# Patient Record
Sex: FEMALE | Race: OTHER | HISPANIC OR LATINO | Employment: FULL TIME | URBAN - METROPOLITAN AREA
[De-identification: names, ages, dates, MRNs, and addresses within clinical notes are randomized per-mention and may not be internally consistent; named-entity substitution may affect disease eponyms.]

---

## 2017-02-23 ENCOUNTER — ALLSCRIPTS OFFICE VISIT (OUTPATIENT)
Dept: OTHER | Facility: OTHER | Age: 22
End: 2017-02-23

## 2017-05-26 ENCOUNTER — HOSPITAL ENCOUNTER (EMERGENCY)
Facility: HOSPITAL | Age: 22
Discharge: HOME | End: 2017-05-26
Attending: EMERGENCY MEDICINE | Admitting: EMERGENCY MEDICINE
Payer: COMMERCIAL

## 2017-05-26 ENCOUNTER — APPOINTMENT (EMERGENCY)
Dept: RADIOLOGY | Facility: HOSPITAL | Age: 22
End: 2017-05-26
Payer: COMMERCIAL

## 2017-05-26 VITALS
WEIGHT: 140 LBS | OXYGEN SATURATION: 98 % | RESPIRATION RATE: 18 BRPM | TEMPERATURE: 97.9 F | DIASTOLIC BLOOD PRESSURE: 59 MMHG | SYSTOLIC BLOOD PRESSURE: 111 MMHG | HEART RATE: 107 BPM

## 2017-05-26 DIAGNOSIS — R10.9 ABDOMINAL PAIN: Primary | ICD-10-CM

## 2017-05-26 LAB
BACTERIA UR QL AUTO: ABNORMAL /HPF
BILIRUB UR QL STRIP: NEGATIVE
CLARITY UR: CLEAR
COLOR UR: YELLOW
COLOR, POC: YELLOW
GLUCOSE UR STRIP-MCNC: NEGATIVE MG/DL
HCG UR QL: NEGATIVE
HGB UR QL STRIP.AUTO: ABNORMAL
HYALINE CASTS #/AREA URNS LPF: ABNORMAL /LPF
KETONES UR STRIP-MCNC: NEGATIVE MG/DL
LEUKOCYTE ESTERASE UR QL STRIP: NEGATIVE
NITRITE UR QL STRIP: NEGATIVE
NON-SQ EPI CELLS URNS QL MICRO: ABNORMAL /HPF
PH UR STRIP.AUTO: 5 [PH] (ref 4.5–8)
PROT UR STRIP-MCNC: NEGATIVE MG/DL
RBC #/AREA URNS AUTO: ABNORMAL /HPF
SP GR UR STRIP.AUTO: >=1.03 (ref 1–1.03)
UROBILINOGEN UR QL STRIP.AUTO: 0.2 E.U./DL
WBC #/AREA URNS AUTO: ABNORMAL /HPF

## 2017-05-26 PROCEDURE — 99284 EMERGENCY DEPT VISIT MOD MDM: CPT

## 2017-05-26 PROCEDURE — A9270 NON-COVERED ITEM OR SERVICE: HCPCS | Performed by: EMERGENCY MEDICINE

## 2017-05-26 PROCEDURE — 81002 URINALYSIS NONAUTO W/O SCOPE: CPT | Performed by: EMERGENCY MEDICINE

## 2017-05-26 PROCEDURE — 74176 CT ABD & PELVIS W/O CONTRAST: CPT

## 2017-05-26 PROCEDURE — 81001 URINALYSIS AUTO W/SCOPE: CPT

## 2017-05-26 PROCEDURE — 76856 US EXAM PELVIC COMPLETE: CPT

## 2017-05-26 PROCEDURE — 93976 VASCULAR STUDY: CPT

## 2017-05-26 PROCEDURE — 76830 TRANSVAGINAL US NON-OB: CPT

## 2017-05-26 PROCEDURE — 81025 URINE PREGNANCY TEST: CPT | Performed by: EMERGENCY MEDICINE

## 2017-05-26 RX ORDER — IBUPROFEN 400 MG/1
400 TABLET ORAL ONCE
Status: COMPLETED | OUTPATIENT
Start: 2017-05-26 | End: 2017-05-26

## 2017-05-26 RX ORDER — ONDANSETRON 4 MG/1
4 TABLET, FILM COATED ORAL EVERY 8 HOURS PRN
Qty: 20 TABLET | Refills: 0 | Status: SHIPPED | OUTPATIENT
Start: 2017-05-26 | End: 2018-05-17

## 2017-05-26 RX ORDER — ONDANSETRON 4 MG/1
4 TABLET, ORALLY DISINTEGRATING ORAL ONCE
Status: COMPLETED | OUTPATIENT
Start: 2017-05-26 | End: 2017-05-26

## 2017-05-26 RX ADMIN — ONDANSETRON 4 MG: 4 TABLET, ORALLY DISINTEGRATING ORAL at 15:07

## 2017-05-26 RX ADMIN — IBUPROFEN 400 MG: 400 TABLET ORAL at 15:31

## 2017-08-15 ENCOUNTER — ALLSCRIPTS OFFICE VISIT (OUTPATIENT)
Dept: OTHER | Facility: OTHER | Age: 22
End: 2017-08-15

## 2017-08-15 DIAGNOSIS — R00.2 PALPITATIONS: ICD-10-CM

## 2017-08-15 DIAGNOSIS — R07.89 OTHER CHEST PAIN: ICD-10-CM

## 2017-08-16 ENCOUNTER — TRANSCRIBE ORDERS (OUTPATIENT)
Dept: LAB | Facility: HOSPITAL | Age: 22
End: 2017-08-16

## 2017-08-16 ENCOUNTER — APPOINTMENT (OUTPATIENT)
Dept: LAB | Facility: HOSPITAL | Age: 22
End: 2017-08-16
Payer: COMMERCIAL

## 2017-08-16 ENCOUNTER — TRANSCRIBE ORDERS (OUTPATIENT)
Dept: ADMINISTRATIVE | Facility: HOSPITAL | Age: 22
End: 2017-08-16

## 2017-08-16 DIAGNOSIS — I49.9 IRREGULAR HEART BEAT: Primary | ICD-10-CM

## 2017-08-16 DIAGNOSIS — R07.89 OTHER CHEST PAIN: ICD-10-CM

## 2017-08-16 DIAGNOSIS — R00.2 PALPITATIONS: ICD-10-CM

## 2017-08-16 LAB
25(OH)D3 SERPL-MCNC: 20 NG/ML (ref 30–100)
ALBUMIN SERPL BCP-MCNC: 3.8 G/DL (ref 3.5–5)
ALP SERPL-CCNC: 63 U/L (ref 46–116)
ALT SERPL W P-5'-P-CCNC: 16 U/L (ref 12–78)
ANION GAP SERPL CALCULATED.3IONS-SCNC: 8 MMOL/L (ref 4–13)
AST SERPL W P-5'-P-CCNC: 11 U/L (ref 5–45)
BASOPHILS # BLD AUTO: 0.02 THOUSANDS/ΜL (ref 0–0.1)
BASOPHILS NFR BLD AUTO: 0 % (ref 0–1)
BILIRUB SERPL-MCNC: 0.5 MG/DL (ref 0.2–1)
BUN SERPL-MCNC: 16 MG/DL (ref 5–25)
CALCIUM SERPL-MCNC: 9.2 MG/DL (ref 8.3–10.1)
CHLORIDE SERPL-SCNC: 103 MMOL/L (ref 100–108)
CO2 SERPL-SCNC: 26 MMOL/L (ref 21–32)
CREAT SERPL-MCNC: 0.7 MG/DL (ref 0.6–1.3)
EOSINOPHIL # BLD AUTO: 0.09 THOUSAND/ΜL (ref 0–0.61)
EOSINOPHIL NFR BLD AUTO: 1 % (ref 0–6)
ERYTHROCYTE [DISTWIDTH] IN BLOOD BY AUTOMATED COUNT: 13.5 % (ref 11.6–15.1)
GFR SERPL CREATININE-BSD FRML MDRD: 123 ML/MIN/1.73SQ M
GLUCOSE P FAST SERPL-MCNC: 91 MG/DL (ref 65–99)
HCT VFR BLD AUTO: 41.8 % (ref 34.8–46.1)
HGB BLD-MCNC: 13.6 G/DL (ref 11.5–15.4)
LYMPHOCYTES # BLD AUTO: 2.14 THOUSANDS/ΜL (ref 0.6–4.47)
LYMPHOCYTES NFR BLD AUTO: 33 % (ref 14–44)
MCH RBC QN AUTO: 29.3 PG (ref 26.8–34.3)
MCHC RBC AUTO-ENTMCNC: 32.5 G/DL (ref 31.4–37.4)
MCV RBC AUTO: 90 FL (ref 82–98)
MONOCYTES # BLD AUTO: 0.58 THOUSAND/ΜL (ref 0.17–1.22)
MONOCYTES NFR BLD AUTO: 9 % (ref 4–12)
NEUTROPHILS # BLD AUTO: 3.6 THOUSANDS/ΜL (ref 1.85–7.62)
NEUTS SEG NFR BLD AUTO: 57 % (ref 43–75)
NRBC BLD AUTO-RTO: 0 /100 WBCS
PLATELET # BLD AUTO: 284 THOUSANDS/UL (ref 149–390)
PMV BLD AUTO: 10.1 FL (ref 8.9–12.7)
POTASSIUM SERPL-SCNC: 4.2 MMOL/L (ref 3.5–5.3)
PROT SERPL-MCNC: 7.4 G/DL (ref 6.4–8.2)
RBC # BLD AUTO: 4.64 MILLION/UL (ref 3.81–5.12)
SODIUM SERPL-SCNC: 137 MMOL/L (ref 136–145)
TSH SERPL DL<=0.05 MIU/L-ACNC: 2.42 UIU/ML (ref 0.36–3.74)
WBC # BLD AUTO: 6.43 THOUSAND/UL (ref 4.31–10.16)

## 2017-08-16 PROCEDURE — 84443 ASSAY THYROID STIM HORMONE: CPT

## 2017-08-16 PROCEDURE — 85025 COMPLETE CBC W/AUTO DIFF WBC: CPT

## 2017-08-16 PROCEDURE — 80053 COMPREHEN METABOLIC PANEL: CPT

## 2017-08-16 PROCEDURE — 36415 COLL VENOUS BLD VENIPUNCTURE: CPT

## 2017-08-16 PROCEDURE — 82306 VITAMIN D 25 HYDROXY: CPT

## 2017-08-17 ENCOUNTER — GENERIC CONVERSION - ENCOUNTER (OUTPATIENT)
Dept: OTHER | Facility: OTHER | Age: 22
End: 2017-08-17

## 2017-08-24 ENCOUNTER — HOSPITAL ENCOUNTER (OUTPATIENT)
Dept: NON INVASIVE DIAGNOSTICS | Facility: CLINIC | Age: 22
Discharge: HOME | End: 2017-08-24
Payer: COMMERCIAL

## 2017-08-24 DIAGNOSIS — R00.2 PALPITATIONS: ICD-10-CM

## 2017-08-24 DIAGNOSIS — R07.89 OTHER CHEST PAIN: ICD-10-CM

## 2017-08-24 PROCEDURE — 93306 TTE W/DOPPLER COMPLETE: CPT

## 2017-08-24 PROCEDURE — 93226 XTRNL ECG REC<48 HR SCAN A/R: CPT

## 2017-08-24 PROCEDURE — 93225 XTRNL ECG REC<48 HRS REC: CPT

## 2017-09-14 ENCOUNTER — TRANSCRIBE ORDERS (OUTPATIENT)
Dept: LAB | Facility: HOSPITAL | Age: 22
End: 2017-09-14

## 2017-09-14 ENCOUNTER — APPOINTMENT (OUTPATIENT)
Dept: LAB | Facility: HOSPITAL | Age: 22
End: 2017-09-14
Payer: COMMERCIAL

## 2017-09-14 DIAGNOSIS — Z11.3 SCREENING EXAMINATION FOR VENEREAL DISEASE: ICD-10-CM

## 2017-09-14 DIAGNOSIS — Z11.3 SCREENING EXAMINATION FOR VENEREAL DISEASE: Primary | ICD-10-CM

## 2017-09-14 LAB
HBV SURFACE AG SER QL: NORMAL
HCV AB SER QL: NORMAL

## 2017-09-14 PROCEDURE — 87389 HIV-1 AG W/HIV-1&-2 AB AG IA: CPT

## 2017-09-14 PROCEDURE — 86694 HERPES SIMPLEX NES ANTBDY: CPT

## 2017-09-14 PROCEDURE — 86696 HERPES SIMPLEX TYPE 2 TEST: CPT

## 2017-09-14 PROCEDURE — 86592 SYPHILIS TEST NON-TREP QUAL: CPT

## 2017-09-14 PROCEDURE — 86695 HERPES SIMPLEX TYPE 1 TEST: CPT

## 2017-09-14 PROCEDURE — 87340 HEPATITIS B SURFACE AG IA: CPT

## 2017-09-14 PROCEDURE — 36415 COLL VENOUS BLD VENIPUNCTURE: CPT

## 2017-09-14 PROCEDURE — 86803 HEPATITIS C AB TEST: CPT

## 2017-09-15 LAB
HIV 1+2 AB+HIV1 P24 AG SERPL QL IA: NORMAL
HSV1 IGG SER IA-ACNC: 29.7 INDEX (ref 0–0.9)
HSV2 IGG SER IA-ACNC: <0.91 INDEX (ref 0–0.9)
RPR SER QL: NORMAL

## 2017-09-18 LAB — HSV1+2 IGM SER IA-ACNC: 1.56 RATIO (ref 0–0.9)

## 2017-09-26 ENCOUNTER — ALLSCRIPTS OFFICE VISIT (OUTPATIENT)
Dept: OTHER | Facility: OTHER | Age: 22
End: 2017-09-26

## 2018-01-10 NOTE — PROGRESS NOTES
Assessment    1  Encounter for preventive health examination (V70 0) (Z00 00)   2  Cherry angioma (448 1) (I78 1)   3  Vitamin D deficiency (268 9) (E55 9)    Plan  Cherry angioma    · [facility] (DERMATOLOGY ) Physician Referral  Consult  Status: Hold For -  Scheduling  Requested for: [id number]  Care Summary provided  : Yes  Cherry angioma, Health Maintenance    · (1) COMPREHENSIVE METABOLIC PANEL; Status:Active; Requested for:20Oct2016;    · (1) LIPID PANEL FASTING W DIRECT LDL REFLEX; Status:Active; Requested  for:20Oct2016;     Discussion/Summary  health maintenance visit healthy adult female Currently, she eats an adequate diet and has an inadequate exercise regimen  the risks and benefits of cervical cancer screening were discussed cervical cancer screening is current Breast cancer screening: the risks and benefits of breast cancer screening were discussed and breast cancer screening is not indicated  Colorectal cancer screening: the risks and benefits of colorectal cancer screening were discussed and colorectal cancer screening is not indicated  Advice and education were given regarding nutrition  Patient discussion: discussed with the patient  The patient was counseled regarding instructions for management, risk factor reductions, impressions  total time of encounter was 30 minutes  Chief Complaint  HM  UTD with Pap and flu vaccine      History of Present Illness  , Adult Female: The patient is being seen for a health maintenance evaluation  The last health maintenance visit was 1 year(s) ago  General Health: The patient's health since the last visit is described as good  Lifestyle:  She consumes a diverse and healthy diet  She does not have any weight concerns  She does not exercise regularly  She does not use tobacco  She denies alcohol use  She denies drug use  Reproductive health: the patient is premenopausal   she reports normal menses  she uses contraception   For contraception, she uses the intravaginal ring  she is sexually active  she denies prior pregnancies  Screening:   metabolic screening reviewed and current  Review of Systems    Constitutional: No fever, no chills, feels well, no tiredness, no recent weight gain or weight loss  Eyes: No complaints of eye pain, no red eyes, no eyesight problems, no discharge, no dry eyes, no itching of eyes  ENT: no complaints of earache, no loss of hearing, no nose bleeds, no nasal discharge, no sore throat, no hoarseness  Cardiovascular: No complaints of slow heart rate, no fast heart rate, no chest pain, no palpitations, no leg claudication, no lower extremity edema  Respiratory: No complaints of shortness of breath, no wheezing, no cough, no SOB on exertion, no orthopnea, no PND  Gastrointestinal: No complaints of abdominal pain, no constipation, no nausea or vomiting, no diarrhea, no bloody stools  Genitourinary: No complaints of dysuria, no incontinence, no pelvic pain, no dysmenorrhea, no vaginal discharge or bleeding  Musculoskeletal: No complaints of arthralgias, no myalgias, no joint swelling or stiffness, no limb pain or swelling  Integumentary: cherry angionma on right leg, bothersome bc bleeds when she shaves, has it for years  otherwise no apin or sxs , but No complaints of skin rash or lesions, no itching, no skin wounds, no breast pain or lump  Neurological: No complaints of headache, no confusion, no convulsions, no numbness, no dizziness or fainting, no tingling, no limb weakness, no difficulty walking  Psychiatric: Not suicidal, no sleep disturbance, no anxiety or depression, no change in personality, no emotional problems  Endocrine: No complaints of proptosis, no hot flashes, no muscle weakness, no deepening of the voice, no feelings of weakness  Hematologic/Lymphatic: No complaints of swollen glands, no swollen glands in the neck, does not bleed easily, does not bruise easily        Active Problems 1  Tired (780 79) (R53 83)   2  Vitamin D deficiency (268 9) (E55 9)    Past Medical History    · History of Healthy adult    Surgical History    · History of Prior Surgical Procedure Not Done    Family History  Mother    · Family history of hypertension (V17 49) (Z82 49)  Father    · Family history of Vitamin D deficiency  Maternal Grandmother    · Family history of diabetes mellitus (V18 0) (Z83 3)  Paternal Uncle    · Family history of schizophrenia (V17 0) (Z81 8)    Social History    · Caffeine use (V49 89) (F15 90)   · Full time student   · general [employer] [address] [facility]   · Never a smoker   · No drug use   · Occasional alcohol use   · Part-time employment   · Childs and Minor-    · Single    Current Meds   1  Calcium 500/D 500-200 MG-UNIT Oral Tablet; TAKE 1 TABLET TWICE DAILY AS   DIRECTED; Therapy: 73XMV0097 to (Evaluate:61Ojh7397)  Requested for: 57HTZ1384; Last   Rx:01Feb2016 Ordered   2  Dicyclomine HCl - 10 MG Oral Capsule; TAKE 1 CAPSULE EVERY 6 HOURS AS   NEEDED for cramping; Therapy: 60GXK6449 to (Evaluate:56Yto6548)  Requested for: 19ICF9856; Last   Rx:29Jan2016 Ordered   3  Ergocalciferol 32979 UNIT Oral Capsule; TAKE 1 CAPSULE BY MOUTH WEEKLY; Therapy: 48XOY9463 to (Last Rx:01Feb2016)  Requested for: 17KQX8138 Ordered   4  NuvaRing 0 12-0 015 MG/24HR Vaginal Ring; USE AS DIRECTED; Therapy: 68TAT8077 to Recorded    Allergies    1  No Known Drug Allergies    Vitals   Recorded: 58HPF3791 74:96GC   Systolic 272   Diastolic 70   Heart Rate 90   Temperature 98 6 F   Pain Scale 0   LMP 55Xhx5309   O2 Saturation 100   Height 5 ft 1 in   Weight 127 lb 2 oz   BMI Calculated 24 02   BSA Calculated 1 56     Physical Exam    Constitutional   General appearance: No acute distress, well appearing and well nourished  Head and Face   Head and face: Normal     Palpation of the face and sinuses: No sinus tenderness  Eyes   Conjunctiva and lids: No swelling, erythema or discharge  Ears, Nose, Mouth, and Throat   External inspection of ears and nose: Normal     Nasal mucosa, septum, and turbinates: Normal without edema or erythema  Lips, teeth, and gums: Normal, good dentition  Oropharynx: Normal with no erythema, edema, exudate or lesions  Neck   Neck: Supple, symmetric, trachea midline, no masses  Thyroid: Normal, no thyromegaly  Pulmonary   Respiratory effort: No increased work of breathing or signs of respiratory distress  Palpation of chest: Normal     Auscultation of lungs: Clear to auscultation  Cardiovascular   Auscultation of heart: Normal rate and rhythm, normal S1 and S2, no murmurs  Peripheral vascular exam: Normal     Examination of extremities for edema and/or varicosities: Normal     Chest   Breasts: Normal, no dimpling or skin changes appreciated  Chest: Normal     Abdomen   Abdomen: Non-tender, no masses  Examination for hernias: No hernia appreciated  Lymphatic   Palpation of lymph nodes in neck: No lymphadenopathy  Musculoskeletal   Gait and station: Normal     Joints, bones, and muscles: Normal     Skin   Skin and subcutaneous tissue: Normal without rashes or lesions  cherry angioma on right upper leg  Palpation of skin and subcutaneous tissue: Normal turgor  Neurologic   Cranial nerves: Cranial nerves II-XII intact  Psychiatric   Judgment and insight: Normal     Mood and affect: Normal        Results/Data  PHQ-2 Adult Depression Screening 20Oct2016 03:00PM User, s     Test Name Result Flag Reference   PHQ-2 Adult Depression Score 0     Over the last two weeks, how often have you been bothered by any of the following problems?   Little interest or pleasure in doing things: Not at all - 0  Feeling down, depressed, or hopeless: Not at all - 0   PHQ-2 Adult Depression Screening Negative         Health Management  History of Influenza vaccine needed   Fluzone everyuadrivalent 0 5 ML Intramuscular Suspension; every 1 year; Last  15NCW5868; Next Due: 65BXF3333; Overdue  History of Need for Tdap vaccination   Adacel 5-2-15 5 LF-MCG/0 5 Intramuscular Suspension; every 10 years; Next Due:  91FUK7061; Overdue    Future Appointments    Date/Time Provider Specialty Site   10/23/2017 01:00 PM ELZA Hill   Family Medicine [address]     Signatures   Electronically signed by : ELZA Ross ; Oct 20 2016  6:05PM EST                       (Author)

## 2018-01-10 NOTE — RESULT NOTES
Verified Results  (1) COMPREHENSIVE METABOLIC PANEL 34EVK3755 58:38JK [facility] Order Number: [id number]     Test Name Result Flag Reference   SODIUM 137 mmol/L  136-145   POTASSIUM 4 2 mmol/L  3 5-5 3   CHLORIDE 103 mmol/L  100-108   CARBON DIOXIDE 26 mmol/L  21-32   ANION GAP (CALC) 8 mmol/L  4-13   BLOOD UREA NITROGEN 16 mg/dL  5-25   CREATININE 0 70 mg/dL  0 60-1 30   Standardized to IDMS reference method   CALCIUM 9 2 mg/dL  8 3-10 1   BILI, TOTAL 0 50 mg/dL  0 20-1 00   ALK PHOSPHATAS 63 U/L     ALT (SGPT) 16 U/L  12-78   Specimen collection should occur prior to Sulfasalazine and/or Sulfapyridine administration due to the potential for falsely depressed results  AST(SGOT) 11 U/L  5-45   Specimen collection should occur prior to Sulfasalazine administration due to the potential for falsely depressed results  ALBUMIN 3 8 g/dL  3 5-5 0   TOTAL PROTEIN 7 4 g/dL  6 4-8 2   eGFR 123 ml/min/1 73sq [facility] Disease Education Program recommendations are as follows:  GFR calculation is accurate only with a steady state creatinine  Chronic Kidney disease less than 60 ml/min/1 73 sq  meters  Kidney failure less than 15 ml/min/1 73 sq  meters  GLUCOSE FASTING 91 mg/dL  65-99   Specimen collection should occur prior to Sulfasalazine administration due to the potential for falsely depressed results  Specimen collection should occur prior to Sulfapyridine administration due to the potential for falsely elevated results  (1) TSH WITH FT4 REFLEX 53Gbr5515 08:47AM [facility] Order Number: [id number]     Test Name Result Flag Reference   TSH 2 420 uIU/mL  0 358-3 740   Patients undergoing fluorescein dye angiography may retain small amounts of fluorescein in the body for 48-72 hours post procedure  Samples containing fluorescein can produce falsely depressed TSH values   If the patient had this procedure,a specimen should be resubmitted post fluorescein clearance  The recommended reference ranges for TSH during pregnancy are as follows:  First trimester 0 1 to 2 5 uIU/mL  Second trimester  0 2 to 3 0 uIU/mL  Third trimester 0 3 to 3 0 uIU/m     (1) VITAMIN D 25-HYDROXY 29Vgb3792 08:47AM Liseth Doll    Order Number: [id number]     Test Name Result Flag Reference   VIT D 25-HYDROX 20 0 ng/mL L 30 0-100 0   This assay is a certified procedure of the CDC Vitamin D Standardization Certification Program (VDSCP)     Deficiency <20ng/ml   Insufficiency 20-30ng/ml   Sufficient  ng/ml     *Patients undergoing fluorescein dye angiography may retain small amounts of fluorescein in the body for 48-72 hours post procedure  Samples containing fluorescein can produce falsely elevated Vitamin D values  If the patient had this procedure, a specimen should be resubmitted post fluorescein clearance  (1) CBC/PLT/DIFF 16SJC4240 08:47AM Liseth Doll    Order Number: [id number]     Test Name Result Flag Reference   WBC COUNT 6 43 Thousand/uL  4 31-10 16   RBC COUNT 4 64 Million/uL  3 81-5 12   HEMOGLOBIN 13 6 g/dL  11 5-15 4   HEMATOCRIT 41 8 %  34 8-46  1   MCV 90 fL  82-98   MCH 29 3 pg  26 8-34 3   MCHC 32 5 g/dL  31 4-37 4   RDW 13 5 %  11 6-15 1   MPV 10 1 fL  8 9-12 7   PLATELET COUNT 118 Thousands/uL  149-390   nRBC AUTOMATED 0 /100 WBCs     NEUTROPHILS RELATIVE PERCENT 57 %  43-75   LYMPHOCYTES RELATIVE PERCENT 33 %  14-44   MONOCYTES RELATIVE PERCENT 9 %  4-12   EOSINOPHILS RELATIVE PERCENT 1 %  0-6   BASOPHILS RELATIVE PERCENT 0 %  0-1   NEUTROPHILS ABSOLUTE COUNT 3 60 Thousands/? ??L  1 85-7 62   LYMPHOCYTES ABSOLUTE COUNT 2 14 Thousands/? ??L  0 60-4 47   MONOCYTES ABSOLUTE COUNT 0 58 Thousand/? ??L  0 17-1 22   EOSINOPHILS ABSOLUTE COUNT 0 09 Thousand/? ??L  0 00-0 61   BASOPHILS ABSOLUTE COUNT 0 02 Thousands/? ??L  0 00-0 10

## 2018-01-12 VITALS
DIASTOLIC BLOOD PRESSURE: 60 MMHG | RESPIRATION RATE: 16 BRPM | WEIGHT: 133 LBS | HEART RATE: 85 BPM | HEIGHT: 61 IN | OXYGEN SATURATION: 98 % | SYSTOLIC BLOOD PRESSURE: 100 MMHG | TEMPERATURE: 98.4 F | BODY MASS INDEX: 25.11 KG/M2

## 2018-01-12 VITALS
TEMPERATURE: 98.1 F | HEIGHT: 61 IN | BODY MASS INDEX: 25.86 KG/M2 | SYSTOLIC BLOOD PRESSURE: 100 MMHG | WEIGHT: 137 LBS | OXYGEN SATURATION: 98 % | DIASTOLIC BLOOD PRESSURE: 70 MMHG | RESPIRATION RATE: 18 BRPM | HEART RATE: 85 BPM

## 2018-01-13 NOTE — RESULT NOTES
Message   TSH is within normal limits  VIT D is very low - will send over prescription to pharm - 50,000 iu vit D2 once a week x 8 weeks - choose a day that you will remember - I recommend one of the week end days  Also take calcium 500 mg 2 x a day daily ( twice daily is needed since you dont get much calcium enriched foods in diet) - sent script over to pharmacy - calcium is needed for VIT D to be absorbed  Please obtain US as ordered at [address]  Verified Results  (1) TSH WITH FT4 REFLEX 30Jan2016 11:23AM Farhat Cooper   [facility] Order Number: [id number]    Patients undergoing fluorescein dye angiography may retain small amounts of fluorescein in the body for 48-72 hours post procedure  Samples containing fluorescein can produce falsely depressed TSH values  If the patient had this procedure,a specimen should be resubmitted post fluorescein clearance          The recommended reference ranges for TSH during pregnancy are as follows:  First trimester 0 1 to 2 5 uIU/mL  Second trimester  0 2 to 3 0 uIU/mL  Third trimester 0 3 to 3 0 uIU/m     Test Name Result Flag Reference   TSH 1 570 uIU/mL  0 463-3 980     (1) VITAMIN D 25-HYDROXY 30Jan2016 11:23AM Farhat COLLADO Order Number: [id number]     Order Number: [id number]     Test Name Result Flag Reference   VIT D 25-HYDROX 16 3 ng/mL L 30 0-100 0       Plan  Vitamin D deficiency    · Calcium 500/D 500-200 MG-UNIT Oral Tablet; TAKE 1 TABLET TWICE DAILY AS  DIRECTED   · Ergocalciferol 54561 UNIT Oral Capsule; TAKE 1 CAPSULE BY MOUTH WEEKLY

## 2018-01-14 VITALS
WEIGHT: 138 LBS | OXYGEN SATURATION: 99 % | HEART RATE: 79 BPM | TEMPERATURE: 98.2 F | BODY MASS INDEX: 26.06 KG/M2 | DIASTOLIC BLOOD PRESSURE: 70 MMHG | HEIGHT: 61 IN | SYSTOLIC BLOOD PRESSURE: 100 MMHG

## 2018-01-15 NOTE — RESULT NOTES
Message   Pt has a normal thyroid on US/ normal TSH  No further work up is necessary  Verified Results  US THYROID [id number] 11:44AM Arielle Lucas     Test Name Result Flag Reference   US THYROID (Report)     THYROID ULTRASOUND     INDICATION: Enlarged thyroid gland  COMPARISON: None  TECHNIQUE:  Ultrasound of the thyroid was performed with a high frequency linear transducer in transverse and sagittal planes including volumetric imaging sweeps as well as traditional still imaging technique  FINDINGS:   Normal homogeneous smooth echotexture  Right gland: 4 8 x 1 0 cm  No dominant nodules  Left gland: 4 6 x 1 0 cm  No dominant nodules  Isthmus: 0 8 cm in AP dimension  No dominant nodules  IMPRESSION:      Normal      Signed by:    Paulo Gonsalez MD   2/1/16

## 2018-02-14 DIAGNOSIS — B00.1 COLD SORE: Primary | ICD-10-CM

## 2018-02-14 RX ORDER — VALACYCLOVIR HYDROCHLORIDE 1 G/1
TABLET, FILM COATED ORAL
COMMUNITY
Start: 2017-04-14 | End: 2018-02-14 | Stop reason: SDUPTHER

## 2018-02-15 RX ORDER — VALACYCLOVIR HYDROCHLORIDE 500 MG/1
TABLET, FILM COATED ORAL
Qty: 15 TABLET | Refills: 1 | Status: SHIPPED | OUTPATIENT
Start: 2018-02-15 | End: 2018-05-17

## 2018-05-17 ENCOUNTER — APPOINTMENT (EMERGENCY)
Dept: RADIOLOGY | Facility: HOSPITAL | Age: 23
End: 2018-05-17
Payer: OTHER MISCELLANEOUS

## 2018-05-17 ENCOUNTER — HOSPITAL ENCOUNTER (EMERGENCY)
Facility: HOSPITAL | Age: 23
Discharge: HOME | End: 2018-05-17
Payer: OTHER MISCELLANEOUS

## 2018-05-17 VITALS
TEMPERATURE: 98.3 F | DIASTOLIC BLOOD PRESSURE: 69 MMHG | OXYGEN SATURATION: 98 % | SYSTOLIC BLOOD PRESSURE: 124 MMHG | HEART RATE: 93 BPM | RESPIRATION RATE: 20 BRPM

## 2018-05-17 DIAGNOSIS — S80.211A ABRASION, RIGHT KNEE, INITIAL ENCOUNTER: ICD-10-CM

## 2018-05-17 DIAGNOSIS — S40.021A HEMATOMA OF ARM, RIGHT, INITIAL ENCOUNTER: Primary | ICD-10-CM

## 2018-05-17 DIAGNOSIS — S60.811A ABRASION OF RIGHT WRIST, INITIAL ENCOUNTER: ICD-10-CM

## 2018-05-17 DIAGNOSIS — M25.561 RIGHT KNEE PAIN: ICD-10-CM

## 2018-05-17 DIAGNOSIS — S60.211A CONTUSION OF RIGHT WRIST, INITIAL ENCOUNTER: ICD-10-CM

## 2018-05-17 PROCEDURE — 73564 X-RAY EXAM KNEE 4 OR MORE: CPT

## 2018-05-17 PROCEDURE — 99283 EMERGENCY DEPT VISIT LOW MDM: CPT

## 2018-05-17 PROCEDURE — 73110 X-RAY EXAM OF WRIST: CPT

## 2018-05-17 PROCEDURE — 73060 X-RAY EXAM OF HUMERUS: CPT

## 2018-05-17 RX ORDER — IBUPROFEN 600 MG/1
600 TABLET ORAL ONCE
Status: COMPLETED | OUTPATIENT
Start: 2018-05-17 | End: 2018-05-17

## 2018-05-17 RX ADMIN — IBUPROFEN 600 MG: 600 TABLET ORAL at 16:01

## 2018-05-17 NOTE — DISCHARGE INSTRUCTIONS
Abrasion   WHAT YOU NEED TO KNOW:   An abrasion is a scrape on your skin  It happens when your skin rubs against a rough surface  Some examples of an abrasion include rug burn, a skinned elbow, or road rash  Abrasions can be many shapes and sizes  The wound may hurt, bleed, bruise, or swell  DISCHARGE INSTRUCTIONS:   Return to the emergency department if:   · The bleeding does not stop after 10 minutes of firm pressure  · You cannot rinse one or more foreign objects out of your wound  · You have red streaks on your skin coming from your wound  Contact your healthcare provider if:   · You have a fever or chills  · Your abrasion is red, warm, swollen, or draining pus  · You have questions or concerns about your condition or care  Care for your abrasion:   · Wash your hands and dry them with a clean towel  · Press a clean cloth against your wound to stop any bleeding  · Rinse your wound with a lot of clean water  Do not use harsh soap, alcohol, or iodine solutions  · Use a clean, wet cloth to remove any objects, such as small pieces of rocks or dirt  · Rub antibiotic ointment on your wound  This may help prevent infection and help your wound heal     · Cover the wound with a non-stick bandage  Change the bandage daily, and if gets wet or dirty  Follow up with your healthcare provider as directed:  Write down your questions so you remember to ask them during your visits  © 2017 [employer] Information is for End User's use only and may not be sold, redistributed or otherwise used for commercial purposes  All illustrations and images included in CareNotes® are the copyrighted property of [employer]  or Seth Truong  The above information is an  only  It is not intended as medical advice for individual conditions or treatments   Talk to your doctor, nurse or pharmacist before following any medical regimen to see if it is safe and effective for you     Contusion in [facility]:   A contusion is a bruise that appears on your skin after an injury  A bruise happens when small blood vessels tear but skin does not  When blood vessels tear, blood leaks into nearby tissue, such as soft tissue or muscle  DISCHARGE INSTRUCTIONS:   Seek care immediately if:   · You have new trouble moving the injured area  · You have tingling or numbness in or near the injured area  · Your hand or foot below the bruise gets cold or turns pale  Contact your healthcare provider if:   · You find a new lump in the injured area  · Your symptoms do not improve with treatment after 4 to 5 days  · You have questions or concerns about your condition or care  Medicines: You may need any of the following:  · NSAIDs , such as ibuprofen, help decrease swelling, pain, and fever  This medicine is available with or without a doctor's order  NSAIDs can cause stomach bleeding or kidney problems in certain people  If you take blood thinner medicine, always ask your healthcare provider if NSAIDs are safe for you  Always read the medicine label and follow directions  · Prescription pain medicine  may be given  Do not wait until the pain is severe before you take your medicine  · Take your medicine as directed  Contact your healthcare provider if you think your medicine is not helping or if you have side effects  Tell him or her if you are allergic to any medicine  Keep a list of the medicines, vitamins, and herbs you take  Include the amounts, and when and why you take them  Bring the list or the pill bottles to follow-up visits  Carry your medicine list with you in case of an emergency  Follow up with your healthcare provider as directed: You may need to return within a week to check your injury again  Write down your questions so you remember to ask them during your visits    Help a contusion heal:   · Rest the injured area  or use it less than usual  If you bruised your leg or foot, you may need crutches or a cane to help you walk  This will help you keep weight off your injured body part  · Apply ice  to decrease swelling and pain  Ice may also help prevent tissue damage  Use an ice pack, or put crushed ice in a plastic bag  Cover it with a towel and place it on your bruise for 15 to 20 minutes every hour or as directed  · Use compression  to support the area and decrease swelling  Wrap an elastic bandage around the area over the bruised muscle  Make sure the bandage is not too tight  You should be able to fit 1 finger between the bandage and your skin  · Elevate (raise) your injured body part  above the level of your heart to help decrease pain and swelling  Use pillows, blankets, or rolled towels to elevate the area as often as you can  · Do not drink alcohol  as directed  Alcohol may slow healing  · Do not stretch injured muscles  right after your injury  Ask your healthcare provider when and how you may safely stretch after your injury  Gentle stretches can help increase your flexibility  · Do not massage the area or put heating pads  on the bruise right after your injury  Heat and massage may slow healing  Your healthcare provider may tell you to apply heat after several days  At that time, heat will start to help the injury heal   Prevent another contusion:   · Stretch and warm up before you play sports or exercise  · Wear protective gear when you play sports  Examples are shin guards and padding  · If you begin a new physical activity, start slowly to give your body a chance to adjust   © 2017 [facility] Information is for End User's use only and may not be sold, redistributed or otherwise used for commercial purposes  All illustrations and images included in CareNotes® are the copyrighted property of [employer]  or Seth Truong  The above information is an  only   It is not intended as medical advice for individual conditions or treatments  Talk to your doctor, nurse or pharmacist before following any medical regimen to see if it is safe and effective for you  Knee Pain   WHAT YOU NEED TO KNOW:   Knee pain may start suddenly, or it may be a long-term problem  You may have pain on the side, front, or back of your knee  You may have knee stiffness and swelling  You may hear popping sounds or feel like your knee is giving way or locking up as you walk  You may feel pain when you sit, stand, walk, or climb up and down stairs  Knee pain can be caused by conditions such as obesity, inflammation, or strains or tears in ligaments or tendons  DISCHARGE INSTRUCTIONS:   Follow up with your healthcare provider within 24 hours or as directed: You may need follow-up treatments, such as steroid injections to decrease pain  Write down your questions so you remember to ask them during your visits  Self-care:   · Rest  your knee so it can heal  Limit activities that increase your pain  · Ice  can help reduce swelling  Wrap ice in a towel and put it on your knee for as long and as often as directed  · Compression  with a brace or bandage can help reduce swelling  Use a brace or bandage only as directed  · Elevation  helps decrease pain and swelling  Elevate your knee while you are sitting or lying down  Prop your leg on pillows to keep your knee above the level of your heart  Medicines:   · NSAIDs  help decrease swelling and pain or fever  This medicine is available with or without a doctor's order  NSAIDs can cause stomach bleeding or kidney problems in certain people  If you take blood thinner medicine, always ask your healthcare provider if NSAIDs are safe for you  Always read the medicine label and follow directions  · Acetaminophen  decreases pain and fever  It is available without a doctor's order  Ask how much to take and when to take it  Follow directions   Acetaminophen can cause liver damage if not taken correctly  · Take your medicine as directed  Contact your healthcare provider if you think your medicine is not helping or if you have side effects  Tell him or her if you are allergic to any medicine  Keep a list of the medicines, vitamins, and herbs you take  Include the amounts, and when and why you take them  Bring the list or the pill bottles to follow-up visits  Carry your medicine list with you in case of an emergency  Exercise as directed: You may need to see a physical therapist or do recommended exercises to improve movement and decrease your pain  You may be directed to walk, swim, or ride a bike  Follow your exercise plan exactly as directed to avoid further injury  Contact your healthcare provider if:   · You have questions or concerns about your condition or care  Return to the emergency department if:   · Your pain is worse, even after treatment  · You cannot bend or straighten your leg completely  · The swelling around your knee does not go down even with treatment  · Your knee is painful and hot to the touch  © 2017 [employer]  Information is for End User's use only and may not be sold, redistributed or otherwise used for commercial purposes  All illustrations and images included in CareNotes® are the copyrighted property of [employer]  or Seth Truong  The above information is an  only  It is not intended as medical advice for individual conditions or treatments  Talk to your doctor, nurse or pharmacist before following any medical regimen to see if it is safe and effective for you  Wrist Injury   WHAT YOU NEED TO KNOW:   A wrist injury happens when the tissues of your wrist joint are damaged  Your wrist joint is made up of tendons, ligaments, nerves, and bones  Two common types of injuries that can happen to your wrist are sprains and strains  A sprain can happen when the ligaments are stretched or torn  Ligaments are bands of elastic tissue that connect and hold the bones together  A strain happens when a tendon or muscle is overused, stretched, or torn  Tendons attach your hand and arm muscles to the bones of the wrist    DISCHARGE INSTRUCTIONS:   Medicines:   · NSAIDs:  These medicines decrease swelling, pain, and fever  NSAIDs are available without a doctor's order  Ask which medicine is right for you  Ask how much to take and when to take it  Take as directed  NSAIDs can cause stomach bleeding and kidney problems if not taken correctly  · Pain medicine: You may be given a prescription medicine to decrease pain  Do not wait until the pain is severe before you take this medicine  · Take your medicine as directed  Contact your healthcare provider if you think your medicine is not helping or if you have side effects  Tell him of her if you are allergic to any medicine  Keep a list of the medicines, vitamins, and herbs you take  Include the amounts, and when and why you take them  Bring the list or the pill bottles to follow-up visits  Carry your medicine list with you in case of an emergency  Follow up with your healthcare provider as directed:  Write down your questions so you remember to ask them during your visits  Manage your symptoms:   · Wrist supports:  A cast or splint may be put on your fingers, hand, and wrist to support your wrist and prevent further damage  Wear these as directed  Ask for instructions on how to bathe while you are wearing a splint or case  · Rest:  You may need to rest your wrist for at least 48 hours and avoid activities that cause pain  Ask what activities you should avoid and for how long  · Ice:  Ice helps decrease swelling and pain  Ice may also help prevent tissue damage  Use an ice pack or put crushed ice in a plastic bag  Cover it with a towel and place it on your injured wrist for 15 to 20 minutes every hour as directed      · Compression:  Your healthcare provider may suggest you wrap your wrist with an elastic bandage  This will help decrease swelling, support your wrist, and help it heal  Wear your wrist wrap as directed  Ask for instructions on how to wrap your wrist     · Elevation:  When you sit or lie down, keep your wrist at or above the level of your heart  This may help decrease pain and swelling  Physical therapy:  Your healthcare provider may recommend that you go to physical therapy  A physical therapist shows you how to do exercises that can help to strengthen your wrist and improve its range of movement  These exercises may also help decrease your pain  Prevent another wrist injury:   · Do strengthening exercises: Your healthcare provider or physical therapist may suggest that you do exercises to strengthen your hand and arm muscles  Ask when you may return to your regular physical activities or sports  If you start to exercise too soon it may cause you to injure your wrist again  · Protect your wrists:  Wrist guard splints or protective tape can help to support your wrist during exercise and sports  These devices may also keep your wrist from bending too far back  Ask for more information about the type of wrist support that you should use  Contact your healthcare provider if:   · You have a fever  · The bruising, swelling, or pain in your wrist gets worse  · You have questions or concerns about your condition or care  Return to the emergency department if:   · The skin on or near your wrist or hand feels cold, or it turns blue or white  · The skin on or near your wrist or hand is very tight, raised, and swollen  · You have new trouble moving and using your hands, fingers, or wrist     · Your wrist, hands, or fingers become swollen, red, numb, or they tingle  · Your wrist has any open wounds, including from surgery, that are red, swollen, warm, or have pus coming from them    © 2017 [employer] Information is for End User's use only and may not be sold, redistributed or otherwise used for commercial purposes  All illustrations and images included in CareNotes® are the copyrighted property of [employer]  or Seth Truong  The above information is an  only  It is not intended as medical advice for individual conditions or treatments  Talk to your doctor, nurse or pharmacist before following any medical regimen to see if it is safe and effective for you

## 2018-05-17 NOTE — ED PROVIDER NOTES
History  Chief Complaint   Patient presents with    Fall     Pt states that she slipped and fell at work  Pt hit her right arm, wrist and knee off of the corner of the bar      25 y o  Female presents with c o  Right arm, wrist and knee pain after slipping while at work and hitting the corner of the bar and the floor; Notes fall was last night, denies hitting head, LOC, HA, blurry vision, numbness, tingling, erythema, loss of strength or motor  Does note ambulation is painful on knee  None       History reviewed  No pertinent past medical history  Past Surgical History:   Procedure Laterality Date    WISDOM TOOTH EXTRACTION         History reviewed  No pertinent family history  I have reviewed and agree with the history as documented  Social History   Substance Use Topics    Smoking status: Light Tobacco Smoker    Smokeless tobacco: Never Used      Comment: occassionally    Alcohol use Yes      Comment: occassionally        Review of Systems   Musculoskeletal: Positive for arthralgias, gait problem, joint swelling and myalgias  All other systems reviewed and are negative  Physical Exam  ED Triage Vitals   Temperature Pulse Respirations Blood Pressure SpO2   05/17/18 1435 05/17/18 1435 05/17/18 1435 05/17/18 1435 05/17/18 1435   98 3 °F (36 8 °C) 93 20 124/69 98 %      Temp Source Heart Rate Source Patient Position - Orthostatic VS BP Location FiO2 (%)   05/17/18 1435 05/17/18 1435 05/17/18 1435 05/17/18 1435 --   Oral Monitor Sitting Left arm       Pain Score       05/17/18 1601       No Pain           Orthostatic Vital Signs  Vitals:    05/17/18 1435   BP: 124/69   Pulse: 93   Patient Position - Orthostatic VS: Sitting       Physical Exam   Constitutional: She is oriented to person, place, and time  She appears well-developed and well-nourished  HENT:   Head: Normocephalic and atraumatic     Right Ear: External ear normal    Left Ear: External ear normal    Nose: Nose normal  Eyes: Conjunctivae and EOM are normal    Neck: Normal range of motion  Cardiovascular: Normal rate and regular rhythm  Pulmonary/Chest: Effort normal    Musculoskeletal: She exhibits edema and tenderness  Right wrist: She exhibits decreased range of motion and swelling  Right knee: She exhibits decreased range of motion, swelling and ecchymosis  Tenderness found  Medial joint line and lateral joint line tenderness noted  Neurological: She is alert and oriented to person, place, and time  Skin: Skin is warm and dry  Capillary refill takes less than 2 seconds  Bruising and ecchymosis noted  Psychiatric: She has a normal mood and affect  Her behavior is normal    Nursing note and vitals reviewed        ED Medications  Medications   ibuprofen (MOTRIN) tablet 600 mg (600 mg Oral Given 5/17/18 1601)       Diagnostic Studies  Results Reviewed     None                 XR knee 4+ views Right injury   ED Interpretation by Naomi England PA-C (05/17 1657)   No acute osseous abnormalitie      XR humerus RIGHT   ED Interpretation by Naomi England PA-C (05/17 1656)   No acute osseous abnormalities      XR wrist 3+ views RIGHT   ED Interpretation by Naomi England PA-C (05/17 1656)   No acute osseous abnormalities                 Procedures  Procedures       Phone Contacts  ED Phone Contact    ED Course                               MDM  Number of Diagnoses or Management Options  Abrasion of right wrist, initial encounter: new and requires workup  Abrasion, right knee, initial encounter: new and requires workup  Contusion of right wrist, initial encounter: new and requires workup  Hematoma of arm, right, initial encounter: new and requires workup  Right knee pain: new and requires workup    CritCare Time    Disposition  Final diagnoses:   None     ED Disposition     None      Follow-up Information    None       Patient's Medications   Discharge Prescriptions    No medications on file No discharge procedures on file      ED Provider  Electronically Signed by           Dolores Arreguin PA-C  05/30/18 [phone]

## 2018-05-21 ENCOUNTER — OFFICE VISIT (OUTPATIENT)
Dept: FAMILY MEDICINE CLINIC | Facility: CLINIC | Age: 23
End: 2018-05-21
Payer: COMMERCIAL

## 2018-05-21 VITALS
OXYGEN SATURATION: 98 % | HEIGHT: 61 IN | TEMPERATURE: 98.5 F | BODY MASS INDEX: 25.3 KG/M2 | WEIGHT: 134 LBS | SYSTOLIC BLOOD PRESSURE: 102 MMHG | DIASTOLIC BLOOD PRESSURE: 58 MMHG | HEART RATE: 89 BPM

## 2018-05-21 DIAGNOSIS — M25.561 ACUTE PAIN OF RIGHT KNEE: Primary | ICD-10-CM

## 2018-05-21 PROBLEM — R00.2 PALPITATIONS: Status: ACTIVE | Noted: 2017-08-15

## 2018-05-21 PROBLEM — F41.9 ANXIETY: Status: ACTIVE | Noted: 2017-09-26

## 2018-05-21 PROBLEM — B00.1 COLD SORE: Status: ACTIVE | Noted: 2017-04-14

## 2018-05-21 PROBLEM — J45.909 REACTIVE AIRWAY DISEASE: Status: ACTIVE | Noted: 2017-02-23

## 2018-05-21 PROBLEM — J30.2 SEASONAL ALLERGIC RHINITIS: Status: ACTIVE | Noted: 2017-02-23

## 2018-05-21 PROCEDURE — 99213 OFFICE O/P EST LOW 20 MIN: CPT | Performed by: FAMILY MEDICINE

## 2018-05-21 PROCEDURE — 3008F BODY MASS INDEX DOCD: CPT | Performed by: FAMILY MEDICINE

## 2018-05-21 NOTE — PROGRESS NOTES
Assessment/Plan:    No problem-specific Assessment & Plan notes found for this encounter  Diagnoses and all orders for this visit:    Acute pain of right knee  -     Ambulatory referral to Physical Therapy; Future    Other orders  -     Levonorgestrel 13 5 MG IUD; 1 each by Intrauterine route        ICE, elevate and rest for 3 days (will be 1 week post injury) for better healing  Take ibuprofen 600mg bid for a week  Can try PT after next week for ROM  PRN     Subjective:   Pt here for an acute visit  Pt feel at work on Wednesday pt was in e/r  Right knee bruised and thigh is cramping when walking     Patient ID: Sadia Marrero is a 25 y o  female  HPI    Pt presents bc last week she suffered a fall at work after she was working as a  and slipped  She injured her right knee  She went to ER and had xrays done neg for fracture  She took 2 days off but returned to work 10hr shift 2 days ago  She states there has been some improvement but still swelling and pain with some movements and soreness  She uses crutches for prolonged walking/long distances  She otherwise states did well at work and was able to push through her shift but had to take ibuprofen all day and next day felt more pain  She also noted cramping of her leg after exerting it  She is able to bear weight on right knee without pain  The following portions of the patient's history were reviewed and updated as appropriate: allergies, current medications, past family history, past medical history, past social history, past surgical history and problem list     Review of Systems   Constitutional: Negative for activity change and appetite change  Respiratory: Negative  Cardiovascular: Negative  Gastrointestinal: Negative  Genitourinary: Negative  Musculoskeletal: Negative for arthralgias  Skin: Negative for rash  Psychiatric/Behavioral: Negative            Objective:      /58   Pulse 89   Temp 98 5 °F (36 9 °C)   Ht 5' 1 22" (1 555 m)   Wt 60 8 kg (134 lb)   SpO2 98%   BMI 25 14 kg/m²           Physical Exam   Constitutional: She is oriented to person, place, and time  She appears well-developed and well-nourished  HENT:   Head: Normocephalic  Eyes: Conjunctivae are normal    Cardiovascular: Normal rate, regular rhythm and normal heart sounds  Pulmonary/Chest: Effort normal and breath sounds normal  No respiratory distress  She has no wheezes  She has no rales  Musculoskeletal:        Right knee: She exhibits decreased range of motion (at full flexion), swelling and ecchymosis  She exhibits no erythema, no LCL laxity and no MCL laxity  Tenderness (generalized) found  Legs:  Neurological: She is alert and oriented to person, place, and time  Psychiatric: She has a normal mood and affect   Her behavior is normal

## 2018-06-28 DIAGNOSIS — B00.1 COLD SORE: Primary | ICD-10-CM

## 2018-06-29 RX ORDER — VALACYCLOVIR HYDROCHLORIDE 1 G/1
1 TABLET, FILM COATED ORAL 2 TIMES DAILY
Qty: 15 TABLET | Refills: 1 | Status: SHIPPED | OUTPATIENT
Start: 2018-06-29 | End: 2019-02-08 | Stop reason: ALTCHOICE

## 2018-08-08 ENCOUNTER — EVALUATION (OUTPATIENT)
Dept: PHYSICAL THERAPY | Facility: OTHER | Age: 23
End: 2018-08-08
Payer: OTHER MISCELLANEOUS

## 2018-08-08 DIAGNOSIS — M25.561 ACUTE PAIN OF RIGHT KNEE: Primary | ICD-10-CM

## 2018-08-08 PROCEDURE — G8979 MOBILITY GOAL STATUS: HCPCS | Performed by: PHYSICAL THERAPIST

## 2018-08-08 PROCEDURE — 97140 MANUAL THERAPY 1/> REGIONS: CPT | Performed by: PHYSICAL THERAPIST

## 2018-08-08 PROCEDURE — 97161 PT EVAL LOW COMPLEX 20 MIN: CPT | Performed by: PHYSICAL THERAPIST

## 2018-08-08 PROCEDURE — 97110 THERAPEUTIC EXERCISES: CPT | Performed by: PHYSICAL THERAPIST

## 2018-08-08 PROCEDURE — G8978 MOBILITY CURRENT STATUS: HCPCS | Performed by: PHYSICAL THERAPIST

## 2018-08-08 NOTE — PROGRESS NOTES
PT Evaluation     Today's date: 2018  Patient name: Sadia Marrero  : 1995  MRN: [id number]  Referring provider: Aruna Edwards MD  Dx:   Encounter Diagnosis     ICD-10-CM    1  Acute pain of right knee M25 561 Ambulatory referral to Physical Therapy       Start Time: 1500  Stop Time: 1600  Total time in clinic (min): 60 minutes    Assessment  Impairments: abnormal or restricted ROM, activity intolerance, impaired balance, impaired physical strength, lacks appropriate home exercise program, pain with function, safety issue and weight-bearing intolerance    Assessment details: Sadia Marrero is a 21 y o  female who presents with complaints of acute pain of right knee  (primary encounter diagnosis)  No further referral appears necessary at this time based upon examination results  Patient presents to PT with impaired strength, impaired ROM, decreased flexibility and impaired ability to complete IADLs  Prognosis is good given HEP compliance and PT 2-3x/wk  Positive prognostic indicators include positive attitude toward recovery  Please contact me if you have any questions or recommendations  Thank you for the opportunity to share in  [facility]       Understanding of Dx/Px/POC: good   Prognosis: good    Goals  Short Term:  Pt will report decreased levels of pain by at least 2 subjective ratings in 4 weeks  Pt will demonstrate improved ROM by at least 10 degrees in 4 weeks  Pt will demonstrate improved strength by 1/2 grade  Long Term:   Pt will be independent in their HEP in 8 weeks  Pt will be be pain free with IADL's  Pt will demonstrate improved FOTO score       Plan  Patient would benefit from: skilled physical therapy  Planned modality interventions: electrical stimulation/Russian stimulation, cryotherapy, low level laser therapy and thermotherapy: hydrocollator packs  Planned therapy interventions: abdominal trunk stabilization, balance, flexibility, functional ROM exercises, gait training, home exercise program, therapeutic exercise, therapeutic activities, stretching, strengthening, patient education, manual therapy, joint mobilization and neuromuscular re-education  Frequency: 2x week  Duration in weeks: 12  Plan of Care beginning date: 2018  Plan of Care expiration date: 2018  Treatment plan discussed with: patient        Subjective Evaluation    History of Present Illness  Date of onset: 2018  Mechanism of injury: Patient reports she fell on 2018  She said she nailed her knee on the bar as well as her RUE into the bar  Patient went to urgent care next day  They performed an x-ray  They gave her crutches and told her to stay off of it for a week but she was unable to do so  Patient went to see her PCP that Monday  She was told to take off of work for a week  She abided by this recommendation  She has since returned back to work but she is still having issues      Not a recurrent problem   Quality of life: fair    Pain  Current pain ratin  At best pain ratin  At worst pain ratin  Location: R Knee   Quality: discomfort and dull ache  Relieving factors: change in position, support, ice and medications  Aggravating factors: sitting, standing, stair climbing, walking and lifting      Diagnostic Tests  X-ray: normal  Treatments  Previous treatment: medication  Patient Goals  Patient goals for therapy: decreased edema, decreased pain, improved balance, increased motion, increased strength, return to work and return to sport/leisure activities  Patient goal: "I want to be able to deadlift 195 again"         Objective     Palpation     Additional Palpation Details  Tenderness noted at the medial joint line    Neurological Testing     Sensation     Knee   Left Knee   Intact: light touch    Right Knee   Intact: light touch     Reflexes   Left   Patellar (L4): normal (2+)  Achilles (S1): normal (2+)  Babinski sign: negative  Clonus sign: negative    Right Patellar (L4): normal (2+)  Achilles (S1): normal (2+)  Babinski sign: negative  Clonus sign: negative    Passive Range of Motion   Left Knee   Flexion: 138 degrees   Prone flexion: 125 degrees   Extension: -2 degrees     Right Knee   Flexion: 138 degrees   Prone flexion: 130 degrees   Extension: -4 degrees     Tests     Left Knee   Negative anterior drawer, anterior Lachman, Apley's compression, Apley's distraction, lateral Yosi, medial Yosi, patellar apprehension, patellar compression, patella-femoral grind, posterior drawer, Thessaly's test at 5 degrees, Thessaly's test at 20 degrees, valgus stress test at 0 degrees, valgus stress test at 30 degrees, varus stress test at 0 degrees and varus stress test at 30 degrees  Right Knee   Positive anterior drawer, Apley's compression, Apley's distraction, medial Yosi, patellar compression, patella-femoral grind, Thessaly's test at 5 degrees and Thessaly's test at 20 degrees  Negative anterior Lachman, lateral Yosi, patellar apprehension, posterior drawer, valgus stress test at 0 degrees, valgus stress test at 30 degrees, varus stress test at 0 degrees and varus stress test at 30 degrees       Additional Tests Details  Eggy Test IR + ER -       Flowsheet Rows      Most Recent Value   PT/OT G-Codes   Current Score  66   Projected Score  78   FOTO information reviewed  Yes   Assessment Type  Evaluation   G code set  Mobility: Walking & Moving Around   Mobility: Walking and Moving Around Current Status ()  CJ   Mobility: Walking and Moving Around Goal Status ()  CJ        Precautions: anxiety, cherry angioma    Daily Treatment Diary     Manual                                                               Exercise Diary           TA contraction          p-ball crushers          Clamshells           SLR x 4          Prone bent knee hip ext          [facility]          Single Leg Stance (SLS)          SLS on foam          SLS w/ ball toss          STAR Sliders          Step Up          Lat Step Ups          Curve/Bike          Alter G          Hamstring strap stretch          Quad strap stretch          IT Band Strap Stretch          Slantboard          Rockerboard: Balance          BIODEX                                Modalities

## 2018-08-13 ENCOUNTER — APPOINTMENT (OUTPATIENT)
Dept: PHYSICAL THERAPY | Facility: OTHER | Age: 23
End: 2018-08-13
Payer: OTHER MISCELLANEOUS

## 2018-08-13 NOTE — PROGRESS NOTES
Daily Note     Today's date: 2018  Patient name: Levi White  : 1995  MRN: [id number]  Referring provider: Nima Olvera MD  Dx: No diagnosis found  Subjective: ***      Objective: See treatment diary below  Daily Treatment Diary     Manual                                                               Exercise Diary           TA contraction          p-ball crushers          Clamshells           SLR x 4          Prone bent knee hip ext          [facility]          Single Leg Stance (SLS)          SLS on foam          SLS w/ ball toss          STAR Sliders          Step Up          Lat Step Ups          Curve/Bike          Alter G          Hamstring strap stretch          Quad strap stretch          IT Band Strap Stretch          Slantboard          Rockerboard: Balance          BIODEX                                Modalities                                               Assessment: Tolerated treatment {Tolerated treatment :[id number]}   Patient {assessment:}      Plan: {PLAN:[id number]}

## 2018-08-20 ENCOUNTER — APPOINTMENT (OUTPATIENT)
Dept: PHYSICAL THERAPY | Facility: OTHER | Age: 23
End: 2018-08-20
Payer: OTHER MISCELLANEOUS

## 2018-08-23 ENCOUNTER — APPOINTMENT (OUTPATIENT)
Dept: PHYSICAL THERAPY | Facility: OTHER | Age: 23
End: 2018-08-23
Payer: OTHER MISCELLANEOUS

## 2018-08-27 ENCOUNTER — APPOINTMENT (OUTPATIENT)
Dept: PHYSICAL THERAPY | Facility: OTHER | Age: 23
End: 2018-08-27
Payer: OTHER MISCELLANEOUS

## 2018-08-30 ENCOUNTER — APPOINTMENT (OUTPATIENT)
Dept: PHYSICAL THERAPY | Facility: OTHER | Age: 23
End: 2018-08-30
Payer: OTHER MISCELLANEOUS

## 2018-09-26 NOTE — PROGRESS NOTES
Patient only came in for the evaluation  She never came back after that  PT and [facility] called patient several times to schedule and re-schedule  She would answer and say she was coming and then not show up or cancel last minute    Thank you for your referral

## 2019-02-08 ENCOUNTER — OFFICE VISIT (OUTPATIENT)
Dept: URGENT CARE | Age: 24
End: 2019-02-08
Payer: COMMERCIAL

## 2019-02-08 VITALS
BODY MASS INDEX: 24.35 KG/M2 | TEMPERATURE: 99 F | SYSTOLIC BLOOD PRESSURE: 104 MMHG | WEIGHT: 129 LBS | HEIGHT: 61 IN | HEART RATE: 96 BPM | RESPIRATION RATE: 18 BRPM | DIASTOLIC BLOOD PRESSURE: 67 MMHG | OXYGEN SATURATION: 99 %

## 2019-02-08 DIAGNOSIS — J11.1 INFLUENZA-LIKE ILLNESS: Primary | ICD-10-CM

## 2019-02-08 PROCEDURE — 87631 RESP VIRUS 3-5 TARGETS: CPT | Performed by: PHYSICIAN ASSISTANT

## 2019-02-08 PROCEDURE — G0382 LEV 3 HOSP TYPE B ED VISIT: HCPCS | Performed by: FAMILY MEDICINE

## 2019-02-08 RX ORDER — OSELTAMIVIR PHOSPHATE 75 MG/1
75 CAPSULE ORAL EVERY 12 HOURS SCHEDULED
Qty: 10 CAPSULE | Refills: 0 | Status: SHIPPED | OUTPATIENT
Start: 2019-02-08 | End: 2019-02-13

## 2019-02-08 NOTE — PROGRESS NOTES
[address] [facility] [facility]        NAME: Foster Blount is a 21 y o  female  : 1995    MRN: [id number]  DATE: 2019  TIME: 2:42 PM    Assessment and Plan   Influenza-like illness [R69]  1  Influenza-like illness  oseltamivir (TAMIFLU) 75 mg capsule    Influenza A/B and RSV by PCR         Patient Instructions       Follow up with PCP in 3-5 days  Proceed to  ER if symptoms worsen  Chief Complaint     Chief Complaint   Patient presents with    Fever     pt states fever and cough onset yesterday  History of Present Illness       Patient here for evaluation of acute onset of fevers, chills, cough, body aches, fatigue  Patient's symptoms started yesterday abruptly  She did not take her temperature but has had chills and body aches over the past 24 hr  She did not get her flu shot this year        Review of Systems   Review of Systems      Current Medications       Current Outpatient Prescriptions:     Levonorgestrel 13 5 MG IUD, 1 each by Intrauterine route, Disp: , Rfl:     oseltamivir (TAMIFLU) 75 mg capsule, Take 1 capsule (75 mg total) by mouth every 12 (twelve) hours for 5 days, Disp: 10 capsule, Rfl: 0    Current Allergies     Allergies as of 2019    (No Known Allergies)            The following portions of the patient's history were reviewed and updated as appropriate: allergies, current medications, past family history, past medical history, past social history, past surgical history and problem list      History reviewed  No pertinent past medical history  Past Surgical History:   Procedure Laterality Date    WISDOM TOOTH EXTRACTION         No family history on file  Medications have been verified          Objective   /67 (BP Location: Left arm, Patient Position: Sitting)   Pulse 96   Temp 99 °F (37 2 °C) (Temporal)   Resp 18   Ht 5' 1" (1 549 m)   Wt 58 5 kg (129 lb)   LMP 2019   SpO2 99%   BMI 24 37 kg/m²        Physical Exam     Physical Exam Constitutional: She is oriented to person, place, and time  She appears well-developed and well-nourished  No distress  HENT:   Head: Normocephalic and atraumatic  Right Ear: External ear normal    Left Ear: External ear normal    Bilateral nasal congestion and erythema with no discharge  Bilateral tonsillar erythema with no soft tissue swelling  No exudate  Eyes: Pupils are equal, round, and reactive to light  Conjunctivae and EOM are normal    Cardiovascular: Normal rate, regular rhythm and normal heart sounds  No murmur heard  Pulmonary/Chest: Effort normal and breath sounds normal  No respiratory distress  She has no wheezes  She has no rales  Lymphadenopathy:     She has no cervical adenopathy  Neurological: She is alert and oriented to person, place, and time  Skin: Skin is warm and dry  Psychiatric: She has a normal mood and affect  Her behavior is normal    Nursing note and vitals reviewed

## 2019-02-08 NOTE — PATIENT INSTRUCTIONS
The influenza swab will be sent for further testing  Results take approximately 24 hours to return  Please call phone number at top of clinical summary to request the results  In the meantime you may take over-the-counter medications as needed        Go to emergency room if symptoms are worsening

## 2019-02-08 NOTE — LETTER
February 8, 2019     Patient: Tristen Cagle   YOB: 1995   Date of Visit: 2/8/2019       To Whom It May Concern:    Please excuse Tristen Cagle  from work 02/08/2019 and 02/09/2019 due to illness           Sincerely,        Isac Haro PA-C    CC: No Recipients

## 2019-02-09 LAB
FLUAV AG SPEC QL: DETECTED
FLUBV AG SPEC QL: NOT DETECTED
RSV B RNA SPEC QL NAA+PROBE: NOT DETECTED

## 2019-02-11 ENCOUNTER — TELEPHONE (OUTPATIENT)
Dept: URGENT CARE | Age: 24
End: 2019-02-11

## 2019-07-08 NOTE — PROGRESS NOTES
Assessment/Plan:    Cold sore  Prescription for Valtrex provided to use PRN symptoms  Seasonal allergic rhinitis  Discuss treatment with OTC Flonase and OTC Claritin  Hemangioma of skin  Will refer to dermatology as patient would like the lesion removed  Acute pain of right knee  Will refer to PT for acute flare of right knee pain  Anxiety  Labwork ordered to assess  Will refer to psychology for further management  Vitamin D deficiency  Labwork ordered to assess  Diagnoses and all orders for this visit:    Hemangioma of skin  -     Ambulatory referral to Dermatology; Future    Encounter for health maintenance examination in adult  -     Lipid Panel with Direct LDL reflex; Future  -     Comprehensive metabolic panel; Future  -     CBC and differential; Future  -     TSH, 3rd generation with Free T4 reflex; Future    Anxiety  -     Ambulatory referral to Psychology; Future    Need for vaccination against Streptococcus pneumoniae using pneumococcal conjugate vaccine 13  -     PNEUMOCOCCAL CONJUGATE VACCINE 13-VALENT GREATER THAN 6 MONTHS    Acute pain of right knee  -     Ambulatory referral to Physical Therapy; Future    Cold sore  -     valACYclovir (VALTREX) 1,000 mg tablet; Take 2 tablets (2,000 mg total) by mouth 2 (two) times a day for 1 day    Vitamin D deficiency  -     Vitamin D 25 hydroxy; Future          Subjective:  Patient is here for a physical exam    Patient would like the pneumo vaccine     Health Maintenance Due   Topic Date Due    Pneumococcal Vaccine: Pediatrics (0 to 5 Years) and At-Risk Patients (6 to 59 Years) (1 of 3 - PCV13) 07/02/2001    INFLUENZA VACCINE  07/01/2019          Patient ID: Shruthi Falk is a 25 y o  female  Patient presents to clinic today for a well visit  She notes that she has an angioma on the right thigh that she would like to have removed  She also notes that she feels some anxiousness, and would like to be referred for counseling    She works as a , which she enjoys but it is stressful  She also notes to have some right knee pain  She suffered a fall a while ago, and was treated with PT  However, she had trouble going to PT appointments, so PT was discontinued  She did feel that PT improved her symptoms  She has not had any new trauma, but notes that her right knee pain returned after working out  She would like to try physical therapy for the knee  She also notes to get cold sores occasionally, and would like to have a prescription on hand for the Valtrex to use PRN  The following portions of the patient's history were reviewed and updated as appropriate: allergies, current medications, past family history, past medical history, past social history, past surgical history and problem list     Review of Systems   Constitutional: Positive for fatigue  Musculoskeletal: Positive for arthralgias (right knee pain)  Skin: Positive for wound  Rash: hemangioma right thigh  Psychiatric/Behavioral: The patient is nervous/anxious  All other systems reviewed and are negative  Objective:      /70 (BP Location: Left arm, Patient Position: Sitting, Cuff Size: Adult)   Pulse 75   Temp 98 1 °F (36 7 °C) (Oral)   Resp 16   Ht 5' 1" (1 549 m)   Wt 55 6 kg (122 lb 9 6 oz)   SpO2 99%   BMI 23 17 kg/m²          Physical Exam   Constitutional: She is oriented to person, place, and time  She appears well-developed and well-nourished  She is cooperative  HENT:   Head: Normocephalic and atraumatic  Right Ear: Hearing, tympanic membrane, external ear and ear canal normal    Left Ear: Hearing, tympanic membrane, external ear and ear canal normal    Mouth/Throat: Uvula is midline and mucous membranes are normal  Posterior oropharyngeal erythema (mild with post nasal drainage noted) present  Eyes: Pupils are equal, round, and reactive to light   Conjunctivae and lids are normal    Neck: Trachea normal and normal range of motion  Neck supple  No thyromegaly present  Cardiovascular: Normal rate, regular rhythm and normal heart sounds  No murmur heard  Pulmonary/Chest: Effort normal and breath sounds normal  She has no wheezes  She has no rhonchi  She has no rales  Abdominal: Soft  Normal appearance and bowel sounds are normal  There is no tenderness  Musculoskeletal: Normal range of motion  Lymphadenopathy:     She has no cervical adenopathy  Neurological: She is alert and oriented to person, place, and time  No cranial nerve deficit  She exhibits normal muscle tone  Gait normal    Skin: Skin is warm, dry and intact  Lesion (small hemangioma on distal right lateral thigh) noted  Psychiatric: She has a normal mood and affect  Her speech is normal and behavior is normal    Nursing note and vitals reviewed

## 2019-07-09 ENCOUNTER — OFFICE VISIT (OUTPATIENT)
Dept: FAMILY MEDICINE CLINIC | Facility: CLINIC | Age: 24
End: 2019-07-09
Payer: COMMERCIAL

## 2019-07-09 VITALS
RESPIRATION RATE: 16 BRPM | OXYGEN SATURATION: 99 % | BODY MASS INDEX: 23.15 KG/M2 | WEIGHT: 122.6 LBS | HEIGHT: 61 IN | DIASTOLIC BLOOD PRESSURE: 70 MMHG | TEMPERATURE: 98.1 F | HEART RATE: 75 BPM | SYSTOLIC BLOOD PRESSURE: 100 MMHG

## 2019-07-09 DIAGNOSIS — Z23 NEED FOR VACCINATION AGAINST STREPTOCOCCUS PNEUMONIAE USING PNEUMOCOCCAL CONJUGATE VACCINE 13: ICD-10-CM

## 2019-07-09 DIAGNOSIS — E55.9 VITAMIN D DEFICIENCY: ICD-10-CM

## 2019-07-09 DIAGNOSIS — M25.561 ACUTE PAIN OF RIGHT KNEE: ICD-10-CM

## 2019-07-09 DIAGNOSIS — F41.9 ANXIETY: ICD-10-CM

## 2019-07-09 DIAGNOSIS — Z00.00 ENCOUNTER FOR HEALTH MAINTENANCE EXAMINATION IN ADULT: ICD-10-CM

## 2019-07-09 DIAGNOSIS — B00.1 COLD SORE: ICD-10-CM

## 2019-07-09 DIAGNOSIS — D18.01 HEMANGIOMA OF SKIN: Primary | ICD-10-CM

## 2019-07-09 PROBLEM — J11.1 INFLUENZA-LIKE ILLNESS: Status: RESOLVED | Noted: 2019-02-08 | Resolved: 2019-07-09

## 2019-07-09 PROCEDURE — 90670 PCV13 VACCINE IM: CPT

## 2019-07-09 PROCEDURE — 99395 PREV VISIT EST AGE 18-39: CPT | Performed by: FAMILY MEDICINE

## 2019-07-09 PROCEDURE — 90471 IMMUNIZATION ADMIN: CPT

## 2019-07-09 RX ORDER — VALACYCLOVIR HYDROCHLORIDE 1 G/1
2000 TABLET, FILM COATED ORAL 2 TIMES DAILY
Qty: 4 TABLET | Refills: 3 | Status: SHIPPED | OUTPATIENT
Start: 2019-07-09 | End: 2022-03-10 | Stop reason: SDUPTHER

## 2019-07-09 NOTE — PATIENT INSTRUCTIONS

## 2019-07-11 ENCOUNTER — TELEPHONE (OUTPATIENT)
Dept: FAMILY MEDICINE CLINIC | Facility: CLINIC | Age: 24
End: 2019-07-11

## 2019-08-22 ENCOUNTER — EVALUATION (OUTPATIENT)
Dept: PHYSICAL THERAPY | Facility: REHABILITATION | Age: 24
End: 2019-08-22
Payer: COMMERCIAL

## 2019-08-22 DIAGNOSIS — M25.561 ACUTE PAIN OF RIGHT KNEE: Primary | ICD-10-CM

## 2019-08-22 PROCEDURE — 97161 PT EVAL LOW COMPLEX 20 MIN: CPT | Performed by: PHYSICAL THERAPIST

## 2019-08-22 PROCEDURE — 97110 THERAPEUTIC EXERCISES: CPT | Performed by: PHYSICAL THERAPIST

## 2019-08-22 PROCEDURE — 97140 MANUAL THERAPY 1/> REGIONS: CPT | Performed by: PHYSICAL THERAPIST

## 2019-08-22 NOTE — PROGRESS NOTES
PT Evaluation     Today's date: 2019  Patient name: Bianca Ruiz  : 1995  MRN: [id number]  Referring provider: Heather Bermudez MD  Dx:   Encounter Diagnosis     ICD-10-CM    1  Acute pain of right knee M25 561                   Assessment  Assessment details: Patient presents complaining of R knee pain which has been ongoing for about a year following a fall at work  She was turning and slipped and fell down to the ground and made contact at her knee  She did have physical therapy following that but only went to her initial evaluation, she reported limited relief following this  She had an x-ray completed which was negative for any fracture  She reaggrevated her pain following an hour gym session a few months ago, she just reports her knee "doesn't feel right"  She reports most of her pain is on the medial aspect of her R knee with no pain laterally or posteriorly  She reports no numbness or tingling down to the toes  She currently works as a  at [employer], she reports limited pain at work  She reports noting no swelling on her R knee compared to L  She reports some difficulty with steps, as well as cutting and turning  Patient presents with normal range of motion and strength in her LE, but does have tenderness on the medial aspect of her R knee, inferior to joint line, consistent with pes anserine pathology  She also does have some slightly positive testing for meniscal involvement  Patient made aware of condition as well as the proposed treatment plan, including risks, benefits and alternatives  Impairments: abnormal or restricted ROM, activity intolerance, impaired physical strength, lacks appropriate home exercise program and pain with function     Prognosis: good    Goals  ST- demonstrate compliancy with HEP in 1 week     Decrease reported pain to 4/10 at worst and with activity, to improve functional capacity, within 2 weeks   Decrease tenderness on R medial knee, within 3 weeks LT- Improve FOTO score to specified value to improve patients perceived benefit of therapy, in 8 weeks   Be able to complete full shift at work with no complaints of pain, in 10 weeks     Plan  Plan details: Physical therapy with focus on there ex and manual therapy to improve ability to complete tasks around the house and complete functional activities, use of modalities as needed  Patient would benefit from: skilled physical therapy  Referral necessary: No  Planned modality interventions: cryotherapy, TENS and thermotherapy: hydrocollator packs  Planned therapy interventions: ADL training, balance, balance/weight bearing training, gait training, manual therapy, joint mobilization, neuromuscular re-education, strengthening, stretching, therapeutic activities and therapeutic exercise  Frequency: 2x week  Duration in weeks: 12  Plan of Care beginning date: 2019  Plan of Care expiration date: 2019  Treatment plan discussed with: patient        Subjective Evaluation    History of Present Illness  Date of onset: 2018  Mechanism of injury: Fall at work   Pain  Current pain ratin  At best pain ratin  At worst pain ratin  Location: R knee   Quality: dull ache  Relieving factors: ice  Aggravating factors: stair climbing and running    Treatments  Previous treatment: physical therapy  Patient Goals  Patient goals for therapy: decreased pain and independence with ADLs/IADLs  Patient goal: return to gym activities without pain      patient arrived late for her evaluation today       Objective     General Comments:      Knee Comments  Ttp along medial aspect of R knee at location of pes anserine bursa, no tenderness noted at medial joint line      rom  L knee WFL  R knee WFL    mmt  Hip flexion 4+/5 B/L  Hip abduction 5/5 B/L  Hip adduction 5/5 B/L   Knee extension L=5/5 R=4+/5   Knee flexion 5/5 B/L    Special tests  Mild (+) thessalys, anteromedial and anterolateral drawer   (-) anterior drawer, mcmdarrian, valgus/varus stress test     Joint play normal @ TF jt, patellar movement increased              Precautions: none       Manual  8/22            IASTM R pes anserine, medial knee  CW 10'             SL hip flexor stretch                                                         Exercise Diary              Bike              Step downs (fwd, lat)              Single leg press             Wall sits             Wall squats             SL clamshells              Bridges w/ TB                                                                                                                                                                                           Modalities              PRN

## 2019-08-26 ENCOUNTER — OFFICE VISIT (OUTPATIENT)
Dept: PHYSICAL THERAPY | Facility: REHABILITATION | Age: 24
End: 2019-08-26
Payer: COMMERCIAL

## 2019-08-26 DIAGNOSIS — M25.561 ACUTE PAIN OF RIGHT KNEE: Primary | ICD-10-CM

## 2019-08-26 DIAGNOSIS — M70.50 PES ANSERINE BURSITIS: ICD-10-CM

## 2019-08-26 PROCEDURE — 97140 MANUAL THERAPY 1/> REGIONS: CPT | Performed by: PHYSICAL THERAPIST

## 2019-08-26 PROCEDURE — 97530 THERAPEUTIC ACTIVITIES: CPT | Performed by: PHYSICAL THERAPIST

## 2019-08-26 PROCEDURE — 97110 THERAPEUTIC EXERCISES: CPT | Performed by: PHYSICAL THERAPIST

## 2019-08-26 PROCEDURE — 97112 NEUROMUSCULAR REEDUCATION: CPT | Performed by: PHYSICAL THERAPIST

## 2019-08-26 NOTE — PROGRESS NOTES
Daily Note     Today's date: 2019  Patient name: Manisha Dunn  : 1995  MRN: [id number]  Referring provider: Bobbi Mei MD  Dx:   Encounter Diagnosis     ICD-10-CM    1  Acute pain of right knee M25 561    2  Pes anserine bursitis M70 50                   Subjective: Reports feeling more soreness today after doing her exercises at home  Objective: See treatment diary below      Assessment: Tolerated treatment well  Patient would benefit from continued PT, reported some pain with single leg press, was able to complete with both legs  Plan: Continue per plan of care        Precautions: none       Manual              IASTM R pes anserine, medial knee  CW 10'  CW 8'            SL hip flexor stretch R   CW 3'                                                       Exercise Diary               Bike  10'             Step downs (fwd, lat)  20x ea 6" R             Leg press 3x10 100#              Wall sits nv            Wall squats 3x10             SL clamshells  20x B/L GTB            Bridges w/ TB  15x5"  GTB            Side steps w/ TB  3 laps GTB                                                                                                                                                                            Modalities              PRN

## 2019-08-29 ENCOUNTER — OFFICE VISIT (OUTPATIENT)
Dept: PHYSICAL THERAPY | Facility: REHABILITATION | Age: 24
End: 2019-08-29
Payer: COMMERCIAL

## 2019-08-29 DIAGNOSIS — M25.561 ACUTE PAIN OF RIGHT KNEE: Primary | ICD-10-CM

## 2019-08-29 DIAGNOSIS — M70.50 PES ANSERINE BURSITIS: ICD-10-CM

## 2019-08-29 PROCEDURE — 97530 THERAPEUTIC ACTIVITIES: CPT

## 2019-08-29 PROCEDURE — 97110 THERAPEUTIC EXERCISES: CPT

## 2019-08-29 PROCEDURE — 97112 NEUROMUSCULAR REEDUCATION: CPT

## 2019-08-29 PROCEDURE — 97140 MANUAL THERAPY 1/> REGIONS: CPT

## 2019-08-29 NOTE — PROGRESS NOTES
Daily Note     Today's date: 2019  Patient name: Bebeto Santana  : 1995  MRN: [id number]  Referring provider: Carey Estrella MD  Dx:   Encounter Diagnosis     ICD-10-CM    1  Acute pain of right knee M25 561    2  Pes anserine bursitis M70 50                   Subjective: Pt reports increased soreness upon arrival due to increased lifting activity at work      Objective: See treatment diary below      Assessment: Tolerated treatment well  Patient would benefit from continued PT   Pt  able to complete all exercises with no increase in pain during or after session  Pt able to progress exercises this session without complaint  Pt  1:1 with PTA for entirety  Plan: Continue per plan of care        Precautions: none       Manual            IASTM R pes anserine, medial knee  CW 10'  CW 8'  KP 6'          SL hip flexor stretch R   CW 3'  KP 3'                                                     Exercise Diary             Bike  10'  10'           Step downs (fwd, lat)  20x ea 6" R  20x ea 6" R           Leg press 3x10 100#   3x10 115#           Wall sits nv 3x30"           Wall squats 3x10  3x10           SL clamshells  20x B/L GTB 20x b/l gtb           Bridges w/ TB  15x5"  GTB 15x5" gtb           Side steps w/ TB  3 laps GTB 3 laps gtb                                                                                                                                                                           Modalities              PRN

## 2019-09-05 ENCOUNTER — OFFICE VISIT (OUTPATIENT)
Dept: PHYSICAL THERAPY | Facility: REHABILITATION | Age: 24
End: 2019-09-05
Payer: COMMERCIAL

## 2019-09-05 DIAGNOSIS — M25.561 ACUTE PAIN OF RIGHT KNEE: Primary | ICD-10-CM

## 2019-09-05 DIAGNOSIS — M70.50 PES ANSERINE BURSITIS: ICD-10-CM

## 2019-09-05 PROCEDURE — 97112 NEUROMUSCULAR REEDUCATION: CPT | Performed by: PHYSICAL THERAPIST

## 2019-09-05 PROCEDURE — 97140 MANUAL THERAPY 1/> REGIONS: CPT | Performed by: PHYSICAL THERAPIST

## 2019-09-05 PROCEDURE — 97110 THERAPEUTIC EXERCISES: CPT | Performed by: PHYSICAL THERAPIST

## 2019-09-05 PROCEDURE — 97530 THERAPEUTIC ACTIVITIES: CPT | Performed by: PHYSICAL THERAPIST

## 2019-09-05 NOTE — PROGRESS NOTES
Daily Note     Today's date: 2019  Patient name: Cass Munroe  : 1995  MRN: [id number]  Referring provider: Josh Randolph MD  Dx:   Encounter Diagnosis     ICD-10-CM    1  Acute pain of right knee M25 561    2  Pes anserine bursitis M70 50                   Subjective: Reports having decreased R knee pain today  Objective: See treatment diary below      Assessment: Tolerated treatment well  Patient would benefit from continued PT, patient reported some difficulty with wall sits but was able to complete  Was able to complete squats to improve ability to complete bending activities at home  Plan: Continue per plan of care        Precautions: none       Manual           IASTM R pes anserine, medial knee  CW 10'  CW 8'  KP 6' CW 5'          SL hip flexor stretch R   CW 3'  KP 3' CW 3'                                                    Exercise Diary            Bike  10'  10' 10'           Step downs (fwd, lat)  20x ea 6" R  20x ea 6" R 25x ea 6" R          Leg press 3x10 100#   3x10 115# 3x10 115#           Wall sits nv 3x30" 3x30"           Wall squats 3x10  3x10 2x15           SL clamshells  20x B/L GTB 20x b/l gtb 25x B/L GTB          Bridges w/ TB  15x5"  GTB 15x5" gtb 20x5"           Side steps w/ TB  3 laps GTB 3 laps gtb 4 laps GTB                                                                                                                                                                          Modalities              PRN

## 2019-09-09 ENCOUNTER — OFFICE VISIT (OUTPATIENT)
Dept: PHYSICAL THERAPY | Facility: REHABILITATION | Age: 24
End: 2019-09-09
Payer: COMMERCIAL

## 2019-09-09 DIAGNOSIS — M70.50 PES ANSERINE BURSITIS: ICD-10-CM

## 2019-09-09 DIAGNOSIS — M25.561 ACUTE PAIN OF RIGHT KNEE: Primary | ICD-10-CM

## 2019-09-09 PROCEDURE — 97112 NEUROMUSCULAR REEDUCATION: CPT

## 2019-09-09 PROCEDURE — 97140 MANUAL THERAPY 1/> REGIONS: CPT

## 2019-09-09 PROCEDURE — 97110 THERAPEUTIC EXERCISES: CPT

## 2019-09-09 PROCEDURE — 97530 THERAPEUTIC ACTIVITIES: CPT

## 2019-09-09 NOTE — PROGRESS NOTES
Daily Note     Today's date: 2019  Patient name: Hina Burnett  : 1995  MRN: [id number]  Referring provider: Val Barbosa MD  Dx:   Encounter Diagnosis     ICD-10-CM    1  Acute pain of right knee M25 561    2  Pes anserine bursitis M70 50                   Subjective: Pt reports that knee is better upon arrival, states she has had less pain at work  Objective: See treatment diary below      Assessment: Tolerated treatment well  Patient would benefit from continued PT  Pt  able to complete all exercises with no increase in pain during or after session  Pt able to progress exercises this session without complaint  Pt  1:1 with PTA for entirety  Plan: Continue per plan of care        Precautions: none       Manual          IASTM R pes anserine, medial knee  CW 10'  CW 8'  KP 6' CW 5'  KP 5'        SL hip flexor stretch R   CW 3'  KP 3' CW 3' KP 3'                                                   Exercise Diary           Bike  10'  10' 10'  10'         Step downs (fwd, lat)  20x ea 6" R  20x ea 6" R 25x ea 6" R 25x ea 6" R         Leg press 3x10 100#   3x10 115# 3x10 115#  3x10 115#         Wall sits nv 3x30" 3x30"  3x30"         Wall squats 3x10  3x10 2x15  2x15         SL clamshells  20x B/L GTB 20x b/l gtb 25x B/L GTB 25x b/l btb         Bridges w/ TB  15x5"  GTB 15x5" gtb 20x5"  25x5" btb         Side steps w/ TB  3 laps GTB 3 laps gtb 4 laps GTB 4 laps btb         Lunges into bosu    10x10" w R                                                                                                                                                            Modalities              PRN

## 2019-09-12 ENCOUNTER — OFFICE VISIT (OUTPATIENT)
Dept: PHYSICAL THERAPY | Facility: REHABILITATION | Age: 24
End: 2019-09-12
Payer: COMMERCIAL

## 2019-09-12 DIAGNOSIS — M70.50 PES ANSERINE BURSITIS: ICD-10-CM

## 2019-09-12 DIAGNOSIS — M25.561 ACUTE PAIN OF RIGHT KNEE: Primary | ICD-10-CM

## 2019-09-12 PROCEDURE — 97110 THERAPEUTIC EXERCISES: CPT | Performed by: PHYSICAL THERAPIST

## 2019-09-12 PROCEDURE — 97140 MANUAL THERAPY 1/> REGIONS: CPT | Performed by: PHYSICAL THERAPIST

## 2019-09-12 PROCEDURE — 97112 NEUROMUSCULAR REEDUCATION: CPT | Performed by: PHYSICAL THERAPIST

## 2019-09-12 PROCEDURE — 97530 THERAPEUTIC ACTIVITIES: CPT | Performed by: PHYSICAL THERAPIST

## 2019-09-12 NOTE — PROGRESS NOTES
Daily Note     Today's date: 2019  Patient name: Cass Munroe  : 1995  MRN: [id number]  Referring provider: Josh Randolph MD  Dx:   Encounter Diagnosis     ICD-10-CM    1  Acute pain of right knee M25 561    2  Pes anserine bursitis M70 50                   Subjective: Patient reports improved R knee status, and has less pain while at work  Objective: See treatment diary below      Assessment: Tolerated treatment well  Patient would benefit from continued PT, demonstrated less restrictions with IASTM today  Was able to complete squats and wall sits to improve ability to complete bending and lifting tasks at work  Plan: Continue per plan of care        Precautions: none       Manual         IASTM R pes anserine, medial knee  CW 10'  CW 8'  KP 6' CW 5'  KP 5' CW 5'        SL hip flexor stretch R   CW 3'  KP 3' CW 3' KP 3' CW 3'                                                   Exercise Diary          Bike  10'  10' 10'  10' 10'         Step downs (fwd, lat)  20x ea 6" R  20x ea 6" R 25x ea 6" R 25x ea 6" R 30x ea 6" R         Leg press 3x10 100#   3x10 115# 3x10 115#  3x10 115# 3x10 130#         Wall sits nv 3x30" 3x30"  3x30" 3x30"         Wall squats 3x10  3x10 2x15  2x15 2x20         SL clamshells  20x B/L GTB 20x b/l gtb 25x B/L GTB 25x b/l btb 30x B/L BTB        Bridges w/ TB  15x5"  GTB 15x5" gtb 20x5"  25x5" btb 30x5" BTB        Side steps w/ TB  3 laps GTB 3 laps gtb 4 laps GTB 4 laps btb 4 laps BTB        Lunges into bosu    10x10" w R 15x10" R                                                                                                                                                            Modalities              PRN

## 2019-09-16 ENCOUNTER — OFFICE VISIT (OUTPATIENT)
Dept: PHYSICAL THERAPY | Facility: REHABILITATION | Age: 24
End: 2019-09-16
Payer: COMMERCIAL

## 2019-09-16 DIAGNOSIS — M70.50 PES ANSERINE BURSITIS: ICD-10-CM

## 2019-09-16 DIAGNOSIS — M25.561 ACUTE PAIN OF RIGHT KNEE: Primary | ICD-10-CM

## 2019-09-16 PROCEDURE — 97530 THERAPEUTIC ACTIVITIES: CPT

## 2019-09-16 PROCEDURE — 97110 THERAPEUTIC EXERCISES: CPT

## 2019-09-16 PROCEDURE — 97140 MANUAL THERAPY 1/> REGIONS: CPT

## 2019-09-16 PROCEDURE — 97112 NEUROMUSCULAR REEDUCATION: CPT

## 2019-09-16 NOTE — PROGRESS NOTES
Daily Note     Today's date: 2019  Patient name: Shruthi Falk  : 1995  MRN: [id number]  Referring provider: Niru Gregory MD  Dx:   Encounter Diagnosis     ICD-10-CM    1  Acute pain of right knee M25 561    2  Pes anserine bursitis M70 50                   Subjective: Pt reports some overall improvement upon arrival, states that work activities are not bothering her as much as they used to, but she has also reduced amount of lifting she's doing      Objective: See treatment diary below      Assessment: Tolerated treatment well  Patient demonstrated fatigue post treatment and would benefit from continued PT  Pt able to progress exercises this session without complaint  Pt  able to complete all exercises with no increase in pain during or after session  Pt 1:1 with PTA [id number], IEP for remainder  Plan: Continue per plan of care        Precautions: none       Manual        IASTM R pes anserine, medial knee  CW 10'  CW 8'  KP 6' CW 5'  KP 5' CW 5'  KP 6'      SL hip flexor stretch R   CW 3'  KP 3' CW 3' KP 3' CW 3'  KP 2'                                                 Exercise Diary         Bike  10'  10' 10'  10' 10'  10'       Step downs (fwd, lat)  20x ea 6" R  20x ea 6" R 25x ea 6" R 25x ea 6" R 30x ea 6" R  20x ea 8" R       Leg press 3x10 100#   3x10 115# 3x10 115#  3x10 115# 3x10 130#  2x15 130#       Wall sits nv 3x30" 3x30"  3x30" 3x30"  3x30"       Wall squats 3x10  3x10 2x15  2x15 2x20  2x20       SL clamshells  20x B/L GTB 20x b/l gtb 25x B/L GTB 25x b/l btb 30x B/L BTB 30x b/l btb       Bridges w/ TB  15x5"  GTB 15x5" gtb 20x5"  25x5" btb 30x5" BTB 30x5" btb       Side steps w/ TB  3 laps GTB 3 laps gtb 4 laps GTB 4 laps btb 4 laps BTB 5 laps btb       Lunges into bosu    10x10" w R 15x10" R  15x10" R Modalities              PRN

## 2019-09-19 ENCOUNTER — APPOINTMENT (OUTPATIENT)
Dept: PHYSICAL THERAPY | Facility: REHABILITATION | Age: 24
End: 2019-09-19
Payer: COMMERCIAL

## 2019-09-23 ENCOUNTER — APPOINTMENT (OUTPATIENT)
Dept: PHYSICAL THERAPY | Facility: REHABILITATION | Age: 24
End: 2019-09-23
Payer: COMMERCIAL

## 2019-09-24 ENCOUNTER — APPOINTMENT (OUTPATIENT)
Dept: PHYSICAL THERAPY | Facility: REHABILITATION | Age: 24
End: 2019-09-24
Payer: COMMERCIAL

## 2019-09-26 ENCOUNTER — APPOINTMENT (OUTPATIENT)
Dept: PHYSICAL THERAPY | Facility: REHABILITATION | Age: 24
End: 2019-09-26
Payer: COMMERCIAL

## 2019-09-30 ENCOUNTER — OFFICE VISIT (OUTPATIENT)
Dept: FAMILY MEDICINE CLINIC | Facility: CLINIC | Age: 24
End: 2019-09-30
Payer: COMMERCIAL

## 2019-09-30 VITALS
DIASTOLIC BLOOD PRESSURE: 72 MMHG | WEIGHT: 121 LBS | TEMPERATURE: 98.1 F | BODY MASS INDEX: 22.84 KG/M2 | OXYGEN SATURATION: 97 % | SYSTOLIC BLOOD PRESSURE: 108 MMHG | HEIGHT: 61 IN | HEART RATE: 92 BPM | RESPIRATION RATE: 17 BRPM

## 2019-09-30 DIAGNOSIS — J20.9 ACUTE BRONCHITIS, UNSPECIFIED ORGANISM: Primary | ICD-10-CM

## 2019-09-30 DIAGNOSIS — Z23 NEED FOR IMMUNIZATION AGAINST INFLUENZA: ICD-10-CM

## 2019-09-30 PROCEDURE — 90471 IMMUNIZATION ADMIN: CPT

## 2019-09-30 PROCEDURE — 90682 RIV4 VACC RECOMBINANT DNA IM: CPT

## 2019-09-30 PROCEDURE — 99214 OFFICE O/P EST MOD 30 MIN: CPT | Performed by: FAMILY MEDICINE

## 2019-09-30 RX ORDER — ALBUTEROL SULFATE 90 UG/1
2 AEROSOL, METERED RESPIRATORY (INHALATION) EVERY 6 HOURS PRN
Qty: 3 INHALER | Refills: 1 | Status: SHIPPED | OUTPATIENT
Start: 2019-09-30 | End: 2020-04-17 | Stop reason: SDUPTHER

## 2019-09-30 RX ORDER — AZITHROMYCIN 250 MG/1
TABLET, FILM COATED ORAL
Qty: 6 TABLET | Refills: 0 | Status: SHIPPED | OUTPATIENT
Start: 2019-09-30 | End: 2019-10-05

## 2019-09-30 RX ORDER — METHYLPREDNISOLONE 4 MG/1
TABLET ORAL
Qty: 21 EACH | Refills: 0 | Status: SHIPPED | OUTPATIENT
Start: 2019-09-30 | End: 2019-10-11 | Stop reason: ALTCHOICE

## 2019-09-30 NOTE — PROGRESS NOTES
Assessment/Plan:    No problem-specific Assessment & Plan notes found for this encounter  Diagnoses and all orders for this visit:    Acute bronchitis, unspecified organism  -     albuterol (PROVENTIL HFA,VENTOLIN HFA) 90 mcg/act inhaler; Inhale 2 puffs every 6 (six) hours as needed for wheezing or shortness of breath  -     methylPREDNISolone 4 MG tablet therapy pack; Use as directed on package  -     azithromycin (ZITHROMAX) 250 mg tablet; Take 2 tablets PO daily on day #1, then 1 tablet PO daily on days #2-5    Need for immunization against influenza  -     influenza vaccine, [id number], quadrivalent, recombinant, PF, 0 5 mL, for patients 18 yr+ (FLUBLOK)          Subjective:      Patient ID: Lashonda Chahal is a 25 y o  female  Cough   This is a new problem  Episode onset: 3 weeks  The problem has been unchanged  Episode frequency: intermittently  The cough is productive of sputum  Associated symptoms include nasal congestion  Pertinent negatives include no fever, headaches, sore throat, shortness of breath or wheezing  The symptoms are aggravated by lying down  Treatments tried: Mucinex DM  The treatment provided mild relief  The following portions of the patient's history were reviewed and updated as appropriate: allergies, current medications, past family history, past medical history, past social history, past surgical history and problem list     Review of Systems   Constitutional: Negative for fever  HENT: Positive for congestion  Negative for sore throat  Respiratory: Positive for cough  Negative for shortness of breath and wheezing  Gastrointestinal: Negative for nausea and vomiting  Neurological: Negative for headaches           Objective:      /72 (BP Location: Left arm, Patient Position: Sitting, Cuff Size: Standard)   Pulse 92   Temp 98 1 °F (36 7 °C) (Oral)   Resp 17   Ht 5' 1" (1 549 m)   Wt 54 9 kg (121 lb)   SpO2 97%   BMI 22 86 kg/m²          Physical Exam Constitutional: She is oriented to person, place, and time  She appears well-developed and well-nourished  She is cooperative  HENT:   Head: Normocephalic and atraumatic  Right Ear: Hearing, tympanic membrane, external ear and ear canal normal    Left Ear: Hearing, tympanic membrane, external ear and ear canal normal    Mouth/Throat: Uvula is midline and mucous membranes are normal  Posterior oropharyngeal erythema (mild with purulent post nasal drainage) present  Eyes: Conjunctivae and lids are normal    Neck: Trachea normal and normal range of motion  Neck supple  No thyromegaly present  Cardiovascular: Normal rate, regular rhythm and normal heart sounds  Pulmonary/Chest: Effort normal  She has wheezes (end expiratory wheezing all lung fields)  Musculoskeletal: Normal range of motion  Lymphadenopathy:     She has no cervical adenopathy  Neurological: She is alert and oriented to person, place, and time  She exhibits normal muscle tone  Gait normal    Skin: Skin is warm and dry  Psychiatric: She has a normal mood and affect  Her speech is normal and behavior is normal    Nursing note and vitals reviewed

## 2019-09-30 NOTE — PATIENT INSTRUCTIONS
Acute Bronchitis   WHAT YOU NEED TO KNOW:   Acute bronchitis is swelling and irritation in the air passages of your lungs  This irritation may cause you to cough or have other breathing problems  Acute bronchitis often starts because of another illness, such as a cold or the flu  The illness spreads from your nose and throat to your windpipe and airways  Bronchitis is often called a chest cold  Acute bronchitis lasts about 3 to 6 weeks and is usually not a serious illness  Your cough can last for several weeks  DISCHARGE INSTRUCTIONS:   Return to the emergency department if:   · You cough up blood  · Your lips or fingernails turn blue  · You feel like you are not getting enough air when you breathe  Contact your healthcare provider if:   · You have a fever  · Your breathing problems do not go away or get worse  · Your cough does not get better within 4 weeks  · You have questions or concerns about your condition or care  Self-care:   · Get more rest   Rest helps your body to heal  Slowly start to do more each day  Rest when you feel it is needed  · Avoid irritants in the air  Avoid chemicals, fumes, and dust  Wear a face mask if you must work around dust or fumes  Stay inside on days when air pollution levels are high  If you have allergies, stay inside when pollen counts are high  Do not use aerosol products, such as spray-on deodorant, bug spray, and hair spray  · Do not smoke or be around others who smoke  Nicotine and other chemicals in cigarettes and cigars damages the cilia that move mucus out of your lungs  Ask your healthcare provider for information if you currently smoke and need help to quit  E-cigarettes or smokeless tobacco still contain nicotine  Talk to your healthcare provider before you use these products  · Drink liquids as directed  Liquids help keep your air passages moist and help you cough up mucus   You may need to drink more liquids when you have acute bronchitis  Ask how much liquid to drink each day and which liquids are best for you  · Use a humidifier or vaporizer  Use a cool mist humidifier or a vaporizer to increase air moisture in your home  This may make it easier for you to breathe and help decrease your cough  Decrease risk for acute bronchitis:   · Get the vaccinations you need  Ask your healthcare provider if you should get vaccinated against the flu or pneumonia  · Prevent the spread of germs  You can decrease your risk of acute bronchitis and other illnesses by doing the following:     [facility] your hands often with soap and water  Carry germ-killing hand lotion or gel with you  You can use the lotion or gel to clean your hands when soap and water are not available  ¨ Do not touch your eyes, nose, or mouth unless you have washed your hands first     ¨ Always cover your mouth when you cough to prevent the spread of germs  It is best to cough into a tissue or your shirt sleeve instead of into your hand  Ask those around you cover their mouths when they cough  ¨ Try to avoid people who have a cold or the flu  If you are sick, stay away from others as much as possible  Medicines: Your healthcare provider may  give you any of the following:  · Ibuprofen or acetaminophen  are medicines that help lower your fever  They are available without a doctor's order  Ask your healthcare provider which medicine is right for you  Ask how much to take and how often to take it  Follow directions  These medicines can cause stomach bleeding if not taken correctly  Ibuprofen can cause kidney damage  Do not take ibuprofen if you have kidney disease, an ulcer, or allergies to aspirin  Acetaminophen can cause liver damage  Do not take more than 4,000 milligrams in 24 hours  · Decongestants  help loosen mucus in your lungs and make it easier to cough up  This can help you breathe easier  · Cough suppressants  decrease your urge to cough   If your cough produces mucus, do not take a cough suppressant unless your healthcare provider tells you to  Your healthcare provider may suggest that you take a cough suppressant at night so you can rest     · Inhalers  may be given  Your healthcare provider may give you one or more inhalers to help you breathe easier and cough less  An inhaler gives your medicine to open your airways  Ask your healthcare provider to show you how to use your inhaler correctly  · Take your medicine as directed  Contact your healthcare provider if you think your medicine is not helping or if you have side effects  Tell him of her if you are allergic to any medicine  Keep a list of the medicines, vitamins, and herbs you take  Include the amounts, and when and why you take them  Bring the list or the pill bottles to follow-up visits  Carry your medicine list with you in case of an emergency  Follow up with your healthcare provider as directed:  Write down questions you have so you will remember to ask them during your follow-up visits  © 2017 [employer] Information is for End User's use only and may not be sold, redistributed or otherwise used for commercial purposes  All illustrations and images included in CareNotes® are the copyrighted property of [employer]  or [employer]  The above information is an  only  It is not intended as medical advice for individual conditions or treatments  Talk to your doctor, nurse or pharmacist before following any medical regimen to see if it is safe and effective for you

## 2019-10-01 NOTE — PROGRESS NOTES
Patient will be D/C due to noncompliance to therapy  No formal re-evaluation performed and goals were not assessed

## 2019-10-11 ENCOUNTER — OFFICE VISIT (OUTPATIENT)
Dept: FAMILY MEDICINE CLINIC | Facility: CLINIC | Age: 24
End: 2019-10-11
Payer: COMMERCIAL

## 2019-10-11 VITALS
BODY MASS INDEX: 23.22 KG/M2 | HEART RATE: 97 BPM | OXYGEN SATURATION: 98 % | SYSTOLIC BLOOD PRESSURE: 100 MMHG | DIASTOLIC BLOOD PRESSURE: 60 MMHG | HEIGHT: 61 IN | WEIGHT: 123 LBS | TEMPERATURE: 98 F | RESPIRATION RATE: 16 BRPM

## 2019-10-11 DIAGNOSIS — J30.81 ALLERGIC RHINITIS DUE TO ANIMAL HAIR AND DANDER: Primary | ICD-10-CM

## 2019-10-11 PROCEDURE — 99214 OFFICE O/P EST MOD 30 MIN: CPT | Performed by: FAMILY MEDICINE

## 2019-10-11 PROCEDURE — 3008F BODY MASS INDEX DOCD: CPT | Performed by: FAMILY MEDICINE

## 2019-10-11 RX ORDER — MOMETASONE FUROATE 50 UG/1
2 SPRAY, METERED NASAL DAILY
Qty: 1 ACT | Refills: 6 | Status: SHIPPED | OUTPATIENT
Start: 2019-10-11 | End: 2020-04-17 | Stop reason: SDUPTHER

## 2019-10-11 RX ORDER — MONTELUKAST SODIUM 10 MG/1
10 TABLET ORAL
Qty: 30 TABLET | Refills: 6 | Status: SHIPPED | OUTPATIENT
Start: 2019-10-11 | End: 2020-04-17 | Stop reason: SDUPTHER

## 2019-10-11 RX ORDER — AZELASTINE 1 MG/ML
2 SPRAY, METERED NASAL 2 TIMES DAILY
Qty: 1 BOTTLE | Refills: 6 | Status: SHIPPED | OUTPATIENT
Start: 2019-10-11 | End: 2022-04-04

## 2019-10-11 NOTE — ASSESSMENT & PLAN NOTE
Will treat the allergy with Nasonex, Astelin, and Singulair  Discussed tapering of medications when able to, but continuing Nasonex as her maintenance medication for her allergies  If symptoms do not improve with this regimen, will refer to allergist for further evaluation and management

## 2019-10-11 NOTE — PROGRESS NOTES
Assessment/Plan:    Allergic rhinitis due to animal hair and dander  Will treat the allergy with Nasonex, Astelin, and Singulair  Discussed tapering of medications when able to, but continuing Nasonex as her maintenance medication for her allergies  If symptoms do not improve with this regimen, will refer to allergist for further evaluation and management  Diagnoses and all orders for this visit:    Allergic rhinitis due to animal hair and dander  -     mometasone (NASONEX) 50 mcg/act nasal spray; 2 sprays into each nostril daily  -     montelukast (SINGULAIR) 10 mg tablet; Take 1 tablet (10 mg total) by mouth daily at bedtime  -     azelastine (ASTELIN) 0 1 % nasal spray; 2 sprays into each nostril 2 (two) times a day Use in each nostril as directed          Subjective:      Patient ID: Ny Henson is a 25 y o  female  Cough   This is a new problem  The current episode started more than 1 month ago  The problem has been unchanged  The problem occurs constantly  The cough is productive of sputum  Associated symptoms include ear congestion, headaches (sinus), nasal congestion and postnasal drip  Pertinent negatives include no fever or shortness of breath  Exacerbated by: roommate has a dog and cat  Roommate recently obtained the cat  The following portions of the patient's history were reviewed and updated as appropriate: allergies, current medications, past family history, past medical history, past social history, past surgical history and problem list     Review of Systems   Constitutional: Negative for fever  HENT: Positive for congestion, postnasal drip and sinus pressure  Respiratory: Positive for cough  Negative for shortness of breath  Neurological: Positive for headaches (sinus)           Objective:      /60 (BP Location: Left arm, Patient Position: Sitting, Cuff Size: Adult)   Pulse 97   Temp 98 °F (36 7 °C) (Oral)   Resp 16   Ht 5' 1" (1 549 m)   Wt 55 8 kg (123 lb) SpO2 98%   BMI 23 24 kg/m²          Physical Exam   Constitutional: She is oriented to person, place, and time  She appears well-developed and well-nourished  She is cooperative  HENT:   Head: Normocephalic and atraumatic  Right Ear: Hearing, external ear and ear canal normal  Tympanic membrane is not injected and not erythematous  A middle ear effusion is present  Left Ear: Hearing, external ear and ear canal normal  Tympanic membrane is not injected and not erythematous  A middle ear effusion is present  Mouth/Throat: Uvula is midline and mucous membranes are normal  Posterior oropharyngeal erythema (with white post nasal drainage) present  Eyes: Conjunctivae and lids are normal    Neck: Trachea normal and normal range of motion  Neck supple  No thyromegaly present  Cardiovascular: Normal rate, regular rhythm and normal heart sounds  Pulmonary/Chest: Effort normal and breath sounds normal    Musculoskeletal: Normal range of motion  Lymphadenopathy:     She has no cervical adenopathy  Neurological: She is alert and oriented to person, place, and time  She exhibits normal muscle tone  Gait normal    Skin: Skin is warm and dry  Psychiatric: She has a normal mood and affect  Her speech is normal and behavior is normal    Nursing note and vitals reviewed

## 2019-10-11 NOTE — PATIENT INSTRUCTIONS
Allergic Rhinitis   AMBULATORY CARE:   Allergic rhinitis , or hay fever, is swelling of the inside of your nose  The swelling is a reaction to allergens in the air  An allergen can be anything that causes an allergic reaction  Allergies to weeds, grass, trees, or mold often cause seasonal allergic rhinitis  Indoor dust mites, cockroaches, pet dander, or mold can also cause allergic rhinitis  Common signs and symptoms include the following:   · Sneezing    · Nasal congestion    · Runny nose    · Itchy nose, eyes, or mouth    · Red, watery eyes    · Postnasal drip (nasal drainage down the back of your throat)    · Cough or frequent throat clearing    · Feeling tired or lethargic    · Dark circles under your eyes  Call 911 for the following:   · You have chest pain or shortness of breath  Seek care immediately if:   · You have severe pain  · You cough up blood  Contact your healthcare provider if:   · You have a fever  · You have ear or sinus pain, or a headache  · Your symptoms get worse, even after treatment  · You have yellow, green, brown, or bloody mucus coming from your nose  · Your nose is bleeding or you have pain inside your nose  · You have trouble sleeping because of your symptoms  · You have questions or concerns about your condition or care  Treatment:   · Antihistamines  help reduce itching, sneezing, and a runny nose  Some antihistamines can make you sleepy  · Nasal steroids  help decrease inflammation in your nose  · Decongestants  help clear your stuffy nose  · Immunotherapy  may be needed if your symptoms are severe or other treatments do not work  Immunotherapy is used to inject an allergen into your skin  At first, the therapy contains tiny amounts of the allergen  Your healthcare provider will slowly increase the amount of allergen  This may help your body be less sensitive to the allergen and stop reacting to it   You may need immunotherapy for weeks or longer  Manage allergic rhinitis:  The best way to manage allergic rhinitis is to avoid allergens that can trigger your symptoms  Any of the following may help decrease your symptoms:  · Rinse your nose and sinuses  with a salt water solution or use a salt water nasal spray  This will help thin the mucus in your nose and rinse away pollen and dirt  It will also help reduce swelling so you can breathe normally  Ask your healthcare provider how often to rinse your nose  · Reduce exposure to dust mites  Wash sheets and towels in hot water every week  Cover your pillows and mattresses with allergen-free covers  Limit the number of stuffed animals and soft toys your child has  Wash your child's toys in hot water regularly  Vacuum weekly and use a vacuum  with an air filter  If possible, get rid of carpets and curtains  These collect dust and dust mites  · Reduce exposure to pollen  Keep windows and doors closed in your house and car  Stay inside when air pollution or the pollen count is high  Run your air conditioner on recycle, and change air filters often  Shower and wash your hair before bed every night to rinse away pollen  · Reduce exposure to pet dander  If possible, do not keep cats, dogs, birds, or other pets  If you do keep pets in your home, keep them out of bedrooms and carpeted rooms  Bathe them often  · Reduce exposure to mold  Do not spend time in basements  Choose artificial plants instead of live plants  Keep your home's humidity at less than 45%  Do not have ponds or standing water in your home or yard  · Do not smoke  Avoid others who smoke  Ask your healthcare provider for information if you currently smoke and need help to quit  Follow up with your healthcare provider as directed:  Write down your questions so you remember to ask them during your visits     © 2017 [employer] Information is for End User's use only and may not be sold, redistributed or otherwise used for commercial purposes  All illustrations and images included in CareNotes® are the copyrighted property of [employer]  or [employer]ong  The above information is an  only  It is not intended as medical advice for individual conditions or treatments  Talk to your doctor, nurse or pharmacist before following any medical regimen to see if it is safe and effective for you

## 2020-04-17 ENCOUNTER — OFFICE VISIT (OUTPATIENT)
Dept: FAMILY MEDICINE CLINIC | Facility: CLINIC | Age: 25
End: 2020-04-17
Payer: COMMERCIAL

## 2020-04-17 DIAGNOSIS — J30.81 ALLERGIC RHINITIS DUE TO ANIMAL HAIR AND DANDER: Primary | ICD-10-CM

## 2020-04-17 DIAGNOSIS — E55.9 VITAMIN D DEFICIENCY: ICD-10-CM

## 2020-04-17 DIAGNOSIS — R53.83 FATIGUE, UNSPECIFIED TYPE: ICD-10-CM

## 2020-04-17 DIAGNOSIS — J45.20 MILD INTERMITTENT REACTIVE AIRWAY DISEASE WITHOUT COMPLICATION: ICD-10-CM

## 2020-04-17 PROCEDURE — 99213 OFFICE O/P EST LOW 20 MIN: CPT | Performed by: FAMILY MEDICINE

## 2020-04-17 RX ORDER — ALBUTEROL SULFATE 90 UG/1
2 AEROSOL, METERED RESPIRATORY (INHALATION) EVERY 6 HOURS PRN
Qty: 3 INHALER | Refills: 1 | Status: SHIPPED | OUTPATIENT
Start: 2020-04-17

## 2020-04-17 RX ORDER — MOMETASONE FUROATE 50 UG/1
2 SPRAY, METERED NASAL DAILY
Qty: 3 ACT | Refills: 1 | Status: SHIPPED | OUTPATIENT
Start: 2020-04-17 | End: 2022-04-04 | Stop reason: ALTCHOICE

## 2020-04-17 RX ORDER — CETIRIZINE HYDROCHLORIDE 10 MG/1
10 TABLET ORAL DAILY
Qty: 90 TABLET | Refills: 1 | Status: SHIPPED | OUTPATIENT
Start: 2020-04-17 | End: 2022-04-04

## 2020-04-17 RX ORDER — MONTELUKAST SODIUM 10 MG/1
10 TABLET ORAL
Qty: 90 TABLET | Refills: 1 | Status: SHIPPED | OUTPATIENT
Start: 2020-04-17 | End: 2022-04-04

## 2020-07-09 ENCOUNTER — OFFICE VISIT (OUTPATIENT)
Dept: FAMILY MEDICINE CLINIC | Facility: CLINIC | Age: 25
End: 2020-07-09
Payer: COMMERCIAL

## 2020-07-09 VITALS
SYSTOLIC BLOOD PRESSURE: 100 MMHG | TEMPERATURE: 100 F | WEIGHT: 140 LBS | BODY MASS INDEX: 26.43 KG/M2 | OXYGEN SATURATION: 98 % | DIASTOLIC BLOOD PRESSURE: 62 MMHG | HEIGHT: 61 IN | RESPIRATION RATE: 17 BRPM | HEART RATE: 93 BPM

## 2020-07-09 DIAGNOSIS — D18.01 HEMANGIOMA OF SKIN: ICD-10-CM

## 2020-07-09 DIAGNOSIS — Z13.220 SCREENING FOR LIPID DISORDERS: ICD-10-CM

## 2020-07-09 DIAGNOSIS — R45.86 MOOD CHANGE: ICD-10-CM

## 2020-07-09 DIAGNOSIS — M25.551 RIGHT HIP PAIN: ICD-10-CM

## 2020-07-09 DIAGNOSIS — Z00.00 ANNUAL PHYSICAL EXAM: Primary | ICD-10-CM

## 2020-07-09 PROCEDURE — 99395 PREV VISIT EST AGE 18-39: CPT | Performed by: FAMILY MEDICINE

## 2020-07-09 NOTE — PROGRESS NOTES
[address] [facility] PRACTICE    NAME: Gutierrez Romero  AGE: 22 y o  SEX: female  : 1995     DATE: 2020     Assessment and Plan:     Problem List Items Addressed This Visit        Cardiovascular and Mediastinum    Hemangioma of skin    Relevant Orders    Ambulatory referral to Dermatology      Other Visit Diagnoses     Annual physical exam    -  Primary    BMI 26 0-26 9,adult        Right hip pain        Relevant Orders    Ambulatory referral to Sports Medicine    Mood change        Relevant Orders    Ambulatory referral to [address] [place] [address] for lipid disorders        Relevant Orders    Lipid Panel with Direct LDL reflex          Immunizations and preventive care screenings were discussed with patient today  Appropriate education was printed on patient's after visit summary  Counseling:  · Exercise: the importance of regular exercise/physical activity was discussed  Recommend exercise 3-5 times per week for at least 30 minutes  BMI Counseling: Body mass index is 26 45 kg/m²  The BMI is above normal  Nutrition recommendations include encouraging healthy choices of fruits and vegetables and limiting drinks that contain sugar  Exercise recommendations include exercising 3-5 times per week  Tobacco Cessation Counseling: Tobacco cessation counseling was provided  The patient is sincerely urged to quit consumption of tobacco  She is not ready to quit tobacco        Return in 1 year (on 2021) for Annual physical      Chief Complaint:     Chief Complaint   Patient presents with    Follow-up      History of Present Illness:     Adult Annual Physical   Patient here for a comprehensive physical exam  The patient reports problems - right hip pain and would like to be referred for counseling  Diet and Physical Activity  · Diet/Nutrition: limited junk food and consuming 3-5 servings of fruits/vegetables daily  · Exercise: 1-2 times a week on average  Depression Screening  PHQ-9 Depression Screening    PHQ-9:    Frequency of the following problems over the past two weeks:       Little interest or pleasure in doing things:  0 - not at all  Feeling down, depressed, or hopeless:  0 - not at all  PHQ-2 Score:  0       General Health  · Sleep: sleeps poorly and gets 7-8 hours of sleep on average  · Hearing: normal - bilateral   · Vision: no vision problems  · Dental: regular dental visits  /GYN Health  · Last menstrual period: 1 month ago3  · Contraceptive method: IUD placement  · History of STDs?: no      Review of Systems:     Review of Systems   Constitutional: Negative for fever  Respiratory: Negative for shortness of breath  Cardiovascular: Negative for chest pain  Musculoskeletal: Positive for myalgias (right hip pain; previous ballet injury)  Skin:        Hemangioma right thigh; unchanged   Neurological: Negative for syncope  Psychiatric/Behavioral: Positive for dysphoric mood  All other systems reviewed and are negative  Past Medical History:     No past medical history on file     Past Surgical History:     Past Surgical History:   Procedure Laterality Date    WISDOM TOOTH EXTRACTION        Social History:        Social History     Socioeconomic History    Marital status: Single     Spouse name: None    Number of children: None    Years of education: None    Highest education level: None   Occupational History    None   Social Needs    Financial resource strain: Not hard at all   Edel-Eric insecurity:     Worry: Never true     Inability: Never true    Transportation needs:     Medical: No     Non-medical: No   Tobacco Use    Smoking status: Light Tobacco Smoker    Smokeless tobacco: Never Used    Tobacco comment: occassionally   Substance and Sexual Activity    Alcohol use: Yes     Comment: occassionally    Drug use: Yes     Types: Marijuana    Sexual activity: Not Currently   Lifestyle    Physical activity:     Days per week: 0 days     Minutes per session: 0 min    Stress: To some extent   Relationships    Social connections:     Talks on phone: Patient refused     Gets together: Patient refused     Attends Congregational service: Patient refused     Active member of club or organization: Patient refused     Attends meetings of clubs or organizations: Patient refused     Relationship status: Patient refused    Intimate partner violence:     Fear of current or ex partner: Patient refused     Emotionally abused: Patient refused     Physically abused: Patient refused     Forced sexual activity: Patient refused   Other Topics Concern    None   Social History Narrative    None      Family History:     Family History   Problem Relation Age of Onset    Hypertension Mother     Vitamin D deficiency Father     Diabetes Maternal Grandmother     Schizophrenia Paternal Uncle       Current Medications:     Current Outpatient Medications   Medication Sig Dispense Refill    albuterol (PROVENTIL HFA,VENTOLIN HFA) 90 mcg/act inhaler Inhale 2 puffs every 6 (six) hours as needed for wheezing or shortness of breath 3 Inhaler 1    azelastine (ASTELIN) 0 1 % nasal spray 2 sprays into each nostril 2 (two) times a day Use in each nostril as directed 1 Bottle 6    cetirizine (ZyrTEC) 10 mg tablet Take 1 tablet (10 mg total) by mouth daily 90 tablet 1    mometasone (NASONEX) 50 mcg/act nasal spray 2 sprays into each nostril daily 3 Act 1    montelukast (SINGULAIR) 10 mg tablet Take 1 tablet (10 mg total) by mouth daily at bedtime 90 tablet 1    Levonorgestrel 13 5 MG IUD 1 each by Intrauterine route      valACYclovir (VALTREX) 1,000 mg tablet Take 2 tablets (2,000 mg total) by mouth 2 (two) times a day for 1 day 4 tablet 3     No current facility-administered medications for this visit         Allergies:     No Known Allergies   Physical Exam:     /62 (BP Location: Left arm, Patient Position: Sitting, Cuff Size: Standard)   Pulse 93   Temp 100 °F (37 8 °C) (Tympanic)   Resp 17   Ht 5' 1" (1 549 m)   Wt 63 5 kg (140 lb)   SpO2 98%   BMI 26 45 kg/m²     Physical Exam   Constitutional: She is oriented to person, place, and time  She appears well-developed and well-nourished  She is cooperative  No distress  HENT:   Head: Normocephalic and atraumatic  Right Ear: Hearing, tympanic membrane, external ear and ear canal normal    Left Ear: Hearing, tympanic membrane, external ear and ear canal normal    Mouth/Throat: Uvula is midline, oropharynx is clear and moist and mucous membranes are normal    Eyes: Pupils are equal, round, and reactive to light  Conjunctivae and lids are normal    Neck: Trachea normal and normal range of motion  Neck supple  No thyromegaly present  Cardiovascular: Normal rate, regular rhythm and normal heart sounds  No murmur heard  Pulses:       Posterior tibial pulses are 2+ on the right side, and 2+ on the left side  Pulmonary/Chest: Effort normal and breath sounds normal  She has no decreased breath sounds  She has no wheezes  She has no rhonchi  She has no rales  Abdominal: Soft  Normal appearance and bowel sounds are normal  There is no hepatosplenomegaly  There is no tenderness  Musculoskeletal: Normal range of motion  Right ankle: She exhibits no swelling  Left ankle: She exhibits no swelling  Lymphadenopathy:        Head (right side): No submandibular adenopathy present  Head (left side): No submandibular adenopathy present  She has no cervical adenopathy  Neurological: She is alert and oriented to person, place, and time  No cranial nerve deficit  She exhibits normal muscle tone  Gait normal    Skin: Skin is warm, dry and intact  Psychiatric: She has a normal mood and affect  Her speech is normal and behavior is normal  Thought content normal    Nursing note and vitals reviewed        Teresa Harper MD   [facility]

## 2020-07-09 NOTE — PATIENT INSTRUCTIONS
Wellness Visit for Adults   AMBULATORY CARE:   A wellness visit  is when you see your healthcare provider to get screened for health problems  You can also get advice on how to stay healthy  Write down your questions so you remember to ask them  Ask your healthcare provider how often you should have a wellness visit  What happens at a wellness visit:  Your healthcare provider will ask about your health, and your family history of health problems  This includes high blood pressure, heart disease, and cancer  He or she will ask if you have symptoms that concern you, if you smoke, and about your mood  You may also be asked about your intake of medicines, supplements, food, and alcohol  Any of the following may be done:  · Your weight  will be checked  Your height may also be checked so your body mass index (BMI) can be calculated  Your BMI shows if you are at a healthy weight  · Your blood pressure  and heart rate will be checked  Your temperature may also be checked  · Blood and urine tests  may be done  Blood tests may be done to check your cholesterol levels  Abnormal cholesterol levels increase your risk for heart disease and stroke  You may also need a blood or urine test to check for diabetes if you are at increased risk  Urine tests may be done to look for signs of an infection or kidney disease  · A physical exam  includes checking your heartbeat and lungs with a stethoscope  Your healthcare provider may also check your skin to look for sun damage  · Screening tests  may be recommended  A screening test is done to check for diseases that may not cause symptoms  The screening tests you may need depend on your age, gender, family history, and lifestyle habits  For example, colorectal screening may be recommended if you are 48years old or older  Screening tests you need if you are a woman:   · A Pap smear  is used to screen for cervical cancer   Pap smears are usually done every 3 to 5 years depending on your age  You may need them more often if you have had abnormal Pap smear test results in the past  Ask your healthcare provider how often you should have a Pap smear  · A mammogram  is an x-ray of your breasts to screen for breast cancer  Experts recommend mammograms every 2 years starting at age 48 years  You may need a mammogram at age 52 years or younger if you have an increased risk for breast cancer  Talk to your healthcare provider about when you should start having mammograms and how often you need them  Vaccines you may need:   · Get an influenza vaccine  every year  The influenza vaccine protects you from the flu  Several types of viruses cause the flu  The viruses change over time, so new vaccines are made each year  · Get a tetanus-diphtheria (Td) booster vaccine  every 10 years  This vaccine protects you against tetanus and diphtheria  Tetanus is a severe infection that may cause painful muscle spasms and lockjaw  Diphtheria is a severe bacterial infection that causes a thick covering in the back of your mouth and throat  · Get a human papillomavirus (HPV) vaccine  if you are female and aged 23 to 32 or male 23 to 24 and never received it  This vaccine protects you from HPV infection  HPV is the most common infection spread by sexual contact  HPV may also cause vaginal, penile, and anal cancers  · Get a pneumococcal vaccine  if you are aged 72 years or older  The pneumococcal vaccine is an injection given to protect you from pneumococcal disease  Pneumococcal disease is an infection caused by pneumococcal bacteria  The infection may cause pneumonia, meningitis, or an ear infection  · Get a shingles vaccine  if you are aged 61 or older, even if you have had shingles before  The shingles vaccine is an injection to protect you from the varicella-zoster virus  This is the same virus that causes chickenpox   Shingles is a painful rash that develops in people who had chickenpox or have been exposed to the virus  How to eat healthy:  My Plate is a model for planning healthy meals  It shows the types and amounts of foods that should go on your plate  Fruits and vegetables make up about half of your plate, and grains and protein make up the other half  A serving of dairy is included on the side of your plate  The amount of calories and serving sizes you need depends on your age, gender, weight, and height  Examples of healthy foods are listed below:  · Eat a variety of vegetables  such as dark green, red, and orange vegetables  You can also include canned vegetables low in sodium (salt) and frozen vegetables without added butter or sauces  · Eat a variety of fresh fruits , canned fruit in 100% juice, frozen fruit, and dried fruit  · Include whole grains  At least half of the grains you eat should be whole grains  Examples include whole-wheat bread, wheat pasta, brown rice, and whole-grain cereals such as oatmeal     · Eat a variety of protein foods such as seafood (fish and shellfish), lean meat, and poultry without skin (turkey and chicken)  Examples of lean meats include pork leg, shoulder, or tenderloin, and beef round, sirloin, tenderloin, and extra lean ground beef  Other protein foods include eggs and egg substitutes, beans, peas, soy products, nuts, and seeds  · Choose low-fat dairy products such as skim or 1% milk or low-fat yogurt, cheese, and cottage cheese  · Limit unhealthy fats  such as butter, hard margarine, and shortening  Exercise:  Exercise at least 30 minutes per day on most days of the week  Some examples of exercise include walking, biking, dancing, and swimming  You can also fit in more physical activity by taking the stairs instead of the elevator or parking farther away from stores  Include muscle strengthening activities 2 days each week  Regular exercise provides many health benefits   It helps you manage your weight, and decreases your risk for type 2 diabetes, heart disease, stroke, and high blood pressure  Exercise can also help improve your mood  Ask your healthcare provider about the best exercise plan for you  General health and safety guidelines:   · Do not smoke  Nicotine and other chemicals in cigarettes and cigars can cause lung damage  Ask your healthcare provider for information if you currently smoke and need help to quit  E-cigarettes or smokeless tobacco still contain nicotine  Talk to your healthcare provider before you use these products  · Limit alcohol  A drink of alcohol is 12 ounces of beer, 5 ounces of wine, or 1½ ounces of liquor  · Lose weight, if needed  Being overweight increases your risk of certain health conditions  These include heart disease, high blood pressure, type 2 diabetes, and certain types of cancer  · Protect your skin  Do not sunbathe or use tanning beds  Use sunscreen with a SPF 15 or higher  Apply sunscreen at least 15 minutes before you go outside  Reapply sunscreen every 2 hours  Wear protective clothing, hats, and sunglasses when you are outside  · Drive safely  Always wear your seatbelt  Make sure everyone in your car wears a seatbelt  A seatbelt can save your life if you are in an accident  Do not use your cell phone when you are driving  This could distract you and cause an accident  Pull over if you need to make a call or send a text message  · Practice safe sex  Use latex condoms if are sexually active and have more than one partner  Your healthcare provider may recommend screening tests for sexually transmitted infections (STIs)  · Wear helmets, lifejackets, and protective gear  Always wear a helmet when you ride a bike or motorcycle, go skiing, or play sports that could cause a head injury  Wear protective equipment when you play sports  Wear a lifejacket when you are on a boat or doing water sports    © 2017 [employer] Information is for End User's use only and may not be sold, redistributed or otherwise used for commercial purposes  All illustrations and images included in CareNotes® are the copyrighted property of [employer]  or Seth Truong  The above information is an  only  It is not intended as medical advice for individual conditions or treatments  Talk to your doctor, nurse or pharmacist before following any medical regimen to see if it is safe and effective for you  Weight Management   AMBULATORY CARE:   Why it is important to manage your weight:  Being overweight increases your risk of health conditions such as heart disease, high blood pressure, type 2 diabetes, and certain types of cancer  It can also increase your risk for osteoarthritis, sleep apnea, and other respiratory problems  Aim for a slow, steady weight loss  Even a small amount of weight loss can lower your risk of health problems  How to lose weight safely:  A safe and healthy way to lose weight is to eat fewer calories and get regular exercise  You can lose up about 1 pound a week by decreasing the number of calories you eat by 500 calories each day  You can decrease calories by eating smaller portion sizes or by cutting out high-calorie foods  Read labels to find out how many calories are in the foods you eat  You can also burn calories with exercise such as walking, swimming, or biking  You will be more likely to keep weight off if you make these changes part of your lifestyle  Healthy meal plan for weight management:  A healthy meal plan includes a variety of foods, contains fewer calories, and helps you stay healthy  A healthy meal plan includes the following:  · Eat whole-grain foods more often  A healthy meal plan should contain fiber  Fiber is the part of grains, fruits, and vegetables that is not broken down by your body  Whole-grain foods are healthy and provide extra fiber in your diet   Some examples of whole-grain foods are whole-wheat breads and pastas, oatmeal, brown rice, and bulgur  · Eat a variety of vegetables every day  Include dark, leafy greens such as spinach, kale, brandie greens, and mustard greens  Eat yellow and orange vegetables such as carrots, sweet potatoes, and winter squash  · Eat a variety of fruits every day  Choose fresh or canned fruit (canned in its own juice or light syrup) instead of juice  Fruit juice has very little or no fiber  · Eat low-fat dairy foods  Drink fat-free (skim) milk or 1% milk  Eat fat-free yogurt and low-fat cottage cheese  Try low-fat cheeses such as mozzarella and other reduced-fat cheeses  · Choose meat and other protein foods that are low in fat  Choose beans or other legumes such as split peas or lentils  Choose fish, skinless poultry (chicken or turkey), or lean cuts of red meat (beef or pork)  Before you cook meat or poultry, cut off any visible fat  · Use less fat and oil  Try baking foods instead of frying them  Add less fat, such as margarine, sour cream, regular salad dressing and mayonnaise to foods  Eat fewer high-fat foods  Some examples of high-fat foods include french fries, doughnuts, ice cream, and cakes  · Eat fewer sweets  Limit foods and drinks that are high in sugar  This includes candy, cookies, regular soda, and sweetened drinks  Ways to decrease calories:   · Eat smaller portions  ¨ Use a small plate with smaller servings  ¨ Do not eat second helpings  ¨ When you eat at a restaurant, ask for a box and place half of your meal in the box before you eat  ¨ Share an entrée with someone else  · Replace high-calorie snacks with healthy, low-calorie snacks  ¨ Choose fresh fruit, vegetables, fat-free rice cakes, or air-popped popcorn instead of potato chips, nuts, or chocolate  ¨ Choose water or calorie-free drinks instead of soda or sweetened drinks  · Eat regular meals  Skipping meals can lead to overeating later in the day   Eat a healthy snack in place of a meal if you do not have time to eat a regular meal      · Do not shop for groceries when you are hungry  You may be more likely to make unhealthy food choices  Take a grocery list of healthy foods and shop after you have eaten  Exercise:  Exercise at least 30 minutes per day on most days of the week  Some examples of exercise include walking, biking, dancing, and swimming  You can also fit in more physical activity by taking the stairs instead of the elevator or parking farther away from stores  Ask your healthcare provider about the best exercise plan for you  Other things to consider as you try to lose weight:   · Be aware of situations that may give you the urge to overeat, such as eating while watching television  Find ways to avoid these situations  For example, read a book, go for a walk, or do crafts  · Meet with a weight loss support group or friends who are also trying to lose weight  This may help you stay motivated to continue working on your weight loss goals  © 2017 [employer] Information is for End User's use only and may not be sold, redistributed or otherwise used for commercial purposes  All illustrations and images included in CareNotes® are the copyrighted property of [employer]  or [employer] Truong  The above information is an  only  It is not intended as medical advice for individual conditions or treatments  Talk to your doctor, nurse or pharmacist before following any medical regimen to see if it is safe and effective for you

## 2020-07-22 NOTE — PROGRESS NOTES
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  IMPRESSION:  Labral tear of the right hip, initial encounter  -Relative rest, ice, moist heat as indicated  -OTC analgesics (discussed SE's of NSAIDs and educated on proper use as well as contraindications)  -Will initiate physical therapy  -Follow-up in 6 weeks; may consider MR arthrogram at that time    Repeat X-ray next visit:   No       Return in about 6 weeks (around 9/3/2020)  Patient Instructions   I believe you have a labral tear of your right hip joint  We will start with weekly physical therapy this week  I recommend home exercise as directed by your physical therapist  May use ice (15 mins on, 15 mins off, 15 mins on take a half hour break and then repeat), may use Tylenol and ibuprofen (with food) as needed for pain  Follow-up will be with us in 6 weeks  CHIEF COMPLAINT:  Right hip pain    HPI:  Susan Mendieta is a 22 y o  female  who presents for right hip pain  She was referred by her PCP, Dr Amado Shabazz  The patient believes this is due to an old ballet injury  Visit 7/23/2020 : Today she reports the original injury was 5-6 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  Two years ago she fell and hit her right knee, which re-aggravated the hip   After the second injury she went to physical therapy, but reports this was primarily for her knee  She reports the pain is located on the anteromedial aspect of the hip  Lifting heavy objects and "doing  More physical activity" makes the pain worse  Sometimes the right hip pain radiates into her back  Doesn't take any OTC's  Not using the joint seems to make it better  Denies any fevers, chills, night sweats  Review of Systems   Constitutional: Negative for chills, fever and unexpected weight change  HENT: Negative for hearing loss, nosebleeds and sore throat  Eyes: Negative for pain, redness and visual disturbance  Respiratory: Negative for cough, shortness of breath and wheezing  Cardiovascular: Negative for chest pain, palpitations and leg swelling  Gastrointestinal: Negative for abdominal pain, nausea and vomiting  Endocrine: Negative for polydipsia and polyuria  Genitourinary: Negative for dysuria and hematuria  Skin: Negative for rash and wound  Neurological: Negative for dizziness, numbness and headaches  Psychiatric/Behavioral: Negative for decreased concentration, dysphoric mood and suicidal ideas  The patient is not nervous/anxious  Following history reviewed and update:    History reviewed  No pertinent past medical history    Past Surgical History:   Procedure Laterality Date    CERVICAL BIOPSY  W/ LOOP ELECTRODE EXCISION      WISDOM TOOTH EXTRACTION       Social History   Social History     Substance and Sexual Activity   Alcohol Use Yes    Comment: occassionally     Social History     Substance and Sexual Activity   Drug Use Yes    Types: Marijuana     Social History     Tobacco Use   Smoking Status Light Tobacco Smoker   Smokeless Tobacco Never Used   Tobacco Comment    occassionally     Family History   Problem Relation Age of Onset    Hypertension Mother     Vitamin D deficiency Father     Diabetes Maternal Grandmother     Schizophrenia Paternal Uncle      No Known Allergies       Physical Exam  BP 95/60 (BP Location: Right arm, Patient Position: Sitting, Cuff Size: Adult)   Pulse 85   Ht 5' (1 524 m)   Wt 62 6 kg (138 lb)   BMI 26 95 kg/m²     Constitutional:  see vital signs  Gen: well-developed, normocephalic/atraumatic, well-groomed  Eyes: No inflammation or discharge of conjunctiva or lids; sclera clear   Pharynx: no inflammation, lesion, or mass of lips  Neck: supple, no masses, non-distended  MSK: no inflammation, lesion, mass, or clubbing of nails and digits except for other than mentioned below  SKIN: no visible rashes or skin lesions  Pulmonary/Chest: Effort normal  No respiratory distress     NEURO: cranial nerves grossly intact  PSYCH:  Alert and oriented to person, place, and time; recent and remote memory intact; mood normal, no depression, anxiety, or agitation, judgment and insight good and intact     Ortho Exam  BACK EXAM:  Gait: normal, no trendelenberg gait, no antalgic gait    BACK TENDERNESS:  Spinous Processes: no  Paraspinal Muscles: no  SI Joint: no  Sacrum: no    ROM:  Flexion: intact  Extension: intact  Sidebending: intact    DERMATOMAL SENSATION:  L1: normal   L2: normal   L3: normal   L4: normal   L5: normal   S1: normal    STRENGTH (bilateral):  Knee Extension: 5/5  Knee Flexion: 5/5  Foot Dorsiflexion: 5/5  Great Toe Extension: 5/5  Foot Plantarflexion: 5/5  Hip Flexion: 5/5  Hip Abduction: 5/5    REFLEXES:  Patellar: 2+ bilateral  Achilles: 2+ bilateral  Clonus: negative bilateral    BACK:   SUPINE STRAIGHT LEG: negative    HIP:  LOG ROLL: negative  KIARA: negative  FADIR: reproduces pain    SI JOINT: no tenderness  ASIS COMPRESSION TEST: negative  KIARA SI PAIN: negative     Procedures    None

## 2020-07-23 ENCOUNTER — APPOINTMENT (OUTPATIENT)
Dept: RADIOLOGY | Facility: OTHER | Age: 25
End: 2020-07-23
Payer: COMMERCIAL

## 2020-07-23 ENCOUNTER — TELEPHONE (OUTPATIENT)
Dept: PSYCHIATRY | Facility: CLINIC | Age: 25
End: 2020-07-23

## 2020-07-23 ENCOUNTER — CONSULT (OUTPATIENT)
Dept: OBGYN CLINIC | Facility: OTHER | Age: 25
End: 2020-07-23
Payer: COMMERCIAL

## 2020-07-23 VITALS
HEART RATE: 85 BPM | SYSTOLIC BLOOD PRESSURE: 95 MMHG | WEIGHT: 138 LBS | DIASTOLIC BLOOD PRESSURE: 60 MMHG | HEIGHT: 60 IN | BODY MASS INDEX: 27.09 KG/M2

## 2020-07-23 DIAGNOSIS — M25.551 RIGHT HIP PAIN: ICD-10-CM

## 2020-07-23 DIAGNOSIS — M25.551 RIGHT HIP PAIN: Primary | ICD-10-CM

## 2020-07-23 DIAGNOSIS — S73.191A TEAR OF RIGHT ACETABULAR LABRUM, INITIAL ENCOUNTER: ICD-10-CM

## 2020-07-23 PROCEDURE — 3008F BODY MASS INDEX DOCD: CPT | Performed by: FAMILY MEDICINE

## 2020-07-23 PROCEDURE — 73502 X-RAY EXAM HIP UNI 2-3 VIEWS: CPT

## 2020-07-23 PROCEDURE — 4004F PT TOBACCO SCREEN RCVD TLK: CPT | Performed by: FAMILY MEDICINE

## 2020-07-23 PROCEDURE — 99203 OFFICE O/P NEW LOW 30 MIN: CPT | Performed by: FAMILY MEDICINE

## 2020-07-23 NOTE — TELEPHONE ENCOUNTER
Behavorial Health Outpatient Intake Questions    Referred by: PCP    Please advised interviewee that they need to answer all questions truthfully to allow for best care and any misrepresentations of information may affect their ability to be seen at this clinic   => Was this discussed? Yes     Behavorial Health Outpatient Intake History -     Presenting Problem (in patient's words): depression/anxiety; PTSD    Are there any developmental disabilities? ? If yes, can they speak to you on the phone? If they are too limited to speak to you on phone, refer out No    Are you taking any psychiatric medications? No    => If yes, who prescribes? If yes, are they injectable medications? Does the patient have a language barrier or hearing impairment? No    Have you been treated at [facility] by a therapist or a doctor in the past? If yes, who? No    Has the patient been hospitalized for mental health? No   If yes, how long ago was last hospitalization and where was it? Do you actively use alcohol or marijuana or illegal substances? If yes, what and how much - refer out to Drug and alcohol treatment if use is excessive or daily use of illegal substances There is a documented history of marijuana abuse confirmed by the patient  Recreational use-every day    Do you have a community treatment team or ? No    Legal History-     Does the patient have any history of arrests, snf/long-term time, or DUIs? No  If Yes-  1) What types of charges? 2) When were they last incarcerated? 3) Are they currently on parole or probation? Minor Child-    Who has custody of the child? Is there a custody agreement? If there is a custody agreement remind parent that they must bring a copy to the first appt or they will not be seen       Intake Team, please check with provider before scheduling if flags come up such as:  - complex case  - legal history (other than DUI)  - communication barrier concerns are present  - if, in your judgment, this needs further review    ACCEPTED as a patient Yes  => Appointment Date: Tuesday 10/20 @ 11am w/ Dr Lina Snow? Yes    Name of Insurance Co: [address] [place] [address] ID#: [id number]  Insurance Phone #  If ins is primary or secondary  If patient is a minor, parents information such as Name, D  O B of guarantor

## 2020-07-23 NOTE — PATIENT INSTRUCTIONS
I believe you have a labral tear of your right hip joint  We will start with weekly physical therapy this week  I recommend home exercise as directed by your physical therapist  May use ice (15 mins on, 15 mins off, 15 mins on take a half hour break and then repeat), may use Tylenol and ibuprofen (with food) as needed for pain  Follow-up will be with us in 6 weeks

## 2020-07-28 ENCOUNTER — EVALUATION (OUTPATIENT)
Dept: PHYSICAL THERAPY | Facility: OTHER | Age: 25
End: 2020-07-28
Payer: COMMERCIAL

## 2020-07-28 DIAGNOSIS — S73.191A TEAR OF RIGHT ACETABULAR LABRUM, INITIAL ENCOUNTER: ICD-10-CM

## 2020-07-28 DIAGNOSIS — M25.551 RIGHT HIP PAIN: Primary | ICD-10-CM

## 2020-07-28 PROCEDURE — 97162 PT EVAL MOD COMPLEX 30 MIN: CPT | Performed by: PHYSICAL THERAPIST

## 2020-07-28 NOTE — PROGRESS NOTES
PT Evaluation     Today's date: 2020  Patient name: Marcy Solis  : 1995  MRN: [id number]  Referring provider: Judy Cho DO  Dx:   Encounter Diagnosis     ICD-10-CM    1  Right hip pain M25 551 Ambulatory referral to Physical Therapy   2  Tear of right acetabular labrum, initial encounter S73 191A Ambulatory referral to Physical Therapy       Start Time: 1100  Stop Time: 1200  Total time in clinic (min): 60 minutes    Assessment  Assessment details: Marcy Solis is a 22 y o  female who presents with complaints of right hip pain  (primary encounter diagnosis) and tear of right acetabular labrum, initial encounter  No further referral appears necessary at this time based upon examination results  Patient presents to PT with impaired strength, impaired ROM, decreased flexibility and impaired ability to complete IADLs  Prognosis is good given HEP compliance and PT 2-3x/wk  Positive prognostic indicators include positive attitude toward recovery  Please contact me if you have any questions or recommendations  Thank you for the opportunity to share in  [facility]         Impairments: abnormal coordination, abnormal muscle firing, abnormal or restricted ROM, activity intolerance, impaired physical strength, lacks appropriate home exercise program, pain with function and poor body mechanics    Symptom irritability: moderateBarriers to therapy: History of knee pain, history of hip pain  Understanding of Dx/Px/POC: good   Prognosis: good    Goals  Short Term:  Pt will report decreased levels of pain by at least 2 subjective ratings in 4 weeks  Pt will demonstrate improved ROM by at least 10 degrees in 4 weeks  Pt will demonstrate improved strength by 1/2 grade  Long Term:   Pt will be independent in their HEP in 8 weeks  Pt will be be pain free with IADL's  Pt will demonstrate improved FOTO, > 80         Plan  Plan details: Prognosis above is given PT services 2x/week tapering to 1x/week over the next 3 months and home program adherence  Patient would benefit from: skilled physical therapy  Planned modality interventions: thermotherapy: hydrocollator packs  Planned therapy interventions: activity modification, joint mobilization, manual therapy, motor coordination training, neuromuscular re-education, patient education, self care, therapeutic activities, therapeutic exercise, graded activity, home exercise program, flexibility, functional ROM exercises, strengthening and stretching  Frequency: 2x week  Duration in weeks: 12  Plan of Care beginning date: 7/28/2020  Plan of Care expiration date: 10/28/2020  Treatment plan discussed with: patient        Subjective Evaluation    History of Present Illness  Mechanism of injury: Patient has hip pain from when she was dancing  She injured her R knee approximately 2-3 years ago  Patient said that she was trying to get back into shape  She said she can't workout 5-6 days like she used to because of the cumulative effects of the knee, the hip and the low back  Patient underwent an x-ray which revealed nothing  Patient was sent to PT    Pain   R Hip   Currently 0/10  [facility] 0/10  At [facility] 6/10  Patient described the pain as "annoying- I need to crack it "    AGGS: lifting heavy, splits, stretching  EASES: slow squats which cause self mobilization   Patient's Goal: "I want to get my splits back and be pain free "          Objective     General Comments:      Hip Comments   LQS WNL     Special Tests:   Butch +1 joint on L +2 joint on R   Ely's +R +L   Log Roll -   Lumbar Protective Mechanism: +L posterior +R anterior     Palpation: increased tone in the bilateral QL and increased tenderness with palpation of the bilateral Iliopsoas     Strength:   Iliopsoas 3-/5   ER 3+/5   IR 3+/5   Glute Max 3-/5   PGM 3-/5     ROM   Lumbar WNL   Hip WNL     Segmental Mobility:   Hypomobile L2-L4         Precautions: depression, anxiety, low back pain     Daily Treatment Log   Manuals 7/28            MWM Hip Flexion Desean FINN GRC            SI Mobs GRC            Lumbar Mobs GRC            Psoas Release GRC            Neuro Re-Ed 7/28            Clamshells  HEP            Single Leg Bridge HEP                                                                             Ther Ex 7/28            SLR Flex HEP            SLR Add HEP                                                                                          Ther Activity                                       Gait Training                                       Modalities

## 2020-07-30 ENCOUNTER — OFFICE VISIT (OUTPATIENT)
Dept: PHYSICAL THERAPY | Facility: OTHER | Age: 25
End: 2020-07-30
Payer: COMMERCIAL

## 2020-07-30 DIAGNOSIS — S73.191A TEAR OF RIGHT ACETABULAR LABRUM, INITIAL ENCOUNTER: ICD-10-CM

## 2020-07-30 DIAGNOSIS — M25.551 RIGHT HIP PAIN: Primary | ICD-10-CM

## 2020-07-30 PROCEDURE — 97140 MANUAL THERAPY 1/> REGIONS: CPT | Performed by: PHYSICAL THERAPIST

## 2020-07-30 PROCEDURE — 97110 THERAPEUTIC EXERCISES: CPT | Performed by: PHYSICAL THERAPIST

## 2020-07-30 PROCEDURE — 97112 NEUROMUSCULAR REEDUCATION: CPT | Performed by: PHYSICAL THERAPIST

## 2020-07-30 NOTE — PROGRESS NOTES
Daily Note     Today's date: 2020  Patient name: Jacques Gracia  : 1995  MRN: [id number]  Referring provider: Jhon Burdick DO  Dx:   Encounter Diagnosis     ICD-10-CM    1  Right hip pain M25 551    2  Tear of right acetabular labrum, initial encounter S73 191A      1 on 1 entire duration  Start Time: 1100  Stop Time: 1200  Total time in clinic (min): 60 minutes    Subjective: Patient reports increased muscle soreness during today's session  Objective: See treatment diary below    Assessment: Tolerated treatment well  Patient demonstrated fatigue post treatment, exhibited good technique with therapeutic exercises and would benefit from continued PT    Plan: Continue per plan of care       Precautions: none     Daily Treatment Log  Manuals        MFDc  20'        GISTM  5'                        Neuro Re-Ed         Diagonal Resisted Hip Flexion   GTB 15 x 5"ea       KB Hip Flexion KB#10 15x5"ea       Flamingos NV       Bird Dips  NV       Hip CARs  30x ea                       Ther Ex        Bike  10'                                                                Ther Activity                        Gait Training                        Modalities

## 2020-08-04 ENCOUNTER — OFFICE VISIT (OUTPATIENT)
Dept: PHYSICAL THERAPY | Facility: OTHER | Age: 25
End: 2020-08-04
Payer: COMMERCIAL

## 2020-08-04 DIAGNOSIS — M25.551 RIGHT HIP PAIN: Primary | ICD-10-CM

## 2020-08-04 DIAGNOSIS — S73.191A TEAR OF RIGHT ACETABULAR LABRUM, INITIAL ENCOUNTER: ICD-10-CM

## 2020-08-04 PROCEDURE — 97140 MANUAL THERAPY 1/> REGIONS: CPT | Performed by: PHYSICAL THERAPIST

## 2020-08-04 PROCEDURE — 97110 THERAPEUTIC EXERCISES: CPT | Performed by: PHYSICAL THERAPIST

## 2020-08-04 PROCEDURE — 97112 NEUROMUSCULAR REEDUCATION: CPT | Performed by: PHYSICAL THERAPIST

## 2020-08-04 NOTE — PROGRESS NOTES
Daily Note     Today's date: 2020  Patient name: Terrence Montoya  : 1995  MRN: [id number]  Referring provider: Maria Del Rosario Espinal DO  Dx:   Encounter Diagnosis     ICD-10-CM    1  Right hip pain  M25 551    2  Tear of right acetabular labrum, initial encounter  S73 191A      1 on 1 entire duration  Time In: 1000  Time Out: 1100  Total Time: 60 minutes     Subjective: Patient reports increased muscle soreness at the conclusion of today's session  Objective: See treatment diary below    Assessment: Tolerated treatment well  Patient demonstrated fatigue post treatment, exhibited good technique with therapeutic exercises and would benefit from continued PT    Plan: Continue per plan of care       Precautions: none     Daily Treatment Log  Manuals        MFDc  20'        GISTM  5'        LAD  [facility] 1'       FR  [facility] 8'       Neuro Re-Ed        Diagonal Resisted Hip Flexion   GTB 15 x 5"ea   GTB 20 x 5" ea      KB Hip Flexion KB#10 15x5"ea       Flamingos NV       Bird Dips  NV       Hip CARs  30x ea 15x ea      Clamshells  30 x 5" ea GTB              Ther Ex        Bike  10'  10'       Resisted SS  GTB 5x       Donkey Kicks  1 x 15 x 5" ea                                              Ther Activity                        Gait Training                        Modalities

## 2020-08-05 ENCOUNTER — OFFICE VISIT (OUTPATIENT)
Dept: PHYSICAL THERAPY | Facility: OTHER | Age: 25
End: 2020-08-05
Payer: COMMERCIAL

## 2020-08-05 DIAGNOSIS — M25.551 RIGHT HIP PAIN: Primary | ICD-10-CM

## 2020-08-05 DIAGNOSIS — S73.191A TEAR OF RIGHT ACETABULAR LABRUM, INITIAL ENCOUNTER: ICD-10-CM

## 2020-08-05 PROCEDURE — 97140 MANUAL THERAPY 1/> REGIONS: CPT | Performed by: PHYSICAL THERAPIST

## 2020-08-05 PROCEDURE — 97110 THERAPEUTIC EXERCISES: CPT | Performed by: PHYSICAL THERAPIST

## 2020-08-05 PROCEDURE — 97112 NEUROMUSCULAR REEDUCATION: CPT | Performed by: PHYSICAL THERAPIST

## 2020-08-05 NOTE — PROGRESS NOTES
Daily Note     Today's date: 2020  Patient name: Alvino Pierce  : 1995  MRN: [id number]  Referring provider: Yasmany Neri DO  Dx:   Encounter Diagnosis     ICD-10-CM    1  Right hip pain  M25 551    2  Tear of right acetabular labrum, initial encounter  S73 191A      1 on 1 entire duration  Time In: 1000  Time Out: 1100  Total Time: 60 minutes     Subjective: Patient reports increased muscle soreness  Today PT checked LLD  She was a little shorter on the L  QD SPT performed MET to correct  Objective: See treatment diary below    Assessment: Tolerated treatment well  Patient demonstrated fatigue post treatment, exhibited good technique with therapeutic exercises and would benefit from continued PT    Plan: Continue per plan of care       Precautions: none     Daily Treatment Log  Manuals       MFDc  20'        GISTM  5'        LAD  GRC 1'  QD SPT 1'      [facility]       [place] MET   QD SPT 5'      Psoas Release   QD SPT 5'      Psoas MET   QD SPT 5'      L4 G5   QD SPT 1'      Neuro Re-Ed        Diagonal Resisted Hip Flexion   GTB 15 x 5"ea   GTB 20 x 5" ea      KB Hip Flexion KB#10 15x5"ea       Flamingos NV       Bird Dips  NV       Hip CARs  30x ea 15x ea      Clamshells  30 x 5" ea GTB      Quadruped Rocking   15 x 5"      MF Activation in Diagonal    15 x 5"     Ther Ex       Bike  10'  10'  10'      Resisted SS  GTB 5x  GTB 2x  3x      Monster Walks   GTB 2x   2x      Donkey Kicks  1 x 15 x 5" ea                                              Ther Activity                        Gait Training                        Modalities

## 2020-08-11 ENCOUNTER — APPOINTMENT (OUTPATIENT)
Dept: PHYSICAL THERAPY | Facility: OTHER | Age: 25
End: 2020-08-11
Payer: COMMERCIAL

## 2020-08-11 ENCOUNTER — TELEPHONE (OUTPATIENT)
Dept: PSYCHIATRY | Facility: CLINIC | Age: 25
End: 2020-08-11

## 2020-08-11 ENCOUNTER — TELEMEDICINE (OUTPATIENT)
Dept: PSYCHIATRY | Facility: CLINIC | Age: 25
End: 2020-08-11
Payer: COMMERCIAL

## 2020-08-11 VITALS — HEIGHT: 60 IN | BODY MASS INDEX: 27.09 KG/M2 | WEIGHT: 138 LBS

## 2020-08-11 DIAGNOSIS — F31.81 BIPOLAR 2 DISORDER (HCC): Primary | ICD-10-CM

## 2020-08-11 DIAGNOSIS — F60.3 BORDERLINE PERSONALITY DISORDER (HCC): Chronic | ICD-10-CM

## 2020-08-11 DIAGNOSIS — F41.9 ANXIETY: ICD-10-CM

## 2020-08-11 PROCEDURE — 90792 PSYCH DIAG EVAL W/MED SRVCS: CPT | Performed by: STUDENT IN AN ORGANIZED HEALTH CARE EDUCATION/TRAINING PROGRAM

## 2020-08-11 PROCEDURE — 3008F BODY MASS INDEX DOCD: CPT | Performed by: FAMILY MEDICINE

## 2020-08-11 RX ORDER — LAMOTRIGINE 25 MG/1
50 TABLET ORAL DAILY
Qty: 60 TABLET | Refills: 0 | Status: SHIPPED | OUTPATIENT
Start: 2020-08-11 | End: 2020-09-02

## 2020-08-11 NOTE — PATIENT INSTRUCTIONS
- referral for individual psychotherapy  - f/u in 4 weeks  - I educated Ms Tristen Cagle on the potential risks, benefits and alternatives of treatment with lamotrigine -- including the rare but serious risk of a potentially fatal rash (with education about recognition and management of rash if it occurs -- "Please go directly to the emergency room if you notice any rash while taking lamotrigine -- especially any rash that is red, or raised, or peeling, or involving the face, or involving any wet part of the body -- because this could be a sign of a very dangerous side effect"); and including the rare but serious risk of suicidal ideation  I encouraged Tristen Cagle to always take lamotrigine consistently -- and to never abruptly stop and start lamotrigine, because starting lamotrigine suddenly could increase the risk of a dangerous rash  I also educated Tristen Keekatelynn about the potential risks, benefits and alternatives of declining lamotrigine treatment (e g , engaging in psychotherapy alone without lamotrigine treatment)  Tristen Chilangokatelynn gave informed consent for treatment with lamotrigine
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
## 2020-08-11 NOTE — BH TREATMENT PLAN
TREATMENT PLAN (Medication Management Only)        [facility]    Name and Date of Birth:  Christopher Monroe 22 y o  1995  Date of Treatment Plan: August 11, 2020  Diagnosis/Diagnoses:  No diagnosis found  Strengths/Personal Resources for Self-Care: ""willingness to change, listening to music and painting""  Area/Areas of need (in own words): ""mood swings and frustration""  1  Long Term Goal: improve mood stability  Target Date: 2 months - 10/11/2020  Person/Persons responsible for completion of goal: Raghav  2  Short Term Objective (s) - How will we reach this goal?:   A  Provider new recommended medication/dosage changes and/or continue medication(s): continue current medications as prescribed  B  Individual psychotherapy  C  N/A  Target Date: 4 weeks - 9/8/2020  Person/Persons Responsible for Completion of Goal: Raghav  Progress Towards Goals: initiating treatment  Treatment Modality: medication management every 4 months  Review due 90 to 120 days from date of this plan: 4 months - 12/11/2020  Expected length of service: maintenance  My Physician/PA/NP and I have developed this plan together and I agree to work on the goals and objectives  I understand the treatment goals that were developed for my treatment

## 2020-08-18 ENCOUNTER — OFFICE VISIT (OUTPATIENT)
Dept: PHYSICAL THERAPY | Facility: OTHER | Age: 25
End: 2020-08-18
Payer: COMMERCIAL

## 2020-08-18 DIAGNOSIS — S73.191A TEAR OF RIGHT ACETABULAR LABRUM, INITIAL ENCOUNTER: ICD-10-CM

## 2020-08-18 DIAGNOSIS — M25.551 RIGHT HIP PAIN: Primary | ICD-10-CM

## 2020-08-18 PROCEDURE — 97110 THERAPEUTIC EXERCISES: CPT | Performed by: PHYSICAL THERAPIST

## 2020-08-18 PROCEDURE — 97112 NEUROMUSCULAR REEDUCATION: CPT | Performed by: PHYSICAL THERAPIST

## 2020-08-18 PROCEDURE — 97140 MANUAL THERAPY 1/> REGIONS: CPT | Performed by: PHYSICAL THERAPIST

## 2020-08-18 NOTE — PROGRESS NOTES
Daily Note     Today's date: 2020  Patient name: Foster Blount  : 1995  MRN: [id number]  Referring provider: Ann Ramos DO  Dx:   Encounter Diagnosis     ICD-10-CM    1  Right hip pain  M25 551    2  Tear of right acetabular labrum, initial encounter  S73 191A      1 on 1 entire duration  Time In: 1000  Time Out: 1100  Total Time: 60 minutes     Subjective: Patient reports a pull in her gluteal region  She said she was dancing on the weekend  PT will be adding split squats and bird dips as well as other hamstring exercises  Objective: See treatment diary below    Assessment: Tolerated treatment well  Patient demonstrated fatigue post treatment, exhibited good technique with therapeutic exercises and would benefit from continued PT    Plan: Continue per plan of care       Precautions: none     Daily Treatment Log  Manuals     MFDc  20'        GISTM  5'        LAD  GRC 1'  QD SPT 1'  [facility]     FR  [facility] 8'       [place] MET   QD SPT 5'      Psoas Release   QD SPT 5'  [facility]    Psoas MET   QD SPT 5'  GRC    L4 G5   QD SPT 1'  [facility]    SI SL G5    [facility]     Neuro Re-Ed      Diagonal Resisted Hip Flexion   GTB 15 x 5"ea   GTB 20 x 5" ea        KB Hip Flexion KB#10 15x5"ea   KB #10 15 x 5" ea    [employer] NV       Bird Dips  NV       Hip CARs  30x ea 15x ea      Clamshells  30 x 5" ea GTB      Quadruped Rocking   15 x 5"      Dead Bug Variations:   Anti Rot  Anti Ext         OTB 20x ea  OTB 20x     ER/IR MRE    15 x 5" ea    TB Oscillations ER/IR      OTB 60x ea    Clams    Adv II 20 x 5" ea    MF Activation in Diagonal    15 x 5"     Ther Ex     Bike  10'  10'  10'  Curve 10'     Resisted SS  GTB 5x  GTB 2x  3x      Monster Walks   GTB 2x   2x      Donkey Kicks  1 x 15 x 5" ea                                              Ther Activity                        Gait Training                        Modalities

## 2020-08-19 LAB
25(OH)D3 SERPL-MCNC: 21 NG/ML (ref 30–100)
BASOPHILS # BLD AUTO: 51 CELLS/UL (ref 0–200)
BASOPHILS NFR BLD AUTO: 0.8 %
EOSINOPHIL # BLD AUTO: 77 CELLS/UL (ref 15–500)
EOSINOPHIL NFR BLD AUTO: 1.2 %
ERYTHROCYTE [DISTWIDTH] IN BLOOD BY AUTOMATED COUNT: 12.7 % (ref 11–15)
HCT VFR BLD AUTO: 40.5 % (ref 35–45)
HGB BLD-MCNC: 13.2 G/DL (ref 11.7–15.5)
LYMPHOCYTES # BLD AUTO: 1510 CELLS/UL (ref 850–3900)
LYMPHOCYTES NFR BLD AUTO: 23.6 %
MCH RBC QN AUTO: 29.7 PG (ref 27–33)
MCHC RBC AUTO-ENTMCNC: 32.6 G/DL (ref 32–36)
MCV RBC AUTO: 91.2 FL (ref 80–100)
MONOCYTES # BLD AUTO: 467 CELLS/UL (ref 200–950)
MONOCYTES NFR BLD AUTO: 7.3 %
NEUTROPHILS # BLD AUTO: 4294 CELLS/UL (ref 1500–7800)
NEUTROPHILS NFR BLD AUTO: 67.1 %
PLATELET # BLD AUTO: 298 THOUSAND/UL (ref 140–400)
PMV BLD REES-ECKER: 10.2 FL (ref 7.5–12.5)
RBC # BLD AUTO: 4.44 MILLION/UL (ref 3.8–5.1)
TSH SERPL-ACNC: 0.82 MIU/L
VIT B12 SERPL-MCNC: 348 PG/ML (ref 200–1100)
WBC # BLD AUTO: 6.4 THOUSAND/UL (ref 3.8–10.8)

## 2020-08-20 ENCOUNTER — OFFICE VISIT (OUTPATIENT)
Dept: PHYSICAL THERAPY | Facility: OTHER | Age: 25
End: 2020-08-20
Payer: COMMERCIAL

## 2020-08-20 DIAGNOSIS — M25.551 RIGHT HIP PAIN: Primary | ICD-10-CM

## 2020-08-20 DIAGNOSIS — S73.191A TEAR OF RIGHT ACETABULAR LABRUM, INITIAL ENCOUNTER: ICD-10-CM

## 2020-08-20 LAB
ALBUMIN SERPL-MCNC: 4.5 G/DL (ref 3.6–5.1)
ALBUMIN/GLOB SERPL: 2 (CALC) (ref 1–2.5)
ALP SERPL-CCNC: 45 U/L (ref 31–125)
ALT SERPL-CCNC: 13 U/L (ref 6–29)
AST SERPL-CCNC: 13 U/L (ref 10–30)
BILIRUB SERPL-MCNC: 0.6 MG/DL (ref 0.2–1.2)
BUN SERPL-MCNC: 13 MG/DL (ref 7–25)
BUN/CREAT SERPL: NORMAL (CALC) (ref 6–22)
CALCIUM SERPL-MCNC: 9.4 MG/DL (ref 8.6–10.2)
CHLORIDE SERPL-SCNC: 104 MMOL/L (ref 98–110)
CO2 SERPL-SCNC: 25 MMOL/L (ref 20–32)
CREAT SERPL-MCNC: 0.71 MG/DL (ref 0.5–1.1)
GLOBULIN SER CALC-MCNC: 2.2 G/DL (CALC) (ref 1.9–3.7)
GLUCOSE SERPL-MCNC: 87 MG/DL (ref 65–99)
POTASSIUM SERPL-SCNC: 4.5 MMOL/L (ref 3.5–5.3)
PROT SERPL-MCNC: 6.7 G/DL (ref 6.1–8.1)
SL AMB EGFR AFRICAN AMERICAN: 137 ML/MIN/1.73M2
SL AMB EGFR NON AFRICAN AMERICAN: 118 ML/MIN/1.73M2
SODIUM SERPL-SCNC: 138 MMOL/L (ref 135–146)

## 2020-08-20 PROCEDURE — 97140 MANUAL THERAPY 1/> REGIONS: CPT | Performed by: PHYSICAL THERAPIST

## 2020-08-20 PROCEDURE — 97112 NEUROMUSCULAR REEDUCATION: CPT | Performed by: PHYSICAL THERAPIST

## 2020-08-20 PROCEDURE — 97110 THERAPEUTIC EXERCISES: CPT | Performed by: PHYSICAL THERAPIST

## 2020-08-20 NOTE — PROGRESS NOTES
Daily Note     Today's date: 2020  Patient name: Kandy Arias  : 1995  MRN: [id number]  Referring provider: Ernie Gallardo DO  Dx:   Encounter Diagnosis     ICD-10-CM    1  Right hip pain  M25 551    2  Tear of right acetabular labrum, initial encounter  S73 191A      IEP [id number]  1 on 1 [id number]  Time In: 1000  Time Out: 1110  Total Time: 70 minutes     Subjective: Patient reports short term relief  PT added split squats and bird dips as well as other exercises today  She said her R hip is feeling much better but her L hip specifically hamstrings insertion was giving her the most issues  Objective: See treatment diary below    Assessment: Tolerated treatment well  Patient demonstrated fatigue post treatment, exhibited good technique with therapeutic exercises and would benefit from continued PT    Plan: Continue per plan of care       Precautions: none     Daily Treatment Log  Manuals    MFDc  20'     [facility] 10'    GISTM  5'        LAD  GRC 1'  QD SPT 1'  [facility]     FR  [facility] 8'       [place] MET   QD SPT 5'      Psoas Release   QD SPT 5'  [facility]    Psoas MET   QD SPT 5'  GRC    L4 G5   QD SPT 1'  [facility]    SI SL G5    [facility]   Neuro Re-Ed     Diagonal Resisted Hip Flexion   GTB 15 x 5"ea   GTB 20 x 5" ea        KB Hip Flexion KB#10 15x5"ea   KB #10 15 x 5" ea    Flamingos NV       Bird Dips  NV       Hip CARs  30x ea 15x ea      Clamshells  30 x 5" ea GTB      Quadruped Rocking   15 x 5"      Dead Bug Variations:   Anti Rot  Anti Ext         OTB 20x ea  OTB 20x     ER/IR MRE    15 x 5" ea    TB Oscillations ER/IR      OTB 60x ea    Clams    Adv II 20 x 5" ea    MF Activation in Diagonal    15 x 5"     Bird Dips     1 x 15 ea   Split Squat Hold     10 x 10" ea   Dead Bug Crushers     2 x 10 x 5" ea           Ther Ex    Bike  10'  10'  10'  Curve 10'  10'    Resisted SS  GTB 5x  GTB 2x  3x      Monster Walks   GTB 2x   2x Donkey Kicks  1 x 15 x 5" ea      PBall HS Curls     2 x 10 x 5" ea   [address]      #2  2 x 10 x 5" ea   Step Ups     18" 1 x 15 ea                   Ther Activity                        Gait Training                        Modalities

## 2020-08-21 LAB — B-HCG SERPL-ACNC: <3 MIU/ML

## 2020-08-25 ENCOUNTER — OFFICE VISIT (OUTPATIENT)
Dept: PHYSICAL THERAPY | Facility: OTHER | Age: 25
End: 2020-08-25
Payer: COMMERCIAL

## 2020-08-25 DIAGNOSIS — M25.551 RIGHT HIP PAIN: Primary | ICD-10-CM

## 2020-08-25 DIAGNOSIS — S73.191A TEAR OF RIGHT ACETABULAR LABRUM, INITIAL ENCOUNTER: ICD-10-CM

## 2020-08-25 PROCEDURE — 97140 MANUAL THERAPY 1/> REGIONS: CPT | Performed by: PHYSICAL THERAPIST

## 2020-08-25 PROCEDURE — 97110 THERAPEUTIC EXERCISES: CPT | Performed by: PHYSICAL THERAPIST

## 2020-08-25 PROCEDURE — 97112 NEUROMUSCULAR REEDUCATION: CPT | Performed by: PHYSICAL THERAPIST

## 2020-08-25 NOTE — PROGRESS NOTES
Daily Note     Today's date: 2020  Patient name: Genevieve Abbasi  : 1995  MRN: [id number]  Referring provider: Vikki Ding DO  Dx:   Encounter Diagnosis     ICD-10-CM    1  Right hip pain  M25 551    2  Tear of right acetabular labrum, initial encounter  S73 191A      1 on 1 [id number]  IEP [id number]  Time In: 1100  Time Out: 1200  Total Time: 60 minutes     Subjective: Patient reports more relief after today's session than previously  She said she felt great after last session  Her pain is diminishing  PT will be adding several more exercises NV  Objective: See treatment diary below    Assessment: Tolerated treatment well  Patient demonstrated fatigue post treatment, exhibited good technique with therapeutic exercises and would benefit from continued PT    Plan: Continue per plan of care       Precautions: none     Daily Treatment Log  Manuals    MFDc      GRC 10'    GISTM         LAD  [facility] 1'  QD SPT 1'  [facility]     FR  [facility] 8'       [place] MET   QD SPT 5'      Psoas Release GRC   QD SPT 5'  [facility]    Psoas MET   QD SPT 5'  GRC    L4 G5   QD SPT 1'  [facility] GRC    SI SL G5 [facility]   SI MET Prone [facility]       FMP Lumbar Flexion Supine [facility]               Neuro Re-Ed     Diagonal Resisted Hip Flexion    GTB 20 x 5" ea        KB Hip Flexion    KB #10 15 x 5" ea    Flamingos        Bird Dips         Hip CARs   15x ea      Quadruped Rocking   15 x 5"      Dead Bug Variations:   Anti Rot  Anti Ext   Crushers              OTB 20x ea  OTB 20x     ER/IR MRE    15 x 5" ea    TB Oscillations ER/IR      OTB 60x ea    Clams Adv II   2 x 10 x 5" ea   Adv II 20 x 5" ea    MF Activation in Diagonal    15 x 5"     Bird Dips     1 x 15 ea   Split Squat Hold     10 x 10" ea   Dead Bug Crushers 2 x 10 x 5" ea    2 x 10 x 5" ea   PBKHE  2 x 10 x 5" ea       Ther Ex    Bike  10'  10'  10'  Curve 10'  10'    Resisted SS  GTB 5x  GTB 2x  3x Monster Walks   GTB 2x   2x      Donkey Kicks  1 x 15 x 5" ea      PBall HS Curls 20 x 5" ea    2 x 10 x 5" ea   [address]      #2  2 x 10 x 5" ea   Step Ups     18" 1 x 15 ea   Cat Camel 20 x 5" ea               Ther Activity                        Gait Training                        Modalities

## 2020-08-26 ENCOUNTER — OFFICE VISIT (OUTPATIENT)
Dept: PHYSICAL THERAPY | Facility: OTHER | Age: 25
End: 2020-08-26
Payer: COMMERCIAL

## 2020-08-26 DIAGNOSIS — S73.191A TEAR OF RIGHT ACETABULAR LABRUM, INITIAL ENCOUNTER: ICD-10-CM

## 2020-08-26 DIAGNOSIS — M25.551 RIGHT HIP PAIN: Primary | ICD-10-CM

## 2020-08-26 PROCEDURE — 97140 MANUAL THERAPY 1/> REGIONS: CPT | Performed by: PHYSICAL THERAPIST

## 2020-08-26 PROCEDURE — 97110 THERAPEUTIC EXERCISES: CPT | Performed by: PEDIATRICS

## 2020-08-26 PROCEDURE — 97112 NEUROMUSCULAR REEDUCATION: CPT | Performed by: PEDIATRICS

## 2020-08-26 PROCEDURE — 97112 NEUROMUSCULAR REEDUCATION: CPT | Performed by: PHYSICAL THERAPIST

## 2020-08-26 NOTE — PROGRESS NOTES
Daily Note     Today's date: 2020  Patient name: Rosi Bustamante  : 1995  MRN: [id number]  Referring provider: Ashwini Pollack DO  Dx:   Encounter Diagnosis     ICD-10-CM    1  Right hip pain  M25 551    2  Tear of right acetabular labrum, initial encounter  S73 191A      1:1 [id number]  [facility] [id number]    Subjective: Patient reports she has gradually been feeling better over the past week  Still has pain with dancing  Objective: See treatment diary below    Assessment: Tolerated treatment well  Focused on TE today  No complaints of increased pain  Patient demonstrated fatigue post treatment, exhibited good technique with therapeutic exercises and would benefit from continued PT    Plan: Continue per plan of care       Precautions: none     Daily Treatment Log  Manuals    MFDc      GRC 10'    GISTM         LAD   QD SPT 1'  [facility]     FR        [place] MET   QD SPT 5'      Psoas Release GRC   QD SPT 5'  [facility]    Psoas MET   QD SPT 5'  [facility]    L4 G5   QD SPT 1'  [facility]    SI SL G5 [address]  [facility]   SI MET Prone [facility]       FMP Lumbar Flexion Supine [facility]              Neuro Re-Ed     Diagonal Resisted Hip Flexion          KB Hip Flexion    KB #10 15 x 5" ea    Flamingos        Bird Dips   10#  2 x 10      Hip CARs         Quadruped Rocking   15 x 5"      Dead Bug Variations:   Anti Rot  Anti Ext   Crushers              OTB 20x ea  OTB 20x     ER/IR MRE    15 x 5" ea    TB Oscillations ER/IR      OTB 60x ea    Clams Adv II   2 x 10 x 5" ea   Adv II 20 x 5" ea    MF Activation in Diagonal    15 x 5"     Bird Dips     1 x 15 ea   Split Squat Hold     10 x 10" ea   Dead Bug Crushers 2 x 10 x 5" ea 2 x 10 x 5" ea   2 x 10 x 5" ea   PBKHE  2 x 10 x 5" ea 2 x 10 x 5" ea      Ther Ex    Bike  10'  5' 10'  Curve 10'  10'    Resisted SS  GTB 5x  GTB 2x  3x      Monster Walks  GTB  5x GTB 2x   2x      Resisted nicole  GTB  20x      [employer] PBall HS Curls 20 x 5" ea 20 x 5" ea   2 x 10 x 5" ea   [address]      #2  2 x 10 x 5" ea   Step Ups     18" 1 x 15 ea   Cat Camel 20 x 5" ea               Ther Activity                        Gait Training                        Modalities

## 2020-09-01 ENCOUNTER — OFFICE VISIT (OUTPATIENT)
Dept: PHYSICAL THERAPY | Facility: OTHER | Age: 25
End: 2020-09-01
Payer: COMMERCIAL

## 2020-09-01 DIAGNOSIS — M25.551 RIGHT HIP PAIN: Primary | ICD-10-CM

## 2020-09-01 DIAGNOSIS — S73.191A TEAR OF RIGHT ACETABULAR LABRUM, INITIAL ENCOUNTER: ICD-10-CM

## 2020-09-01 PROCEDURE — 97140 MANUAL THERAPY 1/> REGIONS: CPT | Performed by: PHYSICAL THERAPIST

## 2020-09-01 PROCEDURE — 97112 NEUROMUSCULAR REEDUCATION: CPT | Performed by: PHYSICAL THERAPIST

## 2020-09-01 PROCEDURE — 97110 THERAPEUTIC EXERCISES: CPT | Performed by: PHYSICAL THERAPIST

## 2020-09-01 NOTE — PROGRESS NOTES
Daily Note     Today's date: 2020  Patient name: Connie Velasquez  : 1995  MRN: [id number]  Referring provider: Leonides Ayala DO  Dx:   Encounter Diagnosis     ICD-10-CM    1  Right hip pain  M25 551    2  Tear of right acetabular labrum, initial encounter  S73 191A          Subjective: Pt reports she is continuing to improve  She report some pinching in the L groin and mild low back pain with lifting  Objective: See treatment diary below    Assessment: Tolerated treatment well  Reported reduced pinching in L hip following mobilizations  Was fatigued with exercises needing short rest breaks between sets  Some minor pain in low back reported by end of visit  Demonstrates good technique through out  Patient demonstrated fatigue post treatment, exhibited good technique with therapeutic exercises and would benefit from continued PT    Plan: Continue per plan of care       Precautions: none     Daily Treatment Log  Manuals    MFDc      [facility] 10'    GISTM         LAD        FR        [place] MET        Psoas Release GRC        Psoas MET        L4 G5   G4 P-A mobs   GRC    SI SL G5 GRC    GRC   SI MET Prone GRC       FMP Lumbar Flexion Supine [facility] GRC      Hip mobs   Caudal distraction with mob belt G4     Neuro Re-Ed     Diagonal Resisted Hip Flexion        KB Hip Flexion        Flamingos        Bird Dips   10#  2 x 10 10#  2 x 10     Hip CARs         Quadruped Rocking        Dead Bug Variations:   Anti Rot  Anti Ext   Crushers              ER/IR MRE        TB Oscillations ER/IR        Clams Adv II   2 x 10 x 5" ea  Adv II   2 x 10 x 5" ea     MF Activation in Diagonal         Bird Dips     1 x 15 ea   Split Squat Hold     10 x 10" ea   Dead Bug Crushers 2 x 10 x 5" ea 2 x 10 x 5" ea 2 x 10 x 5" ea  2 x 10 x 5" ea   PBKHE  2 x 10 x 5" ea 2 x 10 x 5" ea 2 x 10 x 5" ea     Ther Ex    Bike  10'  5' 5' L2  10'    Resisted SS  GTB 5x  GTB 5x      [employer] Wal[employer]  GTB  5x GTB  5x     Resisted marches  GTB  20x GTB  20x     Donkey Kicks        PBall HS Curls 20 x 5" ea 20 x 5" ea 20 x 5" ea  2 x 10 x 5" ea   SHC      #2  2 x 10 x 5" ea   Step Ups     18" 1 x 15 ea   Cat Camel 20 x 5" ea               Ther Activity                        Gait Training                        Modalities

## 2020-09-02 DIAGNOSIS — F31.81 BIPOLAR 2 DISORDER (HCC): ICD-10-CM

## 2020-09-02 RX ORDER — LAMOTRIGINE 25 MG/1
50 TABLET ORAL
Qty: 14 TABLET | Refills: 0 | Status: SHIPPED | OUTPATIENT
Start: 2020-09-02 | End: 2020-09-16 | Stop reason: ALTCHOICE

## 2020-09-02 NOTE — TELEPHONE ENCOUNTER
The writer called the patient to clarify the reason she has requested refill prior to the expected time (f/u appointment on 9/8)  She stated that in spite of explanation and instructions on her pill box, she "messed up" and took 25 mg for 1 week and then increased to 50 mg nightly afterwards  She denied any side effects  I educated Ms Dillard Halsted on the potential risks, benefits and alternatives of treatment with lamotrigine -- including the rare but serious risk of a potentially fatal rash (with education about recognition and management of rash if it occurs -- "Please go directly to the emergency room if you notice any rash while taking lamotrigine -- especially any rash that is red, or raised, or peeling, or involving the face, or involving any wet part of the body -- because this could be a sign of a very dangerous side effect"); and including the rare but serious risk of suicidal ideation; and including the risk of birth defects if lamotrigine is taken during pregnancy  I encouraged Dillard Halsted to always take lamotrigine consistently as prescribed -- and to never abruptly stop and start lamotrigine, because starting lamotrigine suddenly could increase the risk of a dangerous rash  She was receptive to the education and verbalized understanding  Denied SI/HI at this time  7 day supply was sent to the pharmacy until her f/u appointment on 9/8/2020

## 2020-09-03 ENCOUNTER — APPOINTMENT (OUTPATIENT)
Dept: PHYSICAL THERAPY | Facility: OTHER | Age: 25
End: 2020-09-03
Payer: COMMERCIAL

## 2020-09-08 ENCOUNTER — EVALUATION (OUTPATIENT)
Dept: PHYSICAL THERAPY | Facility: OTHER | Age: 25
End: 2020-09-08
Payer: COMMERCIAL

## 2020-09-08 DIAGNOSIS — S73.191A TEAR OF RIGHT ACETABULAR LABRUM, INITIAL ENCOUNTER: ICD-10-CM

## 2020-09-08 DIAGNOSIS — M25.551 RIGHT HIP PAIN: Primary | ICD-10-CM

## 2020-09-08 PROCEDURE — 97110 THERAPEUTIC EXERCISES: CPT | Performed by: PHYSICAL THERAPIST

## 2020-09-08 PROCEDURE — 97112 NEUROMUSCULAR REEDUCATION: CPT | Performed by: PHYSICAL THERAPIST

## 2020-09-08 PROCEDURE — 97140 MANUAL THERAPY 1/> REGIONS: CPT | Performed by: PHYSICAL THERAPIST

## 2020-09-08 NOTE — PROGRESS NOTES
PT Re-Evaluation     Today's date: 2020  Patient name: Ileana Crowley  : 1995  MRN: [id number]  Referring provider: Alda Courtney DO  Dx:   Encounter Diagnosis     ICD-10-CM    1  Right hip pain  M25 551    2  Tear of right acetabular labrum, initial encounter  S73 191A      1 on 1 entire duration   Start Time: 1000  Stop Time: 1100  Total time in clinic (min): 60 minutes    Assessment  Assessment details: Ileana Crowley is a 22 y o  female who presents with complaints of right hip pain  (primary encounter diagnosis) and tear of right acetabular labrum, initial encounter  No further referral appears necessary at this time based upon examination results  Patient presents to PT with improved strength, ROM, flexibility and ability to complete IADLs  Prognosis is good given HEP compliance and PT 2-3x/wk  Positive prognostic indicators include positive attitude toward recovery  Please contact me if you have any questions or recommendations  Thank you for the opportunity to share in  [facility]         Impairments: abnormal coordination, abnormal muscle firing, abnormal or restricted ROM, activity intolerance, impaired physical strength, lacks appropriate home exercise program, pain with function and poor body mechanics    Symptom irritability: moderateBarriers to therapy: History of knee pain, history of hip pain  Understanding of Dx/Px/POC: good   Prognosis: good    Goals  Short Term:  Pt will report decreased levels of pain by at least 2 subjective ratings in 4 weeks- PARTIALLY MET  Pt will demonstrate improved ROM by at least 10 degrees in 4 weeks- MET  Pt will demonstrate improved strength by 1/2 grade- PARTIALLY MET  Long Term:   Pt will be independent in their HEP in 8 weeks- PARTIALLY MET  Pt will be be pain free with IADL's- MET  Pt will demonstrate improved FOTO, > 80- PARTIALLY MET     Plan  Plan details: Prognosis above is given PT services 2x/week tapering to 1x/week over the next 3 months and home program adherence  Patient would benefit from: skilled physical therapy  Planned therapy interventions: activity modification, joint mobilization, manual therapy, motor coordination training, neuromuscular re-education, patient education, self care, therapeutic activities, therapeutic exercise, graded activity, home exercise program, flexibility, functional ROM exercises, strengthening and stretching  Frequency: 2x week  Duration in weeks: 12  Plan of Care beginning date: 9/8/2020  Plan of Care expiration date: 12/8/2020  Treatment plan discussed with: patient        Subjective Evaluation    History of Present Illness  Mechanism of injury: Patient has hip pain from when she was dancing  She injured her R knee approximately 2-3 years ago  Patient said that she was trying to get back into shape  She said she can't workout 5-6 days like she used to because of the cumulative effects of the knee, the hip and the low back  Patient underwent an x-ray which revealed nothing  Patient was sent to PT    Pain   R Hip   Currently 0/10  [facility] 0/10  At [facility] [place] 6/10  Patient described the pain as "annoying- I need to crack it "    AGGS: lifting heavy, splits, stretching  EASES: slow squats which cause self mobilization   Patient's Goal: "I want to get my splits back and be pain free "          Objective     General Comments:      Hip Comments   LQS WNL     Special Tests:   Emanuel Peres's -   Log Roll -   Lumbar Protective Mechanism: -    Palpation: increased tone in the bilateral external rotator insertion     Strength:   Iliopsoas 4/5   ER 5/5   IR 5/5   Glute Max 4+/5   PGM 4/5     ROM   Lumbar WNL   Hip WNL     Segmental Mobility:   Hypomobile L2-L4   Precautions: depression, anxiety, low back pain     Daily Treatment Log   Manuals 8/25 8/26 9/1 9/8 8/20   [facility] 10'  [facility] 10'    GISTM         LAD        FR        [place] MET        Psoas Release GRC        Psoas MET        L4 G5   G4 P-A mobs [facility]    SI SL G5 [facility]    GRC   SI MET Prone GRC       FMP Lumbar Flexion Supine [facility]      Hip mobs   Caudal distraction with mob belt G4     Neuro Re-Ed  8/25 8/26 9/8 8/20   Diagonal Resisted Hip Flexion        KB Hip Flexion        Flamingos    20 x 5" ea    Bird Dips   10#  2 x 10 10#  2 x 10     Hip CARs         Quadruped Rocking        Dead Bug Variations:   Anti Rot  Anti Ext   Crushers              ER/IR MRE        TB Oscillations ER/IR        Clams Adv II   2 x 10 x 5" ea  Adv II   2 x 10 x 5" ea     MF Activation in Diagonal         Bird Dips     1 x 15 ea   Split Squat Hold     10 x 10" ea   Dead Bug Crushers 2 x 10 x 5" ea 2 x 10 x 5" ea 2 x 10 x 5" ea  2 x 10 x 5" ea   PBKHE  2 x 10 x 5" ea 2 x 10 x 5" ea 2 x 10 x 5" ea     Ther Ex 8/25 8/26 9/1 9/8 8/20   Bike  10'  5' 5' L2 10'  10'    Resisted SS  GTB 5x  GTB 5x  MTB 2x   5x    Monster Walks  GTB  5x GTB  5x MTB 2x  5x    Resisted marches  GTB  20x GTB  20x MTB   20 x 5" ea    Donkey Kicks        PBall HS Curls 20 x 5" ea 20 x 5" ea 20 x 5" ea  2 x 10 x 5" ea   SHC      #2  2 x 10 x 5" ea   Step Ups     18" 1 x 15 ea   Cat Camel 20 x 5" ea               Ther Activity                        Gait Training                        Modalities

## 2020-09-10 ENCOUNTER — OFFICE VISIT (OUTPATIENT)
Dept: PHYSICAL THERAPY | Facility: OTHER | Age: 25
End: 2020-09-10
Payer: COMMERCIAL

## 2020-09-10 DIAGNOSIS — M25.551 RIGHT HIP PAIN: Primary | ICD-10-CM

## 2020-09-10 DIAGNOSIS — S73.191A TEAR OF RIGHT ACETABULAR LABRUM, INITIAL ENCOUNTER: ICD-10-CM

## 2020-09-10 PROCEDURE — 97140 MANUAL THERAPY 1/> REGIONS: CPT | Performed by: PHYSICAL THERAPIST

## 2020-09-10 PROCEDURE — 97110 THERAPEUTIC EXERCISES: CPT | Performed by: PHYSICAL THERAPIST

## 2020-09-10 PROCEDURE — 97112 NEUROMUSCULAR REEDUCATION: CPT | Performed by: PHYSICAL THERAPIST

## 2020-09-10 NOTE — PROGRESS NOTES
Daily Note     Today's date: 9/10/2020  Patient name: Ileana Crowley  : 1995  MRN: [id number]  Referring provider: Alda Courtney DO  Dx:   Encounter Diagnosis     ICD-10-CM    1  Right hip pain  M25 551    2  Tear of right acetabular labrum, initial encounter  S73 191A      1 on 1 entire duration   Start Time: 0945  Stop Time: 1045  Total time in clinic (min): 60 minutes    Subjective: Patient told PT she felt much better since last PT session  Today PT cupped her sacral region because she said it was tight  Patient responded favorably to cupping in this region  Objective: See treatment diary below    Assessment: Tolerated treatment well  Patient demonstrated fatigue post treatment, exhibited good technique with therapeutic exercises and would benefit from continued PT    Plan: Continue per plan of care       Precautions: none     Daily Treatment Log   Manuals 8/25 8/26 9/1 9/8 9/10   MFDc     GRC 10'  [facility] 20'    GISTM/STM     [facility] 10'    PS Inferior [place]     GRC 5'    LAD        FR        [place] MET        Psoas Release GRC        Psoas MET        L4 G5   G4 P-A mobs      SI SL G5 GRC       SI MET Prone GRC       FMP Lumbar Flexion Supine [facility] GRC      Hip mobs   Caudal distraction with mob belt G4     Neuro Re-Ed  8/25 8/26  9/8 9/10   Diagonal Resisted Hip Flexion        KB Hip Flexion        Flamingos    20 x 5" ea    Bird Dips   10#  2 x 10 10#  2 x 10     Hip CARs         Quadruped Rocking        Dead Bug Variations:   Anti Rot  Anti Ext   Crushers              ER/IR MRE        TB Oscillations ER/IR        Clams Adv II   2 x 10 x 5" ea  Adv II   2 x 10 x 5" ea     MF Activation in Diagonal         Bird Dips        Split Squat Hold        Dead Bug Crushers 2 x 10 x 5" ea 2 x 10 x 5" ea 2 x 10 x 5" ea     Alt UE/LE      15 x 5" ea   PBKHE  2 x 10 x 5" ea 2 x 10 x 5" ea 2 x 10 x 5" ea  30 x 5" ea    Ther Ex 8/25 8/26 9/1 9/8 9/10   Bike  10'  5' 5' L2 10'  10'    Resisted SS  GTB 5x  GTB 5x MTB 2x   5x    Monster Walks  GTB  5x GTB  5x MTB 2x  5x    Resisted marches  GTB  20x GTB  20x MTB   20 x 5" ea    Donkey Kicks        PBall HS Curls 20 x 5" ea 20 x 5" ea 20 x 5" ea     SHC         Step Ups        Cat Camel 20 x 5" ea               Ther Activity                        Gait Training                        Modalities

## 2020-09-15 ENCOUNTER — APPOINTMENT (OUTPATIENT)
Dept: PHYSICAL THERAPY | Facility: OTHER | Age: 25
End: 2020-09-15
Payer: COMMERCIAL

## 2020-09-16 ENCOUNTER — OFFICE VISIT (OUTPATIENT)
Dept: PHYSICAL THERAPY | Facility: OTHER | Age: 25
End: 2020-09-16
Payer: COMMERCIAL

## 2020-09-16 ENCOUNTER — TELEPHONE (OUTPATIENT)
Dept: PSYCHIATRY | Facility: CLINIC | Age: 25
End: 2020-09-16

## 2020-09-16 DIAGNOSIS — M25.551 RIGHT HIP PAIN: Primary | ICD-10-CM

## 2020-09-16 DIAGNOSIS — F31.81 BIPOLAR 2 DISORDER (HCC): Primary | ICD-10-CM

## 2020-09-16 DIAGNOSIS — S73.191A TEAR OF RIGHT ACETABULAR LABRUM, INITIAL ENCOUNTER: ICD-10-CM

## 2020-09-16 PROCEDURE — 97140 MANUAL THERAPY 1/> REGIONS: CPT | Performed by: PHYSICAL THERAPIST

## 2020-09-16 PROCEDURE — 97110 THERAPEUTIC EXERCISES: CPT | Performed by: PHYSICAL THERAPIST

## 2020-09-16 PROCEDURE — 97112 NEUROMUSCULAR REEDUCATION: CPT | Performed by: PHYSICAL THERAPIST

## 2020-09-16 RX ORDER — LAMOTRIGINE 200 MG/1
200 TABLET ORAL DAILY
Qty: 30 TABLET | Refills: 0 | Status: SHIPPED | OUTPATIENT
Start: 2020-09-16 | End: 2020-09-18 | Stop reason: SDUPTHER

## 2020-09-16 NOTE — TELEPHONE ENCOUNTER
The writer called the patient and she noted taht she missed her previous appointment, but has been consistent with her Lamictal, and has been taking Lamictal 50 mg for two weeks and then 100 mg for last week  The patient endorsed feeling better with less mood swings, and denied any side effects  Denied SI/HI, intent or plan upon direct inquiry at this time  Lamictal was uptitrated to 200 mg po daily and medication supply sent to her pharmacy  Her next appointment with the writer is on 9/18/2020

## 2020-09-16 NOTE — PROGRESS NOTES
Daily Note     Today's date: 2020  Patient name: Dianne Argueta  : 1995  MRN: [id number]  Referring provider: Stef Feldman DO  Dx:   Encounter Diagnosis     ICD-10-CM    1  Right hip pain  M25 551    2  Tear of right acetabular labrum, initial encounter  S73 191A      1 on 1 entire duration   Start Time: 1400  Stop Time: 1445  Total time in clinic (min): 45 minutes    Subjective: Patient told PT she felt much better since last PT session  She said that when she twisted her hip the other day she felt pain  Monitor response NV  Objective: See treatment diary below    Assessment: Tolerated treatment well  Patient demonstrated fatigue post treatment, exhibited good technique with therapeutic exercises and would benefit from continued PT    Plan: Continue per plan of care       Precautions: none     Daily Treatment Log   Manuals 9/16 8/26 9/1 9/8 9/10   MFDc  GRC 10'    [facility] 10'  [facility] 20'    GISTM/STM [facility] 5'     [facility] 10'    PS Inferior [place]     GRC 5'    LAD        FR        [place] MET        Psoas Release        Psoas MET        L4 G5 GRC   G4 P-A mobs      SI SL G5        SI MET Prone        FMP Lumbar Flexion Supine  GRC      Hip mobs   Caudal distraction with mob belt G4     Neuro Re-Ed  9/16 8/26  9/8 9/10   Diagonal Resisted Hip Flexion        KB Hip Flexion        Flamingos    20 x 5" ea    Bird Dips   10#  2 x 10 10#  2 x 10     Hip CARs         Quadruped Rocking        Dead Bug Variations:   Anti Rot  Anti Ext   Crushers              ER/IR MRE        TB Oscillations ER/IR        Clams 3 x 10 x 5"   Adv II   2 x 10 x 5" ea     MF Activation in Diagonal         Bird Dips        Split Squat Hold        Dead Bug Crushers  2 x 10 x 5" ea 2 x 10 x 5" ea     Alt UE/LE      15 x 5" ea   [address] 15x5"       PBKHE   2 x 10 x 5" ea 2 x 10 x 5" ea  30 x 5" ea    Ther Ex 9/15 8/26 9/1 9/8 9/10   Bike  10'  5' 5' L2 10'  10'    Resisted SS  GTB 5x  GTB 5x  MTB 2x   5x    [employer] GTB  5x GTB  5x MTB 2x  5x    Resisted marches  GTB  20x GTB  20x MTB   20 x 5" ea    Donkey Kicks        PBall HS Curls  20 x 5" ea 20 x 5" ea     SHC         Step Ups        Cat Camel                Ther Activity                        Gait Training                        Modalities 9/15       EPAT Glute Fold   15 Hz 3 5  2000    Adductor  15 Hz 3 0  2000

## 2020-09-17 ENCOUNTER — APPOINTMENT (OUTPATIENT)
Dept: PHYSICAL THERAPY | Facility: OTHER | Age: 25
End: 2020-09-17
Payer: COMMERCIAL

## 2020-09-18 ENCOUNTER — TELEMEDICINE (OUTPATIENT)
Dept: PSYCHIATRY | Facility: CLINIC | Age: 25
End: 2020-09-18
Payer: COMMERCIAL

## 2020-09-18 DIAGNOSIS — F60.3 BORDERLINE PERSONALITY DISORDER (HCC): Primary | Chronic | ICD-10-CM

## 2020-09-18 DIAGNOSIS — F31.81 BIPOLAR 2 DISORDER (HCC): ICD-10-CM

## 2020-09-18 PROCEDURE — 99213 OFFICE O/P EST LOW 20 MIN: CPT | Performed by: STUDENT IN AN ORGANIZED HEALTH CARE EDUCATION/TRAINING PROGRAM

## 2020-09-18 PROCEDURE — 3725F SCREEN DEPRESSION PERFORMED: CPT | Performed by: STUDENT IN AN ORGANIZED HEALTH CARE EDUCATION/TRAINING PROGRAM

## 2020-09-18 PROCEDURE — 90833 PSYTX W PT W E/M 30 MIN: CPT | Performed by: STUDENT IN AN ORGANIZED HEALTH CARE EDUCATION/TRAINING PROGRAM

## 2020-09-18 RX ORDER — LAMOTRIGINE 200 MG/1
200 TABLET ORAL DAILY
Qty: 30 TABLET | Refills: 2 | Status: SHIPPED | OUTPATIENT
Start: 2020-10-15 | End: 2020-10-08
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
## 2020-09-22 ENCOUNTER — OFFICE VISIT (OUTPATIENT)
Dept: OBGYN CLINIC | Facility: OTHER | Age: 25
End: 2020-09-22
Payer: COMMERCIAL

## 2020-09-22 ENCOUNTER — APPOINTMENT (OUTPATIENT)
Dept: RADIOLOGY | Facility: OTHER | Age: 25
End: 2020-09-22
Payer: COMMERCIAL

## 2020-09-22 ENCOUNTER — OFFICE VISIT (OUTPATIENT)
Dept: PHYSICAL THERAPY | Facility: OTHER | Age: 25
End: 2020-09-22
Payer: COMMERCIAL

## 2020-09-22 VITALS
HEART RATE: 86 BPM | SYSTOLIC BLOOD PRESSURE: 99 MMHG | BODY MASS INDEX: 28.08 KG/M2 | DIASTOLIC BLOOD PRESSURE: 64 MMHG | WEIGHT: 143.8 LBS

## 2020-09-22 DIAGNOSIS — G89.29 CHRONIC GROIN PAIN, LEFT: ICD-10-CM

## 2020-09-22 DIAGNOSIS — S73.191A TEAR OF RIGHT ACETABULAR LABRUM, INITIAL ENCOUNTER: ICD-10-CM

## 2020-09-22 DIAGNOSIS — M25.551 RIGHT HIP PAIN: Primary | ICD-10-CM

## 2020-09-22 DIAGNOSIS — M25.552 PAIN IN LEFT HIP: Primary | ICD-10-CM

## 2020-09-22 DIAGNOSIS — M25.552 PAIN IN LEFT HIP: ICD-10-CM

## 2020-09-22 DIAGNOSIS — R10.32 CHRONIC GROIN PAIN, LEFT: ICD-10-CM

## 2020-09-22 PROCEDURE — 73502 X-RAY EXAM HIP UNI 2-3 VIEWS: CPT

## 2020-09-22 PROCEDURE — 97110 THERAPEUTIC EXERCISES: CPT | Performed by: PHYSICAL THERAPIST

## 2020-09-22 PROCEDURE — 99213 OFFICE O/P EST LOW 20 MIN: CPT | Performed by: FAMILY MEDICINE

## 2020-09-22 PROCEDURE — 4004F PT TOBACCO SCREEN RCVD TLK: CPT | Performed by: FAMILY MEDICINE

## 2020-09-22 PROCEDURE — 97112 NEUROMUSCULAR REEDUCATION: CPT | Performed by: PHYSICAL THERAPIST

## 2020-09-22 NOTE — PROGRESS NOTES
Daily Note     Today's date: 2020  Patient name: Connie Velasquez  : 1995  MRN: [id number]  Referring provider: Lenoides Ayala DO  Dx:   Encounter Diagnosis     ICD-10-CM    1  Right hip pain  M25 551    2  Tear of right acetabular labrum, initial encounter  S73 191A      1 on 1 entire duration   Start Time: 0935  Stop Time: 1015  Total time in clinic (min): 40 minutes    Subjective: Patient told PT she felt much better since last PT session  PT showed patient several stretches today  Objective: See treatment diary below    Assessment: Tolerated treatment well  Patient demonstrated fatigue post treatment, exhibited good technique with therapeutic exercises and would benefit from continued PT    Plan: Continue per plan of care       Precautions: none     Daily Treatment Log   Manuals 9/16 9/22 9/1 9/8 9/10   MFDc  GRC 10'    [facility] 10'  [facility] 20'    GISTM/STM [facility] 5'     [facility] 10'    PS Inferior [place]     GRC 5'    LAD        FR        [place] MET        Psoas Release        Psoas MET        L4 G5 GRC   G4 P-A mobs      SI SL G5        SI MET Prone        FMP Lumbar Flexion Supine        Hip mobs   Caudal distraction with mob belt G4     Neuro Re-Ed  9/16 9/22  9/8 9/10   SLS on BOSU  15 x 10" ea       Ecc Leg Press  #70 3 x 10      SL Leg Press  #35 3 x 10       Diagonal Resisted Hip Flexion        KB Hip Flexion        Flamingos    20 x 5" ea    Bird Dips    10#  2 x 10     Hip CARs         Quadruped Rocking        Dead Bug Variations:   Anti Rot  Anti Ext   Crushers              ER/IR MRE        TB Oscillations ER/IR        Clams 3 x 10 x 5"   Adv II   2 x 10 x 5" ea     MF Activation in Diagonal         Bird Dips        Split Squat Hold        Dead Bug Crushers   2 x 10 x 5" ea     Alt UE/LE      15 x 5" ea   SLR Adduction  15 x 5" ea      Can Openers 1st [address] 15x5" 15 x 5" ea       PBKHE    2 x 10 x 5" ea  30 x 5" ea    Ther Ex 9/15 9/22 9/1 9/8 9/10   Bike  10'  10'  5' L2 10'  10'    Resisted SS GTB 5x  MTB 2x   5x    Monster Walks   GTB  5x MTB 2x  5x    Resisted marches   GTB  20x MTB   20 x 5" ea    Donkey Kicks        PBall HS Curls   20 x 5" ea     SHC         Step Ups        Cat Camel        Couch Stretch   10 x 10" ea      Hip Add Stretch  10 x 10" ea       Ther Activity                        Gait Training                        Modalities 9/15       EPAT Glute Fold   15 Hz 3 5  2000    Adductor  15 Hz 3 0  2000

## 2020-09-22 NOTE — PROGRESS NOTES
1  Pain in left hip  XR hip/pelv 2-3 vws left if performed    Ambulatory referral to Physical Therapy   2  Chronic groin pain, left  Ambulatory referral to Physical Therapy     Orders Placed This Encounter   Procedures    XR hip/pelv 2-3 vws left if performed    Ambulatory referral to Physical Therapy      Imaging Studies (I personally reviewed images in PACS and report):  Past Diagnostics:   XR right hip - 7/23/20 - no acute osseous abnormality      IMPRESSION:  Acute on chronic left hip pain   Left groin pain over left pubic ramus  Right hip pain now resolved      Repeat X-ray next visit: None    Return if symptoms worsen or fail to improve  Patient Instructions    Discussed in detail options such as hip injections, requiring MR arthrograms  At this point would be best to trial focused physical therapy on the left hip before proceeding with further imaging and treatments  Patient agreed to plan  She can return if her symptoms do not improve  She can call with any questions or concerns  CHIEF COMPLAINT:  Right Hip follow up    HPI:  Rupal Hartman is a 22 y o  female  who presents for follow up of her right hip pain  She was initially seen on 7/23/20 and there was concern for a right hip labral tear given her history of ballet  She was started in physical therapy and advised to follow up in 6 weeks  Visit 9/23/2020 :    She reports that her right  Hip pain is now resolved, however her left hip has been bothering her more  In physical therapy they have been working on both hips  She denies any new injuries  She does think that she has 50% better, but her left hip is still lingering and pain  It is not constant pain but occasionally sharp stabbing pain that takes her breath away  She is not taking any new medications  She is not active with belly at this time but she would like to resume it when her pain resolves    She also states that her left ischial tuberosity has been a source of pain as well as her left medial thigh tracking into her left pubic ramus  She is not sure what provoked this, but that is typically where her shooting pain occurs  Review of Systems   Constitutional: Negative for chills, fever and unexpected weight change  HENT: Negative for hearing loss, nosebleeds and sore throat  Eyes: Negative for pain, redness and visual disturbance  Respiratory: Negative for cough, shortness of breath and wheezing  Cardiovascular: Negative for chest pain, palpitations and leg swelling  Gastrointestinal: Negative for abdominal pain, nausea and vomiting  Endocrine: Negative for polydipsia and polyuria  Genitourinary: Negative for dysuria and hematuria  Skin: Negative for rash and wound  Neurological: Negative for dizziness, numbness and headaches  Psychiatric/Behavioral: Negative for decreased concentration, dysphoric mood and suicidal ideas  The patient is nervous/anxious (Chronic anxiety)  Following history reviewed and update:    No past medical history on file    Past Surgical History:   Procedure Laterality Date    CERVICAL BIOPSY  W/ LOOP ELECTRODE EXCISION      WISDOM TOOTH EXTRACTION       Social History   Social History     Substance and Sexual Activity   Alcohol Use Yes    Comment: occassionally     Social History     Substance and Sexual Activity   Drug Use Yes    Types: Marijuana     Social History     Tobacco Use   Smoking Status Light Tobacco Smoker   Smokeless Tobacco Never Used   Tobacco Comment    occassionally     Family History   Problem Relation Age of Onset    Hypertension Mother     Vitamin D deficiency Father     Diabetes Maternal Grandmother     Schizophrenia Paternal Uncle      No Known Allergies       Physical Exam  BP 99/64 (BP Location: Right arm, Patient Position: Sitting, Cuff Size: Adult)   Pulse 86   Wt 65 2 kg (143 lb 12 8 oz)   BMI 28 08 kg/m²     Constitutional:  see vital signs  Gen: well-developed, normocephalic/atraumatic, well-groomed  Eyes: No inflammation or discharge of conjunctiva or lids; sclera clear   Pharynx: no inflammation, lesion, or mass of lips  Neck: supple, no masses, non-distended  MSK: no inflammation, lesion, mass, or clubbing of nails and digits except for other than mentioned below  SKIN: no visible rashes or skin lesions  Pulmonary/Chest: Effort normal  No respiratory distress     NEURO: cranial nerves grossly intact  PSYCH:  Alert and oriented to person, place, and time; recent and remote memory intact; mood normal, no depression, anxiety, or agitation, judgment and insight good and intact     Ortho Exam  BACK EXAM:  Gait: normal, no trendelenberg gait, no antalgic gait    BACK TENDERNESS:  Spinous Processes: no  Paraspinal Muscles: no  SI Joint: no  Sacrum: no    ROM:  Flexion: intact  Extension: intact  Sidebending: intact    DERMATOMAL SENSATION:  L1: normal   L2: normal   L3: normal   L4: normal   L5: normal   S1: normal    STRENGTH (bilateral):  Knee Extension: 5/5  Knee Flexion: 5/5  Foot Dorsiflexion: 5/5  Great Toe Extension: 5/5  Foot Plantarflexion: 5/5  Hip Flexion: 5/5  Hip Abduction: 5/5    REFLEXES:  Patellar: 2+ bilateral  Achilles: 2+ bilateral  Clonus: negative bilateral    BACK:   SUPINE STRAIGHT LEG: negative  SLUMP: negative    Pelvis:   Tender to palpation on ischial tuberosity of left and in left superior pubic ramus, non tender pubic symphysis  No motion with     HIPS Bilateral :  ROM: normal  LOG ROLL: negative  KIARA: negative  FADIR: negative

## 2020-09-23 ENCOUNTER — OFFICE VISIT (OUTPATIENT)
Dept: PHYSICAL THERAPY | Facility: OTHER | Age: 25
End: 2020-09-23
Payer: COMMERCIAL

## 2020-09-23 DIAGNOSIS — M25.552 LEFT HIP PAIN: ICD-10-CM

## 2020-09-23 DIAGNOSIS — M25.551 RIGHT HIP PAIN: Primary | ICD-10-CM

## 2020-09-23 DIAGNOSIS — S73.191A TEAR OF RIGHT ACETABULAR LABRUM, INITIAL ENCOUNTER: ICD-10-CM

## 2020-09-23 PROCEDURE — 97140 MANUAL THERAPY 1/> REGIONS: CPT | Performed by: PHYSICAL THERAPIST

## 2020-09-23 PROCEDURE — 97110 THERAPEUTIC EXERCISES: CPT | Performed by: PHYSICAL THERAPIST

## 2020-09-23 PROCEDURE — 97112 NEUROMUSCULAR REEDUCATION: CPT | Performed by: PHYSICAL THERAPIST

## 2020-09-23 NOTE — PATIENT INSTRUCTIONS
Discussed in detail options such as hip injections, requiring MR arthrograms  At this point would be best to trial focused physical therapy on the left hip before proceeding with further imaging and treatments  Patient agreed to plan  She can return if her symptoms do not improve  She can call with any questions or concerns

## 2020-09-23 NOTE — PROGRESS NOTES
Daily Note     Today's date: 2020  Patient name: Christopher Monroe  : 1995  MRN: [id number]  Referring provider: Lottie Aquino DO  Dx:   Encounter Diagnosis     ICD-10-CM    1  Right hip pain  M25 551    2  Tear of right acetabular labrum, initial encounter  S73 191A    3  Left hip pain  M25 552      1 on 1 entire duration   Start Time: 0900  Stop Time: 1000  Total time in clinic (min): 60 minutes    Subjective: Patient told PT she felt much better since last PT session  She said the stretches the PT showed her yesterday were very beneficial        Objective: See treatment diary below    Assessment: Tolerated treatment well  Patient demonstrated fatigue post treatment, exhibited good technique with therapeutic exercises and would benefit from continued PT    Plan: Continue per plan of care       Precautions: none     Daily Treatment Log   Manuals 9/16 9/22 9/23 9/8 9/10   MFDc  GRC 10'   [facility] 30'  [facility] 10'  [facility] 21'    GISTM/STM [facility] 5'   [facility] 10'   [facility] 10'    PS Inferior [place]     GRC 5'    LAD        FR   GRC 5'   Low Back      [place] MET        Psoas Release        Psoas MET        L4 G5 GRC        SI SL G5        SI MET Prone        FMP Lumbar Flexion Supine        Hip mobs        Sacral Mobs   GRC 3'      Neuro Re-Ed  9/16 9/22 9/23 9/8 9/10   SLS on BOSU  15 x 10" ea       Ecc Leg Press  #70 3 x 10      SL Leg Press  #35 3 x 10       Diagonal Resisted Hip Flexion        KB Hip Flexion        Flamingos    20 x 5" ea    Bird Dips         Hip CARs         Quadruped Rocking        Dead Bug Variations:   Anti Rot  Anti Ext   Crushers              ER/IR MRE        TB Oscillations ER/IR        Clams 3 x 10 x 5"        MF Activation in Diagonal         Bird Dips        Split Squat Hold        Dead Bug Crushers        Alt UE/LE      15 x 5" ea   SLR Adduction  15 x 5" ea      Can Openers 1st Way 15x5" 15 x 5" ea       Hip Add Iso   1 x 10      PBKHE    1 x 15   30 x 5" ea    Ther Ex 9/15 9/22 9/23 9/8 9/10 Bike  10'  10'  10'  10'  10'    Resisted SS    MTB 2x   5x    Monster Walks    MTB 2x  5x    Resisted marches    MTB   20 x 5" ea    Donkey Kicks        PBall HS Curls        SHC         Step Ups        Cat Camel        Couch Stretch   10 x 10" ea      Hip Add Stretch  10 x 10" ea  10 x 10" ea     Ther Activity                        Gait Training                        Modalities 9/15       EPAT Glute Fold   15 Hz 3 5  2000    Adductor  15 Hz 3 0  2000

## 2020-09-29 ENCOUNTER — OFFICE VISIT (OUTPATIENT)
Dept: PHYSICAL THERAPY | Facility: OTHER | Age: 25
End: 2020-09-29
Payer: COMMERCIAL

## 2020-09-29 DIAGNOSIS — M25.552 LEFT HIP PAIN: ICD-10-CM

## 2020-09-29 DIAGNOSIS — M25.551 RIGHT HIP PAIN: Primary | ICD-10-CM

## 2020-09-29 DIAGNOSIS — S73.191A TEAR OF RIGHT ACETABULAR LABRUM, INITIAL ENCOUNTER: ICD-10-CM

## 2020-09-29 PROCEDURE — 97140 MANUAL THERAPY 1/> REGIONS: CPT | Performed by: PHYSICAL THERAPIST

## 2020-09-29 PROCEDURE — 97110 THERAPEUTIC EXERCISES: CPT | Performed by: PHYSICAL THERAPIST

## 2020-09-29 PROCEDURE — 97112 NEUROMUSCULAR REEDUCATION: CPT | Performed by: PHYSICAL THERAPIST

## 2020-09-29 NOTE — PROGRESS NOTES
Daily Note     Today's date: 2020  Patient name: Kandy Arias  : 1995  MRN: [id number]  Referring provider: Ernie Gallardo DO  Dx:   Encounter Diagnosis     ICD-10-CM    1  Right hip pain  M25 551    2  Tear of right acetabular labrum, initial encounter  S73 191A    3  Left hip pain  M25 552      1 on 1 entire duration   Start Time: 1030  Stop Time: 1130  Total time in clinic (min): 60 minutes    Subjective: Patient tolerated today's session very well  She reports her pain is changing from discomfort to muscle soreness  Overall she is doing very well  Objective: See treatment diary below    Assessment: Tolerated treatment well  Patient demonstrated fatigue post treatment, exhibited good technique with therapeutic exercises and would benefit from continued PT    Plan: Continue per plan of care        Precautions: none     Daily Treatment Log  Manuals  9/10   MFDc  GRC 10'   [facility] 30'   [facility] 20'    GISTM/STM [facility] 5'   [facility] 10'   [facility] 10'    PS Inferior [place]     GRC 5'    LAD        FR   GRC 5'   Low Back      [place] MET        Psoas Release        Psoas MET        L4 G5 GRC        SI SL G5        SI MET Prone        FMP Lumbar Flexion Supine        Hip mobs        Sacral Mobs   GRC 3'      MWM Hip Flex    [facility] 5'     Neuro Re-Ed   9/10   SLS on BOSU  15 x 10" ea       Ecc Leg Press  #70 3 x 10      SL Leg Press  #35 3 x 10       Diagonal Resisted Hip Flexion        KB Hip Flexion        Flamingos        Bird Dips         Hip CARs         Quadruped Rocking        Dead Bug Variations:   Anti Rot  Anti Ext   Crushers              ER/IR MRE        TB Oscillations ER/IR        Clams 3 x 10 x 5"        MF Activation in Diagonal         Bird Dips        Split Squat Hold        Dead Bug Crushers        Alt UE/LE      15 x 5" ea   SLR Adduction  15 x 5" ea      Can Openers 1st Way 15x5" 15 x 5" ea   3 Way   15 x 5" ea    Hip Add Iso   1 x 10      PBKHE    1 x 15   30 x 5" ea    NRE GMax    10'     MFDc PBKHE    3 x 10 x 5"     MFDc [place]    3 x 10 x 5"     Ther Ex 9/15 9/22 9/23 9/29 9/10   Bike  10'  10'  10'  10'  10'    Resisted SS        Monster Walks        Resisted marches        Donkey Kicks        PBall HS Curls        SHC         Step Ups        Cat Camel        Couch Stretch   10 x 10" ea      Hip Add Stretch  10 x 10" ea  10 x 10" ea     Ther Activity                        Gait Training                        Modalities 9/15       EPAT Glute Fold   15 Hz 3 5  2000    Adductor  15 Hz 3 0  2000

## 2020-10-01 ENCOUNTER — OFFICE VISIT (OUTPATIENT)
Dept: PHYSICAL THERAPY | Facility: OTHER | Age: 25
End: 2020-10-01
Payer: COMMERCIAL

## 2020-10-01 DIAGNOSIS — S73.191A TEAR OF RIGHT ACETABULAR LABRUM, INITIAL ENCOUNTER: ICD-10-CM

## 2020-10-01 DIAGNOSIS — M25.551 RIGHT HIP PAIN: Primary | ICD-10-CM

## 2020-10-01 DIAGNOSIS — M25.552 LEFT HIP PAIN: ICD-10-CM

## 2020-10-01 PROCEDURE — 97112 NEUROMUSCULAR REEDUCATION: CPT | Performed by: PEDIATRICS

## 2020-10-01 PROCEDURE — 97140 MANUAL THERAPY 1/> REGIONS: CPT | Performed by: PEDIATRICS

## 2020-10-01 PROCEDURE — 97110 THERAPEUTIC EXERCISES: CPT | Performed by: PEDIATRICS

## 2020-10-02 ENCOUNTER — TELEPHONE (OUTPATIENT)
Dept: PSYCHIATRY | Facility: CLINIC | Age: 25
End: 2020-10-02

## 2020-10-06 ENCOUNTER — OFFICE VISIT (OUTPATIENT)
Dept: PHYSICAL THERAPY | Facility: OTHER | Age: 25
End: 2020-10-06
Payer: COMMERCIAL

## 2020-10-06 DIAGNOSIS — M25.551 RIGHT HIP PAIN: Primary | ICD-10-CM

## 2020-10-06 DIAGNOSIS — S73.191A TEAR OF RIGHT ACETABULAR LABRUM, INITIAL ENCOUNTER: ICD-10-CM

## 2020-10-06 DIAGNOSIS — M25.552 LEFT HIP PAIN: ICD-10-CM

## 2020-10-06 PROCEDURE — 97112 NEUROMUSCULAR REEDUCATION: CPT | Performed by: PEDIATRICS

## 2020-10-06 PROCEDURE — 97140 MANUAL THERAPY 1/> REGIONS: CPT | Performed by: PEDIATRICS

## 2020-10-06 PROCEDURE — 97110 THERAPEUTIC EXERCISES: CPT | Performed by: PEDIATRICS

## 2020-10-08 ENCOUNTER — OFFICE VISIT (OUTPATIENT)
Dept: PHYSICAL THERAPY | Facility: OTHER | Age: 25
End: 2020-10-08
Payer: COMMERCIAL

## 2020-10-08 DIAGNOSIS — M25.552 LEFT HIP PAIN: ICD-10-CM

## 2020-10-08 DIAGNOSIS — M25.551 RIGHT HIP PAIN: Primary | ICD-10-CM

## 2020-10-08 DIAGNOSIS — S73.191A TEAR OF RIGHT ACETABULAR LABRUM, INITIAL ENCOUNTER: ICD-10-CM

## 2020-10-08 DIAGNOSIS — F31.81 BIPOLAR 2 DISORDER (HCC): ICD-10-CM

## 2020-10-08 PROCEDURE — 97110 THERAPEUTIC EXERCISES: CPT | Performed by: PEDIATRICS

## 2020-10-08 PROCEDURE — 97112 NEUROMUSCULAR REEDUCATION: CPT | Performed by: PEDIATRICS

## 2020-10-08 PROCEDURE — 97140 MANUAL THERAPY 1/> REGIONS: CPT | Performed by: PEDIATRICS

## 2020-10-08 RX ORDER — LAMOTRIGINE 200 MG/1
TABLET ORAL
Qty: 30 TABLET | Refills: 0 | Status: SHIPPED | OUTPATIENT
Start: 2020-10-08 | End: 2020-11-06 | Stop reason: SDUPTHER

## 2020-10-13 ENCOUNTER — OFFICE VISIT (OUTPATIENT)
Dept: PHYSICAL THERAPY | Facility: OTHER | Age: 25
End: 2020-10-13
Payer: COMMERCIAL

## 2020-10-13 DIAGNOSIS — M25.551 RIGHT HIP PAIN: Primary | ICD-10-CM

## 2020-10-13 DIAGNOSIS — S73.191A TEAR OF RIGHT ACETABULAR LABRUM, INITIAL ENCOUNTER: ICD-10-CM

## 2020-10-13 PROCEDURE — 97140 MANUAL THERAPY 1/> REGIONS: CPT | Performed by: PEDIATRICS

## 2020-10-13 PROCEDURE — 97112 NEUROMUSCULAR REEDUCATION: CPT | Performed by: PEDIATRICS

## 2020-10-13 PROCEDURE — 97110 THERAPEUTIC EXERCISES: CPT | Performed by: PEDIATRICS

## 2020-10-15 ENCOUNTER — TELEPHONE (OUTPATIENT)
Dept: FAMILY MEDICINE CLINIC | Facility: CLINIC | Age: 25
End: 2020-10-15

## 2020-10-15 ENCOUNTER — APPOINTMENT (OUTPATIENT)
Dept: PHYSICAL THERAPY | Facility: OTHER | Age: 25
End: 2020-10-15
Payer: COMMERCIAL

## 2020-10-15 ENCOUNTER — TELEPHONE (OUTPATIENT)
Dept: PSYCHIATRY | Facility: CLINIC | Age: 25
End: 2020-10-15

## 2020-10-15 DIAGNOSIS — Z20.828 EXPOSURE TO SARS-ASSOCIATED CORONAVIRUS: Primary | ICD-10-CM

## 2020-10-16 ENCOUNTER — NURSE TRIAGE (OUTPATIENT)
Dept: OTHER | Facility: OTHER | Age: 25
End: 2020-10-16

## 2020-10-16 DIAGNOSIS — Z20.822 COVID-19 RULED OUT: Primary | ICD-10-CM

## 2020-10-16 DIAGNOSIS — Z20.822 COVID-19 RULED OUT: ICD-10-CM

## 2020-10-16 PROCEDURE — U0003 INFECTIOUS AGENT DETECTION BY NUCLEIC ACID (DNA OR RNA); SEVERE ACUTE RESPIRATORY SYNDROME CORONAVIRUS 2 (SARS-COV-2) (CORONAVIRUS DISEASE [COVID-19]), AMPLIFIED PROBE TECHNIQUE, MAKING USE OF HIGH THROUGHPUT TECHNOLOGIES AS DESCRIBED BY CMS-2020-01-R: HCPCS | Performed by: FAMILY MEDICINE

## 2020-10-17 LAB — SARS-COV-2 RNA SPEC QL NAA+PROBE: NOT DETECTED

## 2020-10-22 ENCOUNTER — OFFICE VISIT (OUTPATIENT)
Dept: PHYSICAL THERAPY | Facility: OTHER | Age: 25
End: 2020-10-22
Payer: COMMERCIAL

## 2020-10-22 ENCOUNTER — OFFICE VISIT (OUTPATIENT)
Dept: OBGYN CLINIC | Facility: OTHER | Age: 25
End: 2020-10-22
Payer: COMMERCIAL

## 2020-10-22 VITALS
SYSTOLIC BLOOD PRESSURE: 107 MMHG | HEART RATE: 85 BPM | WEIGHT: 143 LBS | TEMPERATURE: 97.2 F | DIASTOLIC BLOOD PRESSURE: 73 MMHG | BODY MASS INDEX: 27.93 KG/M2

## 2020-10-22 DIAGNOSIS — M62.9 HAMSTRING TIGHTNESS OF BOTH LOWER EXTREMITIES: ICD-10-CM

## 2020-10-22 DIAGNOSIS — S73.191A TEAR OF RIGHT ACETABULAR LABRUM, INITIAL ENCOUNTER: ICD-10-CM

## 2020-10-22 DIAGNOSIS — M25.551 RIGHT HIP PAIN: Primary | ICD-10-CM

## 2020-10-22 DIAGNOSIS — M25.552 LEFT HIP PAIN: ICD-10-CM

## 2020-10-22 DIAGNOSIS — M70.62 GREATER TROCHANTERIC BURSITIS OF LEFT HIP: ICD-10-CM

## 2020-10-22 DIAGNOSIS — M13.0 POLYARTHRITIS: Primary | ICD-10-CM

## 2020-10-22 DIAGNOSIS — M13.80 MIGRATORY POLYARTHRITIS: ICD-10-CM

## 2020-10-22 PROCEDURE — 97140 MANUAL THERAPY 1/> REGIONS: CPT | Performed by: PEDIATRICS

## 2020-10-22 PROCEDURE — 99214 OFFICE O/P EST MOD 30 MIN: CPT | Performed by: FAMILY MEDICINE

## 2020-10-22 PROCEDURE — 97110 THERAPEUTIC EXERCISES: CPT | Performed by: PEDIATRICS

## 2020-10-22 PROCEDURE — 97112 NEUROMUSCULAR REEDUCATION: CPT | Performed by: PEDIATRICS

## 2020-10-27 ENCOUNTER — OFFICE VISIT (OUTPATIENT)
Dept: PHYSICAL THERAPY | Facility: OTHER | Age: 25
End: 2020-10-27
Payer: COMMERCIAL

## 2020-10-27 DIAGNOSIS — M25.551 RIGHT HIP PAIN: Primary | ICD-10-CM

## 2020-10-27 DIAGNOSIS — M25.552 LEFT HIP PAIN: ICD-10-CM

## 2020-10-27 DIAGNOSIS — S73.191A TEAR OF RIGHT ACETABULAR LABRUM, INITIAL ENCOUNTER: ICD-10-CM

## 2020-10-27 PROCEDURE — 97112 NEUROMUSCULAR REEDUCATION: CPT | Performed by: PEDIATRICS

## 2020-10-27 PROCEDURE — 97140 MANUAL THERAPY 1/> REGIONS: CPT | Performed by: PEDIATRICS

## 2020-10-27 PROCEDURE — 97110 THERAPEUTIC EXERCISES: CPT | Performed by: PEDIATRICS

## 2020-10-28 ENCOUNTER — TELEPHONE (OUTPATIENT)
Dept: OBGYN CLINIC | Facility: OTHER | Age: 25
End: 2020-10-28

## 2020-10-29 ENCOUNTER — OFFICE VISIT (OUTPATIENT)
Dept: PHYSICAL THERAPY | Facility: OTHER | Age: 25
End: 2020-10-29
Payer: COMMERCIAL

## 2020-10-29 DIAGNOSIS — M25.551 RIGHT HIP PAIN: Primary | ICD-10-CM

## 2020-10-29 DIAGNOSIS — M25.552 LEFT HIP PAIN: ICD-10-CM

## 2020-10-29 DIAGNOSIS — S73.191A TEAR OF RIGHT ACETABULAR LABRUM, INITIAL ENCOUNTER: ICD-10-CM

## 2020-10-29 PROCEDURE — 97140 MANUAL THERAPY 1/> REGIONS: CPT | Performed by: PHYSICAL THERAPIST

## 2020-10-29 PROCEDURE — 97112 NEUROMUSCULAR REEDUCATION: CPT | Performed by: PHYSICAL THERAPIST

## 2020-10-29 PROCEDURE — 97110 THERAPEUTIC EXERCISES: CPT | Performed by: PHYSICAL THERAPIST

## 2020-11-03 ENCOUNTER — OFFICE VISIT (OUTPATIENT)
Dept: PHYSICAL THERAPY | Facility: OTHER | Age: 25
End: 2020-11-03
Payer: COMMERCIAL

## 2020-11-03 DIAGNOSIS — M25.552 LEFT HIP PAIN: ICD-10-CM

## 2020-11-03 DIAGNOSIS — M25.551 RIGHT HIP PAIN: Primary | ICD-10-CM

## 2020-11-03 DIAGNOSIS — S73.191A TEAR OF RIGHT ACETABULAR LABRUM, INITIAL ENCOUNTER: ICD-10-CM

## 2020-11-03 PROCEDURE — 97140 MANUAL THERAPY 1/> REGIONS: CPT | Performed by: PEDIATRICS

## 2020-11-03 PROCEDURE — 97110 THERAPEUTIC EXERCISES: CPT | Performed by: PEDIATRICS

## 2020-11-05 ENCOUNTER — TELEPHONE (OUTPATIENT)
Dept: PSYCHIATRY | Facility: CLINIC | Age: 25
End: 2020-11-05

## 2020-11-05 ENCOUNTER — OFFICE VISIT (OUTPATIENT)
Dept: PHYSICAL THERAPY | Facility: OTHER | Age: 25
End: 2020-11-05
Payer: COMMERCIAL

## 2020-11-05 DIAGNOSIS — S73.191A TEAR OF RIGHT ACETABULAR LABRUM, INITIAL ENCOUNTER: ICD-10-CM

## 2020-11-05 DIAGNOSIS — M25.551 RIGHT HIP PAIN: Primary | ICD-10-CM

## 2020-11-05 DIAGNOSIS — M25.552 LEFT HIP PAIN: ICD-10-CM

## 2020-11-05 PROCEDURE — 97110 THERAPEUTIC EXERCISES: CPT | Performed by: PEDIATRICS

## 2020-11-05 PROCEDURE — 97140 MANUAL THERAPY 1/> REGIONS: CPT | Performed by: PEDIATRICS

## 2020-11-06 DIAGNOSIS — F31.81 BIPOLAR 2 DISORDER (HCC): ICD-10-CM

## 2020-11-06 RX ORDER — LAMOTRIGINE 200 MG/1
200 TABLET ORAL DAILY
Qty: 30 TABLET | Refills: 1 | Status: SHIPPED | OUTPATIENT
Start: 2020-11-06 | End: 2021-01-14 | Stop reason: SDUPTHER

## 2020-11-10 ENCOUNTER — APPOINTMENT (OUTPATIENT)
Dept: PHYSICAL THERAPY | Facility: OTHER | Age: 25
End: 2020-11-10
Payer: COMMERCIAL

## 2020-11-11 ENCOUNTER — APPOINTMENT (OUTPATIENT)
Dept: PHYSICAL THERAPY | Facility: OTHER | Age: 25
End: 2020-11-11
Payer: COMMERCIAL

## 2020-11-11 ENCOUNTER — TELEPHONE (OUTPATIENT)
Dept: PSYCHIATRY | Facility: CLINIC | Age: 25
End: 2020-11-11

## 2020-11-12 ENCOUNTER — OFFICE VISIT (OUTPATIENT)
Dept: PHYSICAL THERAPY | Facility: OTHER | Age: 25
End: 2020-11-12
Payer: COMMERCIAL

## 2020-11-12 DIAGNOSIS — M25.551 RIGHT HIP PAIN: Primary | ICD-10-CM

## 2020-11-12 DIAGNOSIS — M25.552 LEFT HIP PAIN: ICD-10-CM

## 2020-11-12 DIAGNOSIS — S73.191A TEAR OF RIGHT ACETABULAR LABRUM, INITIAL ENCOUNTER: ICD-10-CM

## 2020-11-12 PROCEDURE — 97140 MANUAL THERAPY 1/> REGIONS: CPT | Performed by: PEDIATRICS

## 2020-11-12 PROCEDURE — 97110 THERAPEUTIC EXERCISES: CPT | Performed by: PEDIATRICS

## 2020-11-17 ENCOUNTER — APPOINTMENT (OUTPATIENT)
Dept: PHYSICAL THERAPY | Facility: OTHER | Age: 25
End: 2020-11-17
Payer: COMMERCIAL

## 2020-11-18 ENCOUNTER — TELEMEDICINE (OUTPATIENT)
Dept: FAMILY MEDICINE CLINIC | Facility: CLINIC | Age: 25
End: 2020-11-18
Payer: COMMERCIAL

## 2020-11-18 DIAGNOSIS — Z20.822 EXPOSURE TO COVID-19 VIRUS: Primary | ICD-10-CM

## 2020-11-18 DIAGNOSIS — Z20.822 EXPOSURE TO COVID-19 VIRUS: ICD-10-CM

## 2020-11-18 PROCEDURE — 99214 OFFICE O/P EST MOD 30 MIN: CPT | Performed by: NURSE PRACTITIONER

## 2020-11-18 PROCEDURE — U0003 INFECTIOUS AGENT DETECTION BY NUCLEIC ACID (DNA OR RNA); SEVERE ACUTE RESPIRATORY SYNDROME CORONAVIRUS 2 (SARS-COV-2) (CORONAVIRUS DISEASE [COVID-19]), AMPLIFIED PROBE TECHNIQUE, MAKING USE OF HIGH THROUGHPUT TECHNOLOGIES AS DESCRIBED BY CMS-2020-01-R: HCPCS | Performed by: NURSE PRACTITIONER

## 2020-11-19 ENCOUNTER — TELEMEDICINE (OUTPATIENT)
Dept: PSYCHIATRY | Facility: CLINIC | Age: 25
End: 2020-11-19
Payer: COMMERCIAL

## 2020-11-19 DIAGNOSIS — F31.81 BIPOLAR 2 DISORDER (HCC): Primary | ICD-10-CM

## 2020-11-19 PROCEDURE — 90833 PSYTX W PT W E/M 30 MIN: CPT | Performed by: STUDENT IN AN ORGANIZED HEALTH CARE EDUCATION/TRAINING PROGRAM

## 2020-11-19 PROCEDURE — 99213 OFFICE O/P EST LOW 20 MIN: CPT | Performed by: STUDENT IN AN ORGANIZED HEALTH CARE EDUCATION/TRAINING PROGRAM

## 2020-11-19 RX ORDER — HYDROXYZINE HYDROCHLORIDE 10 MG/1
10 TABLET, FILM COATED ORAL EVERY 6 HOURS PRN
Qty: 30 TABLET | Refills: 1 | Status: SHIPPED | OUTPATIENT
Start: 2020-11-19 | End: 2021-05-11 | Stop reason: SDUPTHER

## 2020-11-20 LAB — SARS-COV-2 RNA SPEC QL NAA+PROBE: NOT DETECTED

## 2020-11-23 ENCOUNTER — TELEMEDICINE (OUTPATIENT)
Dept: FAMILY MEDICINE CLINIC | Facility: CLINIC | Age: 25
End: 2020-11-23
Payer: COMMERCIAL

## 2020-11-23 VITALS — WEIGHT: 143 LBS | TEMPERATURE: 97.7 F | BODY MASS INDEX: 24.41 KG/M2 | HEIGHT: 64 IN

## 2020-11-23 DIAGNOSIS — R09.81 CONGESTION OF NASAL SINUS: Primary | ICD-10-CM

## 2020-11-23 DIAGNOSIS — Z20.822 EXPOSURE TO COVID-19 VIRUS: ICD-10-CM

## 2020-11-23 PROCEDURE — 4004F PT TOBACCO SCREEN RCVD TLK: CPT | Performed by: FAMILY MEDICINE

## 2020-11-23 PROCEDURE — 99213 OFFICE O/P EST LOW 20 MIN: CPT | Performed by: FAMILY MEDICINE

## 2020-11-23 PROCEDURE — 3008F BODY MASS INDEX DOCD: CPT | Performed by: FAMILY MEDICINE

## 2020-11-23 RX ORDER — PSEUDOEPHEDRINE HYDROCHLORIDE 30 MG/1
30 TABLET ORAL EVERY 6 HOURS PRN
Qty: 10 TABLET | Refills: 0 | Status: SHIPPED | OUTPATIENT
Start: 2020-11-23 | End: 2022-04-04 | Stop reason: ALTCHOICE

## 2020-12-09 ENCOUNTER — OFFICE VISIT (OUTPATIENT)
Dept: RHEUMATOLOGY | Facility: CLINIC | Age: 25
End: 2020-12-09
Payer: COMMERCIAL

## 2020-12-09 VITALS
HEIGHT: 64 IN | WEIGHT: 143 LBS | HEART RATE: 90 BPM | DIASTOLIC BLOOD PRESSURE: 72 MMHG | BODY MASS INDEX: 24.41 KG/M2 | SYSTOLIC BLOOD PRESSURE: 108 MMHG

## 2020-12-09 DIAGNOSIS — M79.641 BILATERAL HAND PAIN: ICD-10-CM

## 2020-12-09 DIAGNOSIS — M25.551 HIP PAIN, BILATERAL: Primary | ICD-10-CM

## 2020-12-09 DIAGNOSIS — M13.0 POLYARTHRITIS: ICD-10-CM

## 2020-12-09 DIAGNOSIS — M79.642 BILATERAL HAND PAIN: ICD-10-CM

## 2020-12-09 DIAGNOSIS — M25.552 HIP PAIN, BILATERAL: Primary | ICD-10-CM

## 2020-12-09 PROCEDURE — 99203 OFFICE O/P NEW LOW 30 MIN: CPT | Performed by: INTERNAL MEDICINE

## 2020-12-09 PROCEDURE — 3008F BODY MASS INDEX DOCD: CPT | Performed by: INTERNAL MEDICINE

## 2020-12-09 PROCEDURE — 1036F TOBACCO NON-USER: CPT | Performed by: INTERNAL MEDICINE

## 2020-12-09 RX ORDER — MELOXICAM 15 MG/1
15 TABLET ORAL DAILY
Qty: 30 TABLET | Refills: 1 | Status: SHIPPED | OUTPATIENT
Start: 2020-12-09 | End: 2022-04-04

## 2020-12-10 ENCOUNTER — TELEPHONE (OUTPATIENT)
Dept: OTHER | Facility: OTHER | Age: 25
End: 2020-12-10

## 2021-01-05 ENCOUNTER — TELEPHONE (OUTPATIENT)
Dept: OBGYN CLINIC | Facility: HOSPITAL | Age: 26
End: 2021-01-05

## 2021-01-05 DIAGNOSIS — M19.90 ARTHRITIS: Primary | ICD-10-CM

## 2021-01-05 NOTE — TELEPHONE ENCOUNTER
DR Debby Castillo  Re: LABs    Patient asked if DR Debby Castillo would like patient to have blood work prior to appt 01 19    Patient will be going to [email or web address], and will  lab script at [place] office
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
## 2021-01-14 ENCOUNTER — OFFICE VISIT (OUTPATIENT)
Dept: PSYCHIATRY | Facility: CLINIC | Age: 26
End: 2021-01-14
Payer: COMMERCIAL

## 2021-01-14 ENCOUNTER — TELEPHONE (OUTPATIENT)
Dept: PSYCHIATRY | Facility: CLINIC | Age: 26
End: 2021-01-14

## 2021-01-14 DIAGNOSIS — F60.3 BORDERLINE PERSONALITY DISORDER (HCC): Chronic | ICD-10-CM

## 2021-01-14 DIAGNOSIS — F31.81 BIPOLAR 2 DISORDER (HCC): Primary | ICD-10-CM

## 2021-01-14 PROCEDURE — 99213 OFFICE O/P EST LOW 20 MIN: CPT | Performed by: STUDENT IN AN ORGANIZED HEALTH CARE EDUCATION/TRAINING PROGRAM

## 2021-01-14 PROCEDURE — 90833 PSYTX W PT W E/M 30 MIN: CPT | Performed by: STUDENT IN AN ORGANIZED HEALTH CARE EDUCATION/TRAINING PROGRAM

## 2021-01-14 RX ORDER — LAMOTRIGINE 200 MG/1
200 TABLET ORAL DAILY
Qty: 30 TABLET | Refills: 1 | Status: SHIPPED | OUTPATIENT
Start: 2021-01-14 | End: 2021-03-11 | Stop reason: SDUPTHER

## 2021-01-14 NOTE — BH TREATMENT PLAN
TREATMENT PLAN (Medication Management Only)        [facility]    Name and Date of Birth:  Esteban Gomez 22 y o  1995  Date of Treatment Plan: January 14, 2021  Diagnosis/Diagnoses:    1  Bipolar 2 disorder ([facility] [address] )    2  Borderline personality disorder [facility])      Strengths/Personal Resources for Self-Care: "willingness to change, listening to music and painting, compliance with medication"  Area/Areas of need (in own words): mood swings  1  Long Term Goal: continue improvement in mood stability  Target Date:4 months - 5/14/2021  Person/Persons responsible for completion of goal: Raghav  2  Short Term Objective (s) - How will we reach this goal?:   A  Provider new recommended medication/dosage changes and/or continue medication(s): continue current medications as prescribed  B  therapy  C  N/A  Target Date:4 months - 5/14/2021  Person/Persons Responsible for Completion of Goal: Raghav  Progress Towards Goals: continuing treatment  Treatment Modality: medication management every 4 months  Review due 180 days from date of this plan: 4 months - 5/14/2021  Expected length of service: maintenance  My Physician/PA/NP and I have developed this plan together and I agree to work on the goals and objectives  I understand the treatment goals that were developed for my treatment

## 2021-01-15 ENCOUNTER — OFFICE VISIT (OUTPATIENT)
Dept: OBGYN CLINIC | Facility: OTHER | Age: 26
End: 2021-01-15
Payer: COMMERCIAL

## 2021-01-15 VITALS
BODY MASS INDEX: 26.09 KG/M2 | DIASTOLIC BLOOD PRESSURE: 68 MMHG | SYSTOLIC BLOOD PRESSURE: 108 MMHG | WEIGHT: 152 LBS | HEART RATE: 98 BPM

## 2021-01-15 DIAGNOSIS — R10.32 CHRONIC GROIN PAIN, LEFT: ICD-10-CM

## 2021-01-15 DIAGNOSIS — M25.552 PAIN IN LEFT HIP: Primary | ICD-10-CM

## 2021-01-15 DIAGNOSIS — M25.552 HIP PAIN, LEFT: ICD-10-CM

## 2021-01-15 DIAGNOSIS — G89.29 CHRONIC GROIN PAIN, LEFT: ICD-10-CM

## 2021-01-15 PROCEDURE — 1036F TOBACCO NON-USER: CPT | Performed by: FAMILY MEDICINE

## 2021-01-15 PROCEDURE — 99214 OFFICE O/P EST MOD 30 MIN: CPT | Performed by: FAMILY MEDICINE

## 2021-01-15 NOTE — PROGRESS NOTES
1  Pain in left hip  MRI arthrogram left hip   2  Chronic groin pain, left  MRI arthrogram left hip     Orders Placed This Encounter   Procedures    MRI arthrogram left hip        Imaging Studies (I personally reviewed images in PACS and report):      IMPRESSION:  Chronic left hip pain  Differential diagnosis:  Labral tear  Femoral acetabular impingement  Greater trochanteric process syndrome  Athletic pubalgia    Repeat X-ray next visit:   None      Return for Follow-up after MRI arthrogram       Patient Instructions   Explained the patient that her persistent pain in her groin region may be due to a labral tear and or femoral acetabular impingement  At this time she has not responded with satisfaction to conservative treatment I have ordered MRI arthrogram of the left hip  CHIEF COMPLAINT:  Follow-up bilateral hip pain and polyarthralgias    HPI:  Milagros Ly is a 22 y o  female  who presents for       Visit 1/15/2021 : Follow-up bilateral hip pain  Patient has working diagnosis of femoral acetabular impingement  Last visit she declined MRI arthrogram for possible labral tear  She was referred to Rheumatology for evaluation of polyarthralgias and recommended to continue exercise program   Review of rheumatology note from 12/09/2020 reveals that patient recommend to continue physical therapy, start lidocaine patches, trial meloxicam     Today, tells me that she has had inconsistent relief with home exercise program given her by physical therapist in the past   Currently she is approximately 50% of her usual baseline for her left hip  She points to her left groin and lateral hip as source of intermittent pain  At times it can be sharp and severe  Review of Systems   Constitutional: Negative for chills, fever and unexpected weight change  HENT: Negative for hearing loss, nosebleeds and sore throat  Eyes: Negative for pain, redness and visual disturbance     Respiratory: Negative for cough, shortness of breath and wheezing  Cardiovascular: Negative for chest pain, palpitations and leg swelling  Gastrointestinal: Negative for abdominal distention, nausea and vomiting  Endocrine: Negative for polydipsia and polyuria  Genitourinary: Negative for dysuria and hematuria  Skin: Negative for rash and wound  Neurological: Negative for dizziness, numbness and headaches  Psychiatric/Behavioral: Negative for decreased concentration and suicidal ideas  Following history reviewed and update:    History reviewed  No pertinent past medical history  Past Surgical History:   Procedure Laterality Date    CERVICAL BIOPSY  W/ LOOP ELECTRODE EXCISION      WISDOM TOOTH EXTRACTION       Social History   Social History     Substance and Sexual Activity   Alcohol Use Yes    Comment: occassionally     Social History     Substance and Sexual Activity   Drug Use Yes    Types: Marijuana    Comment: occasionally     Social History     Tobacco Use   Smoking Status Former Smoker   Smokeless Tobacco Never Used   Tobacco Comment    was a social smoker     Family History   Problem Relation Age of Onset    Hypertension Mother     Vitamin D deficiency Father     Diabetes Maternal Grandmother     Schizophrenia Paternal Uncle      No Known Allergies       Physical Exam  /68 (BP Location: Right arm, Patient Position: Sitting, Cuff Size: Adult)   Pulse 98   Wt 68 9 kg (152 lb)   BMI 26 09 kg/m²     Constitutional:  see vital signs  Gen: well-developed, normocephalic/atraumatic, well-groomed  Eyes: No inflammation or discharge of conjunctiva or lids; sclera clear   Pharynx: no inflammation, lesion, or mass of lips  Neck: supple, no masses, non-distended  MSK: no inflammation, lesion, mass, or clubbing of nails and digits except for other than mentioned below  SKIN: no visible rashes or skin lesions  Pulmonary/Chest: Effort normal  No respiratory distress     NEURO: cranial nerves grossly intact  PSYCH:  Alert and oriented to person, place, and time; recent and remote memory intact; mood normal, no depression, anxiety, or agitation, judgment and insight good and intact     Ortho Exam    BACK EXAM:  Gait: normal, no trendelenberg gait, no antalgic gait    BACK TENDERNESS:  Spinous Processes: no  Paraspinal Muscles: no  SI Joint: no  Sacrum: no    ROM:  Flexion: intact  Extension: intact  Sidebending: intact    DERMATOMAL SENSATION:  L1: normal   L2: normal   L3: normal   L4: normal   L5: normal   S1: normal    STRENGTH (bilateral):  Knee Extension: 5/5  Knee Flexion: 5/5  Foot Dorsiflexion: 5/5  Great Toe Extension: 5/5  Foot Plantarflexion: 5/5  Hip Flexion: 5/5  Hip Abduction: 5/5    REFLEXES:  Patellar: 2+ bilateral  Achilles: 2+ bilateral  Clonus: negative bilateral    BACK:   SUPINE STRAIGHT LEG: negative  PRONE STRAIGHT LEG:  SLUMP: negative    HIP:  LOG ROLL: negative  KIARA: negative  FADIR: + reproduces chief complaint of pain      Procedures

## 2021-01-15 NOTE — PATIENT INSTRUCTIONS
Explained the patient that her persistent pain in her groin region may be due to a labral tear and or femoral acetabular impingement  At this time she has not responded with satisfaction to conservative treatment I have ordered MRI arthrogram of the left hip

## 2021-01-26 NOTE — NURSING NOTE
Call placed to patient to discuss upcoming appointment at [place] [address] radiology department and complete consultation with patient  Patient is having MRI left hip arthogram   Reviewed with patient her allergies NKDA , no current anticoagulant medication present per patient , also discussed the pre and post procedure expectations  Reminded patient of location and time expected for procedure, Patient expressed understanding by verbalizing and repeating instructions  Patient asked if it was necessary to remove her piercings for the MRI, my response was if  It is metal then yes they need to be removed for her safety, if plastic they can remain in place  My number was also supplied to patient to call if any further questions or concerns should arise pre or post procedure

## 2021-02-03 ENCOUNTER — HOSPITAL ENCOUNTER (OUTPATIENT)
Dept: RADIOLOGY | Facility: HOSPITAL | Age: 26
Discharge: HOME | End: 2021-02-03
Attending: ORTHOPAEDIC SURGERY
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
## 2021-03-11 ENCOUNTER — TELEMEDICINE (OUTPATIENT)
Dept: PSYCHIATRY | Facility: CLINIC | Age: 26
End: 2021-03-11
Payer: COMMERCIAL

## 2021-03-11 DIAGNOSIS — F31.81 BIPOLAR 2 DISORDER (HCC): Primary | ICD-10-CM

## 2021-03-11 PROCEDURE — 90833 PSYTX W PT W E/M 30 MIN: CPT | Performed by: STUDENT IN AN ORGANIZED HEALTH CARE EDUCATION/TRAINING PROGRAM

## 2021-03-11 PROCEDURE — 99214 OFFICE O/P EST MOD 30 MIN: CPT | Performed by: STUDENT IN AN ORGANIZED HEALTH CARE EDUCATION/TRAINING PROGRAM

## 2021-03-11 RX ORDER — LAMOTRIGINE 200 MG/1
200 TABLET ORAL DAILY
Qty: 30 TABLET | Refills: 1 | Status: SHIPPED | OUTPATIENT
Start: 2021-03-11 | End: 2021-04-14

## 2021-03-11 RX ORDER — BUPROPION HYDROCHLORIDE 150 MG/1
150 TABLET ORAL DAILY
Qty: 30 TABLET | Refills: 0 | Status: SHIPPED | OUTPATIENT
Start: 2021-03-11 | End: 2021-04-06

## 2021-03-11 RX ORDER — TRAZODONE HYDROCHLORIDE 50 MG/1
50 TABLET ORAL
Qty: 30 TABLET | Refills: 0 | Status: SHIPPED | OUTPATIENT
Start: 2021-03-11 | End: 2021-04-06

## 2021-03-15 ENCOUNTER — TELEPHONE (OUTPATIENT)
Dept: OBGYN CLINIC | Facility: OTHER | Age: 26
End: 2021-03-15

## 2021-03-15 NOTE — TELEPHONE ENCOUNTER
Left message for patient to see if she wanted to reschedule the MRI Dr Charlene Clinton ordered I gave her my number to call me back

## 2021-03-23 ENCOUNTER — TELEPHONE (OUTPATIENT)
Dept: FAMILY MEDICINE CLINIC | Facility: CLINIC | Age: 26
End: 2021-03-23

## 2021-03-23 DIAGNOSIS — Z20.822 EXPOSURE TO COVID-19 VIRUS: Primary | ICD-10-CM

## 2021-03-23 NOTE — TELEPHONE ENCOUNTER
Pt reports they had a positive COVID-19 contact  Contact is defined as within 6 feet for >15 min in a 24 hour period  Unable to schedule pt for an appointment with the provider  A covid test was ordered and the patient will remain on isolation until test results come back  If positive the patient will make an appointment and if negative they will complete a 14 day quarantine starting from the day of the exposure        Patient is currently experiencing a cough and congestion   Patient aware of possible charge of $150 00

## 2021-03-24 DIAGNOSIS — Z20.822 EXPOSURE TO COVID-19 VIRUS: ICD-10-CM

## 2021-03-24 PROCEDURE — U0003 INFECTIOUS AGENT DETECTION BY NUCLEIC ACID (DNA OR RNA); SEVERE ACUTE RESPIRATORY SYNDROME CORONAVIRUS 2 (SARS-COV-2) (CORONAVIRUS DISEASE [COVID-19]), AMPLIFIED PROBE TECHNIQUE, MAKING USE OF HIGH THROUGHPUT TECHNOLOGIES AS DESCRIBED BY CMS-2020-01-R: HCPCS | Performed by: FAMILY MEDICINE

## 2021-03-24 PROCEDURE — U0005 INFEC AGEN DETEC AMPLI PROBE: HCPCS | Performed by: FAMILY MEDICINE

## 2021-03-25 LAB — SARS-COV-2 RNA RESP QL NAA+PROBE: POSITIVE

## 2021-03-25 NOTE — RESULT ENCOUNTER NOTE
I spoke with Josh Salazar and let her know that her COVID-19 swab was positive  Continue symptomatic treatment  Advised she implement home isolation measures including      Staying home  Stay in a specific "sick room" or area and away from other people or animals, including pets  Wear a mask when leaving your room  Use a separate bathroom, if available  Wipe down all commonly touched surfaces with household   Recommended vit d 1000mcg, vit c 500mg, zinc 50mg daily  Recommend purchasing a thermometer and a pulse ox  Please call the patient Monday to check on symptoms  ER precautions given to patient

## 2021-03-29 ENCOUNTER — TELEPHONE (OUTPATIENT)
Dept: FAMILY MEDICINE CLINIC | Facility: CLINIC | Age: 26
End: 2021-03-29

## 2021-03-29 NOTE — TELEPHONE ENCOUNTER
----- Message from Festus Banuelos MD sent at 3/25/2021  4:01 PM EDT -----  I spoke with Mag Cavazos and let her know that her COVID-19 swab was positive  Continue symptomatic treatment  Advised she implement home isolation measures including      Staying home  Stay in a specific "sick room" or area and away from other people or animals, including pets  Wear a mask when leaving your room  Use a separate bathroom, if available  Wipe down all commonly touched surfaces with household   Recommended vit d 1000mcg, vit c 500mg, zinc 50mg daily  Recommend purchasing a thermometer and a pulse ox  Please call the patient Monday to check on symptoms  ER precautions given to patient

## 2021-03-29 NOTE — TELEPHONE ENCOUNTER
----- Message from Esequiel Lux MD sent at 3/25/2021  4:01 PM EDT -----  I spoke with Carlos Gonzalez and let her know that her COVID-19 swab was positive  Continue symptomatic treatment  Advised she implement home isolation measures including      Staying home  Stay in a specific "sick room" or area and away from other people or animals, including pets  Wear a mask when leaving your room  Use a separate bathroom, if available  Wipe down all commonly touched surfaces with household   Recommended vit d 1000mcg, vit c 500mg, zinc 50mg daily  Recommend purchasing a thermometer and a pulse ox  Please call the patient Monday to check on symptoms  ER precautions given to patient

## 2021-03-29 NOTE — NURSING NOTE
Call placed to patient to discuss upcoming left hip arthrogram at [facility] Radiology  RN reviewed patient allergies, verified that patient does not currently take any anticoagulant medications and discussed pre and post procedure expectations  Pre procedure diet and medication (patient's own medications) instructions reviewed with patient  Reviewed the procedure as well as post procedure expectations with the patient  Patient verbalizes understanding  Questions answered  Patient reminded of the location, date and time of her expected procedure  Contact number provided in case patient has any further questions

## 2021-03-29 NOTE — NURSING NOTE
RN attempted to contact patient regarding upcoming left hip arthrogram at [facility]iology  Message left  Radiology RN's name and call back number provided in message

## 2021-03-30 ENCOUNTER — TELEPHONE (OUTPATIENT)
Dept: FAMILY MEDICINE CLINIC | Facility: CLINIC | Age: 26
End: 2021-03-30

## 2021-03-30 NOTE — TELEPHONE ENCOUNTER
Spoke to pt and she is taking her Vitamins  Shes having just a a cough  And alittle fatigue   She know if symptoms worsen she will go to ER

## 2021-03-30 NOTE — TELEPHONE ENCOUNTER
----- Message from Lane Swift MD sent at 3/25/2021  4:01 PM EDT -----  I spoke with Vladislav Chávez and let her know that her COVID-19 swab was positive  Continue symptomatic treatment  Advised she implement home isolation measures including      Staying home  Stay in a specific "sick room" or area and away from other people or animals, including pets  Wear a mask when leaving your room  Use a separate bathroom, if available  Wipe down all commonly touched surfaces with household   Recommended vit d 1000mcg, vit c 500mg, zinc 50mg daily  Recommend purchasing a thermometer and a pulse ox  Please call the patient Monday to check on symptoms  ER precautions given to patient

## 2021-04-02 DIAGNOSIS — F31.81 BIPOLAR 2 DISORDER (HCC): ICD-10-CM

## 2021-04-06 RX ORDER — TRAZODONE HYDROCHLORIDE 50 MG/1
TABLET ORAL
Qty: 30 TABLET | Refills: 0 | Status: SHIPPED | OUTPATIENT
Start: 2021-04-06 | End: 2021-05-04

## 2021-04-06 RX ORDER — BUPROPION HYDROCHLORIDE 150 MG/1
TABLET ORAL
Qty: 30 TABLET | Refills: 0 | Status: SHIPPED | OUTPATIENT
Start: 2021-04-06 | End: 2021-05-04

## 2021-04-07 ENCOUNTER — HOSPITAL ENCOUNTER (OUTPATIENT)
Dept: RADIOLOGY | Facility: HOSPITAL | Age: 26
Discharge: HOME | End: 2021-04-07
Attending: FAMILY MEDICINE
Payer: COMMERCIAL

## 2021-04-07 ENCOUNTER — TELEPHONE (OUTPATIENT)
Dept: OTHER | Facility: HOSPITAL | Age: 26
End: 2021-04-07

## 2021-04-07 ENCOUNTER — HOSPITAL ENCOUNTER (OUTPATIENT)
Dept: RADIOLOGY | Facility: HOSPITAL | Age: 26
Discharge: HOME | End: 2021-04-07
Attending: ORTHOPAEDIC SURGERY
Payer: COMMERCIAL

## 2021-04-07 DIAGNOSIS — M25.552 PAIN IN LEFT HIP: ICD-10-CM

## 2021-04-07 DIAGNOSIS — R10.32 CHRONIC GROIN PAIN, LEFT: ICD-10-CM

## 2021-04-07 DIAGNOSIS — G89.29 CHRONIC GROIN PAIN, LEFT: ICD-10-CM

## 2021-04-07 PROCEDURE — A9585 GADOBUTROL INJECTION: HCPCS | Performed by: ORTHOPAEDIC SURGERY

## 2021-04-07 PROCEDURE — 27095 INJECTION FOR HIP X-RAY: CPT

## 2021-04-07 PROCEDURE — 77002 NEEDLE LOCALIZATION BY XRAY: CPT

## 2021-04-07 PROCEDURE — 73722 MRI JOINT OF LWR EXTR W/DYE: CPT

## 2021-04-07 PROCEDURE — G1004 CDSM NDSC: HCPCS

## 2021-04-07 RX ORDER — LIDOCAINE HYDROCHLORIDE 10 MG/ML
15 INJECTION, SOLUTION EPIDURAL; INFILTRATION; INTRACAUDAL; PERINEURAL
Status: DISCONTINUED | OUTPATIENT
Start: 2021-04-07 | End: 2021-04-08 | Stop reason: HOSPADM

## 2021-04-07 RX ADMIN — GADOBUTROL 1 ML: 604.72 INJECTION INTRAVENOUS at 14:31

## 2021-04-07 RX ADMIN — IOHEXOL 3 ML: 300 INJECTION, SOLUTION INTRAVENOUS at 14:31
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
## 2021-04-08 ENCOUNTER — TELEMEDICINE (OUTPATIENT)
Dept: PSYCHIATRY | Facility: CLINIC | Age: 26
End: 2021-04-08
Payer: COMMERCIAL

## 2021-04-08 DIAGNOSIS — F31.81 BIPOLAR 2 DISORDER (HCC): Primary | ICD-10-CM

## 2021-04-08 DIAGNOSIS — F60.3 BORDERLINE PERSONALITY DISORDER (HCC): Chronic | ICD-10-CM

## 2021-04-08 DIAGNOSIS — F43.10 PTSD (POST-TRAUMATIC STRESS DISORDER): ICD-10-CM

## 2021-04-08 PROCEDURE — 3725F SCREEN DEPRESSION PERFORMED: CPT | Performed by: STUDENT IN AN ORGANIZED HEALTH CARE EDUCATION/TRAINING PROGRAM

## 2021-04-08 PROCEDURE — 1036F TOBACCO NON-USER: CPT | Performed by: STUDENT IN AN ORGANIZED HEALTH CARE EDUCATION/TRAINING PROGRAM

## 2021-04-08 PROCEDURE — 90833 PSYTX W PT W E/M 30 MIN: CPT | Performed by: STUDENT IN AN ORGANIZED HEALTH CARE EDUCATION/TRAINING PROGRAM

## 2021-04-08 PROCEDURE — 99214 OFFICE O/P EST MOD 30 MIN: CPT | Performed by: STUDENT IN AN ORGANIZED HEALTH CARE EDUCATION/TRAINING PROGRAM

## 2021-04-08 NOTE — BH TREATMENT PLAN
Treatment Plan done but not signed at time of office visit due to:  Plan reviewed by phone or in person  and verbal consent given due to COVID social distancing    TREATMENT PLAN (Medication Management Only)        Joseph Salazar    Name and Date of Birth:  Rosi Bustamante 25 y o  1995  Date of Treatment Plan: April 8, 2021  Diagnosis/Diagnoses:    1  Bipolar 2 disorder ([address] )    2  PTSD (post-traumatic stress disorder)    3  Borderline personality disorder [facility])      Strengths/Personal Resources for Self-Care: "willingness to change, listening to music and painting, compliance with medication"  Area/Areas of need (in own words): "mood stability, PTSD sxs"  1  Long Term Goal: improve PTSD sxs and mood stability  Target Date:4 months - 8/8/2021  Person/Persons responsible for completion of goal: Raghav  2  Short Term Objective (s) - How will we reach this goal?:   A  Provider new recommended medication/dosage changes and/or continue medication(s): continue current medications as prescribed  B  individual therapy  C  N/A  Target Date:4 months - 8/8/2021  Person/Persons Responsible for Completion of Goal: Raghav  Progress Towards Goals: continuing treatment  Treatment Modality: medication management every 4 months  Review due 180 days from date of this plan: 4 months - 8/8/2021  Expected length of service: maintenance  My Physician/PA/NP and I have developed this plan together and I agree to work on the goals and objectives  I understand the treatment goals that were developed for my treatment

## 2021-04-09 ENCOUNTER — TELEPHONE (OUTPATIENT)
Dept: PSYCHIATRY | Facility: CLINIC | Age: 26
End: 2021-04-09

## 2021-04-09 NOTE — TELEPHONE ENCOUNTER
----- Message from Renetta York sent at 4/8/2021  4:52 PM EDT -----  Dr Leatha Quintero MD referred Treva Fierro for therapy services      Please add to waitlist      Per provider: - f/u REFERRAL FOR THERAPY (referred on 8/11/2020 - not heard back!)

## 2021-04-14 DIAGNOSIS — F31.81 BIPOLAR 2 DISORDER (HCC): ICD-10-CM

## 2021-04-14 RX ORDER — LAMOTRIGINE 200 MG/1
TABLET ORAL
Qty: 30 TABLET | Refills: 1 | Status: SHIPPED | OUTPATIENT
Start: 2021-04-14 | End: 2021-05-10

## 2021-04-22 ENCOUNTER — TELEPHONE (OUTPATIENT)
Dept: PSYCHIATRY | Facility: CLINIC | Age: 26
End: 2021-04-22

## 2021-04-22 NOTE — TELEPHONE ENCOUNTER
Contacted patient to get 5/3 appt rescheduled due to Dr Chandra Rosado out of the office  Asked Patient to call back to reschedule

## 2021-05-04 ENCOUNTER — TELEPHONE (OUTPATIENT)
Dept: PSYCHIATRY | Facility: CLINIC | Age: 26
End: 2021-05-04

## 2021-05-04 DIAGNOSIS — F31.81 BIPOLAR 2 DISORDER (HCC): ICD-10-CM

## 2021-05-04 RX ORDER — TRAZODONE HYDROCHLORIDE 50 MG/1
TABLET ORAL
Qty: 30 TABLET | Refills: 0 | Status: SHIPPED | OUTPATIENT
Start: 2021-05-04 | End: 2021-06-03

## 2021-05-04 RX ORDER — BUPROPION HYDROCHLORIDE 150 MG/1
TABLET ORAL
Qty: 30 TABLET | Refills: 0 | Status: SHIPPED | OUTPATIENT
Start: 2021-05-04 | End: 2021-05-11 | Stop reason: SDUPTHER

## 2021-05-08 DIAGNOSIS — F31.81 BIPOLAR 2 DISORDER (HCC): ICD-10-CM

## 2021-05-10 RX ORDER — LAMOTRIGINE 200 MG/1
TABLET ORAL
Qty: 90 TABLET | Refills: 1 | Status: SHIPPED | OUTPATIENT
Start: 2021-05-10 | End: 2021-05-11 | Stop reason: SDUPTHER
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
## 2021-05-11 ENCOUNTER — TELEMEDICINE (OUTPATIENT)
Dept: PSYCHIATRY | Facility: CLINIC | Age: 26
End: 2021-05-11
Payer: COMMERCIAL

## 2021-05-11 ENCOUNTER — TELEPHONE (OUTPATIENT)
Dept: PSYCHIATRY | Facility: CLINIC | Age: 26
End: 2021-05-11

## 2021-05-11 DIAGNOSIS — F31.81 BIPOLAR 2 DISORDER (HCC): ICD-10-CM

## 2021-05-11 PROCEDURE — 99214 OFFICE O/P EST MOD 30 MIN: CPT | Performed by: STUDENT IN AN ORGANIZED HEALTH CARE EDUCATION/TRAINING PROGRAM

## 2021-05-11 PROCEDURE — 3725F SCREEN DEPRESSION PERFORMED: CPT | Performed by: STUDENT IN AN ORGANIZED HEALTH CARE EDUCATION/TRAINING PROGRAM

## 2021-05-11 PROCEDURE — 90833 PSYTX W PT W E/M 30 MIN: CPT | Performed by: STUDENT IN AN ORGANIZED HEALTH CARE EDUCATION/TRAINING PROGRAM

## 2021-05-11 RX ORDER — BUPROPION HYDROCHLORIDE 150 MG/1
150 TABLET ORAL DAILY
Qty: 90 TABLET | Refills: 0 | Status: SHIPPED | OUTPATIENT
Start: 2021-05-11 | End: 2021-06-07 | Stop reason: SDUPTHER

## 2021-05-11 RX ORDER — HYDROXYZINE HYDROCHLORIDE 10 MG/1
10 TABLET, FILM COATED ORAL EVERY 6 HOURS PRN
Qty: 30 TABLET | Refills: 2 | Status: SHIPPED | OUTPATIENT
Start: 2021-05-11 | End: 2021-06-07 | Stop reason: SDUPTHER

## 2021-05-11 RX ORDER — LAMOTRIGINE 200 MG/1
200 TABLET ORAL DAILY
Qty: 90 TABLET | Refills: 0 | Status: SHIPPED | OUTPATIENT
Start: 2021-05-11 | End: 2021-08-03 | Stop reason: SDUPTHER

## 2021-05-13 ENCOUNTER — TELEPHONE (OUTPATIENT)
Dept: PSYCHIATRY | Facility: CLINIC | Age: 26
End: 2021-05-13

## 2021-05-19 ENCOUNTER — TELEPHONE (OUTPATIENT)
Dept: PSYCHIATRY | Facility: CLINIC | Age: 26
End: 2021-05-19

## 2021-05-19 NOTE — TELEPHONE ENCOUNTER
Patient called and would like to set up an apt she said a good time to contact her is on Tuesday's and Thursday's anytime in the afternoon can you please give her a call thank you

## 2021-05-25 ENCOUNTER — OFFICE VISIT (OUTPATIENT)
Dept: OBGYN CLINIC | Facility: OTHER | Age: 26
End: 2021-05-25
Payer: COMMERCIAL

## 2021-05-25 ENCOUNTER — TELEPHONE (OUTPATIENT)
Dept: PSYCHIATRY | Facility: CLINIC | Age: 26
End: 2021-05-25

## 2021-05-25 VITALS
SYSTOLIC BLOOD PRESSURE: 103 MMHG | WEIGHT: 148.6 LBS | HEART RATE: 79 BPM | BODY MASS INDEX: 29.02 KG/M2 | DIASTOLIC BLOOD PRESSURE: 68 MMHG

## 2021-05-25 DIAGNOSIS — M13.80 MIGRATORY POLYARTHRITIS: ICD-10-CM

## 2021-05-25 DIAGNOSIS — M25.552 HIP PAIN, LEFT: Primary | ICD-10-CM

## 2021-05-25 DIAGNOSIS — M25.641 JOINT STIFFNESS OF HAND, RIGHT: ICD-10-CM

## 2021-05-25 PROCEDURE — 99214 OFFICE O/P EST MOD 30 MIN: CPT | Performed by: FAMILY MEDICINE

## 2021-05-25 NOTE — PATIENT INSTRUCTIONS
Explained the patient that her left groin pain can be possibly due to femoral acetabular impingement of a tight ball in socket  She has not had any significant improvement  Her MRI does not show any significant pathology however she does have top normal angle for possible impingement  As such I recommended trial of ultrasound-guided corticosteroid injection to left hip joint  Also today patient presented with significant hand stiffness and I have asked her to make a follow-up appointment with Rheumatology again to discuss this  Explained that sometimes a diagnosis of autoimmune disorder requires blood work to be repeated and can be a tricky diagnosis

## 2021-05-25 NOTE — PROGRESS NOTES
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 with no bony edema    bilateral hand pain and stiffness with recurrent flares -seen by Rheumatology  Repeat X-ray next visit:    none      Return for Follow-up for ultrasound-guided corticosteroid injection left hip  Patient Instructions   Explained the patient that her left groin pain can be possibly due to femoral acetabular impingement of a tight ball in socket  She has not had any significant improvement  Her MRI does not show any significant pathology however she does have top normal angle for possible impingement  As such I recommended trial of ultrasound-guided corticosteroid injection to left hip joint  Also today patient presented with significant hand stiffness and I have asked her to make a follow-up appointment with Rheumatology again to discuss this  Explained that sometimes a diagnosis of autoimmune disorder requires blood work to be repeated and can be a tricky diagnosis  CHIEF COMPLAINT:   follow-up left hip pain and complains of hand pain    HPI:  Ileana Crowley is a 22 y o  female  who presents for       Visit 5/25/2021 :   hip pain  No change  No improvement  Continues to point to the left side anterior groin as source of pain worse with perform activities of daily living lifting and walking at work  MRI ordered last visit did not reveal any evidence of labral tear but there is suspicion for possible OLAF without edema     Patient also complains of hand pain and stiffness right greater than left    States unable to make a full fist of the right hand due to significant stiffness  Denies any trauma  Has been seen the past by Rheumatology  Review of Systems   Constitutional: Negative for chills and fever  Neurological: Negative for weakness and numbness  Following history reviewed and update:    No past medical history on file  Past Surgical History:   Procedure Laterality Date    CERVICAL BIOPSY  W/ LOOP ELECTRODE EXCISION      FL INJECTION LEFT HIP (ARTHROGRAM)  4/7/2021    WISDOM TOOTH EXTRACTION       Social History   Social History     Substance and Sexual Activity   Alcohol Use Yes    Comment: occassionally     Social History     Substance and Sexual Activity   Drug Use Yes    Types: Marijuana    Comment: occasionally     Social History     Tobacco Use   Smoking Status Former Smoker   Smokeless Tobacco Never Used   Tobacco Comment    was a social smoker     Family History   Problem Relation Age of Onset    Hypertension Mother     Vitamin D deficiency Father     Diabetes Maternal Grandmother     Schizophrenia Paternal Uncle      No Known Allergies       Physical Exam  /68   Pulse 79   Wt 67 4 kg (148 lb 9 6 oz)   BMI 29 02 kg/m²     Constitutional:  see vital signs  Gen: well-developed, normocephalic/atraumatic, well-groomed  Eyes: No inflammation or discharge of conjunctiva or lids; sclera clear   Pharynx: no inflammation, lesion, or mass of lips  Neck: supple, no masses, non-distended  MSK: no inflammation, lesion, mass, or clubbing of nails and digits except for other than mentioned below  SKIN: no visible rashes or skin lesions  Pulmonary/Chest: Effort normal  No respiratory distress     NEURO: cranial nerves grossly intact  PSYCH:  Alert and oriented to person, place, and time; recent and remote memory intact; mood normal, no depression, anxiety, or agitation, judgment and insight good and intact     Ortho Exam  BACK EXAM:  Gait: normal, no trendelenberg gait, no antalgic gait    BACK TENDERNESS:  Spinous Processes: no  Paraspinal Muscles: no  SI Joint: no  Sacrum: no    ROM:  Flexion: intact  Extension: intact  Sidebending: intact    DERMATOMAL SENSATION:  L1: normal   L2: normal   L3: normal   L4: normal   L5: normal   S1: normal    STRENGTH (bilateral):  Knee Extension: 5/5  Knee Flexion: 5/5  Foot Dorsiflexion: 5/5  Great Toe Extension: 5/5  Foot Plantarflexion: 5/5  Hip Flexion: 5/5  Hip Abduction: 5/5    REFLEXES:  Patellar: 2+ bilateral  Achilles: 2+ bilateral  Clonus: negative bilateral    BACK:   SUPINE STRAIGHT LEG: negative  PRONE STRAIGHT LEG:  SLUMP: negative    RIGHT HIP:  LOG ROLL: negative  KIARA: negative  FADIR: negative    LEFT HIP:  +  Lateral hip greater trochanteric tenderness mild  LOG ROLL: negative  KIARA: +  FADIR:+      Right Wrist  no swelling, erythema, or increased warmth  rom full  nontender  no laxity of joint; druj stable  Carpal tunnel compression test:  Phalen's test:  Tinel's carpal tunnel test:    Right Hand  no erythema  Swelling:+ mild diffuse  Tenderness: non  rom fingers mcp, pip, dip  Diminished active with mild pain on flexion  No digital scissoring or deviation of fingers  no extensor lag  no rotation of fingers  no joint laxity  strenght flexion and extension mcp, pip, dip 5/5  sensation intact  capillary refill intact   Froment sign:  normal  OK sign:  Normal  Thumb extension:  5/5          Procedures     medical assistant present for encounter as chaperone: Gris Serna

## 2021-05-26 ENCOUNTER — TELEPHONE (OUTPATIENT)
Dept: PSYCHIATRY | Facility: CLINIC | Age: 26
End: 2021-05-26

## 2021-05-26 NOTE — TELEPHONE ENCOUNTER
Returning [id number] call from 5/25  Has phone on her at all times   But she said she is off 5/28

## 2021-06-01 ENCOUNTER — IMMUNIZATIONS (OUTPATIENT)
Dept: FAMILY MEDICINE CLINIC | Facility: HOSPITAL | Age: 26
End: 2021-06-01

## 2021-06-01 DIAGNOSIS — Z23 ENCOUNTER FOR IMMUNIZATION: Primary | ICD-10-CM

## 2021-06-01 PROCEDURE — 0001A SARS-COV-2 / COVID-19 MRNA VACCINE (PFIZER-BIONTECH) 30 MCG: CPT

## 2021-06-01 PROCEDURE — 91300 SARS-COV-2 / COVID-19 MRNA VACCINE (PFIZER-BIONTECH) 30 MCG: CPT

## 2021-06-03 ENCOUNTER — TELEPHONE (OUTPATIENT)
Dept: OBGYN CLINIC | Facility: HOSPITAL | Age: 26
End: 2021-06-03

## 2021-06-03 DIAGNOSIS — F31.81 BIPOLAR 2 DISORDER (HCC): ICD-10-CM

## 2021-06-03 RX ORDER — TRAZODONE HYDROCHLORIDE 50 MG/1
TABLET ORAL
Qty: 30 TABLET | Refills: 0 | Status: SHIPPED | OUTPATIENT
Start: 2021-06-03 | End: 2021-06-29

## 2021-06-03 NOTE — TELEPHONE ENCOUNTER
Patient called and schedule an appointment for this upcoming Monday, 6/07 with Dr Milvia Rodriguez  Patient would like to know, if she needs to do any blood work prior to her appointment?    # [phone]

## 2021-06-04 RX ORDER — BUPROPION HYDROCHLORIDE 150 MG/1
TABLET ORAL
Qty: 30 TABLET | OUTPATIENT
Start: 2021-06-04

## 2021-06-04 NOTE — TELEPHONE ENCOUNTER
90 day supply was provided on 5/11/2021, and the request for 30 day supply was declined at this time

## 2021-06-07 DIAGNOSIS — F31.81 BIPOLAR 2 DISORDER (HCC): ICD-10-CM

## 2021-06-07 RX ORDER — HYDROXYZINE HYDROCHLORIDE 10 MG/1
10 TABLET, FILM COATED ORAL EVERY 6 HOURS PRN
Qty: 30 TABLET | Refills: 2 | Status: SHIPPED | OUTPATIENT
Start: 2021-06-07 | End: 2021-08-03 | Stop reason: SDUPTHER

## 2021-06-07 RX ORDER — BUPROPION HYDROCHLORIDE 150 MG/1
150 TABLET ORAL DAILY
Qty: 90 TABLET | Refills: 0 | Status: SHIPPED | OUTPATIENT
Start: 2021-06-07 | End: 2021-08-03 | Stop reason: SDUPTHER

## 2021-06-14 ENCOUNTER — TELEPHONE (OUTPATIENT)
Dept: PSYCHIATRY | Facility: CLINIC | Age: 26
End: 2021-06-14

## 2021-06-14 ENCOUNTER — OFFICE VISIT (OUTPATIENT)
Dept: RHEUMATOLOGY | Facility: CLINIC | Age: 26
End: 2021-06-14
Payer: COMMERCIAL

## 2021-06-14 VITALS
BODY MASS INDEX: 28.47 KG/M2 | HEIGHT: 60 IN | HEART RATE: 87 BPM | DIASTOLIC BLOOD PRESSURE: 72 MMHG | WEIGHT: 145 LBS | SYSTOLIC BLOOD PRESSURE: 107 MMHG

## 2021-06-14 DIAGNOSIS — M53.3 SACROILIAC JOINT PAIN: Primary | ICD-10-CM

## 2021-06-14 PROCEDURE — 3008F BODY MASS INDEX DOCD: CPT | Performed by: INTERNAL MEDICINE

## 2021-06-14 PROCEDURE — 99213 OFFICE O/P EST LOW 20 MIN: CPT | Performed by: INTERNAL MEDICINE

## 2021-06-14 PROCEDURE — 1036F TOBACCO NON-USER: CPT | Performed by: INTERNAL MEDICINE

## 2021-06-14 NOTE — PROGRESS NOTES
Assessment and Plan:   Left SI joint pain and intermittent left hip pain  Continue exercising and home PT  Patient with f/u with ortho for left hip CS injection under fluoroscopic guidance  Topical analgesics for left SI joint pain  Meloxicam PRN joint pain, stiffness  Repeat labs in 3 mos  and f/u in 3 mos  or PRN      Rheumatic Disease Summary  As above    Here for f/u visit  Still with left hip pain on certain ROMs  Also with left SI joint pain, intermittent upper buttock pain  Otherwise she feels well  She took the meloxicam with some improvement in her symptoms  The following portions of the patient's history were reviewed and updated as appropriate: allergies, current medications, past family history, past medical history, past social history, past surgical history and problem list     Review of Systems:   Review of Systems   Constitutional: Negative for chills, fatigue, fever and unexpected weight change  HENT: Negative for mouth sores and trouble swallowing  Eyes: Negative for pain and visual disturbance  Respiratory: Negative for cough and shortness of breath  Cardiovascular: Negative for chest pain and leg swelling  Gastrointestinal: Negative for constipation and diarrhea  Musculoskeletal: Positive for arthralgias and back pain  Negative for joint swelling and myalgias  Skin: Negative for color change and rash  Neurological: Negative for weakness  Hematological: Negative for adenopathy  Psychiatric/Behavioral: Negative for sleep disturbance         Home Medications:    Current Outpatient Medications:     albuterol (PROVENTIL HFA,VENTOLIN HFA) 90 mcg/act inhaler, Inhale 2 puffs every 6 (six) hours as needed for wheezing or shortness of breath, Disp: 3 Inhaler, Rfl: 1    azelastine (ASTELIN) 0 1 % nasal spray, 2 sprays into each nostril 2 (two) times a day Use in each nostril as directed, Disp: 1 Bottle, Rfl: 6    buPROPion (WELLBUTRIN XL) 150 mg 24 hr tablet, Take 1 tablet (150 mg total) by mouth daily, Disp: 90 tablet, Rfl: 0    cetirizine (ZyrTEC) 10 mg tablet, Take 1 tablet (10 mg total) by mouth daily, Disp: 90 tablet, Rfl: 1    hydrOXYzine HCL (ATARAX) 10 mg tablet, Take 1 tablet (10 mg total) by mouth every 6 (six) hours as needed for anxiety, Disp: 30 tablet, Rfl: 2    lamoTRIgine (LaMICtal) 200 MG tablet, Take 1 tablet (200 mg total) by mouth daily, Disp: 90 tablet, Rfl: 0    Levonorgestrel 13 5 MG IUD, 1 each by Intrauterine route, Disp: , Rfl:     meloxicam (MOBIC) 15 mg tablet, Take 1 tablet (15 mg total) by mouth daily, Disp: 30 tablet, Rfl: 1    mometasone (NASONEX) 50 mcg/act nasal spray, 2 sprays into each nostril daily, Disp: 3 Act, Rfl: 1    montelukast (SINGULAIR) 10 mg tablet, Take 1 tablet (10 mg total) by mouth daily at bedtime (Patient not taking: Reported on 5/25/2021), Disp: 90 tablet, Rfl: 1    pseudoephedrine (SUDAFED) 30 mg tablet, Take 1 tablet (30 mg total) by mouth every 6 (six) hours as needed for congestion (Patient not taking: Reported on 5/25/2021), Disp: 10 tablet, Rfl: 0    traZODone (DESYREL) 50 mg tablet, TAKE 1 TABLET BY MOUTH DAILY AT BEDTIME, Disp: 30 tablet, Rfl: 0    valACYclovir (VALTREX) 1,000 mg tablet, Take 2 tablets (2,000 mg total) by mouth 2 (two) times a day for 1 day (Patient not taking: Reported on 12/9/2020), Disp: 4 tablet, Rfl: 3    Objective:    Vitals:    06/14/21 1454   BP: 107/72   Pulse: 87   Weight: 65 8 kg (145 lb)   Height: 5' (1 524 m)       Physical Exam  Constitutional:       General: She is not in acute distress  Appearance: She is well-developed  HENT:      Head: Normocephalic and atraumatic  Eyes:      General: Lids are normal  No scleral icterus  Conjunctiva/sclera: Conjunctivae normal    Cardiovascular:      Rate and Rhythm: Normal rate and regular rhythm  Heart sounds: S1 normal and S2 normal  No murmur heard  No friction rub     Pulmonary:      Effort: Pulmonary effort is normal  No tachypnea or respiratory distress  Breath sounds: Normal breath sounds  No wheezing, rhonchi or rales  Musculoskeletal:      Cervical back: Neck supple  No muscular tenderness  Legs:    Skin:     General: Skin is warm and dry  Findings: No rash  Nails: There is no clubbing  Neurological:      Mental Status: She is alert  Sensory: No sensory deficit  Psychiatric:         Behavior: Behavior normal  Behavior is cooperative  Reviewed labs and imaging  Imaging:   No results found  Labs:   Orders Only on 03/24/2021   Component Date Value Ref Range Status    SARS-CoV-2 03/24/2021 Positive* Negative Final   Orders Only on 11/18/2020   Component Date Value Ref Range Status    SARS-CoV-2  11/18/2020 Not Detected  Not Detected Final    Comment: This nucleic acid amplification test was developed and its performance  characteristics determined by [employer]  Nucleic acid  amplification tests include PCR and TMA  This test has not been FDA  cleared or approved  This test has been authorized by FDA under an  Emergency Use Authorization (EUA)  This test is only authorized for  the duration of time the declaration that circumstances exist  justifying the authorization of the emergency use of in vitro  diagnostic tests for detection of SARS-CoV-2 virus and/or diagnosis  of COVID-19 infection under section 564(b)(1) of the Act, 21 U  S C   163ORD-5(A) (1), unless the authorization is terminated or revoked  sooner  When diagnostic testing is negative, the possibility of a false  negative result should be considered in the context of a patient's  recent exposures and the presence of clinical signs and symptoms  consistent with COVID-19  An individual without symptoms of COVID-19  and who is not shedding SARS-CoV-2 virus would                            expect to have a  negative (not detected) result in this assay     Orders Only on 10/16/2020   Component Date Value Ref Range Status    SARS-CoV-2  10/16/2020 Not Detected  Not Detected Final    Comment: This nucleic acid amplification test was developed and its performance  characteristics determined by [employer]  Nucleic acid  amplification tests include PCR and TMA  This test has not been FDA  cleared or approved  This test has been authorized by FDA under an  Emergency Use Authorization (EUA)  This test is only authorized for  the duration of time the declaration that circumstances exist  justifying the authorization of the emergency use of in vitro  diagnostic tests for detection of SARS-CoV-2 virus and/or diagnosis  of COVID-19 infection under section 564(b)(1) of the Act, 21 U  S C   581KHS-0(T) (1), unless the authorization is terminated or revoked  sooner  When diagnostic testing is negative, the possibility of a false  negative result should be considered in the context of a patient's  recent exposures and the presence of clinical signs and symptoms  consistent with COVID-19  An individual without symptoms of COVID-19  and who is not shedding SARS-CoV-2 virus would                            expect to have a  negative (not detected) result in this assay     Orders Only on 08/20/2020   Component Date Value Ref Range Status    Glucose, Random 08/20/2020 87  65 - 99 mg/dL Final    Comment:               Fasting reference interval         BUN 08/20/2020 13  7 - 25 mg/dL Final    Creatinine 08/20/2020 0 71  0 50 - 1 10 mg/dL Final    eGFR Non  08/20/2020 118  > OR = 60 mL/min/1 73m2 Final    eGFR African American 08/20/2020 137  > OR = 60 mL/min/1 73m2 Final    SL AMB BUN/CREATININE RATIO 81/62/7943 NOT APPLICABLE  6 - 22 (calc) Final    Sodium 08/20/2020 138  135 - 146 mmol/L Final    Potassium 08/20/2020 4 5  3 5 - 5 3 mmol/L Final    Chloride 08/20/2020 104  98 - 110 mmol/L Final    CO2 08/20/2020 25  20 - 32 mmol/L Final    Calcium 08/20/2020 9 4  8 6 - 10 2 mg/dL Final    Protein, Total 08/20/2020 6 7  6 1 - 8 1 g/dL Final    Albumin 08/20/2020 4 5  3 6 - 5 1 g/dL Final    Globulin 08/20/2020 2 2  1 9 - 3 7 g/dL (calc) Final    Albumin/Globulin Ratio 08/20/2020 2 0  1 0 - 2 5 (calc) Final    TOTAL BILIRUBIN 08/20/2020 0 6  0 2 - 1 2 mg/dL Final    Alkaline Phosphatase 08/20/2020 45  31 - 125 U/L Final    AST 08/20/2020 13  10 - 30 U/L Final    ALT 08/20/2020 13  6 - 29 U/L Final   Orders Only on 08/18/2020   Component Date Value Ref Range Status    White Blood Cell Count 08/18/2020 6 4  3 8 - 10 8 Thousand/uL Final    Red Blood Cell Count 08/18/2020 4 44  3 80 - 5 10 Million/uL Final    Hemoglobin 08/18/2020 13 2  11 7 - 15 5 g/dL Final    HCT 08/18/2020 40 5  35 0 - 45 0 % Final    MCV 08/18/2020 91 2  80 0 - 100 0 fL Final    MCH 08/18/2020 29 7  27 0 - 33 0 pg Final    MCHC 08/18/2020 32 6  32 0 - 36 0 g/dL Final    RDW 08/18/2020 12 7  11 0 - 15 0 % Final    Platelet Count 61/63/8512 298  140 - 400 Thousand/uL Final    SL AMB MPV 08/18/2020 10 2  7 5 - 12 5 fL Final    Neutrophils (Absolute) 08/18/2020 4,294  1,500 - 7,800 cells/uL Final    Lymphocytes (Absolute) 08/18/2020 1,510  850 - 3,900 cells/uL Final    Monocytes (Absolute) 08/18/2020 467  200 - 950 cells/uL Final    Eosinophils (Absolute) 08/18/2020 77  15 - 500 cells/uL Final    Basophils ABS 08/18/2020 51  0 - 200 cells/uL Final    Neutrophils 08/18/2020 67 1  % Final    Lymphocytes 08/18/2020 23 6  % Final    Monocytes 08/18/2020 7 3  % Final    Eosinophils 08/18/2020 1 2  % Final    Basophils PCT 08/18/2020 0 8  % Final    Vitamin B-12 08/18/2020 348  200 - 1,100 pg/mL Final    Comment:    Please Note: Although the reference range for vitamin  B12 is 200-1100 pg/mL, it has been reported that between  5 and 10% of patients with values between 200 and 400  pg/mL may experience neuropsychiatric and hematologic  abnormalities due to occult B12 deficiency; less than 1%  of patients with values above 400 pg/mL will have symptoms   Vitamin D, 25-Hydroxy, Serum 08/18/2020 21* 30 - 100 ng/mL Final    Comment: Vitamin D Status         25-OH Vitamin D:     Deficiency:                    <20 ng/mL  Insufficiency:             20 - 29 ng/mL  Optimal:                 > or = 30 ng/mL     For 25-OH Vitamin D testing on patients on   D2-supplementation and patients for whom quantitation   of D2 and D3 fractions is required, the QuestAssureD(TM)  25-OH VIT D, (D2,D3), LC/MS/MS is recommended: order   code 68891 (patients >2yrs)  See Note 1     Note 1     For additional information, please refer to   http://education  [employer] [facility]/faq/SFQ983   (This link is being provided for informational/  educational purposes only )      TSH W/RFX TO FREE T4 08/18/2020 0 82  mIU/L Final    Comment:           Reference Range                         > or = 20 Years  0 40-4 50                              Pregnancy Ranges            First trimester    0 26-2 66            Second trimester   0 55-2 73            Third trimester    0 43-2 91

## 2021-06-14 NOTE — PATIENT INSTRUCTIONS
Continue eating healthy and stretching  You can try over the counter Aspercreme with lidocaine and apply it to your painful joints and lower back 2 times per day as needed  You can also try Salonpas lidocaine patches at apply to your lower back and keep on for 12 hours at a time  I will see you back in the clinic in 3 mos  or sooner if need be

## 2021-06-17 ENCOUNTER — TELEPHONE (OUTPATIENT)
Dept: PSYCHIATRY | Facility: CLINIC | Age: 26
End: 2021-06-17

## 2021-06-18 ENCOUNTER — TELEPHONE (OUTPATIENT)
Dept: PSYCHIATRY | Facility: CLINIC | Age: 26
End: 2021-06-18

## 2021-06-22 ENCOUNTER — TELEPHONE (OUTPATIENT)
Dept: FAMILY MEDICINE CLINIC | Facility: CLINIC | Age: 26
End: 2021-06-22

## 2021-06-22 ENCOUNTER — IMMUNIZATIONS (OUTPATIENT)
Dept: FAMILY MEDICINE CLINIC | Facility: HOSPITAL | Age: 26
End: 2021-06-22

## 2021-06-22 DIAGNOSIS — Z23 ENCOUNTER FOR IMMUNIZATION: Primary | ICD-10-CM

## 2021-06-22 PROCEDURE — 91300 SARS-COV-2 / COVID-19 MRNA VACCINE (PFIZER-BIONTECH) 30 MCG: CPT

## 2021-06-22 PROCEDURE — 0002A SARS-COV-2 / COVID-19 MRNA VACCINE (PFIZER-BIONTECH) 30 MCG: CPT

## 2021-06-22 NOTE — TELEPHONE ENCOUNTER
Patient traveling to [address] [place] [address] 06/26/2021 she asked for a Note stating she is out of quarantine and is able to travel  conducted a detailed discussion... I had a detailed discussion with the patient and/or guardian regarding the historical points, exam findings, and any diagnostic results supporting the discharge/admit diagnosis.

## 2021-06-23 ENCOUNTER — TELEPHONE (OUTPATIENT)
Dept: PSYCHIATRY | Facility: CLINIC | Age: 26
End: 2021-06-23

## 2021-06-29 DIAGNOSIS — F31.81 BIPOLAR 2 DISORDER (HCC): ICD-10-CM

## 2021-06-29 RX ORDER — TRAZODONE HYDROCHLORIDE 50 MG/1
TABLET ORAL
Qty: 30 TABLET | Refills: 0 | Status: SHIPPED | OUTPATIENT
Start: 2021-06-29 | End: 2021-08-03

## 2021-07-13 ENCOUNTER — OFFICE VISIT (OUTPATIENT)
Dept: OBGYN CLINIC | Facility: OTHER | Age: 26
End: 2021-07-13
Payer: COMMERCIAL

## 2021-07-13 VITALS
DIASTOLIC BLOOD PRESSURE: 70 MMHG | BODY MASS INDEX: 29.69 KG/M2 | WEIGHT: 152 LBS | HEART RATE: 82 BPM | SYSTOLIC BLOOD PRESSURE: 107 MMHG

## 2021-07-13 DIAGNOSIS — R10.32 CHRONIC GROIN PAIN, LEFT: Primary | ICD-10-CM

## 2021-07-13 DIAGNOSIS — G89.29 CHRONIC GROIN PAIN, LEFT: Primary | ICD-10-CM

## 2021-07-13 PROCEDURE — 20611 DRAIN/INJ JOINT/BURSA W/US: CPT | Performed by: FAMILY MEDICINE

## 2021-07-13 RX ADMIN — TRIAMCINOLONE ACETONIDE 80 MG: 40 INJECTION, SUSPENSION INTRA-ARTICULAR; INTRAMUSCULAR at 09:22

## 2021-07-13 RX ADMIN — LIDOCAINE HYDROCHLORIDE 8 ML: 10 INJECTION, SOLUTION INFILTRATION; PERINEURAL at 09:22

## 2021-07-13 NOTE — PROGRESS NOTES
1  Chronic groin pain, left  Large joint arthrocentesis: L hip joint     Orders Placed This Encounter   Procedures    Large joint arthrocentesis: L hip joint        Imaging Studies (I personally reviewed images in PACS and report):     past diagnostics:    MRI arthrogram left hip 04/07/2021:   No significant abnormality   Alpha angle is top normal in size, however no sequela of femoral acetabular impingement is appreciated  IMPRESSION:  Left anterior groin hip pain   Mild OLAF on MRI with no bony edema    bilateral hand pain and stiffness with recurrent flares   -seen by Rheumatology      Repeat X-ray next visit: None    Return if symptoms worsen or fail to improve  Patient Instructions   Trial corticosteroid injection left hip  Continue home exercise program          CHIEF COMPLAINT:  Left hip OLAF    HPI:  Robbie Vivas is a 32 y o  female  who presents for       Visit 7/13/2021 :   here for trial corticosteroid injection  Continues to have intermittent left groin pain  Review of Systems   Constitutional: Negative for chills and fever  Neurological: Negative for weakness and numbness  Following history reviewed and update:    No past medical history on file    Past Surgical History:   Procedure Laterality Date    CERVICAL BIOPSY  W/ LOOP ELECTRODE EXCISION      FL INJECTION LEFT HIP (ARTHROGRAM)  4/7/2021    WISDOM TOOTH EXTRACTION       Social History   Social History     Substance and Sexual Activity   Alcohol Use Yes    Comment: occassionally     Social History     Substance and Sexual Activity   Drug Use Yes    Types: Marijuana    Comment: occasionally     Social History     Tobacco Use   Smoking Status Former Smoker   Smokeless Tobacco Never Used   Tobacco Comment    was a social smoker     Family History   Problem Relation Age of Onset    Hypertension Mother     Vitamin D deficiency Father     Diabetes Maternal Grandmother     Schizophrenia Paternal Uncle      No Known Allergies       Physical Exam  /70 (BP Location: Right arm, Patient Position: Sitting, Cuff Size: Adult)   Pulse 82   Wt 68 9 kg (152 lb)   BMI 29 69 kg/m²     Constitutional:  see vital signs  Gen: well-developed, normocephalic/atraumatic, well-groomed  Eyes: No inflammation or discharge of conjunctiva or lids; sclera clear   Pharynx: no inflammation, lesion, or mass of lips  Neck: supple, no masses, non-distended  MSK: no inflammation, lesion, mass, or clubbing of nails and digits except for other than mentioned below  SKIN: no visible rashes or skin lesions  Pulmonary/Chest: Effort normal  No respiratory distress  NEURO: cranial nerves grossly intact  PSYCH:  Alert and oriented to person, place, and time; recent and remote memory intact; mood normal, no depression, anxiety, or agitation, judgment and insight good and intact     Ortho Exam    Left Hip  No swelling erythema or increased warmth        Large joint arthrocentesis: L hip joint  Universal Protocol:  Procedure performed by: (Dr Devante Cross)  Consent: Verbal consent obtained  Risks and benefits: risks, benefits and alternatives were discussed  Consent given by: patient  Time out: Immediately prior to procedure a "time out" was called to verify the correct patient, procedure, equipment, support staff and site/side marked as required    Patient understanding: patient states understanding of the procedure being performed  Site marked: the operative site was marked  Patient identity confirmed: verbally with patient    Supporting Documentation  Indications: pain and diagnostic evaluation   Procedure Details  Location: hip - L hip joint  Preparation: Patient was prepped and draped in the usual sterile fashion  Needle size: 22 G  Ultrasound guidance: yes  Approach: anterior  Medications administered: 8 mL lidocaine 1 %; 80 mg triamcinolone acetonide 40 mg/mL; 7 mL lidocaine 1 % (25g 7ml lidocaine anesthetic tract)    Aspirate amount: 0 mL    Patient tolerance: patient tolerated the procedure well with no immediate complications  Dressing:  Sterile dressing applied

## 2021-07-14 RX ORDER — LIDOCAINE HYDROCHLORIDE 10 MG/ML
7 INJECTION, SOLUTION INFILTRATION; PERINEURAL
Status: COMPLETED | OUTPATIENT
Start: 2021-07-14 | End: 2021-07-14

## 2021-07-14 RX ORDER — TRIAMCINOLONE ACETONIDE 40 MG/ML
80 INJECTION, SUSPENSION INTRA-ARTICULAR; INTRAMUSCULAR
Status: COMPLETED | OUTPATIENT
Start: 2021-07-13 | End: 2021-07-13

## 2021-07-14 RX ORDER — LIDOCAINE HYDROCHLORIDE 10 MG/ML
8 INJECTION, SOLUTION INFILTRATION; PERINEURAL
Status: COMPLETED | OUTPATIENT
Start: 2021-07-13 | End: 2021-07-13

## 2021-07-14 RX ADMIN — LIDOCAINE HYDROCHLORIDE 7 ML: 10 INJECTION, SOLUTION INFILTRATION; PERINEURAL at 08:52

## 2021-07-15 ENCOUNTER — SOCIAL WORK (OUTPATIENT)
Dept: BEHAVIORAL/MENTAL HEALTH CLINIC | Facility: CLINIC | Age: 26
End: 2021-07-15
Payer: COMMERCIAL

## 2021-07-15 DIAGNOSIS — F31.81 BIPOLAR 2 DISORDER (HCC): Primary | ICD-10-CM

## 2021-07-15 DIAGNOSIS — F43.10 PTSD (POST-TRAUMATIC STRESS DISORDER): ICD-10-CM

## 2021-07-15 PROCEDURE — 90791 PSYCH DIAGNOSTIC EVALUATION: CPT | Performed by: COUNSELOR

## 2021-07-15 NOTE — BH TREATMENT PLAN
Rosalva Rodriguesjaqueline  1995       Date of Initial Treatment Plan: 7/15/2021   Date of Current Treatment Plan: 07/15/21    Treatment Plan Number 1     Strengths/Personal Resources for Self Care: Gardening, cooking, dancing, music, painting    Diagnosis:   1  Bipolar 2 disorder ([address] )     2  PTSD (post-traumatic stress disorder)         Area of Needs: Symptoms of PTSD, bipolar 2 disorder, Intrusive memories and nightmares      Long Term Goal 1: A "I can have emotions     that I don't feel like I need to act on right now" "I feel everything intensely" "To have positive resources to better understand emotional intensity" To reduce emotional intensity 50 percent  and BTo reduce intrusive memories from ~a few times per week to 0 times per week  Target Date: 1/5/2022  Completion Date: n/a         Short Term Objectives for Goal 1: This writer will provide Raghav with PCT, CBT, SFBT, DBT and CPT through her course of treatment  CBT/DBT for emotional resiliance  CPT for symptoms of PTSD  PCT and ET work throughout the course of treatment  Efren Dominguez will follw through with her medication management, taking medications as prescribed, engaging in various treatment modalities, following through with homework  GOAL 1: Modality: Individual 4x per month   Completion Date n/a, Medication Management and The person(s) responsible for carrying out the plan is  this writer, Dr Francesco Hodge, [address]: Diagnosis and Treatment Plan explained to Matt Parra relates understanding diagnosis and is agreeable to Treatment Plan  Client Comments : Please share your thoughts, feelings, need and/or experiences regarding your treatment plan: She is in agreement with the above, helped formulate this plan
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
## 2021-07-20 ENCOUNTER — SOCIAL WORK (OUTPATIENT)
Dept: BEHAVIORAL/MENTAL HEALTH CLINIC | Facility: CLINIC | Age: 26
End: 2021-07-20
Payer: COMMERCIAL

## 2021-07-20 DIAGNOSIS — F43.10 PTSD (POST-TRAUMATIC STRESS DISORDER): ICD-10-CM

## 2021-07-20 DIAGNOSIS — F31.81 BIPOLAR 2 DISORDER (HCC): Primary | ICD-10-CM

## 2021-07-20 PROCEDURE — 90834 PSYTX W PT 45 MINUTES: CPT | Performed by: COUNSELOR

## 2021-07-20 NOTE — PSYCH
Psychotherapy Provided: Individual Psychotherapy 45 minutes   9:05am-9:52am  Length of time in session: 47 minutes, follow up in 1 week    Encounter Diagnosis     ICD-10-CM    1  Bipolar 2 disorder (HCC)  F31 81    2  PTSD (post-traumatic stress disorder)  F43 10        Goals addressed in session: Goal 1     Pain:      none    0    Current suicide risk : Low     D: Focused on her enjoyment with friends, then moved into psychoeducation regarding her diagnoses  Discussed Bipolar II disorder, borderline traits and PTSD today  Moved into discussing her mother's concern for her not being able to get out of "big picture" thinking patterns at times since 8th grade  PCT through rapport building, empathic listening, validation of feelings, reflection of content and feelings  She reports she is mostly enjoying all of the work she's doing around her house in-between her jobs  She says she's been enjoying time with family as well, enjoys cooking for her family and friends  A: Seemed to be in a euthymic mood with congruent affect, experiencing moderate symptoms of PTSD through hypervigilance, intrusive memories, nightmares at times  Symptoms apparent of hypomanic episode through increased goal-directed activity  Seems to be responding well to PCT work today  Engaged in PCT interventions  P: Begin CPT work  Behavioral Health Treatment Plan [facility]: Diagnosis and Treatment Plan explained to Nerissa Schmitz relates understanding diagnosis and is agreeable to Treatment Plan   Yes

## 2021-07-27 ENCOUNTER — SOCIAL WORK (OUTPATIENT)
Dept: BEHAVIORAL/MENTAL HEALTH CLINIC | Facility: CLINIC | Age: 26
End: 2021-07-27
Payer: COMMERCIAL

## 2021-07-27 DIAGNOSIS — F31.81 BIPOLAR 2 DISORDER (HCC): ICD-10-CM

## 2021-07-27 DIAGNOSIS — F43.10 PTSD (POST-TRAUMATIC STRESS DISORDER): Primary | ICD-10-CM

## 2021-07-27 PROCEDURE — 90834 PSYTX W PT 45 MINUTES: CPT | Performed by: COUNSELOR

## 2021-07-27 NOTE — PSYCH
Psychotherapy Provided: Individual Psychotherapy 45 minutes   9:08am - 9:59am   Length of time in session: 51 minutes, follow up in 1 week    Encounter Diagnosis     ICD-10-CM    1  PTSD (post-traumatic stress disorder)  F43 10    2  Bipolar 2 disorder ([facility] [address] )  F31 81        Goals addressed in session: Goal 1     Pain:      none    0    Current suicide risk : Low     D: Discussed her recent week, says she has been working around the house at times, but also been watching television quite a bit  Reports that things are going well between herself and her sister  Checked in with her with her strengths in caring for her garden at home  Discussed her preference for a polyamorous relationship  Discussed her beliefs, her views on the world from a political and spiritual perspective, answering her questions about human behavior, how to build intimacy with others  Psychoeducation regarding intimacy  Discussed next session and beginning CPT work  PCT provided through empathic listening, rapport building, validation of feelings, reflection of content and feelings  A: Seems to be in a mildly euthymic mood with congruent affect, experiencing moderate symptoms of PTSD through intrusive memories, sleep disturbances, lower mood at times, moderate lethargy and avolition at times  No SI/HI apparent  Seems to be responding well to PCT today  P: Begin CPT psychoeducation, give handout sheets  Behavioral Health Treatment Plan [facility]: Diagnosis and Treatment Plan explained to Sonia Robledo relates understanding diagnosis and is agreeable to Treatment Plan   Yes

## 2021-07-29 ENCOUNTER — SOCIAL WORK (OUTPATIENT)
Dept: BEHAVIORAL/MENTAL HEALTH CLINIC | Facility: CLINIC | Age: 26
End: 2021-07-29
Payer: COMMERCIAL

## 2021-07-29 DIAGNOSIS — F31.81 BIPOLAR 2 DISORDER (HCC): ICD-10-CM

## 2021-07-29 DIAGNOSIS — F43.10 PTSD (POST-TRAUMATIC STRESS DISORDER): Primary | ICD-10-CM

## 2021-07-29 PROCEDURE — 90834 PSYTX W PT 45 MINUTES: CPT | Performed by: COUNSELOR

## 2021-07-29 NOTE — PSYCH
Psychotherapy Provided: Individual Psychotherapy 45 minutes   3:08pm - 3:56pm  Length of time in session: 48 minutes, follow up in 1 week    Encounter Diagnosis     ICD-10-CM    1  PTSD (post-traumatic stress disorder)  F43 10    2  Bipolar 2 disorder (HCC)  F31 81        Goals addressed in session: Goal 1     Pain:      none    0    Current suicide risk : Low     D: Provided psychoeducation today through discussion about brain, symptoms of PTSD through a CPT lens  Answered questions about brain relationship with symptoms of PTSD  Administered PCL-5 and she scored 37  A: Seems to be responding well to CPT work today, moderate symptoms of PTSD through excessive guilt, lower mood at times, intrusive memories, sleep disturbances  Seemed to be kempt, no SI/HI apparent  P: Begin at Introducing CPT  Behavioral Health Treatment Plan [facility]: Diagnosis and Treatment Plan explained to Diann Olson relates understanding diagnosis and is agreeable to Treatment Plan   Yes

## 2021-08-03 ENCOUNTER — TELEMEDICINE (OUTPATIENT)
Dept: PSYCHIATRY | Facility: CLINIC | Age: 26
End: 2021-08-03
Payer: COMMERCIAL

## 2021-08-03 ENCOUNTER — TELEPHONE (OUTPATIENT)
Dept: PSYCHIATRY | Facility: CLINIC | Age: 26
End: 2021-08-03

## 2021-08-03 DIAGNOSIS — F43.10 PTSD (POST-TRAUMATIC STRESS DISORDER): ICD-10-CM

## 2021-08-03 DIAGNOSIS — F31.81 BIPOLAR 2 DISORDER (HCC): Primary | ICD-10-CM

## 2021-08-03 DIAGNOSIS — R09.81 CONGESTION OF NASAL SINUS: ICD-10-CM

## 2021-08-03 DIAGNOSIS — F31.81 BIPOLAR 2 DISORDER (HCC): ICD-10-CM

## 2021-08-03 DIAGNOSIS — F60.3 BORDERLINE PERSONALITY DISORDER (HCC): ICD-10-CM

## 2021-08-03 PROCEDURE — 99214 OFFICE O/P EST MOD 30 MIN: CPT | Performed by: STUDENT IN AN ORGANIZED HEALTH CARE EDUCATION/TRAINING PROGRAM

## 2021-08-03 RX ORDER — TRAZODONE HYDROCHLORIDE 50 MG/1
25 TABLET ORAL
Qty: 45 TABLET | Refills: 0 | Status: SHIPPED | OUTPATIENT
Start: 2021-08-03 | End: 2021-11-01

## 2021-08-03 RX ORDER — BUPROPION HYDROCHLORIDE 150 MG/1
150 TABLET ORAL DAILY
Qty: 90 TABLET | Refills: 0 | Status: SHIPPED | OUTPATIENT
Start: 2021-08-03 | End: 2021-10-26 | Stop reason: SDUPTHER

## 2021-08-03 RX ORDER — LAMOTRIGINE 200 MG/1
200 TABLET ORAL DAILY
Qty: 90 TABLET | Refills: 0 | Status: SHIPPED | OUTPATIENT
Start: 2021-08-03 | End: 2021-10-26 | Stop reason: SDUPTHER

## 2021-08-03 RX ORDER — TRAZODONE HYDROCHLORIDE 50 MG/1
TABLET ORAL
Qty: 30 TABLET | Refills: 0 | Status: SHIPPED | OUTPATIENT
Start: 2021-08-03 | End: 2021-08-03 | Stop reason: SDUPTHER

## 2021-08-03 RX ORDER — HYDROXYZINE HYDROCHLORIDE 10 MG/1
10 TABLET, FILM COATED ORAL EVERY 6 HOURS PRN
Qty: 30 TABLET | Refills: 2 | Status: SHIPPED | OUTPATIENT
Start: 2021-08-03 | End: 2021-10-26 | Stop reason: SDUPTHER
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
## 2021-08-03 NOTE — TELEPHONE ENCOUNTER
----- Message from Mary Waite sent at 8/3/2021 12:13 PM EDT -----  Regarding: Patient No Show  Ishan Montoya [[id number]]  No Showed 08/03/21  for [facility]  and did not call with proper notice to Cx appt   They are marked as a No Show for today's visit

## 2021-08-05 ENCOUNTER — SOCIAL WORK (OUTPATIENT)
Dept: BEHAVIORAL/MENTAL HEALTH CLINIC | Facility: CLINIC | Age: 26
End: 2021-08-05
Payer: COMMERCIAL

## 2021-08-05 DIAGNOSIS — F31.81 BIPOLAR 2 DISORDER (HCC): ICD-10-CM

## 2021-08-05 DIAGNOSIS — F43.10 PTSD (POST-TRAUMATIC STRESS DISORDER): Primary | ICD-10-CM

## 2021-08-05 PROCEDURE — 90834 PSYTX W PT 45 MINUTES: CPT | Performed by: COUNSELOR

## 2021-08-05 NOTE — PSYCH
Psychotherapy Provided: Individual Psychotherapy 45 minutes   3:07pm - 3:58pm  Length of time in session:  51 minutes, follow up in 1 week    Encounter Diagnosis     ICD-10-CM    1  PTSD (post-traumatic stress disorder)  F43 10    2  Bipolar 2 disorder (HCC)  F31 81        Goals addressed in session: Goal 1     Pain:      none    0    Current suicide risk : Low     D: Reports she's been very busy with spending time with friends, spending time with her neighbors, getting things done around the house by cleaning and organizing  Began to focus on psychoeducation regarding CPT,discussing her engagement with the treatment, she reports she isn't sure she's ready to begin this challenging treatment  A: She appears to be in an anxious mood with congruent affect, experiencing moderate symptoms of PTSD through avoidance, lower mood at times, thoughts of worthlessness at times, excessive worry, intrusive memories  No Si/HI apparent, no CAMARGO risk apparent  Appears to be hesitant to begin CPT, is going to think about this (discussed avoidance, along with her choice)  P: Check in with her decision on CPT  Continue with PCT, CBT interventions  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

## 2021-08-10 ENCOUNTER — SOCIAL WORK (OUTPATIENT)
Dept: BEHAVIORAL/MENTAL HEALTH CLINIC | Facility: CLINIC | Age: 26
End: 2021-08-10
Payer: COMMERCIAL

## 2021-08-10 DIAGNOSIS — F43.10 PTSD (POST-TRAUMATIC STRESS DISORDER): Primary | ICD-10-CM

## 2021-08-10 DIAGNOSIS — F31.81 BIPOLAR 2 DISORDER (HCC): ICD-10-CM

## 2021-08-10 PROCEDURE — 90834 PSYTX W PT 45 MINUTES: CPT | Performed by: COUNSELOR

## 2021-08-10 NOTE — PSYCH
Psychotherapy Provided: Individual Psychotherapy 45 minutes   9:07am - 9:52am  Length of time in session: 45 minutes, follow up in 2 day    Encounter Diagnosis     ICD-10-CM    1  PTSD (post-traumatic stress disorder)  F43 10    2  Bipolar 2 disorder (HCC)  F31 81        Goals addressed in session: Goal 1     Pain:      none    0    Current suicide risk : Low     D: Discussed her experiences when going out with friends this past weekend  Discussed her gardening, focused on this as her strength and interest  Focused on psychoeducation regarding CPT, she chooses to engage in it twice per week  Discussed plan for following session  PCT through empathic listening, validation of feelings, reflection of content and feelings  A: Seemed to be in a neutral mood with mildly euthymic affect today, experiencing moderate symptoms of PTSD through intrusive memories, racing thoughts, excessive worry at times, lower mood, thoughts of hopelessness and worthlessness at times  P: Prefers working through CPT twice per week  Begin discussion at Introducing CPT, psychoeducation regarding thoughts, emotions, introducing impact statement  Behavioral Health Treatment Plan [facility]: Diagnosis and Treatment Plan explained to Tristen Rojas relates understanding diagnosis and is agreeable to Treatment Plan   Yes

## 2021-08-12 ENCOUNTER — SOCIAL WORK (OUTPATIENT)
Dept: BEHAVIORAL/MENTAL HEALTH CLINIC | Facility: CLINIC | Age: 26
End: 2021-08-12
Payer: COMMERCIAL

## 2021-08-12 DIAGNOSIS — F31.81 BIPOLAR 2 DISORDER (HCC): ICD-10-CM

## 2021-08-12 DIAGNOSIS — F43.10 PTSD (POST-TRAUMATIC STRESS DISORDER): Primary | ICD-10-CM

## 2021-08-12 PROCEDURE — 90834 PSYTX W PT 45 MINUTES: CPT | Performed by: COUNSELOR

## 2021-08-12 NOTE — PSYCH
Psychotherapy Provided: Individual Psychotherapy 45 minutes   3:09pm - 3:55pm  Length of time in session: 46 minutes, follow up in 1 week    Encounter Diagnosis     ICD-10-CM    1  PTSD (post-traumatic stress disorder)  F43 10    2  Bipolar 2 disorder ([facility] [address] )  F31 81        Goals addressed in session: Goal 1     Pain:      none    0    Current suicide risk : Low     D: Focused today on psycheoducation portion of CPT introducing cognitive theory, emotions and discussing the homework, writing her impact statement  A: She appears to be in a mildly euthymic mood with congruent affect today, experiencing moderate symptoms of PTSD through intrusive memories, nightmares, excessive guilt, thoughts of worthlessness at times, mild lethargy, sleep disturbances, no SI/HI apparent or reported, no CAMARGO risk apparent, seems to be responding well to CPT discussion today  P: Write her impact statement  Read her impact statement aloud, pick out and discuss stuck points  Continued with session 2  Behavioral Health Treatment Plan [facility]: Diagnosis and Treatment Plan explained to Jigna Harrison relates understanding diagnosis and is agreeable to Treatment Plan   Yes

## 2021-08-17 ENCOUNTER — SOCIAL WORK (OUTPATIENT)
Dept: BEHAVIORAL/MENTAL HEALTH CLINIC | Facility: CLINIC | Age: 26
End: 2021-08-17
Payer: COMMERCIAL

## 2021-08-17 DIAGNOSIS — F31.81 BIPOLAR 2 DISORDER (HCC): ICD-10-CM

## 2021-08-17 DIAGNOSIS — F43.10 PTSD (POST-TRAUMATIC STRESS DISORDER): Primary | ICD-10-CM

## 2021-08-17 PROCEDURE — 90834 PSYTX W PT 45 MINUTES: CPT | Performed by: COUNSELOR

## 2021-08-17 NOTE — PSYCH
Psychotherapy Provided: Individual Psychotherapy 45 minutes   9:05am - 10:00am  Length of time in session: 55 minutes, follow up in 1 week    Encounter Diagnosis     ICD-10-CM    1  PTSD (post-traumatic stress disorder)  F43 10    2  Bipolar 2 disorder ([facility] [address] )  F31 81        Goals addressed in session: Goal 1     Pain:      none    0    Current suicide risk : Low     D: Says she enjoyed her time hanging up pictures in her apartment, using her tools, reorganizing her room and creating an office  She reports she did write her impact statement, she says she wonders if she was subconsciously delaying it  Focused on listening to [facility] discuss her thoughts, examined them for stuck points and filled in her stuck point log together  A: Seems to be experiencing moderate symptoms of PTSD through intrusive memories, lowe mood, excessive guilt, moderate sleep disturbances  No SI/HI apparent  No CAMARGO apparent  Seems to be responding well to PCT as evidenced by engagement today  P: Continue working with [facility] on second half of her impact statement  Then discuss ABC worksheets and discuss how she can go about completing them as homework  Behavioral Health Treatment Plan [facility]: Diagnosis and Treatment Plan explained to Pilar Mae relates understanding diagnosis and is agreeable to Treatment Plan   Yes

## 2021-08-19 ENCOUNTER — SOCIAL WORK (OUTPATIENT)
Dept: BEHAVIORAL/MENTAL HEALTH CLINIC | Facility: CLINIC | Age: 26
End: 2021-08-19
Payer: COMMERCIAL

## 2021-08-19 DIAGNOSIS — F43.10 PTSD (POST-TRAUMATIC STRESS DISORDER): Primary | ICD-10-CM

## 2021-08-19 PROCEDURE — 90834 PSYTX W PT 45 MINUTES: CPT | Performed by: COUNSELOR
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
## 2021-08-24 ENCOUNTER — SOCIAL WORK (OUTPATIENT)
Dept: BEHAVIORAL/MENTAL HEALTH CLINIC | Facility: CLINIC | Age: 26
End: 2021-08-24
Payer: COMMERCIAL

## 2021-08-24 DIAGNOSIS — F43.10 PTSD (POST-TRAUMATIC STRESS DISORDER): Primary | ICD-10-CM

## 2021-08-24 PROCEDURE — 90834 PSYTX W PT 45 MINUTES: CPT | Performed by: COUNSELOR
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
## 2021-08-26 ENCOUNTER — SOCIAL WORK (OUTPATIENT)
Dept: BEHAVIORAL/MENTAL HEALTH CLINIC | Facility: CLINIC | Age: 26
End: 2021-08-26
Payer: COMMERCIAL

## 2021-08-26 DIAGNOSIS — F43.10 PTSD (POST-TRAUMATIC STRESS DISORDER): Primary | ICD-10-CM

## 2021-08-26 PROCEDURE — 90834 PSYTX W PT 45 MINUTES: CPT | Performed by: COUNSELOR

## 2021-08-26 NOTE — PSYCH
Psychotherapy Provided: Individual Psychotherapy 45 minutes     Length of time in session:  50minutes, follow up in 2 week    Encounter Diagnosis     ICD-10-CM    1  PTSD (post-traumatic stress disorder)  F43 10        Goals addressed in session: Goal 1     Pain:      none    0    Current suicide risk : Low     Time: 3:05pm - 3:55pm    D: Looked over previous ABC worksheets  Focused on psychoeducation through discussion about challenging questions worksheet and reviewing a stuck point together in session  CPT through psychoeducation through session 4    A: Sadia Marrero appeared to be in a euthymic mood with congruent affect, seemed to be experiencing symptoms of PTSD through irritability, lower mood, intrusive memories, excessive guilt, appeared to be kempt, no SI/HI nor CAMARGO risk apparent or reported, seemed to respond well to CPT work today  P: Begin session 5 checking in with her challenging questions worksheets  Behavioral Health Treatment Plan [facility]: Diagnosis and Treatment Plan explained to Liseth Gonsalez relates understanding diagnosis and is agreeable to Treatment Plan   Yes

## 2021-09-09 ENCOUNTER — SOCIAL WORK (OUTPATIENT)
Dept: BEHAVIORAL/MENTAL HEALTH CLINIC | Facility: CLINIC | Age: 26
End: 2021-09-09
Payer: COMMERCIAL

## 2021-09-09 DIAGNOSIS — F43.10 PTSD (POST-TRAUMATIC STRESS DISORDER): Primary | ICD-10-CM

## 2021-09-09 PROCEDURE — 90834 PSYTX W PT 45 MINUTES: CPT | Performed by: COUNSELOR

## 2021-09-09 NOTE — PSYCH
Psychotherapy Provided: Individual Psychotherapy 45 minutes     Length of time in session: 45 minutes, follow up in 1 week    Encounter Diagnosis     ICD-10-CM    1  PTSD (post-traumatic stress disorder)  F43 10        Goals addressed in session: Goal 1     Pain:      none    0    Current suicide risk : Low     Time: 3:04pm - 3:49pm    D: Discussed her previous 10 days or so, say she has been painting quite a bit  Moved into discussion about her challenging questions worksheets  Discussed whether or not she is in control of other peoples' feelings or not  A: Barry Greg appeared to be in a mildly euthymic mood with congruent affect, seemed to be experiencing moderate symptoms of PTSD, appeared to be kempt, no SI/HI nor CAMARGO risk apparent or reported, seemed to respond well to CPT work today  P: Resume at session 5 again and check for one challenging question worksheet per day  Behavioral Health Treatment Plan [facility]: Diagnosis and Treatment Plan explained to Tristen Rojas relates understanding diagnosis and is agreeable to Treatment Plan   Yes

## 2021-09-14 ENCOUNTER — SOCIAL WORK (OUTPATIENT)
Dept: BEHAVIORAL/MENTAL HEALTH CLINIC | Facility: CLINIC | Age: 26
End: 2021-09-14
Payer: COMMERCIAL

## 2021-09-14 DIAGNOSIS — F43.10 PTSD (POST-TRAUMATIC STRESS DISORDER): Primary | ICD-10-CM

## 2021-09-14 DIAGNOSIS — F31.81 BIPOLAR 2 DISORDER (HCC): ICD-10-CM

## 2021-09-14 PROCEDURE — 90834 PSYTX W PT 45 MINUTES: CPT | Performed by: COUNSELOR

## 2021-09-14 NOTE — PSYCH
Psychotherapy Provided: Individual Psychotherapy 45 minutes     Length of time in session: 45 minutes, follow up in 1 week    Encounter Diagnosis     ICD-10-CM    1  PTSD (post-traumatic stress disorder)  F43 10    2  Bipolar 2 disorder ([facility] [address] )  F31 81        Goals addressed in session: Goal 1     Pain:      none    0    Current suicide risk : Low     Time: 9:10am - 9:55am    D: She reports her entire weekend felt like it was "about consent" in which she had multiple conversations about consent regarding sexuality, touching  Explored her challenging beliefs worksheets  Introduced problematic thinking patterns worksheets  A: Raz Marshall appeared to be in a euthymic mood with congruent affect, seemed to be experiencing nightmares, racing thoughts at times, lower mood at times, thoughts of worthlessness, excessive guilt, hypervigilance, appeared to be kempt, no SI/HI nor CAMARGO risk apparent or reported, seemed to respond well to CPT work, engaged in her homework  P: Check in with completing "I should have known better" challenging questions worksheet, then do one patterns of problematic thinking worksheet per day  Resume at session 6  Behavioral Health Treatment Plan [facility]: Diagnosis and Treatment Plan explained to Sonia Robledo relates understanding diagnosis and is agreeable to Treatment Plan   Yes

## 2021-09-15 ENCOUNTER — OFFICE VISIT (OUTPATIENT)
Dept: RHEUMATOLOGY | Facility: CLINIC | Age: 26
End: 2021-09-15
Payer: COMMERCIAL

## 2021-09-15 VITALS
SYSTOLIC BLOOD PRESSURE: 108 MMHG | DIASTOLIC BLOOD PRESSURE: 70 MMHG | BODY MASS INDEX: 28.58 KG/M2 | WEIGHT: 145.6 LBS | HEIGHT: 60 IN | HEART RATE: 82 BPM

## 2021-09-15 DIAGNOSIS — M25.551 HIP PAIN, BILATERAL: Primary | ICD-10-CM

## 2021-09-15 DIAGNOSIS — M25.552 HIP PAIN, BILATERAL: Primary | ICD-10-CM

## 2021-09-15 PROCEDURE — 99213 OFFICE O/P EST LOW 20 MIN: CPT | Performed by: INTERNAL MEDICINE

## 2021-09-15 NOTE — PROGRESS NOTES
Assessment and Plan:   Back pain, SI joint pain and left hip pain  S/p left hip CS injection with improvement in her symptoms  She also feels better with Tylenol that she takes PRN and exercises/stretching  F/u in 6mos  or sooner if necessary  Rheumatic Disease Summary  As above    With intermittent left hip pain here for f/u visit  Overall she feels well  No f/c/ns  Left hip with FROM  Min  AM stiffness  Really, no other joint pain or stiffness  The following portions of the patient's history were reviewed and updated as appropriate: allergies, current medications, past family history, past medical history, past social history, past surgical history and problem list     Review of Systems:   Review of Systems   Constitutional: Negative for chills, fatigue, fever and unexpected weight change  HENT: Negative for mouth sores and trouble swallowing  Eyes: Negative for pain and visual disturbance  Respiratory: Negative for cough and shortness of breath  Cardiovascular: Negative for chest pain and leg swelling  Gastrointestinal: Negative for constipation and diarrhea  Musculoskeletal: Positive for arthralgias  Negative for back pain, joint swelling and myalgias  Skin: Negative for color change and rash  Neurological: Negative for weakness  Hematological: Negative for adenopathy  Psychiatric/Behavioral: Negative for sleep disturbance         Home Medications:    Current Outpatient Medications:     albuterol (PROVENTIL HFA,VENTOLIN HFA) 90 mcg/act inhaler, Inhale 2 puffs every 6 (six) hours as needed for wheezing or shortness of breath, Disp: 3 Inhaler, Rfl: 1    azelastine (ASTELIN) 0 1 % nasal spray, 2 sprays into each nostril 2 (two) times a day Use in each nostril as directed, Disp: 1 Bottle, Rfl: 6    buPROPion (WELLBUTRIN XL) 150 mg 24 hr tablet, Take 1 tablet (150 mg total) by mouth daily, Disp: 90 tablet, Rfl: 0    cetirizine (ZyrTEC) 10 mg tablet, Take 1 tablet (10 mg total) by mouth daily, Disp: 90 tablet, Rfl: 1    hydrOXYzine HCL (ATARAX) 10 mg tablet, Take 1 tablet (10 mg total) by mouth every 6 (six) hours as needed for anxiety, Disp: 30 tablet, Rfl: 2    lamoTRIgine (LaMICtal) 200 MG tablet, Take 1 tablet (200 mg total) by mouth daily, Disp: 90 tablet, Rfl: 0    mometasone (NASONEX) 50 mcg/act nasal spray, 2 sprays into each nostril daily, Disp: 3 Act, Rfl: 1    montelukast (SINGULAIR) 10 mg tablet, Take 1 tablet (10 mg total) by mouth daily at bedtime, Disp: 90 tablet, Rfl: 1    pseudoephedrine (SUDAFED) 30 mg tablet, Take 1 tablet (30 mg total) by mouth every 6 (six) hours as needed for congestion, Disp: 10 tablet, Rfl: 0    traZODone (DESYREL) 50 mg tablet, Take 0 5 tablets (25 mg total) by mouth daily at bedtime, Disp: 45 tablet, Rfl: 0    Levonorgestrel 13 5 MG IUD, 1 each by Intrauterine route, Disp: , Rfl:     meloxicam (MOBIC) 15 mg tablet, Take 1 tablet (15 mg total) by mouth daily, Disp: 30 tablet, Rfl: 1    valACYclovir (VALTREX) 1,000 mg tablet, Take 2 tablets (2,000 mg total) by mouth 2 (two) times a day for 1 day (Patient not taking: Reported on 12/9/2020), Disp: 4 tablet, Rfl: 3    Objective:    Vitals:    09/15/21 1447   BP: 108/70   Pulse: 82   Weight: 66 kg (145 lb 9 6 oz)   Height: 5' (1 524 m)       Physical Exam  Constitutional:       General: She is not in acute distress  Appearance: She is well-developed  HENT:      Head: Normocephalic and atraumatic  Eyes:      General: Lids are normal  No scleral icterus  Conjunctiva/sclera: Conjunctivae normal    Cardiovascular:      Rate and Rhythm: Normal rate and regular rhythm  Heart sounds: S1 normal and S2 normal  No murmur heard  No friction rub  Pulmonary:      Effort: Pulmonary effort is normal  No tachypnea or respiratory distress  Breath sounds: Normal breath sounds  No wheezing, rhonchi or rales  Musculoskeletal:      Cervical back: Neck supple   No muscular tenderness  Left hip: Tenderness present  Comments: +min  discomfort internal rotation  Skin:     General: Skin is warm and dry  Findings: No rash  Nails: There is no clubbing  Neurological:      Mental Status: She is alert  Sensory: No sensory deficit  Psychiatric:         Behavior: Behavior normal  Behavior is cooperative  Reviewed labs and imaging  Imaging:   No results found  Labs:   Orders Only on 03/24/2021   Component Date Value Ref Range Status    SARS-CoV-2 03/24/2021 Positive* Negative Final   Orders Only on 11/18/2020   Component Date Value Ref Range Status    SARS-CoV-2  11/18/2020 Not Detected  Not Detected Final    Comment: This nucleic acid amplification test was developed and its performance  characteristics determined by [employer]  Nucleic acid  amplification tests include PCR and TMA  This test has not been FDA  cleared or approved  This test has been authorized by FDA under an  Emergency Use Authorization (EUA)  This test is only authorized for  the duration of time the declaration that circumstances exist  justifying the authorization of the emergency use of in vitro  diagnostic tests for detection of SARS-CoV-2 virus and/or diagnosis  of COVID-19 infection under section 564(b)(1) of the Act, 21 [place]   890MMS-1(W) (1), unless the authorization is terminated or revoked  sooner  When diagnostic testing is negative, the possibility of a false  negative result should be considered in the context of a patient's  recent exposures and the presence of clinical signs and symptoms  consistent with COVID-19  An individual without symptoms of COVID-19  and who is not shedding SARS-CoV-2 virus would                            expect to have a  negative (not detected) result in this assay  Orders Only on 10/16/2020   Component Date Value Ref Range Status    SARS-CoV-2  10/16/2020 Not Detected  Not Detected Final    Comment:  This nucleic acid amplification test was developed and its performance  characteristics determined by [employer]  Nucleic acid  amplification tests include PCR and TMA  This test has not been FDA  cleared or approved  This test has been authorized by FDA under an  Emergency Use Authorization (EUA)  This test is only authorized for  the duration of time the declaration that circumstances exist  justifying the authorization of the emergency use of in vitro  diagnostic tests for detection of SARS-CoV-2 virus and/or diagnosis  of COVID-19 infection under section 564(b)(1) of the Act, 21 U  [place] C   050QNI-9(O) (1), unless the authorization is terminated or revoked  sooner  When diagnostic testing is negative, the possibility of a false  negative result should be considered in the context of a patient's  recent exposures and the presence of clinical signs and symptoms  consistent with COVID-19  An individual without symptoms of COVID-19  and who is not shedding SARS-CoV-2 virus would                            expect to have a  negative (not detected) result in this assay

## 2021-09-15 NOTE — PATIENT INSTRUCTIONS
You can take Advil liquid gel 2-3 times per day with food for hip pain/flares for up to 7-10 days  Continue to exercise/physical therapy  We will see you back in the office in 6 months

## 2021-09-16 ENCOUNTER — SOCIAL WORK (OUTPATIENT)
Dept: BEHAVIORAL/MENTAL HEALTH CLINIC | Facility: CLINIC | Age: 26
End: 2021-09-16
Payer: COMMERCIAL

## 2021-09-16 DIAGNOSIS — F43.10 PTSD (POST-TRAUMATIC STRESS DISORDER): Primary | ICD-10-CM

## 2021-09-16 DIAGNOSIS — F31.81 BIPOLAR 2 DISORDER (HCC): ICD-10-CM

## 2021-09-16 PROCEDURE — 90834 PSYTX W PT 45 MINUTES: CPT | Performed by: COUNSELOR
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
## 2021-09-21 ENCOUNTER — SOCIAL WORK (OUTPATIENT)
Dept: BEHAVIORAL/MENTAL HEALTH CLINIC | Facility: CLINIC | Age: 26
End: 2021-09-21
Payer: COMMERCIAL

## 2021-09-21 DIAGNOSIS — F31.81 BIPOLAR 2 DISORDER (HCC): ICD-10-CM

## 2021-09-21 DIAGNOSIS — F43.10 PTSD (POST-TRAUMATIC STRESS DISORDER): Primary | ICD-10-CM

## 2021-09-21 PROCEDURE — 90834 PSYTX W PT 45 MINUTES: CPT | Performed by: COUNSELOR

## 2021-09-21 NOTE — PSYCH
Psychotherapy Provided: Individual Psychotherapy 45 minutes     Length of time in session: 45 minutes, follow up in 1 week    Encounter Diagnosis     ICD-10-CM    1  PTSD (post-traumatic stress disorder)  F43 10    2  Bipolar 2 disorder ([facility] [address] )  F31 81        Goals addressed in session: Goal 1     Pain:      none    0    Current suicide risk : Low     Time: 9:08am - 9:53am    D: Jaylen Grullon reports she went out with friends on Friday, last night and "got drunk"  She says she feels hungover today and did not complete her homework  Discussed importance of engaging with CPT homework, as difficult as it can be, to continue to reduce her symptoms of PTSD  Gave her monthly PCL today and she has indeed made some apparent progress, falling below the 38 johnathan threshold  Encouraged her with this data to continue with her homework and complete problems of problematic thinking worksheets until Thursday, in which the final type of worksheet will be introduced to her  Provided Jaylen Grullon with empathic listening, psychoeducation regarding intimacy and relationships, validation of feelings, reflection of content and feelings  A: Miguel Geller appeared to be in a mildly euthymic mood with congruent affect, seemed to be experiencing moderate symptoms of PTSD through intrusive memories, racing thoughts, lower mood at times, sleep disturbances, appeared to be kempt, no SI/HI nor CAMARGO risk apparent or reported, seemed to respond well to PCT, CPT work today  P: Check in with her three problematic thinking worksheets completed, then introduce and spend majority of session on the final type of worksheet  Begin Session 6 work  Behavioral Health Treatment Plan [facility]: Diagnosis and Treatment Plan explained to Kole Garza relates understanding diagnosis and is agreeable to Treatment Plan   Yes

## 2021-09-23 ENCOUNTER — SOCIAL WORK (OUTPATIENT)
Dept: BEHAVIORAL/MENTAL HEALTH CLINIC | Facility: CLINIC | Age: 26
End: 2021-09-23
Payer: COMMERCIAL

## 2021-09-23 DIAGNOSIS — F31.81 BIPOLAR 2 DISORDER (HCC): ICD-10-CM

## 2021-09-23 DIAGNOSIS — F43.10 PTSD (POST-TRAUMATIC STRESS DISORDER): Primary | ICD-10-CM

## 2021-09-23 PROCEDURE — 90834 PSYTX W PT 45 MINUTES: CPT | Performed by: COUNSELOR

## 2021-09-23 NOTE — PSYCH
Psychotherapy Provided: Individual Psychotherapy 45 minutes     Length of time in session: 45 minutes, follow up in 1 week    Encounter Diagnosis     ICD-10-CM    1  PTSD (post-traumatic stress disorder)  F43 10    2  Bipolar 2 disorder (HCC)  F31 81        Goals addressed in session: Goal 1     Pain:      none    0    Current suicide risk : Low     Time: 2:15pm - 3:00pm  Previous session plan: Check in with her three problematic thinking worksheets completed, then introduce and spend majority of session on the final type of worksheet  Begin Session 6 work  D: Discussed her three problematic thinking worksheets today as focus of beginning of session  It appears she has already completed the challenging beliefs worksheets, discussed them, provided psychoeducation regarding these worksheets  PCT through rapport building, empathic listening, validation of feelings, reflection of content and feelings  CPT through exploration of challenging beliefs worksheets, socratic dialogue  A: Cass Munroe appeared to be in a euthymic mood with congruent affect, seemed to be experiencing moderate symptoms of PTSD through intrisuve memories, excessive guilt, lower mood at times, hypervigilance, appeared to be kempt, no SI/HI nor CAMARGO risk apparent or reported, seemed to respond well to PCT, CPT  P: Check in with her 5 challenging beliefs worksheets, then begin to work on first of 5 modules  Behavioral Health Treatment Plan [facility]: Diagnosis and Treatment Plan explained to Tomás Mccurdy relates understanding diagnosis and is agreeable to Treatment Plan   Yes

## 2021-09-28 ENCOUNTER — SOCIAL WORK (OUTPATIENT)
Dept: BEHAVIORAL/MENTAL HEALTH CLINIC | Facility: CLINIC | Age: 26
End: 2021-09-28
Payer: COMMERCIAL

## 2021-09-28 DIAGNOSIS — F43.10 PTSD (POST-TRAUMATIC STRESS DISORDER): Primary | ICD-10-CM

## 2021-09-28 PROCEDURE — 90834 PSYTX W PT 45 MINUTES: CPT | Performed by: COUNSELOR

## 2021-09-28 NOTE — PSYCH
Psychotherapy Provided: Individual Psychotherapy 45 minutes     Length of time in session: 45 minutes, follow up in 1 week    Encounter Diagnosis     ICD-10-CM    1  PTSD (post-traumatic stress disorder)  F43 10        Goals addressed in session: Goal 1     Pain:      none    0    Current suicide risk : Low     Time: 9:07am - 9:52am  Previous session plan: Check in with her 5 challenging beliefs worksheets, then begin to work on first of 5 modules  D: Focused on her challenging beliefs worksheets, CPT provided through socratic dialogue  PCT through empathic listening, validation of feelings, reflection of content and feelings  Explored her stuck points through challenging beliefs worksheets  A: Lowell Johnston appeared to be in a euthymic mood with congruent affect, seemed to be experiencing moderate symptoms of PTSD through intrusive memories, feelings of excessive guilt, lower mood, thoughts of hopelessness and worthlessness, appeared to be kempt, no SI/HI nor CAMARGO risk apparent or reported, seemed to respond well to CPT, PCT  P: Begin discussing the remaining challenging beliefs worksheets  She is to complete another week's work on her stuck points  Then begin discussing the modules and completing challenging beliefs worksheets based on discussion about each module  Behavioral Health Treatment Plan [facility]: Diagnosis and Treatment Plan explained to Brooklynn Espinosa relates understanding diagnosis and is agreeable to Treatment Plan   Yes

## 2021-09-29 ENCOUNTER — SOCIAL WORK (OUTPATIENT)
Dept: BEHAVIORAL/MENTAL HEALTH CLINIC | Facility: CLINIC | Age: 26
End: 2021-09-29
Payer: COMMERCIAL

## 2021-09-29 DIAGNOSIS — F43.10 PTSD (POST-TRAUMATIC STRESS DISORDER): Primary | ICD-10-CM

## 2021-09-29 DIAGNOSIS — F31.81 BIPOLAR 2 DISORDER (HCC): ICD-10-CM

## 2021-09-29 PROCEDURE — 90834 PSYTX W PT 45 MINUTES: CPT | Performed by: COUNSELOR

## 2021-09-29 NOTE — PSYCH
Psychotherapy Provided: Individual Psychotherapy 45 minutes     Length of time in session: 45 minutes, follow up in 1 week    Encounter Diagnosis     ICD-10-CM    1  PTSD (post-traumatic stress disorder)  F43 10    2  Bipolar 2 disorder (HCC)  F31 81        Goals addressed in session: Goal 1     Pain:      none    0    Current suicide risk : Low     Time: 2:07pm - 2:52pm  Previous session plan: n/a  D: Discussed some of her coping skills initially in session, discussed how she enjoys shabana  Focused discussion on her remaining challenging beliefs worksheets  Focused on concept of "good" and "Bad" people and how seemingly more bad enter her life  Explored idea that if she were to be "good" then less "bad" people would come into her life, how this belief relates to the just-world belief  A: Kayla Randolph appeared to be in an elevated mood with congruent affect, seemed to be experiencing intrusive memories, thoughts of worthlessness, excessive guilt, lower mood at times, appeared to be kempt, no SI/HI nor CAMARGO risk apparent or reported, seemed to respond well to CPT interventions today  P: Check in with Sunny at the beginning of session regarding her 7 more challenging beliefs worksheets on her stuck points, then move into safety module  Behavioral Health Treatment Plan [facility]: Diagnosis and Treatment Plan explained to David Sanches relates understanding diagnosis and is agreeable to Treatment Plan   Yes

## 2021-10-05 ENCOUNTER — SOCIAL WORK (OUTPATIENT)
Dept: BEHAVIORAL/MENTAL HEALTH CLINIC | Facility: CLINIC | Age: 26
End: 2021-10-05
Payer: COMMERCIAL

## 2021-10-05 DIAGNOSIS — F31.81 BIPOLAR 2 DISORDER (HCC): ICD-10-CM

## 2021-10-05 DIAGNOSIS — F43.10 PTSD (POST-TRAUMATIC STRESS DISORDER): Primary | ICD-10-CM

## 2021-10-05 PROCEDURE — 90834 PSYTX W PT 45 MINUTES: CPT | Performed by: COUNSELOR

## 2021-10-07 ENCOUNTER — SOCIAL WORK (OUTPATIENT)
Dept: BEHAVIORAL/MENTAL HEALTH CLINIC | Facility: CLINIC | Age: 26
End: 2021-10-07
Payer: COMMERCIAL

## 2021-10-07 ENCOUNTER — HOSPITAL ENCOUNTER (EMERGENCY)
Facility: HOSPITAL | Age: 26
Discharge: HOME | End: 2021-10-08
Attending: EMERGENCY MEDICINE | Admitting: EMERGENCY MEDICINE
Payer: COMMERCIAL

## 2021-10-07 DIAGNOSIS — F43.10 PTSD (POST-TRAUMATIC STRESS DISORDER): Primary | ICD-10-CM

## 2021-10-07 DIAGNOSIS — F31.81 BIPOLAR 2 DISORDER (HCC): ICD-10-CM

## 2021-10-07 DIAGNOSIS — K52.9 COLITIS: Primary | ICD-10-CM

## 2021-10-07 PROCEDURE — 99284 EMERGENCY DEPT VISIT MOD MDM: CPT

## 2021-10-07 PROCEDURE — 90834 PSYTX W PT 45 MINUTES: CPT | Performed by: COUNSELOR

## 2021-10-08 ENCOUNTER — APPOINTMENT (EMERGENCY)
Dept: RADIOLOGY | Facility: HOSPITAL | Age: 26
End: 2021-10-08
Payer: COMMERCIAL

## 2021-10-08 VITALS
TEMPERATURE: 97 F | RESPIRATION RATE: 18 BRPM | SYSTOLIC BLOOD PRESSURE: 112 MMHG | HEART RATE: 69 BPM | OXYGEN SATURATION: 95 % | DIASTOLIC BLOOD PRESSURE: 63 MMHG

## 2021-10-08 LAB
ALBUMIN SERPL BCP-MCNC: 4.6 G/DL (ref 3.5–5)
ALP SERPL-CCNC: 62 U/L (ref 46–116)
ALT SERPL W P-5'-P-CCNC: 17 U/L (ref 12–78)
ANION GAP SERPL CALCULATED.3IONS-SCNC: 7 MMOL/L (ref 4–13)
AST SERPL W P-5'-P-CCNC: 11 U/L (ref 5–45)
BASOPHILS # BLD AUTO: 0.08 THOUSANDS/ΜL (ref 0–0.1)
BASOPHILS NFR BLD AUTO: 1 % (ref 0–1)
BILIRUB SERPL-MCNC: 0.48 MG/DL (ref 0.2–1)
BILIRUB UR QL STRIP: NEGATIVE
BUN SERPL-MCNC: 11 MG/DL (ref 5–25)
CALCIUM SERPL-MCNC: 10 MG/DL (ref 8.3–10.1)
CHLORIDE SERPL-SCNC: 106 MMOL/L (ref 100–108)
CLARITY UR: CLEAR
CO2 SERPL-SCNC: 24 MMOL/L (ref 21–32)
COLOR UR: YELLOW
CREAT SERPL-MCNC: 0.91 MG/DL (ref 0.6–1.3)
EOSINOPHIL # BLD AUTO: 0.04 THOUSAND/ΜL (ref 0–0.61)
EOSINOPHIL NFR BLD AUTO: 0 % (ref 0–6)
ERYTHROCYTE [DISTWIDTH] IN BLOOD BY AUTOMATED COUNT: 13.2 % (ref 11.6–15.1)
EXT PREG TEST URINE: NEGATIVE
EXT. CONTROL ED NAV: NORMAL
GFR SERPL CREATININE-BSD FRML MDRD: 87 ML/MIN/1.73SQ M
GLUCOSE SERPL-MCNC: 93 MG/DL (ref 65–140)
GLUCOSE UR STRIP-MCNC: NEGATIVE MG/DL
HCT VFR BLD AUTO: 42.3 % (ref 34.8–46.1)
HGB BLD-MCNC: 14 G/DL (ref 11.5–15.4)
HGB UR QL STRIP.AUTO: NEGATIVE
IMM GRANULOCYTES # BLD AUTO: 0.08 THOUSAND/UL (ref 0–0.2)
IMM GRANULOCYTES NFR BLD AUTO: 1 % (ref 0–2)
KETONES UR STRIP-MCNC: ABNORMAL MG/DL
LEUKOCYTE ESTERASE UR QL STRIP: NEGATIVE
LIPASE SERPL-CCNC: 48 U/L (ref 73–393)
LYMPHOCYTES # BLD AUTO: 2.49 THOUSANDS/ΜL (ref 0.6–4.47)
LYMPHOCYTES NFR BLD AUTO: 15 % (ref 14–44)
MCH RBC QN AUTO: 30.4 PG (ref 26.8–34.3)
MCHC RBC AUTO-ENTMCNC: 33.1 G/DL (ref 31.4–37.4)
MCV RBC AUTO: 92 FL (ref 82–98)
MONOCYTES # BLD AUTO: 1.03 THOUSAND/ΜL (ref 0.17–1.22)
MONOCYTES NFR BLD AUTO: 6 % (ref 4–12)
NEUTROPHILS # BLD AUTO: 13.44 THOUSANDS/ΜL (ref 1.85–7.62)
NEUTS SEG NFR BLD AUTO: 77 % (ref 43–75)
NITRITE UR QL STRIP: NEGATIVE
NRBC BLD AUTO-RTO: 0 /100 WBCS
PH UR STRIP.AUTO: 5.5 [PH] (ref 4.5–8)
PLATELET # BLD AUTO: 320 THOUSANDS/UL (ref 149–390)
PMV BLD AUTO: 9.2 FL (ref 8.9–12.7)
POTASSIUM SERPL-SCNC: 3.5 MMOL/L (ref 3.5–5.3)
PROT SERPL-MCNC: 8.3 G/DL (ref 6.4–8.2)
PROT UR STRIP-MCNC: NEGATIVE MG/DL
RBC # BLD AUTO: 4.6 MILLION/UL (ref 3.81–5.12)
SODIUM SERPL-SCNC: 137 MMOL/L (ref 136–145)
SP GR UR STRIP.AUTO: >=1.03 (ref 1–1.03)
UROBILINOGEN UR QL STRIP.AUTO: 0.2 E.U./DL
WBC # BLD AUTO: 17.16 THOUSAND/UL (ref 4.31–10.16)

## 2021-10-08 PROCEDURE — 74177 CT ABD & PELVIS W/CONTRAST: CPT

## 2021-10-08 PROCEDURE — 36415 COLL VENOUS BLD VENIPUNCTURE: CPT

## 2021-10-08 PROCEDURE — 83690 ASSAY OF LIPASE: CPT | Performed by: EMERGENCY MEDICINE

## 2021-10-08 PROCEDURE — 99284 EMERGENCY DEPT VISIT MOD MDM: CPT | Performed by: EMERGENCY MEDICINE

## 2021-10-08 PROCEDURE — 96361 HYDRATE IV INFUSION ADD-ON: CPT

## 2021-10-08 PROCEDURE — 96374 THER/PROPH/DIAG INJ IV PUSH: CPT

## 2021-10-08 PROCEDURE — 80053 COMPREHEN METABOLIC PANEL: CPT | Performed by: EMERGENCY MEDICINE

## 2021-10-08 PROCEDURE — 81025 URINE PREGNANCY TEST: CPT | Performed by: EMERGENCY MEDICINE

## 2021-10-08 PROCEDURE — 96375 TX/PRO/DX INJ NEW DRUG ADDON: CPT

## 2021-10-08 PROCEDURE — 96376 TX/PRO/DX INJ SAME DRUG ADON: CPT

## 2021-10-08 PROCEDURE — 81003 URINALYSIS AUTO W/O SCOPE: CPT

## 2021-10-08 PROCEDURE — 85025 COMPLETE CBC W/AUTO DIFF WBC: CPT | Performed by: EMERGENCY MEDICINE

## 2021-10-08 RX ORDER — ONDANSETRON 4 MG/1
4 TABLET, FILM COATED ORAL EVERY 6 HOURS
Qty: 12 TABLET | Refills: 0 | Status: SHIPPED | OUTPATIENT
Start: 2021-10-08 | End: 2022-04-04

## 2021-10-08 RX ORDER — ONDANSETRON 2 MG/ML
4 INJECTION INTRAMUSCULAR; INTRAVENOUS ONCE
Status: COMPLETED | OUTPATIENT
Start: 2021-10-08 | End: 2021-10-08

## 2021-10-08 RX ORDER — KETOROLAC TROMETHAMINE 30 MG/ML
15 INJECTION, SOLUTION INTRAMUSCULAR; INTRAVENOUS ONCE
Status: COMPLETED | OUTPATIENT
Start: 2021-10-08 | End: 2021-10-08

## 2021-10-08 RX ADMIN — KETOROLAC TROMETHAMINE 15 MG: 30 INJECTION, SOLUTION INTRAMUSCULAR at 03:28

## 2021-10-08 RX ADMIN — ONDANSETRON 4 MG: 2 INJECTION INTRAMUSCULAR; INTRAVENOUS at 00:30

## 2021-10-08 RX ADMIN — IOHEXOL 100 ML: 350 INJECTION, SOLUTION INTRAVENOUS at 01:08

## 2021-10-08 RX ADMIN — ONDANSETRON 4 MG: 2 INJECTION INTRAMUSCULAR; INTRAVENOUS at 03:28

## 2021-10-08 RX ADMIN — SODIUM CHLORIDE 1000 ML: 0.9 INJECTION, SOLUTION INTRAVENOUS at 00:33

## 2021-10-12 ENCOUNTER — SOCIAL WORK (OUTPATIENT)
Dept: BEHAVIORAL/MENTAL HEALTH CLINIC | Facility: CLINIC | Age: 26
End: 2021-10-12
Payer: COMMERCIAL

## 2021-10-12 DIAGNOSIS — F43.10 PTSD (POST-TRAUMATIC STRESS DISORDER): Primary | ICD-10-CM

## 2021-10-12 DIAGNOSIS — F31.81 BIPOLAR 2 DISORDER (HCC): ICD-10-CM

## 2021-10-12 PROCEDURE — 90834 PSYTX W PT 45 MINUTES: CPT | Performed by: COUNSELOR

## 2021-10-21 ENCOUNTER — SOCIAL WORK (OUTPATIENT)
Dept: BEHAVIORAL/MENTAL HEALTH CLINIC | Facility: CLINIC | Age: 26
End: 2021-10-21
Payer: COMMERCIAL

## 2021-10-21 DIAGNOSIS — F31.81 BIPOLAR 2 DISORDER (HCC): ICD-10-CM

## 2021-10-21 DIAGNOSIS — F43.10 PTSD (POST-TRAUMATIC STRESS DISORDER): Primary | ICD-10-CM

## 2021-10-21 PROCEDURE — 90834 PSYTX W PT 45 MINUTES: CPT | Performed by: COUNSELOR

## 2021-10-26 ENCOUNTER — TELEMEDICINE (OUTPATIENT)
Dept: PSYCHIATRY | Facility: CLINIC | Age: 26
End: 2021-10-26
Payer: COMMERCIAL

## 2021-10-26 ENCOUNTER — SOCIAL WORK (OUTPATIENT)
Dept: BEHAVIORAL/MENTAL HEALTH CLINIC | Facility: CLINIC | Age: 26
End: 2021-10-26
Payer: COMMERCIAL

## 2021-10-26 DIAGNOSIS — F60.3 BORDERLINE PERSONALITY DISORDER (HCC): ICD-10-CM

## 2021-10-26 DIAGNOSIS — F43.10 PTSD (POST-TRAUMATIC STRESS DISORDER): ICD-10-CM

## 2021-10-26 DIAGNOSIS — F31.81 BIPOLAR 2 DISORDER (HCC): Primary | ICD-10-CM

## 2021-10-26 DIAGNOSIS — F43.10 PTSD (POST-TRAUMATIC STRESS DISORDER): Primary | ICD-10-CM

## 2021-10-26 DIAGNOSIS — F31.81 BIPOLAR 2 DISORDER (HCC): ICD-10-CM

## 2021-10-26 PROCEDURE — 90834 PSYTX W PT 45 MINUTES: CPT | Performed by: COUNSELOR

## 2021-10-26 PROCEDURE — 99214 OFFICE O/P EST MOD 30 MIN: CPT | Performed by: STUDENT IN AN ORGANIZED HEALTH CARE EDUCATION/TRAINING PROGRAM

## 2021-10-26 RX ORDER — HYDROXYZINE HYDROCHLORIDE 10 MG/1
10 TABLET, FILM COATED ORAL EVERY 6 HOURS PRN
Qty: 30 TABLET | Refills: 2 | Status: SHIPPED | OUTPATIENT
Start: 2021-10-26 | End: 2022-01-18 | Stop reason: SDUPTHER

## 2021-10-26 RX ORDER — BUPROPION HYDROCHLORIDE 150 MG/1
150 TABLET ORAL DAILY
Qty: 90 TABLET | Refills: 0 | Status: SHIPPED | OUTPATIENT
Start: 2021-10-26 | End: 2022-01-18 | Stop reason: SDUPTHER

## 2021-10-26 RX ORDER — LAMOTRIGINE 200 MG/1
200 TABLET ORAL DAILY
Qty: 90 TABLET | Refills: 0 | Status: SHIPPED | OUTPATIENT
Start: 2021-10-26 | End: 2022-01-18 | Stop reason: SDUPTHER

## 2021-10-27 ENCOUNTER — SOCIAL WORK (OUTPATIENT)
Dept: BEHAVIORAL/MENTAL HEALTH CLINIC | Facility: CLINIC | Age: 26
End: 2021-10-27
Payer: COMMERCIAL

## 2021-10-27 DIAGNOSIS — F31.81 BIPOLAR 2 DISORDER (HCC): ICD-10-CM

## 2021-10-27 DIAGNOSIS — F43.10 PTSD (POST-TRAUMATIC STRESS DISORDER): Primary | ICD-10-CM

## 2021-10-27 PROCEDURE — 90834 PSYTX W PT 45 MINUTES: CPT | Performed by: COUNSELOR

## 2021-11-01 DIAGNOSIS — F31.81 BIPOLAR 2 DISORDER (HCC): ICD-10-CM

## 2021-11-01 RX ORDER — TRAZODONE HYDROCHLORIDE 50 MG/1
25 TABLET ORAL
Qty: 45 TABLET | Refills: 0 | Status: SHIPPED | OUTPATIENT
Start: 2021-11-01 | End: 2022-01-25

## 2021-11-02 ENCOUNTER — SOCIAL WORK (OUTPATIENT)
Dept: BEHAVIORAL/MENTAL HEALTH CLINIC | Facility: CLINIC | Age: 26
End: 2021-11-02
Payer: COMMERCIAL

## 2021-11-02 DIAGNOSIS — F31.81 BIPOLAR 2 DISORDER (HCC): ICD-10-CM

## 2021-11-02 DIAGNOSIS — F43.10 PTSD (POST-TRAUMATIC STRESS DISORDER): Primary | ICD-10-CM

## 2021-11-02 PROCEDURE — 90834 PSYTX W PT 45 MINUTES: CPT | Performed by: COUNSELOR

## 2021-11-04 ENCOUNTER — SOCIAL WORK (OUTPATIENT)
Dept: BEHAVIORAL/MENTAL HEALTH CLINIC | Facility: CLINIC | Age: 26
End: 2021-11-04
Payer: COMMERCIAL

## 2021-11-04 DIAGNOSIS — F31.81 BIPOLAR 2 DISORDER (HCC): ICD-10-CM

## 2021-11-04 DIAGNOSIS — F43.10 PTSD (POST-TRAUMATIC STRESS DISORDER): Primary | ICD-10-CM

## 2021-11-04 PROCEDURE — 90834 PSYTX W PT 45 MINUTES: CPT | Performed by: COUNSELOR

## 2021-11-09 ENCOUNTER — SOCIAL WORK (OUTPATIENT)
Dept: BEHAVIORAL/MENTAL HEALTH CLINIC | Facility: CLINIC | Age: 26
End: 2021-11-09
Payer: COMMERCIAL

## 2021-11-09 DIAGNOSIS — F31.81 BIPOLAR 2 DISORDER (HCC): ICD-10-CM

## 2021-11-09 DIAGNOSIS — F43.10 PTSD (POST-TRAUMATIC STRESS DISORDER): Primary | ICD-10-CM

## 2021-11-09 PROCEDURE — 90834 PSYTX W PT 45 MINUTES: CPT | Performed by: COUNSELOR

## 2021-11-16 ENCOUNTER — SOCIAL WORK (OUTPATIENT)
Dept: BEHAVIORAL/MENTAL HEALTH CLINIC | Facility: CLINIC | Age: 26
End: 2021-11-16
Payer: COMMERCIAL

## 2021-11-16 DIAGNOSIS — F43.10 PTSD (POST-TRAUMATIC STRESS DISORDER): Primary | ICD-10-CM

## 2021-11-16 DIAGNOSIS — F31.81 BIPOLAR 2 DISORDER (HCC): ICD-10-CM

## 2021-11-16 PROCEDURE — 90834 PSYTX W PT 45 MINUTES: CPT | Performed by: COUNSELOR

## 2021-11-18 ENCOUNTER — TELEPHONE (OUTPATIENT)
Dept: PSYCHIATRY | Facility: CLINIC | Age: 26
End: 2021-11-18

## 2021-11-23 ENCOUNTER — SOCIAL WORK (OUTPATIENT)
Dept: BEHAVIORAL/MENTAL HEALTH CLINIC | Facility: CLINIC | Age: 26
End: 2021-11-23
Payer: COMMERCIAL

## 2021-11-23 DIAGNOSIS — F31.81 BIPOLAR 2 DISORDER (HCC): ICD-10-CM

## 2021-11-23 DIAGNOSIS — F43.10 PTSD (POST-TRAUMATIC STRESS DISORDER): Primary | ICD-10-CM

## 2021-11-23 PROCEDURE — 90834 PSYTX W PT 45 MINUTES: CPT | Performed by: COUNSELOR

## 2021-11-30 ENCOUNTER — SOCIAL WORK (OUTPATIENT)
Dept: BEHAVIORAL/MENTAL HEALTH CLINIC | Facility: CLINIC | Age: 26
End: 2021-11-30
Payer: COMMERCIAL

## 2021-11-30 DIAGNOSIS — F43.10 PTSD (POST-TRAUMATIC STRESS DISORDER): Primary | ICD-10-CM

## 2021-11-30 DIAGNOSIS — F31.81 BIPOLAR 2 DISORDER (HCC): ICD-10-CM

## 2021-11-30 PROCEDURE — 90834 PSYTX W PT 45 MINUTES: CPT | Performed by: COUNSELOR

## 2021-12-07 ENCOUNTER — SOCIAL WORK (OUTPATIENT)
Dept: BEHAVIORAL/MENTAL HEALTH CLINIC | Facility: CLINIC | Age: 26
End: 2021-12-07
Payer: COMMERCIAL

## 2021-12-07 DIAGNOSIS — F43.10 PTSD (POST-TRAUMATIC STRESS DISORDER): Primary | ICD-10-CM

## 2021-12-07 DIAGNOSIS — F31.81 BIPOLAR 2 DISORDER (HCC): ICD-10-CM

## 2021-12-07 PROCEDURE — 90834 PSYTX W PT 45 MINUTES: CPT | Performed by: COUNSELOR

## 2021-12-14 ENCOUNTER — SOCIAL WORK (OUTPATIENT)
Dept: BEHAVIORAL/MENTAL HEALTH CLINIC | Facility: CLINIC | Age: 26
End: 2021-12-14
Payer: COMMERCIAL

## 2021-12-14 DIAGNOSIS — F43.10 PTSD (POST-TRAUMATIC STRESS DISORDER): Primary | ICD-10-CM

## 2021-12-14 DIAGNOSIS — F31.81 BIPOLAR 2 DISORDER (HCC): ICD-10-CM

## 2021-12-14 PROCEDURE — 90834 PSYTX W PT 45 MINUTES: CPT | Performed by: COUNSELOR

## 2021-12-21 ENCOUNTER — SOCIAL WORK (OUTPATIENT)
Dept: BEHAVIORAL/MENTAL HEALTH CLINIC | Facility: CLINIC | Age: 26
End: 2021-12-21
Payer: COMMERCIAL

## 2021-12-21 DIAGNOSIS — Z86.59 HISTORY OF POSTTRAUMATIC STRESS DISORDER (PTSD): ICD-10-CM

## 2021-12-21 DIAGNOSIS — F31.81 BIPOLAR 2 DISORDER (HCC): Primary | ICD-10-CM

## 2021-12-21 PROCEDURE — 90834 PSYTX W PT 45 MINUTES: CPT | Performed by: COUNSELOR

## 2021-12-28 ENCOUNTER — SOCIAL WORK (OUTPATIENT)
Dept: BEHAVIORAL/MENTAL HEALTH CLINIC | Facility: CLINIC | Age: 26
End: 2021-12-28
Payer: COMMERCIAL

## 2021-12-28 DIAGNOSIS — Z86.59 HISTORY OF POSTTRAUMATIC STRESS DISORDER (PTSD): ICD-10-CM

## 2021-12-28 DIAGNOSIS — F31.81 BIPOLAR 2 DISORDER (HCC): Primary | ICD-10-CM

## 2021-12-28 PROCEDURE — 90834 PSYTX W PT 45 MINUTES: CPT | Performed by: COUNSELOR

## 2022-01-03 ENCOUNTER — SOCIAL WORK (OUTPATIENT)
Dept: BEHAVIORAL/MENTAL HEALTH CLINIC | Facility: CLINIC | Age: 27
End: 2022-01-03
Payer: COMMERCIAL

## 2022-01-03 DIAGNOSIS — F31.81 BIPOLAR 2 DISORDER (HCC): Primary | ICD-10-CM

## 2022-01-03 DIAGNOSIS — Z86.59 HISTORY OF POSTTRAUMATIC STRESS DISORDER (PTSD): ICD-10-CM

## 2022-01-03 PROCEDURE — 90834 PSYTX W PT 45 MINUTES: CPT | Performed by: COUNSELOR

## 2022-01-03 NOTE — PSYCH
Psychotherapy Provided: Individual Psychotherapy 45 minutes     Length of time in session: 47 minutes, follow up in 1 week    Encounter Diagnosis     ICD-10-CM    1  Bipolar 2 disorder (HCC)  F31 81    2  History of posttraumatic stress disorder (PTSD)  Z86 59        Goals addressed in session: Goal 1     Pain:      none    0    Current suicide risk : Low     Time: 3:08pm - 3:55pm  Previous session plan: CONTINUED: Begin mindfulness work together, starting with grounding  Consider more existential work, discussion  CONTINUED: ET, CBT regarding thoughts about safety and intimacy, continue addressing these themes  Continue with PCT interventions, checking in with her work at [employer]  Check in with going to [employer]  Currently has [employer] book    D: Brief discussion about mindfulness  Discussion about her experiences with her family over the holiday and seeing her parents, noticing that she has resentment for her parents  Reports her [employer] classes are enjoyable despite some issues with someone else at the classes  This writer provided PCT through empathic listening, reflection of content and feelings, psychoeducation  Socratic dialogue provided  A: Laurence Sutherland appeared to be in a mildly depressed mood with congruent affect, seemed to be experiencing lower mood, irritability at times, excessive worry, thoughts of hopelessness and worthlessness at times, appeared to be kempt, no SI/HI nor CAMARGO risk apparent or reported, seemed to respond well to PCT today      P: Check in with considering DBT workbook  CONTINUED: Begin mindfulness work together, starting with grounding  Consider more existential work, discussion  CONTINUED: ET, CBT regarding thoughts about safety and intimacy, continue addressing these themes  Continue with PCT interventions, checking in with her work at [employer]   Check in with going to [employer]  Currently has Art of Happ[employer] book        Behavioral Health Treatment Plan [facility]: Diagnosis and Treatment Plan explained to Javier Lawson relates understanding diagnosis and is agreeable to Treatment Plan   Yes

## 2022-01-05 ENCOUNTER — TELEPHONE (OUTPATIENT)
Dept: PSYCHIATRY | Facility: CLINIC | Age: 27
End: 2022-01-05

## 2022-01-05 NOTE — TELEPHONE ENCOUNTER
Patient called to switch appt 1/6/22 to virtual due to her job "not taking Covid seriously" and someone coming in not wearing a mask   appt was updated

## 2022-01-06 ENCOUNTER — TELEMEDICINE (OUTPATIENT)
Dept: BEHAVIORAL/MENTAL HEALTH CLINIC | Facility: CLINIC | Age: 27
End: 2022-01-06
Payer: COMMERCIAL

## 2022-01-06 DIAGNOSIS — F31.81 BIPOLAR 2 DISORDER (HCC): Primary | ICD-10-CM

## 2022-01-06 DIAGNOSIS — Z86.59 HISTORY OF POSTTRAUMATIC STRESS DISORDER (PTSD): ICD-10-CM

## 2022-01-06 PROCEDURE — 90834 PSYTX W PT 45 MINUTES: CPT | Performed by: COUNSELOR

## 2022-01-06 NOTE — PSYCH
Psychotherapy Provided: Individual Psychotherapy 45 minutes     Length of time in session: 49 minutes, follow up in 1 week    Encounter Diagnosis     ICD-10-CM    1  Bipolar 2 disorder (HCC)  F31 81    2  History of posttraumatic stress disorder (PTSD)  Z86 59        Goals addressed in session: Goal 1     Pain:      none    0    Current suicide risk : Low     Time: 8:03am - 8:52am  Previous session plan: Check in with considering DBT workbook  CONTINUED: Begin mindfulness work together, starting with grounding  Consider more existential work, discussion  CONTINUED: ET, CBT regarding thoughts about safety and intimacy, continue addressing these themes  Continue with PCT interventions, checking in with her work at [employer]  Check in with going to [employer] classes  Currently has Art [employer] Happ[employer] book    D: She says she came into work and found that workers were at work sick, not masking  She decided to stay at home for this appointment  She says when her coworkers say things about Covid, it generally makes her anxious  She says that she can recognize that her reaction to what they are saying is her reaction  "I have a reaction, but it does not warrant an action"  "I was trying to actively think about their compassion is just different than mine"  She discussed a movie again today, discussed which characters she relates to  Movie was about familial trauma  She says she has read through some of the art of happiness  She says that she has been understanding the book, considering how many emotions come from our relationships with people, and how she interacts with other people  This writer provided PCT through empathic listening, validation of feelings, reflection of content and feelings  Psychoeducation regarding trust and intimacy     A: Nehemias Rivero appeared to be in a mildly euthymic mood with congruent affect, seemed to be experiencing lower mood at times, mild excessive guilt, appeared to be kempt, no SI/HI nor CAMARGO risk apparent or reported, seemed to respond well to PCT work  P:  "I was trying to actively think about their compassion is just different than mine" Check in with considering DBT workbook  CONTINUED: Begin mindfulness work together, starting with grounding  Consider more existential work, discussion  CONTINUED: ET, CBT regarding thoughts about safety and intimacy, continue addressing these themes  Continue with PCT interventions, checking in with her work at [employer]  Check in with going to [employer] classes  Currently has Art of Happ[employer] book        Behavioral Health Treatment Plan [facility]: Diagnosis and Treatment Plan explained to Vladislav Jones relates understanding diagnosis and is agreeable to Treatment Plan  Yes     Telemedicine consent    Patient: Lokesh Jackson  Provider: Karl Dan  Provider located at [address] [place] [id number] [phone]    The patient was identified by name and date of birth  Lokesh Jackson was informed that this is a telemedicine visit and that the visit is being conducted through [facility] and patient was informed this is a secure, HIPAA-complaint platform  She agrees to proceed     My office door was closed  No one else was in the room  She acknowledged consent and understanding of privacy and security of the video platform  The patient has agreed to participate and understands they can discontinue the visit at any time  Patient is aware this is a billable service  I spent 49 minutes with the patient during this visit

## 2022-01-11 ENCOUNTER — TELEMEDICINE (OUTPATIENT)
Dept: BEHAVIORAL/MENTAL HEALTH CLINIC | Facility: CLINIC | Age: 27
End: 2022-01-11
Payer: COMMERCIAL

## 2022-01-11 DIAGNOSIS — Z86.59 HISTORY OF POSTTRAUMATIC STRESS DISORDER (PTSD): ICD-10-CM

## 2022-01-11 DIAGNOSIS — F31.81 BIPOLAR 2 DISORDER (HCC): Primary | ICD-10-CM

## 2022-01-11 PROCEDURE — 90834 PSYTX W PT 45 MINUTES: CPT | Performed by: COUNSELOR

## 2022-01-11 NOTE — PSYCH
Psychotherapy Provided: Individual Psychotherapy 45 minutes     Length of time in session: 51 minutes, follow up in 1 week    Encounter Diagnosis     ICD-10-CM    1  Bipolar 2 disorder (HCC)  F31 81    2  History of posttraumatic stress disorder (PTSD)  Z86 59        Goals addressed in session: Goal 1     Pain:      none    0    Current suicide risk : Low     Time: 9:02am - 9:53am  Previous session plan: Check in with considering DBT workbook  CONTINUED: Begin mindfulness work together, starting with grounding  Consider more existential work, discussion  CONTINUED: ET, CBT regarding thoughts about safety and intimacy, continue addressing these themes  Continue with PCT interventions, checking in with her work at [employer]  Check in with going to [employer]  Currently has Art of Happiness book    D: She reports she is hoping to  painting later in the day after work  She says Yamilet Pete has moved to [place]  She says she wants to see if it helps make her calm  Says she was upset with her mother about how she handled her sister coming out to her mother  Says she has been crying quite a bit each day  She says she has continued with [employer] classes, enjoys time with the teacher  Continues to read Art of Happiness  Says she's dealing with microaggressions and is having trouble with managing her responses, internal reactions  She reports she does not have anyone else to talk to about her being an ally for minorities, the underprivileged  Consider talking to her cousin's boyfriend more often - [address] [facility]  This writer provided PCT through reflection of content and feelings, psychoeducation, empathic listening, validation of feelings, bibliotherapy work (Art of ET work through discussion about concept of isolation, the "other"     A: Nehemias Rivero appeared to be in a mildly depressed  mood with congruent affect, seemed to be experiencing lower mood, lethargy, moderate avoltion, appeared to be kempt, no SI/HI nor CAMARGO risk apparent or reported, seemed to respond well to PCT today  ET work through discussion about concept of isolation, the "other"  P: Check in with attempting to paint again  Having flashbacks, consider doing challenging beliefs worksheets  Consider talking to her cousin's boyfriend more often, or the [facility]  Check in with considering DBT workbook  CONTINUED: Begin mindfulness work together, starting with grounding  Consider more existential work, discussion  CONTINUED: ET, CBT regarding thoughts about safety and intimacy, continue addressing these themes  Continue with PCT interventions, checking in with her work at [employer]  Check in with going to [employer] classes  Currently has Art [employer] Happ[employer] book      Behavioral Health Treatment Plan [facility]: Diagnosis and Treatment Plan explained to Kale Lantigua relates understanding diagnosis and is agreeable to Treatment Plan  Yes     Telemedicine consent    Patient: Vj Champion  Provider: Chelo García  Provider located at [address] [place] [id number] [phone]    The patient was identified by name and date of birth  Vj Champion was informed that this is a telemedicine visit and that the visit is being conducted through [facility] and patient was informed this is a secure, HIPAA-complaint platform  She agrees to proceed     My office door was closed  No one else was in the room  She acknowledged consent and understanding of privacy and security of the video platform  The patient has agreed to participate and understands they can discontinue the visit at any time  Patient is aware this is a billable service  I spent 51 minutes with the patient during this visit
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
## 2022-01-18 ENCOUNTER — TELEMEDICINE (OUTPATIENT)
Dept: PSYCHIATRY | Facility: CLINIC | Age: 27
End: 2022-01-18
Payer: COMMERCIAL

## 2022-01-18 DIAGNOSIS — F31.81 BIPOLAR 2 DISORDER (HCC): Primary | ICD-10-CM

## 2022-01-18 DIAGNOSIS — F60.3 BORDERLINE PERSONALITY DISORDER (HCC): ICD-10-CM

## 2022-01-18 DIAGNOSIS — F43.10 PTSD (POST-TRAUMATIC STRESS DISORDER): ICD-10-CM

## 2022-01-18 PROCEDURE — 99214 OFFICE O/P EST MOD 30 MIN: CPT | Performed by: STUDENT IN AN ORGANIZED HEALTH CARE EDUCATION/TRAINING PROGRAM

## 2022-01-18 RX ORDER — LAMOTRIGINE 200 MG/1
200 TABLET ORAL DAILY
Qty: 90 TABLET | Refills: 0 | Status: SHIPPED | OUTPATIENT
Start: 2022-01-18 | End: 2022-04-19 | Stop reason: SDUPTHER

## 2022-01-18 RX ORDER — BUPROPION HYDROCHLORIDE 150 MG/1
150 TABLET ORAL DAILY
Qty: 90 TABLET | Refills: 0 | Status: SHIPPED | OUTPATIENT
Start: 2022-01-18 | End: 2022-04-19 | Stop reason: SDUPTHER

## 2022-01-18 RX ORDER — HYDROXYZINE HYDROCHLORIDE 10 MG/1
10 TABLET, FILM COATED ORAL EVERY 6 HOURS PRN
Qty: 30 TABLET | Refills: 2 | Status: SHIPPED | OUTPATIENT
Start: 2022-01-18 | End: 2022-04-19 | Stop reason: SDUPTHER

## 2022-01-19 ENCOUNTER — TELEMEDICINE (OUTPATIENT)
Dept: BEHAVIORAL/MENTAL HEALTH CLINIC | Facility: CLINIC | Age: 27
End: 2022-01-19
Payer: COMMERCIAL

## 2022-01-19 DIAGNOSIS — F31.81 BIPOLAR 2 DISORDER (HCC): Primary | ICD-10-CM

## 2022-01-19 DIAGNOSIS — Z86.59 HISTORY OF POSTTRAUMATIC STRESS DISORDER (PTSD): ICD-10-CM

## 2022-01-19 PROCEDURE — 90834 PSYTX W PT 45 MINUTES: CPT | Performed by: COUNSELOR

## 2022-01-19 NOTE — PSYCH
Psychotherapy Provided: Individual Psychotherapy 45 minutes     Length of time in session: 48 minutes, follow up in 1 week    Encounter Diagnosis     ICD-10-CM    1  Bipolar 2 disorder (HCC)  F31 81    2  History of posttraumatic stress disorder (PTSD)  Z86 59        Goals addressed in session: Goal 1     Pain:      none    0    Current suicide risk : Low     Time: 3:07pm - 3:55pm   Previous session plan: Check in with attempting to paint again  Having flashbacks, consider doing challenging beliefs worksheets  Consider talking to her cousin's boyfriend more often, or the [employer]  Check in with considering DBT workbook  CONTINUED: Begin mindfulness work together, starting with grounding  Consider more existential work, discussion  CONTINUED: ET, CBT regarding thoughts about safety and intimacy, continue addressing these themes  Continue with PCT interventions, checking in with her work at [employer]  Check in with going to [employer] classes  Currently has [employer] book    D: She says she has been working today and is done for the day  She says she has been doing well - "I think I'm good"  She says she talked to Dr Zeinab De Los Santos  She says she has entered into advanced [employer] classes and she has been enjoying them  Says she wants to go to events at beginning of February  Says Barbara Epstein invited her to go to a tournament this Saturday  "Trying every single day to not be so hard on myself" - Focused with Sunny on the importance of allowing herself to take care of herself, while balancing this out with her responsibilities as an adult, a homeowner, and so on  This writer provided CBT through reframing, socratic dialogue, healthy behavioral changes, PCT through empathic listening, validation of feelings, reflection of content and feelings     A: Artis Campos appeared to be in a moderately euthymic mood with congruent affect, seemed to be experiencing mild lethargy, lower mood at times, excessive worry, appeared to be kempt, No SI/HI apparent, seemed to respond well to PCT, CBT work today  P: Check in with "trying to not be so hard on herself", adjusting beliefs about herself and being "ok with just painting", being an introvert  Listening to new podcast called "All my relations", makes her feel better  Jelanisahara Roth invited her to competition  In more advanced [employer] classes now  Check in with painting  Planning on going to meetings at [facility] at beginning of February  CONTINUED:  Check in with attempting to paint again  Having flashbacks, consider doing challenging beliefs worksheets  Check in with considering DBT workbook  CONTINUED: Begin mindfulness work together, starting with grounding  Consider more existential work, discussion  CONTINUED: ET, CBT regarding thoughts about safety and intimacy, continue addressing these themes  Continue with PCT interventions, checking in with her work at [address] [place] has [employer] book         [address]: Diagnosis and Treatment Plan explained to Jocelyn Mcginnis relates understanding diagnosis and is agreeable to Treatment Plan  Yes     Telemedicine consent    Patient: Tru Knight  Provider: Jaydon Romero  Provider located at [address] [place] [id number] [phone]    The patient was identified by name and date of birth  Tru Knight was informed that this is a telemedicine visit and that the visit is being conducted through [facility] and patient was informed this is a secure, HIPAA-complaint platform  She agrees to proceed     My office door was closed  No one else was in the room  She acknowledged consent and understanding of privacy and security of the video platform   The patient has agreed to participate and understands they can discontinue the visit at any time     Patient is aware this is a billable service  I spent 45 minutes with the patient during this visit  `

## 2022-01-25 ENCOUNTER — SOCIAL WORK (OUTPATIENT)
Dept: BEHAVIORAL/MENTAL HEALTH CLINIC | Facility: CLINIC | Age: 27
End: 2022-01-25
Payer: COMMERCIAL

## 2022-01-25 DIAGNOSIS — Z86.59 HISTORY OF POSTTRAUMATIC STRESS DISORDER (PTSD): ICD-10-CM

## 2022-01-25 DIAGNOSIS — F31.81 BIPOLAR 2 DISORDER (HCC): Primary | ICD-10-CM

## 2022-01-25 DIAGNOSIS — F31.81 BIPOLAR 2 DISORDER (HCC): ICD-10-CM

## 2022-01-25 PROCEDURE — 90834 PSYTX W PT 45 MINUTES: CPT | Performed by: COUNSELOR

## 2022-01-25 RX ORDER — TRAZODONE HYDROCHLORIDE 50 MG/1
25 TABLET ORAL
Qty: 45 TABLET | Refills: 0 | Status: SHIPPED | OUTPATIENT
Start: 2022-01-25 | End: 2022-04-19 | Stop reason: SDUPTHER

## 2022-01-25 NOTE — PSYCH
Psychotherapy Provided: Individual Psychotherapy 45 minutes     Length of time in session: 48 minutes, follow up in 1 week    Encounter Diagnosis     ICD-10-CM    1  Bipolar 2 disorder (HCC)  F31 81    2  History of posttraumatic stress disorder (PTSD)  Z86 59        Goals addressed in session: Goal 1     Pain:      none    0    Current suicide risk : Low     Time: 9:10am - 9:58am  Previous session plan: Check in with "trying to not be so hard on herself", adjusting beliefs about herself and being "ok with just painting", being an introvert  Listening to new podcast called "All my relations", makes her feel better  Fatmata invited her to competition  In more advanced [employer] classes now  Check in with painting  Planning on going to meetings at [facility] at beginning of February  CONTINUED:  Check in with attempting to paint again  Having flashbacks, consider doing challenging beliefs worksheets  Check in with considering DBT workbook  CONTINUED: Begin mindfulness work together, starting with grounding  Consider more existential work, discussion  CONTINUED: ET, CBT regarding thoughts about safety and intimacy, continue addressing these themes  Continue with PCT interventions, checking in with her work at [address] has [employer] book    D: She says she has been feeling well  She reports she went to the competition  She says she greatly enjoyed herself going to the competition and continuing with her [employer] classes  Says she enjoyed some one on one time with Fatmata  She says she was able to clean her house  Focused with her on her ability to clean her home by using a reward system and by practicing mindfulness by adjusting her attitude toward listening to music/cleaning at same time  Explored how she relates to her classmates at [employer], how she feels about herself relating to others    This writer provided PCT through empathic listening, validation of feelings, reflection of content and feelings, discussion about intimacy, reframing work through CBT work, socratic dialogue, reducing sense of isolation  A: Rosie Thurman appeared to be in a moderately euthymic mood with congruent affect, seemed to be experiencing reduced symptoms of depression apparent today, appeared to be kempt, no SI/HI nor CAMARGO risk apparent or reported, seemed to respond well to PCT, CBT and ET work  P: Check in with her month trial for the gym/[employer] classes  She says she was able to use a game, listening to [employer], to motivate herself to clean  Continue discussion about her turning to be closer to her authentic self  CONTINUED: Check in with "trying to not be so hard on herself", adjusting beliefs about herself and being "ok with just painting", being an introvert  Listening to new podcast called "All my relations", makes her feel better  Fatmata invited her to competition  In more advanced [employer] classes now  Check in with painting  Planning on going to meetings at [facility] at beginning of February  Check in with attempting to paint again  Having flashbacks, consider doing challenging beliefs worksheets  Check in with considering DBT workbook  Begin mindfulness work together, starting with grounding  Consider more existential work, discussion  ET, CBT regarding thoughts about safety and intimacy, continue addressing these themes  Continue with PCT interventions, checking in with her work at [address] has [employer] book        [address]: Diagnosis and Treatment Plan explained to Genaro Martinez relates understanding diagnosis and is agreeable to Treatment Plan   Yes

## 2022-01-26 ENCOUNTER — APPOINTMENT (EMERGENCY)
Dept: RADIOLOGY | Facility: HOSPITAL | Age: 27
End: 2022-01-26
Payer: COMMERCIAL

## 2022-01-26 ENCOUNTER — HOSPITAL ENCOUNTER (EMERGENCY)
Facility: HOSPITAL | Age: 27
Discharge: HOME | End: 2022-01-26
Attending: EMERGENCY MEDICINE
Payer: COMMERCIAL

## 2022-01-26 VITALS
OXYGEN SATURATION: 97 % | RESPIRATION RATE: 18 BRPM | HEART RATE: 83 BPM | BODY MASS INDEX: 28.32 KG/M2 | SYSTOLIC BLOOD PRESSURE: 112 MMHG | WEIGHT: 145 LBS | TEMPERATURE: 98.8 F | DIASTOLIC BLOOD PRESSURE: 66 MMHG

## 2022-01-26 DIAGNOSIS — S43.109A: ICD-10-CM

## 2022-01-26 DIAGNOSIS — S43.409A SHOULDER SPRAIN: ICD-10-CM

## 2022-01-26 DIAGNOSIS — M25.512 LEFT SHOULDER PAIN: Primary | ICD-10-CM

## 2022-01-26 PROCEDURE — 99283 EMERGENCY DEPT VISIT LOW MDM: CPT

## 2022-01-26 PROCEDURE — 99284 EMERGENCY DEPT VISIT MOD MDM: CPT | Performed by: EMERGENCY MEDICINE

## 2022-01-26 PROCEDURE — 96372 THER/PROPH/DIAG INJ SC/IM: CPT

## 2022-01-26 PROCEDURE — 73030 X-RAY EXAM OF SHOULDER: CPT

## 2022-01-26 RX ORDER — KETOROLAC TROMETHAMINE 30 MG/ML
15 INJECTION, SOLUTION INTRAMUSCULAR; INTRAVENOUS ONCE
Status: COMPLETED | OUTPATIENT
Start: 2022-01-26 | End: 2022-01-26

## 2022-01-26 RX ADMIN — KETOROLAC TROMETHAMINE 15 MG: 30 INJECTION, SOLUTION INTRAMUSCULAR at 20:54

## 2022-01-26 NOTE — Clinical Note
Terrence Montoya was seen and treated in our emergency department on 1/26/2022  Diagnosis: left shoulder strain    Demetrius Cole  may return to work on return date  She may return on this date: 01/29/2022         If you have any questions or concerns, please don't hesitate to call        Shanna Castle MD    ______________________________           _______________          _______________  [facility] Representative                              Date                                Time

## 2022-01-27 ENCOUNTER — OFFICE VISIT (OUTPATIENT)
Dept: OBGYN CLINIC | Facility: HOSPITAL | Age: 27
End: 2022-01-27
Payer: COMMERCIAL

## 2022-01-27 VITALS
DIASTOLIC BLOOD PRESSURE: 68 MMHG | SYSTOLIC BLOOD PRESSURE: 107 MMHG | HEIGHT: 60 IN | HEART RATE: 82 BPM | WEIGHT: 130.7 LBS | BODY MASS INDEX: 25.66 KG/M2

## 2022-01-27 DIAGNOSIS — S43.102A AC SEPARATION, LEFT, INITIAL ENCOUNTER: Primary | ICD-10-CM

## 2022-01-27 PROCEDURE — 99243 OFF/OP CNSLTJ NEW/EST LOW 30: CPT | Performed by: PHYSICIAN ASSISTANT

## 2022-01-27 NOTE — LETTER
January 27, 2022     Corie Paiz MD  [address]    Patient: Lokesh Jackson   YOB: 1995   Date of Visit: 1/27/2022       Dear Dr Oleg Borden: Thank you for referring Ayla Hall to me for evaluation  Below are my notes for this consultation  If you have questions, please do not hesitate to call me  I look forward to following your patient along with you  Sincerely,        Thor Montoya PA-C        CC: No Recipients  Sandrarene Lindsay, [place]  1/27/2022 10:53 AM  Attested  Assessment:    Left AC joint separation, type II    Plan:      Immobilize with sling for 5-7 days then initiate gentle ROM  Start PT next week for treatment program  NSAIDs PRN  Rest  Ice to the area  Follow-up 3-4 weeks with Dr Geraldine Snyder              Subjective:     Patient ID:  Lokesh Jackson is a 32 y o  female    HPI    32year old female presenting for evaluation of her left shoulder  She states last night she was doing jujitsu and her legs were swept out from under her and she landed directly on her left shoulder  She presented to the ER were she was presented with the sling and x-rays showed no acute findings  She today she states the pain is well localized to the anterior lateral shoulder  She is taking Tylenol with minimal relief  No associated numbness and tingling in the left upper extremity    The following portions of the patient's history were reviewed and updated as appropriate: allergies, current medications, past family history, past medical history, past social history, past surgical history and problem list     Review of Systems     Objective:    Imaging:  Left shoulder x-rays demonstrate type 2 AC separation  No acute fracture  Vitals:    01/27/22 1012   BP: 107/68   Pulse: 82           Physical Exam     Orthopedic Examination:  Left shoulder    Inspection:  No open wounds or erythema    There is no tenting of the skin     Palpation:  Tender to palpation over the [facility] region mid and proximal clavicle  Humerus  And elbow joint nontender to palpation    Range-of-motion:  Limited    Strength:  Deferred    Sensation:  Intact axillary muscular cutaneous median radial ulnar nerve distribution    Special Tests:  Palpable radial pulse   Good cap refill at the fingertips  Attestation signed by Elsy Asher MD at 1/27/2022 11:13 AM:    The patient was seen by the advanced practitioner  I was available by telephone as needed  I am attesting this note as a supervising physician but did not see or examine the patient

## 2022-01-27 NOTE — PROGRESS NOTES
Assessment:    Left AC joint separation, type II    Plan:      Immobilize with sling for 5-7 days then initiate gentle ROM  Start PT next week for treatment program  NSAIDs PRN  Rest  Ice to the area  Follow-up 3-4 weeks with Dr Kim Dixon              Subjective:     Patient ID:  Rosie Thurman is a 32 y o  female    HPI    32year old female presenting for evaluation of her left shoulder  She states last night she was doing jujitsu and her legs were swept out from under her and she landed directly on her left shoulder  She presented to the ER were she was presented with the sling and x-rays showed no acute findings  She today she states the pain is well localized to the anterior lateral shoulder  She is taking Tylenol with minimal relief  No associated numbness and tingling in the left upper extremity    The following portions of the patient's history were reviewed and updated as appropriate: allergies, current medications, past family history, past medical history, past social history, past surgical history and problem list     Review of Systems     Objective:    Imaging:  Left shoulder x-rays demonstrate type 2 AC separation  No acute fracture  Vitals:    01/27/22 1012   BP: 107/68   Pulse: 82           Physical Exam     Orthopedic Examination:  Left shoulder    Inspection:  No open wounds or erythema  There is no tenting of the skin  Palpation:  Tender to palpation over the [facility] region mid and proximal clavicle  Humerus  And elbow joint nontender to palpation    Range-of-motion:  Limited    Strength:  Deferred    Sensation:  Intact axillary muscular cutaneous median radial ulnar nerve distribution    Special Tests:  Palpable radial pulse   Good cap refill at the fingertips 
No

## 2022-01-27 NOTE — DISCHARGE INSTRUCTIONS
You were seen in the ED for shoulder pain  Return to the ED for any worsening symptoms or new symptoms  Follow up with your primary care doctor and orthopedic doctor as soon as possible

## 2022-01-28 ENCOUNTER — TELEPHONE (OUTPATIENT)
Dept: OBGYN CLINIC | Facility: HOSPITAL | Age: 27
End: 2022-01-28

## 2022-01-28 NOTE — TELEPHONE ENCOUNTER
Patient sees Sheron Meza    Patient works at a [address] [place] [address]  Patient needs a new work note per her Employer, it needs to be more detailed on what patient can & can't do      Please call patient with any questions  cb - [phone]

## 2022-01-29 NOTE — ED ATTENDING ATTESTATION
1/26/2022  ISarah MD, saw and evaluated the patient  I have discussed the patient with the resident/non-physician practitioner and agree with the resident's/non-physician practitioner's findings, Plan of Care, and MDM as documented in the resident's/non-physician practitioner's note, except where noted  All available labs and Radiology studies were reviewed  I was present for key portions of any procedure(s) performed by the resident/non-physician practitioner and I was immediately available to provide assistance  At this point I agree with the current assessment done in the Emergency Department  I have conducted an independent evaluation of this patient a history and physical is as follows:    ED Course     59-year-old female presents with left anterior shoulder pain status post takedown at [facility] class today  Patient states she landed on her left shoulder  No obvious deformity but pain with abduction and point tenderness over AC joint  Impression left shoulder pain  Plan pain control shoulder immobilizer at shoulder x-ray  X-rays reviewed by me concern for TRISR [facility] separation  Skin is intact over AC joint  Plan to mobilize and shoulder sling    Follow-up with orthopedics as outpatient    Critical Care Time  Procedures

## 2022-01-31 ENCOUNTER — SOCIAL WORK (OUTPATIENT)
Dept: BEHAVIORAL/MENTAL HEALTH CLINIC | Facility: CLINIC | Age: 27
End: 2022-01-31
Payer: COMMERCIAL

## 2022-01-31 DIAGNOSIS — Z86.59 HISTORY OF POSTTRAUMATIC STRESS DISORDER (PTSD): ICD-10-CM

## 2022-01-31 DIAGNOSIS — F31.81 BIPOLAR 2 DISORDER (HCC): Primary | ICD-10-CM

## 2022-01-31 PROCEDURE — 90834 PSYTX W PT 45 MINUTES: CPT | Performed by: COUNSELOR

## 2022-01-31 NOTE — PSYCH
Psychotherapy Provided: Individual Psychotherapy 45 minutes     Length of time in session: 45 minutes, follow up in 1 week    Encounter Diagnosis     ICD-10-CM    1  Bipolar 2 disorder (HCC)  F31 81    2  History of posttraumatic stress disorder (PTSD)  Z86 59        Goals addressed in session: Goal 1     Pain:      none    0    Current suicide risk : Low     Time: 3:07pm - 3:52pm   Previous session plan: Check in with her month trial for the [email or web address] classes  She says she was able to use a game, listening to [employer], to motivate herself to clean  Continue discussion about her turning to be closer to her authentic self  CONTINUED: Check in with "trying to not be so hard on herself", adjusting beliefs about herself and being "ok with just painting", being an introvert  Listening to new podcast called "All my relations", makes her feel better  Fatmata invited her to competition  In more advanced [email or web address] classes now  Check in with painting  Planning on going to meetings at [facility] at [facility] in with attempting to paint again  Having flashbacks, consider doing challenging beliefs worksheets  Check in with considering DBT workbook  Begin mindfulness work together, starting with grounding  Consider more existential work, discussion  ET, CBT regarding thoughts about safety and intimacy, continue addressing these themes  Continue with PCT interventions, checking in with her work at [address] has [employer] book    D: She reported on an injury she had during her [employer] class, of a torn ligament  Discussed this for much of session, how she is coping and the plan for her treatment  Discussed her recently seeing an old friend Jason Sunshine, who she had a casual relationship with, who she could trust  She says he helped her around the house and going grocery shopping   This writer provided PCT through empathic listening, validation of feelings, reflection of content and feelings  A: Lalitha Sanders appeared to be in a neutral mood with congruent affect, seemed to be experiencing lower mood at times, mild lethargy and avolition, excessive worry at times, appeared to be kempt, no SI/HI nor CAMARGO risk apparent or reported, seemed to respond well to PCT      P: Check in with torn ligament  CONTINUED: Check in with her month trial for the gym/[employer] classes  She says she was able to use a game, listening to [employer], to motivate herself to clean  Continue discussion about her turning to be closer to her authentic self  CONTINUED: Check in with "trying to not be so hard on herself", adjusting beliefs about herself and being "ok with just painting", being an introvert  Listening to new podcast called "All my relations", makes her feel better  Fatmata invited her to competition  In more advanced [employer] classes now  Check in with painting  Planning on going to meetings at [facility] at beginning of [facility] in with attempting to paint again  Having flashbacks, consider doing challenging beliefs worksheets  Check in with considering DBT workbook  Begin mindfulness work together, starting with grounding  Consider more existential work, discussion  ET, CBT regarding thoughts about safety and intimacy, continue addressing these themes  Continue with PCT interventions, checking in with her work at [address] has [employer] book        [address]: Diagnosis and Treatment Plan explained to Colby West relates understanding diagnosis and is agreeable to Treatment Plan   Yes

## 2022-01-31 NOTE — TELEPHONE ENCOUNTER
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

## 2022-01-31 NOTE — TELEPHONE ENCOUNTER
Called left message that patient had a note already written that said no use of left upper extremity  Not sure what needs to be more specific for her employer  Ask her to call back and explain

## 2022-02-07 ENCOUNTER — EVALUATION (OUTPATIENT)
Dept: PHYSICAL THERAPY | Facility: OTHER | Age: 27
End: 2022-02-07
Payer: COMMERCIAL

## 2022-02-07 DIAGNOSIS — S43.102A AC SEPARATION, LEFT, INITIAL ENCOUNTER: Primary | ICD-10-CM

## 2022-02-07 PROCEDURE — 97110 THERAPEUTIC EXERCISES: CPT | Performed by: PHYSICAL THERAPIST

## 2022-02-07 PROCEDURE — 97162 PT EVAL MOD COMPLEX 30 MIN: CPT | Performed by: PHYSICAL THERAPIST

## 2022-02-07 NOTE — PROGRESS NOTES
PT Evaluation     Today's date: 2022  Patient name: Aruna Henriquez  : 1995  MRN: [id number]  Referring provider: Esequiel Bar  Dx:   Encounter Diagnosis     ICD-10-CM    1  AC separation, left, initial encounter  [phone] Ambulatory Referral to Physical Therapy       Start Time: 1500  Stop Time: 1540  Total time in clinic (min): 40 minutes    Assessment  Assessment details: Aruna Henriquez is a pleasant 32 y o  female who presents with L shoulder pain after falling directly on her shoulder during [facility]  Today she presents with signs and symptoms consistent with a frozen shoulder  The primary movement problem is L shoulder pain with movement coordination impairments limiting her ability to care for self, carry, exercise or recreation, lift, reach overhead and sleep  No further referral appears necessary at this time based upon examination results  The patient's greatest concerns are fear of not being able to keep active and future ill health (and wanting to prevent it)  Problem List:  1) decreased shoulder ROM  2) decreased scap stabilizer strength  3) decreased UE flexibility  4) shoulder pain      Impairments: abnormal coordination, abnormal muscle firing, abnormal or restricted ROM, abnormal movement, activity intolerance, impaired physical strength, lacks appropriate home exercise program, pain with function and scapular dyskinesis  Prognosis details: Positive prognostic indicators include positive attitude toward recovery  Negative prognostic indicators include high symptom irritability  Goals  STG's to be achieved in 4 weeks:  1) Patient will demonstrate normal pain free AROM of L shoulder  2) Patient will improve scap stabilizer strength by 1/2 grade  3) Patient will be independent with all ADLs with L shoulder    LTG's to be achieved in 4 weeks:  1) Patient will be independent and compliant with HEP     2) Patient will return to [facility] with no greater than 2/10 shoulder pain  3) Patient will score 80 or greater on FOTO  Plan  Frequency: 2x week  Duration in weeks: 8        Subjective Evaluation    History of Present Illness  Date of onset: 2022  Mechanism of injury: Patient reports an injury to her L shoulder approximately 2 weeks ago  States that she was practicing [employer] and her feet were swept out from under her resulting in her landing directly on her shoulder  Since injury she was seen in the ED and placed in a sling  She also has followed up with ortho and was diagnosed with an AC sprain  Patient was subsequently referred to PT and will follow up with primary care sports med in 8 weeks  At rest, describes no pain, but, states that certain motions, reaching behind her and above shoulder level bother her the most  Patient is taking tylenol and advil for pain     Pain  Current pain ratin  At best pain ratin  At worst pain ratin    Patient Goals  Patient goals for therapy: independence with ADLs/IADLs, decreased pain, increased motion and return to sport/leisure activities          Objective     Active Range of Motion   Left Shoulder   Flexion: 70 degrees with pain  Abduction: 50 degrees with pain  Internal rotation BTB: sacrum with pain    Right Shoulder   Normal active range of motion    Passive Range of Motion   Left Shoulder   Flexion: 90 degrees with pain  Abduction: 90 degrees with pain  External rotation 45°: 20 degrees with pain  Internal rotation 45°: 25 degrees with pain    Right Shoulder   Normal passive range of motion    Strength/Myotome Testing     Left Shoulder     Planes of Motion   Flexion: 4-   Extension: 4-   Abduction: 4-   External rotation at 0°: 4-   Internal rotation at 0°: 4-     Right Shoulder     Planes of Motion   Flexion: 5   Extension: 5   Abduction: 5   External rotation at 0°: 5   Internal rotation at 0°: 5     General Comments:      Shoulder Comments   TTP- L AC  Spasm in upper trap, levator, rhomboids Precautions: AC sprain      Manuals 2/7            AC mobs             LASER ligament sprain protocol jodiudbc [place]                                      Neuro Re-Ed             scap squeeze 2 x 10, 5" hold            TB LPD, row             Prone T, I                                                                 Ther Ex             UBE             Upper trap stretch 10 x 10"            Cervical ROM 10 x            Table slide flex, scap 10 x 10"            Finger ladder             ranger                                       Ther Activity                                       Gait Training                                       Modalities

## 2022-02-08 ENCOUNTER — SOCIAL WORK (OUTPATIENT)
Dept: BEHAVIORAL/MENTAL HEALTH CLINIC | Facility: CLINIC | Age: 27
End: 2022-02-08
Payer: COMMERCIAL

## 2022-02-08 DIAGNOSIS — Z86.59 HISTORY OF POSTTRAUMATIC STRESS DISORDER (PTSD): ICD-10-CM

## 2022-02-08 DIAGNOSIS — F31.81 BIPOLAR 2 DISORDER (HCC): Primary | ICD-10-CM

## 2022-02-08 PROCEDURE — 90834 PSYTX W PT 45 MINUTES: CPT | Performed by: COUNSELOR

## 2022-02-08 NOTE — PSYCH
Psychotherapy Provided: Individual Psychotherapy 45 minutes     Length of time in session: 50 minutes, follow up in 1 week    Encounter Diagnosis     ICD-10-CM    1  Bipolar 2 disorder (HCC)  F31 81    2  History of posttraumatic stress disorder (PTSD)  Z86 59        Goals addressed in session: Goal 1     Pain:      none    0    Current suicide risk : Low     Time: 9:08am - 9:58am  Previous session plan: Check in with torn ligament  CONTINUED: Check in with her month trial for the gym/[employer] classes  She says she was able to use a game, listening to [employer], to motivate herself to clean  Continue discussion about her turning to be closer to her authentic self  Chrissie Mends in with "trying to not be so hard on herself", adjusting beliefs about herself and being "ok with just painting", being an introvert  Listening to new podcast called "All my relations", makes her feel better  Fatmata invited her to competition  In more advanced [employer] classes now  Check in with painting  Planning on going to meetings at [facility] at beginning of [facility] in with attempting to paint again  Having flashbacks, consider doing challenging beliefs worksheets  Check in with considering DBT workbook  Begin mindfulness work together, starting with grounding  Consider more existential work, discussion  ET, CBT regarding thoughts about safety and intimacy, continue addressing these themes  Continue with PCT interventions, checking in with her work at [address] has [employer] book    D: She reports she has started physical therapy and has been feeling better  She says she went to the [facility] fr a bisexual and pansexual group meeting  She says she was treated well and greatly enjoyed her time at the center  Focused on this for some time  She says she has had discussion with her sister about needing help with her having her injury and she's not helping her   Was able to work on communication with her sister  Was able to find some common ground with her sister, agree with her on points  This writer provided PCT, CBT through reframing, empathic listening, socratic dialogue, reflection of content and feelings, reducing isolation through seeking groups with similar interests, psychoeducation  A: Selam Evans appeared to be in a mildly dysphoric mood with congruent affect, seemed to be experiencing racing thoughts at times, excessive worry, appeared to be kempt, no SI/HI nor CAMARGO risk apparent or reported, seemed to respond well to PCT  P: CONTINUED: Check in with torn ligament  Check in with her month trial for the gym/[employer] classes  She says she was able to use a game, listening to [employer], to motivate herself to clean  Continue discussion about her turning to be closer to her authentic self  Check in with "trying to not be so hard on herself", adjusting beliefs about herself and being "ok with just painting", being an introvert  Listening to new podcast called "All my relations", makes her feel better  Fatmata invited her to competition  In more advanced [employer] classes now  Check in with painting  Check in with attempting to paint again  Having flashbacks, consider doing challenging beliefs worksheets  Check in with considering DBT workbook  Begin mindfulness work together, starting with grounding  Consider more existential work, discussion  ET, CBT regarding thoughts about safety and intimacy, continue addressing these themes  Continue with PCT interventions, checking in with her work at [address] has [employer] book        [address]: Diagnosis and Treatment Plan explained to Jessica Alejandro relates understanding diagnosis and is agreeable to Treatment Plan   Yes

## 2022-02-10 ENCOUNTER — OFFICE VISIT (OUTPATIENT)
Dept: PHYSICAL THERAPY | Facility: OTHER | Age: 27
End: 2022-02-10
Payer: COMMERCIAL

## 2022-02-10 DIAGNOSIS — S43.102A AC SEPARATION, LEFT, INITIAL ENCOUNTER: Primary | ICD-10-CM

## 2022-02-10 PROCEDURE — 97110 THERAPEUTIC EXERCISES: CPT

## 2022-02-10 PROCEDURE — 97112 NEUROMUSCULAR REEDUCATION: CPT

## 2022-02-10 NOTE — PROGRESS NOTES
Daily Note     Today's date: 2/10/2022  Patient name: Reddy Granados  : 1995  MRN: [id number]  Referring provider: Kusum Raman  Dx:   Encounter Diagnosis     ICD-10-CM    1  AC separation, left, initial encounter  S43 102A            1 on 1 with PTA and PT       Subjective: Patient reports improved mobility after last session  No pain upon arrival  Pain may reach 8/10 quickly and intermittently depending on the motion  Objective: See treatment diary below      Assessment: Tolerated treatment well, she denied pain throughout  Demonstrated forward rounded posture and decreased postural endurance, as well as tension throughout her upper traps  Patient would benefit from IASTM/MFD to reduce tension, perform NV if able  Good tolerance to more AAROM  Patient demonstrated fatigue post treatment, exhibited good technique with therapeutic exercises and would benefit from continued PT      Plan: Continue per plan of care  Progress treatment as tolerated  Precautions: AC sprain      Manuals 2/7 2/10           AC mobs             LASER ligament sprain protocol elizabeth REDMOND            MFD  NV           IASTM  NV           Neuro Re-Ed             scap squeeze 2 x 10, 5" hold            TB LPD, row  OTB 1 x 15 Row    Low Row 1 x 10 OTB           Prone T, I                                                                 Ther Ex             UBE             Upper trap stretch 10 x 10" 10 x 10"           Cervical ROM 10 x 10 x 5" ea             Table slide flex, scap 10 x 10" 10" x 10           Finger ladder  10" x 10           ranger             Pulleys  3 mins                        Ther Activity                                       Gait Training                                       Modalities

## 2022-02-14 ENCOUNTER — SOCIAL WORK (OUTPATIENT)
Dept: BEHAVIORAL/MENTAL HEALTH CLINIC | Facility: CLINIC | Age: 27
End: 2022-02-14
Payer: COMMERCIAL

## 2022-02-14 DIAGNOSIS — Z86.59 HISTORY OF POSTTRAUMATIC STRESS DISORDER (PTSD): ICD-10-CM

## 2022-02-14 DIAGNOSIS — F31.81 BIPOLAR 2 DISORDER (HCC): Primary | ICD-10-CM

## 2022-02-14 PROCEDURE — 90834 PSYTX W PT 45 MINUTES: CPT | Performed by: COUNSELOR
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
## 2022-02-15 ENCOUNTER — TELEMEDICINE (OUTPATIENT)
Dept: BEHAVIORAL/MENTAL HEALTH CLINIC | Facility: CLINIC | Age: 27
End: 2022-02-15
Payer: COMMERCIAL

## 2022-02-15 ENCOUNTER — OFFICE VISIT (OUTPATIENT)
Dept: PHYSICAL THERAPY | Facility: OTHER | Age: 27
End: 2022-02-15
Payer: COMMERCIAL

## 2022-02-15 DIAGNOSIS — Z86.59 HISTORY OF POSTTRAUMATIC STRESS DISORDER (PTSD): ICD-10-CM

## 2022-02-15 DIAGNOSIS — S43.102A AC SEPARATION, LEFT, INITIAL ENCOUNTER: Primary | ICD-10-CM

## 2022-02-15 DIAGNOSIS — F31.81 BIPOLAR 2 DISORDER (HCC): Primary | ICD-10-CM

## 2022-02-15 PROCEDURE — 97140 MANUAL THERAPY 1/> REGIONS: CPT

## 2022-02-15 PROCEDURE — 90834 PSYTX W PT 45 MINUTES: CPT | Performed by: COUNSELOR

## 2022-02-15 PROCEDURE — 97110 THERAPEUTIC EXERCISES: CPT

## 2022-02-15 NOTE — PROGRESS NOTES
Daily Note     Today's date: 2/15/2022  Patient name: Sandrea Paget  : 1995  MRN: [id number]  Referring provider: Jonny Jain  Dx:   Encounter Diagnosis     ICD-10-CM    1  AC separation, left, initial encounter  S43 102A            1 on 1 with PTA        Subjective: Patient reports posterior shoulder pinching and occasional anterior shoulder tightness/pain  She experienced muscle soreness after last session  Objective: See treatment diary below      Assessment: Tolerated treatment well  Initiated MFD static, gliding, and with self stretching  Patient presents with signficant tightness, increased UT tone and tension throughout upper back and neck  Good tolerance to addition of pball T and I's and scapular stabilization with pball on the wall  Patient demonstrated fatigue post treatment, exhibited good technique with therapeutic exercises and would benefit from continued PT      Plan: Continue per plan of care  Progress treatment as tolerated  Precautions: AC sprain      Manuals 2/7 2/10 2/15          AC mobs             LASER ligament sprain protocol elizabeth EB            MFD  NV MP          IASTM  NV MP          Neuro Re-Ed             scap squeeze 2 x 10, 5" hold            TB LPD, row  OTB 1 x 15 Row    Low Row 1 x 10 OTB           Prone T, I   pball   1 x 15 ea  Pball Roll Up with serratus press   5" 1 x 5                                                 Ther Ex             UBE   2 mins retro          Upper trap stretch 10 x 10" 10 x 10" MFD 10 x 10"          Cervical ROM 10 x 10 x 5" ea  10 x 5" ea            Table slide flex, scap 10 x 10" 10" x 10           Finger ladder  10" x 10           ranger             Pulleys  3 mins           Doorway Pec Stretch   10" x 5          Ther Activity                                       Gait Training                                       Modalities
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
## 2022-02-22 ENCOUNTER — OFFICE VISIT (OUTPATIENT)
Dept: PHYSICAL THERAPY | Facility: OTHER | Age: 27
End: 2022-02-22
Payer: COMMERCIAL

## 2022-02-22 ENCOUNTER — TELEPHONE (OUTPATIENT)
Dept: OBGYN CLINIC | Facility: OTHER | Age: 27
End: 2022-02-22

## 2022-02-22 ENCOUNTER — TELEMEDICINE (OUTPATIENT)
Dept: BEHAVIORAL/MENTAL HEALTH CLINIC | Facility: CLINIC | Age: 27
End: 2022-02-22
Payer: COMMERCIAL

## 2022-02-22 DIAGNOSIS — F31.81 BIPOLAR 2 DISORDER (HCC): Primary | ICD-10-CM

## 2022-02-22 DIAGNOSIS — Z86.59 HISTORY OF POSTTRAUMATIC STRESS DISORDER (PTSD): ICD-10-CM

## 2022-02-22 DIAGNOSIS — S43.102A AC SEPARATION, LEFT, INITIAL ENCOUNTER: Primary | ICD-10-CM

## 2022-02-22 PROCEDURE — 90834 PSYTX W PT 45 MINUTES: CPT | Performed by: COUNSELOR

## 2022-02-22 PROCEDURE — 97140 MANUAL THERAPY 1/> REGIONS: CPT

## 2022-02-22 PROCEDURE — 97112 NEUROMUSCULAR REEDUCATION: CPT

## 2022-02-22 PROCEDURE — 97110 THERAPEUTIC EXERCISES: CPT

## 2022-02-22 NOTE — PROGRESS NOTES
Daily Note     Today's date: 2022  Patient name: Hortensia Hare  : 1995  MRN: [id number]  Referring provider: Raoul Benoit  Dx:   Encounter Diagnosis     ICD-10-CM    1  AC separation, left, initial encounter  S43 102A            1 on 1 with two PTAs       Subjective: Patient states she responded well to MFD last session  She bumped into a wall with her L shoulder walking down steps which increased shoulder pain temporarily  Objective: See treatment diary below      Assessment: Tolerated treatment well  Added foam roll protocol and progressed TB strengthening  Good tolerance  Patient demonstrates improved posture, with more relaxed UT  Patient demonstrated fatigue post treatment, exhibited good technique with therapeutic exercises and would benefit from continued PT      Plan: Continue per plan of care  Progress treatment as tolerated  Precautions: AC sprain      Manuals 2/7 2/10 2/15 2/22         AC mobs             LASER ligament sprain protocol elizabeth REDMOND            MFD  NV MP MP         IASTM  NV MP MP         Neuro Re-Ed             scap squeeze 2 x 10, 5" hold            TB LPD, row  OTB 1 x 15 Row    Low Row 1 x 10 OTB  OTB 2 x 10 ea  Prone T, I   pball   1 x 15 ea  pball 2 x 10          Pball Roll Up with serratus press   5" 1 x 5          Foam Roll Krystle    1' ea  Wall Slides    Flex, scap, hor add                      Ther Ex             UBE   2 mins retro 3'/3'         Upper trap stretch 10 x 10" 10 x 10" MFD 10 x 10" 10 x 10"         Cervical ROM 10 x 10 x 5" ea  10 x 5" ea  10 ea   MFD         Table slide flex, scap 10 x 10" 10" x 10           Finger ladder  10" x 10           ranger             Pulleys  3 mins           Doorway Pec Stretch   10" x 5          Ther Activity                                       Gait Training                                       Modalities

## 2022-02-22 NOTE — PSYCH
Psychotherapy Provided: Individual Psychotherapy 45 minutes     Length of time in session: 49 minutes, follow up in 1 week    Encounter Diagnosis     ICD-10-CM    1  Bipolar 2 disorder (HCC)  F31 81    2  History of posttraumatic stress disorder (PTSD)  Z86 59        Goals addressed in session: Goal 1     Pain:      none    0    Current suicide risk : Low     Time: 9:02am - 9:50am  Previous session plan: Check in with working on a routine  "As a reflex, I say 'I hate myself or I hate my life' " Begin using notebook and bringing it to session, discussing internal more often  Also begin mindfulness work together, starting with grounding   "I feel like if I were to tell people things trauma related     it comes with something, they would feel obligated to something"  ET, CBT regarding thoughts about safety and intimacy, continue addressing these themes  Currently has [employer] book    D: Says she was unable to sleep  Says she did sleep well the other nights  She says she had "a revelation" that her sleeping medication make her sleep well  She says she did  She says she has not practiced much grounding, but is aware of it  Sent Sunny box breathing gif to utilize  Practiced this in session, then moved into discussion about cognitive defusion  Socratic dialogue surrounding idea of having to speak up constantly in every situation that she encounters that she disagrees with, focusing on reality, focusing on helpful thoughts/realistic thoughts  Says after her family get-together she was upset and crying  This writer provided CBT through psychoeducation regarding cognitive defusion, empathic listening, validation of feelings, reflection of content and feelings, socratic dialogue     A: Jarred Kaur appeared to be in a mildly anxious, otherwise euthymic mood with congruent affect, seemed to be experiencing racing thoughts, excessive worry, intrusive memories at times, lower mood at times, appeared to be kempt, no SI/HI nor CAMARGO risk apparent or reported, seemed to respond  Well to PCT, CBT today  P: Discussed grounding, box breathing and cognitive defusion together, move into pendulation  Check in with working on a routine  "As a reflex, I say 'I hate myself or I hate my life' " Begin using notebook and bringing it to session, discussing internal more often  Also begin mindfulness work together, starting with grounding   "I feel like if I were to tell people things trauma related     it comes with something, they would feel obligated to something"  ET, CBT regarding thoughts about safety and intimacy, continue addressing these themes  Currently has [employer] book      Telemedicine consent    Patient: Gagan Taylor  Provider: Marcelino Wheatley  Provider located at [address] [place] [id number] [phone]    The patient was identified by name and date of birth  Gagan Taylor was informed that this is a telemedicine visit and that the visit is being conducted through [facility] and patient was informed this is a secure, HIPAA-complaint platform  She agrees to proceed     My office door was closed  No one else was in the room  She acknowledged consent and understanding of privacy and security of the video platform  The patient has agreed to participate and understands they can discontinue the visit at any time  Patient is aware this is a billable service  I spent 49 minutes with the patient during this visit  Behavioral Health Treatment Plan [facility]: Diagnosis and Treatment Plan explained to Kanu Jaffe relates understanding diagnosis and is agreeable to Treatment Plan   Yes

## 2022-02-24 ENCOUNTER — OFFICE VISIT (OUTPATIENT)
Dept: PHYSICAL THERAPY | Facility: OTHER | Age: 27
End: 2022-02-24
Payer: COMMERCIAL

## 2022-02-24 DIAGNOSIS — S43.102A AC SEPARATION, LEFT, INITIAL ENCOUNTER: Primary | ICD-10-CM

## 2022-02-24 PROCEDURE — 97112 NEUROMUSCULAR REEDUCATION: CPT

## 2022-02-24 PROCEDURE — 97110 THERAPEUTIC EXERCISES: CPT

## 2022-02-24 PROCEDURE — 97140 MANUAL THERAPY 1/> REGIONS: CPT

## 2022-02-24 NOTE — PROGRESS NOTES
Daily Note     Today's date: 2022  Patient name: Meenakshi Kamara  : 1995  MRN: [id number]  Referring provider: Supriya Verduzco  Dx:   Encounter Diagnosis     ICD-10-CM    1  AC separation, left, initial encounter  S43 102A            1 on 1 with PTA and PT [id number]       Subjective: Patient reports more joint line discomfort v  Muscular  However she continues to have L pec tightess  Objective: See treatment diary below      Assessment: Tolerated treatment well  Improved symptoms with mobs and AC taping  Catching at ~90* of scaption during foam roll protocol  Patient demonstrated fatigue post treatment, exhibited good technique with therapeutic exercises and would benefit from continued PT      Plan: Continue per plan of care  Progress treatment as tolerated  Incorporate stabilization Missy as able  Patient would like to perform pull ups and push ups again  Precautions: AC sprain      Manuals  2/10 2/15 2/22 2/24        AC mobs     Gr 3   EB        LASER ligament sprain protocol shoudler EB            MFD  NV MP MP MP        IASTM  NV MP MP         SC mobs     Gr 3 EB        1st Rib     Gr 4 EB        Neuro Re-Ed             scap squeeze 2 x 10, 5" hold            TB LPD, row  OTB 1 x 15 Row    Low Row 1 x 10 OTB  OTB 2 x 10 ea  OTB 3 x 10        TB ER     OTB 2 x 15        Prone T, I   pball   1 x 15 ea  pball 2 x 10  Pball 2 x 10         Pball Roll Up with serratus press   5" 1 x 5          Foam Roll Krystle    1' ea  1' ea  Wall Slides    Flex, scap, hor add Flex 5" x 10 ea  Ther Ex             UBE   2 mins retro 3'/3' 3'/3'        Upper trap stretch 10 x 10" 10 x 10" MFD 10 x 10" 10 x 10" 10" x 10        Cervical ROM 10 x 10 x 5" ea  10 x 5" ea  10 ea  MFD 10 ea          Table slide flex, scap 10 x 10" 10" x 10           Finger ladder  10" x 10           ranger             Pulleys  3 mins           Doorway Pec Stretch   10" x 5 Ther Activity                                       Gait Training                                       Modalities             Maza Tape     AC

## 2022-02-28 ENCOUNTER — OFFICE VISIT (OUTPATIENT)
Dept: PHYSICAL THERAPY | Facility: OTHER | Age: 27
End: 2022-02-28
Payer: COMMERCIAL

## 2022-02-28 ENCOUNTER — SOCIAL WORK (OUTPATIENT)
Dept: BEHAVIORAL/MENTAL HEALTH CLINIC | Facility: CLINIC | Age: 27
End: 2022-02-28
Payer: COMMERCIAL

## 2022-02-28 DIAGNOSIS — S43.102A AC SEPARATION, LEFT, INITIAL ENCOUNTER: Primary | ICD-10-CM

## 2022-02-28 DIAGNOSIS — Z86.59 HISTORY OF POSTTRAUMATIC STRESS DISORDER (PTSD): ICD-10-CM

## 2022-02-28 DIAGNOSIS — F31.81 BIPOLAR 2 DISORDER (HCC): Primary | ICD-10-CM

## 2022-02-28 PROCEDURE — 90834 PSYTX W PT 45 MINUTES: CPT | Performed by: COUNSELOR

## 2022-02-28 PROCEDURE — 97110 THERAPEUTIC EXERCISES: CPT | Performed by: PHYSICAL THERAPIST

## 2022-02-28 PROCEDURE — 97140 MANUAL THERAPY 1/> REGIONS: CPT | Performed by: PHYSICAL THERAPIST

## 2022-02-28 PROCEDURE — 97112 NEUROMUSCULAR REEDUCATION: CPT | Performed by: PHYSICAL THERAPIST

## 2022-02-28 NOTE — PROGRESS NOTES
Daily Note     Today's date: 2022  Patient name: Hortensia Hare  : 1995  MRN: [id number]  Referring provider: Raoul Benoit  Dx:   Encounter Diagnosis     ICD-10-CM    1  AC separation, left, initial encounter  S43 102A            1 on 1 with [id number], not billed remainder       Subjective: Patient reports positive response to added AC mobs and KT tape last visit  Objective: See treatment diary below      Assessment: Tolerated treatment well  Patient demonstrated fatigue post treatment, exhibited good technique with therapeutic exercises and would benefit from continued PT  Patient with decreased pain with snow angella with taping prior to activity  Progressed resistance for TB exercises with fatigue  Plan: Continue per plan of care  Progress treatment as tolerated  Precautions: AC sprain      Manuals 2/7 2/10 2/15 2/22 2/24 2/28       AC mobs     Gr 3   EB EB gr 3       LASER ligament sprain protocol shoudler EB            MFD  NV MP MP MP EB       IASTM  NV MP MP         SC mobs     Gr 3 EB EB gr 3       1st Rib     Gr 4 EB EB gr 4       Neuro Re-Ed             scap squeeze 2 x 10, 5" hold            Bent over row      2 x 10, 10#, b/l       TB LPD, row  OTB 1 x 15 Row    Low Row 1 x 10 OTB  OTB 2 x 10 ea  OTB 3 x 10 GTB 3 x 10       TB ER     OTB 2 x 15 GTB 2 x 15       Prone T, I   pball   1 x 15 ea  pball 2 x 10  Pball 2 x 10  PBALL 30 ea       Pball Roll Up with serratus press   5" 1 x 5          Foam Roll Krystle    1' ea  1' ea  1' ea       Wall Slides    Flex, scap, hor add Flex 5" x 10 ea  Flex 10" 2x 10 ea  Ther Ex             UBE   2 mins retro 3'/3' 3'/3' 3/3       Upper trap stretch 10 x 10" 10 x 10" MFD 10 x 10" 10 x 10" 10" x 10 10 x 10" MFDc       Cervical ROM 10 x 10 x 5" ea  10 x 5" ea  10 ea  MFD 10 ea   10 x 10" MFDc       Table slide flex, scap 10 x 10" 10" x 10           Finger ladder  10" x 10           ranger             Pulleys 3 mins           Doorway Pec Stretch   10" x 5          Ther Activity                                       Gait Training                                       Modalities             Maza Tape     AC

## 2022-02-28 NOTE — PSYCH
Psychotherapy Provided: Individual Psychotherapy 45 minutes     Length of time in session: 47 minutes, follow up in 1 week    Encounter Diagnosis     ICD-10-CM    1  Bipolar 2 disorder (HCC)  F31 81    2  History of posttraumatic stress disorder (PTSD)  Z86 59        Goals addressed in session: Goal 1     Pain:      none    0    Current suicide risk : Low     Time: 3:07pm - 3:54pm   Previous session plan: Discussed grounding, box breathing and cognitive defusion together, move into pendulation  Check in with working on a routine  "As a reflex, I say 'I hate myself or I hate my life' " Begin using notebook and bringing it to session, discussing internal more often  Also begin mindfulness work together, starting with grounding   "I feel like if I were to tell people things trauma related     it comes with something, they would feel obligated to something"  ET, CBT regarding thoughts about safety and intimacy, continue addressing these themes  Currently has [employer] book    D: She says she has been thinking about the war in [facility]  She say she has been working through cognitive defusion and is helpful  Followed up on intimacy and trust concerns  Discussed her history briefly  Here and now intervention provided at end of session  This writer provided PCT, CBT through reframing, normalization, validation of feelings, reflection of content and feelings  A: Sandrea Paget appeared to be in a euthymic mood with congruent affect, seemed to be experiencing lower mood, mild lethargy, mild avolition, appeared to be kempt, no SI/HI nor CAMARGO risk apparent or reported, seemed to respond well to PCT, CBT today  P: She is to complete one painting, one makeup experience in March  "I'm a sensitive person and that's ok"  Discussed grounding, box breathing and cognitive defusion together, move into pendulation  Check in with working on a routine   "As a reflex, I say 'I hate myself or I hate my life' " Begin using notebook and bringing it to session, discussing internal more often  Also begin mindfulness work together, starting with grounding   "I feel like if I were to tell people things trauma related     it comes with something, they would feel obligated to something"  ET, CBT regarding thoughts about safety and intimacy, continue addressing these themes  Behavioral Health Treatment Plan [facility]: Diagnosis and Treatment Plan explained to Colby West relates understanding diagnosis and is agreeable to Treatment Plan   Yes
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
## 2022-03-03 ENCOUNTER — OFFICE VISIT (OUTPATIENT)
Dept: OBGYN CLINIC | Facility: OTHER | Age: 27
End: 2022-03-03
Payer: COMMERCIAL

## 2022-03-03 ENCOUNTER — APPOINTMENT (OUTPATIENT)
Dept: RADIOLOGY | Facility: OTHER | Age: 27
End: 2022-03-03
Payer: COMMERCIAL

## 2022-03-03 VITALS
WEIGHT: 130 LBS | SYSTOLIC BLOOD PRESSURE: 112 MMHG | HEIGHT: 60 IN | BODY MASS INDEX: 25.52 KG/M2 | HEART RATE: 97 BPM | DIASTOLIC BLOOD PRESSURE: 71 MMHG

## 2022-03-03 DIAGNOSIS — M25.512 LEFT SHOULDER PAIN: ICD-10-CM

## 2022-03-03 DIAGNOSIS — S43.52XA SPRAIN OF ACROMIOCLAVICULAR JOINT, LEFT, INITIAL ENCOUNTER: Primary | ICD-10-CM

## 2022-03-03 PROCEDURE — 73050 X-RAY EXAM OF SHOULDERS: CPT

## 2022-03-03 PROCEDURE — 99213 OFFICE O/P EST LOW 20 MIN: CPT | Performed by: FAMILY MEDICINE

## 2022-03-03 NOTE — PATIENT INSTRUCTIONS
Discussed with patient that she has a type 3 AC joint sprain  Advised to continue physical therapy to work on strengthening and range of motion  She can return to activities as long as they do not reproduce any symptoms  We will see her back for evaluation about 6 weeks   If pain persists we will consider surgical referral

## 2022-03-03 NOTE — LETTER
March 3, 2022     Patient: Thai Fitch   YOB: 1995   Date of Visit: 3/3/2022       To Whom it May Concern:    Terrence Montoya is under my professional care  She was seen in my office on 3/3/2022  She may return to activities as long as they do not reproduce symptoms  She may return to normal work hours  No lifting, pushing or pulling objects over 15 pounds  Follow up in 6 weeks  If you have any questions or concerns, please don't hesitate to call  Sincerely,          Devante Egan III, DO        CC: Delfina Frey

## 2022-03-07 ENCOUNTER — OFFICE VISIT (OUTPATIENT)
Dept: PHYSICAL THERAPY | Facility: OTHER | Age: 27
End: 2022-03-07
Payer: COMMERCIAL

## 2022-03-07 DIAGNOSIS — S43.102A AC SEPARATION, LEFT, INITIAL ENCOUNTER: Primary | ICD-10-CM

## 2022-03-07 PROCEDURE — 97110 THERAPEUTIC EXERCISES: CPT | Performed by: PHYSICAL THERAPIST

## 2022-03-07 PROCEDURE — 97112 NEUROMUSCULAR REEDUCATION: CPT | Performed by: PHYSICAL THERAPIST

## 2022-03-07 NOTE — PROGRESS NOTES
Daily Note     Today's date: 3/7/2022  Patient name: Ailyn Marino  : 1995  MRN: [id number]  Referring provider: Bladimir Pena  Dx:   Encounter Diagnosis     ICD-10-CM    1  AC separation, left, initial encounter  S43 102A        Start Time: 945  Stop Time:  on 1 with -1030, not billed remainder  Total time in clinic (min): 70 minutes    Subjective: Patient reports skin irritation with tape rubbing during movement for work  Reports she follow up with primary care sports med physician Dr Marie Swain since last visit who diagnosed her with a gr 3 sprain  States that she was cleared to return to work and with caution may begin returning to [employer]  Patient notes soreness with sleeping on her shoulder  Objective: See treatment diary below      Assessment: Tolerated treatment well  Patient demonstrated fatigue post treatment, exhibited good technique with therapeutic exercises and would benefit from continued PT  Held taping this visit due to noted skin irritation  Progressed TB ER as charted below  Plan: Continue per plan of care  Progress treatment as tolerated  Precautions: AC sprain      Manuals  2/10 2/15 2/22 2/24 2/28 3/7      AC mobs     Gr 3   EB EB gr 3       LASER ligament sprain protocol shoudler EB            MFD  NV MP MP MP EB       IASTM  NV MP MP         SC mobs     Gr 3 EB EB gr 3       1st Rib     Gr 4 EB EB gr 4       Neuro Re-Ed             scap squeeze 2 x 10, 5" hold            Bent over row      2 x 10, 10#, b/l 2 x 10, 12#      TB LPD, row  OTB 1 x 15 Row    Low Row 1 x 10 OTB  OTB 2 x 10 ea  OTB 3 x 10 GTB 3 x 10 GTB 3 x 10      TB ER     OTB 2 x 15 GTB 2 x 15 90/90 peach 10 x      Prone T, I   pball   1 x 15 ea  pball 2 x 10  Pball 2 x 10  PBALL 30 ea PBALL 30 ea 1#      Pball Roll Up with serratus press   5" 1 x 5          Foam Roll Krystle    1' ea  1' ea  1' ea 1' ea 1#      Wall Slides    Flex, scap, hor add Flex 5" x 10 ea    Flex 10" 2x 10 ea  Flex, scap, hor add 10 ea      Corkscrew press             AROM flex, scap, abd, w/ scap squeeze       1# 10 ea      Ther Ex             UBE   2 mins retro 3'/3' 3'/3' 3/3 3/3      Upper trap stretch 10 x 10" 10 x 10" MFD 10 x 10" 10 x 10" 10" x 10 10 x 10" MFDc 10 x 10" MFDc      Cervical ROM 10 x 10 x 5" ea  10 x 5" ea  10 ea  MFD 10 ea   10 x 10" MFDc 10 x 10" MFD      Table slide flex, scap 10 x 10" 10" x 10           Finger ladder  10" x 10           ranger             Pulleys  3 mins           Doorway Pec Stretch   10" x 5          Ther Activity                                       Gait Training                                       Modalities             Maza Tape     AC

## 2022-03-08 ENCOUNTER — SOCIAL WORK (OUTPATIENT)
Dept: BEHAVIORAL/MENTAL HEALTH CLINIC | Facility: CLINIC | Age: 27
End: 2022-03-08
Payer: COMMERCIAL

## 2022-03-08 DIAGNOSIS — F31.81 BIPOLAR 2 DISORDER (HCC): Primary | ICD-10-CM

## 2022-03-08 DIAGNOSIS — Z86.59 HISTORY OF POSTTRAUMATIC STRESS DISORDER (PTSD): ICD-10-CM

## 2022-03-08 PROCEDURE — 90834 PSYTX W PT 45 MINUTES: CPT | Performed by: COUNSELOR

## 2022-03-08 NOTE — PSYCH
Psychotherapy Provided: Individual Psychotherapy 45 minutes     Length of time in session: 49 minutes, follow up in 1 week    Encounter Diagnosis     ICD-10-CM    1  Bipolar 2 disorder (HCC)  F31 81    2  History of posttraumatic stress disorder (PTSD)  Z86 59        Goals addressed in session: Goal 1     Pain:      none    0    Current suicide risk : Low     Time: 9:10am - 9:59am  Previous session plan: She is to complete one painting, one makeup experience in March  "I'm a sensitive person and that's ok"  Discussed grounding, box breathing and cognitive defusion together, move into pendulation  Check in with working on a routine  "As a reflex, I say 'I hate myself or I hate my life' " Begin using notebook and bringing it to session, discussing internal more often  Also begin mindfulness work together, starting with grounding   "I feel like if I were to tell people things trauma related     it comes with something, they would feel obligated to something"  ET, CBT regarding thoughts about safety and intimacy, continue addressing these themes  D: She reports she has been going to physical therapy  She says she will continue with this  Says she has been cleaning her house, playing video games, watching TV, going to [facility]  She says she greatly enjoyed the meeting at [facility]  "It was very nice to have these conversations"  She says she is considering joining the  discord  She says she sat and talked with her younger sister and her friends, "It felt validating" "Things that I felt were things that they felt"  Focused on how she has engaged with her family, friends recently in a more intimate way and focused on how she was not destroyed, did not fall apart, and rather felt validated, understood through her choices to make herself better known and to know her family   This writer provided PCT, ET, CBT through reframing, socratic dialogue, empathic listening, validation of feelings, reflection of content and feelings, allowing herself to be vulnerable, being intimate with others  A: Aruna Henriquez appeared to be in a moderately euthymic mood with congruent affect, seemed to be experiencing mildly lower mood at times, mild lethargy at times, otherwise psychiatric symptoms appear significantly reduced, appeared to be kempt, no SI/HI nor CAMARGO risk apparent or reported, seemed to respond well to PCT, ET and CBT today  P: Check in with considering ronni veronica discord and continuing with this  That she's been able to be intimate with others without it destroying her  CONTINUED: She is to complete one painting, one makeup experience in March  "I'm a sensitive person and that's ok"  Discussed grounding, box breathing and cognitive defusion together, move into pendulation  Check in with working on a routine  "As a reflex, I say 'I hate myself or I hate my life' " Begin using notebook and bringing it to session, discussing internal more often  Also begin mindfulness work together, starting with grounding   "I feel like if I were to tell people things trauma related     it comes with something, they would feel obligated to something"  ET, CBT regarding thoughts about safety and intimacy, continue addressing these themes  Behavioral Health Treatment Plan [facility]: Diagnosis and Treatment Plan explained to Javier Lawson relates understanding diagnosis and is agreeable to Treatment Plan   Yes

## 2022-03-10 ENCOUNTER — OFFICE VISIT (OUTPATIENT)
Dept: PHYSICAL THERAPY | Facility: OTHER | Age: 27
End: 2022-03-10
Payer: COMMERCIAL

## 2022-03-10 DIAGNOSIS — B00.1 COLD SORE: ICD-10-CM

## 2022-03-10 DIAGNOSIS — S43.102A AC SEPARATION, LEFT, INITIAL ENCOUNTER: Primary | ICD-10-CM

## 2022-03-10 PROCEDURE — 97110 THERAPEUTIC EXERCISES: CPT

## 2022-03-10 PROCEDURE — 97112 NEUROMUSCULAR REEDUCATION: CPT

## 2022-03-10 PROCEDURE — 97140 MANUAL THERAPY 1/> REGIONS: CPT

## 2022-03-10 NOTE — PROGRESS NOTES
Daily Note     Today's date: 3/10/2022  Patient name: Ramon Pinto  : 1995  MRN: [id number]  Referring provider: Fahad Hair  Dx:   Encounter Diagnosis     ICD-10-CM    1  AC separation, left, initial encounter  S43 102A            1 on 1 with PTA 3-330; unbilled 330-415       Subjective: Patient reports occasional catching with reaching overhead, but no major pain  Objective: See treatment diary below      Assessment: Tolerated treatment well  Progressed as charted with good tolerance  Patient demonstrated fatigue post treatment, exhibited good technique with therapeutic exercises and would benefit from continued PT  Continued to Hold taping this visit due to noted skin irritation  Plan: Continue per plan of care  Progress treatment as tolerated  Precautions: AC sprain      Manuals 2/7 2/10 2/15 2/22 2/24 2/28 3/7 3/10     AC mobs     Gr 3   EB EB gr 3       LASER ligament sprain protocol shoudler EB            MFD  NV MP MP MP EB  MP     IASTM  NV MP MP         SC mobs     Gr 3 EB EB gr 3  GRC     1st Rib     Gr 4 EB EB gr 4  GRC     Neuro Re-Ed             scap squeeze 2 x 10, 5" hold            Bent over row      2 x 10, 10#, b/l 2 x 10, 12#      TB LPD, row  OTB 1 x 15 Row    Low Row 1 x 10 OTB  OTB 2 x 10 ea  OTB 3 x 10 GTB 3 x 10 GTB 3 x 10      TB ER     OTB 2 x 15 GTB 2 x 15 90/90 peach 10 x 90/90 peach 10 x     Prone T, I   pball   1 x 15 ea  pball 2 x 10  Pball 2 x 10  PBALL 30 ea PBALL 30 ea 1# PBALL 30 ea 1#     Pball Roll Up with serratus press   5" 1 x 5          Foam Roll Krystle    1' ea  1' ea  1' ea 1' ea 1# 1' ea  1#     Wall Slides    Flex, scap, hor add Flex 5" x 10 ea  Flex 10" 2x 10 ea  Flex, scap, hor add 10 ea Flex, scap, hor add 10 ea     Corkscrew press             AROM flex, scap, abd, w/ scap squeeze       1# 10 ea 1# 1 x 15 ea       Ther Ex             UBE   2 mins retro 3'/3' 3'/3' 3/3 3/3 3'/3'     Upper trap stretch 10 x 10" 10 x 10" D 10 x 10" 10 x 10" 10" x 10 10 x 10" MFDc 10 x 10" MFDc 10 x 10" MFDc     Cervical ROM 10 x 10 x 5" ea  10 x 5" ea  10 ea  MFD 10 ea   10 x 10" MFDc 10 x 10" MFD 10 x 10" MFD     Table slide flex, scap 10 x 10" 10" x 10           Finger ladder  10" x 10           ranger             Pulleys  3 mins           Doorway Pec Stretch   10" x 5          Rebounder Double arm ball toss        2 x 20     Push Up Plus        30" box 1 x 10 ecc     Ther Activity                                       Gait Training                                       Modalities             Maza Tape     AC

## 2022-03-11 RX ORDER — VALACYCLOVIR HYDROCHLORIDE 1 G/1
2000 TABLET, FILM COATED ORAL 2 TIMES DAILY
Qty: 8 TABLET | Refills: 3 | Status: SHIPPED | OUTPATIENT
Start: 2022-03-11 | End: 2022-04-18 | Stop reason: SDUPTHER

## 2022-03-14 ENCOUNTER — SOCIAL WORK (OUTPATIENT)
Dept: BEHAVIORAL/MENTAL HEALTH CLINIC | Facility: CLINIC | Age: 27
End: 2022-03-14
Payer: COMMERCIAL

## 2022-03-14 ENCOUNTER — OFFICE VISIT (OUTPATIENT)
Dept: PHYSICAL THERAPY | Facility: OTHER | Age: 27
End: 2022-03-14
Payer: COMMERCIAL

## 2022-03-14 DIAGNOSIS — F31.81 BIPOLAR 2 DISORDER (HCC): Primary | ICD-10-CM

## 2022-03-14 DIAGNOSIS — Z86.59 HISTORY OF POSTTRAUMATIC STRESS DISORDER (PTSD): ICD-10-CM

## 2022-03-14 DIAGNOSIS — S43.102A AC SEPARATION, LEFT, INITIAL ENCOUNTER: Primary | ICD-10-CM

## 2022-03-14 PROCEDURE — 97112 NEUROMUSCULAR REEDUCATION: CPT | Performed by: PHYSICAL THERAPIST

## 2022-03-14 PROCEDURE — 90834 PSYTX W PT 45 MINUTES: CPT | Performed by: COUNSELOR

## 2022-03-14 PROCEDURE — 97140 MANUAL THERAPY 1/> REGIONS: CPT | Performed by: PHYSICAL THERAPIST

## 2022-03-14 PROCEDURE — 97110 THERAPEUTIC EXERCISES: CPT | Performed by: PHYSICAL THERAPIST

## 2022-03-14 NOTE — PSYCH
Psychotherapy Provided: Individual Psychotherapy 45 minutes     Length of time in session: 48 minutes, follow up in 1 week    Encounter Diagnosis     ICD-10-CM    1  Bipolar 2 disorder (HCC)  F31 81    2  History of posttraumatic stress disorder (PTSD)  Z86 59        Goals addressed in session: Goal 1     Pain:      none    0    Current suicide risk : Low     Time: 3:08pm - 3:56pm  Previous session plan: Check in with considering ronni godinez and continuing with this  That she's been able to be intimate with others without it destroying her  CONTINUED: She is to complete one painting, one makeup experience in March  "I'm a sensitive person and that's ok"  Discussed grounding, box breathing and cognitive defusion together, move into pendulation  Check in with working on a routine  "As a reflex, I say 'I hate myself or I hate my life' " Begin using notebook and bringing it to session, discussing internal more often  Also begin mindfulness work together, starting with grounding   "I feel like if I were to tell people things trauma related     it comes with something, they would feel obligated to something"  ET, CBT regarding thoughts about safety and intimacy, continue addressing these themes  D: She reports she has been doing physical therapy and it has been helping her with gaining strength  Plans on going back to [facility]  She says she spent a lot of time with her cousins and greatly enjoyed this  Says she did join the [employer] [facility]  She says she did go to an open khalida night with two women from the [facility]  center  She reports she's been spending time with her mother, talking to her more often in a deeper sense  She says she is slowly feeling more intimacy with her mother  This writer provided PCT through empathic listening, validation of feelings, reflection of content and feelings, psychoeducation regarding non-judgmental thought, CBT through reframing     A: Ramon Pinto appeared to be in a euthymic mood with congruent affect, seemed to be experiencing mild thoughts of hopelessness and worthlessness at times, lower mood at times, excessive worry, appeared to be kempt, no SI/HI nor CAMARGO risk apparent or reported, seemed to respond well to PCT, CBT today  P: "i'm not mentally where I was" "I don't give myself credit [for becoming the way that I am]" "I've been trying to see a future" "For the first time I'm imagining a positive future"  Reports realized fear of sex  Check in with her spending time with members of the [facility]  center, chatting in the discord  Working on being more non-judgmental with her family  Check in with deeper, improved conversations with her mother, working on boundaries and being less imposing  Continue with PCT, ET work  Behavioral Health Treatment Plan [facility]: Diagnosis and Treatment Plan explained to Lorena Muir relates understanding diagnosis and is agreeable to Treatment Plan   Yes

## 2022-03-14 NOTE — PROGRESS NOTES
Daily Note     Today's date: 3/14/2022  Patient name: Nehemias Rivero  : 1995  MRN: [id number]  Referring provider: Shannon Contreras  Dx:   Encounter Diagnosis     ICD-10-CM    1  AC separation, left, initial encounter  S43 102A            1 on 1 with PTA 3-330; unbilled 330-415       Subjective: Patient reports "shakiness" and fatigue with last visits progression, but denies pain  Patient reports occasional "cracking and clunking" in her shoulder  However, denies pain with this  States that she has the most difficulty with lying on her shoulder  Objective: See treatment diary below      Assessment: Tolerated treatment well  Patient demonstrated fatigue post treatment, exhibited good technique with therapeutic exercises and would benefit from continued PT  Plan: Continue per plan of care  Progress treatment as tolerated  Precautions: AC sprain      Manuals 2/10 2/15 2/22 2/24 2/28 3/7 3/10 3/14    AC mobs    Gr 3   EB EB gr 3       LASER            MFD NV MP MP MP EB  MP EB    IASTM NV MP MP         SC mobs    Gr 3 EB EB gr 3  GRC     1st Rib    Gr 4 EB EB gr 4  GRC     Neuro Re-Ed            scap squeeze            Bent over row     2 x 10, 10#, b/l 2 x 10, 12#  TRX row 2 x 10     TB LPD, row OTB 1 x 15 Row    Low Row 1 x 10 OTB  OTB 2 x 10 ea  OTB 3 x 10 GTB 3 x 10 GTB 3 x 10      TB ER    OTB 2 x 15 GTB 2 x 15 90/90 peach 10 x 90/90 peach 10 x     Prone T, I  pball   1 x 15 ea  pball 2 x 10  Pball 2 x 10  PBALL 30 ea PBALL 30 ea 1# PBALL 30 ea 1#     Pball Roll Up with serratus press  5" 1 x 5          Foam Roll Krystle   1' ea  1' ea  1' ea 1' ea 1# 1' ea  1# 1' ea 2#    Wall Slides   Flex, scap, hor add Flex 5" x 10 ea  Flex 10" 2x 10 ea  Flex, scap, hor add 10 ea Flex, scap, hor add 10 ea Flex, scap, hor add 10 ea    Corkscrew press        10 x 5#    AROM flex, scap, abd, w/ scap squeeze      1# 10 ea 1# 1 x 15 ea   1# 30 ea    Ther Ex            UBE  2 mins retro 3'/3' 3'/3' 3/3 3/3 3'/3' 3/3    Upper trap stretch 10 x 10" MFD 10 x 10" 10 x 10" 10" x 10 10 x 10" MFDc 10 x 10" MFDc 10 x 10" MFDc 10 x 10"  MFDc    Cervical ROM 10 x 5" ea  10 x 5" ea  10 ea  MFD 10 ea   10 x 10" MFDc 10 x 10" MFD 10 x 10" MFD 10 x 10" MFDc    Table slide flex, scap 10" x 10           Finger ladder 10" x 10           ranger            Pulleys 3 mins           Doorway Pec Stretch  10" x 5          Rebounder Double arm ball toss       2 x 20     Push Up Plus       30" box 1 x 10 ecc 1 x 10, biodex foam    Ther Activity                                    Gait Training                                    Modalities            Maza Tape    AC

## 2022-03-17 NOTE — PROGRESS NOTES
Daily Note     Today's date: 3/17/2022  Patient name: Catherine Bond  : 1995  MRN: [id number]  Referring provider: Puneet Horner  Dx:   No diagnosis found  1  with        Subjective: Patient reports "shakiness" and fatigue with last visits progression, but denies pain  Patient reports occasional "cracking and clunking" in her shoulder  However, denies pain with this  States that she has the most difficulty with lying on her shoulder  Objective: See treatment diary below      Assessment: Tolerated treatment well  Patient demonstrated fatigue post treatment, exhibited good technique with therapeutic exercises and would benefit from continued PT  Plan: Continue per plan of care  Progress treatment as tolerated  Precautions: AC sprain      Manuals 2/10 2/15 2/22 2/24 2/28 3/7 3/10 3/14    AC mobs    Gr 3   EB EB gr 3       LASER            MFD NV MP MP MP EB  MP EB    IASTM NV MP MP         SC mobs    Gr 3 EB EB gr 3  GRC     1st Rib    Gr 4 EB EB gr 4  GRC     Neuro Re-Ed            scap squeeze            Bent over row     2 x 10, 10#, b/l 2 x 10, 12#  TRX row 2 x 10     TB LPD, row OTB 1 x 15 Row    Low Row 1 x 10 OTB  OTB 2 x 10 ea  OTB 3 x 10 GTB 3 x 10 GTB 3 x 10      TB ER    OTB 2 x 15 GTB 2 x 15 90/90 peach 10 x 90/90 peach 10 x     Prone T, I  pball   1 x 15 ea  pball 2 x 10  Pball 2 x 10  PBALL 30 ea PBALL 30 ea 1# PBALL 30 ea 1#     Pball Roll Up with serratus press  5" 1 x 5          Foam Roll Krystle   1' ea  1' ea  1' ea 1' ea 1# 1' ea  1# 1' ea 2#    Wall Slides   Flex, scap, hor add Flex 5" x 10 ea  Flex 10" 2x 10 ea  Flex, scap, hor add 10 ea Flex, scap, hor add 10 ea Flex, scap, hor add 10 ea    Corkscrew press        10 x 5#    AROM flex, scap, abd, w/ scap squeeze      1# 10 ea 1# 1 x 15 ea   1# 30 ea    Ther Ex            UBE  2 mins retro 3'/3' 3'/3' 3/3 3/3 3'/3' 3/3    Upper trap stretch 10 x 10" MFD 10 x 10" 10 x 10" 10" x 10 10 x 10" MFDc 10 x 10" MFDc 10 x 10" MFDc 10 x 10"  MFDc    Cervical ROM 10 x 5" ea  10 x 5" ea  10 ea  MFD 10 ea   10 x 10" MFDc 10 x 10" MFD 10 x 10" MFD 10 x 10" MFDc    Table slide flex, scap 10" x 10           Finger ladder 10" x 10           ranger            Pulleys 3 mins           Doorway Pec Stretch  10" x 5          Rebounder Double arm ball toss       2 x 20     Push Up Plus       30" box 1 x 10 ecc 1 x 10, biodex foam    Ther Activity                                    Gait Training                                    Modalities            Maza Tape    AC

## 2022-03-18 ENCOUNTER — APPOINTMENT (OUTPATIENT)
Dept: PHYSICAL THERAPY | Facility: OTHER | Age: 27
End: 2022-03-18
Payer: COMMERCIAL

## 2022-03-21 ENCOUNTER — OFFICE VISIT (OUTPATIENT)
Dept: PHYSICAL THERAPY | Facility: OTHER | Age: 27
End: 2022-03-21
Payer: COMMERCIAL

## 2022-03-21 DIAGNOSIS — S43.102A AC SEPARATION, LEFT, INITIAL ENCOUNTER: Primary | ICD-10-CM

## 2022-03-21 PROCEDURE — 97140 MANUAL THERAPY 1/> REGIONS: CPT | Performed by: PHYSICAL THERAPIST

## 2022-03-21 PROCEDURE — 97110 THERAPEUTIC EXERCISES: CPT | Performed by: PHYSICAL THERAPIST

## 2022-03-21 PROCEDURE — 97112 NEUROMUSCULAR REEDUCATION: CPT | Performed by: PHYSICAL THERAPIST

## 2022-03-21 NOTE — PROGRESS NOTES
Daily Note     Today's date: 3/21/2022  Patient name: Dianne Funez  : 1995  MRN: [id number]  Referring provider: Jagdish Thompson  Dx:   Encounter Diagnosis     ICD-10-CM    1  AC separation, left, initial encounter  S43 102A        Start Time: 1150  Stop Time: [id number] on 1 with PT for entirety   Total time in clinic (min): 45 minutes    Subjective: Patient reports her shoulder has been bothering her more than usual  States that she may have slept on it wrong  Also, reports work has been busy and she has had to reach overhead more  But, notes she did not expect that reaching activity would bother her this much  Reports a lot of tightness in the back of her shoulder and neck since increasing pain  Objective: See treatment diary below      Assessment: Tolerated treatment well  Patient demonstrated fatigue post treatment, exhibited good technique with therapeutic exercises and would benefit from continued PT  Pinpoints pain to [facility] joint, clavicle and levator  Held pushups this visit due to increased soreness  Patient with improvements in pain post manual therapy  Plan: Continue per plan of care  Progress treatment as tolerated  Precautions: AC sprain      Manuals 2/10 2/15 2/22 2/24 2/28 3/7 3/10 3/14 3/21   AC mobs    Gr 3   EB EB gr 3       LASER            MFD NV MP MP MP EB  MP EB EB   IASTM NV MP MP         SC mobs    Gr 3 EB EB gr 3  GRC  EB   1st Rib    Gr 4 EB EB gr 4  GRC  EB   Neuro Re-Ed            scap squeeze            Bent over row     2 x 10, 10#, b/l 2 x 10, 12#  TRX row 2 x 10     TB LPD, row OTB 1 x 15 Row    Low Row 1 x 10 OTB  OTB 2 x 10 ea  OTB 3 x 10 GTB 3 x 10 GTB 3 x 10      TB ER    OTB 2 x 15 GTB 2 x 15 90/90 peach 10 x 90/90 peach 10 x  Resume NV   Prone T, I  pball   1 x 15 ea  pball 2 x 10  Pball 2 x 10  PBALL 30 ea PBALL 30 ea 1# PBALL 30 ea 1#  pball 30 ea 1#   Pball Roll Up with serratus press  5" 1 x 5          Foam Roll Krystle   1' ea  1' ea   1' ea 1' ea 1# 1' ea  1# 1' ea 2# 1' ea 2#   Wall Slides   Flex, scap, hor add Flex 5" x 10 ea  Flex 10" 2x 10 ea  Flex, scap, hor add 10 ea Flex, scap, hor add 10 ea Flex, scap, hor add 10 ea    Corkscrew press        10 x 5# 10x 5#   AROM flex, scap, abd, w/ scap squeeze      1# 10 ea 1# 1 x 15 ea  1# 30 ea 1# 30 ea   Ther Ex            UBE  2 mins retro 3'/3' 3'/3' 3/3 3/3 3'/3' 3/3 3/3   Upper trap stretch 10 x 10" MFD 10 x 10" 10 x 10" 10" x 10 10 x 10" MFDc 10 x 10" MFDc 10 x 10" MFDc 10 x 10"  MFDc 10 x 10"  MFDc   Cervical ROM 10 x 5" ea  10 x 5" ea  10 ea  MFD 10 ea   10 x 10" MFDc 10 x 10" MFD 10 x 10" MFD 10 x 10" MFDc 10 x 10" MFDc   Table slide flex, scap 10" x 10           Finger ladder 10" x 10           ranger            Pulleys 3 mins           Doorway Pec Stretch  10" x 5          Rebounder Double arm ball toss       2 x 20  Resume NV   Push Up Plus       30" box 1 x 10 ecc 1 x 10, biodex foam Resume NV   Ther Activity                                    Gait Training                                    Modalities            Maza Tape    AC

## 2022-03-22 ENCOUNTER — SOCIAL WORK (OUTPATIENT)
Dept: BEHAVIORAL/MENTAL HEALTH CLINIC | Facility: CLINIC | Age: 27
End: 2022-03-22
Payer: COMMERCIAL

## 2022-03-22 DIAGNOSIS — F31.81 BIPOLAR 2 DISORDER (HCC): Primary | ICD-10-CM

## 2022-03-22 DIAGNOSIS — Z86.59 HISTORY OF POSTTRAUMATIC STRESS DISORDER (PTSD): ICD-10-CM

## 2022-03-22 PROCEDURE — 90834 PSYTX W PT 45 MINUTES: CPT | Performed by: COUNSELOR

## 2022-03-22 NOTE — PSYCH
Psychotherapy Provided: Individual Psychotherapy 45 minutes     Length of time in session: 40 minutes, follow up in 1 week    Encounter Diagnosis     ICD-10-CM    1  Bipolar 2 disorder (HCC)  F31 81    2  History of posttraumatic stress disorder (PTSD)  Z86 59        Goals addressed in session: Goal 1     Pain:      none    0    Current suicide risk : Low     Time: 9:12am - 9:52am  Previous session plan: "i'm not mentally where I was" "I don't give myself credit [for becoming the way that I am]" "I've been trying to see a future" "For the first time I'm imagining a positive future"  Reports realized fear of sex  Check in with her spending time with members of the [email or web address]  center, chatting in the discord  Working on being more non-judgmental with her family  Check in with deeper, improved conversations with her mother, working on boundaries and being less imposing  Continue with PCT, ET work  D: She reported on a night out that was awkward for her, but overall enjoyable  She says that she got a new job at [employer] [address] because she's realizing she needs to make more money to sustain her home  She says she hasn't met up with her [employer] group again  She reports she's been able to continue to talk to her sister and feel fulfilled in her conversations with her  Says she continues to imagine a positive future  This writer provided PCT through empathic listening, validation of feelings, reflection of content and feelings, psychoeducation  A: Jarred Kaur appeared to be in a mildly euthymic mood with congruent affect, seemed to be experiencing lower mood at times, moderate lethargy, mild avolition, mild anhedonia, appeared to be kempt, no SI/HI nor CAMARGO risk apparent or reported, seemed to respond well to PCT today  P: Check in with beginning working at [employer] along with [employer]   Check in with having a new schedule, organizing her time, her well-being  "i'm not mentally where I was" "I don't give myself credit [for becoming the way that I am]" "I've been trying to see a future" "For the first time I'm imagining a positive future"  Reports realized fear of sex  Check in with her spending time with members of the [employer], chatting in the discord  Working on being more non-judgmental with her family  Check in with deeper, improved conversations with her mother, working on boundaries and being less imposing  Behavioral Health Treatment Plan [facility]: Diagnosis and Treatment Plan explained to Mesfin Hidalgo relates understanding diagnosis and is agreeable to Treatment Plan   Yes

## 2022-03-28 ENCOUNTER — OFFICE VISIT (OUTPATIENT)
Dept: PHYSICAL THERAPY | Facility: OTHER | Age: 27
End: 2022-03-28
Payer: COMMERCIAL

## 2022-03-28 DIAGNOSIS — S43.102A AC SEPARATION, LEFT, INITIAL ENCOUNTER: Primary | ICD-10-CM

## 2022-03-28 PROCEDURE — 97140 MANUAL THERAPY 1/> REGIONS: CPT | Performed by: PHYSICAL THERAPIST

## 2022-03-28 PROCEDURE — 97112 NEUROMUSCULAR REEDUCATION: CPT | Performed by: PHYSICAL THERAPIST

## 2022-03-28 PROCEDURE — 97110 THERAPEUTIC EXERCISES: CPT | Performed by: PHYSICAL THERAPIST

## 2022-03-28 NOTE — PROGRESS NOTES
Daily Note     Today's date: 3/28/2022  Patient name: Jose Stokes  : 1995  MRN: [id number]  Referring provider: Pérez Garcia  Dx:   Encounter Diagnosis     ICD-10-CM    1  AC separation, left, initial encounter  S43 102A            1 on 1 with PT for entirety        Subjective: Patient reports she feels "fine"  Notes she still has difficulty with heavy household chores, and feels she is not as strong before  Reports at this time she feels she is not able to do as many push ups or pull ups as she could pre-injury  However, does note improvement and reports she would like to continue functional progression with conservative PT versus invasive treatments  Objective: See treatment diary below      Assessment: Tolerated treatment well  Patient demonstrated fatigue post treatment, exhibited good technique with therapeutic exercises and would benefit from continued PT  Patient easily fatigued with today's session  Continued manuals  Patient noting, significant improvement in ability to perform strengthening exercises post manual therapy  Plan: Continue per plan of care  Progress treatment as tolerated  Precautions: AC sprain      Manuals 2/22 2/24 2/28 3/7 3/10 3/14 3/21 3/28   AC mobs  Gr 3   EB EB gr 3        LASER           MFD MP MP EB  MP EB EB EB   IASTM MP          SC mobs  Gr 3 EB EB gr 3  GRC  EB EB   1st Rib  Gr 4 EB EB gr 4  GRC  EB EB   Neuro Re-Ed           scap squeeze           Bent over row   2 x 10, 10#, b/l 2 x 10, 12#  TRX row 2 x 10   TRX row 2 x 10    TB LPD, row OTB 2 x 10 ea  OTB 3 x 10 GTB 3 x 10 GTB 3 x 10       TB ER  OTB 2 x 15 GTB 2 x 15 90/90 peach 10 x 90/90 peach 10 x  Resume NV    Prone T, I pball 2 x 10  Pball 2 x 10  PBALL 30 ea PBALL 30 ea 1# PBALL 30 ea 1#  pball 30 ea 1#    Pball Roll Up with serratus press           Foam Roll Krystle 1' ea  1' ea  1' ea 1' ea 1# 1' ea  1# 1' ea 2# 1' ea 2#    Wall Slides Flex, scap, hor add Flex 5" x 10 ea  Flex 10" 2x 10 ea  Flex, scap, hor add 10 ea Flex, scap, hor add 10 ea Flex, scap, hor add 10 ea     Corkscrew press      10 x 5# 10x 5# 2 x 10 5#   AROM flex, scap, abd, w/ scap squeeze    1# 10 ea 1# 1 x 15 ea  1# 30 ea 1# [facility] on wall        20 CW, CCW 2 x abcs   red   Table push up        10 x              Ther Ex           UBE 3'/3' 3'/3' 3/3 3/3 3'/3' 3/3 3/3 3/3   Upper trap stretch 10 x 10" 10" x 10 10 x 10" MFDc 10 x 10" MFDc 10 x 10" MFDc 10 x 10"  MFDc 10 x 10"  MFDc 10 x 10"  MFDc   Cervical ROM 10 ea  MFD 10 ea   10 x 10" MFDc 10 x 10" MFD 10 x 10" MFD 10 x 10" MFDc 10 x 10" MFDc 10 x 10"  MFDc   Table slide flex, scap           Finger ladder           ranger           Pulleys           Doorway Pec Stretch           Rebounder Double arm ball toss     2 x 20  Resume NV    Push Up Plus     30" box 1 x 10 ecc 1 x 10, biodex foam Resume NV 2 x 10, biodex foam     Ther Activity                                 Gait Training                                 Modalities           Maza Tape  AC

## 2022-03-31 ENCOUNTER — OFFICE VISIT (OUTPATIENT)
Dept: PHYSICAL THERAPY | Facility: OTHER | Age: 27
End: 2022-03-31
Payer: COMMERCIAL

## 2022-03-31 ENCOUNTER — TELEPHONE (OUTPATIENT)
Dept: OBGYN CLINIC | Facility: HOSPITAL | Age: 27
End: 2022-03-31

## 2022-03-31 DIAGNOSIS — S43.102A AC SEPARATION, LEFT, INITIAL ENCOUNTER: Primary | ICD-10-CM

## 2022-03-31 PROCEDURE — 97112 NEUROMUSCULAR REEDUCATION: CPT

## 2022-03-31 PROCEDURE — 97110 THERAPEUTIC EXERCISES: CPT

## 2022-03-31 NOTE — PROGRESS NOTES
Daily Note     Today's date: 3/31/2022  Patient name: Sandrea Paget  : 1995  MRN: [id number]  Referring provider: Jonny Jain  Dx:   Encounter Diagnosis     ICD-10-CM    1  AC separation, left, initial encounter  S43 102A                   Subjective: Patient noted that she has pain anterior L shoulder  Patient also noted tightness in anterior shoulder around first rib  Objective: See treatment diary below      Assessment: Tolerated treatment well  Patient exhibited good technique with therapeutic exercises and would benefit from continued PT  Patient noted feeling better post manual 1st rib mob and SC mob today with increased ROM post mobs performed by PT LB  Patient demonstrated fatigue post treatment  Plan: Continue per plan of care  Precautions: AC sprain      Manuals 3/31  2/28 3/7 3/10 3/14 3/21 3/28   AC mobs   EB gr 3        LASER           MFD AF  EB  MP EB EB EB   IASTM           SC mobs EB  EB gr 3  GRC  EB EB   1st Rib EB  EB gr 4  GRC  EB EB   Neuro Re-Ed           scap squeeze           Bent over row   2 x 10, 10#, b/l 2 x 10, 12#  TRX row 2 x 10   TRX row 2 x 10    TB LPD, row   GTB 3 x 10 GTB 3 x 10       TB ER   GTB 2 x 15 90/90 peach 10 x 90/90 peach 10 x  Resume NV    Prone T, I pball 30 ea 1#  PBALL 30 ea PBALL 30 ea 1# PBALL 30 ea 1#  pball 30 ea 1#    Pball Roll Up with serratus press           Foam Roll Krystle 1' ea 2#  1' ea 1' ea 1# 1' ea  1# 1' ea 2# 1' ea 2#    Wall Slides   Flex 10" 2x 10 ea  Flex, scap, hor add 10 ea Flex, scap, hor add 10 ea Flex, scap, hor add 10 ea     Corkscrew press 2 x 10 5#     10 x 5# 10x 5# 2 x 10 5#   AROM flex, scap, abd, w/ scap squeeze    1# 10 ea 1# 1 x 15 ea   1# 30 ea 1# 30 ea    Ball on wall 20 CW, CCW 2 x abcs   red       20 CW, CCW 2 x abcs   red   Table push up 10x       10 x              Ther Ex           UBE 3'3'  3/3 3/3 3'/3' 3/3 3/3 3/3   Upper trap stretch 10"x10 MFDc  10 x 10" MFDc 10 x 10" MFDc 10 x 10" MFDc 10 x 10"  MFDc 10 x 10"  MFDc 10 x 10"  MFDc   Cervical ROM 10 x 10" MFDc  10 x 10" MFDc 10 x 10" MFD 10 x 10" MFD 10 x 10" MFDc 10 x 10" MFDc 10 x 10"  MFDc   Table slide flex, scap           Finger ladder           ranger           Pulleys           Doorway Pec Stretch           Rebounder Double arm ball toss     2 x 20  Resume NV    Push Up Plus 2 x 10, biodex foam      30" box 1 x 10 ecc 1 x 10, biodex foam Resume NV 2 x 10, biodex foam     Ther Activity                                 Gait Training                                 Modalities           Maza Diane

## 2022-04-04 ENCOUNTER — OFFICE VISIT (OUTPATIENT)
Dept: FAMILY MEDICINE CLINIC | Facility: CLINIC | Age: 27
End: 2022-04-04
Payer: COMMERCIAL

## 2022-04-04 ENCOUNTER — OFFICE VISIT (OUTPATIENT)
Dept: PHYSICAL THERAPY | Facility: OTHER | Age: 27
End: 2022-04-04
Payer: COMMERCIAL

## 2022-04-04 VITALS
SYSTOLIC BLOOD PRESSURE: 102 MMHG | RESPIRATION RATE: 16 BRPM | TEMPERATURE: 98.5 F | HEIGHT: 60 IN | HEART RATE: 104 BPM | BODY MASS INDEX: 25.91 KG/M2 | DIASTOLIC BLOOD PRESSURE: 60 MMHG | OXYGEN SATURATION: 98 % | WEIGHT: 132 LBS

## 2022-04-04 DIAGNOSIS — D18.01 HEMANGIOMA OF SKIN: ICD-10-CM

## 2022-04-04 DIAGNOSIS — Z00.00 ANNUAL PHYSICAL EXAM: Primary | ICD-10-CM

## 2022-04-04 DIAGNOSIS — S43.102A AC SEPARATION, LEFT, INITIAL ENCOUNTER: Primary | ICD-10-CM

## 2022-04-04 DIAGNOSIS — F31.81 BIPOLAR 2 DISORDER (HCC): ICD-10-CM

## 2022-04-04 DIAGNOSIS — Z13.220 SCREENING FOR HYPERLIPIDEMIA: ICD-10-CM

## 2022-04-04 DIAGNOSIS — L65.9 HAIR LOSS: ICD-10-CM

## 2022-04-04 DIAGNOSIS — E55.9 VITAMIN D DEFICIENCY: ICD-10-CM

## 2022-04-04 DIAGNOSIS — H91.93 DECREASED HEARING OF BOTH EARS: ICD-10-CM

## 2022-04-04 PROBLEM — M25.561 ACUTE PAIN OF RIGHT KNEE: Status: RESOLVED | Noted: 2018-05-21 | Resolved: 2022-04-04

## 2022-04-04 PROBLEM — R00.2 PALPITATIONS: Status: RESOLVED | Noted: 2017-08-15 | Resolved: 2022-04-04

## 2022-04-04 PROBLEM — M62.9 HAMSTRING TIGHTNESS OF BOTH LOWER EXTREMITIES: Status: RESOLVED | Noted: 2020-10-22 | Resolved: 2022-04-04

## 2022-04-04 PROBLEM — R53.83 FATIGUE: Status: RESOLVED | Noted: 2020-04-17 | Resolved: 2022-04-04

## 2022-04-04 PROBLEM — Z20.822 EXPOSURE TO COVID-19 VIRUS: Status: RESOLVED | Noted: 2020-11-18 | Resolved: 2022-04-04

## 2022-04-04 PROCEDURE — 97112 NEUROMUSCULAR REEDUCATION: CPT | Performed by: PHYSICAL THERAPIST

## 2022-04-04 PROCEDURE — 97140 MANUAL THERAPY 1/> REGIONS: CPT | Performed by: PHYSICAL THERAPIST

## 2022-04-04 PROCEDURE — 99395 PREV VISIT EST AGE 18-39: CPT | Performed by: FAMILY MEDICINE

## 2022-04-04 PROCEDURE — 97110 THERAPEUTIC EXERCISES: CPT | Performed by: PHYSICAL THERAPIST

## 2022-04-04 NOTE — PROGRESS NOTES
[facility] PRACT[facility]    NAME: Roque Vallejo  AGE: 32 y o  SEX: female  : 1995     DATE: 2022     Assessment and Plan:     Problem List Items Addressed This Visit        Other    Vitamin D deficiency     Recheck  Relevant Orders    Vitamin D 25 hydroxy    Bipolar 2 disorder ([address] )     Goes to Dr Raúl Lopes, Psychiatry and behavioral therapy  She is doing well on Wellbutrin, lamotrigine, trazodone and atarax as needed  Annual physical exam - Primary     Normal physical exam    Screening labs ordered  Reminded to get pap  Goes to [facility]  She will also have std screening at that time  Immunizations UTD  Other Visit Diagnoses     Decreased hearing of both ears        Relevant Orders    Ambulatory Referral to Audiology    Hair loss        Relevant Orders    TSH, 3rd generation with Free T4 reflex    CBC and differential    Screening for hyperlipidemia        Relevant Orders    Lipid panel    Hemangioma of skin        Relevant Orders    Ambulatory Referral to Dermatology          Immunizations and preventive care screenings were discussed with patient today  Appropriate education was printed on patient's after visit summary  Immunizations UTD  Counseling:  Alcohol/drug use: discussed moderation in alcohol intake, the recommendations for healthy alcohol use, and avoidance of illicit drug use  Dental Health: discussed importance of regular tooth brushing, flossing, and dental visits  · Exercise: the importance of regular exercise/physical activity was discussed  Recommend exercise 3-5 times per week for at least 30 minutes  No follow-ups on file  Chief Complaint:     Chief Complaint   Patient presents with    Annual Exam      History of Present Illness:     Adult Annual Physical   Patient here for a comprehensive physical exam  The patient reports no problems      Diet and Physical Activity  · Diet/Nutrition: well balanced diet  · Exercise: moderate cardiovascular exercise  Depression Screening  PHQ-2/9 Depression Screening         General Health  · Sleep: sleeps well  · Hearing: no issues  · Vision: goes for regular eye exams, wears glasses and wears contacts  · Dental: regular dental visits  /GYN Health  · Last menstrual period: no periods while on IUD  · Contraceptive method: IUD placement  · History of STDs?: no      Review of Systems:     Review of Systems   Constitutional: Negative for fever and unexpected weight change  HENT: Negative for ear pain, sore throat and trouble swallowing  Eyes: Negative for pain and visual disturbance  Respiratory: Negative for cough, chest tightness, shortness of breath and wheezing  Cardiovascular: Negative for chest pain  Gastrointestinal: Negative for abdominal distention, abdominal pain, blood in stool, constipation, diarrhea, nausea and vomiting  Endocrine: Negative for polydipsia and polyuria  Genitourinary: Negative for dysuria and hematuria  Musculoskeletal: Negative for back pain and myalgias  Skin: Negative for rash  Neurological: Negative for syncope and headaches  Psychiatric/Behavioral: Negative for suicidal ideas  Past Medical History:     History reviewed  No pertinent past medical history     Past Surgical History:     Past Surgical History:   Procedure Laterality Date    CERVICAL BIOPSY  W/ LOOP ELECTRODE EXCISION      FL INJECTION LEFT HIP (ARTHROGRAM)  4/7/2021    WISDOM TOOTH EXTRACTION        Social History:     Social History     Socioeconomic History    Marital status: Single     Spouse name: None    Number of children: None    Years of education: None    Highest education level: None   Occupational History    None   Tobacco Use    Smoking status: Former Smoker    Smokeless tobacco: Never Used    Tobacco comment: was a social smoker   Vaping Use    Vaping Use: Never used Substance and Sexual Activity    Alcohol use: Yes     Comment: occassionally    Drug use: Yes     Types: Marijuana     Comment: occasionally    Sexual activity: Not Currently   Other Topics Concern    None   Social History Narrative    None     Social Determinants of Health     Financial Resource Strain: Not on file   Food Insecurity: Not on file   Transportation Needs: Not on file   Physical Activity: Not on file   Stress: Not on file   Social Connections: Not on file   Intimate Partner Violence: Not on file   Housing Stability: Not on file      Family History:     Family History   Problem Relation Age of Onset    Hypertension Mother     Vitamin D deficiency Father     Diabetes Maternal Grandmother     Schizophrenia Paternal Uncle       Current Medications:     Current Outpatient Medications   Medication Sig Dispense Refill    albuterol (PROVENTIL HFA,VENTOLIN HFA) 90 mcg/act inhaler Inhale 2 puffs every 6 (six) hours as needed for wheezing or shortness of breath 3 Inhaler 1    buPROPion (WELLBUTRIN XL) 150 mg 24 hr tablet Take 1 tablet (150 mg total) by mouth daily 90 tablet 0    hydrOXYzine HCL (ATARAX) 10 mg tablet Take 1 tablet (10 mg total) by mouth every 6 (six) hours as needed for anxiety 30 tablet 2    lamoTRIgine (LaMICtal) 200 MG tablet Take 1 tablet (200 mg total) by mouth daily 90 tablet 0    traZODone (DESYREL) 50 mg tablet TAKE 0 5 TABLETS (25 MG TOTAL) BY MOUTH DAILY AT BEDTIME 45 tablet 0    Levonorgestrel 13 5 MG IUD 1 each by Intrauterine route      valACYclovir (VALTREX) 1,000 mg tablet Take 2 tablets (2,000 mg total) by mouth 2 (two) times a day for 1 day 8 tablet 3     No current facility-administered medications for this visit        Allergies:     No Known Allergies   Physical Exam:     /60 (BP Location: Left arm, Patient Position: Sitting, Cuff Size: Adult)   Pulse 104   Temp 98 5 °F (36 9 °C) (Tympanic)   Resp 16   Ht 5' (1 524 m)   Wt 59 9 kg (132 lb)   SpO2 98%   BMI 25 78 kg/m²     Physical Exam  Constitutional:       Appearance: She is well-developed  HENT:      Head: Normocephalic and atraumatic  Eyes:      General: No scleral icterus  Conjunctiva/sclera: Conjunctivae normal       Pupils: Pupils are equal, round, and reactive to light  Cardiovascular:      Rate and Rhythm: Normal rate and regular rhythm  Heart sounds: Normal heart sounds  No murmur heard  No friction rub  No gallop  Pulmonary:      Effort: Pulmonary effort is normal  No respiratory distress  Breath sounds: No wheezing or rales  Chest:      Chest wall: No tenderness  Abdominal:      General: Bowel sounds are normal  There is no distension  Palpations: Abdomen is soft  There is no mass  Tenderness: There is no abdominal tenderness  Musculoskeletal:         General: Normal range of motion  Cervical back: Normal range of motion and neck supple  Lymphadenopathy:      Cervical: No cervical adenopathy  Skin:     General: Skin is warm and dry  Capillary Refill: Capillary refill takes less than 2 seconds  Findings: No rash  Neurological:      Mental Status: She is alert and oriented to person, place, and time  Cranial Nerves: No cranial nerve deficit            Linda Page MD   [address] [place] [address]

## 2022-04-04 NOTE — PROGRESS NOTES
Daily Note     Today's date: 2022  Patient name: Cam Chin  : 1995  MRN: [id number]  Referring provider: Berny Tubbs  Dx:   Encounter Diagnosis     ICD-10-CM    1  AC separation, left, initial encounter  S43 102A        Start Time: 8036  Stop Time: 1830  Total time in clinic (min): 45 minutes    Subjective: Patient reports her shoulder is feeling ok  Offers no complaints at this time  Objective: See treatment diary below      Assessment: Tolerated treatment well  Patient exhibited good technique with therapeutic exercises and would benefit from continued PT  Plan: Continue per plan of care  Precautions: AC sprain      Manuals 2/28 3/7 3/10 3/14 3/21 3/28 4/4   AC mobs EB gr 3         LASER          MFD EB  MP EB EB EB EB   IASTM          SC mobs EB gr 3  GRC  EB EB EB   1st Rib EB gr 4  GRC  EB EB EB   Neuro Re-Ed          scap squeeze          Bent over row 2 x 10, 10#, b/l 2 x 10, 12#  TRX row 2 x 10   TRX row 2 x 10  TRX row 2 x 10    TB LPD, row GTB 3 x 10 GTB 3 x 10        TB ER GTB 2 x 15 90/90 peach 10 x 90/90 peach 10 x  Resume NV  90/90 2 x 10, OTB   Prone T, I PBALL 30 ea PBALL 30 ea 1# PBALL 30 ea 1#  pball 30 ea 1#     Pball Roll Up with serratus press          Foam Roll Krystle 1' ea 1' ea 1# 1' ea  1# 1' ea 2# 1' ea 2#     Wall Slides Flex 10" 2x 10 ea  Flex, scap, hor add 10 ea Flex, scap, hor add 10 ea Flex, scap, hor add 10 ea      Corkscrew press    10 x 5# 10x 5# 2 x 10 5# 2 x 10 5#, 1 x 10, 7 5#   AROM flex, scap, abd, w/ scap squeeze  1# 10 ea 1# 1 x 15 ea   1# 30 ea 1# 30 ea  1# [address] on wall      20 CW, CCW 2 x abcs   red 20 CW, CCW 2 x abcs   red   Table push up      10 x 2 x 10              Ther Ex          UBE 3/3 3/3 3/3' 3/3 3/3 3/3 3/3   Upper trap stretch 10 x 10" MFDc 10 x 10" MFDc 10 x 10" MFDc 10 x 10"  MFDc 10 x 10"  MFDc 10 x 10"  MFDc 10 x 10"  MFDc   Cervical ROM 10 x 10" MFDc 10 x 10" MFD 10 x 10" MFD 10 x 10" MFDc 10 x 10" MFDc 10 x 10"  MFDc 10 x 10"  MFDc   Table slide flex, scap          Finger ladder          ranger          Pulleys          Doorway Pec Stretch          Rebounder Double arm ball toss   2 x 20  Resume NV     Push Up Plus   30" box 1 x 10 ecc 1 x 10, biodex foam Resume NV 2 x 10, biodex foam   3 x 10 biodex foam, add box walkover NV   Ther Activity                              Gait Training                              Modalities          Link Contreras

## 2022-04-05 ENCOUNTER — SOCIAL WORK (OUTPATIENT)
Dept: BEHAVIORAL/MENTAL HEALTH CLINIC | Facility: CLINIC | Age: 27
End: 2022-04-05
Payer: COMMERCIAL

## 2022-04-05 DIAGNOSIS — F31.81 BIPOLAR 2 DISORDER (HCC): Primary | ICD-10-CM

## 2022-04-05 DIAGNOSIS — Z86.59 HISTORY OF POSTTRAUMATIC STRESS DISORDER (PTSD): ICD-10-CM

## 2022-04-05 PROCEDURE — 90834 PSYTX W PT 45 MINUTES: CPT | Performed by: COUNSELOR

## 2022-04-05 NOTE — PSYCH
Psychotherapy Provided: Individual Psychotherapy 45 minutes     Length of time in session: 44 minutes, follow up in 1 week    Encounter Diagnosis     ICD-10-CM    1  Bipolar 2 disorder (HCC)  F31 81    2  History of posttraumatic stress disorder (PTSD)  Z86 59        Goals addressed in session: Goal 1     Pain:      none    0    Current suicide risk : Low     Time: 9:09am - 9:53am  Previous session plan: Check in with beginning working at [employer] along with [employer]  Check in with having a new schedule, organizing her time, her well-being  "i'm not mentally where I was" "I don't give myself credit [for becoming the way that I am]" "I've been trying to see a future" "For the first time I'm imagining a positive future"  Reports realized fear of sex  Check in with her spending time with members of the [employer], chatting in the discord  Working on being more non-judgmental with her family  Check in with deeper, improved conversations with her mother, working on boundaries and being less imposing  D: She says that it has been wonderful working at [employer] thus far, enjoying being able to speak Hebrew around her coworkers without strange looks  Reports she hasn't been engaging with the [employer]  Encouraged her to re-engage because she felt great after her last event  Focused on importance of contacting doctors, or seeing another for a second opinion, until she finds one that she meshes well with  Discussed motivation, increasing her time with individuals she feels safe with, who she can be intimate with  This writer provided PCT through empathic listening, validation of feelings, reflection of content and feelings  A: Ayaka Loyd appeared to be in a mildly depressed mood with congruent affect, seemed to be experiencing moderate avolition, mild anhedonia, excessive worry, mild lethargy, appeared to be kempt, no SI/HI nor CAMARGO risk apparent or reported, seemed to respond well to PCT, CBT work     P: 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  Check in with having a new schedule, organizing her time, her well-being  "i'm not mentally where I was" "I don't give myself credit [for becoming the way that I am]" "I've been trying to see a future" "For the first time I'm imagining a positive future"  Reports realized fear of sex  Check in with her spending time with members of the [employer], chatting in the discord  Working on being more non-judgmental with her family  Check in with deeper, improved conversations with her mother, working on boundaries and being less imposing  Behavioral Health Treatment Plan [facility]: Diagnosis and Treatment Plan explained to Charline Gibson relates understanding diagnosis and is agreeable to Treatment Plan   Yes

## 2022-04-07 ENCOUNTER — OFFICE VISIT (OUTPATIENT)
Dept: PHYSICAL THERAPY | Facility: OTHER | Age: 27
End: 2022-04-07
Payer: COMMERCIAL

## 2022-04-07 DIAGNOSIS — S43.102A AC SEPARATION, LEFT, INITIAL ENCOUNTER: Primary | ICD-10-CM

## 2022-04-07 PROCEDURE — 97140 MANUAL THERAPY 1/> REGIONS: CPT | Performed by: PHYSICAL THERAPIST

## 2022-04-07 PROCEDURE — 97112 NEUROMUSCULAR REEDUCATION: CPT | Performed by: PHYSICAL THERAPIST

## 2022-04-07 PROCEDURE — 97110 THERAPEUTIC EXERCISES: CPT | Performed by: PHYSICAL THERAPIST

## 2022-04-07 NOTE — PROGRESS NOTES
Daily Note     Today's date: 2022  Patient name: Madelin Kolb  : 1995  MRN: [id number]  Referring provider: Janell Jeans, Reva Hires  Dx:   Encounter Diagnosis     ICD-10-CM    1  AC separation, left, initial encounter  S43 102A        Start Time: 1745  Stop Time: 1830  Total time in clinic (min): 45 minutes    Subjective: Patient reports SC discomfort as her primary complaint this visit  Denies any pain at [facility] joint  Overall reports her shoulder feels good  Objective: See treatment diary below      Assessment: Tolerated treatment well  Patient exhibited good technique with therapeutic exercises and would benefit from continued PT  Patient with mild SC pain with corkscrew press, otherwise pain free throughout session  Plan: Continue per plan of care  Precautions: AC sprain      Manuals 3/7 3/10 3/14 3/21 3/28 4/4 4/7   AC mobs          LASER          MFD  MP EB EB EB EB EB   IASTM          SC mobs  GRC  EB EB EB EB   1st Rib  GRC  EB EB EB EB   Neuro Re-Ed          scap squeeze          Bent over row 2 x 10, 12#  TRX row 2 x 10   TRX row 2 x 10  TRX row 2 x 10  TRX row 2 x 10    TB LPD, row GTB 3 x 10         TB ER 90/90 peach 10 x 90/90 peach 10 x  Resume NV  90/90 2 x 10, OTB 90/90 2 x 10, OTB   Prone T, I PBALL 30 ea 1# PBALL 30 ea 1#  pball 30 ea 1#      Box walk over       4" biodex foam, 6" , wobble board 4 laps   Foam Roll Krystle 1' ea 1# 1' ea  1# 1' ea 2# 1' ea 2#      Wall Slides Flex, scap, hor add 10 ea Flex, scap, hor add 10 ea Flex, scap, hor add 10 ea       Corkscrew press   10 x 5# 10x 5# 2 x 10 5# 2 x 10 5#, 1 x 10, 7 5# 2 x 10 5#, 1 x 10, 7 5#   AROM flex, scap, abd, w/ scap squeeze 1# 10 ea 1# 1 x 15 ea   1# 30 ea 1# 30 ea  1# 30 ea    Ball on wall     20 CW, CCW 2 x abcs   red 20 CW, CCW 2 x abcs   red    Table push up     10 x 2 x 10  biodex scap push up 3 x 10             Ther Ex          UBE 3/3 3'/3' 3/3 3/3 3/3 3/3 33   Upper trap stretch 10 x 10" MFDc 10 x 10" MFDc 10 x 10"  MFDc 10 x 10"  MFDc 10 x 10"  MFDc 10 x 10"  MFDc 10 x 10" MFDc   Cervical ROM 10 x 10" MFD 10 x 10" MFD 10 x 10" MFDc 10 x 10" MFDc 10 x 10"  MFDc 10 x 10"  MFDc 10 x 10" MFDc   Table slide flex, scap          Finger ladder          ranger          Pulleys          Doorway Pec Stretch          Rebounder Double arm ball toss  2 x 20  Resume NV      Push Up Plus  30" box 1 x 10 ecc 1 x 10, biodex foam Resume NV 2 x 10, biodex foam   3 x 10 biodex foam, add box walkover NV    Ther Activity                              Gait Training                              Modalities          Link Contreras

## 2022-04-11 ENCOUNTER — SOCIAL WORK (OUTPATIENT)
Dept: BEHAVIORAL/MENTAL HEALTH CLINIC | Facility: CLINIC | Age: 27
End: 2022-04-11
Payer: COMMERCIAL

## 2022-04-11 ENCOUNTER — OFFICE VISIT (OUTPATIENT)
Dept: PHYSICAL THERAPY | Facility: OTHER | Age: 27
End: 2022-04-11
Payer: COMMERCIAL

## 2022-04-11 DIAGNOSIS — F31.81 BIPOLAR 2 DISORDER (HCC): Primary | ICD-10-CM

## 2022-04-11 DIAGNOSIS — Z86.59 HISTORY OF POSTTRAUMATIC STRESS DISORDER (PTSD): ICD-10-CM

## 2022-04-11 DIAGNOSIS — S43.102A AC SEPARATION, LEFT, INITIAL ENCOUNTER: Primary | ICD-10-CM

## 2022-04-11 PROCEDURE — 90834 PSYTX W PT 45 MINUTES: CPT | Performed by: COUNSELOR

## 2022-04-11 PROCEDURE — 97112 NEUROMUSCULAR REEDUCATION: CPT | Performed by: PHYSICAL THERAPIST

## 2022-04-11 PROCEDURE — 97140 MANUAL THERAPY 1/> REGIONS: CPT | Performed by: PHYSICAL THERAPIST

## 2022-04-11 PROCEDURE — 97110 THERAPEUTIC EXERCISES: CPT | Performed by: PHYSICAL THERAPIST

## 2022-04-11 NOTE — PROGRESS NOTES
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   Objective: See treatment diary below      Assessment: Tolerated treatment well  Patient exhibited good technique with therapeutic exercises and would benefit from continued PT  Plan: Continue per plan of care  Precautions: AC sprain      Manuals 3/7 3/10 3/14 3/21 3/28 4/4 4/7 4/11   AC mobs           LASER           MFD  MP EB EB EB EB EB EB   IASTM           SC, AC mobs  GRC  EB EB EB EB EB   1st Rib  GRC  EB EB EB EB    GH discraction        EB   GH percussion        EB   Neuro Re-Ed           scap squeeze           Bent over row 2 x 10, 12#  TRX row 2 x 10   TRX row 2 x 10  TRX row 2 x 10  TRX row 2 x 10     TB LPD, row GTB 3 x 10          TB ER 90/90 peach 10 x 90/90 peach 10 x  Resume NV  90/90 2 x 10, OTB 90/90 2 x 10, OTB 90/90 3 x 10, OTB   Prone T, I PBALL 30 ea 1# PBALL 30 ea 1#  pball 30 ea 1#       Box walk over       4" biodex foam, 6" , wobble board 4 laps 4" biodex foam 10 scap push ups, 6" , wobble board 4 laps   Foam Roll Krystle 1' ea 1# 1' ea  1# 1' ea 2# 1' ea 2#    1' x 2 pec stretch   Wall Slides Flex, scap, hor add 10 ea Flex, scap, hor add 10 ea Flex, scap, hor add 10 ea        Body blade w/ lower trap activation        5 x scap, 5x H  ADD   Corkscrew press   10 x 5# 10x 5# 2 x 10 5# 2 x 10 5#, 1 x 10, 7 5# 2 x 10 5#, 1 x 10, 7 5# 3 x 10  7 5#   AROM flex, scap, abd, w/ scap squeeze 1# 10 ea 1# 1 x 15 ea   1# 30 ea 1# 30 ea  1# 30 ea     Ball on wall     20 CW, CCW 2 x abcs   red 20 CW, CCW 2 x abcs   red     Table push up     10 x 2 x 10  biodex scap push up 3 x 10               Ther Ex           UBE 3/3 3'/3' 3/3 3/3 3/3 3/3 3/3 3/3   Upper trap stretch 10 x 10" MFDc 10 x 10" MFDc 10 x 10"  MFDc 10 x 10"  MFDc 10 x 10"  MFDc 10 x 10"  MFDc 10 x 10" MFDc 10 x 10" MFDc   Cervical ROM 10 x 10" MFD 10 x 10" MFD 10 x 10" MFDc 10 x 10" MFDc 10 x 10"  MFDc 10 x 10"  MFDc 10 x 10" MFDc 10 x 10" MFDc   Table slide flex, scap           Finger ladder           ranger           Pulleys           Doorway Pec Stretch           Rebounder Double arm ball toss  2 x 20  Resume NV       Push Up Plus  30" box 1 x 10 ecc 1 x 10, biodex foam Resume NV 2 x 10, biodex foam   3 x 10 biodex foam, add box walkover NV     Ther Activity                                 Gait Training                                 Modalities           Link Contreras

## 2022-04-11 NOTE — PSYCH
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  Check in with having a new schedule, organizing her time, her well-being  "i'm not mentally where I was" "I don't give myself credit [for becoming the way that I am]" "I've been trying to see a future" "For the first time I'm imagining a positive future"  Reports realized fear of sex  Check in with her spending time with members of the [employer]  center, chatting in the discord  Working on being more non-judgmental with her family  Check in with deeper, improved conversations with her mother, working on boundaries and being less imposing  D: She reports she went to another meeting at [facility] and she greatly enjoyed her time with them, enjoyed socializing  Says she had a negative experience at [address] [place] [address]  Focused today's discussion on intimacy and isolation, the relationship between the two  Discussed her desire for intimacy with others, her desire to feel close to someone, finding a partner, feeing safe and secure  Says her new job is nice in that she can make more money and is able to speak [place]  This writer provided PCT through psychoeducation, empathic listening, CBT through reframing, socratic dialogue  A: Roque Vallejo appeared to be in a mildly dysphoric mood with congruent affect, seemed to be experiencing lower mood at times, excessive worry, thoughts of hopelessness at times, mild avolition and lethargy, appeared to be kempt, no SI/HI nor CAMARGO risk apparent or reported, seemed to respond well to PCT  P: Continue discussion about her desire for intimacy and isolation  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  Check in with having a new schedule, organizing her time, her well-being  "i'm not mentally where I was" "I don't give myself credit [for becoming the way that I am]" "I've been trying to see a future" "For the first time I'm imagining a positive future"  Reports realized fear of sex  Check in with her spending time with members of the [employer]  center, chatting in the discord  Working on being more non-judgmental with her family  Check in with deeper, improved conversations with her mother, working on boundaries and being less imposing  Behavioral Health Treatment Plan [facility]: Diagnosis and Treatment Plan explained to Dionicio Dooley relates understanding diagnosis and is agreeable to Treatment Plan   Yes

## 2022-04-14 ENCOUNTER — OFFICE VISIT (OUTPATIENT)
Dept: PHYSICAL THERAPY | Facility: OTHER | Age: 27
End: 2022-04-14
Payer: COMMERCIAL

## 2022-04-14 DIAGNOSIS — S43.102A AC SEPARATION, LEFT, INITIAL ENCOUNTER: Primary | ICD-10-CM

## 2022-04-14 PROCEDURE — 97140 MANUAL THERAPY 1/> REGIONS: CPT

## 2022-04-14 PROCEDURE — 97112 NEUROMUSCULAR REEDUCATION: CPT

## 2022-04-14 NOTE — PROGRESS NOTES
Daily Note     Today's date: 2022  Patient name: Rosy Lebron  : 1995  MRN: [id number]  Referring provider: Leobardo Erickson [place]  Dx:   Encounter Diagnosis     ICD-10-CM    1  AC separation, left, initial encounter  S43 102A                 Total treatment time 8923-0883, 1-on-1 [id number]  Subjective: "I am feeling pretty good, I still get some soreness in my chest area but other than that, it is fine "      Objective: See treatment diary below      Assessment: Tolerated all exercises well without presence of symptoms  Able to progress to GTB for 9090 ER  Continue to use manuals for symptom relief, scapular strengthening, and shoulder strengthening, especially above shoulder height  Plan: Continue per plan of care  Precautions: AC sprain      Manuals 3/7 3/10 3/14 3/21 3/28 4/4 4/7 4/11 4/14   AC mobs            LASER            MFD  MP EB EB EB EB EB EB KA   IASTM            SC, AC mobs  GRC  EB EB EB EB EB KA   1st Rib  GRC  EB EB EB EB     GH discraction        EB KA   GH percussion        EB KA   Neuro Re-Ed            scap squeeze            Bent over row 2 x 10, 12#  TRX row 2 x 10   TRX row 2 x 10  TRX row 2 x 10  TRX row 2 x 10      TB LPD, row GTB 3 x 10           TB ER 90/90 peach 10 x 90/90 peach 10 x  Resume NV  90/90 2 x 10, OTB 90/90 2 x 10, OTB 90/90 3 x 10, OTB 9090 3x10 GTB   Prone T, I PBALL 30 ea 1# PBALL 30 ea 1#  pball 30 ea 1#        Box walk over       4" biodex foam, 6" , wobble board 4 laps 4" biodex foam 10 scap push ups, 6" , wobble board 4 laps nv   Foam Roll Krystle 1' ea 1# 1' ea   1# 1' ea 2# 1' ea 2#    1' x 2 pec stretch 1'x2 pec stretch   Wall Slides Flex, scap, hor add 10 ea Flex, scap, hor add 10 ea Flex, scap, hor add 10 ea         Body blade w/ lower trap activation        5 x scap, 5x H  ADD 5 x scap, 5x H  ADD   Corkscrew press   10 x 5# 10x 5# 2 x 10 5# 2 x 10 5#, 1 x 10, 7 5# 2 x 10 5#, 1 x 10, 7 5# 3 x 10  7 5# 3x10 7 5#   AROM flex, scap, abd, w/ scap squeeze 1# 10 ea 1# 1 x 15 ea   1# 30 ea 1# 30 ea  1# 30 ea      Ball on wall     20 CW, CCW 2 x abcs   red 20 CW, CCW 2 x abcs   red      Table push up     10 x 2 x 10  biodex scap push up 3 x 10                 Ther Ex            UBE 3/3 3'/3' 3/3 3/3 3/3 3/3 3/3 3/3 3/3   Upper trap stretch 10 x 10" MFDc 10 x 10" MFDc 10 x 10"  MFDc 10 x 10"  MFDc 10 x 10"  MFDc 10 x 10"  MFDc 10 x 10" MFDc 10 x 10" MFDc 10 x 10" MFDc   Cervical ROM 10 x 10" MFD 10 x 10" MFD 10 x 10" MFDc 10 x 10" MFDc 10 x 10"  MFDc 10 x 10"  MFDc 10 x 10" MFDc 10 x 10" MFDc 10 x 10" MFDc   Table slide flex, scap            Finger ladder            ranger            Pulleys            Doorway Pec Stretch            Rebounder Double arm ball toss  2 x 20  Resume NV        Push Up Plus  30" box 1 x 10 ecc 1 x 10, biodex foam Resume NV 2 x 10, biodex foam   3 x 10 biodex foam, add box walkover NV      Ther Activity                                    Gait Training                                    Modalities            Link Contreras

## 2022-04-18 ENCOUNTER — OFFICE VISIT (OUTPATIENT)
Dept: PHYSICAL THERAPY | Facility: OTHER | Age: 27
End: 2022-04-18
Payer: COMMERCIAL

## 2022-04-18 DIAGNOSIS — B00.1 COLD SORE: ICD-10-CM

## 2022-04-18 DIAGNOSIS — S43.102A AC SEPARATION, LEFT, INITIAL ENCOUNTER: Primary | ICD-10-CM

## 2022-04-18 PROCEDURE — 97110 THERAPEUTIC EXERCISES: CPT | Performed by: PHYSICAL THERAPIST

## 2022-04-18 PROCEDURE — 97140 MANUAL THERAPY 1/> REGIONS: CPT | Performed by: PHYSICAL THERAPIST

## 2022-04-18 PROCEDURE — 97112 NEUROMUSCULAR REEDUCATION: CPT | Performed by: PHYSICAL THERAPIST

## 2022-04-18 RX ORDER — VALACYCLOVIR HYDROCHLORIDE 1 G/1
2000 TABLET, FILM COATED ORAL 2 TIMES DAILY
Qty: 24 TABLET | Refills: 3 | Status: SHIPPED | OUTPATIENT
Start: 2022-04-18 | End: 2022-04-19

## 2022-04-18 NOTE — TELEPHONE ENCOUNTER
Pt having more frequent breakouts and is asking for more pills to have on hand due to frequency  Or do you think she needs an increased dose? Please advise

## 2022-04-18 NOTE — PROGRESS NOTES
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   States she had one day last week where her [facility] join was sore and overall her shoulder was stiff  But, otherwise reports she feels fine      Objective: See treatment diary below      Assessment: Tolerated all exercises well without presence of symptoms  Reports significant improvement in SC discomfort post mobilization and self pec stretching  Discussed potential d/c to HEP in the near future as symptoms continue to resolve  Plan: Continue per plan of care        Precautions: AC sprain      Manuals 3/21 3/28 4/4 4/7 4/11 4/14 4/18   AC mobs          LASER          MFD EB EB EB EB EB KA EB   IASTM          SC, AC mobs EB EB EB EB EB KA EB   1st Rib EB EB EB EB      GH discraction     EB KA EB   GH percussion     EB KA EB   Neuro Re-Ed          scap squeeze          Bent over row  TRX row 2 x 10  TRX row 2 x 10  TRX row 2 x 10       TB LPD, row          TB ER Resume NV  90/90 2 x 10, OTB 90/90 2 x 10, OTB 90/90 3 x 10, OTB 9090 3x10 GTB 9090 3x10 GTB   Prone T, I pball 30 ea 1#         Box walk over    4" biodex foam, 6" , wobble board 4 laps 4" biodex foam 10 scap push ups, 6" , wobble board 4 laps nv 4" biodex foam 10 scap push ups, 6" , wobble board 4 laps   Foam Roll Krystle 1' ea 2#    1' x 2 pec stretch 1'x2 pec stretch 1' x 2 pec stretch   Wall Slides          Body blade w/ lower trap activation     5 x scap, 5x H  ADD 5 x scap, 5x H  ADD 5 x scap, 5x H  ADD   Corkscrew press 10x 5# 2 x 10 5# 2 x 10 5#, 1 x 10, 7 5# 2 x 10 5#, 1 x 10, 7 5# 3 x 10  7 5# 3x10 7 5# 3x10 7 5#   AROM flex, scap, abd, w/ scap squeeze 1# 30 ea  1# 30 ea    2# 30 ea   Ball on wall  20 CW, CCW 2 x abcs   red 20 CW, CCW 2 x abcs   red       Table push up  10 x 2 x 10  biodex scap push up 3 x 10                Ther Ex          UBE 3/3 3/3 3/3 3/3 3/3 3/3 3/3   Upper trap stretch 10 x 10"  MFDc 10 x 10"  MFDc 10 x 10"  MFDc 10 x 10" MFDc 10 x 10" MFDc 10 x 10" MFDc 10 x 10" MFDc   Cervical ROM 10 x 10" MFDc 10 x 10"  MFDc 10 x 10"  MFDc 10 x 10" MFDc 10 x 10" MFDc 10 x 10" MFDc 10 x 10" MFDc   Table slide flex, scap          Finger ladder          ranger          Pulleys          Doorway Pec Stretch          Rebounder Double arm ball toss Resume NV         Push Up Plus Resume NV 2 x 10, biodex foam   3 x 10 biodex foam, add box walkover NV       Ther Activity                              Gait Training                              Modalities          Link Contreras

## 2022-04-19 ENCOUNTER — SOCIAL WORK (OUTPATIENT)
Dept: BEHAVIORAL/MENTAL HEALTH CLINIC | Facility: CLINIC | Age: 27
End: 2022-04-19
Payer: COMMERCIAL

## 2022-04-19 ENCOUNTER — TELEMEDICINE (OUTPATIENT)
Dept: PSYCHIATRY | Facility: CLINIC | Age: 27
End: 2022-04-19
Payer: COMMERCIAL

## 2022-04-19 ENCOUNTER — TELEPHONE (OUTPATIENT)
Dept: FAMILY MEDICINE CLINIC | Facility: CLINIC | Age: 27
End: 2022-04-19

## 2022-04-19 DIAGNOSIS — F31.81 BIPOLAR 2 DISORDER (HCC): Primary | ICD-10-CM

## 2022-04-19 DIAGNOSIS — F43.10 PTSD (POST-TRAUMATIC STRESS DISORDER): ICD-10-CM

## 2022-04-19 DIAGNOSIS — F60.3 BORDERLINE PERSONALITY DISORDER (HCC): ICD-10-CM

## 2022-04-19 DIAGNOSIS — Z86.59 HISTORY OF POSTTRAUMATIC STRESS DISORDER (PTSD): ICD-10-CM

## 2022-04-19 PROCEDURE — 99214 OFFICE O/P EST MOD 30 MIN: CPT | Performed by: STUDENT IN AN ORGANIZED HEALTH CARE EDUCATION/TRAINING PROGRAM

## 2022-04-19 PROCEDURE — 90834 PSYTX W PT 45 MINUTES: CPT | Performed by: COUNSELOR

## 2022-04-19 RX ORDER — LAMOTRIGINE 200 MG/1
200 TABLET ORAL DAILY
Qty: 90 TABLET | Refills: 0 | Status: SHIPPED | OUTPATIENT
Start: 2022-04-19 | End: 2022-06-21 | Stop reason: SDUPTHER

## 2022-04-19 RX ORDER — HYDROXYZINE HYDROCHLORIDE 10 MG/1
10 TABLET, FILM COATED ORAL EVERY 6 HOURS PRN
Qty: 30 TABLET | Refills: 2 | Status: SHIPPED | OUTPATIENT
Start: 2022-04-19 | End: 2022-07-18

## 2022-04-19 RX ORDER — BUPROPION HYDROCHLORIDE 150 MG/1
150 TABLET ORAL DAILY
Qty: 90 TABLET | Refills: 0 | Status: SHIPPED | OUTPATIENT
Start: 2022-04-19 | End: 2022-07-18

## 2022-04-19 RX ORDER — TRAZODONE HYDROCHLORIDE 50 MG/1
25 TABLET ORAL
Qty: 45 TABLET | Refills: 0 | Status: SHIPPED | OUTPATIENT
Start: 2022-04-19 | End: 2022-07-25

## 2022-04-19 NOTE — TELEPHONE ENCOUNTER
Can you recommend a topical,  either OTC or prescription to numb the cold sore site? She states she feels the need to "go after them" because they are bothersome  Please advise

## 2022-04-19 NOTE — TELEPHONE ENCOUNTER
She can try oragel  I sent it to the pharmacy but likely it wont be covered and she will need to buy it over the counter
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
## 2022-04-21 ENCOUNTER — APPOINTMENT (OUTPATIENT)
Dept: PHYSICAL THERAPY | Facility: OTHER | Age: 27
End: 2022-04-21
Payer: COMMERCIAL

## 2022-04-25 ENCOUNTER — OFFICE VISIT (OUTPATIENT)
Dept: PHYSICAL THERAPY | Facility: OTHER | Age: 27
End: 2022-04-25
Payer: COMMERCIAL

## 2022-04-25 ENCOUNTER — SOCIAL WORK (OUTPATIENT)
Dept: BEHAVIORAL/MENTAL HEALTH CLINIC | Facility: CLINIC | Age: 27
End: 2022-04-25
Payer: COMMERCIAL

## 2022-04-25 DIAGNOSIS — S43.102A AC SEPARATION, LEFT, INITIAL ENCOUNTER: Primary | ICD-10-CM

## 2022-04-25 DIAGNOSIS — F31.81 BIPOLAR 2 DISORDER (HCC): Primary | ICD-10-CM

## 2022-04-25 DIAGNOSIS — Z86.59 HISTORY OF POSTTRAUMATIC STRESS DISORDER (PTSD): ICD-10-CM

## 2022-04-25 PROCEDURE — 97112 NEUROMUSCULAR REEDUCATION: CPT | Performed by: PEDIATRICS

## 2022-04-25 PROCEDURE — 97110 THERAPEUTIC EXERCISES: CPT | Performed by: PEDIATRICS

## 2022-04-25 PROCEDURE — 90834 PSYTX W PT 45 MINUTES: CPT | Performed by: COUNSELOR

## 2022-04-25 PROCEDURE — 97140 MANUAL THERAPY 1/> REGIONS: CPT | Performed by: PEDIATRICS

## 2022-04-25 NOTE — PROGRESS NOTES
Daily Note     Today's date: 2022  Patient name: Alee Lock  : 1995  MRN: [id number]  Referring provider: Pasquale Bird  Dx:   Encounter Diagnosis     ICD-10-CM    1  AC separation, left, initial encounter  S43 102A                     Subjective: Patient states most discomfort is localized to [facility] region  She states she has been improving overall but over the weekend noticed some dicomfort running across L UT and L LS  She states she does not feel discomfort in those new areas today  Objective: See treatment diary below      Assessment: Tolerated all exercises well without presence of symptoms  Does demonstrate UT tightness for which [facility] offered relief  Plan: Continue per plan of care        Precautions: AC sprain      Manuals 3/28 4/4 4/7 4/11 4/14 4/18 4/25   AC mobs          LASER          MFD EB EB EB EB KA EB EW   IASTM          SC, AC mobs EB EB EB EB KA EB EW   1st Rib EB EB EB       GH discraction    EB KA EB    GH percussion    EB KA EB    Neuro Re-Ed          scap squeeze          Bent over row TRX row 2 x 10  TRX row 2 x 10  TRX row 2 x 10        TB LPD, row          TB ER  90/90 2 x 10, OTB 90/90 2 x 10, OTB 90/90 3 x 10, OTB 9090 3x10 GTB 9090 3x10 GTB 9090 3x10 GTB   Prone T, I          Box walk over   4" biodex foam, 6" , wobble board 4 laps 4" biodex foam 10 scap push ups, 6" , wobble board 4 laps nv 4" biodex foam 10 scap push ups, 6" , wobble board 4 laps 4" biodex foam 10 scap push ups, 6" , wobble board 4 laps   Foam Roll Krystle    1' x 2 pec stretch 1'x2 pec stretch 1' x 2 pec stretch 1' x 2 pec stretch   Wall Slides          Body blade w/ lower trap activation    5 x scap, 5x H  ADD 5 x scap, 5x H  ADD 5 x scap, 5x H  ADD 5 x scap, 5x H  ADD   Corkscrew press 2 x 10 5# 2 x 10 5#, 1 x 10, 7 5# 2 x 10 5#, 1 x 10, 7 5# 3 x 10  7 5# 3x10 7 5# 3x10 7 5# 3x10 7 5#   AROM flex, scap, abd, w/ scap squeeze  1# 30 ea    2# 30 ea 2#  30x ea   Ball on wall 20 CW, CCW 2 x abcs   red 20 CW, CCW 2 x abcs   red        Table push up 10 x 2 x 10  biodex scap push up 3 x 10                 Ther Ex          UBE 3/3 3/3 3/3 3/3 3/3 3/3 3/3   Upper trap stretch 10 x 10"  MFDc 10 x 10"  MFDc 10 x 10" MFDc 10 x 10" MFDc 10 x 10" MFDc 10 x 10" MFDc 10 x 10" MFDc   Cervical ROM 10 x 10"  MFDc 10 x 10"  MFDc 10 x 10" MFDc 10 x 10" MFDc 10 x 10" MFDc 10 x 10" MFDc 10 x 10" MFDc   Table slide flex, scap          Finger ladder          ranger          Pulleys          Doorway Pec Stretch          Rebounder Double arm ball toss          Push Up Plus 2 x 10, biodex foam   3 x 10 biodex foam, add box walkover NV        Ther Activity                              Gait Training                              Modalities          Link Contreras

## 2022-04-25 NOTE — PSYCH
Psychotherapy Provided: Individual Psychotherapy 45 minutes     Length of time in session: 48 minutes, follow up in 1 week    Encounter Diagnosis     ICD-10-CM    1  Bipolar 2 disorder (HCC)  F31 81    2  History of posttraumatic stress disorder (PTSD)  Z86 59        Goals addressed in session: Goal 1     Pain:      none    0    Current suicide risk : Low     Time: 3:04pm - 3:52pm   Previous session plan: Consider looking into intimacy and esteem modules again for stuck points  Continue discussion about her desire for intimacy and isolation  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  Check in with having a new schedule, organizing her time, her well-being  "i'm not mentally where I was" "I don't give myself credit [for becoming the way that I am]" "I've been trying to see a future" "For the first time I'm imagining a positive future"  Reports realized fear of sex  Check in with her spending time with members of the [employer]  center, chatting in the discord  Working on being more non-judgmental with her family  Check in with deeper, improved conversations with her mother, working on boundaries and being less imposing  D: She reports she has been working her two jobs and she has been feeling exhausted, irritable - discussed how overworking herself will lead her to burnout, her symptoms being potentially burnout  Discussed her self-care going to get her haircut  She says she's not making decisions for her "future self"  She says she went out after work with her coworkers and greatly enjoyed herself with them, playing pool, drinking responsibly  Says fear of sex isn't has powerful currently  Reduced sense of isolation having been out with coworkers apparent today  This writer provided PCT, CBT through [employer], reframing, psychoeducation, empathic listening, validation of feelings, reflection of content and feelings     A: Kellen Celeste appeared to be in a mildly euthymic mood with congruent affect, seemed to be experiencing lower mood at times, lethargy, insomnia, mild avolition and mind anhedonia, appeared to be kempt, no SI/HI nor CAMARGO risk apparent or reported, seemed to respond well to PCT, CBT  P: Consider looking into intimacy and esteem modules again for stuck points  Continue discussion about her desire for intimacy and isolation  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  Check in with having a new schedule, organizing her time, her well-being  "i'm not mentally where I was" "I don't give myself credit [for becoming the way that I am]" "I've been trying to see a future" "For the first time I'm imagining a positive future"  Check in with her spending time with members of the [employer]  [facility], chatting in the discord  Working on being more non-judgmental with her family  Check in with deeper, improved conversations with her mother, working on boundaries and being less imposing  Behavioral Health Treatment Plan [facility]: Diagnosis and Treatment Plan explained to Danyelle Plan relates understanding diagnosis and is agreeable to Treatment Plan   Yes

## 2022-04-28 ENCOUNTER — OFFICE VISIT (OUTPATIENT)
Dept: PHYSICAL THERAPY | Facility: OTHER | Age: 27
End: 2022-04-28
Payer: COMMERCIAL

## 2022-04-28 DIAGNOSIS — S43.102A AC SEPARATION, LEFT, INITIAL ENCOUNTER: Primary | ICD-10-CM

## 2022-04-28 PROCEDURE — 97112 NEUROMUSCULAR REEDUCATION: CPT

## 2022-04-28 PROCEDURE — 97110 THERAPEUTIC EXERCISES: CPT

## 2022-04-28 NOTE — PROGRESS NOTES
Daily Note     Today's date: 2022  Patient name: Alee Lock  : 1995  MRN: [id number]  Referring provider: Pasquale Bird  Dx:   Encounter Diagnosis     ICD-10-CM    1  AC separation, left, initial encounter  S43 102A         1 on 1 with PTA             Subjective: Patient reports confidence with DC to [facility]P and has a good understanding of how to address discomfort on her own if pain presents  Objective: See treatment diary below      Assessment: Reviewed, educated and discussed progressions  Provided TB for home  Demonstrated how to perform self mobs in multiple planes         Plan: DC to [facility]P     Precautions: AC sprain      Manuals 3/28 4/4 4/7 4/11 4/14 4/18 4/25 4/28   AC mobs           LASER           MFD EB EB EB EB KA EB EW    IASTM           SC, AC mobs EB EB EB EB KA EB EW    1st Rib EB EB EB        GH discraction    EB KA EB     GH percussion    EB KA EB     Neuro Re-Ed           scap squeeze           Bent over row TRX row 2 x 10  TRX row 2 x 10  TRX row 2 x 10         TB LPD, row           TB ER  90/90 2 x 10, OTB 90/90 2 x 10, OTB 90/90 3 x 10, OTB 9090 3x10 GTB 9090 3x10 GTB 9090 3x10 GTB    Prone T, I           Box walk over   4" biodex foam, 6" , wobble board 4 laps 4" biodex foam 10 scap push ups, 6" , wobble board 4 laps nv 4" biodex foam 10 scap push ups, 6" , wobble board 4 laps 4" biodex foam 10 scap push ups, 6" , wobble board 4 laps    Foam Roll Krystle    1' x 2 pec stretch 1'x2 pec stretch 1' x 2 pec stretch 1' x 2 pec stretch    Wall Slides           Body blade w/ lower trap activation    5 x scap, 5x H  ADD 5 x scap, 5x H  ADD 5 x scap, 5x H  ADD 5 x scap, 5x H  ADD    Corkscrew press 2 x 10 5# 2 x 10 5#, 1 x 10, 7 5# 2 x 10 5#, 1 x 10, 7 5# 3 x 10  7 5# 3x10 7 5# 3x10 7 5# 3x10 7 5#    AROM flex, scap, abd, w/ scap squeeze  1# 30 ea    2# 30 ea 2#  30x ea    Ball on wall 20 CW, CCW 2 x abcs   red 20 CW, CCW 2 x abcs   red         Table push up 10 x 2 x 10  biodex scap push up 3 x 10                   Ther Ex           UBE 3/3 3/3 3/3 3/3 3/3 3/3 3/3 3'/3'   Upper trap stretch 10 x 10"  MFDc 10 x 10"  MFDc 10 x 10" MFDc 10 x 10" MFDc 10 x 10" MFDc 10 x 10" MFDc 10 x 10" MFDc    Cervical ROM 10 x 10"  MFDc 10 x 10"  MFDc 10 x 10" MFDc 10 x 10" MFDc 10 x 10" MFDc 10 x 10" MFDc 10 x 10" MFDc    Table slide flex, scap           Finger ladder           ranger           Pulleys           Doorway Pec Stretch           Rebounder Double arm ball toss           Push Up Plus 2 x 10, biodex foam   3 x 10 biodex foam, add box walkover NV         Ther Activity                                 Gait Training                                 Modalities           Link Contreras

## 2022-05-03 ENCOUNTER — SOCIAL WORK (OUTPATIENT)
Dept: BEHAVIORAL/MENTAL HEALTH CLINIC | Facility: CLINIC | Age: 27
End: 2022-05-03
Payer: COMMERCIAL

## 2022-05-03 DIAGNOSIS — F31.81 BIPOLAR 2 DISORDER (HCC): Primary | ICD-10-CM

## 2022-05-03 PROCEDURE — 90834 PSYTX W PT 45 MINUTES: CPT | Performed by: COUNSELOR

## 2022-05-03 NOTE — PSYCH
Psychotherapy Provided: Individual Psychotherapy 45 minutes     Length of time in session: 46 minutes, follow up in 1 week    Encounter Diagnosis     ICD-10-CM    1  Bipolar 2 disorder (HCC)  F31 81        Goals addressed in session: Goal 1     Pain:      none    0    Current suicide risk : Low     Time: 9:11am - 9:57am   Previous session plan: Consider looking into intimacy and esteem modules again for stuck points  Continue discussion about her desire for intimacy and isolation  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  Check in with having a new schedule, organizing her time, her well-being  "i'm not mentally where I was" "I don't give myself credit [for becoming the way that I am]" "I've been trying to see a future" "For the first time I'm imagining a positive future"  Check in with her spending time with members of the [employer]  center, chatting in the discord  Working on being more non-judgmental with her family  Check in with deeper, improved conversations with her mother, working on boundaries and being less imposing  D: She reports she gave Moctezuma Leticia her 3 weeks' notice  She says this Wednesday she is going to [facility]  Emphasized her self-care  Discussed budgeting and the freedom that can come with budgeting well  She says she is considering going to a drag show  She continues to engage with her [employer] group  This writer provided PCT through empathic listening, validation of feelings, reflection of content and feelings, CBT work through reframing, socratic dialogue  A: Neha Fierro appeared to be in a tired, yet euthymic mood with congruent affect, seemed to be experiencing lower mood at times, mild lethargy, mild avolition, insomnia at times, appeared to be kempt, no SI/HI nor CAMARGO risk apparent or reported, seemed to respond well to PCT today, along with CBT work  P: Check in with her final weeks at [address] [place] [address]   Consider looking into intimacy and esteem modules again for stuck points  Continue discussion about her desire for intimacy and isolation  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  Check in with having a new schedule, organizing her time, her well-being  "i'm not mentally where I was" "I don't give myself credit [for becoming the way that I am]" "I've been trying to see a future" "For the first time I'm imagining a positive future"  Check in with her spending time with members of the [employer]  [facility], chatting in the discord  Working on being more non-judgmental with her family  Check in with deeper, improved conversations with her mother, working on boundaries and being less imposing  Behavioral Health Treatment Plan [facility]: Diagnosis and Treatment Plan explained to Henry Pelletier relates understanding diagnosis and is agreeable to Treatment Plan   Yes

## 2022-05-09 ENCOUNTER — TELEMEDICINE (OUTPATIENT)
Dept: BEHAVIORAL/MENTAL HEALTH CLINIC | Facility: CLINIC | Age: 27
End: 2022-05-09
Payer: COMMERCIAL

## 2022-05-09 DIAGNOSIS — F31.81 BIPOLAR 2 DISORDER (HCC): Primary | ICD-10-CM

## 2022-05-09 DIAGNOSIS — Z86.59 HISTORY OF POSTTRAUMATIC STRESS DISORDER (PTSD): ICD-10-CM

## 2022-05-09 PROCEDURE — 90834 PSYTX W PT 45 MINUTES: CPT | Performed by: COUNSELOR

## 2022-05-09 NOTE — PSYCH
Virtual Regular Visit    Verification of patient location:    Patient is located in the following state in which I hold an active license [place]      Assessment/Plan:    Problem List Items Addressed This Visit        Other    Bipolar 2 disorder ([address] ) - Primary      Other Visit Diagnoses     History of posttraumatic stress disorder (PTSD)              Goals addressed in session: Goal 1          Reason for visit is   Chief Complaint   Patient presents with    Virtual Regular Visit        Encounter provider Baldo Loving    Provider located at [address] [place] [id number] [phone]      Recent Visits  No visits were found meeting these conditions  Showing recent visits within past 7 days and meeting all other requirements  Today's Visits  Date Type Provider Dept   05/09/22 [address] [place] [address] today's visits and meeting all other requirements  Future Appointments  No visits were found meeting these conditions  Showing future appointments within next 150 days and meeting all other requirements       The patient was identified by name and date of birth  Mirza Toscano was informed that this is a telemedicine visit and that the visit is being conducted throughEpic Embedded and patient was informed this is a secure, HIPAA-complaint platform  She agrees to proceed     My office door was closed  No one else was in the room  She acknowledged consent and understanding of privacy and security of the video platform  The patient has agreed to participate and understands they can discontinue the visit at any time  Patient is aware this is a billable service  Tereso Valencia is a 32 y o  female     Time: 3:03pm - 3:41pm  Previous session plan: Check in with her final weeks at [address] [place] [address]   Consider looking into intimacy and esteem modules again for stuck points  Continue discussion about her desire for intimacy and isolation  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  Check in with having a new schedule, organizing her time, her well-being  "i'm not mentally where I was" "I don't give myself credit [for becoming the way that I am]" "I've been trying to see a future" "For the first time I'm imagining a positive future"  Check in with her spending time with members of the [employer]  center, chatting in the discord  Working on being more non-judgmental with her family  Check in with deeper, improved conversations with her mother, working on boundaries and being less imposing  D: She says that she has been chatting in her discord for [address]  She says she went to the drag show  Says she went with a woman who she finds to be beautiful to [employer]  Says she's working hard at [employer]  Says she really enjoyed time with friends from the [address]  Says she went to her mother's house for mother's day  She says she felt good when her mother stood up for her regarding an issue with comments on her weight  Says final weeks of [employer] work is going well enough  This writer provided PCT through empathic listening, validation of feelings, reflection of content and feelings, encouraging social support system, intimacy and trust building  A: Bill Finn appeared to be in a euthymic mood with congruent affect, seemed to be experiencing no psychiatric symptoms apparent today, appeared to be kempt, no SI/HI nor CAMARGO risk apparent or reported, seemed to respond well to PCT today  P: Check in with her final weeks at [address] [place] [address]  Consider looking into intimacy and esteem modules again for stuck points  Continue discussion about her desire for intimacy and isolation  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   Check in with having a new schedule, organizing her time, her well-being  "i'm not mentally where I was" "I don't give myself credit [for becoming the way that I am]" "I've been trying to see a future" "For the first time I'm imagining a positive future"  Check in with her spending time with members of the [employer]  [facility], chatting in the discord  Working on being more non-judgmental with her family  Check in with deeper, improved conversations with her mother, working on boundaries and being less imposing  Tegan Given HPI     No past medical history on file  Past Surgical History:   Procedure Laterality Date    CERVICAL BIOPSY  W/ LOOP ELECTRODE EXCISION      FL INJECTION LEFT HIP (ARTHROGRAM)  4/7/2021    WISDOM TOOTH EXTRACTION         Current Outpatient Medications   Medication Sig Dispense Refill    albuterol (PROVENTIL HFA,VENTOLIN HFA) 90 mcg/act inhaler Inhale 2 puffs every 6 (six) hours as needed for wheezing or shortness of breath 3 Inhaler 1    benzocaine (ORAJEL) 10 % mucosal gel Apply 1 application to the mouth or throat as needed for mucositis 5 3 g 0    buPROPion (WELLBUTRIN XL) 150 mg 24 hr tablet Take 1 tablet (150 mg total) by mouth daily 90 tablet 0    hydrOXYzine HCL (ATARAX) 10 mg tablet Take 1 tablet (10 mg total) by mouth every 6 (six) hours as needed for anxiety 30 tablet 2    lamoTRIgine (LaMICtal) 200 MG tablet Take 1 tablet (200 mg total) by mouth daily 90 tablet 0    Levonorgestrel 13 5 MG IUD 1 each by Intrauterine route      traZODone (DESYREL) 50 mg tablet Take 0 5 tablets (25 mg total) by mouth daily at bedtime 45 tablet 0    valACYclovir (VALTREX) 1,000 mg tablet Take 2 tablets (2,000 mg total) by mouth 2 (two) times a day for 1 day 24 tablet 3     No current facility-administered medications for this visit  No Known Allergies    Review of Systems    Video Exam    There were no vitals filed for this visit  Physical Exam     I spent 38 minutes directly with the patient during this visit    VIRTUAL VISIT UYEN Marroquin Patrick verbally agrees to participate in [employer]   Pt is aware that Virtual Care Services could be limited without vital signs or the ability to perform a full hands-on physical Leighton Damon understands she or the provider may request at any time to terminate the video visit and request the patient to seek care or treatment in person

## 2022-05-17 ENCOUNTER — SOCIAL WORK (OUTPATIENT)
Dept: BEHAVIORAL/MENTAL HEALTH CLINIC | Facility: CLINIC | Age: 27
End: 2022-05-17
Payer: COMMERCIAL

## 2022-05-17 DIAGNOSIS — Z86.59 HISTORY OF POSTTRAUMATIC STRESS DISORDER (PTSD): ICD-10-CM

## 2022-05-17 DIAGNOSIS — F31.81 BIPOLAR 2 DISORDER (HCC): Primary | ICD-10-CM

## 2022-05-17 PROCEDURE — 90834 PSYTX W PT 45 MINUTES: CPT | Performed by: COUNSELOR

## 2022-05-17 NOTE — BH TREATMENT PLAN
Alma Lugo  1995       Date of Initial Treatment Plan: 7/15/2022   Date of Current Treatment Plan: 05/17/22    Treatment Plan Number 3     Strengths/Personal Resources for Self Care: Gardening, cooking, dancing, music, painting    Diagnosis:   1  Bipolar 2 disorder ([address] )     2  History of posttraumatic stress disorder (PTSD)         Area of Needs: Lower self-esteem at times, existential concerns about death and isolation        Long Term Goal 2:  "I would like to understand my mental illness more in regard to bipolar/borderline personality disorder" PHQ9 will continue to remain "no or minimal" depression, PCL-5 will remain under 32 threshold for PTSD  Increased understanding of existential concerns       Target Date: 11/17/2022  Completion date of goal 2: n/a  Previous goal Completion Date: 5/17/2022         Short Term Objectives for Goal 1: This writer will provide Raghav with PCT, CBT, SFBT, DBT through her course of treatment  CBT/DBT for emotional resiliance  Psychoeducation regarding her diagnosis  PCT and ET work throughout the course of treatment  Increased ET work   Lucy Geller will follw through with her medication management, taking medications as prescribed, engaging in various treatment modalities, following through with homework       GOAL 1: Modality: Individual 4x per month   Completion Date 5/17/2022, Medication Management and The person(s) responsible for carrying out the plan is  this writer, Dr Marcelina Escobar 2: Modality: Individual 4x per month   Completion Date N/A, Medication Management and The person(s) responsible for carrying out the plan is  this writer, Dr Armando Souza, [address] [place] [address]: Diagnosis and Treatment Plan explained to Raghav Avilez relates understanding diagnosis and is agreeable to Treatment Plan          Client Comments : Please share your thoughts, feelings, need and/or experiences regarding your treatment plan: She is in agreement with the above, helped formulate this plan

## 2022-05-17 NOTE — PSYCH
Psychotherapy Provided: Individual Psychotherapy 45 minutes     Length of time in session: 48 minutes, follow up in 1 week    Encounter Diagnosis     ICD-10-CM    1  Bipolar 2 disorder (HCC)  F31 81    2  History of posttraumatic stress disorder (PTSD)  Z86 59        Goals addressed in session: Goal 1     Pain:      none    0    Current suicide risk : Low     Time: 9:17am - 9:59am  Previous session plan: Check in with her final weeks at [facility] looking into intimacy and esteem modules again for stuck points  Continue discussion about her desire for intimacy and isolation  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  Check in with having a new schedule, organizing her time, her well-being  "i'm not mentally where I was" "I don't give myself credit [for becoming the way that I am]" "I've been trying to see a future" "For the first time I'm imagining a positive future"  Check in with her spending time with members of the [employer]  center, chatting in the discord  Working on being more non-judgmental with her family  Check in with deeper, improved conversations with her mother, working on boundaries and being less imposing  D: Says her last day at [address] [place] [address] is today  She says she is going to have a day off tomorrow, going to clean her house, sleep, as she's been working two jobs and is tired  She says she had a great time with friends going to the park and painting with friends from her [address]  Says she went out with her work friends and had a lot of fun spending time with them  Encouraged her to continue to build her support system, surround herself with trustworthy friends  Focused on her self-esteem and understanding, accepting, that other people do like her for who she is  Her mother talked to her, stood up for her, about how her physical appearance is always focused on while she's over her parents' house  CBT provided through normalization, socratic dialogue   This writer provided PCT through empathic listening, validation of feelings, reflection of content and feelings  A: Payam Palma appeared to be in a mildly euthymic mood with congruent affect, seemed to be experiencing lower mood at times, mild hypersomnia and lethargy, mild avolition at times, appeared to be kempt, no SI/HI nor CAMARGO risk apparent or reported, seemed to respond well to PCT, treatment plan update today  P: Psychoeducation for bipolar/BPD  Existential concerns  Check in with desire to help community, be part of [employer]  Check in with last day at [id number] [facility]  Self-esteem seemingly improving with feeling sense of belonging, intimacy with LGBTQ group, mother, Cheyanne Lux coworkers  Consider looking into intimacy and esteem modules again for stuck points  Continue discussion about her desire for intimacy and isolation  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  Check in with having a new schedule, organizing her time, her well-being  "i'm not mentally where I was" "I don't give myself credit [for becoming the way that I am]" "I've been trying to see a future" "For the first time I'm imagining a positive future"  Check in with her spending time with members of the [employer]  [facility], chatting in the discord  Working on being more non-judgmental with her family  Check in with deeper, improved conversations with her mother, working on boundaries and being less imposing  Behavioral Health Treatment Plan [facility]: Diagnosis and Treatment Plan explained to Henry Coyne relates understanding diagnosis and is agreeable to Treatment Plan   Yes

## 2022-05-18 ENCOUNTER — TELEPHONE (OUTPATIENT)
Dept: FAMILY MEDICINE CLINIC | Facility: CLINIC | Age: 27
End: 2022-05-18

## 2022-05-18 NOTE — TELEPHONE ENCOUNTER
Patient called and left a message stating that she was given a referral to audiology and she called them and left a message, no one called her back  She no longer has the referral and is asking if someone could call her back  Returned patient's call and provided referral information

## 2022-05-20 ENCOUNTER — TELEPHONE (OUTPATIENT)
Dept: PSYCHIATRY | Facility: CLINIC | Age: 27
End: 2022-05-20

## 2022-05-20 NOTE — TELEPHONE ENCOUNTER
Pt called to cx appt on 5/24/22 with Alia Hanna due to her work schedule, Her follow-up is 5/31/22   Thank you

## 2022-05-31 ENCOUNTER — SOCIAL WORK (OUTPATIENT)
Dept: BEHAVIORAL/MENTAL HEALTH CLINIC | Facility: CLINIC | Age: 27
End: 2022-05-31
Payer: COMMERCIAL

## 2022-05-31 DIAGNOSIS — Z86.59 HISTORY OF POSTTRAUMATIC STRESS DISORDER (PTSD): ICD-10-CM

## 2022-05-31 DIAGNOSIS — F31.81 BIPOLAR 2 DISORDER (HCC): Primary | ICD-10-CM

## 2022-05-31 PROCEDURE — 90834 PSYTX W PT 45 MINUTES: CPT | Performed by: COUNSELOR

## 2022-05-31 NOTE — PSYCH
Psychotherapy Provided: Individual Psychotherapy 45 minutes     Length of time in session: 45 minutes, follow up in 1 week    Encounter Diagnosis     ICD-10-CM    1  Bipolar 2 disorder (HCC)  F31 81    2  History of posttraumatic stress disorder (PTSD)  Z86 59        Goals addressed in session: Goal 1     Pain:      none    0    Current suicide risk : Low     Time: 9:15am - 10:00am  Previous session plan: Psychoeducation for bipolar/BPD  Existential concerns  Check in with desire to help community, be part of [facility]  Check in with last day at [facility]  Self-esteem seemingly improving with feeling sense of belonging, intimacy with LGBTQ group, mother, Stephan Chan coworkers  Consider looking into intimacy and esteem modules again for stuck points  Continue discussion about her desire for intimacy and isolation  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  Check in with having a new schedule, organizing her time, her well-being  "i'm not mentally where I was" "I don't give myself credit [for becoming the way that I am]" "I've been trying to see a future" "For the first time I'm imagining a positive future"  Check in with her spending time with members of the [employer]  [facility], chatting in the discord  Working on being more non-judgmental with her family  Check in with deeper, improved conversations with her mother, working on boundaries and being less imposing  D: She reports she feels good to be done with [employer]  She says she has been enjoying [employer]  Discussed her work environment and how much more comfortable she is so be in this type of environment  Discussed her strengths in this type of environment  "I don't remember the last time that I felt like this"  Says she and Amado Schneider have been facetiming with one another  Discussed difference between bipolar disorder and borderline personality disorder    This writer provided PCT through empathic listening, validation of feelings, reflection of content and feelings psychoeducation, focusing on her strengths, exploring her strengths  A: Neha Fierro appeared to be in a mildly euthymic mood with congruent affect, seemed to be experiencing lower mood at times, mild lethargy, mild avolition, however appears to be experiencing significantly reduced symptoms of bipolar II disorder, appeared to be kempt, no SI/HI nor CAMARGO risk apparent or reported, seemed to respond well to PCT, psychoeducation today  P:  Existential concerns  Check in with desire to help community, be part of [facility]  Check in with last day at [address] [place] [address]  Self-esteem seemingly improving with feeling sense of belonging, intimacy with LGBTQ group, mother, Yusef Haines coworkers  Consider looking into intimacy and esteem modules again for stuck points  Continue discussion about her desire for intimacy and isolation  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  Check in with having a new schedule, organizing her time, her well-being  "i'm not mentally where I was" "I don't give myself credit [for becoming the way that I am]" "I've been trying to see a future" "For the first time I'm imagining a positive future"  Check in with her spending time with members of the [facility], chatting in the discord  Working on being more non-judgmental with her family  Check in with deeper, improved conversations with her mother, working on boundaries and being less imposing  Behavioral Health Treatment Plan [facility]: Diagnosis and Treatment Plan explained to Becka Ibarra relates understanding diagnosis and is agreeable to Treatment Plan   Yes

## 2022-06-01 ENCOUNTER — OFFICE VISIT (OUTPATIENT)
Dept: AUDIOLOGY | Age: 27
End: 2022-06-01
Payer: COMMERCIAL

## 2022-06-01 DIAGNOSIS — H91.93 DECREASED HEARING OF BOTH EARS: ICD-10-CM

## 2022-06-01 DIAGNOSIS — H90.3 SENSORY HEARING LOSS, BILATERAL: Primary | ICD-10-CM

## 2022-06-01 PROCEDURE — 92567 TYMPANOMETRY: CPT | Performed by: AUDIOLOGIST-HEARING AID FITTER

## 2022-06-01 PROCEDURE — 92557 COMPREHENSIVE HEARING TEST: CPT | Performed by: AUDIOLOGIST-HEARING AID FITTER

## 2022-06-01 NOTE — PROGRESS NOTES
HEARING EVALUATION    Name:  Catherine Bond  :  1995  Age:  32 y o  Date of Evaluation: 22     History: Difficulty Understanding  Reason for visit: Catherine Bond is being seen today at the request of Dr Rashard Quan for an evaluation of hearing  Patient reports she asks for repetition in conversation  There is a reported family history of hearing loss  Denied tinnitus, aural fullness, and ear pain  Marlynn Alert reported that she listens to music loudly through headphones  EVALUATION:    Otoscopic Evaluation:   Right Ear: Clear and healthy ear canal and tympanic membrane   Left Ear: Clear and healthy ear canal and tympanic membrane    Tympanometry:   Right: Type A - normal middle ear pressure and compliance   Left: Type A - normal middle ear pressure and compliance    Audiogram Results:  Pure tone testing revealed normal hearing sensitivity bilaterally  SRT and PTA are in agreement indicating good test reliability  Word recognition scores were  excellent bilaterally  *see attached audiogram      RECOMMENDATIONS:  Return to [facility]  for F/U and Copy to Patient/Caregiver    PATIENT EDUCATION:   Discussed results and recommendations with Alfredo Martin  Questions were addressed and the patient was encouraged to contact our department should concerns arise        Ena Segal   Clinical Audiologist

## 2022-06-10 ENCOUNTER — SOCIAL WORK (OUTPATIENT)
Dept: BEHAVIORAL/MENTAL HEALTH CLINIC | Facility: CLINIC | Age: 27
End: 2022-06-10
Payer: COMMERCIAL

## 2022-06-10 DIAGNOSIS — Z86.59 HISTORY OF POSTTRAUMATIC STRESS DISORDER (PTSD): ICD-10-CM

## 2022-06-10 DIAGNOSIS — F31.81 BIPOLAR 2 DISORDER (HCC): Primary | ICD-10-CM

## 2022-06-10 PROCEDURE — 90834 PSYTX W PT 45 MINUTES: CPT | Performed by: COUNSELOR

## 2022-06-10 NOTE — PSYCH
Psychotherapy Provided: Individual Psychotherapy 38 minutes     Length of time in session: 43 minutes, follow up in 1 week    Encounter Diagnosis     ICD-10-CM    1  Bipolar 2 disorder (HCC)  F31 81    2  History of posttraumatic stress disorder (PTSD)  Z86 59        Goals addressed in session: Goal 1     Pain:      none    0    Current suicide risk : Low     Time: 1:11pm - 11:55pm  Previous session plan: Existential concerns  Check in with desire to help community, be part of [facility]  Self-esteem seemingly improving with feeling sense of belonging, intimacy with LGBTQ group, mother, [place] looking into intimacy and esteem modules again for stuck points  Continue discussion about her desire for intimacy and isolation  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  Check in with having a new schedule, organizing her time, her well-being  "i'm not mentally where I was" "I don't give myself credit [for becoming the way that I am]" "I've been trying to see a future" "For the first time I'm imagining a positive future"  Check in with her spending time with members of the [employer]  center, chatting in the discord  Working on being more non-judgmental with her family  Check in with deeper, improved conversations with her mother, working on boundaries and being less imposing  D: She reports she has been very busy  She says she went to a pansexual meeting and greatly enjoyed her time with the individuals attending  Went out with them again to a bar, then again to [place]  Explored the importance of authenticity  She says she has been enjoying time with her family, friends  Reported on an intimate conversation she had with her mother in which each gained a deeper understanding of one another, themselves  This writer provided PCT through empathic listening, validation of feelings, ET work discussing meaning making     A: Mani Ocsar appeared to be in a euthymic mood with congruent affect, seemed to be experiencing no psychiatric symptoms apparent today, thoughts hopeful and WNL today, appeared to be kempt, no SI/HI nor CAMARGO risk apparent or reported, seemed to respond well to PCT, ET work  P: Existential concerns  Check in with desire to help community, be part of [facility]  Self-esteem seemingly improving with feeling sense of belonging, intimacy with [facility] group, mother, Misha Escalona coworkers  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  C "i'm not mentally where I was" "I don't give myself credit [for becoming the way that I am]" "I've been trying to see a future" "For the first time I'm imagining a positive future"  Behavioral Health Treatment Plan [facility]: Diagnosis and Treatment Plan explained to Henry Pelletier relates understanding diagnosis and is agreeable to Treatment Plan   Yes

## 2022-06-14 ENCOUNTER — SOCIAL WORK (OUTPATIENT)
Dept: BEHAVIORAL/MENTAL HEALTH CLINIC | Facility: CLINIC | Age: 27
End: 2022-06-14
Payer: COMMERCIAL

## 2022-06-14 DIAGNOSIS — Z86.59 HISTORY OF POSTTRAUMATIC STRESS DISORDER (PTSD): ICD-10-CM

## 2022-06-14 DIAGNOSIS — F31.81 BIPOLAR 2 DISORDER (HCC): Primary | ICD-10-CM

## 2022-06-14 PROCEDURE — 90834 PSYTX W PT 45 MINUTES: CPT | Performed by: COUNSELOR

## 2022-06-14 NOTE — PSYCH
Psychotherapy Provided: Individual Psychotherapy 45 minutes     Length of time in session: 42 minutes, follow up in 1 week    Encounter Diagnosis     ICD-10-CM    1  Bipolar 2 disorder (HCC)  F31 81    2  History of posttraumatic stress disorder (PTSD)  Z86 59        Goals addressed in session: Goal 1     Pain:      none    0    Current suicide risk : Low     Time: 9:14am - 9:56am  Previous session plan: Existential concerns  Check in with desire to help community, be part of [facility]  Self-esteem seemingly improving with feeling sense of belonging, intimacy with [address] [facility], [facility] coworkers  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  C "i'm not mentally where I was" "I don't give myself credit [for becoming the way that I am]" "I've been trying to see a future" "For the first time I'm imagining a positive future"  D: Discussed cooks flirting with her at work, her comfort level, possible solutions/setting boundaries if she becomes uncomfortable  Explored her identity, how she relates to others, using evidence to build her identity  This writer provided PCT through empathic listening, validation of feelings, reflection of content and feelings  A: Boni San appeared to be in a euthymic mood with congruent affect, seemed to be experiencing lower mood at times, mildly lower self-esteem, excessive worry, mild hypervigilance, appeared to be kempt, no SI/HI nor CAMARGO risk apparent or reported, seemed to respond well to PCT work today  P: Existential concerns  Check in with desire to help community, be part of [facility]  Self-esteem seemingly improving with feeling sense of belonging, intimacy with [address] [facility], mother [facility] coworkers  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  C "i'm not mentally where I was" "I don't give myself credit [for becoming the way that I am]" "I've been trying to see a future" "For the first time I'm imagining a positive future"         Behavioral Health Treatment Plan [facility]: Diagnosis and Treatment Plan explained to George Fraser relates understanding diagnosis and is agreeable to Treatment Plan   Yes

## 2022-06-21 DIAGNOSIS — F31.81 BIPOLAR 2 DISORDER (HCC): ICD-10-CM

## 2022-06-21 RX ORDER — LAMOTRIGINE 200 MG/1
200 TABLET ORAL DAILY
Qty: 90 TABLET | Refills: 0 | Status: SHIPPED | OUTPATIENT
Start: 2022-06-21 | End: 2022-09-19

## 2022-06-21 NOTE — TELEPHONE ENCOUNTER
I called the patient and spoke with her  She noted that she lost her bottle of Lamictal yesterday, and only missed the dose today, but otherwise has been consistent with her meds  Denied SI/HI, intent or plan upon direct inquiry at this time and denied any other concerns  Refills were sent to the patient's preferred pharmacy

## 2022-06-21 NOTE — TELEPHONE ENCOUNTER
Patient left voicemail on  line and stated that she has  lost her entire bottle of her Lamictal 200mg  She is requesting a call back from Dr Mik Vital to discuss and request a new script to be sent  Please review

## 2022-06-28 ENCOUNTER — SOCIAL WORK (OUTPATIENT)
Dept: BEHAVIORAL/MENTAL HEALTH CLINIC | Facility: CLINIC | Age: 27
End: 2022-06-28
Payer: COMMERCIAL

## 2022-06-28 DIAGNOSIS — Z86.59 HISTORY OF POSTTRAUMATIC STRESS DISORDER (PTSD): ICD-10-CM

## 2022-06-28 DIAGNOSIS — F31.81 BIPOLAR 2 DISORDER (HCC): Primary | ICD-10-CM

## 2022-06-28 PROCEDURE — 90834 PSYTX W PT 45 MINUTES: CPT | Performed by: COUNSELOR

## 2022-06-28 NOTE — PSYCH
Psychotherapy Provided: Individual Psychotherapy 45 minutes     Length of time in session: 56 minutes, follow up in 1 week    Encounter Diagnosis     ICD-10-CM    1  Bipolar 2 disorder (HCC)  F31 81    2  History of posttraumatic stress disorder (PTSD)  Z86 59        Goals addressed in session: Goal 1     Pain:      none    0    Current suicide risk : Low     Time: 11:00am - 11:56am  Previous session plan: Existential concerns  Check in with desire to help community, be part [facility]  Self-esteem seemingly improving with feeling sense of belonging, intimacy with [address] [facility] group, mother, Yenny Tovar coworkers  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  C "i'm not mentally where I was" "I don't give myself credit [for becoming the way that I am]" "I've been trying to see a future" "For the first time I'm imagining a positive future"    D: She says she has been going to [facility] often and greatly enjoying her time there, meeting new people  Focused on intimacy reducing isolation, allowing her to feel closer to others, increasing sense of beloging  This writer provided PCT through empathic listening, validation of feelings, psychoeducation, reflection of content and feelings  ET work through [address] discussion  A: Genny Latham appeared to be in a euthymic mood with congruent affect, seemed to be experiencing racing thoughts at times, excessive worry, lower mood at times, fatigue, appeared to be kempt, no SI/HI nor CAMARGO risk apparent or reported, seemed to respond well to PCT, ET work discussing reducing isolation  P: MAKE NEW SCHEDULE  Existential concerns  Check in with desire to help community, be part of [facility]  Self-esteem seemingly improving with feeling sense of belonging, intimacy with [address] [facility], mother, Yenny Tovar coworkers  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   C "i'm not mentally where I was" "I don't give myself credit [for becoming the way that I am]" "I've been trying to see a future" "For the first time I'm imagining a positive future"        Behavioral Health Treatment Plan [facility]: Diagnosis and Treatment Plan explained to Danyelle Plan relates understanding diagnosis and is agreeable to Treatment Plan   Yes

## 2022-07-05 ENCOUNTER — SOCIAL WORK (OUTPATIENT)
Dept: BEHAVIORAL/MENTAL HEALTH CLINIC | Facility: CLINIC | Age: 27
End: 2022-07-05
Payer: COMMERCIAL

## 2022-07-05 DIAGNOSIS — F31.81 BIPOLAR 2 DISORDER (HCC): Primary | ICD-10-CM

## 2022-07-05 DIAGNOSIS — Z86.59 HISTORY OF POSTTRAUMATIC STRESS DISORDER (PTSD): ICD-10-CM

## 2022-07-05 PROCEDURE — 90834 PSYTX W PT 45 MINUTES: CPT | Performed by: COUNSELOR

## 2022-07-05 NOTE — PSYCH
Psychotherapy Provided: Individual Psychotherapy 45 minutes     Length of time in session: 48 minutes, follow up in 1 week    Encounter Diagnosis     ICD-10-CM    1  Bipolar 2 disorder (HCC)  F31 81    2  History of posttraumatic stress disorder (PTSD)  Z86 59        Goals addressed in session: Goal 1     Pain:      none    0    Current suicide risk : Low     Time: 11:10am - 11:58am  Previous session plan: MAKE NEW SCHEDULE  Existential concerns  Check in with desire to help community, be part of [facility]  Self-esteem seemingly improving with feeling sense of belonging, intimacy with [address] [facility], mother, Keli Castro coworkers  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  C "i'm not mentally where I was" "I don't give myself credit [for becoming the way that I am]" "I've been trying to see a future" "For the first time I'm imagining a positive future"    D: Made new schedule  Discussed her concerns with communication with others  Discussed intimacy  Explored burnout  This writer provided PCT through empathic listening, validation of feelings, reflection of content and feelings  A: Jesse Plaster appeared to be in a euthymic mood with congruent affect, seemed to be experiencing mild flights of ideas, racing thoughts at times, appeared to be kempt, no SI/HI nor CAMARGO risk apparent or reported, seemed to respond well to PCT today  P: Existential concerns  Check in with desire to help community,  part of [facility]  Self-esteem seemingly improving with feeling sense of belonging, intimacy with [address] [facility] group, mother, Keli Castro coworkers  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  C "i'm not mentally where I was" "I don't give myself credit [for becoming the way that I am]" "I've been trying to see a future" "For the first time I'm imagining a positive future"        [address]: Diagnosis and Treatment Plan explained to Antonino Torres relates understanding diagnosis and is agreeable to Treatment Plan   Yes

## 2022-07-13 ENCOUNTER — TELEPHONE (OUTPATIENT)
Dept: PSYCHIATRY | Facility: CLINIC | Age: 27
End: 2022-07-13

## 2022-07-15 ENCOUNTER — SOCIAL WORK (OUTPATIENT)
Dept: BEHAVIORAL/MENTAL HEALTH CLINIC | Facility: CLINIC | Age: 27
End: 2022-07-15
Payer: COMMERCIAL

## 2022-07-15 DIAGNOSIS — Z86.59 HISTORY OF POSTTRAUMATIC STRESS DISORDER (PTSD): Primary | ICD-10-CM

## 2022-07-15 DIAGNOSIS — F31.81 BIPOLAR 2 DISORDER (HCC): ICD-10-CM

## 2022-07-15 PROCEDURE — 90834 PSYTX W PT 45 MINUTES: CPT | Performed by: COUNSELOR

## 2022-07-15 NOTE — PSYCH
Psychotherapy Provided: Individual Psychotherapy 45 minutes     Length of time in session: 47 minutes, follow up in 1 week    Encounter Diagnosis     ICD-10-CM    1  History of posttraumatic stress disorder (PTSD)  Z86 59    2  Bipolar 2 disorder (HCC)  F31 81        Goals addressed in session: Goal 1     Pain:      none    0    Current suicide risk : Low     Time: 11:17am - 12:04pm  Previous session plan: Existential concerns  Check in with desire to help community, be part of [facility]  Self-esteem seemingly improving with feeling sense of belonging, intimacy with [address] [facility], mother, [employer] coworkers  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  C "i'm not mentally where I was" "I don't give myself credit [for becoming the way that I am]" "I've been trying to see a future" "For the first time I'm imagining a positive future"    D: Discussed her bringing her notebook in to discuss her existential concerns  Reports restlessness    This writer provided ET work discussing, focusing on death anxiety  A: Babs Ventura appeared to be in a neutral mood with congruent affect, seemed to be experiencing restlessness, excessive worry, racing thoughts at times, moderate letharg, appeared to be kempt, no SI/HI nor CAMARGO risk apparent or reported, seemed to respond well to ET work today  P: Address lethargy, avolition  Continue with ET work as needed  CONTINUED: Check in with desire to help community,  part of [facility]  Self-esteem seemingly improving with feeling sense of belonging, intimacy with [address] [facility], mother, [employer] coworkers  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  C "i'm not mentally where I was" "I don't give myself credit [for becoming the way that I am]" "I've been trying to see a future" "For the first time I'm imagining a positive future"  Behavioral Health Treatment Plan [facility]: Diagnosis and Treatment Plan explained to Abbey Aviles relates understanding diagnosis and is agreeable to Treatment Plan  Yes

## 2022-07-22 ENCOUNTER — SOCIAL WORK (OUTPATIENT)
Dept: BEHAVIORAL/MENTAL HEALTH CLINIC | Facility: CLINIC | Age: 27
End: 2022-07-22
Payer: COMMERCIAL

## 2022-07-22 DIAGNOSIS — F31.81 BIPOLAR 2 DISORDER (HCC): ICD-10-CM

## 2022-07-22 DIAGNOSIS — Z86.59 HISTORY OF POSTTRAUMATIC STRESS DISORDER (PTSD): Primary | ICD-10-CM

## 2022-07-22 PROCEDURE — 90834 PSYTX W PT 45 MINUTES: CPT | Performed by: COUNSELOR

## 2022-07-22 NOTE — PSYCH
Psychotherapy Provided: Individual Psychotherapy 45 minutes     Length of time in session: 49 minutes, follow up in 1 week    Encounter Diagnosis     ICD-10-CM    1  History of posttraumatic stress disorder (PTSD)  Z86 59    2  [facility] ([facility] )  F31 81        Goals addressed in session: Goal 1     Pain:      none    0    Current suicide risk : Low     Time: 1:08pm - 1:57pm   Previous session plan: Address lethargy, avolition  Continue with ET work as needed  CONTINUED: Check in with desire to help community, be part of [facility]  Self-esteem seemingly improving with feeling sense of belonging, intimacy with [address] [place] [address] group, mother, Rivera Furlong coworkers  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  C "i'm not mentally where I was" "I don't give myself credit [for becoming the way that I am]" "I've been trying to see a future" "For the first time I'm imagining a positive future"  D: Focused on her lethargy, avolition, seeking more interests, reducing stress to increase her "social batteries"  Focus on her level of intimacy with others, discussion about her experiences with friends This writer provided PCT through empathic listening, validation of feelings, reflection of content and feelings, SFBT provided through exception seeking questions  A: Lalitha Sanders appeared to be in a mildly dysphoric mood with congruent affect, seemed to be experiencing racing thoughts, excessive worry at times, lethargy, avolition, appeared to be kempt, no SI/HI nor CAMARGO risk apparent or reported, seemed to respond well to PCT, SFBT work today  P: Recommended On Becoming a Person  "This is my first friend group like this" "You're not a bad person for having that inside of you" this writer said to Kojo about her trauma, she says "that's something", her not sharing her trauma with others, discussion about intimacy  Address lethargy, avolition  Continue with ET work as needed   CONTINUED: Check in with desire to help community, be part of [facility]  Self-esteem seemingly improving with feeling sense of belonging, intimacy with [facility] group, mother, Tete Hunt coworkers  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  C "i'm not mentally where I was" "I don't give myself credit [for becoming the way that I am]" "I've been trying to see a future" "For the first time I'm imagining a positive future"  Behavioral Health Treatment Plan [facility]: Diagnosis and Treatment Plan explained to Rita Landeros relates understanding diagnosis and is agreeable to Treatment Plan   Yes

## 2022-07-25 DIAGNOSIS — F31.81 BIPOLAR 2 DISORDER (HCC): ICD-10-CM

## 2022-07-25 RX ORDER — TRAZODONE HYDROCHLORIDE 50 MG/1
25 TABLET ORAL
Qty: 45 TABLET | Refills: 0 | Status: SHIPPED | OUTPATIENT
Start: 2022-07-25 | End: 2022-10-20

## 2022-07-29 ENCOUNTER — SOCIAL WORK (OUTPATIENT)
Dept: BEHAVIORAL/MENTAL HEALTH CLINIC | Facility: CLINIC | Age: 27
End: 2022-07-29
Payer: COMMERCIAL

## 2022-07-29 DIAGNOSIS — F31.81 BIPOLAR 2 DISORDER (HCC): ICD-10-CM

## 2022-07-29 DIAGNOSIS — Z86.59 HISTORY OF POSTTRAUMATIC STRESS DISORDER (PTSD): Primary | ICD-10-CM

## 2022-07-29 PROCEDURE — 90834 PSYTX W PT 45 MINUTES: CPT | Performed by: COUNSELOR

## 2022-07-29 NOTE — PSYCH
Psychotherapy Provided: Individual Psychotherapy 45 minutes     Length of time in session: 45 minutes, follow up in 1 week    Encounter Diagnosis     ICD-10-CM    1  History of posttraumatic stress disorder (PTSD)  Z86 59    2  B[facility] 2 [facility] ([facility] )  F31 81        Goals addressed in session: Goal 1     Pain:      none    0    Current suicide risk : Low     Time: 9:07am - 9:52am   Previous session plan: Recommended On Becoming a Person  "This is my first friend group like this" "You're not a bad person for having that inside of you" this writer said to Kojo about her trauma, she says "that's something", her not sharing her trauma with others, discussion about intimacy  Address lethargy, avolition  Continue with ET work as needed  Belinda Arm in with desire to help community, be part of [facility]  Self-esteem seemingly improving with feeling sense of belonging, intimacy with [address] [place] [address] group, mother, Trisha Kulkarni coworkers  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  C "i'm not mentally where I was" "I don't give myself credit [for becoming the way that I am]" "I've been trying to see a future" "For the first time I'm imagining a positive future"  D: Reports her job was not terrible this week - she made money and did not have difficult customers  She says she went to a [employer] at [facility]  Says she received some sofía  She says she saw her friends at the plant swap  "They're so nice to me and they love me a lot" She says that she does love her friends as well  CBT provided through reframing work of her time to relax at home, normalization of her escaping through tv or video games   This writer provided SFBT through focusing on when she does feel better, emphasizing combining when she feels better with being productive  A: Aruna Henriquez appeared to be in a mildly depressed mood with congruent affect, seemed to be experiencing lower mood at times, moderate avolition, mild anhedonia, appeared to be kempt, no SI/HI nor CAMARGO risk apparent or reported, seemed to respond well to PCT  P: Continue to identify when she does not allow herself to relax, continue to identify thoughts and behaviors that make her feel worse  Do a rundown of her entire day "I don't let myself sleep" Recommended On Becoming a Person  "This is my first friend group like this" "You're not a bad person for having that inside of you" this writer said to Kojo about her trauma, she says "that's something", her not sharing her trauma with others, discussion about intimacy  Address lethargy, avolition  Continue with ET work as needed  Chrissie Latham in with desire to help community, be part of [facility]  Self-esteem seemingly improving with feeling sense of belonging, intimacy with [address] [place] [address] group, mother, Kanu Starr coworkers  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  C "i'm not mentally where I was" "I don't give myself credit [for becoming the way that I am]" "I've been trying to see a future" "For the first time I'm imagining a positive future"  Behavioral Health Treatment Plan [facility]: Diagnosis and Treatment Plan explained to Kale Lantigua relates understanding diagnosis and is agreeable to Treatment Plan   Yes

## 2022-08-01 ENCOUNTER — VBI (OUTPATIENT)
Dept: ADMINISTRATIVE | Facility: OTHER | Age: 27
End: 2022-08-01

## 2022-08-05 ENCOUNTER — SOCIAL WORK (OUTPATIENT)
Dept: BEHAVIORAL/MENTAL HEALTH CLINIC | Facility: CLINIC | Age: 27
End: 2022-08-05
Payer: COMMERCIAL

## 2022-08-05 DIAGNOSIS — F31.81 BIPOLAR 2 DISORDER (HCC): ICD-10-CM

## 2022-08-05 DIAGNOSIS — Z86.59 HISTORY OF POSTTRAUMATIC STRESS DISORDER (PTSD): Primary | ICD-10-CM

## 2022-08-05 PROCEDURE — 90834 PSYTX W PT 45 MINUTES: CPT | Performed by: COUNSELOR

## 2022-08-05 NOTE — PSYCH
Psychotherapy Provided: Individual Psychotherapy 45 minutes     Length of time in session: 45 minutes, follow up in 1 week    Encounter Diagnosis     ICD-10-CM    1  History of posttraumatic stress disorder (PTSD)  Z86 59    2  Bipolar 2 disorder (HCC)  F31 81        Goals addressed in session: Goal 1     Pain:      none    0    Current suicide risk : Low     Time: 1:10pm - 1:55pm   Previous session plan: Continue to identify when she does not allow herself to relax, continue to identify thoughts and behaviors that make her feel worse  Do a rundown of her entire day "I don't let myself sleep" Recommended On Becoming a Person  "This is my first friend group like this" "You're not a bad person for having that inside of you" this writer said to Kojo about her trauma, she says "that's something", her not sharing her trauma with others, discussion about intimacy  Address lethargy, avolition  Continue with ET work as needed  Steve Credit in with desire to help community, be part of Laila  Self-esteem seemingly improving with feeling sense of belonging, intimacy with [address] [place] [address], mother, Domnick  coworkers  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  C "i'm not mentally where I was" "I don't give myself credit [for becoming the way that I am]" "I've been trying to see a future" "For the first time I'm imagining a positive future"  D: Focused on existential issues related to isolation through lens of her experiences with her friends, intimacy, trust  She says she loves her friend Freddi Koyanagi, might even be in love with her romantically  "It feels warm, and safe, and comfortable and intimate"  "I took a break to have a dance party by myself" she says she found this to be helpful in keeping herself motivated  This writer provided PCT, ET work through discussion about isolation, reducing isolation through intimacy, empathic listening, validation of feelings, reflection of content and feelings    A: Aileen Callejas appeared to be in a euthymic mood with congruent affect, seemed to be experiencing mix racing thoughts, excessive worry, appeared to be kempt, no SI/HI nor CAMARGO risk apparent or reported, seemed to respond well to PCT  P: CONTINUED: Continue to identify when she does not allow herself to relax (having dance parties by herself), continue to identify thoughts and behaviors that make her feel worse  Do a rundown of her entire day "I don't let myself sleep" Recommended On Becoming a Person  "This is my first friend group like this" "You're not a bad person for having that inside of you" this writer said to Kojo about her trauma, she says "that's something", her not sharing her trauma with others, discussion about intimacy  Address lethargy, avolition  Continue with ET work as needed  Deborra Guy in with desire to help community, be part of Laila  Self-esteem seemingly improving with feeling sense of belonging, intimacy with [address] [place] [address] group, mother, Candice Parham coworkers  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  "i'm not mentally where I was" "I don't give myself credit [for becoming the way that I am]" "I've been trying to see a future" "For the first time I'm imagining a positive future"  Behavioral Health Treatment Plan [facility]: Diagnosis and Treatment Plan explained to Wilda Borjas relates understanding diagnosis and is agreeable to Treatment Plan   Yes

## 2022-08-08 ENCOUNTER — TELEPHONE (OUTPATIENT)
Dept: BEHAVIORAL/MENTAL HEALTH CLINIC | Facility: CLINIC | Age: 27
End: 2022-08-08

## 2022-08-08 NOTE — TELEPHONE ENCOUNTER
Called patient and lvm to informed her that appt 9/2/22 will be canceled due to provider  wont be in the office

## 2022-08-10 ENCOUNTER — TELEPHONE (OUTPATIENT)
Dept: PSYCHIATRY | Facility: CLINIC | Age: 27
End: 2022-08-10

## 2022-08-10 NOTE — TELEPHONE ENCOUNTER
Pt called in requesting a call back from yo in regards to ASL class  Pt stated tried to reach out via my chart but was not able to do so  Please return call

## 2022-08-11 ENCOUNTER — SOCIAL WORK (OUTPATIENT)
Dept: BEHAVIORAL/MENTAL HEALTH CLINIC | Facility: CLINIC | Age: 27
End: 2022-08-11
Payer: COMMERCIAL

## 2022-08-11 ENCOUNTER — TELEPHONE (OUTPATIENT)
Dept: BEHAVIORAL/MENTAL HEALTH CLINIC | Facility: CLINIC | Age: 27
End: 2022-08-11

## 2022-08-11 DIAGNOSIS — F31.81 BIPOLAR 2 DISORDER (HCC): ICD-10-CM

## 2022-08-11 DIAGNOSIS — Z86.59 HISTORY OF POSTTRAUMATIC STRESS DISORDER (PTSD): Primary | ICD-10-CM

## 2022-08-11 PROCEDURE — 90834 PSYTX W PT 45 MINUTES: CPT | Performed by: COUNSELOR

## 2022-08-11 NOTE — PSYCH
Psychotherapy Provided: Individual Psychotherapy 45 minutes     Length of time in session: 45 minutes, follow up in 1 week    Encounter Diagnosis     ICD-10-CM    1  History of posttraumatic stress disorder (PTSD)  Z86 59    2  Bipolar 2 disorder (HCC)  F31 81        Goals addressed in session: Goal 1     Pain:      none    0    Current suicide risk : Low     Time: 10:15am - 10:53am  Previous session plan: CONTINUED: Continue to identify when she does not allow herself to relax (having dance parties by herself), continue to identify thoughts and behaviors that make her feel worse  Do a rundown of her entire day "I don't let myself sleep" Recommended On Becoming a Person  "This is my first friend group like this" "You're not a bad person for having that inside of you" this writer said to Kojo about her trauma, she says "that's something", her not sharing her trauma with others, discussion about intimacy  Address lethargy, avolition  Continue with ET work as needed  Check in with desire to help community, be part of Laila  Self-esteem seemingly improving with feeling sense of belonging, intimacy with [address] [place] [address], mother, Rajat Gold coworkers  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  "i'm not mentally where I was" "I don't give myself credit [for becoming the way that I am]" "I've been trying to see a future" "For the first time I'm imagining a positive future"  D: She says she didn't enjoy 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 this year, but it wasn't as difficult to work during as she had thought  She says that she had many wonderful moments with her new group of friends  Explored her re-engagement with thoughts about physical intimacy, feeling safe to talk about it with her friends, to explore ideas, understand what she desires, feels safe to do with others  Focused on her thoughts, feelings about [place]   This writer provided PCT through empathic listening, validation of feelings, reflection of content and feelings, CBT work through some reframing and psychoeducation  A: Suzy Pleitez appeared to be in a euthymic mood with congruent affect, seemed to be experiencing mild anxious distress, otherwise symptoms appear to be significantly reduced, appeared to be kempt, no SI/HI nor CAMARGO risk apparent or reported, seemed to respond well to PCT, CBT work today  P: CONTINUED: Continue to identify when she does not allow herself to relax (having dance parties by herself), continue to identify thoughts and behaviors that make her feel worse  Do a rundown of her entire day "I don't let myself sleep" Recommended On Becoming a Person  "This is my first friend group like this" "You're not a bad person for having that inside of you" this writer said to Kojo about her trauma, she says "that's something", her not sharing her trauma with others, discussion about intimacy  Address lethargy, avolition  Continue with ET work as needed  Check in with desire to help community, be part of Laila  Self-esteem seemingly improving with feeling sense of belonging, intimacy with [address] [place] [address] group, mother, Ty Vinnie coworkers  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  "i'm not mentally where I was" "I don't give myself credit [for becoming the way that I am]" "I've been trying to see a future" "For the first time I'm imagining a positive future"  Behavioral Health Treatment Plan [facility]: Diagnosis and Treatment Plan explained to Marlon Marcos relates understanding diagnosis and is agreeable to Treatment Plan   Yes

## 2022-08-11 NOTE — TELEPHONE ENCOUNTER
Called patient and lvm informing provider have opening today @ 10:00 am 8/11/22  If patient called please schedule if its not too late   Can do virtual

## 2022-08-19 ENCOUNTER — SOCIAL WORK (OUTPATIENT)
Dept: BEHAVIORAL/MENTAL HEALTH CLINIC | Facility: CLINIC | Age: 27
End: 2022-08-19
Payer: COMMERCIAL

## 2022-08-19 DIAGNOSIS — F31.81 BIPOLAR 2 DISORDER (HCC): ICD-10-CM

## 2022-08-19 DIAGNOSIS — Z86.59 HISTORY OF POSTTRAUMATIC STRESS DISORDER (PTSD): Primary | ICD-10-CM

## 2022-08-19 PROCEDURE — 90834 PSYTX W PT 45 MINUTES: CPT | Performed by: COUNSELOR

## 2022-08-19 NOTE — PSYCH
Psychotherapy Provided: Individual Psychotherapy 45 minutes     Length of time in session: 45 minutes, follow up in 1 week    Encounter Diagnosis     ICD-10-CM    1  History of posttraumatic stress disorder (PTSD)  Z86 59    2  Bipolar 2 disorder ([facility] )  F31 81        Goals addressed in session: Goal 1     Pain:      none    0    Current suicide risk : Low     Time: 9:15am - 10:00am  Previous session plan: CONTINUED: Continue to identify when she does not allow herself to relax (having dance parties by herself), continue to identify thoughts and behaviors that make her feel worse  Do a rundown of her entire day "I don't let myself sleep" Recommended On Becoming a Person  "This is my first friend group like this" "You're not a bad person for having that inside of you" this writer said to Kojo about her trauma, she says "that's something", her not sharing her trauma with others, discussion about intimacy  Address lethargy, avolition  Continue with ET work as needed  Check in with desire to help community, be part of Laila  Self-esteem seemingly improving with feeling sense of belonging, intimacy with [address] [place] [address], mother, Kaya Redding coworkers  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  "i'm not mentally where I was" "I don't give myself credit [for becoming the way that I am]" "I've been trying to see a future" "For the first time I'm imagining a positive future"  D: Focused on how she has been managing her symptoms of depression, allowing herself to relax  Explored how much better she felt after spending time with friends  Discussed challenges in her day with spending time with family, her niece, having to give her a bath  This writer provided PCT through empathic listening, validation of feelings, reflection of content and feelings     A: Viviana Player appeared to be in a euthymic mood with congruent affect, seemed to be experiencing lower mood at times, anxious distress, appeared to be kempt, no SI/HI nor CAMARGO risk apparent or reported, seemed to respond well to PCT  P:  CONTINUED: Continue to identify when she does not allow herself to relax (having dance parties by herself), continue to identify thoughts and behaviors that make her feel worse  Do a rundown of her entire day "I don't let myself sleep" Recommended On Becoming a Person  "This is my first friend group like this" "You're not a bad person for having that inside of you" this writer said to Kojo about her trauma, she says "that's something", her not sharing her trauma with others, discussion about intimacy  Address lethargy, avolition  Continue with ET work as needed  Check in with desire to help community, be part of Laila  Self-esteem seemingly improving with feeling sense of belonging, intimacy with [address] [place] [address] group, mother, Sky  coworkers  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  "i'm not mentally where I was" "I don't give myself credit [for becoming the way that I am]" "I've been trying to see a future" "For the first time I'm imagining a positive future"  Behavioral Health Treatment Plan [facility]: Diagnosis and Treatment Plan explained to Meredith Gregory relates understanding diagnosis and is agreeable to Treatment Plan   Yes

## 2022-08-25 ENCOUNTER — SOCIAL WORK (OUTPATIENT)
Dept: BEHAVIORAL/MENTAL HEALTH CLINIC | Facility: CLINIC | Age: 27
End: 2022-08-25
Payer: COMMERCIAL

## 2022-08-25 DIAGNOSIS — Z86.59 HISTORY OF POSTTRAUMATIC STRESS DISORDER (PTSD): Primary | ICD-10-CM

## 2022-08-25 DIAGNOSIS — F31.81 BIPOLAR 2 DISORDER (HCC): ICD-10-CM

## 2022-08-25 PROCEDURE — 90834 PSYTX W PT 45 MINUTES: CPT | Performed by: COUNSELOR

## 2022-08-25 RX ORDER — HYDROXYZINE HYDROCHLORIDE 10 MG/1
10 TABLET, FILM COATED ORAL EVERY 6 HOURS PRN
Qty: 30 TABLET | Refills: 2 | Status: SHIPPED | OUTPATIENT
Start: 2022-08-25 | End: 2022-10-07 | Stop reason: SDUPTHER

## 2022-08-25 NOTE — PSYCH
Psychotherapy Provided: Individual Psychotherapy 45 minutes     Length of time in session: 43 minutes, follow up in 1 week    Encounter Diagnosis     ICD-10-CM    1  History of posttraumatic stress disorder (PTSD)  Z86 59    2  Bipolar 2 disorder ([facility] )  F31 81        Goals addressed in session: Goal 1     Pain:      none    0    Current suicide risk : Low     Time: 2:07pm - 2:50pm   Previous session plan: CONTINUED: Continue to identify when she does not allow herself to relax (having dance parties by herself), continue to identify thoughts and behaviors that make her feel worse  Do a rundown of her entire day "I don't let myself sleep" Recommended On Becoming a Person  "This is my first friend group like this" "You're not a bad person for having that inside of you" this writer said to Kojo about her trauma, she says "that's something", her not sharing her trauma with others, discussion about intimacy  Address lethargy, avolition  Continue with ET work as needed  Check in with desire to help community, be part of Laila  Self-esteem seemingly improving with feeling sense of belonging, intimacy with [address] [place] [address], mother, Jyotsna Never coworkers  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  "i'm not mentally where I was" "I don't give myself credit [for becoming the way that I am]" "I've been trying to see a future" "For the first time I'm imagining a positive future"  D: Discussed her going down to pride day, helped volunteer, spent time with her friends and greatly enjoyed it  Discussed how she had trouble setting boundaries with her family, letting her sister stay over her house with her friends, says she was upset by the majority of the experience    This writer provided PCT through review of boundary setting, validation of feelings, reflection of content and feelings     A: Dolores Bhandari appeared to be in a neutral mood with congruent affect, seemed to be experiencing lower mood at times, excessive worry, appeared to be changmpt, no SI/HI nor CAMAGRO risk apparent or reported, seemed to respond well to PCT work today  P: CONTINUED: Continue to identify when she does not allow herself to relax (having dance parties by herself), continue to identify thoughts and behaviors that make her feel worse  Do a rundown of her entire day "I don't let myself sleep" Recommended On Becoming a Person  "This is my first friend group like this" "You're not a bad person for having that inside of you" this writer said to Kojo about her trauma, she says "that's something", her not sharing her trauma with others, discussion about intimacy  Address lethargy, avolition  Continue with ET work as needed  Check in with desire to help community, be part of Laila  Self-esteem seemingly improving with feeling sense of belonging, intimacy with [address] [place] [address] group, mother, Miguel A Ellsworth coworkers  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  "i'm not mentally where I was" "I don't give myself credit [for becoming the way that I am]" "I've been trying to see a future" "For the first time I'm imagining a positive future"  Behavioral Health Treatment Plan [facility]: Diagnosis and Treatment Plan explained to [place] relates understanding diagnosis and is agreeable to Treatment Plan   Yes

## 2022-09-13 ENCOUNTER — SOCIAL WORK (OUTPATIENT)
Dept: BEHAVIORAL/MENTAL HEALTH CLINIC | Facility: CLINIC | Age: 27
End: 2022-09-13
Payer: COMMERCIAL

## 2022-09-13 DIAGNOSIS — F31.81 BIPOLAR 2 DISORDER (HCC): ICD-10-CM

## 2022-09-13 DIAGNOSIS — Z86.59 HISTORY OF POSTTRAUMATIC STRESS DISORDER (PTSD): Primary | ICD-10-CM

## 2022-09-13 PROCEDURE — 90834 PSYTX W PT 45 MINUTES: CPT | Performed by: COUNSELOR

## 2022-09-13 NOTE — PSYCH
Psychotherapy Provided: Individual Psychotherapy 45 minutes     Length of time in session: 48 minutes, follow up in 1 week    Encounter Diagnosis     ICD-10-CM    1  History of posttraumatic stress disorder (PTSD)  Z86 59    2  Bipolar 2 disorder ([facility] 75 )  F31 81        Goals addressed in session: Goal 1     Pain:      none    0    Current suicide risk : Low     Time: 9:09am - 9:57am  Previous session plan: CONTINUED: Continue to identify when she does not allow herself to relax (having dance parties by herself), continue to identify thoughts and behaviors that make her feel worse  Do a rundown of her entire day "I don't let myself sleep" Recommended On Becoming a Person  "This is my first friend group like this" "You're not a bad person for having that inside of you" this writer said to Kojo about her trauma, she says "that's something", her not sharing her trauma with others, discussion about intimacy  Address lethargy, avolition  Continue with ET work as needed  Check in with desire to help community, be part of Laila  Self-esteem seemingly improving with feeling sense of belonging, intimacy with [address] [place] [address] group, mother, [employer] coworkers  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  "i'm not mentally where I was" "I don't give myself credit [for becoming the way that I am]" "I've been trying to see a future" "For the first time I'm imagining a positive future"  D: She says she didn't feel like going to certain events recently, but she was happy she did end up going  Enjoyed her time talking about sexuality, race  Says her sister has started working at [employer]  This writer provided PCT through empathic listening, validation of feelings, reflection of content and feelings     A: Selam Evans appeared to be in a euthymic mood with congruent affect, seemed to be experiencing lower mood at times, excessive worry, mild thoughts of worthlessness and hopelessness, appeared to be kempt, no SI/HI nor CAMARGO risk apparent or reported, seemed to respond well to PCT work today  P: Continue to discuss her relating to others, considering relationships, sexual relationships  CONTINUED: Continue to identify when she does not allow herself to relax (having dance parties by herself), continue to identify thoughts and behaviors that make her feel worse  Do a rundown of her entire day "I don't let myself sleep" Recommended On Becoming a Person  "This is my first friend group like this" "You're not a bad person for having that inside of you" this writer said to Kojo about her trauma, she says "that's something", her not sharing her trauma with others, discussion about intimacy  Address lethargy, avolition  Continue with ET work as needed  Check in with desire to help community, be part of Laila  Self-esteem seemingly improving with feeling sense of belonging, intimacy with [address] [place] [address] group, mother, Lilli Later coworkers  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  "i'm not mentally where I was" "I don't give myself credit [for becoming the way that I am]" "I've been trying to see a future" "For the first time I'm imagining a positive future"  Behavioral Health Treatment Plan [facility]: Diagnosis and Treatment Plan explained to Hannah Mark relates understanding diagnosis and is agreeable to Treatment Plan   Yes

## 2022-09-19 ENCOUNTER — SOCIAL WORK (OUTPATIENT)
Dept: BEHAVIORAL/MENTAL HEALTH CLINIC | Facility: CLINIC | Age: 27
End: 2022-09-19
Payer: COMMERCIAL

## 2022-09-19 DIAGNOSIS — Z86.59 HISTORY OF POSTTRAUMATIC STRESS DISORDER (PTSD): Primary | ICD-10-CM

## 2022-09-19 DIAGNOSIS — F31.81 BIPOLAR 2 DISORDER (HCC): ICD-10-CM

## 2022-09-19 PROCEDURE — 90834 PSYTX W PT 45 MINUTES: CPT | Performed by: COUNSELOR

## 2022-09-19 RX ORDER — LAMOTRIGINE 200 MG/1
TABLET ORAL
Qty: 30 TABLET | Refills: 2 | Status: SHIPPED | OUTPATIENT
Start: 2022-09-19 | End: 2022-09-28
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
## 2022-09-21 ENCOUNTER — SOCIAL WORK (OUTPATIENT)
Dept: BEHAVIORAL/MENTAL HEALTH CLINIC | Facility: CLINIC | Age: 27
End: 2022-09-21
Payer: COMMERCIAL

## 2022-09-21 DIAGNOSIS — F31.81 BIPOLAR 2 DISORDER (HCC): ICD-10-CM

## 2022-09-21 DIAGNOSIS — Z86.59 HISTORY OF POSTTRAUMATIC STRESS DISORDER (PTSD): Primary | ICD-10-CM

## 2022-09-21 PROCEDURE — 90834 PSYTX W PT 45 MINUTES: CPT | Performed by: COUNSELOR

## 2022-09-21 NOTE — PSYCH
Psychotherapy Provided: Individual Psychotherapy 45 minutes     Length of time in session: 52 minutes, follow up in 1 week    Encounter Diagnosis     ICD-10-CM    1  History of posttraumatic stress disorder (PTSD)  Z86 59    2  Bipolar 2 disorder (HCC)  F31 81        Goals addressed in session: Goal 1     Pain:      none    0    Current suicide risk : Low     Time: 1:07pm - 1:59pm   Previous session plan: CONTINUED: Continue to discuss her relating to others, considering relationships, sexual relationships  CONTINUED: Continue to identify when she does not allow herself to relax (having dance parties by herself), continue to identify thoughts and behaviors that make her feel worse  Do a rundown of her entire day "I don't let myself sleep" Recommended On Becoming a Person  "This is my first friend group like this" "You're not a bad person for having that inside of you" this writer said to Kojo about her trauma, she says "that's something", her not sharing her trauma with others, discussion about intimacy  Address lethargy, avolition  Continue with ET work as needed  Check in with desire to help community, be part of [facility]  Self-esteem seemingly improving with feeling sense of belonging, intimacy with [address] [place] [address] group, mother, Beverley Purvis coworkers  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  "i'm not mentally where I was" "I don't give myself credit [for becoming the way that I am]" "I've been trying to see a future" "For the first time I'm imagining a positive future"  D: Focused with Sunny today on her increased symptoms of depression  Clarified that they are not a resurgence of PTSD symptoms (avoidance, intrusive memories, nightmares, denied), rather feeling hopelessness  She reports it has mostly been about seeing her people of [place] hurt, recognizing systemic racism and harmful systems in place that leave many suffering in [place]   Explored her coping skills - grounding, box breathing, going groups at [facility], reaching out to family members  Reports she does not want family to worry about her, because there is nothing to worry about  Reports a fleeting moment of SI that did not persist past a thought, denies a plan or intent to harm herself  Reviewed her safety plan  Referred to IOP in session  She agrees to meet following morning if insurance confirms she may, to call this writer either way  This writer provided PCT through empathic listening, safety planning, validation of feelings, resource and referral, reflection of content and feelings  A: Dolores Bhandari appeared to be in a moderately depressed mood with congruent affect  She has been experiencing increased symptoms of depression through increased thoughts of hopelessness, mild thoughts of worthlessness, moderate avolition, anhedonia, lethargy  Reported fleeting thought of suicide that did not persist - no plan or intent to harm herself  Agrees to reach out to this writer, her supports or county crisis if needed  P: Consider pendulation  CONTINUED: Continue to discuss her relating to others, considering relationships, sexual relationships  CONTINUED: Continue to identify when she does not allow herself to relax (having dance parties by herself), continue to identify thoughts and behaviors that make her feel worse  Do a rundown of her entire day "I don't let myself sleep" Recommended On Becoming a Person  "This is my first friend group like this" "You're not a bad person for having that inside of you" this writer said to Kojo about her trauma, she says "that's something", her not sharing her trauma with others, discussion about intimacy  Address lethargy, avolition  Continue with ET work as needed  Check in with desire to help community, be part of [facility]  Self-esteem seemingly improving with feeling sense of belonging, intimacy with [address] [place] [address] group, mother, J[employer] coworkers  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  "i'm not mentally where I was" "I don't give myself credit [for becoming the way that I am]" "I've been trying to see a future" "For the first time I'm imagining a positive future"  Behavioral Health Treatment Plan [facility]: Diagnosis and Treatment Plan explained to Renata Gonzalez relates understanding diagnosis and is agreeable to Treatment Plan   Yes

## 2022-09-22 ENCOUNTER — SOCIAL WORK (OUTPATIENT)
Dept: BEHAVIORAL/MENTAL HEALTH CLINIC | Facility: CLINIC | Age: 27
End: 2022-09-22
Payer: COMMERCIAL

## 2022-09-22 ENCOUNTER — TELEPHONE (OUTPATIENT)
Dept: PSYCHOLOGY | Facility: CLINIC | Age: 27
End: 2022-09-22

## 2022-09-22 DIAGNOSIS — Z86.59 HISTORY OF POSTTRAUMATIC STRESS DISORDER (PTSD): Primary | ICD-10-CM

## 2022-09-22 DIAGNOSIS — F31.81 BIPOLAR 2 DISORDER (HCC): ICD-10-CM

## 2022-09-22 PROCEDURE — 90834 PSYTX W PT 45 MINUTES: CPT | Performed by: COUNSELOR

## 2022-09-22 NOTE — PSYCH
Psychotherapy Provided: Individual Psychotherapy 45 minutes     Length of time in session: 44 minutes, follow up in 1 week    Encounter Diagnosis     ICD-10-CM    1  History of posttraumatic stress disorder (PTSD)  Z86 59    2  Bipolar 2 disorder ([facility] )  F31 81        Goals addressed in session: Goal 1     Pain:      none    0    Current suicide risk : Low     Time: 8:15am - 8:59am   Previous session plan: Consider pendulation  CONTINUED: Continue to discuss her relating to others, considering relationships, sexual relationships  CONTINUED: Continue to identify when she does not allow herself to relax (having dance parties by herself), continue to identify thoughts and behaviors that make her feel worse  Do a rundown of her entire day "I don't let myself sleep" Recommended On Becoming a Person  "This is my first friend group like this" "You're not a bad person for having that inside of you" this writer said to Kojo about her trauma, she says "that's something", her not sharing her trauma with others, discussion about intimacy  Address lethargy, avolition  Continue with ET work as needed  Check in with desire to help community, be part of [facility]  Self-esteem seemingly improving with feeling sense of belonging, intimacy with [address] [place] [address] [facility] group, mother, Diana Fowler coworkers  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  "i'm not mentally where I was" "I don't give myself credit [for becoming the way that I am]" "I've been trying to see a future" "For the first time I'm imagining a positive future"  D: She reports she's reached out to her friends, they've reached out to her, this has been helpful for her  She says she has been engaging with her supports  She discussed some internalized racism, discussed her desire to go back to [place], fears she will not be able to return  Taught and practiced pendulation today, reports it was helpful  Reports feeling mildly better than the previous day   This writer provided PCT, CBT intervention through empathic listening, validation of feelings, psychoeducation, mindfulness work  A: Meenakshi Kamara appeared to be in a mildly mood with congruent affect, seemed to be experiencing lower mood at times, excessive worry, depressed mood with congruent affect at times, moderate thoughts of hopelesseness, appeared to be kempt, no SI/HI nor CAMARGO risk apparent or reported, seemed to respond well to PCT, CBT work today  P: Check in with practicing pendulation, anxiety heart muscle shaped with real organ colors "The dripping heart", forearms, shaped like statue arms wrapped in the shape of a hug, white, "hug"   CONTINUED: Continue to discuss her relating to others, considering relationships, sexual relationships  Continue to identify when she does not allow herself to relax (having dance parties by herself), continue to identify thoughts and behaviors that make her feel worse  Do a rundown of her entire day "I don't let myself sleep" Recommended On Becoming a Person  "This is my first friend group like this" "You're not a bad person for having that inside of you" this writer said to Kojo about her trauma, she says "that's something", her not sharing her trauma with others, discussion about intimacy  Address lethargy, avolition  Continue with ET work as needed  Check in with desire to help community, be part of [facility]  Self-esteem seemingly improving with feeling sense of belonging, intimacy with [address] [place] [address] group, mother, Candice Parham coworkers  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  "i'm not mentally where I was" "I don't give myself credit [for becoming the way that I am]" "I've been trying to see a future" "For the first time I'm imagining a positive future"  Behavioral Health Treatment Plan [facility]: Diagnosis and Treatment Plan explained to Wilda Borjas relates understanding diagnosis and is agreeable to Treatment Plan   Yes

## 2022-09-26 ENCOUNTER — TELEPHONE (OUTPATIENT)
Dept: PSYCHIATRY | Facility: CLINIC | Age: 27
End: 2022-09-26

## 2022-09-26 NOTE — TELEPHONE ENCOUNTER
Checked in with her, reports she's feeling better since the weekend   She says she will begin IOP on Wednesday

## 2022-09-28 ENCOUNTER — OFFICE VISIT (OUTPATIENT)
Dept: PSYCHOLOGY | Facility: CLINIC | Age: 27
End: 2022-09-28
Payer: COMMERCIAL

## 2022-09-28 ENCOUNTER — OFFICE VISIT (OUTPATIENT)
Dept: PSYCHIATRY | Facility: CLINIC | Age: 27
End: 2022-09-28
Payer: COMMERCIAL

## 2022-09-28 VITALS
HEIGHT: 60 IN | WEIGHT: 142.6 LBS | HEART RATE: 78 BPM | BODY MASS INDEX: 28 KG/M2 | DIASTOLIC BLOOD PRESSURE: 61 MMHG | SYSTOLIC BLOOD PRESSURE: 98 MMHG

## 2022-09-28 DIAGNOSIS — F31.81 BIPOLAR 2 DISORDER (HCC): Primary | ICD-10-CM

## 2022-09-28 DIAGNOSIS — F43.10 PTSD (POST-TRAUMATIC STRESS DISORDER): ICD-10-CM

## 2022-09-28 PROCEDURE — 90791 PSYCH DIAGNOSTIC EVALUATION: CPT | Performed by: PSYCHIATRY & NEUROLOGY

## 2022-09-28 PROCEDURE — 90791 PSYCH DIAGNOSTIC EVALUATION: CPT

## 2022-09-28 RX ORDER — LAMOTRIGINE 200 MG/1
300 TABLET ORAL DAILY
Qty: 45 TABLET | Refills: 0
Start: 2022-09-28

## 2022-09-28 NOTE — BH TREATMENT PLAN
Assessment/Plan:       Diagnoses and all orders for this visit:     Diagnoses and all orders for this visit:    Bipolar 2 disorder ([facility] [address] [facility] )    PTSD (post-traumatic stress disorder)           Subjective:      Patient ID: Paris Orr  is a 32 y o  female   [facility] Treatment Plan   AREAS OF NEED: Gabrielle Ramos is currently struggling with a self-described bout of major depression which has risen to an intensity she's never experienced before as evidenced by lack of motivation, loss of interest in pleasurable activities, and crying spells due to racing thoughts/stressors  Date Initiated: 09/28/22     Strengths: compassionate, well-versed in intersectionality, confident, and passionate  LONG TERM GOAL:   Date Initiated: 09/28/22  1 0 While at [facility], I will gain the support, education, tools, techniques, and skills which will allow me to increase my quality of life  Target Date: 10/26/22  Completion Date:         SHORT TERM OBJECTIVES:      Date Initiated: 09/28/22  1 1 I will spend at list 15 minutes per day adding to my journal  (Grattitude or otherwise)  Revision Date:   Target Date: 10/10/22  Completion Date:      Date Initiated: 09/28/22  1 2 I will learn and practice at least 3 effective grounding/mindfulness techniques and/or coping skills which assist me in slowing down my thoughts when my mind starts racing  Revision Date:   Target Date: 10/10/22  Completion Date:     Date Initiated: 09/28/22  1 3 I will take medications as prescribed and share questions and concerns if arise  Revision Date:   Target Date: 10/10/22  Completion Date:      Date Initiated: 09/28/22  1 4 I will identify 3 ways my supports can assist in my recovery and agree to use them when/if needed      Revision Date:   Target Date: 10/10/22  Completion Date:            7 DAY REVISION:     Date Initiated:   1 5   Revision Date:   Target Date:  Completion Date:           PSYCHIATRY:  Date Initiated: 09/28/22   Medication Management and Education       Revision Date:       The person(s) responsible for carrying out the plan is Brittany Rothman MD     NURSING/SYMPTOM EDUCATION:  Date Initiated: 09/28/22      1 1, 1 2  1 3, 1 4 Provide wellness/symptoms and skill education groups three to five days weekly to educate Meenakshi Kamara on signs and symptoms of diagnoses, skills to manage stressors, and medication questions that will be addressed by the treatment team         Revision date: The person(s) responsible for carrying out the plan is Neil Holder MS     PSYCHOLOGY:   Date Initiated: 09/28/22       1 1, 1 2, 1 4 Provide psychotherapy group 5 times per week to allow opportunity for Meenakshi Kamara  to explore stressors and ways of coping  Revision Date:      The person(s) responsible for carrying out the plan is Mauro Moses MS     ALLIED THERAPY:   Date Initiated: 09/28/22  1 1,1 2 Engage Meenakshi Kamara in AT group 5 times daily to encourage development and use of wellness tools to decrease symptoms and promote recovery through meaningful activity  Revision Date:          The person(s) responsible for carrying out the plan is LAVINIA Garay      CASE MANAGEMENT:   Date Initiated: 09/28/22      1 0 This  will meet with Meenakshijoyce Kamara  3-4 times weekly to assess treatment progress, discharge planning, connection to community supports and UR as indicated  Revision Date:    The person(s) responsible for carrying out the plan is Neil Holder MS     TREATMENT REVIEW/COMMENTS:      DISCHARGE CRITERIA: Identify 3 signs of progress and complete relapse prevention plan  DISCHARGE PLAN: Connect with identified outpatient providers  Estimated Length of Stay: 10 treatment days          Diagnosis and Treatment Plan explained to   MeenakshiCarito Kamara  relates understanding diagnosis and is agreeable to Treatment Plan           CLIENT COMMENTS / Please share your thoughts, feelings, need and/or experiences regarding your treatment plan with Staff  Please see follow up note with comments  Signatures can be found on [facility] Treatment plan consent form

## 2022-09-28 NOTE — PSYCH
Subjective:     Patient ID: Artis Campos is a 32 y o  female  [facility] Clinical Progress Notes      Specialized Services Documentation  Therapist must complete separate progress note for each specific clinical activity in which the individual participated during the day  Other MA precert packet submitted via fax    Case Management Note    Scooter Vicente MS    Current suicide risk : Low     [id number] INTAKE  Met to review program expectations/schedule, review and sign all of the ROIs, and to acquire the standard intake information  See this writer's evaluation today for more info  Medications changes/added/denied? Yes    Treatment session number: 0    Individual Case Management Visit provided today?  Yes     [facility] follow up physician's orders: [facility] 09/29/22
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
## 2022-09-29 ENCOUNTER — OFFICE VISIT (OUTPATIENT)
Dept: PSYCHOLOGY | Facility: CLINIC | Age: 27
End: 2022-09-29
Payer: COMMERCIAL

## 2022-09-29 DIAGNOSIS — F43.10 PTSD (POST-TRAUMATIC STRESS DISORDER): ICD-10-CM

## 2022-09-29 DIAGNOSIS — F31.81 BIPOLAR 2 DISORDER (HCC): Primary | ICD-10-CM

## 2022-09-29 PROCEDURE — H0035 MH PARTIAL HOSP TX UNDER 24H: HCPCS

## 2022-09-29 NOTE — PSYCH
Subjective:     Patient ID: Meenakshi Kamara is a 32 y o  female  [facility] Clinical Progress Notes      Specialized Services Documentation  Therapist must complete separate progress note for each specific clinical activity in which the individual participated during the day  Group Psychotherapy ([id number])    The group began with a brief discussion of yoga  We establish which group members had experience and which would likely struggle with most of the poses  The group was assured that it was ok to alter any poses which were uncomfortable  We then practiced chair yoga as a group for roughly 30 minutes  Following the yoga practice, we discussed how we were feeling/how we felt during the practice/etc  The group also discussed healthy practices such as guided imagery/meditation, float therapy, ester chi, etc  Additionally, the group was given multiple resources for ways to practice for free ([employer], [employer], etc )  Meenakshi Kamara participated by joining the group for the yoga practice and by practicing EFT/explaining how hot yoga works to the group  Working on goals  Continue Psychotherapy to encourage further exploration of the self       Tx Plan Objective: 1 1,1 2, 1 4   Therapist: Neil Holder MS    Other Sunny asked where she could find another bathroom  As I was explaining, another peer offered to show her where it was  Sunny said yes and went with him, however, I felt as though she was not comfortable with this  I followed them up and then waited for her to ensure she was not in an uncomfortable situation and then made sure she could find her way back to program      Case Management Note    Neil Holder MS    Current suicide risk : Low     [id number]  Met to review and sign initial treatment plan  Medications changes/added/denied? No    Treatment session number: 1    Individual Case Management Visit provided today?  Yes     [facility] follow up physician's orders: none

## 2022-09-29 NOTE — PSYCH
Subjective:    Patient ID: Laurence Sutherland is a 32 y o  female      [facility] Clinical Progress Notes      Specialized Services Documentation  Therapist must complete separate progress note for each specific clinical activity in which the individual participated during the day  Group Psychotherapy    [id number] Laurence Sutherland participated in an educational group about cognitive defusion  The group learned what cognitive defusion means, what it involves, and how it is opposite of cognitive fusion  Participants were given the opportunity to discuss their personal relationships with their thoughts/cognitions and struggles with self-criticism  The group members were given a handout of a metaphoric story related to acceptance and commitment therapy which they reflected on   guided participants through two exercises oriented towards moving from cognitive fusion to defusion,with the focus on distancing cognitions from facts  The group also received a list of cognitive defusion techniques to review  Laurence Sutherland shared that one cognitive defusion technique they can reflect on or practice this week is "type it out " Laurence Sutherland continues to make progress towards treatment goals  Continue with psychotherapy to encourage further cognitive skill-building       Tx Plan Objective 1 1, 1 2, 1 4 Therapist: DWAYNE Berrios

## 2022-09-29 NOTE — PSYCH
Subjective:     Patient ID: Lokesh Jackson is a 32 y o  female  [facility] Clinical Progress Notes      Specialized Services Documentation  Therapist must complete separate progress note for each specific clinical activity in which the individual participated during the day  Allied Therapy   [id number]- Lokesh Jackson actively shared in [facility] group focused on using the comparison of a thunder storm to daily mental health struggles and how to utilize their WRAP plan  Lokesh Jackson  engaged in group by actively making music through referential improvisation of making a thunderstorm,  small group discussion related to identifying triggers, symptoms, and action steps to regulate, understanding when and why we use a WRAP plan, as well as learning basic definitions for each component  Group was also encouraged to identify their obstacles or things that get in the way of them utilizing their WRAP plan  Each group member was encouraged to fill out pages 7-9 in their individual WRAP plan  Some effort noted toward treatment goal   Continue AT to encourage the development and practice of dialectic statements to  alleviate symptoms and support wellness  Tx Plan Objective: 1 1, 1 2, & 1 4                         Therapist:  LAVINIA Olsen    Education Therapy   [id number] Lokesh Jackson actively shared in morning assessment and goal review  Presented as Receptive related to readiness to learn  Lokesh Jackson did complete goal from last treatment day identifying gaining support  did not present with any barriers to learning  Jose Madrid engaged throughout the treatment day  Was engaged in learning related to Illness, Medication, Aftercare and Wellness Tools  Staff utilized Verbal, Written, A/V and Demonstration teaching methods  Lokesh Jackson shared area of learning and set a goal for outside of program to increase her energy by completing pleasurable activities  Tx Plan Objective: 1 1, 1 2, 1 4   Therapist:  LAVINIA Sánchez

## 2022-09-30 ENCOUNTER — OFFICE VISIT (OUTPATIENT)
Dept: PSYCHOLOGY | Facility: CLINIC | Age: 27
End: 2022-09-30
Payer: COMMERCIAL

## 2022-09-30 DIAGNOSIS — F31.81 BIPOLAR 2 DISORDER (HCC): Primary | ICD-10-CM

## 2022-09-30 DIAGNOSIS — F43.10 PTSD (POST-TRAUMATIC STRESS DISORDER): ICD-10-CM

## 2022-09-30 PROCEDURE — H0035 MH PARTIAL HOSP TX UNDER 24H: HCPCS

## 2022-09-30 NOTE — PSYCH
Subjective:     Patient ID: Xochitl Ortiz is a 32 y o  female  [facility] Clinical Progress Notes      Specialized Services Documentation  Therapist must complete separate progress note for each specific clinical activity in which the individual participated during the day  Allied Therapy   [id number]  Xochitl Ortiz  actively shared in music therapy group exploring DBT distress tolerance crisis survival strategies  Group explored crisis survival strategies ACCEPTS, SELF-SOOTHING, TIP, STOP, IMPROVE and PROS & CONS) reinforcing actions one could take to learn to tolerate stressful experiences, thoughts and urges  Leona Felt felt she could put effort into practicing STOP  Some initial progress toward goal noted  Continue AT to encourage learning, practice and home practice of skills to manage distress       Tx Plan Objective: 1 1, Therapist:  Teo Rolon MT-BC

## 2022-09-30 NOTE — PSYCH
Subjective:     Patient ID: Nehemias Rivero is a 32 y o  female  [facility] Clinical Progress Notes      Specialized Services Documentation  Therapist must complete separate progress note for each specific clinical activity in which the individual participated during the day  Group Psychotherapy   [id number]- The group engaged in the wellness assessment, which evaluates progress on several different areas of wellness/wellbeing: physical, emotional, cognitive, vocational, social and spiritual  Clients rated their progress and discussed areas that need work  By completing and discussing areas of progress and challenges, members are connected and reminded that, in their mental health struggle, they are not alone  Topics of discussion revolved around positive experiences within each area of wellness as well as the challenging aspects to wellness within their past week  Nehemias Rivero continues to make progress towards goals through participation in group activity and personal disclosures  Continue AT to encourage the development and practice of reflecting on their mental health journey  to alleviate symptoms and support wellness    TX Plan Objectives: 1 1, 1 2, 1 4         Therapist: LAVINIA Saxena & Luis E Trent MS

## 2022-09-30 NOTE — PSYCH
Subjective:     Patient ID: Jarred Kaur is a 32 y o  female  [facility] Clinical Progress Notes      Specialized Services Documentation  Therapist must complete separate progress note for each specific clinical activity in which the individual participated during the day  Group Psychotherapy    [id number] Jarred Kaur actively participated in a psychoeducational group focused on mental health and wellness support resources  Participants were given note-cards to record resources of interest, then observed the presentation of resourceful websites, including topics such as: multicultural psychiatrists / therapists, LGBTQ, DV / SA, housing, legal, drug & alcohol, employment, grief, eating disorder, and  support  Group members openly asked questions and shared personal experiences  Jarred Kaur shared openly about Sergio  Good progress noted towards goal  Continue with psychotherapy to encourage further expansion of outpatient support networks      Tx Plan Objective 1 1, 1 2, 1 4 Therapist: DWAYNE Apple

## 2022-09-30 NOTE — PSYCH
Subjective:     Patient ID: Octaviano Mauro is a 32 y o  female  [facility] Clinical Progress Notes      Specialized Services Documentation  Therapist must complete separate progress note for each specific clinical activity in which the individual participated during the day  Case Management Note    Micah Delgadillo MS    Current suicide risk : Low     Did not meet today    Medications changes/added/denied? No    Treatment session number: 2    Individual Case Management Visit provided today?  No    [facility] follow up physician's orders: none

## 2022-10-03 ENCOUNTER — TELEPHONE (OUTPATIENT)
Dept: PSYCHIATRY | Facility: CLINIC | Age: 27
End: 2022-10-03

## 2022-10-03 ENCOUNTER — DOCUMENTATION (OUTPATIENT)
Dept: PSYCHOLOGY | Facility: CLINIC | Age: 27
End: 2022-10-03

## 2022-10-03 ENCOUNTER — SOCIAL WORK (OUTPATIENT)
Dept: BEHAVIORAL/MENTAL HEALTH CLINIC | Facility: CLINIC | Age: 27
End: 2022-10-03
Payer: COMMERCIAL

## 2022-10-03 DIAGNOSIS — F31.81 BIPOLAR 2 DISORDER (HCC): ICD-10-CM

## 2022-10-03 DIAGNOSIS — Z86.59 HISTORY OF POSTTRAUMATIC STRESS DISORDER (PTSD): Primary | ICD-10-CM

## 2022-10-03 PROCEDURE — 90834 PSYTX W PT 45 MINUTES: CPT | Performed by: COUNSELOR

## 2022-10-03 NOTE — PSYCH
Psychotherapy Provided: Individual Psychotherapy 45 minutes     Length of time in session: 45 minutes, follow up in 1 week    Encounter Diagnosis     ICD-10-CM    1  History of posttraumatic stress disorder (PTSD)  Z86 59    2  Bipolar 2 disorder (HCC)  F31 81        Goals addressed in session: Goal 1     Pain:      none    0    Current suicide risk : Low     Time: 11:10am - 11:55am   Previous session plan: Check in with practicing pendulation, anxiety heart muscle shaped with real organ colors "The dripping heart", forearms, shaped like statue arms wrapped in the shape of a hug, white, "hug"   CONTINUED: Continue to discuss her relating to others, considering relationships, sexual relationships  Continue to identify when she does not allow herself to relax (having dance parties by herself), continue to identify thoughts and behaviors that make her feel worse  Do a rundown of her entire day "I don't let myself sleep" Recommended On Becoming a Person  "This is my first friend group like this" "You're not a bad person for having that inside of you" this writer said to Kojo about her trauma, she says "that's something", her not sharing her trauma with others, discussion about intimacy  Address lethargy, avolition  Continue with ET work as needed  Check in with desire to help community, be part of Laila  Self-esteem seemingly improving with feeling sense of belonging, intimacy with [address] [place] [address] group, mother, [employer] Siddiqui coworkers  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  "i'm not mentally where I was" "I don't give myself credit [for becoming the way that I am]" "I've been trying to see a future" "For the first time I'm imagining a positive future"  D: Reports positive experience at IOP program, having gained additional coping skills and meetings with psychiatry   She says she has found many more resources for bi-poc individuals, reached out to her friends again and all spent time together - found that they were all experiencing deep sadness, depression  This writer provided ET through focus on her experiences with others, reducing isolation  A: Artis Campos appeared to be in a euthymic mood with congruent affect, seemed to be experiencing lower mood at times, excessive worry, thoughts of hopelessness at times, appeared to be kempt, no SI/HI nor CAMARGO risk apparent or reported, seemed to respond well to PCT, thoughts WNL and hopeful today  P: CONTINUED: Check in with practicing pendulation, anxiety heart muscle shaped with real organ colors "The dripping heart", forearms, shaped like statue arms wrapped in the shape of a hug, white, "hug"   CONTINUED: Continue to discuss her relating to others, considering relationships, sexual relationships  Continue to identify when she does not allow herself to relax (having dance parties by herself), continue to identify thoughts and behaviors that make her feel worse  Do a rundown of her entire day "I don't let myself sleep" Recommended On Becoming a Person  "This is my first friend group like this" "You're not a bad person for having that inside of you" this writer said to Kojo about her trauma, she says "that's something", her not sharing her trauma with others, discussion about intimacy  Address lethargy, avolition  Continue with ET work as needed  Check in with desire to help community, be part of Laila  Self-esteem seemingly improving with feeling sense of belonging, intimacy with [address] [place] [address] group, mother, [employer] coworkers  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  "i'm not mentally where I was" "I don't give myself credit [for becoming the way that I am]" "I've been trying to see a future" "For the first time I'm imagining a positive future"  Behavioral Health Treatment Plan [facility]: Diagnosis and Treatment Plan explained to Allen Rodriguez relates understanding diagnosis and is agreeable to Treatment Plan   Yes

## 2022-10-03 NOTE — PSYCH
Subjective:     Patient ID: Dianne Funez is a 32 y o  female  [facility] Clinical Progress Notes      Specialized Services Documentation  Therapist must complete separate progress note for each specific clinical activity in which the individual participated during the day  Education Therapy   [id number] Dianne Funez actively shared in morning assessment and goal review  Presented as Receptive related to readiness to learn  Dianne Funez did complete goal from last treatment day identifying gaining support  did not present with any barriers to learning  Jose Madrid engaged throughout the treatment day  Was engaged in learning related to Illness, Medication, Aftercare and Wellness Tools  Staff utilized Verbal, Written, A/V and Demonstration teaching methods  Dianne Funez shared area of learning and set a goal for outside of program to spend the weekend with friends        Tx Plan Objective: 1 1,1 2,1 4 Therapist:  Forest Barros MS

## 2022-10-03 NOTE — TELEPHONE ENCOUNTER
Pt phoned in this morning wanting to make an appt with her provider, Marika Castillo spoke to the provider and was able to schedule her and phoned the patient back to let her know

## 2022-10-03 NOTE — PROGRESS NOTES
Subjective:     Patient ID: Xochitl Ortiz is a 32 y o  female  [facility] Discharge Summary:   Admission Date: 09/29/22  Patient was referred by Isamar Bishop  Discharge Date: 10/03/2022  Was this a routine discharge? Onelia, Raghav called and requested discharge  Diagnosis: Axis I: Bipolar 2 disorder ([facility] [address] )     PTSD (post-traumatic stress disorder)  Treating Physician: Ethan Dawn MD  Treatment Complications: Leona Hernandez requested discharge from program after 2 days of treatment  Unable to assess progress  Presenting Need: As of 07/28/22, As per this writer: Leona Hernandez ([address] [place] [address] Secret Sales) is a 32year old female that prefers she/her and is LGBTQ  [address] [place] [address] Secret Sales is currently struggling with a self-described major depressive episode with an intensity she has not experienced before, starting roughly 1 5 weeks ago  This episode manifested in multiple ways  [address] [place] [address] Secret Sales has had a loss of interest in activities she loves, her usual effective coping skills were no longer working, she's been overeating (binging), unable to fall asleep, has lost the motivation to take care of ADLs, and increased feelings of hopelessness/worthlessness, and crying spells which could last up to an entire day  Sunny expressed that this episode has caused any little activity/responsiblility/task to become completely overwhelming  Work is her only stressor and 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  Course of treatment includes:    group counseling, medication management, individual case management, allied therapy, psychoeducation and psychiatric evaluation  Treatment Progress: Progress was unable to be assessed  Aftercare recommendations include: Continue with outpatient providers   Isamar Bishop for therapy and Dr Jennifer Gooden for medication management  Discharge Medications include:  Current Outpatient Medications:     albuterol (PROVENTIL HFA,VENTOLIN HFA) 90 mcg/act inhaler, Inhale 2 puffs every 6 (six) hours as needed for wheezing or shortness of breath, Disp: 3 Inhaler, Rfl: 1    benzocaine (ORAJEL) 10 % mucosal gel, Apply 1 application to the mouth or throat as needed for mucositis (Patient not taking: Reported on 9/28/2022), Disp: 5 3 g, Rfl: 0    buPROPion (WELLBUTRIN XL) 150 mg 24 hr tablet, Take 1 tablet (150 mg total) by mouth daily, Disp: 90 tablet, Rfl: 0    hydrOXYzine HCL (ATARAX) 10 mg tablet, Take 1 tablet (10 mg total) by mouth every 6 (six) hours as needed for anxiety, Disp: 30 tablet, Rfl: 2    lamoTRIgine (LaMICtal) 200 MG tablet, Take 1 5 tablets (300 mg total) by mouth daily, Disp: 45 tablet, Rfl: 0    Levonorgestrel 13 5 MG IUD, 1 each by Intrauterine route, Disp: , Rfl:     traZODone (DESYREL) 50 mg tablet, TAKE 0 5 TABLETS (25 MG TOTAL) BY MOUTH DAILY AT BEDTIME, Disp: 45 tablet, Rfl: 0    valACYclovir (VALTREX) 1,000 mg tablet, Take 2 tablets (2,000 mg total) by mouth 2 (two) times a day for 1 day, Disp: 24 tablet, Rfl: 3

## 2022-10-03 NOTE — PROGRESS NOTES
Subjective:     Patient ID: Suzy Pleitez is a 32 y o  female  [facility] Clinical Progress Notes      Specialized Services Documentation  Therapist must complete separate progress note for each specific clinical activity in which the individual participated during the day  Other DISCHARGE FROM PHP TODAY 10/03/22 as per Raghav's request via phone- Dr Holly Tovar MD    Case Management Note    Issac Roman MS    Current suicide risk : Low     Sunny called off today and then called again around lunch time to tell the  Alice De) that she will not be continuing with program     Medications changes/added/denied? No    Treatment session number: completed 2, today would have been 3    Individual Case Management Visit provided today?  No    [facility] follow up physician's orders: [facility] [place] 10/03/22

## 2022-10-04 ENCOUNTER — SOCIAL WORK (OUTPATIENT)
Dept: BEHAVIORAL/MENTAL HEALTH CLINIC | Facility: CLINIC | Age: 27
End: 2022-10-04
Payer: COMMERCIAL

## 2022-10-04 DIAGNOSIS — F31.81 BIPOLAR 2 DISORDER (HCC): ICD-10-CM

## 2022-10-04 DIAGNOSIS — Z86.59 HISTORY OF POSTTRAUMATIC STRESS DISORDER (PTSD): Primary | ICD-10-CM

## 2022-10-04 PROCEDURE — 90834 PSYTX W PT 45 MINUTES: CPT | Performed by: COUNSELOR

## 2022-10-04 NOTE — PSYCH
Psychotherapy Provided: Individual Psychotherapy 45 minutes     Length of time in session: 47 minutes, follow up in 1 week    Encounter Diagnosis     ICD-10-CM    1  History of posttraumatic stress disorder (PTSD)  Z86 59    2  Bipolar 2 disorder (HCC)  F31 81        Goals addressed in session: Goal 1     Pain:      none    0    Current suicide risk : Low     Time: 8:10am - 8:57am  Previous session plan: CONTINUED: Check in with practicing pendulation, anxiety heart muscle shaped with real organ colors "The dripping heart", forearms, shaped like statue arms wrapped in the shape of a hug, white, "hug"   CONTINUED: Continue to discuss her relating to others, considering relationships, sexual relationships  Continue to identify when she does not allow herself to relax (having dance parties by herself), continue to identify thoughts and behaviors that make her feel worse  Do a rundown of her entire day "I don't let myself sleep" Recommended On Becoming a Person  "This is my first friend group like this" "You're not a bad person for having that inside of you" this writer said to Kojo about her trauma, she says "that's something", her not sharing her trauma with others, discussion about intimacy  Address lethargy, avolition  Continue with ET work as needed  Check in with desire to help community, be part of Laila  Self-esteem seemingly improving with feeling sense of belonging, intimacy with [address] [place] [address], mother, Beverley Purvis coworkers  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  "i'm not mentally where I was" "I don't give myself credit [for becoming the way that I am]" "I've been trying to see a future" "For the first time I'm imagining a positive future"  D: Focused on how she has felt better with her friends  Explored her sense of intimacy with others  Focused on communication with her friends  Explored potential for reaching out to her mother to expand social [place]    This writer provided ET, PCT through empathic listening, validation of feelings, psychoeducation, isolation discussion  A: Aruna Henriquez appeared to be in a mildly euthymic mood with congruent affect, seemed to be experiencing mildly depressed mood, racing thoughts, mild intrusive memories, appeared to be kempt, no SI/HI nor CAMARGO risk apparent or reported, seemed to respond well to PCT, ET    P: Not trusting herself to see red flags  Expanding social [place], reaching out to mother  CONTINUED: Check in with practicing pendulation, anxiety heart muscle shaped with real organ colors "The dripping heart", forearms, shaped like statue arms wrapped in the shape of a hug, white, "hug"  Continue to discuss her relating to others, considering relationships, sexual relationships  Continue to identify when she does not allow herself to relax (having dance parties by herself), continue to identify thoughts and behaviors that make her feel worse  Do a rundown of her entire day "I don't let myself sleep" "You're not a bad person for having that inside of you" this writer said to Kojo about her trauma, she says "that's something", her not sharing her trauma with others, discussion about intimacy  Check in with desire to help community, be part of Laila  Self-esteem seemingly improving with feeling sense of belonging, intimacy with [address] [place] [address], mother, Trisha Kulkarni coworkers  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  "i'm not mentally where I was" "I don't give myself credit [for becoming the way that I am]" "I've been trying to see a future" "For the first time I'm imagining a positive future"  Behavioral Health Treatment Plan [facility]: Diagnosis and Treatment Plan explained to Javier Lawson relates understanding diagnosis and is agreeable to Treatment Plan   Yes
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
## 2022-10-07 ENCOUNTER — OFFICE VISIT (OUTPATIENT)
Dept: PSYCHIATRY | Facility: CLINIC | Age: 27
End: 2022-10-07
Payer: COMMERCIAL

## 2022-10-07 DIAGNOSIS — F31.81 BIPOLAR 2 DISORDER (HCC): ICD-10-CM

## 2022-10-07 PROCEDURE — 99214 OFFICE O/P EST MOD 30 MIN: CPT | Performed by: STUDENT IN AN ORGANIZED HEALTH CARE EDUCATION/TRAINING PROGRAM

## 2022-10-07 RX ORDER — HYDROXYZINE HYDROCHLORIDE 10 MG/1
10 TABLET, FILM COATED ORAL EVERY 6 HOURS PRN
Qty: 30 TABLET | Refills: 2 | Status: SHIPPED | OUTPATIENT
Start: 2022-10-07 | End: 2023-01-05

## 2022-10-07 RX ORDER — BUPROPION HYDROCHLORIDE 150 MG/1
150 TABLET ORAL DAILY
Qty: 90 TABLET | Refills: 0 | Status: SHIPPED | OUTPATIENT
Start: 2022-10-07 | End: 2023-01-05

## 2022-10-10 ENCOUNTER — TELEMEDICINE (OUTPATIENT)
Dept: BEHAVIORAL/MENTAL HEALTH CLINIC | Facility: CLINIC | Age: 27
End: 2022-10-10
Payer: COMMERCIAL

## 2022-10-10 DIAGNOSIS — Z86.59 HISTORY OF POSTTRAUMATIC STRESS DISORDER (PTSD): Primary | ICD-10-CM

## 2022-10-10 DIAGNOSIS — F31.81 BIPOLAR 2 DISORDER (HCC): ICD-10-CM

## 2022-10-10 PROCEDURE — 90834 PSYTX W PT 45 MINUTES: CPT | Performed by: COUNSELOR

## 2022-10-10 NOTE — PSYCH
Virtual Regular Visit    Verification of patient location:    Patient is located in the following state in which I hold an active license [place]      Assessment/Plan:    Problem List Items Addressed This Visit        Other    Bipolar 2 disorder ([address] )      Other Visit Diagnoses     History of posttraumatic stress disorder (PTSD)    -  Primary          Goals addressed in session: Goal 1          Reason for visit is   Chief Complaint   Patient presents with   • Virtual Brief Visit   • Virtual Regular Visit        Encounter provider Kevin Hopkins    Provider located at [address] [place] [id number] [phone]      Recent Visits  No visits were found meeting these conditions  Showing recent visits within past 7 days and meeting all other requirements  Today's Visits  Date Type Provider Dept   10/10/22 Telemedicine [address] today's visits and meeting all other requirements  Future Appointments  No visits were found meeting these conditions  Showing future appointments within next 150 days and meeting all other requirements       The patient was identified by name and date of birth  Laurence Sutherland was informed that this is a telemedicine visit and that the visit is being conducted throughic Embedded and patient was informed this is a secure, HIPAA-complaint platform  She agrees to proceed     My office door was closed  No one else was in the room  She acknowledged consent and understanding of privacy and security of the video platform  The patient has agreed to participate and understands they can discontinue the visit at any time  Patient is aware this is a billable service  Subjective  Laurence Sutherland is a 32 y o  female        Time-in: 5:14pm  Time-out 5:51pm  Previous session plan: Not trusting herself to see red flags   Expanding social [place], reaching out to mother  CONTINUED: Check in with practicing pendulation, anxiety heart muscle shaped with real organ colors "The dripping heart", forearms, shaped like statue arms wrapped in the shape of a hug, white, "hug"  Continue to discuss her relating to others, considering relationships, sexual relationships  Continue to identify when she does not allow herself to relax (having dance parties by herself), continue to identify thoughts and behaviors that make her feel worse  Do a rundown of her entire day "I don't let myself sleep" "You're not a bad person for having that inside of you" this writer said to Kojo about her trauma, she says "that's something", her not sharing her trauma with others, discussion about intimacy  Check in with desire to help community, be part of [place]  Self-esteem seemingly improving with feeling sense of belonging, intimacy with [address] [place] [address] group, mother, [employer] coworkers  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  "i'm not mentally where I was" "I don't give myself credit [for becoming the way that I am]" "I've been trying to see a future" "For the first time I'm imagining a positive future"  D: She reports she has worked over the weekend, engaged in [employer], did well working, "was not horrible"  Says J was fired from [employer] - discussed this for some time  She's going to support her friend, go out to dinner with her, considering allowing her to live with her  Focused on her thoughts, understanding helpful vs unhelpful thoughts, exploring alternative thoughts that keep realistic perspective but are more helpful for her as a person  Says she's attempted pendulation, however it has not been as helpful as needed  This writer provided PCT through empathic listening, validation of feelings, reflection of content and feelings, CBT through review of pendulation, reframing, socratic dialogue     A: Myriam Jade appeared to be in a mildly euthymic mood with congruent affect, seemed to be experiencing lower mood at times, excessive worrry, racing thoughts, lethargy, mild avolition, appeared to be kempt, no SI/HI nor CAMARGO risk apparent or reported, seemed to respond well to PCT, CBT work today  P: Not trusting herself to see red flags  Expanding social [place], reaching out to mother  CONTINUED: Check in with practicing pendulation, anxiety heart muscle shaped with real organ colors "The dripping heart", forearms, shaped like statue arms wrapped in the shape of a hug, white, "hug"  Continue to discuss her relating to others, considering relationships, sexual relationships  Continue to identify when she does not allow herself to relax (having dance parties by herself), continue to identify thoughts and behaviors that make her feel worse  Do a rundown of her entire day "I don't let myself sleep" "You're not a bad person for having that inside of you" this writer said to Kojo about her trauma, she says "that's something", her not sharing her trauma with others, discussion about intimacy  Check in with desire to help community, be part of [place]  Self-esteem seemingly improving with feeling sense of belonging, intimacy with [address] [place] [address] group, mother, [employer] coworkers  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  "i'm not mentally where I was" "I don't give myself credit [for becoming the way that I am]" "I've been trying to see a future" "For the first time I'm imagining a positive future"          HPI     Past Medical History:   Diagnosis Date   • Anxiety    • Bipolar disorder ([facility] )    • Borderline personality disorder ([facility] )    • Depression    • Self-injurious behavior        Past Surgical History:   Procedure Laterality Date   • CERVICAL BIOPSY  W/ LOOP ELECTRODE EXCISION     • FL INJECTION LEFT HIP (ARTHROGRAM)  4/7/2021   • WISDOM TOOTH EXTRACTION         Current Outpatient Medications   Medication Sig Dispense Refill   • albuterol (PROVENTIL HFA,VENTOLIN HFA) 90 mcg/act inhaler Inhale 2 puffs every 6 (six) hours as needed for wheezing or shortness of breath 3 Inhaler 1   • benzocaine (ORAJEL) 10 % mucosal gel Apply 1 application to the mouth or throat as needed for mucositis (Patient not taking: Reported on 9/28/2022) 5 3 g 0   • buPROPion (WELLBUTRIN XL) 150 mg 24 hr tablet Take 1 tablet (150 mg total) by mouth daily 90 tablet 0   • hydrOXYzine HCL (ATARAX) 10 mg tablet Take 1 tablet (10 mg total) by mouth every 6 (six) hours as needed for anxiety 30 tablet 2   • lamoTRIgine (LaMICtal) 200 MG tablet Take 1 5 tablets (300 mg total) by mouth daily 45 tablet 0   • Levonorgestrel 13 5 MG IUD 1 each by Intrauterine route     • traZODone (DESYREL) 50 mg tablet TAKE 0 5 TABLETS (25 MG TOTAL) BY MOUTH DAILY AT BEDTIME 45 tablet 0   • valACYclovir (VALTREX) 1,000 mg tablet Take 2 tablets (2,000 mg total) by mouth 2 (two) times a day for 1 day 24 tablet 3     No current facility-administered medications for this visit  No Known Allergies    Review of Systems    Video Exam    There were no vitals filed for this visit      Physical Exam     I spent 37 minutes directly with the patient during this visit

## 2022-10-17 ENCOUNTER — SOCIAL WORK (OUTPATIENT)
Dept: BEHAVIORAL/MENTAL HEALTH CLINIC | Facility: CLINIC | Age: 27
End: 2022-10-17
Payer: COMMERCIAL

## 2022-10-17 DIAGNOSIS — Z86.59 HISTORY OF POSTTRAUMATIC STRESS DISORDER (PTSD): Primary | ICD-10-CM

## 2022-10-17 DIAGNOSIS — F31.81 BIPOLAR 2 DISORDER (HCC): ICD-10-CM

## 2022-10-17 PROCEDURE — 90834 PSYTX W PT 45 MINUTES: CPT | Performed by: COUNSELOR
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
## 2022-10-19 ENCOUNTER — SOCIAL WORK (OUTPATIENT)
Dept: BEHAVIORAL/MENTAL HEALTH CLINIC | Facility: CLINIC | Age: 27
End: 2022-10-19
Payer: COMMERCIAL

## 2022-10-19 DIAGNOSIS — Z86.59 HISTORY OF POSTTRAUMATIC STRESS DISORDER (PTSD): Primary | ICD-10-CM

## 2022-10-19 DIAGNOSIS — F31.81 BIPOLAR 2 DISORDER (HCC): ICD-10-CM

## 2022-10-19 PROCEDURE — 90834 PSYTX W PT 45 MINUTES: CPT | Performed by: COUNSELOR
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
## 2022-10-20 DIAGNOSIS — F31.81 BIPOLAR 2 DISORDER (HCC): ICD-10-CM

## 2022-10-20 RX ORDER — TRAZODONE HYDROCHLORIDE 50 MG/1
25 TABLET ORAL
Qty: 45 TABLET | Refills: 0 | Status: SHIPPED | OUTPATIENT
Start: 2022-10-20 | End: 2023-01-18

## 2022-10-26 ENCOUNTER — SOCIAL WORK (OUTPATIENT)
Dept: BEHAVIORAL/MENTAL HEALTH CLINIC | Facility: CLINIC | Age: 27
End: 2022-10-26
Payer: COMMERCIAL

## 2022-10-26 DIAGNOSIS — F31.81 BIPOLAR 2 DISORDER (HCC): ICD-10-CM

## 2022-10-26 DIAGNOSIS — Z86.59 HISTORY OF POSTTRAUMATIC STRESS DISORDER (PTSD): Primary | ICD-10-CM

## 2022-10-26 PROCEDURE — 90834 PSYTX W PT 45 MINUTES: CPT | Performed by: COUNSELOR
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
## 2022-10-31 ENCOUNTER — SOCIAL WORK (OUTPATIENT)
Dept: BEHAVIORAL/MENTAL HEALTH CLINIC | Facility: CLINIC | Age: 27
End: 2022-10-31

## 2022-10-31 DIAGNOSIS — F31.81 BIPOLAR 2 DISORDER (HCC): ICD-10-CM

## 2022-10-31 DIAGNOSIS — Z86.59 HISTORY OF POSTTRAUMATIC STRESS DISORDER (PTSD): Primary | ICD-10-CM
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
## 2022-11-02 ENCOUNTER — OFFICE VISIT (OUTPATIENT)
Dept: PSYCHIATRY | Facility: CLINIC | Age: 27
End: 2022-11-02

## 2022-11-02 VITALS — BODY MASS INDEX: 28.07 KG/M2 | HEIGHT: 60 IN | WEIGHT: 143 LBS

## 2022-11-02 DIAGNOSIS — F60.3 BORDERLINE PERSONALITY DISORDER (HCC): ICD-10-CM

## 2022-11-02 DIAGNOSIS — F43.10 PTSD (POST-TRAUMATIC STRESS DISORDER): ICD-10-CM

## 2022-11-02 DIAGNOSIS — F31.81 BIPOLAR 2 DISORDER (HCC): Primary | ICD-10-CM

## 2022-11-02 RX ORDER — BUPROPION HYDROCHLORIDE 150 MG/1
150 TABLET ORAL DAILY
Qty: 90 TABLET | Refills: 0 | Status: SHIPPED | OUTPATIENT
Start: 2022-11-02 | End: 2023-01-31

## 2022-11-02 RX ORDER — TRAZODONE HYDROCHLORIDE 50 MG/1
25 TABLET ORAL
Qty: 45 TABLET | Refills: 0 | Status: SHIPPED | OUTPATIENT
Start: 2022-11-02 | End: 2023-01-31

## 2022-11-02 RX ORDER — LAMOTRIGINE 200 MG/1
300 TABLET ORAL DAILY
Qty: 135 TABLET | Refills: 0 | Status: SHIPPED | OUTPATIENT
Start: 2022-11-02 | End: 2023-01-31
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
## 2022-11-08 ENCOUNTER — SOCIAL WORK (OUTPATIENT)
Dept: BEHAVIORAL/MENTAL HEALTH CLINIC | Facility: CLINIC | Age: 27
End: 2022-11-08

## 2022-11-08 DIAGNOSIS — F31.81 BIPOLAR 2 DISORDER (HCC): ICD-10-CM

## 2022-11-08 DIAGNOSIS — Z86.59 HISTORY OF POSTTRAUMATIC STRESS DISORDER (PTSD): Primary | ICD-10-CM
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
## 2022-11-10 ENCOUNTER — SOCIAL WORK (OUTPATIENT)
Dept: BEHAVIORAL/MENTAL HEALTH CLINIC | Facility: CLINIC | Age: 27
End: 2022-11-10

## 2022-11-10 DIAGNOSIS — Z86.59 HISTORY OF POSTTRAUMATIC STRESS DISORDER (PTSD): Primary | ICD-10-CM

## 2022-11-10 DIAGNOSIS — F31.81 BIPOLAR 2 DISORDER (HCC): ICD-10-CM

## 2022-11-10 NOTE — BH TREATMENT PLAN
Mirza Slider  1995         Date of Initial Treatment Plan: 7/15/2021  Date of Current Treatment Plan: 11/10/2022     Treatment Plan Number 4      Strengths/Personal Resources for Self Care: Gardening, cooking, dancing, music, painting     Diagnosis:   1  Bipolar 2 disorder ([facility] )      2  History of posttraumatic stress disorder (PTSD)            Area of Needs: Lower self-esteem at times, existential concerns about death and isolation        Long Term Goal 1: "Incorporating my trauma and emotions into relationships and intimacy" "Understanding that my trauma is a part of me" PHQ9 will continue to remain "no or minimal" depression, PCL-5 will remain under 32 threshold for PTSD  Increased understanding of existential concerns       Target Date: 5/10/2023  Completion date of goal 2: n/a  Previous goal Completion Date: 5/17/2022         Short Term Objectives for Goal 1: This writer will provide Raghav with PCT, CBT, SFBT, DBT through her course of treatment  CBT/DBT for emotional resiliance  Psychoeducation regarding her diagnosis    PCT and ET work throughout the course of treatment  Julian Ross will follw through with her medication management, taking medications as prescribed, engaging in various treatment modalities, following through with homework       GOAL 1: Modality: Individual 4x per month   Completion Date 5/17/2022, Medication Management and The person(s) responsible for carrying out the plan is  this writer, Dr Kim Ontiveros 2: Modality: Individual 4x per month   Completion Date N/A, Medication Management and The person(s) responsible for carrying out the plan is  this writer, Dr Ian Riggs, [address] [place] [address] and Treatment Plan explained to Raghav Avilez relates understanding diagnosis and is agreeable to Treatment Plan          Client Comments : Please share your thoughts, feelings, need and/or experiences regarding your treatment plan: She is in agreement with the above, helped formulate this plan
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
## 2022-11-16 ENCOUNTER — SOCIAL WORK (OUTPATIENT)
Dept: BEHAVIORAL/MENTAL HEALTH CLINIC | Facility: CLINIC | Age: 27
End: 2022-11-16

## 2022-11-16 DIAGNOSIS — Z86.59 HISTORY OF POSTTRAUMATIC STRESS DISORDER (PTSD): Primary | ICD-10-CM
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
## 2022-11-22 ENCOUNTER — SOCIAL WORK (OUTPATIENT)
Dept: BEHAVIORAL/MENTAL HEALTH CLINIC | Facility: CLINIC | Age: 27
End: 2022-11-22

## 2022-11-22 DIAGNOSIS — Z86.59 HISTORY OF POSTTRAUMATIC STRESS DISORDER (PTSD): Primary | ICD-10-CM

## 2022-11-22 DIAGNOSIS — F31.81 BIPOLAR 2 DISORDER (HCC): ICD-10-CM
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
## 2022-11-29 ENCOUNTER — SOCIAL WORK (OUTPATIENT)
Dept: BEHAVIORAL/MENTAL HEALTH CLINIC | Facility: CLINIC | Age: 27
End: 2022-11-29

## 2022-11-29 DIAGNOSIS — F31.81 BIPOLAR 2 DISORDER (HCC): ICD-10-CM

## 2022-11-29 DIAGNOSIS — Z86.59 HISTORY OF POSTTRAUMATIC STRESS DISORDER (PTSD): Primary | ICD-10-CM

## 2022-11-29 NOTE — PSYCH
Psychotherapy Provided: Individual Psychotherapy 45 minutes     Length of time in session: 50 minutes, follow up in 1 week    Encounter Diagnosis     ICD-10-CM    1  History of posttraumatic stress disorder (PTSD)  Z86 59       2  Bipolar 2 disorder (HCC)  F31 81           Goals addressed in session: Goal 1     Pain:      none    0    Current suicide risk : Low     Previous session plan: CONTINUED: Not trusting herself to see red flags  Expanding social [place], reaching out to mother  Check in with practicing pendulation, anxiety heart muscle shaped with real organ colors "The dripping heart", forearms, shaped like statue arms wrapped in the shape of a hug, white, "hug"  Check in with desire to help community, be part of [facility]    D: Discussed her experiences of intimacy with her friends - making herself vulnerable, holding, hugging  Discussed morality regarding supporting friends and family  This writer provided PCT through psychoeducation, validation of feelings, reflection of content and feelings, empathic listening  A: Lark Samples appeared to be in a euthymic mood with congruent affect, seemed to be experiencing lower mood at times, mild excessive guilt, racing thoughts at times, appeared to be kempt, no SI/HI nor CAMARGO risk apparent or reported, seemed to respond well to PCT work today  P: Seeing BI-POC therapist  Joao Thomson: Not trusting herself to see red flags  Expanding social [place], reaching out to mother  Check in with practicing pendulation, anxiety heart muscle shaped with real organ colors "The dripping heart", forearms, shaped like statue arms wrapped in the shape of a hug, white, "hug"  Check in with desire to help community, be part of [address] [place] [address] Zan: Diagnosis and Treatment Plan explained to George Fraser relates understanding diagnosis and is agreeable to Treatment Plan   Yes     Visit start and stop times:    11/29/22  Start Time: 1104  Stop Time: 1154  Total Visit Time: 50 minutes

## 2022-11-30 ENCOUNTER — SOCIAL WORK (OUTPATIENT)
Dept: BEHAVIORAL/MENTAL HEALTH CLINIC | Facility: CLINIC | Age: 27
End: 2022-11-30

## 2022-11-30 DIAGNOSIS — F31.81 BIPOLAR 2 DISORDER (HCC): ICD-10-CM

## 2022-11-30 DIAGNOSIS — Z86.59 HISTORY OF POSTTRAUMATIC STRESS DISORDER (PTSD): Primary | ICD-10-CM

## 2022-11-30 RX ORDER — LAMOTRIGINE 200 MG/1
300 TABLET ORAL DAILY
Qty: 45 TABLET | Refills: 2 | Status: SHIPPED | OUTPATIENT
Start: 2022-11-30 | End: 2023-02-28

## 2022-11-30 RX ORDER — LAMOTRIGINE 200 MG/1
TABLET ORAL
Qty: 30 TABLET | Refills: 2 | Status: SHIPPED | OUTPATIENT
Start: 2022-11-30 | End: 2022-11-30 | Stop reason: SDUPTHER

## 2022-11-30 NOTE — TELEPHONE ENCOUNTER
Medication Refill Request     Name of Medication Lamotrigine  Dose/Frequency 200 mg/ Take 1 tablet by mouth every day  Quantity 30  Verified pharmacy   [x]  Verified ordering Provider   [x]  Does patient have enough for the next 3 days? Yes [] No [x]  Does patient have a follow-up appointment scheduled? Yes [x] No []   If so when is appointment: 1/11/2023      Pt stated that she is running out of her medication earlier then normal due to taking a pill and half because of the increased dosage  Pt askes to either increase the dosage from 200 mg to 300 mg or increase the number of pills in the 200 mg prescription

## 2022-12-05 ENCOUNTER — SOCIAL WORK (OUTPATIENT)
Dept: BEHAVIORAL/MENTAL HEALTH CLINIC | Facility: CLINIC | Age: 27
End: 2022-12-05

## 2022-12-05 DIAGNOSIS — Z86.59 HISTORY OF POSTTRAUMATIC STRESS DISORDER (PTSD): Primary | ICD-10-CM

## 2022-12-05 DIAGNOSIS — F31.81 BIPOLAR 2 DISORDER (HCC): ICD-10-CM

## 2022-12-05 NOTE — PSYCH
Psychotherapy Provided: Individual Psychotherapy 45 minutes     Length of time in session: 47 minutes, follow up in 1 week    Encounter Diagnosis     ICD-10-CM    1  History of posttraumatic stress disorder (PTSD)  Z86 59       2  B[facility] 2 disorder ([facility] )  F31 81           Goals addressed in session: Goal 1     Pain:      none    0    Current suicide risk : Low     Previous session plan:  Collectivism mindset  CONTINUED: Not trusting herself to see red flags  Expanding social [place], reaching out to mother  Check in with practicing pendulation, anxiety heart muscle shaped with real organ colors "The dripping heart", forearms, shaped like statue arms wrapped in the shape of a hug, white, "hug"  Check in with desire to help community, be part of [facility]    D: Explored work related stressors  Discussed setting boundaries with her sister  This writer provided PCT through psychoeducation regarding boundary setting, empathic listening, validation of feelings, reflection of content and feelings  A: Francesco King appeared to be in a euthymic mood with congruent affect, seemed to be experiencing lower mood at times, excessive worry, racing thoughts at times, appeared to be kempt, no SI/HI nor CAMARGO risk apparent or reported, seemed to respond well to PCT work  P:  Collectivism mindset  CONTINUED: Not trusting herself to see red flags  Expanding social [place], reaching out to mother  Check in with practicing pendulation, anxiety heart muscle shaped with real organ colors "The dripping heart", forearms, shaped like statue arms wrapped in the shape of a hug, white, "hug"  Check in with desire to help community, be part of [address] [place] [address] Zan: Diagnosis and Treatment Plan explained to Zenaida Ramey relates understanding diagnosis and is agreeable to Treatment Plan   Yes     Visit start and stop times:    12/05/22  Start Time: 1306  Stop Time: 8046  Total Visit Time: 52 minutes

## 2022-12-12 ENCOUNTER — VBI (OUTPATIENT)
Dept: ADMINISTRATIVE | Facility: OTHER | Age: 27
End: 2022-12-12

## 2022-12-13 ENCOUNTER — SOCIAL WORK (OUTPATIENT)
Dept: BEHAVIORAL/MENTAL HEALTH CLINIC | Facility: CLINIC | Age: 27
End: 2022-12-13

## 2022-12-13 DIAGNOSIS — Z86.59 HISTORY OF POSTTRAUMATIC STRESS DISORDER (PTSD): Primary | ICD-10-CM

## 2022-12-13 DIAGNOSIS — F31.81 BIPOLAR 2 DISORDER (HCC): ICD-10-CM

## 2022-12-13 NOTE — PSYCH
Psychotherapy Provided: Individual Psychotherapy 45 minutes     Length of time in session: 44 minutes, follow up in 1 week    Encounter Diagnosis     ICD-10-CM    1  History of posttraumatic stress disorder (PTSD)  Z86 59       2  Bipolar 2 disorder (HCC)  F31 81           Goals addressed in session: Goal 1     Pain:      none    0    Current suicide risk : Low     Previous session plan: Collectivism mindset  CONTINUED: Not trusting herself to see red flags  Expanding social [place], reaching out to mother  Check in with practicing pendulation, anxiety heart muscle shaped with real organ colors "The dripping heart", forearms, shaped like statue arms wrapped in the shape of a hug, white, "hug"  Check in with desire to help community, be part of [facility]    D: Focused on her interest in becoming an , how this would be meaningful to her  This writer provided PCT through focus on her strengths, empathic listening, validation of feelings  A: Gagan Taylor appeared to be in a netural mood with congruent affect, seemed to be experiencing lower mood at times, excessive worry at times, appeared to be kempt, no SI/HI nor CAMARGO risk apparent or reported, seemed to respond well to PCT  P: Considering   Collectivism mindset  CONTINUED: Not trusting herself to see red flags  Expanding social [place], reaching out to mother  Check in with practicing pendulation, anxiety heart muscle shaped with real organ colors "The dripping heart", forearms, shaped like statue arms wrapped in the shape of a hug, white, "hug"  Check in with desire to help community, be part of [address] [place] [address] Zan: Diagnosis and Treatment Plan explained to Kanu Jaffe relates understanding diagnosis and is agreeable to Treatment Plan   Yes     Visit start and stop times:    12/13/22  Start Time: 1108  Stop Time: 1152  Total Visit Time: 44 minutes

## 2022-12-20 ENCOUNTER — SOCIAL WORK (OUTPATIENT)
Dept: BEHAVIORAL/MENTAL HEALTH CLINIC | Facility: CLINIC | Age: 27
End: 2022-12-20

## 2022-12-20 DIAGNOSIS — F31.81 BIPOLAR 2 DISORDER (HCC): ICD-10-CM

## 2022-12-20 DIAGNOSIS — Z86.59 HISTORY OF POSTTRAUMATIC STRESS DISORDER (PTSD): Primary | ICD-10-CM

## 2022-12-20 NOTE — PSYCH
Psychotherapy Provided: Individual Psychotherapy 45 minutes     Length of time in session: 45 minutes, follow up in 1 week    Encounter Diagnosis     ICD-10-CM    1  History of posttraumatic stress disorder (PTSD)  Z86 59       2  Bipolar 2 disorder ([facility] )  F31 81           Goals addressed in session: Goal 1     Pain:      none    0    Current suicide risk : Low     Previous session plan: Considering   Collectivism mindset  CONTINUED: Not trusting herself to see red flags  Expanding social [place], reaching out to mother  Check in with practicing pendulation, anxiety heart muscle shaped with real organ colors "The dripping heart", forearms, shaped like statue arms wrapped in the shape of a hug, white, "hug"  Check in with desire to help community, be part of [facility]    D: Discussed her self-esteem  Intimacy  Explored other ways of putting herself first, setting boundaries, treating herself well    This writer provided PCT through empathic listening, psychoeducation, CBT through reframing work, socratic dialogue  A: Truman Hou appeared to be in a neutral mood with congruent affect, seemed to be experiencing excessive worry, racing thoughts, excessive worry, excessive guilt, mild avolition, anhedonia, appeared to be kempt, no SI/HI nor CAMARGO risk apparent or reported, seemed to respond well to PCT, CBT work  P: Continue to discuss her feelings being overwhelming  Check in with getting DBT workbook  CONTINUED: Considering   Collectivism mindset  CONTINUED: Not trusting herself to see red flags  Expanding social [place], reaching out to mother  Check in with practicing pendulation, anxiety heart muscle shaped with real organ colors "The dripping heart", forearms, shaped like statue arms wrapped in the shape of a hug, white, "hug"   Check in with desire to help community, be part of [address]: Diagnosis and Treatment Plan explained to Regan Rodriguez relates understanding diagnosis and is agreeable to Treatment Plan   Yes     Visit start and stop times:    12/20/22  Start Time: 1114  Stop Time: 1159  Total Visit Time: 45 minutes

## 2023-01-03 ENCOUNTER — TELEMEDICINE (OUTPATIENT)
Dept: BEHAVIORAL/MENTAL HEALTH CLINIC | Facility: CLINIC | Age: 28
End: 2023-01-03

## 2023-01-03 DIAGNOSIS — F31.81 BIPOLAR 2 DISORDER (HCC): ICD-10-CM

## 2023-01-03 DIAGNOSIS — Z86.59 HISTORY OF POSTTRAUMATIC STRESS DISORDER (PTSD): Primary | ICD-10-CM

## 2023-01-03 NOTE — PSYCH
Virtual Regular Visit    Verification of patient location:    Patient is located in the following state in which I hold an active license [place]      Assessment/Plan:    Problem List Items Addressed This Visit        Other    Bipolar 2 disorder ([address] )   Other Visit Diagnoses     History of posttraumatic stress disorder (PTSD)    -  Primary          Goals addressed in session: Goal 1          Reason for visit is   Chief Complaint   Patient presents with   • Virtual Regular Visit        Encounter provider Desmond Champagne    Provider located at [address] [place] [id number] [phone]      Recent Visits  No visits were found meeting these conditions  Showing recent visits within past 7 days and meeting all other requirements  Today's Visits  Date Type Provider Dept   01/03/23 44 [facility] today's visits and meeting all other requirements  Future Appointments  No visits were found meeting these conditions  Showing future appointments within next 150 days and meeting all other requirements       The patient was identified by name and date of birth  Paris Graugusta was informed that this is a telemedicine visit and that the visit is being conducted [facility]  She agrees to proceed     My office door was closed  No one else was in the room  She acknowledged consent and understanding of privacy and security of the video platform  The patient has agreed to participate and understands they can discontinue the visit at any time  Patient is aware this is a billable service  Tatyana Dawkins is a 32 y o  female     Previous session plan: Continue to discuss her feelings being overwhelming  Check in with getting DBT workbook   CONTINUED: Considering   Collectivism mindset  CONTINUED: Not trusting herself to see red flags  Expanding social [place], reaching out to mother  Check in with practicing pendulation, anxiety heart muscle shaped with real organ colors "The dripping heart", forearms, shaped like statue arms wrapped in the shape of a hug, white, "hug"  Check in with desire to help community, be part of [facility]    D: She reports on significant financial stressors  Discussed her running out of medication, but has them now  Discussed her holiday experiences  "I didn't freak out about it" - her getting gifts  Says she went to her aunt's around [place]  Reports she is excited for her wedding, sharing her love for her fiance  Looking forward to the bi-poc/queer representation, union, love for the ceremony and celebration afterwards  "I've been being a lot more vulnerable than I realized" says how powerful it is for her to be authentically seen  This writer provided PCT through empathic listening, validation of feelings, reflection of content and feelings  A: Minerva Plaster appeared to be in a euthymic mood with congruent affect, seemed to be experiencing lower mood at times, excessive worry, racing thoughts at times, appeared to be kempt, no SI/HI nor CAMARGO risk apparent or reported, seemed to respond well to PCT work today  P: Says how overwhelmed she felt feeling love from her queer/bipoc group when she told them about her marriage planning  CONTINUED: Continue to discuss her feelings being overwhelming  Check in with getting DBT workbook  CONTINUED: Considering   Collectivism mindset  Not trusting herself to see red flags  Expanding social [place], reaching out to mother  Check in with practicing pendulation, anxiety heart muscle shaped with real organ colors "The dripping heart", forearms, shaped like statue arms wrapped in the shape of a hug, white, "hug"   Check in with desire to help community, be part of [facility]          HPI     Past Medical History:   Diagnosis Date   • Anxiety • Bipolar disorder ([facility] )    • Borderline [facility] disorder ([facility] )    • Depression    • Self-injurious behavior        Past Surgical History:   Procedure Laterality Date   • CERVICAL BIOPSY  W/ LOOP ELECTRODE EXCISION     • FL INJECTION LEFT HIP (ARTHROGRAM)  4/7/2021   • WISDOM TOOTH EXTRACTION         Current Outpatient Medications   Medication Sig Dispense Refill   • albuterol (PROVENTIL HFA,VENTOLIN HFA) 90 mcg/act inhaler Inhale 2 puffs every 6 (six) hours as needed for wheezing or shortness of breath 3 Inhaler 1   • benzocaine (ORAJEL) 10 % mucosal gel Apply 1 application to the mouth or throat as needed for mucositis (Patient not taking: Reported on 9/28/2022) 5 3 g 0   • buPROPion (WELLBUTRIN XL) 150 mg 24 hr tablet Take 1 tablet (150 mg total) by mouth daily 90 tablet 0   • hydrOXYzine HCL (ATARAX) 10 mg tablet Take 1 tablet (10 mg total) by mouth every 6 (six) hours as needed for anxiety 30 tablet 2   • lamoTRIgine (LaMICtal) 200 MG tablet Take 1 5 tablets (300 mg total) by mouth daily 45 tablet 2   • Levonorgestrel 13 5 MG IUD 1 each by Intrauterine route     • traZODone (DESYREL) 50 mg tablet Take 0 5 tablets (25 mg total) by mouth daily at bedtime 45 tablet 0   • valACYclovir (VALTREX) 1,000 mg tablet Take 2 tablets (2,000 mg total) by mouth 2 (two) times a day for 1 day 24 tablet 3     No current facility-administered medications for this visit  No Known Allergies    Review of Systems    Video Exam    There were no vitals filed for this visit      Physical Exam     Visit Time  01/03/23  Start Time: 3001  Stop Time: 1450  Total Visit Time: 47 minutes

## 2023-01-06 ENCOUNTER — TELEPHONE (OUTPATIENT)
Dept: PSYCHIATRY | Facility: CLINIC | Age: 28
End: 2023-01-06

## 2023-01-06 ENCOUNTER — TELEMEDICINE (OUTPATIENT)
Dept: BEHAVIORAL/MENTAL HEALTH CLINIC | Facility: CLINIC | Age: 28
End: 2023-01-06

## 2023-01-06 DIAGNOSIS — F43.10 PTSD (POST-TRAUMATIC STRESS DISORDER): ICD-10-CM

## 2023-01-06 DIAGNOSIS — F31.81 BIPOLAR 2 DISORDER (HCC): ICD-10-CM

## 2023-01-06 DIAGNOSIS — Z86.59 HISTORY OF POSTTRAUMATIC STRESS DISORDER (PTSD): Primary | ICD-10-CM

## 2023-01-06 NOTE — PSYCH
Virtual Regular Visit    Verification of patient location:    Patient is located in the following state in which I hold an active license [place]      Assessment/Plan:    Problem List Items Addressed This Visit        Other    Bipolar 2 disorder ([address] )    PTSD (post-traumatic stress disorder)   Other Visit Diagnoses     History of posttraumatic stress disorder (PTSD)    -  Primary          Goals addressed in session: Goal 1          Reason for visit is   Chief Complaint   Patient presents with   • Virtual Regular Visit        Encounter provider Michelle العلي    Provider located at [address] [place] [id number] [phone]      Recent Visits  Date Type Provider Dept   [facility]   Showing recent visits within past 7 days and meeting all other requirements  Today's Visits  Date Type Provider Dept   01/06/23 [address] [place] [address]   Showing today's visits and meeting all other requirements  Future Appointments  No visits were found meeting these conditions  Showing future appointments within next 150 days and meeting all other requirements       The patient was identified by name and date of birth  Ailyn Marino was informed that this is a telemedicine visit and [facility] the [facility]  She agrees to proceed     My office door was closed  No one else was in the room  She acknowledged consent and understanding of privacy and security of the video platform  The patient has agreed to participate and understands they can discontinue the visit at any time  Patient is aware this is a billable service       Hollis Fani is a 32 y o  female     Previous session plan: Says how overwhelmed she felt feeling love from her queer/bipoc group when she told them about her marriage planning  CONTINUED: Continue to discuss her feelings being overwhelming  Check in with getting DBT workbook  Considering   Collectivism mindset  Not trusting herself to see red flags  Expanding social [place], reaching out to mother  Check in with practicing pendulation, anxiety heart muscle shaped with real organ colors "The dripping heart", forearms, shaped like statue arms wrapped in the shape of a hug, white, "hug"  Check in with desire to help community, be part of [facility]    D: Discussed her financial stressors today  Discussed her potential for work  Discussed desire for remote work  Explored her creativity  This writer provided PCT through empathic listening, validation of feelings, reflection of content and feelings  A: Viviana Player appeared to be in a euthymic mood with congruent affect, seemed to be experiencing mild thoughts of hopelessness and worthlessness, lethargy, excessive worry, appeared to be kempt, no SI/HI nor CAMARGO risk apparent or reported, seemed to respond well to PCT work  P: CONTINUED: Says how overwhelmed she felt feeling love from her queer/bipoc group when she told them about her marriage planning  CONTINUED: Continue to discuss her feelings being overwhelming  Check in with getting DBT workbook  Considering   Collectivism mindset  Not trusting herself to see red flags  Expanding social [place], reaching out to mother  Check in with practicing pendulation, anxiety heart muscle shaped with real organ colors "The dripping heart", forearms, shaped like statue arms wrapped in the shape of a hug, white, "hug"   Check in with desire to help community, be part of [facility]     Past Medical History:   Diagnosis Date   • Anxiety    • [facility] )    • Borderline personality disorder ([facility] )    • Depression    • Self-injurious behavior        Past Surgical History:   Procedure Laterality Date   • CERVICAL BIOPSY  W/ LOOP ELECTRODE EXCISION     • FL INJECTION LEFT HIP (ARTHROGRAM)  4/7/2021   • WISDOM TOOTH EXTRACTION         Current Outpatient Medications   Medication Sig Dispense Refill   • albuterol (PROVENTIL HFA,VENTOLIN HFA) 90 mcg/act inhaler Inhale 2 puffs every 6 (six) hours as needed for wheezing or shortness of breath 3 Inhaler 1   • benzocaine (ORAJEL) 10 % mucosal gel Apply 1 application to the mouth or throat as needed for mucositis (Patient not taking: Reported on 9/28/2022) 5 3 g 0   • buPROPion (WELLBUTRIN XL) 150 mg 24 hr tablet Take 1 tablet (150 mg total) by mouth daily 90 tablet 0   • hydrOXYzine HCL (ATARAX) 10 mg tablet Take 1 tablet (10 mg total) by mouth every 6 (six) hours as needed for anxiety 30 tablet 2   • lamoTRIgine (LaMICtal) 200 MG tablet Take 1 5 tablets (300 mg total) by mouth daily 45 tablet 2   • Levonorgestrel 13 5 MG IUD 1 each by Intrauterine route     • traZODone (DESYREL) 50 mg tablet Take 0 5 tablets (25 mg total) by mouth daily at bedtime 45 tablet 0   • valACYclovir (VALTREX) 1,000 mg tablet Take 2 tablets (2,000 mg total) by mouth 2 (two) times a day for 1 day 24 tablet 3     No current facility-administered medications for this visit  No Known Allergies    Review of Systems    Video Exam    There were no vitals filed for this visit      Physical Exam     Visit Time  01/06/23  Start Time: 5657  Stop Time: 3567  Total Visit Time: 52 minutes

## 2023-01-06 NOTE — TELEPHONE ENCOUNTER
Case management received a referral from Obie Castleman to assist in an application for [employer] to obtain an ICM       Referral was completed, patient will need to sign a MELINDA for [place] and the Referral  Writer outreached to patient, [employer] will sign before her appointment on 1/9

## 2023-01-09 ENCOUNTER — TELEPHONE (OUTPATIENT)
Dept: PSYCHIATRY | Facility: CLINIC | Age: 28
End: 2023-01-09

## 2023-01-09 ENCOUNTER — SOCIAL WORK (OUTPATIENT)
Dept: BEHAVIORAL/MENTAL HEALTH CLINIC | Facility: CLINIC | Age: 28
End: 2023-01-09

## 2023-01-09 DIAGNOSIS — F31.81 BIPOLAR 2 DISORDER (HCC): ICD-10-CM

## 2023-01-09 DIAGNOSIS — Z86.59 HISTORY OF POSTTRAUMATIC STRESS DISORDER (PTSD): Primary | ICD-10-CM

## 2023-01-09 NOTE — PSYCH
Chief Complaint   Patient presents with     Follow Up     history of HTN, DMII, CAD (s/p MI in 2007, prior remote pLAD stenting, and recent STEMI 9/2018), and ICM     Vitals were taken and medications were reconciled.     Virgie Eckert RMA  9:51 AM     Psychotherapy Provided: Individual Psychotherapy 45 minutes     Length of time in session: 44 minutes, follow up in 1 week    Encounter Diagnosis     ICD-10-CM    1  History of posttraumatic stress disorder (PTSD)  Z86 59       2  Bipolar 2 disorder (HCC)  F31 81           Goals addressed in session: Goal 1     Pain:      none    0    Current suicide risk : Low     Previous session plan: CONTINUED: Says how overwhelmed she felt feeling love from her queer/bipoc group when she told them about her marriage planning  Continue to discuss her feelings being overwhelming  Check in with getting DBT workbook  Considering   Collectivism mindset  Not trusting herself to see red flags  Expanding social [place], reaching out to mother  Check in with practicing pendulation, anxiety heart muscle shaped with real organ colors "The dripping heart", forearms, shaped like statue arms wrapped in the shape of a hug, white, "hug"  Check in with desire to help community, be part of [facility]    D: She says she contacted to get a  for help with finding a job  She says she had a good weekend  She cleaned quite a bit over the weekend  Strong focus today on boundary setting with others  Focused on effective communication with her sister  This writer provided PCT through empathic listening, validation of feelings, reflection of content and feelings, psychoeducation, focus on intimacy  A: Dianne Funez appeared to be in a euthymic mood with congruent affect, seemed to be experiencing excessive worry, racing thoughts at times, mild thoughts of hopelessness and worthlessness at times, moderate avolition, lethargy, appeared to be kempt, no SI/HI nor CAMARGO risk apparent or reported, seemed to respond well to PCT  P: CONTINUED: Says how overwhelmed she felt feeling love from her queer/bipoc group when she told them about her marriage planning  Continue to discuss her feelings being overwhelming   Check in with getting DBT workbook  Considering   Collectivism mindset  Not trusting herself to see red flags  Expanding social [place], reaching out to mother  Check in with practicing pendulation, anxiety heart muscle shaped with real organ colors "The dripping heart", forearms, shaped like statue arms wrapped in the shape of a hug, white, "hug"  Check in with desire to help community, be part of [address] [place] [address] Zan: Diagnosis and Treatment Plan explained to Nixon Aleksandar relates understanding diagnosis and is agreeable to Treatment Plan   Yes     Visit start and stop times:    01/09/23  Start Time: 1009  Stop Time: 1053  Total Visit Time: 44 minutes

## 2023-01-09 NOTE — TELEPHONE ENCOUNTER
Patient checked out from appt with provider and asked for paperwork that needed signing  In the 's book writer found MELINDA and Referral  Patient and writer signed, Brigido Nichole put papers back in the left side of CM book   1/9/2023
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
## 2023-01-11 ENCOUNTER — OFFICE VISIT (OUTPATIENT)
Dept: PSYCHIATRY | Facility: CLINIC | Age: 28
End: 2023-01-11

## 2023-01-11 VITALS — HEIGHT: 60 IN | WEIGHT: 146 LBS | BODY MASS INDEX: 28.66 KG/M2

## 2023-01-11 DIAGNOSIS — F43.10 PTSD (POST-TRAUMATIC STRESS DISORDER): ICD-10-CM

## 2023-01-11 DIAGNOSIS — Z13.21 SCREENING FOR ENDOCRINE, NUTRITIONAL, METABOLIC AND IMMUNITY DISORDER: ICD-10-CM

## 2023-01-11 DIAGNOSIS — Z13.0 SCREENING FOR ENDOCRINE, NUTRITIONAL, METABOLIC AND IMMUNITY DISORDER: ICD-10-CM

## 2023-01-11 DIAGNOSIS — Z13.228 SCREENING FOR ENDOCRINE, NUTRITIONAL, METABOLIC AND IMMUNITY DISORDER: ICD-10-CM

## 2023-01-11 DIAGNOSIS — Z13.29 SCREENING FOR ENDOCRINE, NUTRITIONAL, METABOLIC AND IMMUNITY DISORDER: ICD-10-CM

## 2023-01-11 DIAGNOSIS — F31.81 BIPOLAR 2 DISORDER (HCC): Primary | ICD-10-CM

## 2023-01-11 DIAGNOSIS — F60.3 BORDERLINE PERSONALITY DISORDER (HCC): ICD-10-CM

## 2023-01-11 RX ORDER — HYDROXYZINE HYDROCHLORIDE 10 MG/1
10 TABLET, FILM COATED ORAL EVERY 6 HOURS PRN
Qty: 30 TABLET | Refills: 2 | Status: SHIPPED | OUTPATIENT
Start: 2023-01-11 | End: 2023-04-11

## 2023-01-11 RX ORDER — LAMOTRIGINE 200 MG/1
300 TABLET ORAL DAILY
Qty: 135 TABLET | Refills: 0 | Status: SHIPPED | OUTPATIENT
Start: 2023-01-11 | End: 2023-04-11

## 2023-01-11 RX ORDER — BUPROPION HYDROCHLORIDE 150 MG/1
150 TABLET ORAL DAILY
Qty: 90 TABLET | Refills: 0 | Status: SHIPPED | OUTPATIENT
Start: 2023-01-11 | End: 2023-04-11

## 2023-01-12 ENCOUNTER — TELEMEDICINE (OUTPATIENT)
Dept: BEHAVIORAL/MENTAL HEALTH CLINIC | Facility: CLINIC | Age: 28
End: 2023-01-12

## 2023-01-12 DIAGNOSIS — Z86.59 HISTORY OF POSTTRAUMATIC STRESS DISORDER (PTSD): Primary | ICD-10-CM

## 2023-01-12 DIAGNOSIS — F31.81 BIPOLAR 2 DISORDER (HCC): ICD-10-CM

## 2023-01-12 NOTE — PSYCH
Virtual Regular Visit    Verification of patient location:    Patient is located in the following state in which I hold an active license [place]      Assessment/Plan:    Problem List Items Addressed This Visit        Other    Bipolar 2 disorder ([address] )   Other Visit Diagnoses     History of posttraumatic stress disorder (PTSD)    -  Primary          Goals addressed in session: Goal 1          Reason for visit is   Chief Complaint   Patient presents with   • Virtual Regular Visit        Encounter provider Celina Reno    Provider located at [address] [place] [id number] [phone]      Recent Visits  Date Type Provider Dept   01/09/23 Telephone [address] [place] [address]   01/06/23 [address] [place] [address] recent visits within past 7 days and meeting all other requirements  Today's Visits  Date Type Provider Dept   01/12/23 Telemedicine 1995 [facility] [address]   Showing today's visits and meeting all other requirements  Future Appointments  No visits were found meeting these conditions  Showing future appointments within next 150 days and meeting all other requirements       The patient was identified by name and date of birth  Janna Perez was informed that this is a telemedicine visit and that the [facility]  She agrees to proceed     My office door was closed  No one else was in the room  She acknowledged consent and understanding of privacy and security of the video platform  The patient has agreed to participate and understands they can discontinue the visit at any time  Patient is aware this is a billable service       Subjective  Janna Perez is a 32 y o  female        Previous session plan: CONTINUED: Says how overwhelmed she felt feeling love from her queer/bipoc group when she told them about her marriage planning  CONTINUED: Continue to discuss her feelings being overwhelming  Check in with getting DBT workbook  Considering   Collectivism mindset  Not trusting herself to see red flags  Expanding social Robinson, reaching out to mother  Check in with practicing pendulation, anxiety heart muscle shaped with real organ colors "The dripping heart", forearms, shaped like statue arms wrapped in the shape of a hug, white, "hug"  Check in with desire to help community, be part of [facility]    D: Focused on her conversation with her sister and the difficulty from this conversation  Discussed with her mother, which was also challenging  Discussed upcoming wedding  This writer provided PCT through empathic listening, validation of feelings, reflection of content and feelings, boundary setting, psychoeducation  A: Selam Evans appeared to be in a mildly euthymic mood with congruent affect, seemed to be experiencing lower mood at times, excessive worry, racing thoughts, thoughts of worthlessness at times, mild excessive guilt, appeared to be kempt, no SI/HI nor CAMARGO risk apparent or reported, seemed to respond well to PCT work today, engaging in homework  P: Check in with her wedding  Consider discussing writing down gratitude letter toward her sister  CONTINUED: Says how overwhelmed she felt feeling love from her queer/bipoc group when she told them about her marriage planning  Continue to discuss her feelings being overwhelming  Check in with getting DBT workbook  Considering   Collectivism mindset  Not trusting herself to see red flags  Expanding social Robinson, reaching out to mother  Check in with practicing pendulation, anxiety heart muscle shaped with real organ colors "The dripping heart", forearms, shaped like statue arms wrapped in the shape of a hug, white, "hug"   Check in with desire to help community, be part of [facility]     Past Medical History:   Diagnosis Date   • Anxiety    • Bipolar disorder ([facility] )    • Borderline personality disorder ([facility] )    • Depression    • Self-injurious behavior        Past Surgical History:   Procedure Laterality Date   • CERVICAL BIOPSY  W/ LOOP ELECTRODE EXCISION     • FL INJECTION LEFT HIP (ARTHROGRAM)  4/7/2021   • WISDOM TOOTH EXTRACTION         Current Outpatient Medications   Medication Sig Dispense Refill   • albuterol (PROVENTIL HFA,VENTOLIN HFA) 90 mcg/act inhaler Inhale 2 puffs every 6 (six) hours as needed for wheezing or shortness of breath 3 Inhaler 1   • benzocaine (ORAJEL) 10 % mucosal gel Apply 1 application to the mouth or throat as needed for mucositis (Patient not taking: Reported on 9/28/2022) 5 3 g 0   • buPROPion (WELLBUTRIN XL) 150 mg 24 hr tablet Take 1 tablet (150 mg total) by mouth daily 90 tablet 0   • hydrOXYzine HCL (ATARAX) 10 mg tablet Take 1 tablet (10 mg total) by mouth every 6 (six) hours as needed for anxiety 30 tablet 2   • lamoTRIgine (LaMICtal) 200 MG tablet Take 1 5 tablets (300 mg total) by mouth daily 135 tablet 0   • Levonorgestrel 13 5 MG IUD 1 each by Intrauterine route     • traZODone (DESYREL) 50 mg tablet Take 0 5 tablets (25 mg total) by mouth daily at bedtime 45 tablet 0   • valACYclovir (VALTREX) 1,000 mg tablet Take 2 tablets (2,000 mg total) by mouth 2 (two) times a day for 1 day 24 tablet 3     No current facility-administered medications for this visit  No Known Allergies    Review of Systems    Video Exam    There were no vitals filed for this visit      Physical Exam     Visit Time  01/12/23  Start Time: 0804  Stop Time: 2976  Total Visit Time: 48 minutes

## 2023-01-13 ENCOUNTER — SOCIAL WORK (OUTPATIENT)
Dept: BEHAVIORAL/MENTAL HEALTH CLINIC | Facility: CLINIC | Age: 28
End: 2023-01-13

## 2023-01-13 DIAGNOSIS — Z86.59 HISTORY OF POSTTRAUMATIC STRESS DISORDER (PTSD): Primary | ICD-10-CM

## 2023-01-13 DIAGNOSIS — F31.81 BIPOLAR 2 DISORDER (HCC): ICD-10-CM

## 2023-01-13 NOTE — PSYCH
Psychotherapy Provided: Individual Psychotherapy 45 minutes     Length of time in session: 48 minutes, follow up in 1 week    Encounter Diagnosis     ICD-10-CM    1  History of posttraumatic stress disorder (PTSD)  Z86 [id number]       2  Bipolar 2 disorder (HCC)  F31 81           Goals addressed in session: Goal 1     Pain:      none    0    Current suicide risk : Low     Previous session plan: Check in with her wedding  Consider discussing writing down gratitude letter toward her sister  CONTINUED: Says how overwhelmed she felt feeling love from her queer/bipoc group when she told them about her marriage planning  Continue to discuss her feelings being overwhelming  Check in with getting DBT workbook  Considering   Collectivism mindset  Not trusting herself to see red flags  Expanding social [place], reaching out to mother  Check in with practicing pendulation, anxiety heart muscle shaped with real organ colors "The dripping heart", forearms, shaped like statue arms wrapped in the shape of a hug, white, "hug"  Check in with desire to help community, be part of Just[facility]    D: Reported significant stress related to upcoming wedding - which is why we've had this appointment this week  Practiced mindfulness skills today to assist with reducing stress, anxiety  This writer provided PCT through empathic listening, validation of feelings, reflection of content and feelings  CBT work through [facility] work  A: Juliocesar Hobson appeared to be in an anxious mood with congruent affect, seemed to be experiencing lower mood at times, excessive worry, racing thoughts, mild thoughts of hopelessness and worthlessness, appeared to be kempt, no SI/HI nor CAMARGO risk apparent or reported, seemed to respond well to PCT, CBT work today  P: Check in with her wedding  Resent mindfulness skills worksheet  Consider discussing writing down gratitude letter toward her sister   CONTINUED: Says how overwhelmed she felt feeling love from her queer/bipoc group when she told them about her marriage planning  Continue to discuss her feelings being overwhelming  Check in with getting DBT workbook  Considering   Collectivism mindset  Not trusting herself to see red flags  Expanding social [place], reaching out to mother  Check in with practicing pendulation, anxiety heart muscle shaped with real organ colors "The dripping heart", forearms, shaped like statue arms wrapped in the shape of a hug, white, "hug"  Check in with desire to help community, be part of [address] [place] [address] Zan: Diagnosis and Treatment Plan explained to Genaro Martinez relates understanding diagnosis and is agreeable to Treatment Plan   Yes     Visit start and stop times:    01/13/23  Start Time: 1308  Stop Time: 1356  Total Visit Time: 48 minutes

## 2023-01-19 ENCOUNTER — TELEMEDICINE (OUTPATIENT)
Dept: BEHAVIORAL/MENTAL HEALTH CLINIC | Facility: CLINIC | Age: 28
End: 2023-01-19

## 2023-01-19 DIAGNOSIS — Z86.59 HISTORY OF POSTTRAUMATIC STRESS DISORDER (PTSD): Primary | ICD-10-CM

## 2023-01-19 DIAGNOSIS — F31.81 BIPOLAR 2 DISORDER (HCC): ICD-10-CM

## 2023-01-19 NOTE — PSYCH
Virtual Regular Visit    Verification of patient location:    Patient is located in the following state in which I hold an active license [place]      Assessment/Plan:    Problem List Items Addressed This Visit        Other    Bipolar 2 disorder ([facility] )   Other Visit Diagnoses     History of posttraumatic stress disorder (PTSD)    -  Primary          Goals addressed in session: Goal 1          Reason for visit is   Chief Complaint   Patient presents with   • Virtual Regular Visit        Encounter provider Doyne Gosselin    Provider located at [address] [place] [address]  [facility] [id number] [phone]      Recent Visits  Date Type Provider Dept   01/12/23 34 [facility]   Showing recent visits within past 7 days and meeting all other requirements  Today's Visits  Date Type Provider Dept   01/19/23 Telemedicine [address] [place] [address] 51 [address]   Showing today's visits and meeting all other requirements  Future Appointments  No visits were found meeting these conditions  Showing future appointments within next 150 days and meeting all other requirements       The patient was identified by name and date of birth  Michael Ybarra was informed that this is a telemedicine visit and [facility]  She agrees to proceed     My office door was closed  No one else was in the room  She acknowledged consent and understanding of privacy and security of the video platform  The patient has agreed to participate and understands they can discontinue the visit at any time  Patient is aware this is a billable service       Subjective  Michael Ybarra is a 32 y o  female    HPI     Past Medical History:   Diagnosis Date   • Anxiety    • Bipolar disorder ([facility] )    • Borderline personality disorder ([facility] )    • Depression    • Self-injurious behavior        Past Surgical History:   Procedure Laterality Date   • CERVICAL BIOPSY  W/ LOOP ELECTRODE EXCISION     • FL INJECTION LEFT HIP (ARTHROGRAM)  4/7/2021   • WISDOM TOOTH EXTRACTION         Current Outpatient Medications   Medication Sig Dispense Refill   • albuterol (PROVENTIL HFA,VENTOLIN HFA) 90 mcg/act inhaler Inhale 2 puffs every 6 (six) hours as needed for wheezing or shortness of breath 3 Inhaler 1   • benzocaine (ORAJEL) 10 % mucosal gel Apply 1 application to the mouth or throat as needed for mucositis (Patient not taking: Reported on 9/28/2022) 5 3 g 0   • buPROPion (WELLBUTRIN XL) 150 mg 24 hr tablet Take 1 tablet (150 mg total) by mouth daily 90 tablet 0   • hydrOXYzine HCL (ATARAX) 10 mg tablet Take 1 tablet (10 mg total) by mouth every 6 (six) hours as needed for anxiety 30 tablet 2   • lamoTRIgine (LaMICtal) 200 MG tablet Take 1 5 tablets (300 mg total) by mouth daily 135 tablet 0   • Levonorgestrel 13 5 MG IUD 1 each by Intrauterine route     • traZODone (DESYREL) 50 mg tablet Take 0 5 tablets (25 mg total) by mouth daily at bedtime 45 tablet 0   • valACYclovir (VALTREX) 1,000 mg tablet Take 2 tablets (2,000 mg total) by mouth 2 (two) times a day for 1 day 24 tablet 3     No current facility-administered medications for this visit  No Known Allergies    Review of Systems    Video Exam    There were no vitals filed for this visit  Physical Exam     Behavioral Health Psychotherapy Progress Note    Psychotherapy Provided: Individual Psychotherapy     1  History of posttraumatic stress disorder (PTSD)        2  Bipolar 2 disorder ([facility] )            Goals addressed in session: Goal 1     DATA:      ROLLING PLAN:  "I'm so loved"  Resent mindfulness skills worksheet   Consider discussing writing down gratitude letter toward her sister  CONTINUED: Says how overwhelmed she felt feeling love from her queer/bipoc group when she told them about her marriage planning  Continue to discuss her feelings being overwhelming  Check in with getting DBT workbook  Considering   Collectivism mindset  Not trusting herself to see red flags  Expanding social [place], reaching out to mother  Check in with practicing pendulation, anxiety heart muscle shaped with real organ colors "The dripping heart", forearms, shaped like statue arms wrapped in the shape of a hug, white, "hug"  Check in with desire to help community, be part of [facility]    She reports she has many beautiful photos of her wedding  She says many individuals surrounded her to make her day special  She says she greatly enjoyed her time with her family, friends  "I'm so loved" she says as she recognizes that family and friends love her  During this session, this clinician used the following therapeutic modalities: Client-centered Therapy and Cognitive Behavioral Therapy    Substance Abuse was not addressed during this session  If the client is diagnosed with a co-occurring substance use disorder, please indicate any changes in the frequency or amount of use: n/a  Stage of change for addressing substance use diagnoses: No substance use/Not applicable    ASSESSMENT:  Placido Horton presents with a Euthymic/ normal mood  her affect is Normal range and intensity, which is congruent, with her mood and the content of the session  The client has made progress on their goals  Placido Horton presents with a none risk of suicide, none risk of self-harm, and none risk of harm to others  For any risk assessment that surpasses a "low" rating, a safety plan must be developed  A safety plan was indicated: no   If yes, describe in detail n/a    PLAN: Between sessions, Placido Horton will continue to consider that she is loved  At the next session, the therapist will use Client-centered Therapy and Cognitive Behavioral Therapy to address symptoms of depression      Behavioral Health Treatment Plan and Discharge Planning: Baron Scott is aware of and agrees to continue to work on their treatment plan  They have identified and are working toward their discharge goals   Yes     Visit start and stop times:    01/19/23  Start Time: 0805  Stop Time: 2805  Total Visit Time: 47 minutes

## 2023-01-23 ENCOUNTER — SOCIAL WORK (OUTPATIENT)
Dept: BEHAVIORAL/MENTAL HEALTH CLINIC | Facility: CLINIC | Age: 28
End: 2023-01-23

## 2023-01-23 DIAGNOSIS — F31.81 BIPOLAR 2 DISORDER (HCC): ICD-10-CM

## 2023-01-23 DIAGNOSIS — Z86.59 HISTORY OF POSTTRAUMATIC STRESS DISORDER (PTSD): Primary | ICD-10-CM
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
## 2023-01-24 RX ORDER — TRAZODONE HYDROCHLORIDE 50 MG/1
25 TABLET ORAL
Qty: 45 TABLET | Refills: 0 | Status: SHIPPED | OUTPATIENT
Start: 2023-01-24 | End: 2023-04-24

## 2023-02-07 ENCOUNTER — APPOINTMENT (OUTPATIENT)
Dept: LAB | Facility: CLINIC | Age: 28
End: 2023-02-07

## 2023-02-07 DIAGNOSIS — Z13.220 SCREENING FOR HYPERLIPIDEMIA: ICD-10-CM

## 2023-02-07 DIAGNOSIS — L65.9 HAIR LOSS: ICD-10-CM

## 2023-02-07 DIAGNOSIS — Z13.228 SCREENING FOR ENDOCRINE, NUTRITIONAL, METABOLIC AND IMMUNITY DISORDER: ICD-10-CM

## 2023-02-07 DIAGNOSIS — Z13.21 SCREENING FOR ENDOCRINE, NUTRITIONAL, METABOLIC AND IMMUNITY DISORDER: ICD-10-CM

## 2023-02-07 DIAGNOSIS — Z13.0 SCREENING FOR ENDOCRINE, NUTRITIONAL, METABOLIC AND IMMUNITY DISORDER: ICD-10-CM

## 2023-02-07 DIAGNOSIS — E55.9 VITAMIN D DEFICIENCY: ICD-10-CM

## 2023-02-07 DIAGNOSIS — Z13.29 SCREENING FOR ENDOCRINE, NUTRITIONAL, METABOLIC AND IMMUNITY DISORDER: ICD-10-CM

## 2023-02-07 DIAGNOSIS — Z11.3 SCREENING FOR VENEREAL DISEASE: ICD-10-CM

## 2023-02-07 LAB
25(OH)D3 SERPL-MCNC: 12.7 NG/ML (ref 30–100)
ALBUMIN SERPL BCP-MCNC: 4 G/DL (ref 3.5–5)
ALP SERPL-CCNC: 51 U/L (ref 46–116)
ALT SERPL W P-5'-P-CCNC: 17 U/L (ref 12–78)
ANION GAP SERPL CALCULATED.3IONS-SCNC: 4 MMOL/L (ref 4–13)
AST SERPL W P-5'-P-CCNC: 10 U/L (ref 5–45)
BASOPHILS # BLD AUTO: 0.04 THOUSANDS/ÂΜL (ref 0–0.1)
BASOPHILS NFR BLD AUTO: 1 % (ref 0–1)
BILIRUB SERPL-MCNC: 0.36 MG/DL (ref 0.2–1)
BUN SERPL-MCNC: 11 MG/DL (ref 5–25)
CALCIUM SERPL-MCNC: 9 MG/DL (ref 8.3–10.1)
CHLORIDE SERPL-SCNC: 106 MMOL/L (ref 96–108)
CHOLEST SERPL-MCNC: 166 MG/DL
CO2 SERPL-SCNC: 26 MMOL/L (ref 21–32)
CREAT SERPL-MCNC: 0.76 MG/DL (ref 0.6–1.3)
EOSINOPHIL # BLD AUTO: 0.06 THOUSAND/ÂΜL (ref 0–0.61)
EOSINOPHIL NFR BLD AUTO: 1 % (ref 0–6)
ERYTHROCYTE [DISTWIDTH] IN BLOOD BY AUTOMATED COUNT: 12.8 % (ref 11.6–15.1)
GFR SERPL CREATININE-BSD FRML MDRD: 107 ML/MIN/1.73SQ M
GLUCOSE P FAST SERPL-MCNC: 96 MG/DL (ref 65–99)
HBV SURFACE AG SER QL: NORMAL
HCT VFR BLD AUTO: 40.3 % (ref 34.8–46.1)
HCV AB SER QL: NORMAL
HDLC SERPL-MCNC: 58 MG/DL
HGB BLD-MCNC: 12.9 G/DL (ref 11.5–15.4)
HIV 1+2 AB+HIV1 P24 AG SERPL QL IA: NORMAL
HIV 2 AB SERPL QL IA: NORMAL
HIV1 AB SERPL QL IA: NORMAL
HIV1 P24 AG SERPL QL IA: NORMAL
IMM GRANULOCYTES # BLD AUTO: 0.02 THOUSAND/UL (ref 0–0.2)
IMM GRANULOCYTES NFR BLD AUTO: 0 % (ref 0–2)
LDLC SERPL CALC-MCNC: 98 MG/DL (ref 0–100)
LYMPHOCYTES # BLD AUTO: 1.75 THOUSANDS/ÂΜL (ref 0.6–4.47)
LYMPHOCYTES NFR BLD AUTO: 22 % (ref 14–44)
MCH RBC QN AUTO: 29.7 PG (ref 26.8–34.3)
MCHC RBC AUTO-ENTMCNC: 32 G/DL (ref 31.4–37.4)
MCV RBC AUTO: 93 FL (ref 82–98)
MONOCYTES # BLD AUTO: 0.49 THOUSAND/ÂΜL (ref 0.17–1.22)
MONOCYTES NFR BLD AUTO: 6 % (ref 4–12)
NEUTROPHILS # BLD AUTO: 5.73 THOUSANDS/ÂΜL (ref 1.85–7.62)
NEUTS SEG NFR BLD AUTO: 70 % (ref 43–75)
NONHDLC SERPL-MCNC: 108 MG/DL
NRBC BLD AUTO-RTO: 0 /100 WBCS
PLATELET # BLD AUTO: 284 THOUSANDS/UL (ref 149–390)
PMV BLD AUTO: 10.1 FL (ref 8.9–12.7)
POTASSIUM SERPL-SCNC: 3.8 MMOL/L (ref 3.5–5.3)
PROT SERPL-MCNC: 7.2 G/DL (ref 6.4–8.4)
RBC # BLD AUTO: 4.34 MILLION/UL (ref 3.81–5.12)
SODIUM SERPL-SCNC: 136 MMOL/L (ref 135–147)
TRIGL SERPL-MCNC: 50 MG/DL
TSH SERPL DL<=0.05 MIU/L-ACNC: 1.92 UIU/ML (ref 0.45–4.5)
WBC # BLD AUTO: 8.09 THOUSAND/UL (ref 4.31–10.16)

## 2023-02-08 DIAGNOSIS — E55.9 VITAMIN D DEFICIENCY: Primary | ICD-10-CM

## 2023-02-08 LAB — RPR SER QL: NORMAL

## 2023-02-08 RX ORDER — ERGOCALCIFEROL 1.25 MG/1
50000 CAPSULE ORAL WEEKLY
Qty: 8 CAPSULE | Refills: 0 | Status: SHIPPED | OUTPATIENT
Start: 2023-02-08

## 2023-02-14 ENCOUNTER — SOCIAL WORK (OUTPATIENT)
Dept: BEHAVIORAL/MENTAL HEALTH CLINIC | Facility: CLINIC | Age: 28
End: 2023-02-14

## 2023-02-14 DIAGNOSIS — Z86.59 HISTORY OF POSTTRAUMATIC STRESS DISORDER (PTSD): Primary | ICD-10-CM

## 2023-02-14 DIAGNOSIS — F31.81 BIPOLAR 2 DISORDER (HCC): ICD-10-CM

## 2023-02-14 NOTE — PSYCH
Behavioral Health Psychotherapy Progress Note    Psychotherapy Provided: Individual Psychotherapy     1  History of posttraumatic stress disorder (PTSD)        2  Bipolar 2 disorder ([address] )            Goals addressed in session: Goal 1     DATA:     ROLLING PLAN:  "I'm so loved"  Resent mindfulness skills worksheet  Consider discussing writing down gratitude letter toward her sister  CONTINUED: Says how overwhelmed she felt feeling love from her queer/bipoc group when she told them about her marriage planning  Continue to discuss her feelings being overwhelming  Check in with getting DBT workbook  Considering   Collectivism mindset  Not trusting herself to see red flags  Expanding social [place], reaching out to mother  Check in with practicing pendulation, anxiety heart muscle shaped with real organ colors "The dripping heart", forearms, shaped like statue arms wrapped in the shape of a hug, white, "hug"  Check in with desire to help community, be part of [email or web address]    Discussed her past few weeks  Explored her time with attempting to see her   She says she's been talking to her sister about her moving out  She says that Ramonita James is going to move into her house  Explored her time with her ICM  Discussed her challenges with insomnia at times  More goal-oriented, possibly hypomania  Discussed her time with her second cousin  During this session, this clinician used the following therapeutic modalities: Client-centered Therapy and Cognitive Behavioral Therapy    Substance Abuse was not addressed during this session  If the client is diagnosed with a co-occurring substance use disorder, please indicate any changes in the frequency or amount of use: n/a  Stage of change for addressing substance use diagnoses: No substance use/Not applicable    ASSESSMENT:  Cece Hernandez presents with a Euthymic/ normal mood       her affect is Normal range and intensity, which is congruent, with her mood and the content of the session  The client has made progress on their goals  Mary Beth Arguelles presents with a none risk of suicide, none risk of self-harm, and none risk of harm to others  For any risk assessment that surpasses a "low" rating, a safety plan must be developed  A safety plan was indicated: no  If yes, describe in detail n/a    PLAN: Between sessions, Mary Beth Arguelles will consider discussion  At the next session, the therapist will use Client-centered Therapy, Cognitive Behavioral Therapy and Existential Therapy to address depression  Behavioral Health Treatment Plan and Discharge Planning: Mary Beth Arguelles is aware of and agrees to continue to work on their treatment plan  They have identified and are working toward their discharge goals   yes    Visit start and stop times:    02/14/23  Start Time: 8656  Stop Time: 1453  Total Visit Time: 50 minutes

## 2023-02-21 ENCOUNTER — SOCIAL WORK (OUTPATIENT)
Dept: BEHAVIORAL/MENTAL HEALTH CLINIC | Facility: CLINIC | Age: 28
End: 2023-02-21

## 2023-02-21 DIAGNOSIS — F31.81 BIPOLAR 2 DISORDER (HCC): ICD-10-CM

## 2023-02-21 DIAGNOSIS — Z86.59 HISTORY OF POSTTRAUMATIC STRESS DISORDER (PTSD): Primary | ICD-10-CM
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
## 2023-02-27 ENCOUNTER — SOCIAL WORK (OUTPATIENT)
Dept: BEHAVIORAL/MENTAL HEALTH CLINIC | Facility: CLINIC | Age: 28
End: 2023-02-27

## 2023-02-27 DIAGNOSIS — Z86.59 HISTORY OF POSTTRAUMATIC STRESS DISORDER (PTSD): Primary | ICD-10-CM

## 2023-02-27 DIAGNOSIS — F31.81 BIPOLAR 2 DISORDER (HCC): ICD-10-CM

## 2023-02-27 NOTE — PSYCH
Behavioral Health Psychotherapy Progress Note    Psychotherapy Provided: Individual Psychotherapy     1  History of posttraumatic stress disorder (PTSD)        2  Bipolar 2 disorder ([address] )            Goals addressed in session: Goal 1     DATA:     ROLLING PLAN: Feeling she failed the little girl inside of her - allowing herself to prostitute herself  Check in with her downloading CPT  and beginning to work through it   "I'm so loved"  Resent mindfulness skills worksheet  Consider discussing writing down gratitude letter toward her sister  CONTINUED: Says how overwhelmed she felt feeling love from her queer/bipoc group when she told them about her marriage planning  Continue to discuss her feelings being overwhelming  Check in with getting DBT workbook  Considering   Collectivism mindset  Not trusting herself to see red flags  Expanding social [place], reaching out to mother  Check in with practicing pendulation, anxiety heart muscle shaped with real organ colors "The dripping heart", forearms, shaped like statue arms wrapped in the shape of a hug, white, "hug"  Check in with desire to help community, be part of [employer]    Discussed her going out with her friend Ramonita James, enjoying time with her  Discussed her time changing the house around  Says her friend Ramonita James has moved in and she's enjoying time with her  She reports she's utilized the CPT christen, she's been engaging in pendulation, imagery rehearsal, which has decreased her nightmares  Focused on her shift in her narrative regarding her trauma, focused on her story  Explored a life-affirming moment she had with her sister  During this session, this clinician used the following therapeutic modalities: Client-centered Therapy and Cognitive Behavioral Therapy    Substance Abuse was not addressed during this session  If the client is diagnosed with a co-occurring substance use disorder, please indicate any changes in the frequency or amount of use: n/a  Stage of change for addressing substance use diagnoses: No substance use/Not applicable    ASSESSMENT:  Britney Lobo presents with a Euthymic/ normal mood  her affect is Tearful and anxious, which is congruent, with her mood and the content of the session  The client has made progress on their goals  Britney Lobo presents with a none risk of suicide, none risk of self-harm, and none risk of harm to others  For any risk assessment that surpasses a "low" rating, a safety plan must be developed  A safety plan was indicated: no  If yes, describe in detail n/a    PLAN: Between sessions, Britney Lobo will practice self-care, deep breathing, pendulation  At the next session, the therapist will use Client-centered Therapy, Cognitive Processing Therapy and Existential Therapy to address PTSD  Behavioral Health Treatment Plan and Discharge Planning: Britney Lobo is aware of and agrees to continue to work on their treatment plan  They have identified and are working toward their discharge goals   yes    Visit start and stop times:    02/27/23

## 2023-03-02 ENCOUNTER — SOCIAL WORK (OUTPATIENT)
Dept: BEHAVIORAL/MENTAL HEALTH CLINIC | Facility: CLINIC | Age: 28
End: 2023-03-02

## 2023-03-02 DIAGNOSIS — F31.81 BIPOLAR 2 DISORDER (HCC): ICD-10-CM

## 2023-03-02 DIAGNOSIS — Z86.59 HISTORY OF POSTTRAUMATIC STRESS DISORDER (PTSD): Primary | ICD-10-CM

## 2023-03-02 NOTE — PSYCH
Behavioral Health Psychotherapy Progress Note    Psychotherapy Provided: Individual Psychotherapy     1  History of posttraumatic stress disorder (PTSD)        2  Bipolar 2 disorder ([address] )            Goals addressed in session: Goal 1     DATA:     ROLLING PLAN: IRT provided, completed one dream together  Feeling she failed the little girl inside of her - allowing herself to prostitute herself  Check in with her downloading CPT  and beginning to work through it   "I'm so loved"  Resent mindfulness skills worksheet  Consider discussing writing down gratitude letter toward her sister  CONTINUED: Says how overwhelmed she felt feeling love from her queer/bipoc group when she told them about her marriage planning  Continue to discuss her feelings being overwhelming  Check in with getting DBT workbook  Considering   Collectivism mindset  Not trusting herself to see red flags  Expanding social [place], reaching out to mother  Check in with practicing pendulation, anxiety heart muscle shaped with real organ colors "The dripping heart", forearms, shaped like statue arms wrapped in the shape of a hug, white, "hug"  Check in with desire to help community, be part of [place]    She reports she's feeling better today than previously  Has been grappling with lethargy, avolition  Has been meeting with her   Have discussed her experiences with creativity, her painting, challenges in connecting with her art  Spent some time fixing her home up with Tian Sanchez  Reports moderate anhedonia  IRT provided today to assist with her trauma dreams  During this session, this clinician used the following therapeutic modalities: Client-centered Therapy, Cognitive Behavioral Therapy and Existential Therapy    Substance Abuse was not addressed during this session  If the client is diagnosed with a co-occurring substance use disorder, please indicate any changes in the frequency or amount of use: n/a   Stage of change for addressing substance use diagnoses: No substance use/Not applicable    ASSESSMENT:  Michael Ybarra presents with a Dysthymic mood  her affect is Normal range and intensity, which is congruent, with her mood and the content of the session  The client has made progress on their goals  Michael Ybarra presents with a none risk of suicide, none risk of self-harm, and none risk of harm to others  For any risk assessment that surpasses a "low" rating, a safety plan must be developed  A safety plan was indicated: no  If yes, describe in detail n/a    PLAN: Between sessions, Michael Ybarra will practice her IRT at home  At the next session, the therapist will use Client-centered Therapy, Cognitive Behavioral Therapy and Existential Therapy to address PTSD symptoms  Behavioral Health Treatment Plan and Discharge Planning: Michael Ybarra is aware of and agrees to continue to work on their treatment plan  They have identified and are working toward their discharge goals   yes    Visit start and stop times:    03/02/23  Start Time: 1505  Stop Time: 1600  Total Visit Time: 55 minutes

## 2023-03-06 ENCOUNTER — SOCIAL WORK (OUTPATIENT)
Dept: BEHAVIORAL/MENTAL HEALTH CLINIC | Facility: CLINIC | Age: 28
End: 2023-03-06

## 2023-03-06 DIAGNOSIS — Z86.59 HISTORY OF POSTTRAUMATIC STRESS DISORDER (PTSD): Primary | ICD-10-CM

## 2023-03-06 DIAGNOSIS — F31.81 BIPOLAR 2 DISORDER (HCC): ICD-10-CM
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
## 2023-03-07 ENCOUNTER — SOCIAL WORK (OUTPATIENT)
Dept: BEHAVIORAL/MENTAL HEALTH CLINIC | Facility: CLINIC | Age: 28
End: 2023-03-07

## 2023-03-07 DIAGNOSIS — Z86.59 HISTORY OF POSTTRAUMATIC STRESS DISORDER (PTSD): Primary | ICD-10-CM

## 2023-03-07 DIAGNOSIS — F31.81 BIPOLAR 2 DISORDER (HCC): ICD-10-CM

## 2023-03-07 NOTE — PSYCH
Behavioral Health Psychotherapy Progress Note    Psychotherapy Provided: Individual Psychotherapy     1  History of posttraumatic stress disorder (PTSD)        2  Bipolar 2 disorder ([address] )            Goals addressed in session: Goal 1 and Goal 2     DATA:     ROLLING PLAN: Reports feeling "small" lately, desiring someone to protect her  Consider free association writing   "I feel weird thinking about how I've been thinking"  IRT provided, completed one dream together  Feeling she failed the little girl inside of her - allowing herself to prostitute herself  Check in with her downloading CPT  and beginning to work through it   "I'm so loved"  Resent mindfulness skills worksheet  Consider discussing writing down gratitude letter toward her sister  Check in with getting DBT workbook  Not trusting herself to see red flags  Expanding social [place], reaching out to mother  Check in with practicing pendulation, anxiety heart muscle shaped with real organ colors "The dripping heart", forearms, shaped like statue arms wrapped in the shape of a hug, white, "hug"  Picked up at discussing "I was angry or scared that I would get taken advantage of again"  Focused on giving Sunny room to express her feelings today, utilizing more silence in the session  She was able to explore her feeling "small" lately, using "small voice" when she expresses herself at times, exploring her vulnerability and desire for someone to protect her, defend her  During this session, this clinician used the following therapeutic modalities: Client-centered Therapy and Cognitive Processing Therapy    Substance Abuse was not addressed during this session  If the client is diagnosed with a co-occurring substance use disorder, please indicate any changes in the frequency or amount of use: n/a   Stage of change for addressing substance use diagnoses: No substance use/Not applicable    ASSESSMENT:  Jen Fischer presents with a Dysthymic mood      her affect is Normal range and intensity, which is congruent, with her mood and the content of the session  The client has made progress on their goals  Maci Vang presents with a none risk of suicide, none risk of self-harm, and none risk of harm to others  For any risk assessment that surpasses a "low" rating, a safety plan must be developed  A safety plan was indicated: no  If yes, describe in detail n/a    PLAN: Between sessions, Maci Vang will consider writing at home  At the next session, the therapist will use Client-centered Therapy and Cognitive Processing Therapy to address PTSD symptoms  Behavioral Health Treatment Plan and Discharge Planning: Maci Vang is aware of and agrees to continue to work on their treatment plan  They have identified and are working toward their discharge goals   yes    Visit start and stop times:    03/07/23  Start Time: 1402  Stop Time: 1503  Total Visit Time: 61 minutes

## 2023-03-11 DIAGNOSIS — E55.9 VITAMIN D DEFICIENCY: ICD-10-CM

## 2023-03-11 RX ORDER — ERGOCALCIFEROL 1.25 MG/1
CAPSULE ORAL
Qty: 12 CAPSULE | Refills: 1 | Status: SHIPPED | OUTPATIENT
Start: 2023-03-11

## 2023-03-13 ENCOUNTER — TELEPHONE (OUTPATIENT)
Dept: PSYCHIATRY | Facility: CLINIC | Age: 28
End: 2023-03-13

## 2023-03-13 DIAGNOSIS — J45.20 MILD INTERMITTENT REACTIVE AIRWAY DISEASE WITHOUT COMPLICATION: ICD-10-CM

## 2023-03-14 ENCOUNTER — TELEMEDICINE (OUTPATIENT)
Dept: BEHAVIORAL/MENTAL HEALTH CLINIC | Facility: CLINIC | Age: 28
End: 2023-03-14

## 2023-03-14 DIAGNOSIS — F31.81 BIPOLAR 2 DISORDER (HCC): ICD-10-CM

## 2023-03-14 DIAGNOSIS — Z86.59 HISTORY OF POSTTRAUMATIC STRESS DISORDER (PTSD): Primary | ICD-10-CM

## 2023-03-14 NOTE — PSYCH
Virtual Regular Visit    Verification of patient location:    Patient is located in the following state in which I hold an active license [place]      Assessment/Plan:    Problem List Items Addressed This Visit        Other    Bipolar 2 disorder ([facility] )   Other Visit Diagnoses     History of posttraumatic stress disorder (PTSD)    -  Primary          Goals addressed in session: Goal 1          Reason for visit is   Chief Complaint   Patient presents with   • Virtual Regular Visit        Encounter provider Kierra Funez    Provider located at [address] [place] [address] [address] [place] [id number] [phone]      Recent Visits  Date Type Provider Dept   03/13/23 Telephone [address]  recent visits within past 7 days and meeting all other requirements  Today's Visits  Date Type Provider Dept   03/14/23 34 [facility]   Showing today's visits and meeting all other requirements  Future Appointments  No visits were found meeting these conditions  Showing future appointments within next 150 days and meeting all other requirements       The patient was identified by name and date of birth  Placido Horton was informed that this is a telemedicine visit and that [facility]  She agrees to proceed     My office door was closed  No one else was in the room  She acknowledged consent and understanding of privacy and security of the video platform  The patient has agreed to participate and understands they can discontinue the visit at any time  Patient is aware this is a billable service  Marck Albarran is a 32 y o  female          HPI     Past Medical History:   Diagnosis Date   • Anxiety    • Bipolar disorder ([facility] )    • Borderline personality disorder ([facility] )    • Depression • Self-injurious behavior        Past Surgical History:   Procedure Laterality Date   • CERVICAL BIOPSY  W/ LOOP ELECTRODE EXCISION     • FL INJECTION LEFT HIP (ARTHROGRAM)  4/7/2021   • WISDOM TOOTH EXTRACTION         Current Outpatient Medications   Medication Sig Dispense Refill   • albuterol (PROVENTIL HFA,VENTOLIN HFA) 90 mcg/act inhaler Inhale 2 puffs every 6 (six) hours as needed for wheezing or shortness of breath 3 Inhaler 1   • benzocaine (ORAJEL) 10 % mucosal gel Apply 1 application to the mouth or throat as needed for mucositis (Patient not taking: Reported on 9/28/2022) 5 3 g 0   • buPROPion (WELLBUTRIN XL) 150 mg 24 hr tablet Take 1 tablet (150 mg total) by mouth daily 90 tablet 0   • ergocalciferol (VITAMIN D2) 50,000 units TAKE 1 CAPSULE BY MOUTH ONE TIME PER WEEK 12 capsule 1   • hydrOXYzine HCL (ATARAX) 10 mg tablet Take 1 tablet (10 mg total) by mouth every 6 (six) hours as needed for anxiety 30 tablet 2   • lamoTRIgine (LaMICtal) 200 MG tablet Take 1 5 tablets (300 mg total) by mouth daily 135 tablet 0   • Levonorgestrel 13 5 MG IUD 1 each by Intrauterine route     • traZODone (DESYREL) 50 mg tablet TAKE 0 5 TABLETS (25 MG TOTAL) BY MOUTH DAILY AT BEDTIME 45 tablet 0   • valACYclovir (VALTREX) 1,000 mg tablet Take 2 tablets (2,000 mg total) by mouth 2 (two) times a day for 1 day 24 tablet 3     No current facility-administered medications for this visit  No Known Allergies    Review of Systems    Video Exam    There were no vitals filed for this visit  Physical Exam     Behavioral Health Psychotherapy Progress Note    Psychotherapy Provided: Individual Psychotherapy     1  History of posttraumatic stress disorder (PTSD)        2  Bipolar 2 disorder ([facility] )            Goals addressed in session: Goal 1     DATA:   ROLLING PLAN: Reports feeling "small" lately, desiring someone to protect her  Consider free association writing   "I feel weird thinking about how I've been thinking"   IRT provided, completed one dream together  Feeling she failed the little girl inside of her - allowing herself to prostitute herself  Check in with her downloading CPT  and beginning to work through it   "I'm so loved"  Resent mindfulness skills worksheet  Consider discussing writing down gratitude letter toward her sister  Check in with getting DBT workbook  Not trusting herself to see red flags  Expanding social [place], reaching out to mother  Check in with practicing pendulation, anxiety heart muscle shaped with real organ colors "The dripping heart", forearms, shaped like statue arms wrapped in the shape of a hug, white, "hug"      Focused on an odd experience she had with a friend, going to their "going away party", likely contracted COVID  She reported on a dream where much of the experience was discussed  Spent much of the time in session discussing her dreams and engaging in IRT work together to process feelings and restructure dream narratives to reduce instances of nightmares related to latent PTSD symptoms  During this session, this clinician used the following therapeutic modalities: Client-centered Therapy, Cognitive Behavioral Therapy, Dialectical Behavior Therapy and Existential Therapy    Substance Abuse was not addressed during this session  If the client is diagnosed with a co-occurring substance use disorder, please indicate any changes in the frequency or amount of use: n/a  Stage of change for addressing substance use diagnoses: No substance use/Not applicable    ASSESSMENT:  Britney Lobo presents with a Euthymic/ normal mood  her affect is Normal range and intensity, which is congruent, with her mood and the content of the session  The client has made progress on their goals  Britney Lobo presents with a none risk of suicide, none risk of self-harm, and none risk of harm to others      For any risk assessment that surpasses a "low" rating, a safety plan must be developed  A safety plan was indicated: no  If yes, describe in detail n/a    PLAN: Between sessions, Chester Cason will continue to practice IRT at home  At the next session, the therapist will use Client-centered Therapy to address depression  Behavioral Health Treatment Plan and Discharge Planning: Chester Cason is aware of and agrees to continue to work on their treatment plan  They have identified and are working toward their discharge goals   yes    Visit start and stop times:    03/14/23  Start Time: 1404  Stop Time: 1450  Total Visit Time: 46 minutes

## 2023-03-15 RX ORDER — ALBUTEROL SULFATE 90 UG/1
2 AEROSOL, METERED RESPIRATORY (INHALATION) EVERY 6 HOURS PRN
Qty: 18 G | Refills: 1 | Status: SHIPPED | OUTPATIENT
Start: 2023-03-15

## 2023-03-21 ENCOUNTER — TELEMEDICINE (OUTPATIENT)
Dept: BEHAVIORAL/MENTAL HEALTH CLINIC | Facility: CLINIC | Age: 28
End: 2023-03-21

## 2023-03-21 DIAGNOSIS — Z86.59 HISTORY OF POSTTRAUMATIC STRESS DISORDER (PTSD): Primary | ICD-10-CM

## 2023-03-21 DIAGNOSIS — F31.81 BIPOLAR 2 DISORDER (HCC): ICD-10-CM

## 2023-03-21 NOTE — PSYCH
Virtual Regular Visit    Verification of patient location:    Patient is located in the following state in which I hold an active license [place]      Assessment/Plan:    Problem List Items Addressed This Visit        Other    Bipolar 2 disorder ([facility] )   Other Visit Diagnoses     History of posttraumatic stress disorder (PTSD)    -  Primary          Goals addressed in session: Goal 1          Reason for visit is   Chief Complaint   Patient presents with   • Virtual Regular Visit        Encounter provider Carissa Currie    Provider located at [address] [place] [id number] [phone]      Recent Visits  Date Type Provider Dept   03/14/23 [facility]   Showing recent visits within past 7 days and meeting all other requirements  Today's Visits  Date Type Provider Dept   03/21/23 [address] [place], [place] [address]   Showing today's visits and meeting all other requirements  Future Appointments  No visits were found meeting these conditions  Showing future appointments within next 150 days and meeting all other requirements       The patient was identified by name and date of birth  Lalitha Sanders was informed that this is a telemedicine visit and that [facility]  She agrees to proceed     My office door was closed  No one else was in the room  She acknowledged consent and understanding of privacy and security of the video platform  The patient has agreed to participate and understands they can discontinue the visit at any time  Patient is aware this is a billable service  Ricky Kauffman is a 32 y o  female          HPI     Past Medical History:   Diagnosis Date   • Anxiety    • Bipolar disorder ([facility] [address] )    • Borderline personality disorder ([facility] )    • Depression    • Self-injurious behavior        Past Surgical History:   Procedure Laterality Date   • CERVICAL BIOPSY  W/ LOOP ELECTRODE EXCISION     • FL INJECTION LEFT HIP (ARTHROGRAM)  4/7/2021   • WISDOM TOOTH EXTRACTION         Current Outpatient Medications   Medication Sig Dispense Refill   • albuterol (PROVENTIL HFA,VENTOLIN HFA) 90 mcg/act inhaler Inhale 2 puffs every 6 (six) hours as needed for wheezing or shortness of breath 18 g 1   • benzocaine (ORAJEL) 10 % mucosal gel Apply 1 application to the mouth or throat as needed for mucositis (Patient not taking: Reported on 9/28/2022) 5 3 g 0   • buPROPion (WELLBUTRIN XL) 150 mg 24 hr tablet Take 1 tablet (150 mg total) by mouth daily 90 tablet 0   • ergocalciferol (VITAMIN D2) 50,000 units TAKE 1 CAPSULE BY MOUTH ONE TIME PER WEEK 12 capsule 1   • hydrOXYzine HCL (ATARAX) 10 mg tablet Take 1 tablet (10 mg total) by mouth every 6 (six) hours as needed for anxiety 30 tablet 2   • lamoTRIgine (LaMICtal) 200 MG tablet Take 1 5 tablets (300 mg total) by mouth daily 135 tablet 0   • Levonorgestrel 13 5 MG IUD 1 each by Intrauterine route     • traZODone (DESYREL) 50 mg tablet TAKE 0 5 TABLETS (25 MG TOTAL) BY MOUTH DAILY AT BEDTIME 45 tablet 0   • valACYclovir (VALTREX) 1,000 mg tablet Take 2 tablets (2,000 mg total) by mouth 2 (two) times a day for 1 day 24 tablet 3     No current facility-administered medications for this visit  No Known Allergies    Review of Systems    Video Exam    There were no vitals filed for this visit  Physical Exam     Behavioral Health Psychotherapy Progress Note    Psychotherapy Provided: Individual Psychotherapy     1  History of posttraumatic stress disorder (PTSD)        2  Bipolar 2 disorder ([facility] [address] )            Goals addressed in session: Goal 1     DATA: Discussed her financial stressors, focused on her vocational goals  Explored self-care while she's sick at home  She's confirmed to have covid   She's been upset with her friend who did not inform her of their sickness  During this session, this clinician used the following therapeutic modalities: Client-centered Therapy and Cognitive Behavioral Therapy    Substance Abuse was not addressed during this session  If the client is diagnosed with a co-occurring substance use disorder, please indicate any changes in the frequency or amount of use: n/a  Stage of change for addressing substance use diagnoses: No substance use/Not applicable    ASSESSMENT:  Laurence Sutherland presents with a Euthymic/ normal mood  her affect is Normal range and intensity, which is congruent, with her mood and the content of the session  The client has made progress on their goals  Laurence Sutherland presents with a none risk of suicide, none risk of self-harm, and none risk of harm to others  For any risk assessment that surpasses a "low" rating, a safety plan must be developed  A safety plan was indicated: no  If yes, describe in detail n/a    PLAN: Between sessions, Laurnece Sutherland will continue to apply for jobs  At the next session, the therapist will use Client-centered Therapy and Cognitive Behavioral Therapy to address depression and anxiety  Behavioral Health Treatment Plan and Discharge Planning: Laurence Sutherland is aware of and agrees to continue to work on their treatment plan  They have identified and are working toward their discharge goals   yes    Visit start and stop times:    03/21/23  Start Time: 1404  Stop Time: 8868  Total Visit Time: 48 minutes

## 2023-03-23 ENCOUNTER — TELEMEDICINE (OUTPATIENT)
Dept: BEHAVIORAL/MENTAL HEALTH CLINIC | Facility: CLINIC | Age: 28
End: 2023-03-23

## 2023-03-23 DIAGNOSIS — Z86.59 HISTORY OF POSTTRAUMATIC STRESS DISORDER (PTSD): Primary | ICD-10-CM

## 2023-03-23 DIAGNOSIS — F31.81 BIPOLAR 2 DISORDER (HCC): ICD-10-CM
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
## 2023-03-28 ENCOUNTER — SOCIAL WORK (OUTPATIENT)
Dept: BEHAVIORAL/MENTAL HEALTH CLINIC | Facility: CLINIC | Age: 28
End: 2023-03-28

## 2023-03-28 DIAGNOSIS — Z86.59 HISTORY OF POSTTRAUMATIC STRESS DISORDER (PTSD): Primary | ICD-10-CM

## 2023-03-28 DIAGNOSIS — F31.81 BIPOLAR 2 DISORDER (HCC): ICD-10-CM
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
## 2023-04-04 ENCOUNTER — SOCIAL WORK (OUTPATIENT)
Dept: BEHAVIORAL/MENTAL HEALTH CLINIC | Facility: CLINIC | Age: 28
End: 2023-04-04

## 2023-04-04 DIAGNOSIS — F31.81 BIPOLAR 2 DISORDER (HCC): ICD-10-CM

## 2023-04-04 DIAGNOSIS — Z86.59 HISTORY OF POSTTRAUMATIC STRESS DISORDER (PTSD): Primary | ICD-10-CM

## 2023-04-04 NOTE — PSYCH
"Behavioral Health Psychotherapy Progress Note    Psychotherapy Provided: Individual Psychotherapy     1  History of posttraumatic stress disorder (PTSD)        2  Bipolar 2 disorder ([address] )            Goals addressed in session: Goal 1     DATA: ROLLING PLAN: Check in with using [employer] to use reminders, to input her responsibilities  Reports feeling \"small\" lately, desiring someone to protect her  Consider free association writing  \"I feel weird thinking about how I've been thinking\"   IRT provided, completed one dream together  Feeling she failed the little girl inside of her - allowing herself to prostitute herself  Check in with her downloading CPT  and beginning to work through it  \"I'm so loved\"  Resent mindfulness skills worksheet  Consider discussing writing down gratitude letter toward her sister  Check in with getting DBT workbook  Not trusting herself to see red flags  Expanding social [place], reaching out to mother  Check in with practicing pendulation, anxiety heart muscle shaped with real organ colors \"The dripping heart\", forearms, shaped like statue arms wrapped in the shape of a hug, white, \"hug\"      Sunny discussed some of her challenges regarding communicating with her roommate in the house  She spent some time discussing this - took a moment to bring her back to the here and now, spent some time practicing mindfulness  Discussed her interest in art  Explored her working on her time with her mother, helping around their house  Discussed her dreams, explored IRT again  During this session, this clinician used the following therapeutic modalities: Client-centered Therapy, Cognitive Behavioral Therapy and Existential Therapy    Substance Abuse was not addressed during this session  If the client is diagnosed with a co-occurring substance use disorder, please indicate any changes in the frequency or amount of use: n/a   Stage of change for addressing substance use diagnoses: No substance " "use/Not applicable    ASSESSMENT:  Imelda Claudio presents with a Euthymic/ normal mood  her affect is Normal range and intensity, which is congruent, with her mood and the content of the session  The client has made progress on their goals  Imelda Claudio presents with a none risk of suicide, none risk of self-harm, and none risk of harm to others  For any risk assessment that surpasses a \"low\" rating, a safety plan must be developed  A safety plan was indicated: no  If yes, describe in detail n/a    PLAN: Between sessions, Imelda Claudio will consider today's discussion  At the next session, the therapist will use Client-centered Therapy, Cognitive Behavioral Therapy and Existential Therapy to address depression, trauma  Behavioral Health Treatment Plan and Discharge Planning: Imelda Claudio is aware of and agrees to continue to work on their treatment plan  They have identified and are working toward their discharge goals   yes    Visit start and stop times:    04/04/23  Start Time: 1402  Stop Time: 1452  Total Visit Time: 50 minutes  "

## 2023-04-07 ENCOUNTER — SOCIAL WORK (OUTPATIENT)
Dept: BEHAVIORAL/MENTAL HEALTH CLINIC | Facility: CLINIC | Age: 28
End: 2023-04-07

## 2023-04-07 DIAGNOSIS — F31.81 BIPOLAR 2 DISORDER (HCC): ICD-10-CM

## 2023-04-07 DIAGNOSIS — Z86.59 HISTORY OF POSTTRAUMATIC STRESS DISORDER (PTSD): Primary | ICD-10-CM

## 2023-04-07 NOTE — BH CRISIS PLAN
"Client Name: Marleny Cantrell       Client YOB: 1995  : 1995    Treatment Team (include name and contact information):     Psychotherapist: Mary Grace Blonut    Psychiatrist: Dr Ismael Arguelles of information completed: yes    \" n/a   Release of information completed: no    Other (Specify Role): n/a    Release of information completed: no    Other (Specify Role): na/   Release of information completed: no    Healthcare Provider  Alfredo Riggins MD  [address]   [phone]    Type of Plan   * Child plans (children 15 yo and younger) must be completed and signed by the child's legal guardian   * Plans for all individuals 15 yo and above must be signed by the client  Plan Type: adolescent/adult (15 and over) Initial      My Personal Strengths are (in the client's own words):  \"I love [music]\" \"My empathy and compassion\" \"I'm cool, I'm funny, I'm chill\"    The stressors and triggers that may put me at risk are:  being physically tired, being hungry, feeling a lack of control and emotions (describe) anxiety and frustration and irritability     Coping skills I can use to keep myself calm and safe:  Listen to music, Call a friend or family member and Zoe/meditate    Coping skills/supports I can use to maintain abstinence from substance use:   n/a    The people that provide me with help and support: (Include name, contact, and how they can help)   Support person #1: Jennings Brtrenton (mother)    * Phone number: [phone]    * How can they help me? Listening to me, talking to me   Support person #2:Pablo (Friend)    * Phone number: in phone    * How can they help me?  Listening to me, talking to me    In the past, the following has helped me in times of crisis:    Being in a quiet space, Being with other people, Taking medications, Calling a friend, Calling a family member, Breathing exercises (or other mindfulness-based activities), Using " de-escalation tools that I have learned and Listening to music      If it is an emergency and you need immediate help, call 9-1-1    If there is a possibility of danger to yourself or others, call the following crisis hotline resources:     Adult Crisis Numbers  Suicide Prevention Hotline - Dial 9-8-8  [facility]: [address]: [address]: [phone]  [facility] ([facility]): [phone]  [facility]: [id number] [place] [address] Avenue: [address] [place] [address] St: [phone] (daytime)  [phone] (after hours, weekends, holidays)     Child/Adolescent Crisis Numbers   [facility]: [address]: [phone]   Jamilah Sahu: [phone]   [facility] ([facility]): [phone]    Please note: Some [facility] do not have a separate number for Child/Adolescent specific crisis  If your county is not listed under Child/Adolescent, please call the adult number for your county     National Talk to Text Line   All Ages - [phone]    In the event your feelings become unmanageable, and you cannot reach your support system, you will call 911 immediately or go to the nearest hospital emergency room

## 2023-04-07 NOTE — BH TREATMENT PLAN
"Srinivasa  1995     Date of Initial Psychotherapy Assessment: 7/15/2021   Date of Current Treatment Plan: 04/07/23  Treatment Plan Target Date: 10/7/2023  Treatment Plan Expiration Date: 10/7/2023    Diagnosis:   1  History of posttraumatic stress disorder (PTSD)        2  Bipolar 2 disorder (HCC)            Area(s) of Need: Lower self-esteem at times, existential concerns about death and isolation    Long Term Goal 1 (in the client's own words): \"Incorporating my trauma and emotions into relationships and intimacy\" \"Understanding that my trauma is a part of me\" PHQ9 will continue to remain \"no or minimal\" depression, PCL-5 will remain under 32 threshold for PTSD  Increased understanding of existential concerns      Stage of Change: Action    Target Date for completion: 10/7/2023     Anticipated therapeutic modalities: PCT, CBT, ET, CPT     People identified to complete this goal: Esequiel Ashford      Objective 1: (identify the means of measuring success in meeting the objective): Tee Rowe will follw through with her medication management, taking medications as prescribed, engaging in various treatment modalities, following through with homework        Objective 2: (identify the means of measuring success in meeting the objective): Cornell Marvin will continue to explore her narrative, complete IRT work, engage in self-care at least once per day 7/7 days of the week  This writer will provide Cornell Marvin with PCT, CBT, SFBT, DBT through her course of treatment  CBT/DBT for emotional resiliance  Psychoeducation regarding her diagnosis   PCT and ET work throughout the course of treatment  Increased ET work        Long Term Goal 2 (in the client's own words):  \"I definitely want to get into the reconnection of body thing\" regarding her trauma       Stage of Change: Action    Target Date for completion: 10/6/2023     Anticipated therapeutic modalities: " PCT, ET     People identified to complete this goal: Lissette Brown      Objective 1: (identify the means of measuring success in meeting the objective): Florence Meyer will engage in mindfulness related to her body, sensations happening within her body, to increase awareness of how her trauma affects her in her day to day, at least once per day  Objective 2: (identify the means of measuring success in meeting the objective): Florence Meyer will continue to engage in self-care at home once per day  I am currently under the care of a [facility] psychiatric provider: yes    My [facility]'s psychiatric provider is: Dr Marge Ruvalcaba     I am currently taking psychiatric medications: Yes, as prescribed    I feel that I will be ready for discharge from mental health care when I reach the following (measurable goal/objective): Goal 1 and 2    For children and adults who have a legal guardian:   Has there been any change to custody orders and/or guardianship status? NA  If yes, attach updated documentation  I have created my Crisis Plan and have been offered a copy of this plan    [address]: Diagnosis and Treatment Plan explained to [id number] [address] acknowledges an understanding of their diagnosis  Christoph Lugo agrees to this treatment plan      I have been offered a copy of this Treatment Plan  yes

## 2023-04-07 NOTE — PSYCH
"Behavioral Health Psychotherapy Progress Note    Psychotherapy Provided: Individual Psychotherapy     1  History of posttraumatic stress disorder (PTSD)        2  Bipolar 2 disorder ([facility] [address] )            Goals addressed in session: Goal 1     DATA:     Spent much of the session updating her releases, crisis plan and treatment plan  During this session, this clinician used the following therapeutic modalities: Client-centered Therapy, Cognitive Behavioral Therapy and Existential Therapy    Substance Abuse was not addressed during this session  If the client is diagnosed with a co-occurring substance use disorder, please indicate any changes in the frequency or amount of use: n/a  Stage of change for addressing substance use diagnoses: No substance use/Not applicable    ASSESSMENT:  Jerald Jordan presents with a Euthymic/ normal mood  her affect is Normal range and intensity, which is congruent, with her mood and the content of the session  The client has made progress on their goals  Jerald Jordan presents with a none risk of suicide, none risk of self-harm, and none risk of harm to others  For any risk assessment that surpasses a \"low\" rating, a safety plan must be developed  A safety plan was indicated: no  If yes, describe in detail n/a    PLAN: Between sessions, Jerald Jordan will continue to manage symptoms  At the next session, the therapist will use Client-centered Therapy, Cognitive Behavioral Therapy and Existential Therapy to address depression, PTSD  Behavioral Health Treatment Plan and Discharge Planning: Jerald Jordan is aware of and agrees to continue to work on their treatment plan  They have identified and are working toward their discharge goals   yes    Visit start and stop times:    04/07/23  Start Time: 0908  Stop Time: 7111  Total Visit Time: 45 minutes  "

## 2023-04-21 ENCOUNTER — APPOINTMENT (OUTPATIENT)
Dept: PHYSICAL THERAPY | Facility: REHABILITATION | Age: 28
End: 2023-04-21

## 2023-04-25 ENCOUNTER — TELEMEDICINE (OUTPATIENT)
Dept: BEHAVIORAL/MENTAL HEALTH CLINIC | Facility: CLINIC | Age: 28
End: 2023-04-25

## 2023-04-25 DIAGNOSIS — F31.81 BIPOLAR 2 DISORDER (HCC): ICD-10-CM

## 2023-04-25 DIAGNOSIS — Z86.59 HISTORY OF POSTTRAUMATIC STRESS DISORDER (PTSD): Primary | ICD-10-CM

## 2023-04-25 RX ORDER — TRAZODONE HYDROCHLORIDE 50 MG/1
25 TABLET ORAL
Qty: 45 TABLET | Refills: 0 | Status: SHIPPED | OUTPATIENT
Start: 2023-04-25 | End: 2023-07-24

## 2023-04-25 NOTE — PSYCH
"Behavioral Health Psychotherapy Progress Note    Psychotherapy Provided: Individual Psychotherapy     1  History of posttraumatic stress disorder (PTSD)        2  Bipolar 2 disorder ([address] )            Goals addressed in session: Goal 1     DATA: ROLLING PLAN: Check in with using [employer] to use reminders, to input her responsibilities  Reports feeling \"small\" lately, desiring someone to protect her  Consider free association writing  \"I feel weird thinking about how I've been thinking\"   IRT provided, completed one dream together  Feeling she failed the little girl inside of her - allowing herself to prostitute herself  Resent mindfulness skills worksheet  Consider discussing writing down gratitude letter toward her sister  Check in with getting DBT workbook      Explored her challenges with motivating herself, her irritability, goal-oriented activity  Focused with her on working through some of her feelings of irritability, explored various solutions to some issues she has with her getting things done  During this session, this clinician used the following therapeutic modalities: Client-centered Therapy, Cognitive Behavioral Therapy and Existential Therapy    Substance Abuse was not addressed during this session  If the client is diagnosed with a co-occurring substance use disorder, please indicate any changes in the frequency or amount of use: n/a  Stage of change for addressing substance use diagnoses: No substance use/Not applicable    ASSESSMENT:  Dmitri Myrick presents with a Euthymic/ normal mood  her affect is Normal range and intensity, which is congruent, with her mood and the content of the session  The client has made progress on their goals  Dmitri Myrick presents with a none risk of suicide, none risk of self-harm, and none risk of harm to others  For any risk assessment that surpasses a \"low\" rating, a safety plan must be developed      A safety plan was indicated: no  If " yes, describe in detail n/a    PLAN: Between sessions, Heidi Andrews will consider her thoughts and feelings  At the next session, the therapist will use Client-centered Therapy, Cognitive Behavioral Therapy and Existential Therapy to address anxiety, depression, venkata  Behavioral Health Treatment Plan and Discharge Planning: Heidi Andrews is aware of and agrees to continue to work on their treatment plan  They have identified and are working toward their discharge goals  yes    Visit start and stop times:    04/25/23  Start Time: 0818  Stop Time: 9444  Total Visit Time: 41 minutes         Virtual Regular Visit    Verification of patient location:    Patient is located at Home in the following state in which I hold an active license [place]      Assessment/Plan:    Problem List Items Addressed This Visit        Other    Bipolar 2 disorder ([facility] )   Other Visit Diagnoses     History of posttraumatic stress disorder (PTSD)    -  Primary          Goals addressed in session: Goal 1          Reason for visit is   Chief Complaint   Patient presents with   • Virtual Regular Visit        Encounter provider Johnathan Rendon    Provider located at [address] [place] [id number] [phone]      Recent Visits  No visits were found meeting these conditions  Showing recent visits within past 7 days and meeting all other requirements  Today's Visits  Date Type Provider Dept   04/25/23 [address] [place] [address] today's visits and meeting all other requirements  Future Appointments  No visits were found meeting these conditions  Showing future appointments within next 150 days and meeting all other requirements       The patient was identified by name and date of birth   Heidi Andrews was informed that this is a telemedicine visit and that the visit is being conducted throughthe Epic Embedded platform  She agrees to proceed     My office door was closed  No one else was in the room  She acknowledged consent and understanding of privacy and security of the video platform  The patient has agreed to participate and understands they can discontinue the visit at any time  Patient is aware this is a billable service  Trever Bach is a 32 y o  female    HPI     Past Medical History:   Diagnosis Date   • Anxiety    • Bipolar disorder ([facility] )    • Borderline personality disorder ([facility] )    • Depression    • Self-injurious behavior        Past Surgical History:   Procedure Laterality Date   • CERVICAL BIOPSY  W/ LOOP ELECTRODE EXCISION     • FL INJECTION LEFT HIP (ARTHROGRAM)  4/7/2021   • WISDOM TOOTH EXTRACTION         Current Outpatient Medications   Medication Sig Dispense Refill   • albuterol (PROVENTIL HFA,VENTOLIN HFA) 90 mcg/act inhaler Inhale 2 puffs every 6 (six) hours as needed for wheezing or shortness of breath 18 g 1   • buPROPion (WELLBUTRIN XL) 150 mg 24 hr tablet Take 1 tablet (150 mg total) by mouth daily 90 tablet 0   • ergocalciferol (VITAMIN D2) 50,000 units TAKE 1 CAPSULE BY MOUTH ONE TIME PER WEEK 12 capsule 1   • hydrOXYzine HCL (ATARAX) 10 mg tablet Take 1 tablet (10 mg total) by mouth every 6 (six) hours as needed for anxiety 30 tablet 2   • lamoTRIgine (LaMICtal) 200 MG tablet Take 1 5 tablets (300 mg total) by mouth daily 135 tablet 0   • Levonorgestrel 13 5 MG IUD 1 each by Intrauterine route     • traZODone (DESYREL) 50 mg tablet TAKE 0 5 TABLETS (25 MG TOTAL) BY MOUTH DAILY AT BEDTIME 45 tablet 0   • valACYclovir (VALTREX) 1,000 mg tablet Take 2 tablets (2,000 mg total) by mouth 2 (two) times a day for 1 day (Patient taking differently: Take 2,000 mg by mouth 2 (two) times a day As needed) 24 tablet 0     No current facility-administered medications for this visit          No Known Allergies    Review of Systems    Video Exam    There were no vitals filed for this visit      Physical Exam

## 2023-04-26 ENCOUNTER — OFFICE VISIT (OUTPATIENT)
Dept: PHYSICAL THERAPY | Facility: REHABILITATION | Age: 28
End: 2023-04-26

## 2023-04-26 DIAGNOSIS — G89.29 CHRONIC PAIN OF BOTH KNEES: ICD-10-CM

## 2023-04-26 DIAGNOSIS — M25.562 CHRONIC PAIN OF BOTH KNEES: ICD-10-CM

## 2023-04-26 DIAGNOSIS — M79.602 LEFT ARM PAIN: Primary | ICD-10-CM

## 2023-04-26 DIAGNOSIS — M25.561 CHRONIC PAIN OF BOTH KNEES: ICD-10-CM

## 2023-04-26 NOTE — PROGRESS NOTES
"Daily Note     Today's date: 2023  Patient name: Jerald Jordan  : 1995  MRN: [id number]  Referring provider: Joce Enriquez  Dx:   Encounter Diagnosis     ICD-10-CM    1  Left arm pain  M79 602       2  Chronic pain of both knees  M25 561     M25 562     G89 29           Start Time: 0830  Stop Time: 09  Total time in clinic (min): 31 minutes    Subjective: Reports increased soreness associated with increased yardwork and starting a parttime job  Objective: See treatment diary below      Assessment: Tolerated treatment well  Patient would benefit from continued PT 1:1 with Trish Bee DPT for entirety of tx  Plan: Continue per plan of care        Precautions: significant mental health hx,       Manuals                        Neuro Re-Ed     SRER     C/S RET   3x10   C/S RET +Ext  3x10   SA punch  3x10 5#   Wall neck bridge  x10 5\"             Ther Ex     HEP review 8'    Shrugs  3x10 5# ea   UBE                              Ther Activity               Gait Training               Modalities                    "

## 2023-04-27 ENCOUNTER — PROCEDURE VISIT (OUTPATIENT)
Age: 28
End: 2023-04-27

## 2023-04-27 VITALS
HEIGHT: 60 IN | OXYGEN SATURATION: 98 % | SYSTOLIC BLOOD PRESSURE: 100 MMHG | BODY MASS INDEX: 29.45 KG/M2 | DIASTOLIC BLOOD PRESSURE: 80 MMHG | HEART RATE: 94 BPM | WEIGHT: 150 LBS

## 2023-04-27 DIAGNOSIS — M70.62 GREATER TROCHANTERIC BURSITIS OF LEFT HIP: ICD-10-CM

## 2023-04-27 DIAGNOSIS — S29.019A THORACIC MYOFASCIAL STRAIN, INITIAL ENCOUNTER: Primary | ICD-10-CM

## 2023-04-27 DIAGNOSIS — M99.01 SEGMENTAL DYSFUNCTION OF CERVICAL REGION: ICD-10-CM

## 2023-04-27 DIAGNOSIS — M99.02 SEGMENTAL DYSFUNCTION OF THORACIC REGION: ICD-10-CM

## 2023-04-27 DIAGNOSIS — M99.04 SEGMENTAL DYSFUNCTION OF SACRAL REGION: ICD-10-CM

## 2023-04-27 NOTE — PROGRESS NOTES
Diagnoses and all orders for this visit:    Thoracic myofascial strain, initial encounter    Segmental dysfunction of cervical region    Segmental dysfunction of sacral region    Segmental dysfunction of thoracic region    Greater trochanteric bursitis of right hip    Pt slightly improved  Discussed stretching until tension then breathing through, don't pull    TREATMENT:  Ther-ex: IASTM - discussed post procedure soreness and/or ecchymosis for up to 36 hrs, applied to affected mm hypertonicities; wall angella, axial retraction, upper trap stretch, lev scap stretch, , rhomboid stretch, glute stretch, lat rows with t-band, lat pull down with t-band, abdominal bracing  Thoracic mobilization/manipulation: prone P-A mob, supine A-P manip; cervical mobilization/manipulation: diversified supine graded mobilization, cervical traction, prone C/T jct HVLA, Long axis traction, drop table to R SIJ    Pt presents for tx for neck/ubp and lbp/hip pain onset late 2022  Pt reports falling onto R shoulder resulting tear which she did PT for; helped anterior shoulder pain, but periscap and upper trap pain persisted  Pt reports that pain spread to spine are after compensating  Pt is  and remote work  4/27: Pt reports home stretching helping feel pain at certain points with stretch    Neck Pain   This is a new problem  The current episode started more than 1 month ago  The problem occurs constantly  The problem has been waxing and waning  The pain is present in the midline and left side  The quality of the pain is described as stabbing and aching  Pain scale: 4-8/10  Exacerbated by: cleaning, stretching shoulder back, mvmt of neck/head/arm; palliative includes nsaids, rest, laying down with pillow under spine  Pertinent negatives include no chest pain, fever, headaches, numbness, trouble swallowing or weakness  Back Pain  This is a new problem  The current episode started more than 1 month ago   The problem occurs 2 to 4 times per day  The pain is present in the gluteal, sacro-iliac, lumbar spine and thoracic spine  The quality of the pain is described as aching and stabbing  Pain scale: 2-7/10  Exacerbated by: transitional mvmts, walking down stairs; palliative includes stretching  Pertinent negatives include no chest pain, dysuria, fever, headaches, numbness or weakness  Fanta Montgomery is a 32 y o  female   Chief Complaint   Patient presents with   • Lower Back - Follow-up     Past Medical History:   Diagnosis Date   • Anxiety    • Bipolar disorder ([facility] )    • Borderline personality disorder ([facility] )    • Depression    • Self-injurious behavior         The following portions of the patient's history were reviewed and updated as appropriate: allergies, past family history, past medical history, past social history, past surgical history, and problem list     Review of Systems   Constitutional: Negative for fever and unexpected weight change  HENT: Negative for tinnitus and trouble swallowing  Cardiovascular: Negative for chest pain  Gastrointestinal: Negative for nausea  Genitourinary: Negative for dysuria  Musculoskeletal: Positive for back pain and neck pain  Neurological: Negative for dizziness, syncope, speech difficulty, weakness, light-headedness, numbness and headaches  Physical Exam  Eyes:      Extraocular Movements: Extraocular movements intact  Neck:        Comments: C/S ROM pnful and limited in FL, Lrot, Rlf  Cardiovascular:      Pulses: Normal pulses  Musculoskeletal:      Cervical back: Pain with movement and muscular tenderness present  Decreased range of motion  Thoracic back: Spasms and tenderness present  Decreased range of motion  Lumbar back: Spasms and tenderness present  Decreased range of motion  Back:       Comments: L/S ROM pnful and limited in Ext, Brot, Llf   Lymphadenopathy:      Cervical: No cervical adenopathy     Neurological:      Mental Status: She is alert and oriented to person, place, and time  Motor: Motor function is intact  Coordination: Coordination is intact  Gait: Gait is intact  Gait and tandem walk normal       Deep Tendon Reflexes: Reflexes normal  Babinski sign absent on the right side  Babinski sign absent on the left side  Reflex Scores:       Tricep reflexes are 2+ on the right side and 2+ on the left side  Bicep reflexes are 2+ on the right side and 2+ on the left side  Brachioradialis reflexes are 2+ on the right side and 2+ on the left side  Psychiatric:         Mood and Affect: Mood and affect normal      SOFT TISSUE ASSESSMENT: Hypertonicity and tenderness palpated L C5-T6 erector spinae, L upper traps, lev scap,, rhomboid, L glute, L ITB; Gait: wnl; JOINT RECTRICTIONS: C5-T6, R SIJ  ORTHO: Luci unremarkable for centralization/peripheralization; max foraminal comp elicits local np on L; shoulder depression elicits stiffness in L upper trap; brachial plexus tension test neg b; cervical distraction elicits relieves CC    Return in about 1 week (around 5/4/2023) for Next scheduled follow up

## 2023-04-28 ENCOUNTER — APPOINTMENT (OUTPATIENT)
Dept: PHYSICAL THERAPY | Facility: REHABILITATION | Age: 28
End: 2023-04-28

## 2023-05-02 ENCOUNTER — OFFICE VISIT (OUTPATIENT)
Dept: PHYSICAL THERAPY | Facility: REHABILITATION | Age: 28
End: 2023-05-02

## 2023-05-02 DIAGNOSIS — M25.561 CHRONIC PAIN OF BOTH KNEES: Primary | ICD-10-CM

## 2023-05-02 DIAGNOSIS — M25.562 CHRONIC PAIN OF BOTH KNEES: Primary | ICD-10-CM

## 2023-05-02 DIAGNOSIS — G89.29 CHRONIC PAIN OF BOTH KNEES: Primary | ICD-10-CM

## 2023-05-02 DIAGNOSIS — M79.602 LEFT ARM PAIN: ICD-10-CM

## 2023-05-02 NOTE — PROGRESS NOTES
"Daily Note     Today's date: 2023  Patient name: Pati Fried  : 1995  MRN: [id number]  Referring provider: Zbigniew Griffith  Dx:   Encounter Diagnosis     ICD-10-CM    1  Chronic pain of both knees  M25 561     M25 562     G89 29       2  Left arm pain  M79 602           Start Time: 0830  Stop Time: 0915  Total time in clinic (min): 45 minutes    Subjective: Pt progressing, reports symptom intensity improving, but has low level residual constant pain with periscapular region and L cervical region  Objective: See treatment diary below    Assessment: Tolerated treatment well  Patient would benefit from continued PT 1:1 with Davian Garcia DPT for entirety of tx  Pt tolerated tx well, now worsening with pain during progressive loading  Continue per POC  Plan: Continue per plan of care        Precautions: significant mental health hx,       Manuals                            Neuro Re-Ed      SRER      C/S RET   3x10 3x10   C/S RET +Ext  3x10 3x10   SA punch  3x10 5# 3x10 5#   Wall neck bridge  x10 5\" 5x 15\" ea   Shrugs w/ ROT   2x10 5#   Shrugs w/ SB   2x10 5#   TB Row   3x10 mtb   TB I   3x10 btb               Ther Ex      HEP review 8'     Shrugs  3x10 5# ea    UBE     L upp trap S                              Ther Activity                  Gait Training                  Modalities                       "

## 2023-05-03 ENCOUNTER — SOCIAL WORK (OUTPATIENT)
Dept: BEHAVIORAL/MENTAL HEALTH CLINIC | Facility: CLINIC | Age: 28
End: 2023-05-03

## 2023-05-03 DIAGNOSIS — Z86.59 HISTORY OF POSTTRAUMATIC STRESS DISORDER (PTSD): Primary | ICD-10-CM

## 2023-05-03 DIAGNOSIS — F31.81 BIPOLAR 2 DISORDER (HCC): ICD-10-CM

## 2023-05-03 NOTE — PSYCH
"Behavioral Health Psychotherapy Progress Note    Psychotherapy Provided: Individual Psychotherapy     1  History of posttraumatic stress disorder (PTSD)        2  Bipolar 2 disorder ([address] )            Goals addressed in session: Goal 1     DATA:   ROLLING PLAN: Check in with using [employer] to use reminders, to input her responsibilities  Reports feeling \"small\" lately, desiring someone to protect her  Consider free association writing  \"I feel weird thinking about how I've been thinking\"   IRT provided, completed one dream together  Feeling she failed the little girl inside of her - allowing herself to prostitute herself  Resent mindfulness skills worksheet  Consider discussing writing down gratitude letter toward her sister  Check in with getting DBT workbook      Explored her vocational stressors, her engagement with various positions she's accepted, including for [employer], 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  She says she is feeling generally secure with this new form of work  Discussed her engagement with her friend and how this affects her  Discussed her setting of boundaries with her friend and how difficult this can be for her  During this session, this clinician used the following therapeutic modalities: Client-centered Therapy, Dialectical Behavior Therapy and Existential Therapy    Substance Abuse was not addressed during this session  If the client is diagnosed with a co-occurring substance use disorder, please indicate any changes in the frequency or amount of use: n/a  Stage of change for addressing substance use diagnoses: No substance use/Not applicable    ASSESSMENT:  Sanna Trimble presents with a Euthymic/ normal mood  her affect is Normal range and intensity, which is congruent, with her mood and the content of the session  The client has made progress on their goals       Sanna Trimble presents with a none risk of suicide, none risk of self-harm, and none risk of harm to " "others  For any risk assessment that surpasses a \"low\" rating, a safety plan must be developed  A safety plan was indicated: no  If yes, describe in detail n/a    PLAN: Between sessions, Pati Fried will continue to work on her anxiety at home through coping skills, engaging in communication with her friend  At the next session, the therapist will use Client-centered Therapy, Dialectical Behavior Therapy and Existential Therapy to address anxiety, depression  Behavioral Health Treatment Plan and Discharge Planning: Pati Fried is aware of and agrees to continue to work on their treatment plan  They have identified and are working toward their discharge goals   yes    Visit start and stop times:    05/03/23  Start Time: 0805  Stop Time: 9678  Total Visit Time: 54 minutes  "

## 2023-05-04 ENCOUNTER — OFFICE VISIT (OUTPATIENT)
Dept: PHYSICAL THERAPY | Facility: REHABILITATION | Age: 28
End: 2023-05-04

## 2023-05-04 DIAGNOSIS — M25.561 CHRONIC PAIN OF BOTH KNEES: ICD-10-CM

## 2023-05-04 DIAGNOSIS — G89.29 CHRONIC PAIN OF BOTH KNEES: ICD-10-CM

## 2023-05-04 DIAGNOSIS — M79.602 LEFT ARM PAIN: Primary | ICD-10-CM

## 2023-05-04 DIAGNOSIS — M25.562 CHRONIC PAIN OF BOTH KNEES: ICD-10-CM

## 2023-05-04 NOTE — PROGRESS NOTES
"Daily Note     Today's date: 2023  Patient name: Laurence Andrews  : 1995  MRN: [id number]  Referring provider: Raina Marks  Dx:   Encounter Diagnosis     ICD-10-CM    1  Left arm pain  M79 602       2  Chronic pain of both knees  M25 561     M25 562     G89 29           Start Time: 0825  Stop Time: 8260  Total time in clinic (min): 38 minutes    Subjective: Reports progressing well  Objective: See treatment diary below    Assessment: Tolerated treatment well  Patient would benefit from continued PT 1:1 with Brain Patella, DPT for entirety of tx  Pt tolerated tx well, minimal c/o pain throughout  Continue per POC  Plan: Continue per plan of care        Precautions: significant mental health hx,     Manuals                                Neuro Re-Ed       SRER       C/S RET   3x10 3x10 3x10   C/S RET +Ext  3x10 3x10 3x10   SA punch  3x10 5# 3x10 5# 3x10 5#   Wall neck bridge  x10 5\" 5x 15\" ea    Shrugs w/ ROT   2x10 5# 2x10 5#   Shrugs w/ SB   2x10 5# 2x10 5#   TB Row   3x10 mtb 3x10 15#   TB I   3x10 btb 3x10 10#   Face Pulls    3x10 8#          Ther Ex       HEP review 8'      Shrugs  3x10 5# ea     UBE   4'/4'   L upp trap S                                   Ther Activity                     Gait Training                     Modalities                          "

## 2023-05-09 ENCOUNTER — VBI (OUTPATIENT)
Dept: ADMINISTRATIVE | Facility: OTHER | Age: 28
End: 2023-05-09

## 2023-05-09 ENCOUNTER — OFFICE VISIT (OUTPATIENT)
Dept: PHYSICAL THERAPY | Facility: REHABILITATION | Age: 28
End: 2023-05-09

## 2023-05-09 ENCOUNTER — SOCIAL WORK (OUTPATIENT)
Dept: BEHAVIORAL/MENTAL HEALTH CLINIC | Facility: CLINIC | Age: 28
End: 2023-05-09

## 2023-05-09 DIAGNOSIS — G89.29 CHRONIC PAIN OF BOTH KNEES: ICD-10-CM

## 2023-05-09 DIAGNOSIS — M25.562 CHRONIC PAIN OF BOTH KNEES: ICD-10-CM

## 2023-05-09 DIAGNOSIS — M79.602 LEFT ARM PAIN: Primary | ICD-10-CM

## 2023-05-09 DIAGNOSIS — M25.561 CHRONIC PAIN OF BOTH KNEES: ICD-10-CM

## 2023-05-09 DIAGNOSIS — F31.81 BIPOLAR 2 DISORDER (HCC): ICD-10-CM

## 2023-05-09 DIAGNOSIS — Z86.59 HISTORY OF POSTTRAUMATIC STRESS DISORDER (PTSD): Primary | ICD-10-CM

## 2023-05-09 NOTE — PSYCH
"Behavioral Health Psychotherapy Progress Note    Psychotherapy Provided: Individual Psychotherapy     1  History of posttraumatic stress disorder (PTSD)        2  Bipolar 2 disorder ([address] )            Goals addressed in session: Goal 1     DATA:   ROLLING PLAN: Considering reading [employer] together  \"I feel weird thinking about how I've been thinking\"   IRT provided, completed one dream together  Feeling she failed the little girl inside of her - allowing herself to prostitute herself  Resent mindfulness skills worksheet  Consider discussing writing down gratitude letter toward her sister  Check in with getting DBT workbook      She says that she has been working and it's mostly boring  She says that she hasn't been using reminders in her phone  She says she no longer feels \"small\" lately and has been feeling different, more \"OCD\" like tendencies at home  Says it's \"better\" at the moment  During this session, this clinician used the following therapeutic modalities: Client-centered Therapy, Cognitive Behavioral Therapy and Existential Therapy    Substance Abuse was not addressed during this session  If the client is diagnosed with a co-occurring substance use disorder, please indicate any changes in the frequency or amount of use: n/a  Stage of change for addressing substance use diagnoses: No substance use/Not applicable    ASSESSMENT:  Evgeny Saldaña presents with a Euthymic/ normal mood  her affect is Normal range and intensity, which is congruent, with her mood and the content of the session  The client has made progress on their goals  Evgeny Saldaña presents with a none risk of suicide, none risk of self-harm, and none risk of harm to others  For any risk assessment that surpasses a \"low\" rating, a safety plan must be developed      A safety plan was indicated: no  If yes, describe in detail n/a    PLAN: Between sessions, Evgeny Saldaña will continue to work on her anxiety at home, " communication with friends  At the next session, the therapist will use Cognitive Behavioral Therapy and Cognitive Processing Therapy to address depression, PTSD history, anxiety  Behavioral Health Treatment Plan and Discharge Planning: Rosalind Villatoro is aware of and agrees to continue to work on their treatment plan  They have identified and are working toward their discharge goals   yes    Visit start and stop times:    05/09/23  Start Time: 1400  Stop Time: 1450  Total Visit Time: 50 minutes

## 2023-05-09 NOTE — PROGRESS NOTES
"Daily Note     Today's date: 2023  Patient name: Julia Salguero  : 1995  MRN: [id number]  Referring provider: Rosy Mckoy  Dx:   Encounter Diagnosis     ICD-10-CM    1  Left arm pain  M79 602       2  Chronic pain of both knees  M25 561     M25 562     G89 29                      Subjective: Reduced posterior neck pain, mild anterior neck pain  Objective: See treatment diary below    Assessment: Tolerated treatment well  Patient would benefit from continued PT 1:1 with Oneyda Blount DPT for entirety of tx  Plan: Continue per plan of care        Precautions: significant mental health hx,   Asterisk signs and Outcome Measures:                                                                                                                                                                                 Test / Measure  2023   C/S Endurance 10s   FOTO (Pred 70) 53   C/S ROM SB L 75%       POC ends 23    Manuals                                    Neuro Re-Ed        SCM S     2x30\"   C/S RET   3x10 3x10 3x10 3x10   C/S RET +Ext  3x10 3x10 3x10 3x10   SA punch  3x10 5# 3x10 5# 3x10 5# 3x10 5#   Wall neck bridge  x10 5\" 5x 15\" ea     Shrugs w/ ROT   2x10 5# 2x10 5# 2x10 5#   Shrugs w/ SB   2x10 5# 2x10 5# 2x10 5#   TB Row   3x10 mtb 3x10 15# 3x10 15#   TB I   3x10 btb 3x10 10# 3x10 10#   Face Pulls    3x10 8#            Ther Ex        HEP review 8'       Shrugs  3x10 5# ea      UBE   4 4'/4' 4'/4'   L upp trap S                                        Ther Activity                        Gait Training                        Modalities                             "

## 2023-05-11 ENCOUNTER — OFFICE VISIT (OUTPATIENT)
Dept: PHYSICAL THERAPY | Facility: REHABILITATION | Age: 28
End: 2023-05-11

## 2023-05-11 DIAGNOSIS — M79.602 LEFT ARM PAIN: Primary | ICD-10-CM

## 2023-05-11 DIAGNOSIS — G89.29 CHRONIC PAIN OF BOTH KNEES: ICD-10-CM

## 2023-05-11 DIAGNOSIS — M25.561 CHRONIC PAIN OF BOTH KNEES: ICD-10-CM

## 2023-05-11 DIAGNOSIS — M25.562 CHRONIC PAIN OF BOTH KNEES: ICD-10-CM

## 2023-05-11 NOTE — PROGRESS NOTES
"Daily Note     Today's date: 2023  Patient name: Tory Bergman  : 1995  MRN: [id number]  Referring provider: Pasquale Chaparro  Dx:   Encounter Diagnosis     ICD-10-CM    1  Left arm pain  M79 602       2  Chronic pain of both knees  M25 561     M25 562     G89 29                      Subjective: Reports porterior periscapular pain has largely resolved  Has intermittent SCM pain  Objective: See treatment diary below    Assessment: Tolerated treatment well  Patient would benefit from continued PT 1:1 with Ian Rolon DPT for entirety of tx  Pt progressing well, improved exercise and activity tolerance, continue per POC  Plan: Continue per plan of care        Precautions: significant mental health hx,   Asterisk signs and Outcome Measures:                                                                                                                                                                                 Test / Measure  2023   C/S Endurance 10s   FOTO (Pred 70) 53   C/S ROM SB L 75%       POC ends 23    Manuals  5 5                               Neuro Re-Ed       SCM S   2x30\" 4x30\"   C/S RET  3x10 3x10 3x10 3x10   C/S RET +Ext 3x10 3x10 3x10 3x10   SA punch 3x10 5# 3x10 5# 3x10 5# 3x10 5#   Wall neck bridge 5x 15\" ea      Shrugs w/ ROT 2x10 5# 2x10 5# 2x10 5# 2x10 5#   Shrugs w/ SB 2x10 5# 2x10 5# 2x10 5# 2x10 5#   TB Row 3x10 mtb 3x10 15# 3x10 15# 3x10 20#   TB I 3x10 btb 3x10 10# 3x10 10# 3x10 12#   Face Pulls  3x10 8#  3x10 rtb   Iso ABd +flexion    x20   Ther Ex       HEP review       Shrugs       UBE  4'/4' 4'/4' 4'/4'   L upp trap S                                   Ther Activity                     Gait Training                     Modalities                          "

## 2023-05-16 ENCOUNTER — SOCIAL WORK (OUTPATIENT)
Dept: BEHAVIORAL/MENTAL HEALTH CLINIC | Facility: CLINIC | Age: 28
End: 2023-05-16

## 2023-05-16 DIAGNOSIS — Z86.59 HISTORY OF POSTTRAUMATIC STRESS DISORDER (PTSD): Primary | ICD-10-CM

## 2023-05-16 DIAGNOSIS — F31.81 BIPOLAR 2 DISORDER (HCC): ICD-10-CM

## 2023-05-16 NOTE — PSYCH
"Behavioral Health Psychotherapy Progress Note    Psychotherapy Provided: Individual Psychotherapy     1  History of posttraumatic stress disorder (PTSD)        2  Bipolar 2 disorder ([address] )            Goals addressed in session: Goal 1     DATA: ROLLING PLAN: Continue discussing feeling resentment at times regarding growing up lower socio-economic status compared to friend  Considering reading [employer] together  \"I feel weird thinking about how I've been thinking\"   IRT provided, completed one dream together  Feeling she failed the little girl inside of her - allowing herself to prostitute herself  Resent mindfulness skills worksheet  Consider discussing writing down gratitude letter toward her sister  Check in with getting DBT workbook      Explored her mother's day  Discussed her time with her family, with Nigel Salgado being present, her enjoying time with her mother, with her father, enjoying making music with her father and Nigel Salgado together  Discussed her engagement with [employer] and queer shades communities at the [facility] and emphasized importance of having a sense of safety and belonging  During this session, this clinician used the following therapeutic modalities: Client-centered Therapy, Cognitive Behavioral Therapy and Existential Therapy    Substance Abuse was not addressed during this session  If the client is diagnosed with a co-occurring substance use disorder, please indicate any changes in the frequency or amount of use: n/a  Stage of change for addressing substance use diagnoses: No substance use/Not applicable    ASSESSMENT:  Ami Call presents with a Euthymic/ normal mood  her affect is Normal range and intensity, which is congruent, with her mood and the content of the session  The client has made progress on their goals  Ami Call presents with a none risk of suicide, none risk of self-harm, and none risk of harm to others      For any risk assessment that " "surpasses a \"low\" rating, a safety plan must be developed  A safety plan was indicated: no  If yes, describe in detail n/a    PLAN: Between sessions, Riverasarah Wisdom will continue to engage with others authentically  At the next session, the therapist will use Client-centered Therapy, Cognitive Behavioral Therapy and Existential Therapy to address depression  Behavioral Health Treatment Plan and Discharge Planning: Riverasarah Wisdom is aware of and agrees to continue to work on their treatment plan  They have identified and are working toward their discharge goals   yes    Visit start and stop times:    05/16/23  Start Time: 1400  Stop Time: 1452  Total Visit Time: 52 minutes  "

## 2023-05-17 ENCOUNTER — OFFICE VISIT (OUTPATIENT)
Dept: PHYSICAL THERAPY | Facility: REHABILITATION | Age: 28
End: 2023-05-17

## 2023-05-17 DIAGNOSIS — M25.561 CHRONIC PAIN OF BOTH KNEES: Primary | ICD-10-CM

## 2023-05-17 DIAGNOSIS — M79.602 LEFT ARM PAIN: ICD-10-CM

## 2023-05-17 DIAGNOSIS — G89.29 CHRONIC PAIN OF BOTH KNEES: Primary | ICD-10-CM

## 2023-05-17 DIAGNOSIS — M25.562 CHRONIC PAIN OF BOTH KNEES: Primary | ICD-10-CM

## 2023-05-17 NOTE — PROGRESS NOTES
"Daily Note     Today's date: 2023  Patient name: Rc Middleton  : 1995  MRN: [id number]  Referring provider: [facility]  Dx:   Encounter Diagnosis     ICD-10-CM    1  Chronic pain of both knees  M25 561     M25 562     G89 29       2  Left arm pain  M79 602                      Subjective: Reprots able to pain parents bathroom over weekend with minimal pain  Objective: See treatment diary below      Assessment: Tolerated treatment well  Patient would benefit from continued PT 1:1 with SHARYN HansenT for entirety of tx  Progressing well with increased ex intensity, continue per POC  Plan: Continue per plan of care        Precautions: significant mental health hx,   Asterisk signs and Outcome Measures:                                                                                                                                                                                 Test / Measure  2023   C/S Endurance 10s   FOTO (Pred 70) 53   C/S ROM SB L 75%       POC ends 23    Manuals / 5                                   Neuro Re-Ed        SCM S   2x30\" 4x30\" 4x30\"   C/S RET  3x10 3x10 3x10 3x10 3x10   C/S RET +Ext 3x10 3x10 3x10 3x10 3x10   SA punch 3x10 5# 3x10 5# 3x10 5# 3x10 5# 3x10 8#   Wall neck bridge 5x 15\" ea       Shrugs w/ ROT 2x10 5# 2x10 5# 2x10 5# 2x10 5# 2x10 8#   Shrugs w/ SB 2x10 5# 2x10 5# 2x10 5# 2x10 5# 2x10 8#   TB Row 3x10 mtb 3x10 15# 3x10 15# 3x10 20# 3x10 m+btb   TB I 3x10 btb 3x10 10# 3x10 10# 3x10 12# 3x10 12#   Face Pulls  3x10 8#  3x10 rtb 3x10 rtb   Iso ABd +flexion    x20 x20   Ther Ex        UBE /4 4'/4' 4'/4' 4'/4' 4'/4'   L upp trap S                                        Ther Activity                        Gait Training                        Modalities                             "

## 2023-05-18 ENCOUNTER — SOCIAL WORK (OUTPATIENT)
Dept: BEHAVIORAL/MENTAL HEALTH CLINIC | Facility: CLINIC | Age: 28
End: 2023-05-18

## 2023-05-18 DIAGNOSIS — F31.81 BIPOLAR 2 DISORDER (HCC): ICD-10-CM

## 2023-05-18 DIAGNOSIS — Z86.59 HISTORY OF POSTTRAUMATIC STRESS DISORDER (PTSD): Primary | ICD-10-CM

## 2023-05-18 NOTE — PSYCH
"Behavioral Health Psychotherapy Progress Note    Psychotherapy Provided: Individual Psychotherapy     1  History of posttraumatic stress disorder (PTSD)        2  Bipolar 2 disorder ([address] )            Goals addressed in session: Goal 1     DATA: ROLLING PLAN: Continue discussing feeling resentment at times regarding growing up lower socio-economic status compared to friend  Considering reading [employer] together  \"I feel weird thinking about how I've been thinking\"   IRT provided, completed one dream together  Feeling she failed the little girl inside of her - allowing herself to prostitute herself  Resent mindfulness skills worksheet  Consider discussing writing down gratitude letter toward her sister  Check in with getting DBT workbook      Focused today on different between an explainatin and an excuse, difference between being mean and holding someone accountable, taking time to find her feelings, finding that she's been feeling wound up and not having a release  During this session, this clinician used the following therapeutic modalities: Client-centered Therapy, Cognitive Behavioral Therapy and Existential Therapy    Substance Abuse was not addressed during this session  If the client is diagnosed with a co-occurring substance use disorder, please indicate any changes in the frequency or amount of use: n/a  Stage of change for addressing substance use diagnoses: No substance use/Not applicable    ASSESSMENT:  Yevgeniy Raymond presents with a Euthymic/ normal mood  her affect is Normal range and intensity, which is congruent, with her mood and the content of the session  The client has made progress on their goals  Yevgeniy Raymond presents with a none risk of suicide, none risk of self-harm, and none risk of harm to others  For any risk assessment that surpasses a \"low\" rating, a safety plan must be developed      A safety plan was indicated: no  If yes, describe in detail n/a    PLAN: " Between sessions, Clara Love will consider today's discussion about communication  At the next session, the therapist will use Client-centered Therapy, Cognitive Behavioral Therapy and Existential Therapy to address depression  Behavioral Health Treatment Plan and Discharge Planning: Clara Love is aware of and agrees to continue to work on their treatment plan  They have identified and are working toward their discharge goals   yes    Visit start and stop times:    05/18/23  Start Time: 0907  Stop Time: 0950  Total Visit Time: 43 minutes

## 2023-05-19 ENCOUNTER — OFFICE VISIT (OUTPATIENT)
Dept: PHYSICAL THERAPY | Facility: REHABILITATION | Age: 28
End: 2023-05-19

## 2023-05-19 DIAGNOSIS — M79.602 LEFT ARM PAIN: ICD-10-CM

## 2023-05-19 DIAGNOSIS — G89.29 CHRONIC PAIN OF BOTH KNEES: Primary | ICD-10-CM

## 2023-05-19 DIAGNOSIS — M25.561 CHRONIC PAIN OF BOTH KNEES: Primary | ICD-10-CM

## 2023-05-19 DIAGNOSIS — M25.562 CHRONIC PAIN OF BOTH KNEES: Primary | ICD-10-CM

## 2023-05-19 NOTE — PROGRESS NOTES
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  Objective: See treatment diary below      Assessment: Tolerated treatment well without reproduction of pain or new complaints  She was appropriately challenged  Patient would benefit from continued PT 1:1 with Jenni Rene PTA for entirety of treatment  She continues to be progressing well with increased ex intensity, continue per POC  Plan: Continue per plan of care        Precautions: significant mental health hx,   Asterisk signs and Outcome Measures:                                                                                                                                                                                 Test / Measure  2023   C/S Endurance 10s   FOTO (Pred 70) 53   C/S ROM SB L 75%       POC ends 23    Manuals  5                                   Neuro Re-Ed        SCM S 4x30\"  2x30\" 4x30\" 4x30\"   C/S RET  3x10 3x10 3x10 3x10 3x10   C/S RET +Ext 3x10 3x10 3x10 3x10 3x10   SA punch 3x10 8# 3x10 5# 3x10 5# 3x10 5# 3x10 8#   Wall neck bridge        Shrugs w/ ROT 3x10 8# 2x10 5# 2x10 5# 2x10 5# 2x10 8#   Shrugs w/ SB 3x10 8# 2x10 5# 2x10 5# 2x10 5# 2x10 8#   TB Row 3x10 15# 3x10 15# 3x10 15# 3x10 20# 3x10 m+btb   TB I 3x10 12# 3x10 10# 3x10 10# 3x10 12# 3x10 12#   Face Pulls  3x10 8#  3x10 rtb 3x10 rtb   Iso ABd " +flexion 20x   x20 x20   Ther Ex        UBE 4/4 4'/4' 4'/4' 4'/4' 4'/4'   L upp kirsten S                                        Ther Activity                        Gait Training                        Modalities

## 2023-05-23 ENCOUNTER — TELEMEDICINE (OUTPATIENT)
Dept: BEHAVIORAL/MENTAL HEALTH CLINIC | Facility: CLINIC | Age: 28
End: 2023-05-23

## 2023-05-23 DIAGNOSIS — F31.81 BIPOLAR 2 DISORDER (HCC): ICD-10-CM

## 2023-05-23 DIAGNOSIS — F43.10 PTSD (POST-TRAUMATIC STRESS DISORDER): Primary | ICD-10-CM

## 2023-05-23 NOTE — PSYCH
Virtual Regular Visit    Verification of patient location:    Patient is located at Home in the following state in which I hold an active license [place]      Assessment/Plan:    Problem List Items Addressed This Visit        Other    Bipolar 2 disorder ([facility] )    PTSD (post-traumatic stress disorder) - Primary       Goals addressed in session: Goal 1          Reason for visit is   Chief Complaint   Patient presents with   • Virtual Regular Visit        Encounter provider Nunu Sanchez    Provider located at [address] [place] [address] Lew Avananth Low [facility] [address] [phone]      Recent Visits  No visits were found meeting these conditions  Showing recent visits within past 7 days and meeting all other requirements  Today's Visits  Date Type Provider Dept   05/23/23 [facility] today's visits and meeting all other requirements  Future Appointments  No visits were found meeting these conditions  Showing future appointments within next 150 days and meeting all other requirements       The patient was identified by name and date of birth  Olu Cason was informed that this is a telemedicine visit and that the visit is being conducted throughthe [facility]  She agrees to proceed     My office door was closed  No one else was in the room  She acknowledged consent and understanding of privacy and security of the video platform  The patient has agreed to participate and understands they can discontinue the visit at any time  Patient is aware this is a billable service  Luis Felipe Sauer is a 32 y o  female          HPI     Past Medical History:   Diagnosis Date   • Anxiety    • Bipolar disorder ([facility] [address] )    • Borderline personality disorder ([facility] [address] )    • Depression    • Self-injurious behavior        Past Surgical History:   Procedure Laterality "Date   • CERVICAL BIOPSY  W/ LOOP ELECTRODE EXCISION     • FL INJECTION LEFT HIP (ARTHROGRAM)  4/7/2021   • WISDOM TOOTH EXTRACTION         Current Outpatient Medications   Medication Sig Dispense Refill   • albuterol (PROVENTIL HFA,VENTOLIN HFA) 90 mcg/act inhaler Inhale 2 puffs every 6 (six) hours as needed for wheezing or shortness of breath 18 g 1   • buPROPion (WELLBUTRIN XL) 150 mg 24 hr tablet Take 1 tablet (150 mg total) by mouth daily 90 tablet 0   • ergocalciferol (VITAMIN D2) 50,000 units TAKE 1 CAPSULE BY MOUTH ONE TIME PER WEEK 12 capsule 1   • hydrOXYzine HCL (ATARAX) 10 mg tablet Take 1 tablet (10 mg total) by mouth every 6 (six) hours as needed for anxiety 30 tablet 2   • lamoTRIgine (LaMICtal) 200 MG tablet Take 1 5 tablets (300 mg total) by mouth daily 135 tablet 0   • Levonorgestrel 13 5 MG IUD 1 each by Intrauterine route     • traZODone (DESYREL) 50 mg tablet TAKE 0 5 TABLETS (25 MG TOTAL) BY MOUTH DAILY AT BEDTIME 45 tablet 0   • valACYclovir (VALTREX) 1,000 mg tablet Take 2 tablets (2,000 mg total) by mouth 2 (two) times a day for 1 day (Patient taking differently: Take 2,000 mg by mouth 2 (two) times a day As needed) 24 tablet 0     No current facility-administered medications for this visit  No Known Allergies    Review of Systems    Video Exam    There were no vitals filed for this visit  Physical Exam     Behavioral Health Psychotherapy Progress Note    Psychotherapy Provided: Individual Psychotherapy     1  PTSD (post-traumatic stress disorder)        2  B[facility] 2 disorder ([facility] )            Goals addressed in session: Goal 1     DATA: ROLLING PLAN: \"Overall I've been like really good lately\" She is to write down when she is feeling anxious about cleaning  \"I feel weird thinking about how I've been thinking\"   IRT provided, completed one dream together  Feeling she failed the little girl inside of her - allowing herself to prostitute herself  Resent mindfulness skills " "worksheet  Consider discussing writing down gratitude letter toward her sister  Check in with getting DBT workbook      Discussed her work and how she's been promoted  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  She says she continues to work with her father, which she enjoys  Focused on the importance of conflict, confrontation, taking the time to tell her friends what she needs  She says she sometimes feels \"crazy\" because she needs to mop the floor twice  \"Overall I've been like really good lately\"     During this session, this clinician used the following therapeutic modalities: Client-centered Therapy and Existential Therapy    Substance Abuse was not addressed during this session  If the client is diagnosed with a co-occurring substance use disorder, please indicate any changes in the frequency or amount of use: n/a  Stage of change for addressing substance use diagnoses: No substance use/Not applicable    ASSESSMENT:  Nelson Jon presents with a Euthymic/ normal mood  her affect is Normal range and intensity, which is congruent, with her mood and the content of the session  The client has made progress on their goals  Nelson Jon presents with a none risk of suicide, none risk of self-harm, and none risk of harm to others  For any risk assessment that surpasses a \"low\" rating, a safety plan must be developed  A safety plan was indicated: yes  If yes, describe in detail n/a    PLAN: Between sessions, Nelson Jon will consider her thoughts  At the next session, the therapist will use Client-centered Therapy and Existential Therapy to address depression   Behavioral Health Treatment Plan and Discharge Planning: Nelson Jon is aware of and agrees to continue to work on their treatment plan  They have identified and are working toward their discharge goals   yes    Visit start and stop times:    05/23/23  Start Time: " 6998  Stop Time: 5651  Total Visit Time: 49 minutes

## 2023-05-24 ENCOUNTER — OFFICE VISIT (OUTPATIENT)
Dept: PHYSICAL THERAPY | Facility: REHABILITATION | Age: 28
End: 2023-05-24

## 2023-05-24 DIAGNOSIS — M79.602 LEFT ARM PAIN: Primary | ICD-10-CM

## 2023-05-24 NOTE — PROGRESS NOTES
"Daily Note     Today's date: 2023  Patient name: Tom Mckoy  : 1995  MRN: [id number]  Referring provider: Amandeep Squires  Dx:   Encounter Diagnosis     ICD-10-CM    1  Left arm pain  M79 602           Start Time: 830  Stop Time: 930  Total time in clinic (min): 60 minutes    Subjective: Patient reports that sx have felt centralized to upper thoracic spine over the past few days, which is a significant improvement  Her chief complaint is stiffness/limited mobility with ext+L rot      Objective: See treatment diary below      Assessment: Patient continues to tolerate treatment well with no peripheralization of sx reported during strengthening exercises  Cervical retraction performed manually today along with joint mobilizations to address complaint stated above  Patient reported improvement of sx post session  Progress, as able  Plan: Continue per plan of care  Add L hip next week        Precautions: significant mental health hx,   Asterisk signs and Outcome Measures:                                                                                                                                                                                 Test / Measure  2023   C/S Endurance 10s   FOTO (Pred 70) 53   C/S ROM SB L 75%       POC ends 23    Manuals    Gr 5 L C1-2      JAB   Cervical retraction      10x   Retraction + ext      10x   L->R cervical glides      Gr 4 JAB   R->L glide c L rot+ext      10x   Neuro Re-Ed         SCM S 4x30\"  2x30\" 4x30\" 4x30\"    C/S RET  3x10 3x10 3x10 3x10 3x10    C/S RET +Ext 3x10 3x10 3x10 3x10 3x10    SA punch 3x10 8# 3x10 5# 3x10 5# 3x10 5# 3x10 8# 3x10 8#   Wall neck bridge         Shrugs w/ ROT 3x10 8# 2x10 5# 2x10 5# 2x10 5# 2x10 8# 3x10 8#   Shrugs w/ SB 3x10 8# 2x10 5# 2x10 5# 2x10 5# 2x10 8# 3x10 8#   TB Row 3x10 15# 3x10 15# 3x10 15# 3x10 20# 3x10 m+btb    TB I 3x10 12# 3x10 10# 3x10 10# 3x10 12# 3x10 12# 10x " 12#     2x10 14#   Face Pulls  3x10 8#  3x10 rtb 3x10 rtb 3x10 blue   Iso ABd +flexion 20x   x20 x20    Ther Ex         UBE 4/4 4'/4' 4'/4' 4'/4' 4'/4' 4'/4'   L upp trap S                                             Ther Activity                           Gait Training                           Modalities

## 2023-05-26 ENCOUNTER — OFFICE VISIT (OUTPATIENT)
Dept: PHYSICAL THERAPY | Facility: REHABILITATION | Age: 28
End: 2023-05-26
Payer: COMMERCIAL

## 2023-05-26 ENCOUNTER — OFFICE VISIT (OUTPATIENT)
Dept: PHYSICAL THERAPY | Facility: REHABILITATION | Age: 28
End: 2023-05-26

## 2023-05-26 ENCOUNTER — SOCIAL WORK (OUTPATIENT)
Dept: BEHAVIORAL/MENTAL HEALTH CLINIC | Facility: CLINIC | Age: 28
End: 2023-05-26

## 2023-05-26 DIAGNOSIS — F31.81 BIPOLAR 2 DISORDER (HCC): ICD-10-CM

## 2023-05-26 DIAGNOSIS — M25.552 LEFT HIP PAIN: Primary | ICD-10-CM

## 2023-05-26 DIAGNOSIS — F43.10 PTSD (POST-TRAUMATIC STRESS DISORDER): Primary | ICD-10-CM
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
## 2023-05-26 NOTE — PROGRESS NOTES
"PT Evaluation     Today's date: 2023  Patient name: Sandra Beasley  : 1995  MRN: [id number]  Referring provider: James Ferrer  Dx:   Encounter Diagnosis     ICD-10-CM    1  Left hip pain  M25 552                      Assessment  Assessment details: Sandra Beasley is a 32 y o  female presenting with chronic L hip pain consistent with OLAF  Primary impairments include decreased ROM, weakness, pain, and reduced function  Will benefit from skilled PT interventions for return to PLOF  HEP was provided and reviewed  Patient is able to complete HEP with good technique and appropriate pain response  Patient expressed understanding of appropriate dosage and frequency of HEP  No additional referral necessary  Impairments: abnormal coordination, abnormal muscle firing, abnormal or restricted ROM, abnormal movement, activity intolerance, impaired balance, impaired physical strength, lacks appropriate home exercise program, pain with function, poor posture  and poor body mechanics  Functional limitations: walking, standing squatting, kneeling  Symptom irritability: moderate  Goals    Short Term Goals: In 4 weeks, the patient will:  1  L hip ER KIARA to <9\"  2  Hip add mmt to 3+/2  3  Supervision with HEP for self care    Long Term Goals: In 8 weeks, the patient will:  1  L hip ROM to within 90% contralateral side, hip mmt to 5/5 all pllanes  2  FOTO to greater than predicted value  3   Independent with HEP for selfcare      Plan  Patient would benefit from: skilled physical therapy  Planned therapy interventions: joint mobilization, manual therapy, massage, Maza taping, neuromuscular re-education, patient education, postural training, self care, strengthening, stretching, therapeutic activities, therapeutic exercise, therapeutic training, graded exercise, graded activity, home exercise program, flexibility, functional ROM exercises, balance and activity modification  Frequency: 2x " "week  Duration in weeks: 8  Treatment plan discussed with: patient        Subjective    Known pt returns via DA for assessment of chronic hip pain  Pain Location: L inguinal pain, pubic symphasis   Pain Intensity: 7/10  KIMMIE: Chronic L hip pain  Provoked by: prolonged sitting, pivoting on L leg  Eased Positions: rest  Constitutional S/S: none   Goals: reduce pain and improve function    Objective     Red Flags: none    Range of Motion: Goniometric revealed the following findings (in degrees)  Joint Motion Right: 5/26/2023 Left: 5/26/2023   Hip Extension WNL 10*   Hip External Rotation WNL 15\" ER Vimal   Hip Internal Rotation WNL 5*   Hip Flexion 115 90*     Strength: MMT revealed the following findings    Joint Motion Right: 5/26/2023 Left: 5/26/2023   Hip Flexion 5/5 3+/5   Hip Abduction 5/5 4-/5   Hip Adduction 5/5 2+/5   Hip Extension 5/5 4/5     Additional Assessments:  Palpation: TTP illiacus, inguinal lig, psoas  Gait Pattern: WNL  Joint Mobility: Decreased L hip mobs all planes    Special Tests:  Test (Structure evaluated) Date: 5/26/2023  ( P / N )   Butch (Iliapsoas/Rectus Fem) P for pain   FADIR P   VIMAL L 4\" off R 15\" off           Pertinent Findings and Outcome Measures:                                                                      Precautions: significant mental health hx    POC End Date: 7/26/23                                                                                                           Test / Measure  5/26/2023   FOTO (70) 63   Hip add mmt 3+   Vimal L 15\" off       Manuals 5/26                   Neuro Re-Ed    [place] side plank nv   [place] Add walkout nv                       Ther Ex    NS L5 10'   SLR Flexion nv   SLR ADD nv   SLR ABD nv                       Ther Activity            Gait Training            Modalities               "

## 2023-05-26 NOTE — PROGRESS NOTES
{SL AMB PT/OT NOTE [id number]}    Today's date: 2023  Patient name: Bianca Davey  : 1995  MRN: [id number]  Referring provider: Esme Perez*  Dx: No diagnosis found                 Assessment/Plan    Subjective    Objective           Precautions: ***      Manuals                                                                 Neuro Re-Ed                                                                                                        Ther Ex                                                                                                                     Ther Activity                                       Gait Training                                       Modalities

## 2023-05-30 ENCOUNTER — SOCIAL WORK (OUTPATIENT)
Dept: BEHAVIORAL/MENTAL HEALTH CLINIC | Facility: CLINIC | Age: 28
End: 2023-05-30

## 2023-05-30 DIAGNOSIS — F31.81 BIPOLAR 2 DISORDER (HCC): ICD-10-CM

## 2023-05-30 DIAGNOSIS — F43.10 PTSD (POST-TRAUMATIC STRESS DISORDER): Primary | ICD-10-CM
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
## 2023-05-31 ENCOUNTER — OFFICE VISIT (OUTPATIENT)
Dept: PHYSICAL THERAPY | Facility: REHABILITATION | Age: 28
End: 2023-05-31

## 2023-05-31 DIAGNOSIS — M25.552 LEFT HIP PAIN: Primary | ICD-10-CM

## 2023-05-31 NOTE — PROGRESS NOTES
"Daily Note     Today's date: 2023  Patient name: Rabia Chung  : 1995  MRN: [id number]  Referring provider: Ten Anthony  Dx:   Encounter Diagnosis     ICD-10-CM    1  Left hip pain  M25 552                      Subjective:Reports exercises were difficult to start, but improving    Objective: See treatment diary below    Assessment: Tolerated treatment well  Patient would benefit from continued PT 1:1 with Nabil Beavers, DPT for entirety of tx  Pt tolerated tx well, improved hip ER ROM tolerance  Continue per POC  Plan: Continue per plan of care        Precautions: significant mental health hx    POC End Date: 23                                                                                                           Test / Measure  2023   FOTO (70) 63   Hip add mmt 3+   Vimal L 15\" off       Manuals                        Neuro Re-Ed     [employer] side plank nv Hold till [place] Add walkout nv    Bridges   15# 3x10   Bridge on ball  3x10   Raul hip flexor full AROM march  3x10 4#             Ther Ex     NS L5 10' L5 10'   SLR Flexion nv 3x10   SLR ADD nv 3x10   SLR ABD nv 3x10                            Ther Activity               Gait Training               Modalities                    "

## 2023-06-02 ENCOUNTER — OFFICE VISIT (OUTPATIENT)
Dept: PHYSICAL THERAPY | Facility: REHABILITATION | Age: 28
End: 2023-06-02

## 2023-06-02 DIAGNOSIS — M25.552 LEFT HIP PAIN: Primary | ICD-10-CM

## 2023-06-02 NOTE — PROGRESS NOTES
"Daily Note     Today's date: 2023  Patient name: Robi Lieberman  : 1995  MRN: [id number]  Referring provider: Climmie Essex  Dx:   Encounter Diagnosis     ICD-10-CM    1  Left hip pain  M25 552           Start Time: 818  Stop Time: 910  Total time in clinic (min): 52 minutes    Subjective: Patient reports muscular soreness after previous session, but denied any significant pain  Presently, she reports \"irritation\" into Left hip, but not pain  She did have to take care of someone recently that required near maximum assistance and she had some soreness afterwards  Objective: See treatment diary below      Assessment: Tolerated treatment well  Added Iso Add Squeeze to bridge as indicated below  Standing hip flexion performed with 4# cuff weights due to Raul being unavailable  Patient was visibly fatigued with all LE strengthening  No complaints of pain reported  Patient would benefit from continued PT      Plan: Continue per plan of care  Progress treatment as tolerated         Precautions: significant mental health hx    POC End Date: 23                                                                                                           Test / Measure  2023   FOTO (70) 63   Hip add mmt 3+   Vimal L 15\" off       Manuals    /                        Neuro Re-Ed      [place] side plank nv Hold till [employer] Add walkout nv     Bridges   15# 3x10 15# 3x10   Bridge on ball  3x10 Bridge with Iso Add 5\" 3x10   Raul hip flexor full AROM march  3x10 4# 4# 3x10               Ther Ex      NS L5 10' L5 10' L5 10'   SLR Flexion nv 3x10 3x10 BLE   SLR ADD nv 3x10 3x10 BLE   SLR ABD nv 3x10 3x10 BLE                                 Ther Activity                  Gait Training                  Modalities                       "

## 2023-06-06 ENCOUNTER — SOCIAL WORK (OUTPATIENT)
Dept: BEHAVIORAL/MENTAL HEALTH CLINIC | Facility: CLINIC | Age: 28
End: 2023-06-06

## 2023-06-06 DIAGNOSIS — F31.81 BIPOLAR 2 DISORDER (HCC): ICD-10-CM

## 2023-06-06 DIAGNOSIS — F43.10 PTSD (POST-TRAUMATIC STRESS DISORDER): Primary | ICD-10-CM

## 2023-06-07 ENCOUNTER — APPOINTMENT (OUTPATIENT)
Dept: PHYSICAL THERAPY | Facility: REHABILITATION | Age: 28
End: 2023-06-07
Payer: COMMERCIAL

## 2023-06-09 ENCOUNTER — OFFICE VISIT (OUTPATIENT)
Dept: PHYSICAL THERAPY | Facility: REHABILITATION | Age: 28
End: 2023-06-09
Payer: COMMERCIAL

## 2023-06-09 DIAGNOSIS — M25.561 CHRONIC PAIN OF BOTH KNEES: ICD-10-CM

## 2023-06-09 DIAGNOSIS — G89.29 CHRONIC PAIN OF BOTH KNEES: ICD-10-CM

## 2023-06-09 DIAGNOSIS — M79.602 LEFT ARM PAIN: ICD-10-CM

## 2023-06-09 DIAGNOSIS — M25.552 LEFT HIP PAIN: Primary | ICD-10-CM

## 2023-06-09 DIAGNOSIS — M25.562 CHRONIC PAIN OF BOTH KNEES: ICD-10-CM

## 2023-06-09 PROCEDURE — 97112 NEUROMUSCULAR REEDUCATION: CPT | Performed by: PHYSICAL THERAPIST

## 2023-06-09 PROCEDURE — 97110 THERAPEUTIC EXERCISES: CPT | Performed by: PHYSICAL THERAPIST

## 2023-06-09 NOTE — PROGRESS NOTES
"Daily Note     Today's date: 2023  Patient name: Filiberto Álvarez  : 1995  MRN: [id number]  Referring provider: John Quan  Dx:   Encounter Diagnosis     ICD-10-CM    1  Left hip pain  M25 552       2  Left arm pain  M79 602       3  Chronic pain of both knees  M25 561     M25 562     G89 [id number]           Start Time: 0830  Stop Time: 0910  Total time in clinic (min): 40 minutes    Subjective: Reports hip having less pain  Objective: See treatment diary below    Assessment: Tolerated treatment well  Patient would benefit from continued PT 1:1 with Heather Stallings DPT for entirety of tx  Pt tolerated tx well, empahsized hip flexor strengthening and motor control  Continue per POC  Plan: Continue per plan of care        Precautions: significant mental health hx    POC End Date: 23                                                                                                           Test / Measure  2023   FOTO (70) 63   Hip add mmt 3+   Vimal L 15\" off       Manuals    /                            Neuro Re-Ed       [employer] side plank nv Hold till [employer] Add walkout nv      Bridges   15# 3x10 15# 3x10 15# 3x10   Bridge on ball  3x10 Bridge with Iso Add 5\" 3x10 Bridge with Iso Add 5\" 3x10   [employer] hip flexor full AROM march  3x10 4# 4# 3x10 4# 3x10   Tall sit lift over    2x15 ea          Ther Ex       NS L5 10' L5 10' L5 10' L5 10'   SLR Flexion nv 3x10 3x10 BLE 3x10 BLE 2#   SLR ADD nv 3x10 3x10 BLE 3x10 BLE 2#   SLR ABD nv 3x10 3x10 BLE 3x10 BLE 2#   Hip flexor s    3x30\" 2#                               Ther Activity                     Gait Training                     Modalities                          "

## 2023-06-12 ENCOUNTER — SOCIAL WORK (OUTPATIENT)
Dept: BEHAVIORAL/MENTAL HEALTH CLINIC | Facility: CLINIC | Age: 28
End: 2023-06-12

## 2023-06-12 DIAGNOSIS — F43.10 PTSD (POST-TRAUMATIC STRESS DISORDER): Primary | ICD-10-CM

## 2023-06-12 DIAGNOSIS — F31.81 BIPOLAR 2 DISORDER (HCC): ICD-10-CM

## 2023-06-13 ENCOUNTER — OFFICE VISIT (OUTPATIENT)
Dept: PSYCHIATRY | Facility: CLINIC | Age: 28
End: 2023-06-13
Payer: COMMERCIAL

## 2023-06-13 ENCOUNTER — SOCIAL WORK (OUTPATIENT)
Dept: BEHAVIORAL/MENTAL HEALTH CLINIC | Facility: CLINIC | Age: 28
End: 2023-06-13

## 2023-06-13 VITALS — BODY MASS INDEX: 29.64 KG/M2 | HEIGHT: 60 IN | WEIGHT: 151 LBS

## 2023-06-13 DIAGNOSIS — F31.81 BIPOLAR 2 DISORDER (HCC): Primary | ICD-10-CM

## 2023-06-13 DIAGNOSIS — F60.3 BORDERLINE PERSONALITY DISORDER (HCC): ICD-10-CM

## 2023-06-13 DIAGNOSIS — F31.81 BIPOLAR 2 DISORDER (HCC): ICD-10-CM

## 2023-06-13 DIAGNOSIS — F43.10 PTSD (POST-TRAUMATIC STRESS DISORDER): ICD-10-CM

## 2023-06-13 DIAGNOSIS — F43.10 PTSD (POST-TRAUMATIC STRESS DISORDER): Primary | ICD-10-CM

## 2023-06-13 PROCEDURE — 99214 OFFICE O/P EST MOD 30 MIN: CPT | Performed by: STUDENT IN AN ORGANIZED HEALTH CARE EDUCATION/TRAINING PROGRAM

## 2023-06-13 RX ORDER — LAMOTRIGINE 200 MG/1
300 TABLET ORAL DAILY
Qty: 135 TABLET | Refills: 0 | Status: SHIPPED | OUTPATIENT
Start: 2023-06-13 | End: 2023-09-11

## 2023-06-13 RX ORDER — BUPROPION HYDROCHLORIDE 150 MG/1
150 TABLET ORAL DAILY
Qty: 90 TABLET | Refills: 0 | Status: SHIPPED | OUTPATIENT
Start: 2023-06-13 | End: 2023-09-11

## 2023-06-13 RX ORDER — HYDROXYZINE HYDROCHLORIDE 10 MG/1
10 TABLET, FILM COATED ORAL EVERY 6 HOURS PRN
Qty: 30 TABLET | Refills: 2 | Status: SHIPPED | OUTPATIENT
Start: 2023-06-13 | End: 2023-09-11
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
## 2023-06-14 ENCOUNTER — OFFICE VISIT (OUTPATIENT)
Dept: PHYSICAL THERAPY | Facility: REHABILITATION | Age: 28
End: 2023-06-14
Payer: COMMERCIAL

## 2023-06-14 DIAGNOSIS — M25.561 CHRONIC PAIN OF BOTH KNEES: ICD-10-CM

## 2023-06-14 DIAGNOSIS — G89.29 CHRONIC PAIN OF BOTH KNEES: ICD-10-CM

## 2023-06-14 DIAGNOSIS — M25.552 LEFT HIP PAIN: Primary | ICD-10-CM

## 2023-06-14 DIAGNOSIS — M79.602 LEFT ARM PAIN: ICD-10-CM

## 2023-06-14 DIAGNOSIS — M25.562 CHRONIC PAIN OF BOTH KNEES: ICD-10-CM

## 2023-06-14 PROCEDURE — 97112 NEUROMUSCULAR REEDUCATION: CPT

## 2023-06-14 PROCEDURE — 97110 THERAPEUTIC EXERCISES: CPT

## 2023-06-14 NOTE — PROGRESS NOTES
"Daily Note     Today's date: 2023  Patient name: Kaylee Moss  : 1995  MRN: [id number]  Referring provider: Huma Parish  Dx:   Encounter Diagnosis     ICD-10-CM    1  Left hip pain  M25 552       2  Left arm pain  M79 602       3  Chronic pain of both knees  M25 561     M25 562     G89 29                      Subjective: Patient states that she tried ballet and had some difficulty / discomfort in positions where the hip was rotated (internally and externally)  She feels that the hip gets stuck and feels tight in the front of the hip  Objective: See treatment diary below      Assessment: Tolerated treatment well  Instructed patient in half lunge stretch on step for hip flexor stretch in WB position which patient tolerated well; reports a good stretch where she is feeling \"stuck\"  Patient tolerated strengthening well  Withheld progression of resistance this visit as patient reports adequate challenge with all strengthening this visit  Patient exhibited good technique with therapeutic exercises and would benefit from continued PT      Plan: Continue per plan of care        Precautions: significant mental health hx    POC End Date: 23                                                                                                           Test / Measure  2023   FOTO (70) 63   Hip add mmt 3+   Vimal L 15\" off       Manuals  6   /                            Neuro Re-Ed       Union side plank  Hold till [employer] Add walkout       Bridges  15# 3x10 15# 3x10 15# 3x10 15# 3x10   Bridge on ball nv 3x10 Bridge with Iso Add 5\" 3x10 Bridge with Iso Add 5\" 3x10   Raul hip flexor full AROM march 4# 3x10 3x10 4# 4# 3x10 4# 3x10   Tall sit lift over nv   2x15 ea          Ther Ex       NS L5 10' L5 10' L5 10' L5 10'   SLR Flexion 3x10 2# 3x10 3x10 BLE 3x10 BLE 2#   SLR ADD 3x10 2# 3x10 3x10 BLE 3x10 BLE 2#   SLR ABD 3x10 2# 3x10 3x10 BLE 3x10 BLE 2#   Hip flexor s " "3x30s on step   3x30\" 2#                               Ther Activity                     Gait Training                     Modalities                          "

## 2023-06-16 ENCOUNTER — OFFICE VISIT (OUTPATIENT)
Dept: PHYSICAL THERAPY | Facility: REHABILITATION | Age: 28
End: 2023-06-16
Payer: COMMERCIAL

## 2023-06-16 DIAGNOSIS — G89.29 CHRONIC PAIN OF BOTH KNEES: ICD-10-CM

## 2023-06-16 DIAGNOSIS — M25.552 LEFT HIP PAIN: Primary | ICD-10-CM

## 2023-06-16 DIAGNOSIS — M25.561 CHRONIC PAIN OF BOTH KNEES: ICD-10-CM

## 2023-06-16 DIAGNOSIS — M25.562 CHRONIC PAIN OF BOTH KNEES: ICD-10-CM

## 2023-06-16 DIAGNOSIS — M79.602 LEFT ARM PAIN: ICD-10-CM

## 2023-06-16 PROCEDURE — 97110 THERAPEUTIC EXERCISES: CPT

## 2023-06-16 PROCEDURE — 97112 NEUROMUSCULAR REEDUCATION: CPT

## 2023-06-16 NOTE — PROGRESS NOTES
"Daily Note     Today's date: 2023  Patient name: Ramos Taveras  : 1995  MRN: [id number]  Referring provider: Barrie Cortés  Dx:   Encounter Diagnosis     ICD-10-CM    1  Left hip pain  M25 552       2  Left arm pain  M79 602       3  Chronic pain of both knees  M25 561     M25 562     G89 29                      Subjective: Pt reports that pain has been mainly localized to L hip with anterior flexion motion recently, but can still present bilaterally with certain motions  Objective: See treatment diary below      Assessment: Tolerated treatment well  Patient would benefit from continued PT   Pt  able to complete all exercises with no increase in pain during or after session  Pt challenged with current level of resistance, had some pain present with SL ADD on L side that subsided over time  Pt finds some relief with stretching  Pt would benefit from continued PT to address current deficits  Pt 1:1 with PTA [id number], IEP for remainder  Plan: Continue per plan of care        Precautions: significant mental health hx    POC End Date: 23                                                                                                           Test / Measure  2023   FOTO (83) 63   Hip add mmt 3+   Vimal L 15\" off       Manuals  6   /                                Neuro Re-Ed        [place] side plank  Hold till Senn[place] Add walkout        Bridges  15# 3x10 15# 3x10 15# 3x10 15# 3x10 3x10 15#   Bridge on ball nv 3x10 Bridge with Iso Add 5\" 3x10 Bridge with Iso Add 5\" 3x10 3x10 bridge with iso add 5\" 3x10   Raul hip flexor full AROM march 4# 3x10 3x10 4# 4# 3x10 4# 3x10 3x10 4# b/l   Tall sit lift over nv   2x15 ea            Ther Ex        NS L5 10' L5 10' L5 10' L5 10' 10' L5   SLR Flexion 3x10 2# 3x10 3x10 BLE 3x10 BLE 2# 3x10 b/l 2#   SLR ADD 3x10 2# 3x10 3x10 BLE 3x10 BLE 2# 3x10 b/l 2#   SLR ABD 3x10 2# 3x10 3x10 BLE 3x10 BLE 2# 3x10 b/l " "2#   Hip flexor s 3x30s on step   3x30\" 2# 3x30\" L 6\"                                   Ther Activity                        Gait Training                        Modalities                             "

## 2023-06-20 ENCOUNTER — SOCIAL WORK (OUTPATIENT)
Dept: BEHAVIORAL/MENTAL HEALTH CLINIC | Facility: CLINIC | Age: 28
End: 2023-06-20
Payer: COMMERCIAL

## 2023-06-20 DIAGNOSIS — F43.10 PTSD (POST-TRAUMATIC STRESS DISORDER): Primary | ICD-10-CM

## 2023-06-20 DIAGNOSIS — F31.81 BIPOLAR 2 DISORDER (HCC): ICD-10-CM

## 2023-06-20 PROCEDURE — 90834 PSYTX W PT 45 MINUTES: CPT | Performed by: COUNSELOR
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
## 2023-06-21 ENCOUNTER — OFFICE VISIT (OUTPATIENT)
Dept: PHYSICAL THERAPY | Facility: REHABILITATION | Age: 28
End: 2023-06-21
Payer: COMMERCIAL

## 2023-06-21 DIAGNOSIS — M25.552 LEFT HIP PAIN: Primary | ICD-10-CM

## 2023-06-21 PROCEDURE — 97110 THERAPEUTIC EXERCISES: CPT | Performed by: PHYSICAL THERAPIST

## 2023-06-21 PROCEDURE — 97140 MANUAL THERAPY 1/> REGIONS: CPT | Performed by: PHYSICAL THERAPIST

## 2023-06-21 PROCEDURE — 97112 NEUROMUSCULAR REEDUCATION: CPT | Performed by: PHYSICAL THERAPIST

## 2023-06-21 NOTE — PROGRESS NOTES
"Daily Note     Today's date: 2023  Patient name: Alivia Zabala  : 1995  MRN: [id number]  Referring provider: Malvin Daniels  Dx:   Encounter Diagnosis     ICD-10-CM    1  Left hip pain  M25 552           Start Time: 840  Stop Time: 920  Total time in clinic (min): 40 minutes    Subjective: Reports no pubic symphasis pain for 2weeks  Intermittent antierior lateral hip pain, but severity has reduced  Objective: See treatment diary below    Assessment: Tolerated treatment well  Patient would benefit from continued PT 1:1 with Irene Dewey, DPT for entirety of tx  Pt progressing well, reduced anteior lateral hip pain with lldx  Continue per POC  Plan: Continue per plan of care        Precautions: significant mental health hx    POC End Date: 23                                                                                                           Test / Measure  2023   FOTO (70) 63   Hip add mmt 3+   [place]iam[place] L 15\" off       Manuals    LLdx Hip   4x30\"                     Neuro Re-Ed      [place] side plank      Raul Add walkout      Bridges  15# 3x10 3x10 15# 3x10 15#   Bridge on ball nv 3x10 bridge with iso add 5\" 3x10 3x10 bridge with iso add 5\" 3x10   [place] hip flexor full AROM march 4# 3x10 3x10 4# b/l 3x10 4# b/l   Tall sit lift over nv           Ther Ex      NS L5 10' 10' L5 10' L5   SLR Flexion 3x10 2# 3x10 b/l 2# 3x10 b/l 2#   SLR ADD 3x10 2# 3x10 b/l 2# 3x10 b/l 2#   SLR ABD 3x10 2# 3x10 b/l 2# 3x10 b/l 2#   Hip flexor s 3x30s on step 3x30\" L 6\" 3x30\" L 6\"                           Ther Activity                  Gait Training                  Modalities                       "

## 2023-06-23 ENCOUNTER — OFFICE VISIT (OUTPATIENT)
Dept: PHYSICAL THERAPY | Facility: REHABILITATION | Age: 28
End: 2023-06-23
Payer: COMMERCIAL

## 2023-06-23 DIAGNOSIS — M25.552 LEFT HIP PAIN: Primary | ICD-10-CM

## 2023-06-23 PROCEDURE — 97110 THERAPEUTIC EXERCISES: CPT | Performed by: PHYSICAL THERAPIST

## 2023-06-23 PROCEDURE — 97112 NEUROMUSCULAR REEDUCATION: CPT | Performed by: PHYSICAL THERAPIST

## 2023-06-23 NOTE — PROGRESS NOTES
"Daily Note     Today's date: 2023  Patient name: Yue Mccray  : 1995  MRN: [id number]  Referring provider: Liseth Orr  Dx:   Encounter Diagnosis     ICD-10-CM    1  Left hip pain  M25 552           Start Time: 830  Stop Time: 925  Total time in clinic (min): 55 minutes    Subjective: Pt reports progressing well, able to participate in ballet class  Objective: See treatment diary below    Assessment: Tolerated treatment well  Patient would benefit from continued PT 1:1 with Geri Fajardo DPT for entirety of tx  Pt progressing well, improved ROM, was able to participate in ballet class with mild inguinal pain, which lingered for ~4-5 hours, likely related to DOMS  Plan: Continue per plan of care        Precautions: significant mental health hx    POC End Date: 23                                                                                                           Test / Measure  2023   FOTO (70) 63   Hip add mmt 3+   Vimal L 15\" off       Manuals    LLdx Hip   4x30\" KS   IR HIP MWM    KS                 Neuro Re-Ed       [place] side plank       Raul Add walkout       Bridges  15# 3x10 3x10 15# 3x10 15# 3x10 15#   Bridge on ball nv 3x10 bridge with iso add 5\" 3x10 3x10 bridge with iso add 5\" 3x10 3x10 bridge with iso add 5\" 3x10   Raul hip flexor full AROM march 4# 3x10 3x10 4# b/l 3x10 4# b/l    Tall sit lift over nv   x15ea          Ther Ex       NS L5 10' 10' L5 10' L5 10' L5   SLR Flexion 3x10 2# 3x10 b/l 2# 3x10 b/l 2# 3x10 b/l 2#   SLR ADD 3x10 2# 3x10 b/l 2# 3x10 b/l 2# 3x10 b/l 2#   SLR ABD 3x10 2# 3x10 b/l 2# 3x10 b/l 2# 3x10 b/l 2#   Hip flexor s 3x30s on step 3x30\" L 6\" 3x30\" L 6\" 3x30\" L 6\"                               Ther Activity                     Gait Training                     Modalities                          "

## 2023-06-27 ENCOUNTER — SOCIAL WORK (OUTPATIENT)
Dept: BEHAVIORAL/MENTAL HEALTH CLINIC | Facility: CLINIC | Age: 28
End: 2023-06-27
Payer: COMMERCIAL

## 2023-06-27 DIAGNOSIS — F31.81 BIPOLAR 2 DISORDER (HCC): ICD-10-CM

## 2023-06-27 DIAGNOSIS — F43.10 PTSD (POST-TRAUMATIC STRESS DISORDER): Primary | ICD-10-CM

## 2023-06-27 PROCEDURE — 90834 PSYTX W PT 45 MINUTES: CPT | Performed by: COUNSELOR
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
## 2023-06-28 ENCOUNTER — OFFICE VISIT (OUTPATIENT)
Dept: PHYSICAL THERAPY | Facility: REHABILITATION | Age: 28
End: 2023-06-28
Payer: COMMERCIAL

## 2023-06-28 DIAGNOSIS — M25.552 LEFT HIP PAIN: Primary | ICD-10-CM

## 2023-06-28 PROCEDURE — 97110 THERAPEUTIC EXERCISES: CPT

## 2023-06-28 PROCEDURE — 97112 NEUROMUSCULAR REEDUCATION: CPT

## 2023-06-28 PROCEDURE — 97140 MANUAL THERAPY 1/> REGIONS: CPT

## 2023-06-28 NOTE — PROGRESS NOTES
"Daily Note     Today's date: 2023  Patient name: Fadia Underwood  : 1995  MRN: [id number]  Referring provider: Wendy Low  Dx:   Encounter Diagnosis     ICD-10-CM    1  Left hip pain  M25 552           Start Time: 830  Stop Time: 910  Total time in clinic (min): 40 minutes    Subjective: Pt reports continued maricruz-lateral and postero-lateral L hip pain  Pain intensity remains unchanged, but the frequency is improved slightly  Objective: See treatment diary below      Assessment: Tolerated treatment well  Patient would benefit from continued PT  Pt able to tolerate slight progression of POC this tx session with increase in resistance for existing therapeutic interventions and introduction of [place] side planks to address hip add weakness  Mild fatigue post-session following introduction of [place] planks  1:1 with Tom Henderson DPT entirety of tx  Plan: Progress treatment as tolerated         Precautions: significant mental health hx    POC End Date: 23                                                                                                           Test / Measure  2023   FOTO (39) 63   Hip add mmt 3+   Vimal L 15\" off       Manuals    LLdx Hip   4x30\" KS MM   IR HIP MWM    KS MM                   Neuro Re-Ed        [place] side plank     6x B 5\" hold   Raul Add walkout        Bridges  15# 3x10 3x10 15# 3x10 15# 3x10 15# 3x10 20#   Bridge on ball nv 3x10 bridge with iso add 5\" 3x10 3x10 bridge with iso add 5\" 3x10 3x10 bridge with iso add 5\" 3x10    [place] hip flexor full AROM march 4# 3x10 3x10 4# b/l 3x10 4# b/l     Tall sit lift over nv   x15ea 15x ea           Ther Ex        NS L5 10' 10' L5 10' L5 10' L5 10' L5   SLR Flexion 3x10 2# 3x10 b/l 2# 3x10 b/l 2# 3x10 b/l 2# 3x10 B 2 5#   SLR ADD 3x10 2# 3x10 b/l 2# 3x10 b/l 2# 3x10 b/l 2# 3x10 B 2 5#   SLR ABD 3x10 2# 3x10 b/l 2# 3x10 b/l 2# 3x10 b/l 2# 3x10 B 2 5#   Hip " "flexor s 3x30s on step 3x30\" L 6\" 3x30\" L 6\" 3x30\" L 6\" 3x30\" B 6\"                                   Ther Activity                        Gait Training                        Modalities                             "

## 2023-06-29 ENCOUNTER — TELEPHONE (OUTPATIENT)
Dept: PSYCHIATRY | Facility: CLINIC | Age: 28
End: 2023-06-29

## 2023-06-29 ENCOUNTER — SOCIAL WORK (OUTPATIENT)
Dept: BEHAVIORAL/MENTAL HEALTH CLINIC | Facility: CLINIC | Age: 28
End: 2023-06-29
Payer: COMMERCIAL

## 2023-06-29 DIAGNOSIS — F31.81 BIPOLAR 2 DISORDER (HCC): ICD-10-CM

## 2023-06-29 DIAGNOSIS — F43.10 PTSD (POST-TRAUMATIC STRESS DISORDER): Primary | ICD-10-CM

## 2023-06-29 PROCEDURE — 90834 PSYTX W PT 45 MINUTES: CPT | Performed by: COUNSELOR
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
## 2023-06-30 ENCOUNTER — EVALUATION (OUTPATIENT)
Dept: PHYSICAL THERAPY | Facility: REHABILITATION | Age: 28
End: 2023-06-30
Payer: COMMERCIAL

## 2023-06-30 DIAGNOSIS — M25.552 LEFT HIP PAIN: Primary | ICD-10-CM

## 2023-06-30 PROCEDURE — 97164 PT RE-EVAL EST PLAN CARE: CPT | Performed by: PHYSICAL THERAPIST

## 2023-06-30 PROCEDURE — 97110 THERAPEUTIC EXERCISES: CPT | Performed by: PHYSICAL THERAPIST

## 2023-06-30 PROCEDURE — 97112 NEUROMUSCULAR REEDUCATION: CPT | Performed by: PHYSICAL THERAPIST

## 2023-06-30 NOTE — PROGRESS NOTES
"PT Re-Evaluation     Today's date: 2023  Patient name: Lali Lara  : 1995  MRN: [id number]  Referring provider: Yari Rogers*  Dx:   Encounter Diagnosis     ICD-10-CM    1  Left hip pain  M25 552           Start Time: 830  Stop Time: 915  Total time in clinic (min): 45 minutes    Assessment/Plan  Progressing well towards goals  Mild hip pain with flexion and IR ROM, recommend continued pt per POC emphasizing increasedf hip IR ROM for return to PLOF 1/wk for 4 weeks  1:1 with Brandie Connor, DPT for 30mins of tx  Goals    Short Term Goals: In 4 weeks, the patient will:  1  L hip ER KIARA to <9\" MET  2  Hip add mmt to 3+/2 MET  3  Supervision with HEP for self care    Long Term Goals: In 8 weeks, the patient will:  1  L hip ROM to within 90% contralateral side, hip mmt to 5/5 all pllanes Progressing  2  FOTO to greater than predicted value  3  Independent with HEP for selfcare    Subjective  Feels she is improving substanially, Has minmial pain with functional activities other than dance       Objective             Precautions: significant mental health hx    POC End Date: 23                                                                                                           Test / Measure  2023   FOTO (77) 23 45   Hip add mmt 3+ 4   [place] L 15\" off 8\" off on L       Manuals    LLdx Hip   4x30\" KS MM KS   IR HIP MWM    KS MM KS                     Neuro Re-Ed         [place] side plank     6x B 5\" hold 6x B 5\" hold   Raul Add walkout         Bridges  15# 3x10 3x10 15# 3x10 15# 3x10 15# 3x10 20# 3x10 20#   Bridge on ball nv 3x10 bridge with iso add 5\" 3x10 3x10 bridge with iso add 5\" 3x10 3x10 bridge with iso add 5\" 3x10     Raul hip flexor full AROM march 4# 3x10 3x10 4# b/l 3x10 4# b/l      Tall sit lift over nv   x15ea 15x ea             Ther Ex         NS L5 10' 10' L5 10' L5 10' L5 10' L5 10' L5   SLR Flexion 3x10 2# " "3x10 b/l 2# 3x10 b/l 2# 3x10 b/l 2# 3x10 B 2 5# 3x10 B 3#   SLR ADD 3x10 2# 3x10 b/l 2# 3x10 b/l 2# 3x10 b/l 2# 3x10 B 2 5# 3x10 B 3#   SLR ABD 3x10 2# 3x10 b/l 2# 3x10 b/l 2# 3x10 b/l 2# 3x10 B 2 5# 3x10 B 3#   Hip flexor s 3x30s on step 3x30\" L 6\" 3x30\" L 6\" 3x30\" L 6\" 3x30\" B 6\" 3x30\" B 6\"                                       Ther Activity                           Gait Training                           Modalities                                  "

## 2023-07-05 ENCOUNTER — OFFICE VISIT (OUTPATIENT)
Dept: URGENT CARE | Age: 28
End: 2023-07-05
Payer: COMMERCIAL

## 2023-07-05 VITALS
DIASTOLIC BLOOD PRESSURE: 60 MMHG | RESPIRATION RATE: 18 BRPM | HEART RATE: 85 BPM | TEMPERATURE: 98.2 F | OXYGEN SATURATION: 98 % | SYSTOLIC BLOOD PRESSURE: 114 MMHG

## 2023-07-05 DIAGNOSIS — T30.0 SKIN BURN: Primary | ICD-10-CM

## 2023-07-05 PROCEDURE — 99213 OFFICE O/P EST LOW 20 MIN: CPT | Performed by: NURSE PRACTITIONER

## 2023-07-05 RX ORDER — CEPHALEXIN 500 MG/1
500 CAPSULE ORAL 3 TIMES DAILY
Qty: 21 CAPSULE | Refills: 0 | Status: SHIPPED | OUTPATIENT
Start: 2023-07-05 | End: 2023-07-12

## 2023-07-05 RX ORDER — PREDNISONE 20 MG/1
20 TABLET ORAL 2 TIMES DAILY WITH MEALS
Qty: 10 TABLET | Refills: 0 | Status: SHIPPED | OUTPATIENT
Start: 2023-07-05 | End: 2023-07-10
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
## 2023-07-11 ENCOUNTER — SOCIAL WORK (OUTPATIENT)
Dept: BEHAVIORAL/MENTAL HEALTH CLINIC | Facility: CLINIC | Age: 28
End: 2023-07-11
Payer: COMMERCIAL

## 2023-07-11 ENCOUNTER — HOSPITAL ENCOUNTER (EMERGENCY)
Facility: HOSPITAL | Age: 28
Discharge: HOME | End: 2023-07-11
Attending: EMERGENCY MEDICINE
Payer: COMMERCIAL

## 2023-07-11 ENCOUNTER — OFFICE VISIT (OUTPATIENT)
Dept: URGENT CARE | Age: 28
End: 2023-07-11
Payer: COMMERCIAL

## 2023-07-11 ENCOUNTER — TELEPHONE (OUTPATIENT)
Dept: PSYCHIATRY | Facility: CLINIC | Age: 28
End: 2023-07-11

## 2023-07-11 VITALS
OXYGEN SATURATION: 100 % | HEART RATE: 80 BPM | TEMPERATURE: 98.9 F | DIASTOLIC BLOOD PRESSURE: 67 MMHG | RESPIRATION RATE: 16 BRPM | SYSTOLIC BLOOD PRESSURE: 133 MMHG

## 2023-07-11 VITALS
TEMPERATURE: 98 F | DIASTOLIC BLOOD PRESSURE: 75 MMHG | RESPIRATION RATE: 17 BRPM | HEART RATE: 92 BPM | SYSTOLIC BLOOD PRESSURE: 128 MMHG | OXYGEN SATURATION: 97 %

## 2023-07-11 DIAGNOSIS — F43.10 PTSD (POST-TRAUMATIC STRESS DISORDER): Primary | ICD-10-CM

## 2023-07-11 DIAGNOSIS — T30.0 SKIN BURN: Primary | ICD-10-CM

## 2023-07-11 DIAGNOSIS — S41.102A ARM WOUND, LEFT, INITIAL ENCOUNTER: Primary | ICD-10-CM

## 2023-07-11 DIAGNOSIS — F31.81 BIPOLAR 2 DISORDER (HCC): ICD-10-CM

## 2023-07-11 PROCEDURE — 99284 EMERGENCY DEPT VISIT MOD MDM: CPT | Performed by: EMERGENCY MEDICINE

## 2023-07-11 PROCEDURE — 99213 OFFICE O/P EST LOW 20 MIN: CPT

## 2023-07-11 PROCEDURE — 99283 EMERGENCY DEPT VISIT LOW MDM: CPT

## 2023-07-11 PROCEDURE — 90834 PSYTX W PT 45 MINUTES: CPT | Performed by: COUNSELOR

## 2023-07-11 RX ORDER — OXYCODONE HYDROCHLORIDE AND ACETAMINOPHEN 5; 325 MG/1; MG/1
1 TABLET ORAL ONCE
Status: COMPLETED | OUTPATIENT
Start: 2023-07-11 | End: 2023-07-11

## 2023-07-11 RX ORDER — GINSENG 100 MG
2 CAPSULE ORAL ONCE
Status: COMPLETED | OUTPATIENT
Start: 2023-07-11 | End: 2023-07-11

## 2023-07-11 RX ORDER — CEPHALEXIN 500 MG/1
500 CAPSULE ORAL EVERY 6 HOURS SCHEDULED
Qty: 20 CAPSULE | Refills: 0 | Status: SHIPPED | OUTPATIENT
Start: 2023-07-11 | End: 2023-07-16

## 2023-07-11 RX ADMIN — BACITRACIN ZINC 2 SMALL APPLICATION: 500 OINTMENT TOPICAL at 19:17

## 2023-07-11 RX ADMIN — OXYCODONE HYDROCHLORIDE AND ACETAMINOPHEN 1 TABLET: 5; 325 TABLET ORAL at 19:17
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
## 2023-07-11 NOTE — TELEPHONE ENCOUNTER
Writer left message to schedule follow up per . Provider asked to offer 7/17/23 2:30pm or 3:00pm. Writer informed patient to contact 911 or go to the nearest ED in case of crisis or any safety concerns.
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
## 2023-07-17 ENCOUNTER — OFFICE VISIT (OUTPATIENT)
Dept: PSYCHIATRY | Facility: CLINIC | Age: 28
End: 2023-07-17
Payer: COMMERCIAL

## 2023-07-17 DIAGNOSIS — F60.3 BORDERLINE PERSONALITY DISORDER (HCC): ICD-10-CM

## 2023-07-17 DIAGNOSIS — F43.10 PTSD (POST-TRAUMATIC STRESS DISORDER): Primary | ICD-10-CM

## 2023-07-17 DIAGNOSIS — F31.81 BIPOLAR 2 DISORDER (HCC): ICD-10-CM

## 2023-07-17 DIAGNOSIS — F12.10 CANNABIS ABUSE: ICD-10-CM

## 2023-07-17 PROCEDURE — 99214 OFFICE O/P EST MOD 30 MIN: CPT | Performed by: STUDENT IN AN ORGANIZED HEALTH CARE EDUCATION/TRAINING PROGRAM

## 2023-07-17 RX ORDER — LAMOTRIGINE 200 MG/1
400 TABLET ORAL DAILY
Qty: 180 TABLET | Refills: 0 | Status: SHIPPED | OUTPATIENT
Start: 2023-07-17 | End: 2023-10-15
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
## 2023-07-21 ENCOUNTER — HOSPITAL ENCOUNTER (EMERGENCY)
Facility: HOSPITAL | Age: 28
Discharge: HOME | End: 2023-07-21
Attending: EMERGENCY MEDICINE
Payer: COMMERCIAL

## 2023-07-21 ENCOUNTER — APPOINTMENT (EMERGENCY)
Dept: RADIOLOGY | Facility: HOSPITAL | Age: 28
End: 2023-07-21
Payer: COMMERCIAL

## 2023-07-21 VITALS
HEART RATE: 97 BPM | OXYGEN SATURATION: 97 % | RESPIRATION RATE: 20 BRPM | TEMPERATURE: 98.7 F | DIASTOLIC BLOOD PRESSURE: 75 MMHG | SYSTOLIC BLOOD PRESSURE: 124 MMHG

## 2023-07-21 DIAGNOSIS — R11.0 NAUSEA: ICD-10-CM

## 2023-07-21 DIAGNOSIS — R10.9 ABDOMINAL PAIN: Primary | ICD-10-CM

## 2023-07-21 LAB
ALBUMIN SERPL BCP-MCNC: 4.1 G/DL (ref 3.5–5)
ALP SERPL-CCNC: 70 U/L (ref 46–116)
ALT SERPL W P-5'-P-CCNC: 57 U/L (ref 12–78)
ANION GAP SERPL CALCULATED.3IONS-SCNC: 4 MMOL/L
AST SERPL W P-5'-P-CCNC: 31 U/L (ref 5–45)
BASOPHILS # BLD AUTO: 0.03 THOUSANDS/ÂΜL (ref 0–0.1)
BASOPHILS NFR BLD AUTO: 0 % (ref 0–1)
BILIRUB SERPL-MCNC: 0.36 MG/DL (ref 0.2–1)
BILIRUB UR QL STRIP: NEGATIVE
BUN SERPL-MCNC: 9 MG/DL (ref 5–25)
CALCIUM SERPL-MCNC: 9.7 MG/DL (ref 8.3–10.1)
CHLORIDE SERPL-SCNC: 106 MMOL/L (ref 96–108)
CLARITY UR: CLEAR
CO2 SERPL-SCNC: 26 MMOL/L (ref 21–32)
COLOR UR: COLORLESS
CREAT SERPL-MCNC: 0.83 MG/DL (ref 0.6–1.3)
EOSINOPHIL # BLD AUTO: 0.04 THOUSAND/ÂΜL (ref 0–0.61)
EOSINOPHIL NFR BLD AUTO: 0 % (ref 0–6)
ERYTHROCYTE [DISTWIDTH] IN BLOOD BY AUTOMATED COUNT: 12.6 % (ref 11.6–15.1)
EXT PREGNANCY TEST URINE: NEGATIVE
EXT. CONTROL: NORMAL
GFR SERPL CREATININE-BSD FRML MDRD: 96 ML/MIN/1.73SQ M
GLUCOSE SERPL-MCNC: 92 MG/DL (ref 65–140)
GLUCOSE UR STRIP-MCNC: NEGATIVE MG/DL
HCT VFR BLD AUTO: 40.7 % (ref 34.8–46.1)
HGB BLD-MCNC: 13.3 G/DL (ref 11.5–15.4)
HGB UR QL STRIP.AUTO: NEGATIVE
IMM GRANULOCYTES # BLD AUTO: 0.04 THOUSAND/UL (ref 0–0.2)
IMM GRANULOCYTES NFR BLD AUTO: 0 % (ref 0–2)
KETONES UR STRIP-MCNC: NEGATIVE MG/DL
LEUKOCYTE ESTERASE UR QL STRIP: NEGATIVE
LYMPHOCYTES # BLD AUTO: 1.62 THOUSANDS/ÂΜL (ref 0.6–4.47)
LYMPHOCYTES NFR BLD AUTO: 10 % (ref 14–44)
MCH RBC QN AUTO: 28.9 PG (ref 26.8–34.3)
MCHC RBC AUTO-ENTMCNC: 32.7 G/DL (ref 31.4–37.4)
MCV RBC AUTO: 89 FL (ref 82–98)
MONOCYTES # BLD AUTO: 1.09 THOUSAND/ÂΜL (ref 0.17–1.22)
MONOCYTES NFR BLD AUTO: 7 % (ref 4–12)
NEUTROPHILS # BLD AUTO: 13.4 THOUSANDS/ÂΜL (ref 1.85–7.62)
NEUTS SEG NFR BLD AUTO: 83 % (ref 43–75)
NITRITE UR QL STRIP: NEGATIVE
NRBC BLD AUTO-RTO: 0 /100 WBCS
PH UR STRIP.AUTO: 6.5 [PH]
PLATELET # BLD AUTO: 291 THOUSANDS/UL (ref 149–390)
PMV BLD AUTO: 8.7 FL (ref 8.9–12.7)
POTASSIUM SERPL-SCNC: 4.1 MMOL/L (ref 3.5–5.3)
PROT SERPL-MCNC: 8 G/DL (ref 6.4–8.4)
PROT UR STRIP-MCNC: NEGATIVE MG/DL
RBC # BLD AUTO: 4.6 MILLION/UL (ref 3.81–5.12)
SODIUM SERPL-SCNC: 136 MMOL/L (ref 135–147)
SP GR UR STRIP.AUTO: 1 (ref 1–1.03)
UROBILINOGEN UR STRIP-ACNC: <2 MG/DL
WBC # BLD AUTO: 16.22 THOUSAND/UL (ref 4.31–10.16)

## 2023-07-21 PROCEDURE — 80053 COMPREHEN METABOLIC PANEL: CPT

## 2023-07-21 PROCEDURE — 96375 TX/PRO/DX INJ NEW DRUG ADDON: CPT

## 2023-07-21 PROCEDURE — 81003 URINALYSIS AUTO W/O SCOPE: CPT

## 2023-07-21 PROCEDURE — 99284 EMERGENCY DEPT VISIT MOD MDM: CPT

## 2023-07-21 PROCEDURE — 81025 URINE PREGNANCY TEST: CPT

## 2023-07-21 PROCEDURE — G1004 CDSM NDSC: HCPCS

## 2023-07-21 PROCEDURE — 36415 COLL VENOUS BLD VENIPUNCTURE: CPT

## 2023-07-21 PROCEDURE — 85025 COMPLETE CBC W/AUTO DIFF WBC: CPT

## 2023-07-21 PROCEDURE — 96361 HYDRATE IV INFUSION ADD-ON: CPT

## 2023-07-21 PROCEDURE — 99285 EMERGENCY DEPT VISIT HI MDM: CPT | Performed by: EMERGENCY MEDICINE

## 2023-07-21 PROCEDURE — 74177 CT ABD & PELVIS W/CONTRAST: CPT

## 2023-07-21 PROCEDURE — 96374 THER/PROPH/DIAG INJ IV PUSH: CPT

## 2023-07-21 RX ORDER — ONDANSETRON 2 MG/ML
4 INJECTION INTRAMUSCULAR; INTRAVENOUS ONCE
Status: COMPLETED | OUTPATIENT
Start: 2023-07-21 | End: 2023-07-21

## 2023-07-21 RX ORDER — MORPHINE SULFATE 4 MG/ML
4 INJECTION, SOLUTION INTRAMUSCULAR; INTRAVENOUS ONCE
Status: COMPLETED | OUTPATIENT
Start: 2023-07-21 | End: 2023-07-21

## 2023-07-21 RX ORDER — KETOROLAC TROMETHAMINE 30 MG/ML
15 INJECTION, SOLUTION INTRAMUSCULAR; INTRAVENOUS ONCE
Status: COMPLETED | OUTPATIENT
Start: 2023-07-21 | End: 2023-07-21

## 2023-07-21 RX ADMIN — IOHEXOL 100 ML: 350 INJECTION, SOLUTION INTRAVENOUS at 04:31

## 2023-07-21 RX ADMIN — SODIUM CHLORIDE 1000 ML: 0.9 INJECTION, SOLUTION INTRAVENOUS at 03:27

## 2023-07-21 RX ADMIN — MORPHINE SULFATE 4 MG: 4 INJECTION INTRAVENOUS at 03:26

## 2023-07-21 RX ADMIN — KETOROLAC TROMETHAMINE 15 MG: 30 INJECTION, SOLUTION INTRAMUSCULAR; INTRAVENOUS at 05:19

## 2023-07-21 RX ADMIN — ONDANSETRON 4 MG: 2 INJECTION INTRAMUSCULAR; INTRAVENOUS at 03:26

## 2023-07-21 NOTE — ED PROVIDER NOTES
History  Chief Complaint   Patient presents with   • Abdominal Pain     Pt c/o lower abdominal/pelvic pain since 10pm with nausea/chills     HPI  Patient is a 29 y.o. female with history of no relevant PMH presenting to the emergency department for lower abdominal pain. Patient states that around 10pm last night she was walking her dogs when she developed periumbilical abdominal pain that has since migrated to the RLQ and suprapubic areas. She also complains of feeling nauseated and having chills, but has not had any fevers or vomiting. Patient was recently on antibiotics for an infection on her arm, she states that she had some diarrhea a few days ago but it has since resolved. Denies having any urinary symptoms or vaginal bleeding. Prior to Admission Medications   Prescriptions Last Dose Informant Patient Reported? Taking?    Levonorgestrel 13.5 MG IUD  Self Yes No   Si each by Intrauterine route   albuterol (PROVENTIL HFA,VENTOLIN HFA) 90 mcg/act inhaler   No No   Sig: Inhale 2 puffs every 6 (six) hours as needed for wheezing or shortness of breath   buPROPion (WELLBUTRIN XL) 150 mg 24 hr tablet   No No   Sig: Take 1 tablet (150 mg total) by mouth daily   ergocalciferol (VITAMIN D2) 50,000 units   No No   Sig: TAKE 1 CAPSULE BY MOUTH ONE TIME PER WEEK   hydrOXYzine HCL (ATARAX) 10 mg tablet   No No   Sig: Take 1 tablet (10 mg total) by mouth every 6 (six) hours as needed for anxiety   lamoTRIgine (LaMICtal) 200 MG tablet   No No   Sig: Take 2 tablets (400 mg total) by mouth daily   traZODone (DESYREL) 50 mg tablet   No No   Sig: TAKE 0.5 TABLETS (25 MG TOTAL) BY MOUTH DAILY AT BEDTIME   valACYclovir (VALTREX) 1,000 mg tablet   No No   Sig: Take 2 tablets (2,000 mg total) by mouth 2 (two) times a day for 1 day   Patient taking differently: Take 2,000 mg by mouth 2 (two) times a day As needed      Facility-Administered Medications: None       Past Medical History:   Diagnosis Date   • Anxiety    • Bipolar disorder ([address])    • Borderline personality disorder ([address])    • Depression    • Self-injurious behavior        Past Surgical History:   Procedure Laterality Date   • CERVICAL BIOPSY  W/ LOOP ELECTRODE EXCISION     • FL INJECTION LEFT HIP (ARTHROGRAM)  4/7/2021   • WISDOM TOOTH EXTRACTION         Family History   Problem Relation Age of Onset   • Hypertension Mother    • Vitamin D deficiency Father    • Schizophrenia Paternal Uncle    • Diabetes Maternal Grandmother    • Suicide Attempts Neg Hx    • Completed Suicide  Neg Hx      I have reviewed and agree with the history as documented. E-Cigarette/Vaping   • E-Cigarette Use Never User      E-Cigarette/Vaping Substances   • Nicotine No    • THC Yes    • CBD No    • Flavoring No    • Other No    • Unknown No      Social History     Tobacco Use   • Smoking status: Former   • Smokeless tobacco: Never   • Tobacco comments:     was a social smoker   Vaping Use   • Vaping Use: Never used   Substance Use Topics   • Alcohol use: Not Currently     Alcohol/week: 1.0 - 2.0 standard drink of alcohol     Types: 1 - 2 Cans of beer per week     Comment: once per week   • Drug use: Not Currently     Types: Marijuana     Comment: reports medical marijuana use almost every day        Review of Systems   Constitutional: Positive for chills. Negative for fever. HENT: Negative for congestion. Eyes: Negative for redness. Respiratory: Negative for cough and shortness of breath. Cardiovascular: Negative for chest pain and palpitations. Gastrointestinal: Positive for abdominal pain and nausea. Negative for diarrhea and vomiting. Endocrine: Negative for polyuria. Genitourinary: Negative for dysuria and hematuria. Musculoskeletal: Negative for arthralgias and back pain. Skin: Negative for rash. Neurological: Negative for light-headedness and headaches. All other systems reviewed and are negative.       Physical Exam  ED Triage Vitals [07/21/23 0236]   Temperature Pulse Respirations Blood Pressure SpO2   98.7 °F (37.1 °C) 97 20 124/75 97 %      Temp Source Heart Rate Source Patient Position - Orthostatic VS BP Location FiO2 (%)   Oral Monitor Sitting Left arm --      Pain Score       9             Orthostatic Vital Signs  Vitals:    07/21/23 0236   BP: 124/75   Pulse: 97   Patient Position - Orthostatic VS: Sitting       Physical Exam  Vitals and nursing note reviewed. Constitutional:       Appearance: Normal appearance. HENT:      Head: Normocephalic and atraumatic. Mouth/Throat:      Mouth: Mucous membranes are moist.   Eyes:      Conjunctiva/sclera: Conjunctivae normal.   Cardiovascular:      Rate and Rhythm: Normal rate and regular rhythm. Pulses: Normal pulses. Heart sounds: Normal heart sounds. Pulmonary:      Effort: Pulmonary effort is normal.      Breath sounds: Normal breath sounds. Abdominal:      Palpations: Abdomen is soft. Tenderness: There is abdominal tenderness in the right lower quadrant and suprapubic area. There is no right CVA tenderness, left CVA tenderness, guarding or rebound. Musculoskeletal:         General: No tenderness. Cervical back: Neck supple. Skin:     General: Skin is warm and dry. Capillary Refill: Capillary refill takes less than 2 seconds. Neurological:      General: No focal deficit present. Mental Status: She is alert. Mental status is at baseline.    Psychiatric:         Mood and Affect: Mood normal.         ED Medications  Medications   ondansetron (ZOFRAN) injection 4 mg (4 mg Intravenous Given 7/21/23 0326)   morphine injection 4 mg (4 mg Intravenous Given 7/21/23 0326)   sodium chloride 0.9 % bolus 1,000 mL (0 mL Intravenous Stopped 7/21/23 0524)   iohexol (OMNIPAQUE) 350 MG/ML injection (SINGLE-DOSE) 100 mL (100 mL Intravenous Given 7/21/23 0431)   ketorolac (TORADOL) injection 15 mg (15 mg Intravenous Given 7/21/23 0519)       Diagnostic Studies  Results Reviewed     Procedure Component Value Units Date/Time    UA w Reflex to Microscopic w Reflex to Culture [[id number]] Resulted: 07/21/23 0505    Lab Status: Final result Specimen: Urine Updated: 07/21/23 0505     Color, UA Colorless     Clarity, UA Clear     Specific Gravity, UA 1.005     pH, UA 6.5     Leukocytes, UA Negative     Nitrite, UA Negative     Protein, UA Negative mg/dl      Glucose, UA Negative mg/dl      Ketones, UA Negative mg/dl      Urobilinogen, UA <2.0 mg/dl      Bilirubin, UA Negative     Occult Blood, UA Negative    POCT pregnancy, urine [[id number]]  (Normal) Resulted: 07/21/23 0415    Lab Status: Final result Updated: 07/21/23 0415     EXT Preg Test, Ur Negative     Control Valid    Comprehensive metabolic panel [[id number]] Collected: 07/21/23 0329    Lab Status: Final result Specimen: Blood from Arm, Right Updated: 07/21/23 0355     Sodium 136 mmol/L      Potassium 4.1 mmol/L      Chloride 106 mmol/L      CO2 26 mmol/L      ANION GAP 4 mmol/L      BUN 9 mg/dL      Creatinine 0.83 mg/dL      Glucose 92 mg/dL      Calcium 9.7 mg/dL      AST 31 U/L      ALT 57 U/L      Alkaline Phosphatase 70 U/L      Total Protein 8.0 g/dL      Albumin 4.1 g/dL      Total Bilirubin 0.36 mg/dL      eGFR 96 ml/min/1.73sq m     Narrative:      National Kidney Disease Foundation guidelines for Chronic Kidney Disease (CKD):   •  Stage 1 with normal or high GFR (GFR > 90 mL/min/1.73 square meters)  •  Stage 2 Mild CKD (GFR = 60-89 mL/min/1.73 square meters)  •  Stage 3A Moderate CKD (GFR = 45-59 mL/min/1.73 square meters)  •  Stage 3B Moderate CKD (GFR = 30-44 mL/min/1.73 square meters)  •  Stage 4 Severe CKD (GFR = 15-29 mL/min/1.73 square meters)  •  Stage 5 End Stage CKD (GFR <15 mL/min/1.73 square meters)  Note: GFR calculation is accurate only with a steady state creatinine    CBC and differential [[id number]]  (Abnormal) Collected: 07/21/23 0329    Lab Status: Final result Specimen: Blood from Arm, Right Updated: 07/21/23 0336     WBC 16.22 Thousand/uL      RBC 4.60 Million/uL      Hemoglobin 13.3 g/dL      Hematocrit 40.7 %      MCV 89 fL      MCH 28.9 pg      MCHC 32.7 g/dL      RDW 12.6 %      MPV 8.7 fL      Platelets 591 Thousands/uL      nRBC 0 /100 WBCs      Neutrophils Relative 83 %      Immat GRANS % 0 %      Lymphocytes Relative 10 %      Monocytes Relative 7 %      Eosinophils Relative 0 %      Basophils Relative 0 %      Neutrophils Absolute 13.40 Thousands/µL      Immature Grans Absolute 0.04 Thousand/uL      Lymphocytes Absolute 1.62 Thousands/µL      Monocytes Absolute 1.09 Thousand/µL      Eosinophils Absolute 0.04 Thousand/µL      Basophils Absolute 0.03 Thousands/µL                  CT abdomen pelvis with contrast   Final Result by Anastacio Garza MD (07/21 0454)      No acute pathology visualized on CT of the abdomen and pelvis with IV without oral contrast.            Workstation performed: [id number]               Procedures  Procedures      ED Course  ED Course as of 07/21/23 0538   Fri Jul 21, 2023   0348 WBC(!): 16.22  Increased from prior   0358 Creatinine: 0.83  No evidence of ENMA   0431 PREGNANCY TEST URINE: Negative   0513 UA w Reflex to Microscopic w Reflex to Culture  No evidence of UTI                             SBIRT 20yo+    Flowsheet Row Most Recent Value   Initial Alcohol Screen: US AUDIT-C     1. How often do you have a drink containing alcohol? 0 Filed at: 07/21/2023 0355   2. How many drinks containing alcohol do you have on a typical day you are drinking? 0 Filed at: 07/21/2023 0355   3b. FEMALE Any Age, or MALE 65+: How often do you have 4 or more drinks on one occassion? 0 Filed at: 07/21/2023 0355   Audit-C Score 0 Filed at: 07/21/2023 1291   MAYUR: How many times in the past year have you. .. Used an illegal drug or used a prescription medication for non-medical reasons? Never Filed at: 07/21/2023 0355                Medical Decision Making  Amount and/or Complexity of Data Reviewed  Labs: ordered. Decision-making details documented in ED Course. Radiology: ordered. Risk  Prescription drug management. Patient is a 29 y.o. female with PMH of no relevant PMH who presents to the ED with lower abdominal pain. Vital signs stable, afebrile. On exam RLQ and suprapubic TTP, no rebound or guarding. History and physical exam most consistent with appendicitis. However, differential diagnosis included but not limited to renal stone, ectopic pregnancy, UTI, ovarian cyst. Plan basic labs, urinalysis and urine pregnancy, as well as CT abdomen pelvis. View ED course above for further discussion on patient workup. On review of previous records patient seen in the ED on 7/11 for forearm pain after obtaining rope burn. All labs reviewed and utilized in the medical decision making process  All radiology studies independently viewed by me and interpreted by the radiologist.  I reviewed all testing with the patient. Upon re-evaluation pain improved, tolerating PO. I have reviewed the patient's vital signs, nursing notes, and other relevant tests/information. I had a detailed discussion with the patient regarding the history, exam findings, and any diagnostic results. Plan to discharge home in stable condition, follow up with primary care provider. Discussed with patient who is agreeable to plan. I discussed discharge instructions, need for follow-up, and oral return precautions for what to return for in addition to the written return precautions and discharge instructions, specifically highlighting areas of special concern. The patient verbalized understanding of the discharge instructions and warnings that would necessitate return to the Emergency Department including worsening abdominal pain, especially in the RLQ, vomiting, inability to tolerate PO, fever. All questions the patient had were answered prior to discharge to the best of my ability.            Disposition  Final diagnoses: Abdominal pain   Nausea     Time reflects when diagnosis was documented in both MDM as applicable and the Disposition within this note     Time User Action Codes Description Comment    7/21/2023  5:15 AM Dany RODRIGUEZ Add [R10.9] Abdominal pain     7/21/2023  5:15 AM Jaki Valera Add [R11.0] Nausea       ED Disposition     ED Disposition   Discharge    Condition   Stable    Date/Time   Fri Jul 21, 2023  5:15 AM    Comment   Yane Holloway discharge to home/self care. Follow-up Information     Follow up With Specialties Details Why [address], [place] Family Medicine Call  As needed [phone].  [address]  [phone]            Discharge Medication List as of 7/21/2023  5:16 AM      CONTINUE these medications which have NOT CHANGED    Details   albuterol (PROVENTIL HFA,VENTOLIN HFA) 90 mcg/act inhaler Inhale 2 puffs every 6 (six) hours as needed for wheezing or shortness of breath, Starting Wed 3/15/2023, Normal      buPROPion (WELLBUTRIN XL) 150 mg 24 hr tablet Take 1 tablet (150 mg total) by mouth daily, Starting Tue 6/13/2023, Until Mon 9/11/2023, Normal      ergocalciferol (VITAMIN D2) 50,000 units TAKE 1 CAPSULE BY MOUTH ONE TIME PER WEEK, Normal      hydrOXYzine HCL (ATARAX) 10 mg tablet Take 1 tablet (10 mg total) by mouth every 6 (six) hours as needed for anxiety, Starting Tue 6/13/2023, Until Mon 9/11/2023 at 2359, Normal      lamoTRIgine (LaMICtal) 200 MG tablet Take 2 tablets (400 mg total) by mouth daily, Starting Mon 7/17/2023, Until Sun 10/15/2023, Normal      Levonorgestrel 13.5 MG IUD 1 each by Intrauterine route, Starting Mon 1/30/2017, Until Thu 4/20/2023 at 2359, Historical Med      traZODone (DESYREL) 50 mg tablet TAKE 0.5 TABLETS (25 MG TOTAL) BY MOUTH DAILY AT BEDTIME, Starting Tue 4/25/2023, Until Mon 7/24/2023, Normal      valACYclovir (VALTREX) 1,000 mg tablet Take 2 tablets (2,000 mg total) by mouth 2 (two) times a day for 1 day, Starting Thu 4/13/2023, Until Thu 4/20/2023, Normal           No discharge procedures on file. PDMP Review     None           ED Provider  Attending physically available and evaluated Jennifer Vasquez. I managed the patient along with the ED Attending.     Electronically Signed by         Nadir Townsend DO  07/21/23 6327

## 2023-07-21 NOTE — DISCHARGE INSTRUCTIONS
You were seen in the emergency department today for abdominal pain and nausea. Your testing showed no evidence of appendicitis on your CT scan, no evidence of urinary tract infection. Follow up with your primary care provider. Take Tylenol / Ibuprofen as needed following the instructions on the bottle. Return to the emergency department for any new or concerning symptoms including worsening abdominal pain, vomiting, fever, inability to tolerate fluids. Thank you for choosing [facility] for your care today.

## 2023-07-21 NOTE — ED ATTENDING ATTESTATION
7/21/2023  I, Bhupendra Weber MD, saw and evaluated the patient. I have discussed the patient with the resident/non-physician practitioner and agree with the resident's/non-physician practitioner's findings, Plan of Care, and MDM as documented in the resident's/non-physician practitioner's note, except where noted. All available labs and Radiology studies were reviewed. I was present for key portions of any procedure(s) performed by the resident/non-physician practitioner and I was immediately available to provide assistance. At this point I agree with the current assessment done in the Emergency Department. I have conducted an independent evaluation of this patient a history and physical is as follows:    27-year-old female presents emergency department for evaluation of abdominal pain. Symptoms started around 10 PM.  Pain was initially located in the periumbilical region, but is now located in the right lower quadrant. She denies any associated headache, fever, chills, chest pain or shortness of breath. No dysuria or hematuria. No constipation or diarrhea. Some nausea, but no vomiting. She denies any abnormal vaginal bleeding or discharge. Does have an IUD in place. General: VS reviewed  Appears in NAD  awake, alert. Well-nourished, well-developed. Appears stated age. Speaking normally in full sentences. Head: Normocephalic, atraumatic  Eyes: EOM-I. No diplopia. No hyphema. No subconjunctival hemorrhages. Symmetrical lids. ENT: Atraumatic external nose and ears. MMM  No malocclusion. No stridor. Normal phonation. No drooling. Normal swallowing. Neck: No JVD. CV: No pallor noted  Lungs:   No tachypnea  No respiratory distress  MSK:   FROM spontaneously  Skin: Dry, intact. Neuro: Awake, alert, GCS15, CN II-XII grossly intact. Motor grossly intact.   Psychiatric/Behavioral: Appropriate mood and affect   Exam: deferred            MDM:  Lower abdominal pain: Differential diagnosis includes but is not limited to appendicitis, urinary tract infection, ureterolithiasis, diverticulitis, colitis, ovarian pathology, unlikely to represent bowel obstruction or perforation. I did check abdominal labs and CT scan of the abdomen and pelvis. Labs remarkable for leukocytosis, but otherwise unremarkable. Urinalysis negative for infection. CT scan negative for any acute findings, symptoms improved following medications, patient will be discharged home. She was given strict return precautions.       ED Course  ED Course as of 07/21/23 0521   Fri Jul 21, 2023   0337 WBC(!): 16.22         Critical Care Time  Procedures

## 2023-07-21 NOTE — Clinical Note
Augusto Romberg was seen and treated in our emergency department on 7/21/2023.    ?    ? ? Diagnosis: ?    Mariza Sanchez  may return to work on return date. She may return on this date: 07/24/2023    Patient was also seen in the emergency department on 7/11/23     If you have any questions or concerns, please don't hesitate to call.       Mimi Bailey, DO    ______________________________           _______________          _______________  Hospital Representative                              Date                                Time

## 2023-07-21 NOTE — Clinical Note
Wayne Rdz was seen and treated in our emergency department on 7/21/2023. Diagnosis:     Aleyda Sanches  may return to work on return date. She may return on this date: 07/24/2023    Patient was also seen in the emergency department on 7/11/23     If you have any questions or concerns, please don't hesitate to call.       Lazarus Phillips, DO    ______________________________           _______________          _______________  Hospital Representative                              Date                                Time

## 2023-07-21 NOTE — Clinical Note
Samantha Dolan was seen and treated in our emergency department on 7/21/2023. Diagnosis:     Marino Chamorro  may return to work on return date. She may return on this date: 07/24/2023    Patient was also seen in the emergency department on 7/11/23     If you have any questions or concerns, please don't hesitate to call.       Celeste Cuevas, DO    ______________________________           _______________          _______________  Hospital Representative                              Date                                Time

## 2023-07-21 NOTE — Clinical Note
Pauletta Dakin was seen and treated in our emergency department on 7/21/2023. Diagnosis:     Marnie Hanson  may return to work on return date. She may return on this date: 07/24/2023    Patient was also seen in the emergency department on 7/11/23     If you have any questions or concerns, please don't hesitate to call.       Luz Lowry, DO    ______________________________           _______________          _______________  Hospital Representative                              Date                                Time

## 2023-07-23 DIAGNOSIS — F31.81 BIPOLAR 2 DISORDER (HCC): ICD-10-CM

## 2023-07-23 RX ORDER — TRAZODONE HYDROCHLORIDE 50 MG/1
25 TABLET ORAL
Qty: 45 TABLET | Refills: 0 | Status: SHIPPED | OUTPATIENT
Start: 2023-07-23 | End: 2023-10-21

## 2023-07-28 ENCOUNTER — TELEMEDICINE (OUTPATIENT)
Dept: BEHAVIORAL/MENTAL HEALTH CLINIC | Facility: CLINIC | Age: 28
End: 2023-07-28

## 2023-07-28 DIAGNOSIS — F31.81 BIPOLAR 2 DISORDER (HCC): ICD-10-CM

## 2023-07-28 DIAGNOSIS — F43.10 PTSD (POST-TRAUMATIC STRESS DISORDER): Primary | ICD-10-CM
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
## 2023-08-01 ENCOUNTER — SOCIAL WORK (OUTPATIENT)
Dept: BEHAVIORAL/MENTAL HEALTH CLINIC | Facility: CLINIC | Age: 28
End: 2023-08-01

## 2023-08-01 DIAGNOSIS — F31.81 BIPOLAR 2 DISORDER (HCC): ICD-10-CM

## 2023-08-01 DIAGNOSIS — F43.10 PTSD (POST-TRAUMATIC STRESS DISORDER): Primary | ICD-10-CM
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
## 2023-08-04 ENCOUNTER — TELEMEDICINE (OUTPATIENT)
Dept: BEHAVIORAL/MENTAL HEALTH CLINIC | Facility: CLINIC | Age: 28
End: 2023-08-04

## 2023-08-04 DIAGNOSIS — F31.81 BIPOLAR 2 DISORDER (HCC): ICD-10-CM

## 2023-08-04 DIAGNOSIS — F43.10 PTSD (POST-TRAUMATIC STRESS DISORDER): Primary | ICD-10-CM
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
## 2023-08-08 ENCOUNTER — OFFICE VISIT (OUTPATIENT)
Dept: PSYCHIATRY | Facility: CLINIC | Age: 28
End: 2023-08-08
Payer: COMMERCIAL

## 2023-08-08 VITALS — HEIGHT: 60 IN | WEIGHT: 153 LBS | BODY MASS INDEX: 30.04 KG/M2

## 2023-08-08 DIAGNOSIS — F31.81 BIPOLAR 2 DISORDER (HCC): ICD-10-CM

## 2023-08-08 PROCEDURE — 99214 OFFICE O/P EST MOD 30 MIN: CPT | Performed by: STUDENT IN AN ORGANIZED HEALTH CARE EDUCATION/TRAINING PROGRAM

## 2023-08-08 RX ORDER — HYDROXYZINE HYDROCHLORIDE 10 MG/1
10 TABLET, FILM COATED ORAL EVERY 6 HOURS PRN
Qty: 30 TABLET | Refills: 2 | Status: SHIPPED | OUTPATIENT
Start: 2023-08-08 | End: 2023-11-06
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
## 2023-08-12 DIAGNOSIS — E55.9 VITAMIN D DEFICIENCY: ICD-10-CM

## 2023-08-12 RX ORDER — ERGOCALCIFEROL 1.25 MG/1
CAPSULE ORAL
Qty: 12 CAPSULE | Refills: 1 | Status: SHIPPED | OUTPATIENT
Start: 2023-08-12

## 2023-08-15 ENCOUNTER — SOCIAL WORK (OUTPATIENT)
Dept: BEHAVIORAL/MENTAL HEALTH CLINIC | Facility: CLINIC | Age: 28
End: 2023-08-15
Payer: COMMERCIAL

## 2023-08-15 DIAGNOSIS — F43.10 PTSD (POST-TRAUMATIC STRESS DISORDER): Primary | ICD-10-CM

## 2023-08-15 DIAGNOSIS — F31.81 BIPOLAR 2 DISORDER (HCC): ICD-10-CM

## 2023-08-15 PROCEDURE — 90834 PSYTX W PT 45 MINUTES: CPT | Performed by: COUNSELOR
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
## 2023-08-17 ENCOUNTER — SOCIAL WORK (OUTPATIENT)
Dept: BEHAVIORAL/MENTAL HEALTH CLINIC | Facility: CLINIC | Age: 28
End: 2023-08-17
Payer: COMMERCIAL

## 2023-08-17 DIAGNOSIS — F43.10 PTSD (POST-TRAUMATIC STRESS DISORDER): Primary | ICD-10-CM

## 2023-08-17 DIAGNOSIS — F31.81 BIPOLAR 2 DISORDER (HCC): ICD-10-CM

## 2023-08-17 PROCEDURE — 90834 PSYTX W PT 45 MINUTES: CPT | Performed by: COUNSELOR

## 2023-08-17 NOTE — PSYCH
will consider today's discussion about freedom. At the next session, the therapist will use Client-centered Therapy, Cognitive Behavioral Therapy and Existential Therapy to address anxiety, depression. Behavioral Health Treatment Plan and Discharge Planning: Pablo Valdovinos is aware of and agrees to continue to work on their treatment plan. They have identified and are working toward their discharge goals.  yes    Visit start and stop times:    08/17/23  Start Time: 1502  Stop Time: 1553  Total Visit Time: 51 minutes

## 2023-08-18 ENCOUNTER — TELEPHONE (OUTPATIENT)
Dept: PSYCHIATRY | Facility: CLINIC | Age: 28
End: 2023-08-18

## 2023-08-18 NOTE — TELEPHONE ENCOUNTER
Writer [place] to inform patient their 9/12 appointment has been cancelled due to provider being out of office. Left office number if they have any questions or concerns.

## 2023-08-21 ENCOUNTER — TELEPHONE (OUTPATIENT)
Dept: PSYCHIATRY | Facility: CLINIC | Age: 28
End: 2023-08-21

## 2023-08-22 ENCOUNTER — SOCIAL WORK (OUTPATIENT)
Dept: BEHAVIORAL/MENTAL HEALTH CLINIC | Facility: CLINIC | Age: 28
End: 2023-08-22
Payer: COMMERCIAL

## 2023-08-22 DIAGNOSIS — F43.10 PTSD (POST-TRAUMATIC STRESS DISORDER): Primary | ICD-10-CM

## 2023-08-22 DIAGNOSIS — F31.81 BIPOLAR 2 DISORDER (HCC): ICD-10-CM

## 2023-08-22 PROCEDURE — 90834 PSYTX W PT 45 MINUTES: CPT | Performed by: COUNSELOR

## 2023-08-22 NOTE — PSYCH
Behavioral Health Psychotherapy Progress Note    Psychotherapy Provided: Individual Psychotherapy     1. PTSD (post-traumatic stress disorder)        2. Bipolar 2 disorder ([address])            Goals addressed in session: Goal 1     DATA: She is to bring in DBT workbook. Continue focusing on how she feels obligated to help others, how this stems from childhood experiences.  "How do I get better at everything?" Desire to learn guitar with her father, taking time from video games. Discussed her speaking to "parts" of herself from a more curious perspective.  "When I think of doing things I'm not thinking of Ajit, I'm thinking of Ajit and Sanjuanita Font". She reports that she has had a wonderful time with her LGBTQ community. She reports she enjoyed this time with her friends and had a wonderful time dancing. Explored boundary setting. During this session, this clinician used the following therapeutic modalities: Client-centered Therapy, Cognitive Behavioral Therapy and Existential Therapy    Substance Abuse was not addressed during this session. If the client is diagnosed with a co-occurring substance use disorder, please indicate any changes in the frequency or amount of use: n/a. Stage of change for addressing substance use diagnoses: No substance use/Not applicable    ASSESSMENT:  Kyle Giraldo presents with a Euthymic/ normal mood. her affect is Normal range and intensity, which is congruent, with her mood and the content of the session. The client has made progress on their goals. Kyle Giraldo presents with a none risk of suicide, none risk of self-harm, and none risk of harm to others. For any risk assessment that surpasses a "low" rating, a safety plan must be developed. A safety plan was indicated: no  If yes, describe in detail n/a    PLAN: Between sessions, Kyle Giraldo will continue to engage with friends.  At the next session, the therapist will use Client-centered Therapy, Cognitive Behavioral Therapy and Existential Therapy to address depression. Behavioral Health Treatment Plan and Discharge Planning: Anamaria Souza is aware of and agrees to continue to work on their treatment plan. They have identified and are working toward their discharge goals.  yes    Visit start and stop times:    08/22/23  Start Time: 1503  Stop Time: 1555  Total Visit Time: 52 minutes

## 2023-08-25 ENCOUNTER — TELEPHONE (OUTPATIENT)
Dept: PSYCHIATRY | Facility: CLINIC | Age: 28
End: 2023-08-25

## 2023-08-25 NOTE — TELEPHONE ENCOUNTER
Writer contacted patient to inform her their 8/25 appointment has been cancelled due to provider out of office  and next appointment is set for 8/29 in office.

## 2023-08-29 ENCOUNTER — SOCIAL WORK (OUTPATIENT)
Dept: BEHAVIORAL/MENTAL HEALTH CLINIC | Facility: CLINIC | Age: 28
End: 2023-08-29
Payer: COMMERCIAL

## 2023-08-29 DIAGNOSIS — F43.10 PTSD (POST-TRAUMATIC STRESS DISORDER): Primary | ICD-10-CM

## 2023-08-29 DIAGNOSIS — F31.81 BIPOLAR 2 DISORDER (HCC): ICD-10-CM

## 2023-08-29 PROCEDURE — 90834 PSYTX W PT 45 MINUTES: CPT | Performed by: COUNSELOR

## 2023-08-31 ENCOUNTER — SOCIAL WORK (OUTPATIENT)
Dept: BEHAVIORAL/MENTAL HEALTH CLINIC | Facility: CLINIC | Age: 28
End: 2023-08-31
Payer: COMMERCIAL

## 2023-08-31 DIAGNOSIS — F43.10 PTSD (POST-TRAUMATIC STRESS DISORDER): Primary | ICD-10-CM

## 2023-08-31 DIAGNOSIS — F31.81 BIPOLAR 2 DISORDER (HCC): ICD-10-CM

## 2023-08-31 PROCEDURE — 90834 PSYTX W PT 45 MINUTES: CPT | Performed by: COUNSELOR

## 2023-08-31 NOTE — PSYCH
Behavioral Health Psychotherapy Progress Note    Psychotherapy Provided: Individual Psychotherapy     1. PTSD (post-traumatic stress disorder)        2. Bipolar 2 disorder ([address])            Goals addressed in session: Goal 1     DATA: Spending time with woman named Danielle. She is to bring in DBT workbook. Continue focusing on how she feels obligated to help others, how this stems from childhood experiences.  "How do I get better at everything?" Desire to learn guitar with her father, taking time from video games. Discussed her speaking to "parts" of herself from a more curious perspective.  "When I think of doing things I'm not thinking of Ajit, I'm thinking of Ajit and Lilibeth Lager". Focused on validating her feelings of "I don't know" and being allowed to take time to make choices. Emphasized her desire to live deliberately and making decisions based on her full self rather than reactionary decisions based on her anxiety. Discussed her date with Danielle and how wonderful it was for her. Focused on her most basic needs as a human being, especially physical touch, holding, hugging. During this session, this clinician used the following therapeutic modalities: Client-centered Therapy, Cognitive Behavioral Therapy and Existential Therapy    Substance Abuse was not addressed during this session. If the client is diagnosed with a co-occurring substance use disorder, please indicate any changes in the frequency or amount of use: n/a. Stage of change for addressing substance use diagnoses: No substance use/Not applicable    ASSESSMENT:  Janeen Jett presents with a Euthymic/ normal mood. her affect is Normal range and intensity, which is congruent, with her mood and the content of the session. The client has made progress on their goals. Janeen Jett presents with a none risk of suicide, none risk of self-harm, and none risk of harm to others.     For any risk assessment that surpasses a "low" rating, a safety plan must be developed. A safety plan was indicated: yes  If yes, describe in detail n/a    PLAN: Between sessions, Jacque Hernandez will continue to authentically engage with others. At the next session, the therapist will use Client-centered Therapy, Cognitive Behavioral Therapy and Existential Therapy to address depression, anxiety. Behavioral Health Treatment Plan and Discharge Planning: Jacque Hernandez is aware of and agrees to continue to work on their treatment plan. They have identified and are working toward their discharge goals.  yes    Visit start and stop times:    08/31/23  Start Time: 1408  Stop Time: 1458  Total Visit Time: 50 minutes

## 2023-09-05 ENCOUNTER — SOCIAL WORK (OUTPATIENT)
Dept: BEHAVIORAL/MENTAL HEALTH CLINIC | Facility: CLINIC | Age: 28
End: 2023-09-05
Payer: COMMERCIAL

## 2023-09-05 DIAGNOSIS — F43.10 PTSD (POST-TRAUMATIC STRESS DISORDER): Primary | ICD-10-CM

## 2023-09-05 DIAGNOSIS — F31.81 BIPOLAR 2 DISORDER (HCC): ICD-10-CM

## 2023-09-05 PROCEDURE — 90834 PSYTX W PT 45 MINUTES: CPT | Performed by: COUNSELOR

## 2023-09-05 NOTE — PSYCH
Behavioral Health Psychotherapy Progress Note    Psychotherapy Provided: Individual Psychotherapy     1. PTSD (post-traumatic stress disorder)        2. Bipolar 2 disorder ([address])            Goals addressed in session: Goal 1     DATA: Spending time with woman named Candie. She is to bring in DBT workbook. Continue focusing on how she feels obligated to help others, how this stems from childhood experiences.  "How do I get better at everything?" Desire to learn guitar with her father, taking time from video games. Discussed her speaking to "parts" of herself from a more curious perspective.  "When I think of doing things I'm not thinking of Ajit, I'm thinking of Ajit and Milad Round". Reports she had a wonderful time with her friend Kalani Carroll, despite some anxiety surrounding intimacy. She says she enjoyed white water rafting with Milad Round. She reports she has continued to communicate with Kalani Carroll. During this session, this clinician used the following therapeutic modalities: Client-centered Therapy, Cognitive Behavioral Therapy and Existential Therapy    Substance Abuse was not addressed during this session. If the client is diagnosed with a co-occurring substance use disorder, please indicate any changes in the frequency or amount of use: n/a. Stage of change for addressing substance use diagnoses: No substance use/Not applicable    ASSESSMENT:  Adriana Damon presents with a Euthymic/ normal mood. her affect is Normal range and intensity, which is congruent, with her mood and the content of the session. The client has made progress on their goals. Adriana Damon presents with a none risk of suicide, none risk of self-harm, and none risk of harm to others. For any risk assessment that surpasses a "low" rating, a safety plan must be developed.     A safety plan was indicated: n/a  If yes, describe in detail n/a    PLAN: Between sessions, Adriana Damon will continue to meet with Kalani Carroll and enjoy her time with her. At the next session, the therapist will use Client-centered Therapy, Cognitive Behavioral Therapy and Existential Therapy to address depression. Behavioral Health Treatment Plan and Discharge Planning: Leonardo Corcoran is aware of and agrees to continue to work on their treatment plan. They have identified and are working toward their discharge goals.  yes    Visit start and stop times:    09/05/23  Start Time: 1400  Stop Time: 1452  Total Visit Time: 52 minutes

## 2023-09-07 ENCOUNTER — SOCIAL WORK (OUTPATIENT)
Dept: BEHAVIORAL/MENTAL HEALTH CLINIC | Facility: CLINIC | Age: 28
End: 2023-09-07
Payer: COMMERCIAL

## 2023-09-07 DIAGNOSIS — F31.81 BIPOLAR 2 DISORDER (HCC): ICD-10-CM

## 2023-09-07 DIAGNOSIS — F43.10 PTSD (POST-TRAUMATIC STRESS DISORDER): Primary | ICD-10-CM

## 2023-09-07 PROCEDURE — 90834 PSYTX W PT 45 MINUTES: CPT | Performed by: COUNSELOR

## 2023-09-07 NOTE — PSYCH
Behavioral Health Psychotherapy Progress Note    Psychotherapy Provided: Individual Psychotherapy     1. PTSD (post-traumatic stress disorder)        2. Bipolar 2 disorder ([address])            Goals addressed in session: Goal 1     DATA: Spending time with woman named Candie. She is to bring in DBT workbook. Continue focusing on how she feels obligated to help others, how this stems from childhood experiences.  "How do I get better at everything?" Desire to learn guitar with her father, taking time from video games. Discussed her speaking to "parts" of herself from a more curious perspective.  "When I think of doing things I'm not thinking of Ajit, I'm thinking of Ajit and Carlene Kocher". Focused on anticipatory grief with Sunny today, emphasizing how difficult it is to manage emotions at times of great despair, waiting for her grandmother to die. Explored who she can engage with to discuss this with. Has been reaching out to her aunt, her father. Focused on hearing Sunny today. During this session, this clinician used the following therapeutic modalities: Client-centered Therapy, Cognitive Behavioral Therapy and Existential Therapy    Substance Abuse was not addressed during this session. If the client is diagnosed with a co-occurring substance use disorder, please indicate any changes in the frequency or amount of use: n/a. Stage of change for addressing substance use diagnoses: No substance use/Not applicable    ASSESSMENT:  Anamaria Souza presents with a Euthymic/ normal mood. her affect is Normal range and intensity, which is congruent, with her mood and the content of the session. The client has made progress on their goals. Anamaria Souza presents with a none risk of suicide, none risk of self-harm, and none risk of harm to others. For any risk assessment that surpasses a "low" rating, a safety plan must be developed.     A safety plan was indicated: no  If yes, describe in detail n/a    PLAN: Between sessions, Shahram Sanches will continue to process anticipatory grief. At the next session, the therapist will use Client-centered Therapy, Cognitive Behavioral Therapy and Existential Therapy to address depression . Behavioral Health Treatment Plan and Discharge Planning: Shahram Sanches is aware of and agrees to continue to work on their treatment plan. They have identified and are working toward their discharge goals.  yes    Visit start and stop times:    09/07/23  Start Time: 0810  Stop Time: 4753  Total Visit Time: 48 minutes

## 2023-09-15 ENCOUNTER — TELEMEDICINE (OUTPATIENT)
Dept: BEHAVIORAL/MENTAL HEALTH CLINIC | Facility: CLINIC | Age: 28
End: 2023-09-15
Payer: COMMERCIAL

## 2023-09-15 DIAGNOSIS — F43.10 PTSD (POST-TRAUMATIC STRESS DISORDER): Primary | ICD-10-CM

## 2023-09-15 DIAGNOSIS — F31.81 BIPOLAR 2 DISORDER (HCC): ICD-10-CM

## 2023-09-15 PROCEDURE — 90834 PSYTX W PT 45 MINUTES: CPT | Performed by: COUNSELOR

## 2023-09-15 NOTE — PSYCH
Virtual Regular Visit    Verification of patient location:    Patient is located at Home in the following state in which I hold an active license [place]      Assessment/Plan:    Problem List Items Addressed This Visit        Other    Bipolar 2 disorder ([address])    PTSD (post-traumatic stress disorder) - Primary       Goals addressed in session: Goal 1          Reason for visit is   Chief Complaint   Patient presents with   • Virtual Regular Visit        Encounter provider Jennifer Sands    Provider located at [address] [place] [id number] [phone]      Recent Visits  No visits were found meeting these conditions. Showing recent visits within past 7 days and meeting all other requirements  Today's Visits  Date Type Provider Dept   09/15/23 Telemedicine [address] [place] [address] today's visits and meeting all other requirements  Future Appointments  No visits were found meeting these conditions. Showing future appointments within next 150 days and meeting all other requirements       The patient was identified by name and date of birth. Destin Sampson was informed that this is a telemedicine visit and [facility] She agrees to proceed. .  My office door was closed. No one else was in the room. She acknowledged consent and understanding of privacy and security of the video platform. The patient has agreed to participate and understands they can discontinue the visit at any time. Patient is aware this is a billable service. Manish Gerber is a 29 y.o. female  .       HPI     Past Medical History:   Diagnosis Date   • Anxiety    • Bipolar disorder ([address])    • Borderline personality disorder ([address])    • Depression    • Self-injurious behavior        Past Surgical History:   Procedure Laterality Date   • CERVICAL BIOPSY  W/ LOOP ELECTRODE EXCISION     • FL INJECTION LEFT HIP (ARTHROGRAM)  4/7/2021   • WISDOM TOOTH EXTRACTION         Current Outpatient Medications   Medication Sig Dispense Refill   • albuterol (PROVENTIL HFA,VENTOLIN HFA) 90 mcg/act inhaler Inhale 2 puffs every 6 (six) hours as needed for wheezing or shortness of breath 18 g 1   • buPROPion (WELLBUTRIN XL) 150 mg 24 hr tablet Take 1 tablet (150 mg total) by mouth daily 90 tablet 0   • ergocalciferol (VITAMIN D2) 50,000 units TAKE 1 CAPSULE BY MOUTH ONE TIME PER WEEK 12 capsule 1   • hydrOXYzine HCL (ATARAX) 10 mg tablet Take 1 tablet (10 mg total) by mouth every 6 (six) hours as needed for anxiety 30 tablet 2   • lamoTRIgine (LaMICtal) 200 MG tablet Take 2 tablets (400 mg total) by mouth daily 180 tablet 0   • Levonorgestrel 13.5 MG IUD 1 each by Intrauterine route     • traZODone (DESYREL) 50 mg tablet TAKE 0.5 TABLETS (25 MG TOTAL) BY MOUTH DAILY AT BEDTIME 45 tablet 0   • valACYclovir (VALTREX) 1,000 mg tablet Take 2 tablets (2,000 mg total) by mouth 2 (two) times a day for 1 day (Patient taking differently: Take 2,000 mg by mouth 2 (two) times a day As needed) 24 tablet 0     No current facility-administered medications for this visit. No Known Allergies    Review of Systems    Video Exam    There were no vitals filed for this visit. Physical Exam     Behavioral Health Psychotherapy Progress Note    Psychotherapy Provided: Individual Psychotherapy     1. PTSD (post-traumatic stress disorder)        2. Bipolar 2 disorder ([address] [facility])            Goals addressed in session: Goal 1     DATA: Spending time with woman named Candie. She is to bring in DBT workbook. Continue focusing on how she feels obligated to help others, how this stems from childhood experiences.  "How do I get better at everything?" Desire to learn guitar with her father, taking time from video games. Discussed her speaking to "parts" of herself from a more curious perspective.  "When I think of doing things I'm not thinking of Ajit, I'm thinking of Ajit and Edyth Gut". She reports she has been experiencing some derealization - asked her to keep track of this by writing down what was happening, the time it occurs. She says her friend Jesica Rawls came over, helped her walk the dogs, cooked together. She says she felt immense relief, once Jesica Rawls just helped her with cooking, cleaning in her home, working together. Felt some anxiety about her friend helping her. Strong focus on her feeling of relief. Discussed what this connection felt like for her. Saturday night cousin came over to help walk dogs, spend time with one another. She says she experiences some relief as well with her father and feeling a sense of love. During this session, this clinician used the following therapeutic modalities: Client-centered Therapy, Cognitive Behavioral Therapy and Existential Therapy    Substance Abuse was not addressed during this session. If the client is diagnosed with a co-occurring substance use disorder, please indicate any changes in the frequency or amount of use: n/a. Stage of change for addressing substance use diagnoses: No substance use/Not applicable    ASSESSMENT:  Kellie Zarate presents with a Euthymic/ normal mood. her affect is Normal range and intensity, which is congruent, with her mood and the content of the session. The client has made progress on their goals. Kellie Zarate presents with a none risk of suicide, none risk of self-harm, and none risk of harm to others. For any risk assessment that surpasses a "low" rating, a safety plan must be developed. A safety plan was indicated: no  If yes, describe in detail n/a    PLAN: Between sessions, Kellie Zarate will continue to engage with her life authentically.  At the next session, the therapist will use Client-centered Therapy, Cognitive Behavioral Therapy and Existential Therapy to address depression. Behavioral Health Treatment Plan and Discharge Planning: Millie Mares is aware of and agrees to continue to work on their treatment plan. They have identified and are working toward their discharge goals.  yes    Visit start and stop times:    09/15/23  Start Time: 1302  Stop Time: 1348  Total Visit Time: 46 minutes

## 2023-09-19 ENCOUNTER — SOCIAL WORK (OUTPATIENT)
Dept: BEHAVIORAL/MENTAL HEALTH CLINIC | Facility: CLINIC | Age: 28
End: 2023-09-19
Payer: COMMERCIAL

## 2023-09-19 DIAGNOSIS — F31.81 BIPOLAR 2 DISORDER (HCC): ICD-10-CM

## 2023-09-19 DIAGNOSIS — F43.10 PTSD (POST-TRAUMATIC STRESS DISORDER): Primary | ICD-10-CM

## 2023-09-19 PROCEDURE — 90834 PSYTX W PT 45 MINUTES: CPT | Performed by: COUNSELOR

## 2023-09-19 NOTE — PSYCH
Behavioral Health Psychotherapy Progress Note    Psychotherapy Provided: Individual Psychotherapy     1. PTSD (post-traumatic stress disorder)        2. Bipolar 2 disorder ([address])            Goals addressed in session: Goal 1     DATA: UPDATE [place] PLAN. Spending time with woman named Candie. She is to bring in DBT workbook. Continue focusing on how she feels obligated to help others, how this stems from childhood experiences.  "How do I get better at everything?" Desire to learn guitar with her father, taking time from video games. Discussed her speaking to "parts" of herself from a more curious perspective.  "When I think of doing things I'm not thinking of Ajit, I'm thinking of Ajit and Dillon Bland". She reports that she had an intense conversation with her roommate Dillon Bland, told Dillon Bland how she feels about her boundary crossing. Discussed her friend crossing her boundaries at home that they had set together, the emotional manipulation. Discussed how incredible Ajit's work has been on her ability to say no to Dillon Bland and have difficult conversations. During this session, this clinician used the following therapeutic modalities: Client-centered Therapy, Cognitive Behavioral Therapy and Existential Therapy    Substance Abuse was not addressed during this session. If the client is diagnosed with a co-occurring substance use disorder, please indicate any changes in the frequency or amount of use: n/a. Stage of change for addressing substance use diagnoses: No substance use/Not applicable    ASSESSMENT:  Raúl Grande presents with a Anxious mood. her affect is Normal range and intensity, which is congruent, with her mood and the content of the session. The client has made progress on their goals. Raúl Grande presents with a none risk of suicide, none risk of self-harm, and none risk of harm to others. For any risk assessment that surpasses a "low" rating, a safety plan must be developed.     A safety plan was indicated: no  If yes, describe in detail n/a    PLAN: Between sessions, Traci Church will continue to set healthy boundaries at home. At the next session, the therapist will use Client-centered Therapy, Cognitive Behavioral Therapy and Existential Therapy to address depression. Behavioral Health Treatment Plan and Discharge Planning: Traci Church is aware of and agrees to continue to work on their treatment plan. They have identified and are working toward their discharge goals.  yes    Visit start and stop times:    09/19/23  Start Time: 1400  Stop Time: 1445  Total Visit Time: 45 minutes

## 2023-09-22 ENCOUNTER — TELEMEDICINE (OUTPATIENT)
Dept: BEHAVIORAL/MENTAL HEALTH CLINIC | Facility: CLINIC | Age: 28
End: 2023-09-22
Payer: COMMERCIAL

## 2023-09-22 DIAGNOSIS — F43.10 PTSD (POST-TRAUMATIC STRESS DISORDER): Primary | ICD-10-CM

## 2023-09-22 DIAGNOSIS — F31.81 BIPOLAR 2 DISORDER (HCC): ICD-10-CM

## 2023-09-22 PROCEDURE — 90834 PSYTX W PT 45 MINUTES: CPT | Performed by: COUNSELOR

## 2023-09-22 NOTE — PSYCH
Virtual Regular Visit    Verification of patient location:    Patient is located at Home in the following state in which I hold an active license [place]      Assessment/Plan:    Problem List Items Addressed This Visit        Other    Bipolar 2 disorder ([address])    PTSD (post-traumatic stress disorder) - Primary       Goals addressed in session: Goal 1          Reason for visit is   Chief Complaint   Patient presents with   • Virtual Regular Visit        Encounter provider Roxanna Martins    Provider located at [address] [place] [id number] [phone]      Recent Visits  Date Type Provider Dept   09/15/23 [facility]   Showing recent visits within past 7 days and meeting all other requirements  Today's Visits  Date Type Provider Dept   09/22/23 Telemedicine [address]   Showing today's visits and meeting all other requirements  Future Appointments  No visits were found meeting these conditions. Showing future appointments within next 150 days and meeting all other requirements       The patient was identified by name and date of birth. Joann Mena was informed that this is a telemedicine visit and [facility] She agrees to proceed. .  My office door was closed. No one else was in the room. She acknowledged consent and understanding of privacy and security of the video platform. The patient has agreed to participate and understands they can discontinue the visit at any time. Patient is aware this is a billable service. Rosa Cornelius is a 29 y.o. female.       HPI     Past Medical History:   Diagnosis Date   • Anxiety    • Bipolar disorder ([address])    • Borderline personality disorder ([address])    • Depression    • Self-injurious behavior        Past Surgical History:   Procedure Laterality Date   • CERVICAL BIOPSY  W/ LOOP ELECTRODE EXCISION     • FL INJECTION LEFT HIP (ARTHROGRAM)  4/7/2021   • WISDOM TOOTH EXTRACTION         Current Outpatient Medications   Medication Sig Dispense Refill   • albuterol (PROVENTIL HFA,VENTOLIN HFA) 90 mcg/act inhaler Inhale 2 puffs every 6 (six) hours as needed for wheezing or shortness of breath 18 g 1   • buPROPion (WELLBUTRIN XL) 150 mg 24 hr tablet Take 1 tablet (150 mg total) by mouth daily 90 tablet 0   • ergocalciferol (VITAMIN D2) 50,000 units TAKE 1 CAPSULE BY MOUTH ONE TIME PER WEEK 12 capsule 1   • hydrOXYzine HCL (ATARAX) 10 mg tablet Take 1 tablet (10 mg total) by mouth every 6 (six) hours as needed for anxiety 30 tablet 2   • lamoTRIgine (LaMICtal) 200 MG tablet Take 2 tablets (400 mg total) by mouth daily 180 tablet 0   • Levonorgestrel 13.5 MG IUD 1 each by Intrauterine route     • traZODone (DESYREL) 50 mg tablet TAKE 0.5 TABLETS (25 MG TOTAL) BY MOUTH DAILY AT BEDTIME 45 tablet 0   • valACYclovir (VALTREX) 1,000 mg tablet Take 2 tablets (2,000 mg total) by mouth 2 (two) times a day for 1 day (Patient taking differently: Take 2,000 mg by mouth 2 (two) times a day As needed) 24 tablet 0     No current facility-administered medications for this visit. No Known Allergies    Review of Systems    Video Exam    There were no vitals filed for this visit. Physical Exam     Behavioral Health Psychotherapy Progress Note    Psychotherapy Provided: Individual Psychotherapy     1. PTSD (post-traumatic stress disorder)        2. Bipolar 2 disorder ([address])            Goals addressed in session: Goal 1     DATA: UPDATE [place] PLAN. Spending time with woman named Candie. She is to bring in DBT workbook. Continue focusing on how she feels obligated to help others, how this stems from childhood experiences.  "How do I get better at everything?" Desire to learn guitar with her father, taking time from video games. Discussed her speaking to "parts" of herself from a more curious perspective.  "When I think of doing things I'm not thinking of Ajit, I'm thinking of Ajit and Abigail Fairbanks". Treatment plan updated. She says that she had a meeting with Abigail Fairbanks and it was challenging for her. She says that Abigail Fairbanks wasn't interested in meeting with her to work on their relationship - she felt that Abigail Fairbanks wasn't listening to her. She attempting to carry out the plan that they had come up with together. Focused today on what she has been feeling as she continues to set boundaries and express herself fully and openly in her relationships - the relief she feels entering into her authentic self and showing her authentic self. During this session, this clinician used the following therapeutic modalities: Client-centered Therapy, Cognitive Behavioral Therapy and Existential Therapy    Substance Abuse was not addressed during this session. If the client is diagnosed with a co-occurring substance use disorder, please indicate any changes in the frequency or amount of use: n/a. Stage of change for addressing substance use diagnoses: No substance use/Not applicable    ASSESSMENT:  Jacque Hernandez presents with a Euthymic/ normal mood. her affect is Normal range and intensity, which is congruent, with her mood and the content of the session. The client has made progress on their goals. Jacque Hernandez presents with a none risk of suicide, none risk of self-harm, and none risk of harm to others. For any risk assessment that surpasses a "low" rating, a safety plan must be developed. A safety plan was indicated: no  If yes, describe in detail n/a    PLAN: Between sessions, Jacque Hernandez will continue to engage in boundary setting, being open and honest about her feelings, expressing herself, making decisions for herself.  At the next session, the therapist will use Client-centered Therapy, Cognitive Behavioral Therapy and Existential Therapy to address depression, PTSD. Behavioral Health Treatment Plan and Discharge Planning: Joao Black is aware of and agrees to continue to work on their treatment plan. They have identified and are working toward their discharge goals.  yes    Visit start and stop times:    09/22/23  Start Time: 0803  Stop Time: 3908  Total Visit Time: 43 minutes

## 2023-09-22 NOTE — BH TREATMENT PLAN
Outpatient Behavioral Health Psychotherapy Treatment Plan     [address]  1995      Date of Initial Psychotherapy Assessment: 7/15/2021   Date of Current Treatment Plan: 9/22/2023  Treatment Plan Target Date: 3/22/2024  Treatment Plan Expiration Date: 3/22/2024     Diagnosis:   1. History of posttraumatic stress disorder (PTSD)          2. Bipolar 2 disorder (HCC)                Area(s) of Need: Lower self-esteem at times, existential concerns about death and isolation     Long Term Goal 1 (in the client's own words): "Incorporating my trauma and emotions into relationships and intimacy" "Understanding that my trauma is a part of me" ("Oh, I got that" Completed 9/22/2023). PHQ9 will continue to remain "no or minimal" depression, PCL-5 will remain under 32 threshold for PTSD. Increased understanding of existential concerns.      Stage of Change: Action     Target Date for completion: 3/22/2024             Anticipated therapeutic modalities: PCT, CBT, ET, CPT             People identified to complete this goal: Irena Ruano                    Objective 1: (identify the means of measuring success in meeting the objective): Misbah Birmingham will follw through with her medication management, taking medications as prescribed, engaging in various treatment modalities, following through with Vincent Wen will continue to set boundaries in the immediate. Objective 2: (identify the means of measuring success in meeting the objective): Inga Kendrick will continue to explore her narrative, complete IRT work, engage in self-care at least once per day 7/7 days of the week. This writer will provide Inga Kendrick with PCT, CBT, SFBT, DBT through her course of treatment.  CBT/DBT for emotional resiliance. Psychoeducation regarding her diagnosis.  PCT and ET work throughout the course of treatment. Increased ET work.       Long Term Goal 2 (in the client's own words):  "I definitely want to get into the reconnection of body thing" regarding her trauma.  "I will be able to better read what my body feels . .. to be able to sit with [a feeling in my body of physical intimacy]"     Stage of Change: Action     Target Date for completion: 10/6/2023             Anticipated therapeutic modalities: PCT, ET             People identified to complete this goal: Laura Vergarabonnie                    Objective 1: (identify the means of measuring success in meeting the objective): Barrera Vasquez will engage in mindfulness related to her body, awareness of sensations happening within her body, practicing articulation of feelings within her body, to increase awareness of how her trauma affects her physical being in her day to day, at least once per day. Objective 2: (identify the means of measuring success in meeting the objective): Barrera Vasquez will continue to engage in self-care at home once per day. This writer will provide psychosomatic psychoeducation. I am currently under the care of a [facility] psychiatric provider: yes     My [facility] psychiatric provider is: Dr. Hunter Underwood      I am currently taking psychiatric medications: Yes, as prescribed     I feel that I will be ready for discharge from mental health care when I reach the following (measurable goal/objective): Goal 1 and 2     For children and adults who have a legal guardian:          Has there been any change to custody orders and/or guardianship status? NA. If yes, attach updated documentation.     I have created my Crisis Plan and have been offered a copy of this plan     [facility]: Diagnosis and Treatment Plan explained to [address] [place] [address] acknowledges an understanding of their diagnosis.  Juan Ospina agrees to this treatment plan.     I have been offered a copy of this Treatment Plan. yes

## 2023-09-26 ENCOUNTER — SOCIAL WORK (OUTPATIENT)
Dept: BEHAVIORAL/MENTAL HEALTH CLINIC | Facility: CLINIC | Age: 28
End: 2023-09-26
Payer: COMMERCIAL

## 2023-09-26 DIAGNOSIS — F43.10 PTSD (POST-TRAUMATIC STRESS DISORDER): Primary | ICD-10-CM

## 2023-09-26 PROCEDURE — 90834 PSYTX W PT 45 MINUTES: CPT | Performed by: COUNSELOR

## 2023-09-26 NOTE — PSYCH
Behavioral Health Psychotherapy Progress Note    Psychotherapy Provided: Individual Psychotherapy     1. PTSD (post-traumatic stress disorder)            Goals addressed in session: Goal 1     DATA: "It feels scary" "I feel so aggressive" Spending time with woman named Candie. She is to bring in DBT workbook. Continue focusing on how she feels obligated to help others, how this stems from childhood experiences.  "How do I get better at everything?" Desire to learn guitar with her father, taking time from video games. Discussed her speaking to "parts" of herself from a more curious perspective.  "When I think of doing things I'm not thinking of Ajit, I'm thinking of Ajit and Edyth Gut". She reported on rediscovering her sexuality through experiences with her girlfriend. She says that she has been enjoying this time with her friend Danielle. During this session, this clinician used the following therapeutic modalities: Client-centered Therapy, Cognitive Behavioral Therapy and Existential Therapy    Substance Abuse was not addressed during this session. If the client is diagnosed with a co-occurring substance use disorder, please indicate any changes in the frequency or amount of use: n/a. Stage of change for addressing substance use diagnoses: No substance use/Not applicable    ASSESSMENT:  Kellie Zarate presents with a Euthymic/ normal mood. her affect is Normal range and intensity, which is congruent, with her mood and the content of the session. The client has made progress on their goals. Kellie Zarate presents with a none risk of suicide, none risk of self-harm, and none risk of harm to others. For any risk assessment that surpasses a "low" rating, a safety plan must be developed. A safety plan was indicated: no  If yes, describe in detail n/a    PLAN: Between sessions, Kellie Zarate will continue to engage fully with her girlfriend.  At the next session, the therapist will use Client-centered Therapy, Cognitive Behavioral Therapy and Existential Therapy to address depression. Behavioral Health Treatment Plan and Discharge Planning: Anamaria Souza is aware of and agrees to continue to work on their treatment plan. They have identified and are working toward their discharge goals.  yes    Visit start and stop times:    09/26/23  Start Time: 1500  Stop Time: 1552  Total Visit Time: 52 minutes

## 2023-09-28 ENCOUNTER — SOCIAL WORK (OUTPATIENT)
Dept: BEHAVIORAL/MENTAL HEALTH CLINIC | Facility: CLINIC | Age: 28
End: 2023-09-28
Payer: COMMERCIAL

## 2023-09-28 DIAGNOSIS — F31.81 BIPOLAR 2 DISORDER (HCC): ICD-10-CM

## 2023-09-28 DIAGNOSIS — F43.10 PTSD (POST-TRAUMATIC STRESS DISORDER): Primary | ICD-10-CM

## 2023-09-28 PROCEDURE — 90834 PSYTX W PT 45 MINUTES: CPT | Performed by: COUNSELOR

## 2023-09-28 NOTE — PSYCH
Behavioral Health Psychotherapy Progress Note    Psychotherapy Provided: Individual Psychotherapy     1. PTSD (post-traumatic stress disorder)        2. Bipolar 2 disorder ([address])            Goals addressed in session: Goal 1     DATA: "It feels scary" "I feel so aggressive" Spending time with woman named Candie. She is to bring in DBT workbook. Continue focusing on how she feels obligated to help others, how this stems from childhood experiences.  "How do I get better at everything?" Desire to learn guitar with her father, taking time from video games. Discussed her speaking to "parts" of herself from a more curious perspective.  "When I think of doing things I'm not thinking of Ajit, I'm thinking of Ajit and Joe Pro". Focused with her on intimacy, being challenged in her intimacy, enjoying her intimacy, her time with her girlfriend. Patient engaged with this writer through exploration of polyamory and this writer engaged in exploration of this meaning in her life. During this session, this clinician used the following therapeutic modalities: Client-centered Therapy, Cognitive Behavioral Therapy and Existential Therapy    Substance Abuse was not addressed during this session. If the client is diagnosed with a co-occurring substance use disorder, please indicate any changes in the frequency or amount of use: n/a. Stage of change for addressing substance use diagnoses: No substance use/Not applicable    ASSESSMENT:  Wallace Valdez presents with a Euthymic/ normal mood. her affect is Normal range and intensity, which is congruent, with her mood and the content of the session. The client has made progress on their goals. Wallace Valdez presents with a none risk of suicide, none risk of self-harm, and none risk of harm to others. For any risk assessment that surpasses a "low" rating, a safety plan must be developed.     A safety plan was indicated: no  If yes, describe in detail n/a    PLAN: Between sessions, Aleyda Marvin will continue to engage with her intimate relationships. At the next session, the therapist will use Client-centered Therapy, Cognitive Behavioral Therapy and Existential Therapy to address depression. Behavioral Health Treatment Plan and Discharge Planning: Aleyda Marvin is aware of and agrees to continue to work on their treatment plan. They have identified and are working toward their discharge goals.  yes    Visit start and stop times:    09/28/23  Start Time: 1404  Stop Time: 1450  Total Visit Time: 46 minutes

## 2023-10-03 ENCOUNTER — TELEPHONE (OUTPATIENT)
Dept: PSYCHIATRY | Facility: CLINIC | Age: 28
End: 2023-10-03

## 2023-10-03 ENCOUNTER — TELEMEDICINE (OUTPATIENT)
Dept: BEHAVIORAL/MENTAL HEALTH CLINIC | Facility: CLINIC | Age: 28
End: 2023-10-03
Payer: COMMERCIAL

## 2023-10-03 DIAGNOSIS — F31.81 BIPOLAR 2 DISORDER (HCC): ICD-10-CM

## 2023-10-03 DIAGNOSIS — F43.10 PTSD (POST-TRAUMATIC STRESS DISORDER): Primary | ICD-10-CM

## 2023-10-03 PROCEDURE — 90834 PSYTX W PT 45 MINUTES: CPT | Performed by: COUNSELOR

## 2023-10-03 NOTE — PSYCH
Virtual Regular Visit    Verification of patient location:    Patient is located at Home in the following state in which I hold an active license [place]      Assessment/Plan:    Problem List Items Addressed This Visit        Other    Bipolar 2 disorder ([address])    PTSD (post-traumatic stress disorder) - Primary       Goals addressed in session: Goal 1          Reason for visit is   Chief Complaint   Patient presents with   • Virtual Regular Visit        Encounter provider Nathanael Bauman    Provider located at [address] [place] [address] Road [id number] [phone]      Recent Visits  No visits were found meeting these conditions. Showing recent visits within past 7 days and meeting all other requirements  Today's Visits  Date Type Provider Dept   10/03/23 Telephone [address]   10/03/23 [facility]   Showing today's visits and meeting all other requirements  Future Appointments  No visits were found meeting these conditions. Showing future appointments within next 150 days and meeting all other requirements       The patient was identified by name and date of birth. Nissamckenzie Dario was informed that this is a telemedicine visit [facility] She agrees to proceed. .  My office door was closed. No one else was in the room. She acknowledged consent and understanding of privacy and security of the video platform. The patient has agreed to participate and understands they can discontinue the visit at any time. Patient is aware this is a billable service. Sherri Soares is a 29 y.o. female  .       HPI     Past Medical History:   Diagnosis Date   • Anxiety    • Bipolar disorder ([address])    • Borderline personality disorder ([address])    • Depression    • Self-injurious behavior        Past Surgical History:   Procedure Laterality Date   • CERVICAL BIOPSY  W/ LOOP ELECTRODE EXCISION     • FL INJECTION LEFT HIP (ARTHROGRAM)  4/7/2021   • WISDOM TOOTH EXTRACTION         Current Outpatient Medications   Medication Sig Dispense Refill   • albuterol (PROVENTIL HFA,VENTOLIN HFA) 90 mcg/act inhaler Inhale 2 puffs every 6 (six) hours as needed for wheezing or shortness of breath 18 g 1   • buPROPion (WELLBUTRIN XL) 150 mg 24 hr tablet Take 1 tablet (150 mg total) by mouth daily 90 tablet 0   • ergocalciferol (VITAMIN D2) 50,000 units TAKE 1 CAPSULE BY MOUTH ONE TIME PER WEEK 12 capsule 1   • hydrOXYzine HCL (ATARAX) 10 mg tablet Take 1 tablet (10 mg total) by mouth every 6 (six) hours as needed for anxiety 30 tablet 2   • lamoTRIgine (LaMICtal) 200 MG tablet Take 2 tablets (400 mg total) by mouth daily 180 tablet 0   • Levonorgestrel 13.5 MG IUD 1 each by Intrauterine route     • traZODone (DESYREL) 50 mg tablet TAKE 0.5 TABLETS (25 MG TOTAL) BY MOUTH DAILY AT BEDTIME 45 tablet 0   • valACYclovir (VALTREX) 1,000 mg tablet Take 2 tablets (2,000 mg total) by mouth 2 (two) times a day for 1 day (Patient taking differently: Take 2,000 mg by mouth 2 (two) times a day As needed) 24 tablet 0     No current facility-administered medications for this visit. No Known Allergies    Review of Systems    Video Exam    There were no vitals filed for this visit. Physical Exam     Behavioral Health Psychotherapy Progress Note    Psychotherapy Provided: Individual Psychotherapy     1. PTSD (post-traumatic stress disorder)        2. Bipolar 2 disorder ([address])            Goals addressed in session: Goal 1     DATA: "It feels scary" "I feel so aggressive" Spending time with woman named Candie. She is to bring in DBT workbook. Continue focusing on how she feels obligated to help others, how this stems from childhood experiences.  "How do I get better at everything?" Desire to learn guitar with her father, taking time from video games. Discussed her speaking to "parts" of herself from a more curious perspective.  "When I think of doing things I'm not thinking of Ajit, I'm thinking of Ajit and Jessica Montero". She reports that she had an argument with her roommate Jessica Montero. Focused on how this conversation apparently, seemingly was filled with gaslighting efforts on Pablo's part, manipulation and apparent narcissism. Mentioned derealization at some point. Focused on coping skills for dissociation. Reaching out to others, using hot/cold to ground herself, doing anything physically     During this session, this clinician used the following therapeutic modalities: Client-centered Therapy, Cognitive Behavioral Therapy and Existential Therapy    Substance Abuse was not addressed during this session. If the client is diagnosed with a co-occurring substance use disorder, please indicate any changes in the frequency or amount of use: n/a. Stage of change for addressing substance use diagnoses: No substance use/Not applicable    ASSESSMENT:  Destin Sampson presents with a Euthymic/ normal mood. her affect is Normal range and intensity, which is congruent, with her mood and the content of the session. The client has made progress on their goals. Destin Sampson presents with a none risk of suicide, none risk of self-harm, and none risk of harm to others. noFor any risk assessment that surpasses a "low" rating, a safety plan must be developed. A safety plan was indicated: no  If yes, describe in detail n/a    PLAN: Between sessions, Destin Sampson will continue to engage with her setting boundaries. At the next session, the therapist will use Client-centered Therapy, Cognitive Behavioral Therapy, Dialectical Behavior Therapy and Existential Therapy to address depression.     Behavioral Health Treatment Plan and Discharge Planning: Destin Sampson is aware of and agrees to continue to work on their treatment plan. They have identified and are working toward their discharge goals.  yes    Visit start and stop times:    10/03/23  Start Time: 0297  Stop Time: 1450  Total Visit Time: 47 minutes

## 2023-10-03 NOTE — TELEPHONE ENCOUNTER
Spoke with the pt in regards tot her appt with Mercie Dancer. Appt was changed to vurtual with the link going to her my chart.

## 2023-10-05 ENCOUNTER — TELEMEDICINE (OUTPATIENT)
Dept: BEHAVIORAL/MENTAL HEALTH CLINIC | Facility: CLINIC | Age: 28
End: 2023-10-05
Payer: COMMERCIAL

## 2023-10-05 DIAGNOSIS — F31.81 BIPOLAR 2 DISORDER (HCC): ICD-10-CM

## 2023-10-05 DIAGNOSIS — F43.10 PTSD (POST-TRAUMATIC STRESS DISORDER): Primary | ICD-10-CM

## 2023-10-05 PROCEDURE — 90834 PSYTX W PT 45 MINUTES: CPT | Performed by: COUNSELOR

## 2023-10-05 NOTE — PSYCH
Virtual Regular Visit    Verification of patient location:    Patient is located at Home in the following state in which I hold an active license [place]      Assessment/Plan:    Problem List Items Addressed This Visit        Other    Bipolar 2 disorder ([address])    PTSD (post-traumatic stress disorder) - Primary       Goals addressed in session: Goal 1          Reason for visit is   Chief Complaint   Patient presents with   • Virtual Regular Visit        Encounter provider Glorine Records    Provider located at [address] [place] [id number] [phone]      Recent Visits  Date Type Provider Dept   10/03/23 Telephone [address]   10/03/23 [id number] recent visits within past 7 days and meeting all other requirements  Today's Visits  Date Type Provider Dept   10/05/23 Telemedicine [facility] [address] [place] today's visits and meeting all other requirements  Future Appointments  No visits were found meeting these conditions. Showing future appointments within next 150 days and meeting all other requirements       The patient was identified by name and date of birth. Kellie Zarate was informed that this is a telemedicine visit and [facility] She agrees to proceed. .  My office door was closed. No one else was in the room. She acknowledged consent and understanding of privacy and security of the video platform. The patient has agreed to participate and understands they can discontinue the visit at any time. Patient is aware this is a billable service. Bernabe Castaneda is a 29 y.o. female  .       HPI     Past Medical History:   Diagnosis Date   • Anxiety    • Bipolar disorder ([address])    • Borderline personality disorder ([address])    • Depression    • Self-injurious behavior        Past Surgical History:   Procedure Laterality Date   • CERVICAL BIOPSY  W/ LOOP ELECTRODE EXCISION     • FL INJECTION LEFT HIP (ARTHROGRAM)  4/7/2021   • WISDOM TOOTH EXTRACTION         Current Outpatient Medications   Medication Sig Dispense Refill   • albuterol (PROVENTIL HFA,VENTOLIN HFA) 90 mcg/act inhaler Inhale 2 puffs every 6 (six) hours as needed for wheezing or shortness of breath 18 g 1   • buPROPion (WELLBUTRIN XL) 150 mg 24 hr tablet Take 1 tablet (150 mg total) by mouth daily 90 tablet 0   • ergocalciferol (VITAMIN D2) 50,000 units TAKE 1 CAPSULE BY MOUTH ONE TIME PER WEEK 12 capsule 1   • hydrOXYzine HCL (ATARAX) 10 mg tablet Take 1 tablet (10 mg total) by mouth every 6 (six) hours as needed for anxiety 30 tablet 2   • lamoTRIgine (LaMICtal) 200 MG tablet Take 2 tablets (400 mg total) by mouth daily 180 tablet 0   • Levonorgestrel 13.5 MG IUD 1 each by Intrauterine route     • traZODone (DESYREL) 50 mg tablet TAKE 0.5 TABLETS (25 MG TOTAL) BY MOUTH DAILY AT BEDTIME 45 tablet 0   • valACYclovir (VALTREX) 1,000 mg tablet Take 2 tablets (2,000 mg total) by mouth 2 (two) times a day for 1 day (Patient taking differently: Take 2,000 mg by mouth 2 (two) times a day As needed) 24 tablet 0     No current facility-administered medications for this visit. No Known Allergies    Review of Systems    Video Exam    There were no vitals filed for this visit. Physical Exam     Behavioral Health Psychotherapy Progress Note    Psychotherapy Provided: Individual Psychotherapy     1. PTSD (post-traumatic stress disorder)        2.  Bipolar 2 disorder ([address])            Goals addressed in session: Goal 1     DATA: "It feels scary" "I feel so aggressive" Spending time with woman named Candie. She is to bring in DBT workbook. Continue focusing on how she feels obligated to help others, how this stems from childhood experiences.  "How do I get better at everything?" Desire to learn guitar with her father, taking time from video games. Discussed her speaking to "parts" of herself from a more curious perspective.  "When I think of doing things I'm not thinking of Ajit, I'm thinking of Jait and Fanny Shell". During this session, this clinician used the following therapeutic modalities: Client-centered Therapy, Cognitive Behavioral Therapy and Existential Therapy    Substance Abuse was not addressed during this session. If the client is diagnosed with a co-occurring substance use disorder, please indicate any changes in the frequency or amount of use: n/a. Stage of change for addressing substance use diagnoses: No substance use/Not applicable    ASSESSMENT:  Angelo Gomez presents with a Euthymic/ normal mood. her affect is Normal range and intensity, which is congruent, with her mood and the content of the session. The client has made progress on their goals. Angelo Gomez presents with a none risk of suicide, none risk of self-harm, and none risk of harm to others. For any risk assessment that surpasses a "low" rating, a safety plan must be developed. A safety plan was indicated: no  If yes, describe in detail n/a    PLAN: Between sessions, Angelo Gomez will consider her feelings. At the next session, the therapist will use Client-centered Therapy, Cognitive Behavioral Therapy and Existential Therapy to address depression. Behavioral Health Treatment Plan and Discharge Planning: Angelo Gomez is aware of and agrees to continue to work on their treatment plan. They have identified and are working toward their discharge goals.  yes    Visit start and stop times:    10/05/23  Start Time: 0901  Stop Time: 2938  Total Visit Time: 50 minutes

## 2023-10-05 NOTE — PSYCH
"Psychotherapy Provided: Individual Psychotherapy 45 minutes     Length of time in session: 45 minutes, follow up in 1 week    Encounter Diagnosis     ICD-10-CM    1  History of posttraumatic stress disorder (PTSD)  Z86 [id number]       2  Bipolar 2 disorder (HCC)  F31 81           Goals addressed in session: Goal 1     Pain:      none    0    Current suicide risk : Low     Previous session plan: Seeing BI-POC therapist  Anup Matamoros trusting herself to see red flags  Expanding social [place], reaching out to mother  Check in with practicing pendulation, anxiety heart muscle shaped with real organ colors "The dripping heart", forearms, shaped like statue arms wrapped in the shape of a hug, white, "hug"  Check in with desire to help community, be part of [facility]    D: Discussed her developing relationship with a bi-poc therapist  Explored her leaning into collectivism  Discussed how she sometimes applies thoughts about her trauma related This writer provided PCT through empathic listening, validation of feelings, reflection of content and feelings, CBT through socratic dialogue  A: Genny Latham appeared to be in a euthymic mood with congruent affect, seemed to be experiencing lower mood at times, excessive worry, racing thoughts, appeared to be kempt, no SI/HI nor CAMARGO risk apparent or reported, seemed to respond well to PCT, CBT work  P: Discuss pleasure, masturbation and discuss dream related to this  Collectivism mindset  CONTINUED: Not trusting herself to see red flags  Expanding social [place], reaching out to mother  Check in with practicing pendulation, anxiety heart muscle shaped with real organ colors "The dripping heart", forearms, shaped like statue arms wrapped in the shape of a hug, white, "hug"   Check in with desire to help community, be part of [address] [place] [address] Zan: Diagnosis and Treatment Plan explained to Samantha Stuart relates understanding diagnosis and is agreeable to Treatment Plan   Yes     Visit start and stop times:    11/30/22  Start Time: 6900  Stop Time: 1553  Total Visit Time: 45 minutes

## 2023-10-10 ENCOUNTER — SOCIAL WORK (OUTPATIENT)
Dept: BEHAVIORAL/MENTAL HEALTH CLINIC | Facility: CLINIC | Age: 28
End: 2023-10-10
Payer: COMMERCIAL

## 2023-10-10 DIAGNOSIS — F31.81 BIPOLAR 2 DISORDER (HCC): ICD-10-CM

## 2023-10-10 DIAGNOSIS — F43.10 PTSD (POST-TRAUMATIC STRESS DISORDER): Primary | ICD-10-CM

## 2023-10-10 PROCEDURE — 90834 PSYTX W PT 45 MINUTES: CPT | Performed by: COUNSELOR

## 2023-10-10 NOTE — PSYCH
Behavioral Health Psychotherapy Progress Note    Psychotherapy Provided: Individual Psychotherapy     1. PTSD (post-traumatic stress disorder)        2. Bipolar 2 disorder ([address])            Goals addressed in session: Goal 1     DATA: Discuss self-gaslighting, cognitive dissonance and how it can be a choice, being on different levels of emotional intelligence with others (Self-awareness and how it affects relationships with others). Candie. She is to bring in DBT workbook. Continue focusing on how she feels obligated to help others, how this stems from childhood experiences.  "How do I get better at everything?" Desire to learn guitar with her father, taking time from video games. Discussed her speaking to "parts" of herself from a more curious perspective.  "When I think of doing things I'm not thinking of Ajit, I'm thinking of Ajit and Sanjuanita Font". Sunny reports she is receiving help for her home being fixed, winterized by a community action program. She reports she has been talking to [employer] about potentially using her home as a studio. Discussed her ability to say "no" to her partner to slow down sexually. Discussed her negative feelings when she has to say "no" to her partner. During this session, this clinician used the following therapeutic modalities: Client-centered Therapy and Cognitive Behavioral Therapy    Substance Abuse was not addressed during this session. If the client is diagnosed with a co-occurring substance use disorder, please indicate any changes in the frequency or amount of use: n/a. Stage of change for addressing substance use diagnoses: No substance use/Not applicable    ASSESSMENT:  Kyle Giraldo presents with a Anxious mood. her affect is Normal range and intensity, which is congruent, with her mood and the content of the session. The client has made progress on their goals.      Kyle Giraldo presents with a none risk of suicide, none risk of self-harm, and none risk of harm to others. For any risk assessment that surpasses a "low" rating, a safety plan must be developed. A safety plan was indicated: no  If yes, describe in detail n/a    PLAN: Between sessions, Karleybob Anthonyananth will continue with her work with Thierno Hale. At the next session, the therapist will use Client-centered Therapy and Cognitive Behavioral Therapy to address depression. Behavioral Health Treatment Plan and Discharge Planning: Iram Ellis is aware of and agrees to continue to work on their treatment plan. They have identified and are working toward their discharge goals.  yes    Visit start and stop times:    10/10/23  Start Time: 1500  Stop Time: 8643  Total Visit Time: 49 minutes

## 2023-10-13 ENCOUNTER — TELEMEDICINE (OUTPATIENT)
Dept: BEHAVIORAL/MENTAL HEALTH CLINIC | Facility: CLINIC | Age: 28
End: 2023-10-13
Payer: COMMERCIAL

## 2023-10-13 DIAGNOSIS — F31.81 BIPOLAR 2 DISORDER (HCC): ICD-10-CM

## 2023-10-13 DIAGNOSIS — F43.10 PTSD (POST-TRAUMATIC STRESS DISORDER): Primary | ICD-10-CM

## 2023-10-13 PROCEDURE — 90834 PSYTX W PT 45 MINUTES: CPT | Performed by: COUNSELOR

## 2023-10-13 NOTE — PSYCH
Virtual Regular Visit     Verification of patient location:     Patient is located at Home in the following state in which I hold an active license [place]        Assessment/Plan:     Problem List Items Addressed This Visit                  Other     Bipolar 2 disorder ([address])     PTSD (post-traumatic stress disorder) - Primary         Goals addressed in session: Goal 1            Reason for visit is       Chief Complaint   Patient presents with    Virtual Regular Visit            Encounter provider [facility]     Provider located at [address] [place] [id number] [phone]        Recent Visits  No visits were found meeting these conditions. Showing recent visits within past 7 days and meeting all other requirements  Today's Visits  Date Type Provider Dept   10/13/23 [id number] Aliya Grover today's visits and meeting all other requirements  Future Appointments  No visits were found meeting these conditions. Showing future appointments within next 150 days and meeting all other requirements        The patient was identified by name and date of birth. Millie Mares was informed that this is a telemedicine visit and [facility]. She agrees to proceed. .  My office door was closed. No one else was in the room. She acknowledged consent and understanding of privacy and security of the video platform. The patient has agreed to participate and understands they can discontinue the visit at any time. Patient is aware this is a billable service. Mary Patel is a 29 y.o. female  .         HPI      Medical History        Past Medical History:   Diagnosis Date    Anxiety      Bipolar disorder ([address])      Borderline personality disorder ([address])      Depression      Self-injurious behavior              Surgical History         Past Surgical History:   Procedure Laterality Date    CERVICAL BIOPSY  W/ LOOP ELECTRODE EXCISION        FL INJECTION LEFT HIP (ARTHROGRAM)   4/7/2021    WISDOM TOOTH EXTRACTION                Current Medications          Current Outpatient Medications   Medication Sig Dispense Refill    albuterol (PROVENTIL HFA,VENTOLIN HFA) 90 mcg/act inhaler Inhale 2 puffs every 6 (six) hours as needed for wheezing or shortness of breath 18 g 1    buPROPion (WELLBUTRIN XL) 150 mg 24 hr tablet Take 1 tablet (150 mg total) by mouth daily 90 tablet 0    ergocalciferol (VITAMIN D2) 50,000 units TAKE 1 CAPSULE BY MOUTH ONE TIME PER WEEK 12 capsule 1    hydrOXYzine HCL (ATARAX) 10 mg tablet Take 1 tablet (10 mg total) by mouth every 6 (six) hours as needed for anxiety 30 tablet 2    lamoTRIgine (LaMICtal) 200 MG tablet Take 2 tablets (400 mg total) by mouth daily 180 tablet 0    Levonorgestrel 13.5 MG IUD 1 each by Intrauterine route        traZODone (DESYREL) 50 mg tablet TAKE 0.5 TABLETS (25 MG TOTAL) BY MOUTH DAILY AT BEDTIME 45 tablet 0    valACYclovir (VALTREX) 1,000 mg tablet Take 2 tablets (2,000 mg total) by mouth 2 (two) times a day for 1 day (Patient taking differently: Take 2,000 mg by mouth 2 (two) times a day As needed) 24 tablet 0      No current facility-administered medications for this visit. No Known Allergies     Review of Systems     Video Exam     Vitals   There were no vitals filed for this visit. Physical Exam      Behavioral Health Psychotherapy Progress Note     Psychotherapy Provided: Individual Psychotherapy      1. PTSD (post-traumatic stress disorder)          2. Bipolar 2 disorder ([address])                Goals addressed in session: Goal 1      DATA: Discuss self-gaslighting, dissociation and how it can be a choice, being on different levels of emotional intelligence with others (Self-awareness and how it affects relationships with others). Terri Klein. She is to bring in DBT workbook. Continue focusing on how she feels obligated to help others, how this stems from childhood experiences. "How do I get better at everything?" Desire to learn guitar with her father, taking time from video games. Discussed her speaking to "parts" of herself from a more curious perspective. "When I think of doing things I'm not thinking of Ajit, I'm thinking of Ajit and Pablo". Discussed her time with mindfulness and her experiences with [employer] in [place]. Discussed her thoughts on self-gaslighting and dissociation from context of her meetings with [address] [place] [address] S groups. She says she does feel hurt knowing that Emilia Bustamante did not respond to her attempts to work out their relationship. During this session, this clinician used the following therapeutic modalities: Client-centered Therapy, Cognitive Behavioral Therapy, and Existential Therapy     Substance Abuse was not addressed during this session. If the client is diagnosed with a co-occurring substance use disorder, please indicate any changes in the frequency or amount of use: n/a. Stage of change for addressing substance use diagnoses: No substance use/Not applicable     ASSESSMENT:  Chris Birmingham presents with a Euthymic/ normal mood. her affect is Normal range and intensity, which is congruent, with her mood and the content of the session. The client has made progress on their goals. Chris Birmingham presents with a none risk of suicide, none risk of self-harm, and none risk of harm to others. For any risk assessment that surpasses a "low" rating, a safety plan must be developed. A safety plan was indicated: no  If yes, describe in detail n/a     PLAN: Between sessions, Chris Birmingham will consider today's discussion about challenging beliefs, grounding.  At the next session, the therapist will use Client-centered Therapy, Cognitive Behavioral Therapy, and Existential Therapy to address depression. Behavioral Health Treatment Plan and Discharge Planning: Raúl Grande is aware of and agrees to continue to work on their treatment plan. They have identified and are working toward their discharge goals.  yes     Visit start and stop times:     10/13/23  Start Time: 1005  Stop Time: 1053  Total Visit Time: 48 minutes

## 2023-10-17 ENCOUNTER — SOCIAL WORK (OUTPATIENT)
Dept: BEHAVIORAL/MENTAL HEALTH CLINIC | Facility: CLINIC | Age: 28
End: 2023-10-17
Payer: COMMERCIAL

## 2023-10-17 DIAGNOSIS — F43.10 PTSD (POST-TRAUMATIC STRESS DISORDER): Primary | ICD-10-CM

## 2023-10-17 DIAGNOSIS — F31.81 BIPOLAR 2 DISORDER (HCC): ICD-10-CM

## 2023-10-17 PROCEDURE — 90834 PSYTX W PT 45 MINUTES: CPT | Performed by: COUNSELOR

## 2023-10-17 NOTE — PSYCH
Behavioral Health Psychotherapy Progress Note    Psychotherapy Provided: Individual Psychotherapy     1. PTSD (post-traumatic stress disorder)        2. Bipolar 2 disorder ([address])            Goals addressed in session: Goal 1     DATA: Discuss self-gaslighting, dissociation and how it can be a choice, being on different levels of emotional intelligence with others (Self-awareness and how it affects relationships with others). Nino Aldridge. She is to bring in DBT workbook. Continue focusing on how she feels obligated to help others, how this stems from childhood experiences. "How do I get better at everything?" Desire to learn guitar with her father, taking time from video games. Discussed her speaking to "parts" of herself from a more curious perspective. "When I think of doing things I'm not thinking of Ajit, I'm thinking of Garcia Sebastian and [place] [employer]ger"     Focused on allowing herself to feel her feelings, to talk to her girlfriend about her feelings, to engage on her own terms. Focused on providing reflection of content and feelings, psychoeducation regarding authenticity. During this session, this clinician used the following therapeutic modalities: Client-centered Therapy, Cognitive Behavioral Therapy, and Existential Therapy    Substance Abuse was not addressed during this session. If the client is diagnosed with a co-occurring substance use disorder, please indicate any changes in the frequency or amount of use: n/a. Stage of change for addressing substance use diagnoses: No substance use/Not applicable    ASSESSMENT:  Shahram Sanches presents with a Euthymic/ normal mood. her affect is Normal range and intensity, which is congruent, with her mood and the content of the session. The client has made progress on their goals. Shahram Sanches presents with a none risk of suicide, none risk of self-harm, and none risk of harm to others.     For any risk assessment that surpasses a "low" rating, a safety plan must be developed. A safety plan was indicated: no  If yes, describe in detail n/a    PLAN: Between sessions, Anamaria Souza will engage authentically. At the next session, the therapist will use Client-centered Therapy, Cognitive Behavioral Therapy, and Existential Therapy to address depression. Behavioral Health Treatment Plan and Discharge Planning: Anamaria Souza is aware of and agrees to continue to work on their treatment plan. They have identified and are working toward their discharge goals.  yes    Visit start and stop times:    10/17/23  Start Time: 1007  Stop Time: 1059  Total Visit Time: 52 minutes

## 2023-10-24 ENCOUNTER — SOCIAL WORK (OUTPATIENT)
Dept: BEHAVIORAL/MENTAL HEALTH CLINIC | Facility: CLINIC | Age: 28
End: 2023-10-24
Payer: COMMERCIAL

## 2023-10-24 DIAGNOSIS — F43.10 PTSD (POST-TRAUMATIC STRESS DISORDER): Primary | ICD-10-CM

## 2023-10-24 PROCEDURE — 90834 PSYTX W PT 45 MINUTES: CPT | Performed by: COUNSELOR

## 2023-10-24 NOTE — PSYCH
Behavioral Health Psychotherapy Progress Note    Psychotherapy Provided: Individual Psychotherapy     1. PTSD (post-traumatic stress disorder)            Goals addressed in session: Goal 1     DATA: Check in with depression symptoms, continue discussion about power in relationships. Discuss self-gaslighting, dissociation and how it can be a choice, being on different levels of emotional intelligence with others (Self-awareness and how it affects relationships with others). Nino Aldridge. She is to bring in DBT workbook. Continue focusing on how she feels obligated to help others, how this stems from childhood experiences. "How do I get better at everything?" Desire to learn guitar with her father, taking time from video games. Discussed her speaking to "parts" of herself from a more curious perspective. "When I think of doing things I'm not thinking of Ajit, I'm thinking of Garcia Sebastian and Mariah Bigger"      She reports feeling more depressed. She says she has been feeling dehumanized, like she doesn't matter, fear of intimacy in relation to past relationships and traumas. Focused with Sunny today on engaging with her girlfriend with clear and firm boundaries. Discussed her fear of intimacy and struggling with having more power in her relationship. During this session, this clinician used the following therapeutic modalities: Client-centered Therapy, Cognitive Behavioral Therapy, and Existential Therapy    Substance Abuse was not addressed during this session. If the client is diagnosed with a co-occurring substance use disorder, please indicate any changes in the frequency or amount of use: n/a. Stage of change for addressing substance use diagnoses: No substance use/Not applicable    ASSESSMENT:  Shahram Sanches presents with a Anxious and Dysthymic mood. her affect is Normal range and intensity, which is congruent, with her mood and the content of the session. The client has made progress on their goals.      Mariana Thomas Barbara Andujar presents with a none risk of suicide, none risk of self-harm, and none risk of harm to others. For any risk assessment that surpasses a "low" rating, a safety plan must be developed. A safety plan was indicated: no  If yes, describe in detail n/a    PLAN: Between sessions, Kellie Zarate will engage with her friends on a regular basis engaging in intimacy as needed. At the next session, the therapist will use Client-centered Therapy, Cognitive Behavioral Therapy, and Existential Therapy to address depression, anxiety, PTSD history. Behavioral Health Treatment Plan and Discharge Planning: Kellie Zarate is aware of and agrees to continue to work on their treatment plan. They have identified and are working toward their discharge goals.  yes    Visit start and stop times:    10/30/23  Start Time: 1500  Stop Time: 1552  Total Visit Time: 52 minutes

## 2023-10-30 ENCOUNTER — SOCIAL WORK (OUTPATIENT)
Dept: BEHAVIORAL/MENTAL HEALTH CLINIC | Facility: CLINIC | Age: 28
End: 2023-10-30
Payer: COMMERCIAL

## 2023-10-30 DIAGNOSIS — F31.81 BIPOLAR 2 DISORDER (HCC): ICD-10-CM

## 2023-10-30 DIAGNOSIS — F43.10 PTSD (POST-TRAUMATIC STRESS DISORDER): Primary | ICD-10-CM

## 2023-10-30 PROCEDURE — 90837 PSYTX W PT 60 MINUTES: CPT | Performed by: COUNSELOR

## 2023-10-30 NOTE — PSYCH
Behavioral Health Psychotherapy Progress Note    Psychotherapy Provided: Individual Psychotherapy     1. PTSD (post-traumatic stress disorder)        2. Bipolar 2 disorder ([address])            Goals addressed in session: Goal 1     DATA: Describes herself as having to "earn" happiness, which is what often leads her to not feel like she deserves happiness, or be motivated. Check in with depression symptoms, continue discussion about power in relationships. Aristeo Martin. She is to bring in DBT workbook. Continue focusing on how she feels obligated to help others, how this stems from childhood experiences. "How do I get better at everything?" Desire to learn guitar with her father, taking time from video games. Discussed her speaking to "parts" of herself from a more curious perspective. "When I think of doing things I'm not thinking of Ajit, I'm thinking of Ashley Boyd and Bandar Tiwari"      Discussed her challenges with commitment and intimacy with her partner. Discussed her communication with Aristeo Martin. Challenges with guilt - discussed levels of responsibility. Focused on changing "They" to "I" statements. During this session, this clinician used the following therapeutic modalities: Client-centered Therapy, Cognitive Behavioral Therapy, and Existential Therapy    Substance Abuse was not addressed during this session. If the client is diagnosed with a co-occurring substance use disorder, please indicate any changes in the frequency or amount of use: n/a. Stage of change for addressing substance use diagnoses: No substance use/Not applicable    ASSESSMENT:  Louise Hastings presents with a Euthymic/ normal mood. her affect is Normal range and intensity, which is congruent, with her mood and the content of the session. The client has made progress on their goals. Louise Hastings presents with a none risk of suicide, none risk of self-harm, and none risk of harm to others.     For any risk assessment that surpasses a "low" rating, a safety plan must be developed. A safety plan was indicated: no  If yes, describe in detail n/a    PLAN: Between sessions, Jacque Hernandez will continue to spend time with her friends, family, engaging with life. At the next session, the therapist will use Client-centered Therapy, Cognitive Behavioral Therapy, and Existential Therapy to address depression. Behavioral Health Treatment Plan and Discharge Planning: Jacque Hernandez is aware of and agrees to continue to work on their treatment plan. They have identified and are working toward their discharge goals.  yes    Visit start and stop times:    10/30/23  Start Time: 1700  Stop Time: 1426  Total Visit Time: 53 minutes

## 2023-10-31 ENCOUNTER — TELEPHONE (OUTPATIENT)
Dept: PSYCHIATRY | Facility: CLINIC | Age: 28
End: 2023-10-31

## 2023-10-31 NOTE — TELEPHONE ENCOUNTER
Patient is calling regarding cancelling an appointment. Date/Time: 10/31/23 at 2:30pm    Reason: Patient started a new job and wont make it. Patient stated that she will call back to reschedule.     Patient was rescheduled: YES [] NO [x]      Patient requesting call back to reschedule: YES [] NO [x]

## 2023-11-13 ENCOUNTER — SOCIAL WORK (OUTPATIENT)
Dept: BEHAVIORAL/MENTAL HEALTH CLINIC | Facility: CLINIC | Age: 28
End: 2023-11-13
Payer: COMMERCIAL

## 2023-11-13 ENCOUNTER — TELEPHONE (OUTPATIENT)
Dept: PSYCHIATRY | Facility: CLINIC | Age: 28
End: 2023-11-13

## 2023-11-13 DIAGNOSIS — F31.81 BIPOLAR 2 DISORDER (HCC): ICD-10-CM

## 2023-11-13 DIAGNOSIS — F43.10 PTSD (POST-TRAUMATIC STRESS DISORDER): Primary | ICD-10-CM

## 2023-11-13 PROCEDURE — 90834 PSYTX W PT 45 MINUTES: CPT | Performed by: COUNSELOR

## 2023-11-13 RX ORDER — LAMOTRIGINE 200 MG/1
400 TABLET ORAL DAILY
Qty: 180 TABLET | Refills: 0 | Status: SHIPPED | OUTPATIENT
Start: 2023-11-13 | End: 2024-02-11

## 2023-11-13 NOTE — PSYCH
Behavioral Health Psychotherapy Progress Note    Psychotherapy Provided: Individual Psychotherapy     1. PTSD (post-traumatic stress disorder)        2. Bipolar 2 disorder ([address])            Goals addressed in session: Goal 1     DATA: Grief for grandmother. Describes herself as having to "earn" happiness, which is what often leads her to not feel like she deserves happiness, or be motivated. Check in with depression symptoms, continue discussion about power in relationships. Troy Monroe. She is to bring in DBT workbook. Continue focusing on how she feels obligated to help others, how this stems from childhood experiences. "How do I get better at everything?" Desire to learn guitar with her father, taking time from video games. Discussed her speaking to "parts" of herself from a more curious perspective. "When I think of doing things I'm not thinking of Ajit, I'm thinking of Ajit and Sanjuanita Font"       Focused with Lonn Snellen on her grief for her grandmother. During this session, this clinician used the following therapeutic modalities: Bereavement Therapy    Substance Abuse was not addressed during this session. If the client is diagnosed with a co-occurring substance use disorder, please indicate any changes in the frequency or amount of use: n/a. Stage of change for addressing substance use diagnoses: No substance use/Not applicable    ASSESSMENT:  Kyle Giraldo presents with a Depressed mood. her affect is Normal range and intensity, which is congruent, with her mood and the content of the session. The client has made progress on their goals. Kyle Giraldo presents with a none risk of suicide, none risk of self-harm, and none risk of harm to others. For any risk assessment that surpasses a "low" rating, a safety plan must be developed. A safety plan was indicated: no  If yes, describe in detail n/a    PLAN: Between sessions, Kyle Giraldo will grieve.  At the next session, the therapist will use Bereavement Therapy and Client-centered Therapy to address depression. Behavioral Health Treatment Plan and Discharge Planning: Joe Bishop is aware of and agrees to continue to work on their treatment plan. They have identified and are working toward their discharge goals.  yes    Visit start and stop times:    11/13/23  Start Time: 1500  Stop Time: 1549  Total Visit Time: 49 minutes

## 2023-11-13 NOTE — TELEPHONE ENCOUNTER
Writer called and left message returning a voice mail. Patient needs an refills and an appointment with Provider.

## 2023-11-15 ENCOUNTER — TELEMEDICINE (OUTPATIENT)
Dept: PSYCHIATRY | Facility: CLINIC | Age: 28
End: 2023-11-15
Payer: COMMERCIAL

## 2023-11-15 DIAGNOSIS — F43.21 GRIEF: ICD-10-CM

## 2023-11-15 DIAGNOSIS — F31.81 BIPOLAR 2 DISORDER (HCC): Primary | ICD-10-CM

## 2023-11-15 DIAGNOSIS — F60.3 BORDERLINE PERSONALITY DISORDER (HCC): ICD-10-CM

## 2023-11-15 DIAGNOSIS — F43.10 PTSD (POST-TRAUMATIC STRESS DISORDER): ICD-10-CM

## 2023-11-15 PROCEDURE — 99214 OFFICE O/P EST MOD 30 MIN: CPT | Performed by: STUDENT IN AN ORGANIZED HEALTH CARE EDUCATION/TRAINING PROGRAM

## 2023-11-15 RX ORDER — BUPROPION HYDROCHLORIDE 300 MG/1
300 TABLET ORAL DAILY
Qty: 90 TABLET | Refills: 0 | Status: SHIPPED | OUTPATIENT
Start: 2023-11-15 | End: 2024-02-13
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
## 2023-11-20 ENCOUNTER — TELEPHONE (OUTPATIENT)
Dept: PSYCHIATRY | Facility: CLINIC | Age: 28
End: 2023-11-20

## 2023-11-20 ENCOUNTER — SOCIAL WORK (OUTPATIENT)
Dept: BEHAVIORAL/MENTAL HEALTH CLINIC | Facility: CLINIC | Age: 28
End: 2023-11-20
Payer: COMMERCIAL

## 2023-11-20 DIAGNOSIS — F43.10 PTSD (POST-TRAUMATIC STRESS DISORDER): Primary | ICD-10-CM

## 2023-11-20 DIAGNOSIS — F31.81 BIPOLAR 2 DISORDER (HCC): ICD-10-CM

## 2023-11-20 PROCEDURE — 90834 PSYTX W PT 45 MINUTES: CPT | Performed by: COUNSELOR

## 2023-11-20 NOTE — TELEPHONE ENCOUNTER
Left voicemail informing patient of the Virtual Psych Encounter form needing to be signed as a requirement from [employer] for billing purposes. Patient can access form via [facility] and sign electronically. Please make patient aware this form must be signed for each virtual visit as a requirement to continue virtual visits with provider.

## 2023-11-20 NOTE — PSYCH
Behavioral Health Psychotherapy Progress Note    Psychotherapy Provided: Individual Psychotherapy     1. PTSD (post-traumatic stress disorder)        2. Bipolar 2 disorder ([address])            Goals addressed in session: Goal 1     DATA: Grief for grandmother. Describes herself as having to "earn" happiness, which is what often leads her to not feel like she deserves happiness, or be motivated. Check in with depression symptoms, continue discussion about power in relationships. Eboni Lee. She is to bring in DBT workbook. Continue focusing on how she feels obligated to help others, how this stems from childhood experiences. "How do I get better at everything?" Desire to learn guitar with her father, taking time from video games. Discussed her speaking to "parts" of herself from a more curious perspective. "When I think of doing things I'm not thinking of Ajit, I'm thinking of Jessica Hanson and César [place]"        Discussed her time with her family, her grief, her work. She says work is going well. She says she's not avoiding her grief, is experiencing it. She says she has been having trouble motivating herself. However - she did get out yesterday to enjoy time with [employer] in [place] during a hike, speaking with others and greatly enjoying her time with them. During this session, this clinician used the following therapeutic modalities: Client-centered Therapy, Cognitive Behavioral Therapy, and Existential Therapy    Substance Abuse was not addressed during this session. If the client is diagnosed with a co-occurring substance use disorder, please indicate any changes in the frequency or amount of use: n/a. Stage of change for addressing substance use diagnoses: No substance use/Not applicable    ASSESSMENT:  Leola Miguel presents with a Euthymic/ normal mood. her affect is Normal range and intensity, which is congruent, with her mood and the content of the session. The client has made progress on their goals.      Sarahi Stephen Xin Thomson presents with a none risk of suicide, none risk of self-harm, and none risk of harm to others. For any risk assessment that surpasses a "low" rating, a safety plan must be developed. A safety plan was indicated: no  If yes, describe in detail n/a    PLAN: Between sessions, Miguel Pierre will continue with her work on herself, engage with work on grief. At the next session, the therapist will use Bereavement Therapy, Client-centered Therapy, and Cognitive Behavioral Therapy to address depression. Behavioral Health Treatment Plan and Discharge Planning: Miguel Pierre is aware of and agrees to continue to work on their treatment plan. They have identified and are working toward their discharge goals.  yes    Visit start and stop times:    11/20/23  Start Time: 1102  Stop Time: 1148  Total Visit Time: 46 minutes

## 2023-11-27 ENCOUNTER — TELEPHONE (OUTPATIENT)
Dept: PSYCHIATRY | Facility: CLINIC | Age: 28
End: 2023-11-27

## 2023-11-27 NOTE — TELEPHONE ENCOUNTER
Writer [place] to inform patient their 11/27@ 3 pm appointment has been cancelled due to provider being out of office.  Next apt is on [email or web address]

## 2023-12-04 ENCOUNTER — SOCIAL WORK (OUTPATIENT)
Dept: BEHAVIORAL/MENTAL HEALTH CLINIC | Facility: CLINIC | Age: 28
End: 2023-12-04
Payer: COMMERCIAL

## 2023-12-04 DIAGNOSIS — F43.10 PTSD (POST-TRAUMATIC STRESS DISORDER): Primary | ICD-10-CM

## 2023-12-04 DIAGNOSIS — F31.81 BIPOLAR 2 DISORDER (HCC): ICD-10-CM

## 2023-12-04 PROCEDURE — 90834 PSYTX W PT 45 MINUTES: CPT | Performed by: COUNSELOR

## 2023-12-04 NOTE — PSYCH
Behavioral Health Psychotherapy Progress Note    Psychotherapy Provided: Individual Psychotherapy     1. PTSD (post-traumatic stress disorder)        2. Bipolar 2 disorder ([address])            Goals addressed in session: Goal 1     DATA: Grief for grandmother. Describes herself as having to "earn" happiness, which is what often leads her to not feel like she deserves happiness, or be motivated. Check in with depression symptoms, continue discussion about power in relationships. Allen Gore. She is to bring in DBT workbook. Continue focusing on how she feels obligated to help others, how this stems from childhood experiences. "How do I get better at everything?" Desire to learn guitar with her father, taking time from video games. Discussed her speaking to "parts" of herself from a more curious perspective. "When I think of doing things I'm not thinking of Ajit, I'm thinking of Anayeli Taylor and Villa Andrews"      Discussed her grief for her grandmother. Focused on giving herself "lara" or giving herself the space to not be ok in this time her of her life. Discussed how this exhausts her, taking so much time to pressure herself, or beat herself up. Discussed importance of caring for herself. During this session, this clinician used the following therapeutic modalities: Client-centered Therapy and Cognitive Behavioral Therapy    Substance Abuse was not addressed during this session. If the client is diagnosed with a co-occurring substance use disorder, please indicate any changes in the frequency or amount of use: n/a. Stage of change for addressing substance use diagnoses: No substance use/Not applicable    ASSESSMENT:  Tom Lguo presents with a Euthymic/ normal mood. her affect is Normal range and intensity, which is congruent, with her mood and the content of the session. The client has made progress on their goals.      Tom Lugo presents with a none risk of suicide, none risk of self-harm, and none risk of harm to others. For any risk assessment that surpasses a "low" rating, a safety plan must be developed. A safety plan was indicated: no  If yes, describe in detail n/a    PLAN: Between sessions, Traci Church will engage with self-care, lara. At the next session, the therapist will use Client-centered Therapy and Cognitive Behavioral Therapy to address depression. Behavioral Health Treatment Plan and Discharge Planning: Traci Church is aware of and agrees to continue to work on their treatment plan. They have identified and are working toward their discharge goals.  yes    Visit start and stop times:    12/04/23  Start Time: 1500  Stop Time: 1552  Total Visit Time: 52 minutes

## 2023-12-11 ENCOUNTER — SOCIAL WORK (OUTPATIENT)
Dept: BEHAVIORAL/MENTAL HEALTH CLINIC | Facility: CLINIC | Age: 28
End: 2023-12-11
Payer: COMMERCIAL

## 2023-12-11 DIAGNOSIS — F31.81 BIPOLAR 2 DISORDER (HCC): ICD-10-CM

## 2023-12-11 DIAGNOSIS — F43.10 PTSD (POST-TRAUMATIC STRESS DISORDER): Primary | ICD-10-CM

## 2023-12-11 PROCEDURE — 90834 PSYTX W PT 45 MINUTES: CPT | Performed by: COUNSELOR

## 2023-12-11 NOTE — PSYCH
Behavioral Health Psychotherapy Progress Note    Psychotherapy Provided: Individual Psychotherapy     1. PTSD (post-traumatic stress disorder)        2. Bipolar 2 disorder ([address])            Goals addressed in session: Goal 1     DATA: Grief for grandmother. Describes herself as having to "earn" happiness, which is what often leads her to not feel like she deserves happiness, or be motivated. Check in with depression symptoms, continue discussion about power in relationships. Missy Valencia. She is to bring in DBT workbook. Continue focusing on how she feels obligated to help others, how this stems from childhood experiences. "How do I get better at everything?" Desire to learn guitar with her father, taking time from video games. Discussed her speaking to "parts" of herself from a more curious perspective. "When I think of doing things I'm not thinking of Ajit, I'm thinking of Ajit and Harl Nakai"       Focused with Sunny today on the importance of utilizing her tools to relax, to reduce depression. Focused with her on giving herself "lara". Focused on adjusting thoughts, meeting herself where she is at. During this session, this clinician used the following therapeutic modalities: Client-centered Therapy and Cognitive Behavioral Therapy    Substance Abuse was not addressed during this session. If the client is diagnosed with a co-occurring substance use disorder, please indicate any changes in the frequency or amount of use: n/a. Stage of change for addressing substance use diagnoses: No substance use/Not applicable    ASSESSMENT:  Griffin Stubbs presents with a Euthymic/ normal mood. her affect is Normal range and intensity, which is congruent, with her mood and the content of the session. The client has made progress on their goals. Griffin Stubbs presents with a none risk of suicide, none risk of self-harm, and none risk of harm to others.     For any risk assessment that surpasses a "low" rating, a safety plan must be developed. A safety plan was indicated: no  If yes, describe in detail n/a    PLAN: Between sessions, Jacque Hernandez will continue to engage with herself meeting herself where she is at. At the next session, the therapist will use Client-centered Therapy and Cognitive Behavioral Therapy to address depression. Behavioral Health Treatment Plan and Discharge Planning: Jacque Hernandez is aware of and agrees to continue to work on their treatment plan. They have identified and are working toward their discharge goals.  yes    Visit start and stop times:    12/11/23  Start Time: 1500  Stop Time: 1545  Total Visit Time: 45 minutes

## 2023-12-13 ENCOUNTER — TELEMEDICINE (OUTPATIENT)
Dept: PSYCHIATRY | Facility: CLINIC | Age: 28
End: 2023-12-13
Payer: COMMERCIAL

## 2023-12-13 DIAGNOSIS — F43.10 PTSD (POST-TRAUMATIC STRESS DISORDER): ICD-10-CM

## 2023-12-13 DIAGNOSIS — F12.10 CANNABIS ABUSE: ICD-10-CM

## 2023-12-13 DIAGNOSIS — F31.81 BIPOLAR 2 DISORDER (HCC): Primary | ICD-10-CM

## 2023-12-13 DIAGNOSIS — F60.3 BORDERLINE PERSONALITY DISORDER (HCC): ICD-10-CM

## 2023-12-13 PROCEDURE — 99214 OFFICE O/P EST MOD 30 MIN: CPT | Performed by: STUDENT IN AN ORGANIZED HEALTH CARE EDUCATION/TRAINING PROGRAM

## 2023-12-13 PROCEDURE — 90833 PSYTX W PT W E/M 30 MIN: CPT | Performed by: STUDENT IN AN ORGANIZED HEALTH CARE EDUCATION/TRAINING PROGRAM
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
## 2023-12-18 ENCOUNTER — SOCIAL WORK (OUTPATIENT)
Dept: BEHAVIORAL/MENTAL HEALTH CLINIC | Facility: CLINIC | Age: 28
End: 2023-12-18
Payer: COMMERCIAL

## 2023-12-18 DIAGNOSIS — F43.10 PTSD (POST-TRAUMATIC STRESS DISORDER): Primary | ICD-10-CM

## 2023-12-18 DIAGNOSIS — F31.81 BIPOLAR 2 DISORDER (HCC): ICD-10-CM

## 2023-12-18 PROCEDURE — 90834 PSYTX W PT 45 MINUTES: CPT | Performed by: COUNSELOR
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
## 2023-12-27 ENCOUNTER — TELEPHONE (OUTPATIENT)
Dept: PSYCHIATRY | Facility: CLINIC | Age: 28
End: 2023-12-27

## 2023-12-27 NOTE — TELEPHONE ENCOUNTER
Writer contacted patient to inform them their 12.27 at 1 pm apt has been cancelled due to provider being out of office. Next apt is 1.08  Patient has been added to cancellation list for sooner apt.

## 2023-12-29 ENCOUNTER — OFFICE VISIT (OUTPATIENT)
Dept: URGENT CARE | Age: 28
End: 2023-12-29
Payer: COMMERCIAL

## 2023-12-29 VITALS
HEART RATE: 94 BPM | SYSTOLIC BLOOD PRESSURE: 116 MMHG | DIASTOLIC BLOOD PRESSURE: 76 MMHG | OXYGEN SATURATION: 98 % | TEMPERATURE: 97 F

## 2023-12-29 DIAGNOSIS — R11.0 NAUSEA: ICD-10-CM

## 2023-12-29 DIAGNOSIS — R10.30 LOWER ABDOMINAL PAIN: Primary | ICD-10-CM

## 2023-12-29 LAB
SL AMB  POCT GLUCOSE, UA: ABNORMAL
SL AMB LEUKOCYTE ESTERASE,UA: ABNORMAL
SL AMB POCT BILIRUBIN,UA: ABNORMAL
SL AMB POCT BLOOD,UA: ABNORMAL
SL AMB POCT CLARITY,UA: CLEAR
SL AMB POCT COLOR,UA: YELLOW
SL AMB POCT KETONES,UA: ABNORMAL
SL AMB POCT NITRITE,UA: ABNORMAL
SL AMB POCT PH,UA: 6
SL AMB POCT SPECIFIC GRAVITY,UA: 1.01
SL AMB POCT URINE HCG: NORMAL
SL AMB POCT URINE PROTEIN: ABNORMAL
SL AMB POCT UROBILINOGEN: 0.2

## 2023-12-29 PROCEDURE — 81025 URINE PREGNANCY TEST: CPT

## 2023-12-29 PROCEDURE — 81002 URINALYSIS NONAUTO W/O SCOPE: CPT

## 2023-12-29 PROCEDURE — 99214 OFFICE O/P EST MOD 30 MIN: CPT

## 2023-12-29 PROCEDURE — 87086 URINE CULTURE/COLONY COUNT: CPT

## 2023-12-29 RX ORDER — ONDANSETRON 4 MG/1
4 TABLET, FILM COATED ORAL EVERY 8 HOURS PRN
Qty: 20 TABLET | Refills: 0 | Status: SHIPPED | OUTPATIENT
Start: 2023-12-29

## 2023-12-29 NOTE — PROGRESS NOTES
[facility]        NAME: Raghav Smith is a 28 y.o. female  : 1995    MRN: [id number]  DATE: 2023  TIME: 2:49 PM    Assessment and Plan   Lower abdominal pain [R10.30]  1. Lower abdominal pain  POCT urine dip    ondansetron (ZOFRAN) 4 mg tablet    POCT urine HCG    Urine culture    CANCELED: Urine culture      2. Nausea  POCT urine HCG        Urine dip noted to have small amount of leukocytes, no other signs of infection.  Urine hCG negative.  Pending urine culture results.    Patient Instructions     Please take Zofran every 8 hours as needed for nausea.    Clear liquids (i.e. Gatorade, Powerade, Pedialyte, etc but avoid red liquids) only for at least 6-8 hours after you last vomited then may advance to bland diet (ie. BRAT - bananas, applesauce, toast) as tolerated.  Recommend avoiding any milk products, spicy, greasy foods and citrus products over the next 1-2 weeks.  Advance diet as tolerated.    Go to the emergency room if your symptoms are worsening    Follow-up with her primary care physician in 2-3 days if symptoms persist.     Chief Complaint     Chief Complaint   Patient presents with    Abdominal Pain    Nausea    Diarrhea     Nausea, LLQ abdomen  , feels disoriented, sensitive to light t it's been since yesterday.   Diarrhea it's been 2 days.         History of Present Illness       28-year-old female presenting for evaluation of abdominal pain.  Patient states that 1 day ago she developed lower abdominal pain.  States the pain was so severe that she was lightheaded, disoriented and out of breath.  She states that she is tolerating drinking, but is experiencing nausea.  She denies any measured fevers, chills, chest pain, but admits to mild shortness of breath.  States that she has had stomach issues in the past with negative evaluation in the ER.     Abdominal Pain  Associated symptoms include diarrhea and nausea. Pertinent negatives include no dysuria, fever, frequency  or vomiting.   Nausea  Associated symptoms include abdominal pain and nausea. Pertinent negatives include no chest pain, chills, fever or vomiting.   Diarrhea   Associated symptoms include abdominal pain. Pertinent negatives include no chills, fever or vomiting.       Review of Systems   Review of Systems   Constitutional:  Negative for chills and fever.   Respiratory:  Negative for shortness of breath.    Cardiovascular:  Negative for chest pain.   Gastrointestinal:  Positive for abdominal pain, diarrhea and nausea. Negative for vomiting.   Genitourinary:  Negative for dysuria, frequency, urgency, vaginal discharge and vaginal pain.   All other systems reviewed and are negative.        Current Medications       Current Outpatient Medications:     ondansetron (ZOFRAN) 4 mg tablet, Take 1 tablet (4 mg total) by mouth every 8 (eight) hours as needed for nausea or vomiting, Disp: 20 tablet, Rfl: 0    albuterol (PROVENTIL HFA,VENTOLIN HFA) 90 mcg/act inhaler, Inhale 2 puffs every 6 (six) hours as needed for wheezing or shortness of breath, Disp: 18 g, Rfl: 1    buPROPion (Wellbutrin XL) 300 mg 24 hr tablet, Take 1 tablet (300 mg total) by mouth daily, Disp: 90 tablet, Rfl: 0    ergocalciferol (VITAMIN D2) 50,000 units, TAKE 1 CAPSULE BY MOUTH ONE TIME PER WEEK, Disp: 12 capsule, Rfl: 1    hydrOXYzine HCL (ATARAX) 10 mg tablet, Take 1 tablet (10 mg total) by mouth every 6 (six) hours as needed for anxiety, Disp: 30 tablet, Rfl: 2    lamoTRIgine (LaMICtal) 200 MG tablet, Take 2 tablets (400 mg total) by mouth daily, Disp: 180 tablet, Rfl: 0    Levonorgestrel 13.5 MG IUD, 1 each by Intrauterine route, Disp: , Rfl:     traZODone (DESYREL) 50 mg tablet, TAKE 0.5 TABLETS (25 MG TOTAL) BY MOUTH DAILY AT BEDTIME, Disp: 45 tablet, Rfl: 0    valACYclovir (VALTREX) 1,000 mg tablet, Take 2 tablets (2,000 mg total) by mouth 2 (two) times a day for 1 day (Patient taking differently: Take 2,000 mg by mouth 2 (two) times a day As  needed), Disp: 24 tablet, Rfl: 0    Current Allergies     Allergies as of 12/29/2023    (No Known Allergies)            The following portions of the patient's history were reviewed and updated as appropriate: allergies, current medications, past family history, past medical history, past social history, past surgical history and problem list.     Past Medical History:   Diagnosis Date    Anxiety     Bipolar disorder (HCC)     Borderline personality disorder (HCC)     Depression     Self-injurious behavior        Past Surgical History:   Procedure Laterality Date    CERVICAL BIOPSY  W/ LOOP ELECTRODE EXCISION      FL INJECTION LEFT HIP (ARTHROGRAM)  4/7/2021    WISDOM TOOTH EXTRACTION         Family History   Problem Relation Age of Onset    Hypertension Mother     Vitamin D deficiency Father     Schizophrenia Paternal Uncle     Diabetes Maternal Grandmother     Suicide Attempts Neg Hx     Completed Suicide  Neg Hx          Medications have been verified.        Objective   /76   Pulse 94   Temp (!) 97 °F (36.1 °C) (Temporal)   SpO2 98%        Physical Exam     Physical Exam  Vitals and nursing note reviewed.   Constitutional:       General: She is not in acute distress.     Appearance: She is well-developed and normal weight. She is not ill-appearing, toxic-appearing or diaphoretic.   HENT:      Head: Normocephalic and atraumatic.      Mouth/Throat:      Mouth: Mucous membranes are moist.   Cardiovascular:      Rate and Rhythm: Normal rate and regular rhythm.      Heart sounds: Normal heart sounds. No murmur heard.     No friction rub. No gallop.   Pulmonary:      Effort: Pulmonary effort is normal. No respiratory distress.      Breath sounds: Normal breath sounds. No stridor. No wheezing, rhonchi or rales.   Chest:      Chest wall: No tenderness.   Abdominal:      General: Abdomen is flat. There is no distension. There are no signs of injury.      Palpations: Abdomen is soft. There is no mass.       Tenderness: There is generalized abdominal tenderness and tenderness in the left upper quadrant and left lower quadrant. There is no right CVA tenderness, left CVA tenderness, guarding or rebound.   Skin:     General: Skin is warm and dry.   Neurological:      Mental Status: She is alert.

## 2023-12-29 NOTE — PATIENT INSTRUCTIONS
Please take Zofran every 8 hours as needed for nausea.    Clear liquids (i.e. Gatorade, Powerade, Pedialyte, etc but avoid red liquids) only for at least 6-8 hours after you last vomited then may advance to bland diet (ie. BRAT - bananas, applesauce, toast) as tolerated.  Recommend avoiding any milk products, spicy, greasy foods and citrus products over the next 1-2 weeks.  Advance diet as tolerated.    Go to the emergency room if your symptoms are worsening    Follow-up with her primary care physician in 2-3 days if symptoms persist.

## 2023-12-30 LAB — BACTERIA UR CULT: NORMAL

## 2024-01-04 ENCOUNTER — TELEPHONE (OUTPATIENT)
Dept: PSYCHIATRY | Facility: CLINIC | Age: 29
End: 2024-01-04

## 2024-01-04 NOTE — TELEPHONE ENCOUNTER
Writer [place] offer patient 1/04 appointment at 2 pm. Provided office to call back, please assist patient upon return call.TY

## 2024-01-08 ENCOUNTER — SOCIAL WORK (OUTPATIENT)
Dept: BEHAVIORAL/MENTAL HEALTH CLINIC | Facility: CLINIC | Age: 29
End: 2024-01-08
Payer: COMMERCIAL

## 2024-01-08 DIAGNOSIS — F43.10 PTSD (POST-TRAUMATIC STRESS DISORDER): Primary | ICD-10-CM

## 2024-01-08 PROCEDURE — 90834 PSYTX W PT 45 MINUTES: CPT | Performed by: COUNSELOR

## 2024-01-08 NOTE — PSYCH
"Behavioral Health Psychotherapy Progress Note    Psychotherapy Provided: Individual Psychotherapy     1. PTSD (post-traumatic stress disorder)            Goals addressed in session: Goal 1     DATA: Focused with Sunny jordan on her challenges with losing her job. Discussed this often. Focused with Sunny jordan on making small objectives to move toward a larger objective. Discussed giving herself lara. Able to reconnect with her sexuality in the past few weeks.     During this session, this clinician used the following therapeutic modalities: Client-centered Therapy, Cognitive Behavioral Therapy, and Existential Therapy    Substance Abuse was not addressed during this session. If the client is diagnosed with a co-occurring substance use disorder, please indicate any changes in the frequency or amount of use: n/a. Stage of change for addressing substance use diagnoses: No substance use/Not applicable    ASSESSMENT:  Raghav Smith presents with a Euthymic/ normal mood.     her affect is Normal range and intensity, which is congruent, with her mood and the content of the session. The client has made progress on their goals.     Raghav Smith presents with a none risk of suicide, none risk of self-harm, and none risk of harm to others.    For any risk assessment that surpasses a \"low\" rating, a safety plan must be developed.    A safety plan was indicated: no  If yes, describe in detail n/a    PLAN: Between sessions, Raghav Smith will continue to engage with her job search, smaller goals, rediscovering her sexuality. At the next session, the therapist will use Client-centered Therapy, Cognitive Behavioral Therapy, and Existential Therapy to address depression.    Behavioral Health Treatment Plan and Discharge Planning: Raghav Smith is aware of and agrees to continue to work on their treatment plan. They have identified and are working toward their discharge goals. yes    Visit start and stop " times:    01/08/24  Start Time: 1506  Stop Time: 1553  Total Visit Time: 47 minutes

## 2024-01-11 ENCOUNTER — SOCIAL WORK (OUTPATIENT)
Dept: BEHAVIORAL/MENTAL HEALTH CLINIC | Facility: CLINIC | Age: 29
End: 2024-01-11
Payer: COMMERCIAL

## 2024-01-11 DIAGNOSIS — F43.10 PTSD (POST-TRAUMATIC STRESS DISORDER): Primary | ICD-10-CM

## 2024-01-11 PROCEDURE — 90834 PSYTX W PT 45 MINUTES: CPT | Performed by: COUNSELOR
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
## 2024-01-15 ENCOUNTER — TELEPHONE (OUTPATIENT)
Dept: PSYCHIATRY | Facility: CLINIC | Age: 29
End: 2024-01-15

## 2024-01-15 ENCOUNTER — TELEMEDICINE (OUTPATIENT)
Dept: BEHAVIORAL/MENTAL HEALTH CLINIC | Facility: CLINIC | Age: 29
End: 2024-01-15
Payer: COMMERCIAL

## 2024-01-15 DIAGNOSIS — F31.81 BIPOLAR 2 DISORDER (HCC): ICD-10-CM

## 2024-01-15 DIAGNOSIS — F43.10 PTSD (POST-TRAUMATIC STRESS DISORDER): Primary | ICD-10-CM

## 2024-01-15 PROCEDURE — 90834 PSYTX W PT 45 MINUTES: CPT | Performed by: COUNSELOR

## 2024-01-15 NOTE — PSYCH
Virtual Regular Visit    Verification of patient location:    Patient is located at Home in the following state in which I hold an active license [place]      Assessment/Plan:    Problem List Items Addressed This Visit          Other    Bipolar 2 disorder (HCC)    PTSD (post-traumatic stress disorder) - Primary       Goals addressed in session: Goal 1          Reason for visit is   Chief Complaint   Patient presents with    Virtual Regular Visit          Encounter provider Sunil Ruiz    Provider located at [facility] [place]  [facility] [place]  [address]  [place] [address] [phone]      Recent Visits  No visits were found meeting these conditions.  Showing recent visits within past 7 days and meeting all other requirements  Today's Visits  Date Type Provider Dept   01/15/24 Telephone Sunil Ruiz [facility] [place]   01/15/24 Telemedicine Sunil Ruiz [facility] [place]   Showing today's visits and meeting all other requirements  Future Appointments  No visits were found meeting these conditions.  Showing future appointments within next 150 days and meeting all other requirements       The patient was identified by name and date of birth. Raghav Smith was informed that this is a telemedicine visit and that the visit is being conducted throughthe Epic Embedded platform. She agrees to proceed..  My office door was closed. No one else was in the room.  She acknowledged consent and understanding of privacy and security of the video platform. The patient has agreed to participate and understands they can discontinue the visit at any time.    Patient is aware this is a billable service.     Subjective  Raghav Smith is a 28 y.o. female  .      HPI     Past Medical History:   Diagnosis Date    Anxiety     Bipolar disorder (HCC)     Borderline personality disorder (HCC)     Depression      "Self-injurious behavior        Past Surgical History:   Procedure Laterality Date    CERVICAL BIOPSY  W/ LOOP ELECTRODE EXCISION      FL INJECTION LEFT HIP (ARTHROGRAM)  4/7/2021    WISDOM TOOTH EXTRACTION         Current Outpatient Medications   Medication Sig Dispense Refill    albuterol (PROVENTIL HFA,VENTOLIN HFA) 90 mcg/act inhaler Inhale 2 puffs every 6 (six) hours as needed for wheezing or shortness of breath 18 g 1    buPROPion (Wellbutrin XL) 300 mg 24 hr tablet Take 1 tablet (300 mg total) by mouth daily 90 tablet 0    ergocalciferol (VITAMIN D2) 50,000 units TAKE 1 CAPSULE BY MOUTH ONE TIME PER WEEK 12 capsule 1    hydrOXYzine HCL (ATARAX) 10 mg tablet Take 1 tablet (10 mg total) by mouth every 6 (six) hours as needed for anxiety 30 tablet 2    lamoTRIgine (LaMICtal) 200 MG tablet Take 2 tablets (400 mg total) by mouth daily 180 tablet 0    Levonorgestrel 13.5 MG IUD 1 each by Intrauterine route      ondansetron (ZOFRAN) 4 mg tablet Take 1 tablet (4 mg total) by mouth every 8 (eight) hours as needed for nausea or vomiting 20 tablet 0    traZODone (DESYREL) 50 mg tablet TAKE 0.5 TABLETS (25 MG TOTAL) BY MOUTH DAILY AT BEDTIME 45 tablet 0    valACYclovir (VALTREX) 1,000 mg tablet Take 2 tablets (2,000 mg total) by mouth 2 (two) times a day for 1 day (Patient taking differently: Take 2,000 mg by mouth 2 (two) times a day As needed) 24 tablet 0     No current facility-administered medications for this visit.        No Known Allergies    Review of Systems    Video Exam    There were no vitals filed for this visit.    Physical Exam     Behavioral Health Psychotherapy Progress Note    Psychotherapy Provided: Individual Psychotherapy     1. PTSD (post-traumatic stress disorder)        2. Bipolar 2 disorder (HCC)            Goals addressed in session: Goal 1     DATA: Finding shabana discord. Treating self in third person, \"Celestia\", affirmations for her. Grief for grandmother. CHILDHOOD \"Good enough to go out\" " "and be with people. Parentification. \"if I try to do the least amount of things, it will be the least hard\" Mother made her feel bad about herself, doing dishes, cleaning. Father made her feel like she didn't exist or was a nuisance. Self-esteem - catholicism, \"Having a body is sinful\", Describes herself as having to \"earn\" happiness, which is what often leads her to not feel like she deserves happiness, or be motivated.She is to bring in DBT workbook. Continue focusing on how she feels obligated to help others, how this stems from childhood experiences. \"How do I get better at everything?\" Desire to learn guitar with her father, taking time from video games.      Discussed idea of her looking at herself in the third person to give herself some room to breathe, to stop beating herself up. Discussed her time that she slept today, how it was excessive, however she did wake up naturally. Discussed ideas for finding motivating, finding more community, seeing out friends who game online.     During this session, this clinician used the following therapeutic modalities: Client-centered Therapy, Cognitive Behavioral Therapy, and Existential Therapy    Substance Abuse was not addressed during this session. If the client is diagnosed with a co-occurring substance use disorder, please indicate any changes in the frequency or amount of use: n/a. Stage of change for addressing substance use diagnoses: No substance use/Not applicable    ASSESSMENT:  Raghav Smith presents with a Euthymic/ normal mood.     her affect is Normal range and intensity, which is congruent, with her mood and the content of the session. The client has made progress on their goals.     Raghav Smith presents with a none risk of suicide, none risk of self-harm, and none risk of harm to others.    For any risk assessment that surpasses a \"low\" rating, a safety plan must be developed.    A safety plan was indicated: no  If yes, describe in detail " n/a    PLAN: Between sessions, Raghav Smith will continue to engage with shabana online, finding friends who play games. At the next session, the therapist will use Client-centered Therapy, Cognitive Behavioral Therapy, and Existential Therapy to address depression2.    Behavioral Health Treatment Plan and Discharge Planning: Raghav Smith is aware of and agrees to continue to work on their treatment plan. They have identified and are working toward their discharge goals. yes    Visit start and stop times:    01/15/24  Start Time: 1608  Stop Time: 1650  Total Visit Time: 42 minutes

## 2024-01-15 NOTE — TELEPHONE ENCOUNTER
Writer [place] to inform patient that today's apt has been switched to a virtual visit and the link will be sent via my chart. Provided office number if needing to cancel or reschedule. Please assist upon return call.ty

## 2024-01-16 ENCOUNTER — HOSPITAL ENCOUNTER (EMERGENCY)
Facility: HOSPITAL | Age: 29
Discharge: HOME | End: 2024-01-16
Attending: EMERGENCY MEDICINE | Admitting: EMERGENCY MEDICINE
Payer: COMMERCIAL

## 2024-01-16 ENCOUNTER — APPOINTMENT (EMERGENCY)
Dept: RADIOLOGY | Facility: HOSPITAL | Age: 29
End: 2024-01-16
Payer: COMMERCIAL

## 2024-01-16 VITALS
DIASTOLIC BLOOD PRESSURE: 58 MMHG | TEMPERATURE: 97.7 F | SYSTOLIC BLOOD PRESSURE: 125 MMHG | HEART RATE: 115 BPM | OXYGEN SATURATION: 98 % | RESPIRATION RATE: 20 BRPM

## 2024-01-16 DIAGNOSIS — R10.9 ABDOMINAL PAIN: Primary | ICD-10-CM

## 2024-01-16 DIAGNOSIS — R11.0 NAUSEA: ICD-10-CM

## 2024-01-16 LAB
ALBUMIN SERPL BCP-MCNC: 4.5 G/DL (ref 3.5–5)
ALP SERPL-CCNC: 53 U/L (ref 34–104)
ALT SERPL W P-5'-P-CCNC: 16 U/L (ref 7–52)
AMORPH URATE CRY URNS QL MICRO: ABNORMAL
ANION GAP SERPL CALCULATED.3IONS-SCNC: 8 MMOL/L
AST SERPL W P-5'-P-CCNC: 13 U/L (ref 13–39)
BACTERIA UR QL AUTO: ABNORMAL /HPF
BASOPHILS # BLD AUTO: 0.04 THOUSANDS/ÂΜL (ref 0–0.1)
BASOPHILS NFR BLD AUTO: 0 % (ref 0–1)
BILIRUB SERPL-MCNC: 0.28 MG/DL (ref 0.2–1)
BILIRUB UR QL STRIP: NEGATIVE
BUN SERPL-MCNC: 16 MG/DL (ref 5–25)
CALCIUM SERPL-MCNC: 9.7 MG/DL (ref 8.4–10.2)
CHLORIDE SERPL-SCNC: 106 MMOL/L (ref 96–108)
CLARITY UR: CLEAR
CO2 SERPL-SCNC: 24 MMOL/L (ref 21–32)
COLOR UR: YELLOW
CREAT SERPL-MCNC: 0.7 MG/DL (ref 0.6–1.3)
EOSINOPHIL # BLD AUTO: 0.09 THOUSAND/ÂΜL (ref 0–0.61)
EOSINOPHIL NFR BLD AUTO: 1 % (ref 0–6)
ERYTHROCYTE [DISTWIDTH] IN BLOOD BY AUTOMATED COUNT: 13.1 % (ref 11.6–15.1)
EXT PREGNANCY TEST URINE: NEGATIVE
EXT. CONTROL: NORMAL
GFR SERPL CREATININE-BSD FRML MDRD: 118 ML/MIN/1.73SQ M
GLUCOSE SERPL-MCNC: 104 MG/DL (ref 65–140)
GLUCOSE UR STRIP-MCNC: NEGATIVE MG/DL
HCT VFR BLD AUTO: 40.2 % (ref 34.8–46.1)
HGB BLD-MCNC: 13 G/DL (ref 11.5–15.4)
HGB UR QL STRIP.AUTO: ABNORMAL
IMM GRANULOCYTES # BLD AUTO: 0.05 THOUSAND/UL (ref 0–0.2)
IMM GRANULOCYTES NFR BLD AUTO: 0 % (ref 0–2)
KETONES UR STRIP-MCNC: NEGATIVE MG/DL
LEUKOCYTE ESTERASE UR QL STRIP: NEGATIVE
LIPASE SERPL-CCNC: 17 U/L (ref 11–82)
LYMPHOCYTES # BLD AUTO: 2.11 THOUSANDS/ÂΜL (ref 0.6–4.47)
LYMPHOCYTES NFR BLD AUTO: 12 % (ref 14–44)
MCH RBC QN AUTO: 29.4 PG (ref 26.8–34.3)
MCHC RBC AUTO-ENTMCNC: 32.3 G/DL (ref 31.4–37.4)
MCV RBC AUTO: 91 FL (ref 82–98)
MONOCYTES # BLD AUTO: 0.83 THOUSAND/ÂΜL (ref 0.17–1.22)
MONOCYTES NFR BLD AUTO: 5 % (ref 4–12)
MUCOUS THREADS UR QL AUTO: ABNORMAL
NEUTROPHILS # BLD AUTO: 14.01 THOUSANDS/ÂΜL (ref 1.85–7.62)
NEUTS SEG NFR BLD AUTO: 82 % (ref 43–75)
NITRITE UR QL STRIP: NEGATIVE
NON-SQ EPI CELLS URNS QL MICRO: ABNORMAL /HPF
NRBC BLD AUTO-RTO: 0 /100 WBCS
PH UR STRIP.AUTO: 7 [PH] (ref 4.5–8)
PLATELET # BLD AUTO: 339 THOUSANDS/UL (ref 149–390)
PMV BLD AUTO: 9.2 FL (ref 8.9–12.7)
POTASSIUM SERPL-SCNC: 3.6 MMOL/L (ref 3.5–5.3)
PROT SERPL-MCNC: 7 G/DL (ref 6.4–8.4)
PROT UR STRIP-MCNC: NEGATIVE MG/DL
RBC # BLD AUTO: 4.42 MILLION/UL (ref 3.81–5.12)
RBC #/AREA URNS AUTO: ABNORMAL /HPF
SODIUM SERPL-SCNC: 138 MMOL/L (ref 135–147)
SP GR UR STRIP.AUTO: 1.02 (ref 1–1.03)
UROBILINOGEN UR QL STRIP.AUTO: 1 E.U./DL
WBC # BLD AUTO: 17.13 THOUSAND/UL (ref 4.31–10.16)
WBC #/AREA URNS AUTO: ABNORMAL /HPF

## 2024-01-16 PROCEDURE — 83690 ASSAY OF LIPASE: CPT | Performed by: EMERGENCY MEDICINE

## 2024-01-16 PROCEDURE — 36415 COLL VENOUS BLD VENIPUNCTURE: CPT

## 2024-01-16 PROCEDURE — 80053 COMPREHEN METABOLIC PANEL: CPT | Performed by: EMERGENCY MEDICINE

## 2024-01-16 PROCEDURE — 81025 URINE PREGNANCY TEST: CPT | Performed by: EMERGENCY MEDICINE

## 2024-01-16 PROCEDURE — 96374 THER/PROPH/DIAG INJ IV PUSH: CPT

## 2024-01-16 PROCEDURE — 99284 EMERGENCY DEPT VISIT MOD MDM: CPT

## 2024-01-16 PROCEDURE — 96375 TX/PRO/DX INJ NEW DRUG ADDON: CPT

## 2024-01-16 PROCEDURE — 99285 EMERGENCY DEPT VISIT HI MDM: CPT | Performed by: EMERGENCY MEDICINE

## 2024-01-16 PROCEDURE — 74177 CT ABD & PELVIS W/CONTRAST: CPT

## 2024-01-16 PROCEDURE — 81001 URINALYSIS AUTO W/SCOPE: CPT

## 2024-01-16 PROCEDURE — 85025 COMPLETE CBC W/AUTO DIFF WBC: CPT | Performed by: EMERGENCY MEDICINE

## 2024-01-16 RX ORDER — ACETAMINOPHEN 325 MG/1
650 TABLET ORAL ONCE
Status: COMPLETED | OUTPATIENT
Start: 2024-01-16 | End: 2024-01-16

## 2024-01-16 RX ORDER — KETOROLAC TROMETHAMINE 30 MG/ML
15 INJECTION, SOLUTION INTRAMUSCULAR; INTRAVENOUS ONCE
Status: COMPLETED | OUTPATIENT
Start: 2024-01-16 | End: 2024-01-16

## 2024-01-16 RX ORDER — ONDANSETRON 2 MG/ML
4 INJECTION INTRAMUSCULAR; INTRAVENOUS ONCE
Status: COMPLETED | OUTPATIENT
Start: 2024-01-16 | End: 2024-01-16

## 2024-01-16 RX ADMIN — ONDANSETRON 4 MG: 2 INJECTION INTRAMUSCULAR; INTRAVENOUS at 01:53

## 2024-01-16 RX ADMIN — ACETAMINOPHEN 650 MG: 325 TABLET, FILM COATED ORAL at 01:51

## 2024-01-16 RX ADMIN — IOHEXOL 100 ML: 350 INJECTION, SOLUTION INTRAVENOUS at 02:14

## 2024-01-16 RX ADMIN — KETOROLAC TROMETHAMINE 15 MG: 30 INJECTION, SOLUTION INTRAMUSCULAR; INTRAVENOUS at 01:52

## 2024-01-16 NOTE — ED PROVIDER NOTES
History  Chief Complaint   Patient presents with    Abdominal Pain     Pt c/o L sided abdominal pain since 5 pm with nausea; denies vomiting/diarrhea.      HPI    Raghav Smith is a 28 y.o. female presenting with six hours of sharp LLQ pain and nausea. Patient states this pain is located up and down the left side of the abdomen. Patient states she has been seen for this pain previously and was prescribed zofran. She took this prior to arrival but doesn't think it helped.  She states she has not been sexually active for over a year and denies any changes in vaginal discharge. Patient did have a CT AP in 2023 that was unremarkable. Patient denies any fevers, chills, diarrhea, or recent illness. She believes she may be constipated.     Prior to Admission Medications   Prescriptions Last Dose Informant Patient Reported? Taking?   Levonorgestrel 13.5 MG IUD  Self Yes No   Si each by Intrauterine route   albuterol (PROVENTIL HFA,VENTOLIN HFA) 90 mcg/act inhaler   No No   Sig: Inhale 2 puffs every 6 (six) hours as needed for wheezing or shortness of breath   buPROPion (Wellbutrin XL) 300 mg 24 hr tablet   No No   Sig: Take 1 tablet (300 mg total) by mouth daily   ergocalciferol (VITAMIN D2) 50,000 units   No No   Sig: TAKE 1 CAPSULE BY MOUTH ONE TIME PER WEEK   hydrOXYzine HCL (ATARAX) 10 mg tablet   No No   Sig: Take 1 tablet (10 mg total) by mouth every 6 (six) hours as needed for anxiety   lamoTRIgine (LaMICtal) 200 MG tablet   No No   Sig: Take 2 tablets (400 mg total) by mouth daily   ondansetron (ZOFRAN) 4 mg tablet   No No   Sig: Take 1 tablet (4 mg total) by mouth every 8 (eight) hours as needed for nausea or vomiting   traZODone (DESYREL) 50 mg tablet   No No   Sig: TAKE 0.5 TABLETS (25 MG TOTAL) BY MOUTH DAILY AT BEDTIME   valACYclovir (VALTREX) 1,000 mg tablet   No No   Sig: Take 2 tablets (2,000 mg total) by mouth 2 (two) times a day for 1 day   Patient taking differently: Take 2,000 mg by  mouth 2 (two) times a day As needed      Facility-Administered Medications: None       Past Medical History:   Diagnosis Date    Anxiety     Bipolar disorder (HCC)     Borderline personality disorder (HCC)     Depression     Self-injurious behavior        Past Surgical History:   Procedure Laterality Date    CERVICAL BIOPSY  W/ LOOP ELECTRODE EXCISION      FL INJECTION LEFT HIP (ARTHROGRAM)  4/7/2021    WISDOM TOOTH EXTRACTION         Family History   Problem Relation Age of Onset    Hypertension Mother     Vitamin D deficiency Father     Schizophrenia Paternal Uncle     Diabetes Maternal Grandmother     Suicide Attempts Neg Hx     Completed Suicide  Neg Hx      I have reviewed and agree with the history as documented.    E-Cigarette/Vaping    E-Cigarette Use Never User      E-Cigarette/Vaping Substances    Nicotine No     THC Yes     CBD No     Flavoring No     Other No     Unknown No      Social History     Tobacco Use    Smoking status: Former    Smokeless tobacco: Never    Tobacco comments:     was a social smoker   Vaping Use    Vaping status: Never Used   Substance Use Topics    Alcohol use: Not Currently     Alcohol/week: 1.0 - 2.0 standard drink of alcohol     Types: 1 - 2 Cans of beer per week     Comment: once per week    Drug use: Not Currently     Types: Marijuana     Comment: reports medical marijuana use almost every day        Review of Systems   Constitutional:  Negative for activity change, appetite change, diaphoresis and fever.   HENT:  Negative for congestion.    Respiratory:  Negative for apnea.    Gastrointestinal:  Positive for abdominal distention and nausea. Negative for diarrhea and vomiting.   Genitourinary:  Negative for flank pain, urgency and vaginal discharge.   Skin:  Negative for color change.   Neurological:  Negative for dizziness, weakness and light-headedness.       Physical Exam  ED Triage Vitals [01/16/24 0033]   Temperature Pulse Respirations Blood Pressure SpO2   97.7 °F  (36.5 °C) (!) 115 20 125/58 98 %      Temp src Heart Rate Source Patient Position - Orthostatic VS BP Location FiO2 (%)   -- Monitor -- -- --      Pain Score       8             Orthostatic Vital Signs  Vitals:    01/16/24 0033   BP: 125/58   Pulse: (!) 115       Physical Exam  Constitutional:       General: She is not in acute distress.     Appearance: She is well-developed and normal weight.   HENT:      Head: Normocephalic.      Mouth/Throat:      Mouth: Mucous membranes are moist.   Eyes:      Extraocular Movements: Extraocular movements intact.   Cardiovascular:      Rate and Rhythm: Normal rate.      Heart sounds: Normal heart sounds.   Pulmonary:      Effort: Pulmonary effort is normal. No respiratory distress.      Breath sounds: Normal breath sounds. No wheezing.   Abdominal:      Tenderness: There is abdominal tenderness in the left lower quadrant. There is no right CVA tenderness, left CVA tenderness, guarding or rebound.   Skin:     General: Skin is warm and dry.      Capillary Refill: Capillary refill takes less than 2 seconds.      Coloration: Skin is not jaundiced.      Findings: No bruising.   Neurological:      General: No focal deficit present.      Mental Status: She is alert and oriented to person, place, and time.   Psychiatric:         Mood and Affect: Mood normal.         ED Medications  Medications   acetaminophen (TYLENOL) tablet 650 mg (650 mg Oral Given 1/16/24 0151)   ketorolac (TORADOL) injection 15 mg (15 mg Intravenous Given 1/16/24 0152)   ondansetron (ZOFRAN) injection 4 mg (4 mg Intravenous Given 1/16/24 0153)   iohexol (OMNIPAQUE) 350 MG/ML injection (MULTI-DOSE) 100 mL (100 mL Intravenous Given 1/16/24 0214)       Diagnostic Studies  Results Reviewed       Procedure Component Value Units Date/Time    Urine Microscopic [[id number]]  (Abnormal) Collected: 01/16/24 0158    Lab Status: Final result Specimen: Urine, Clean Catch Updated: 01/16/24 0224     RBC, UA None Seen /hpf      WBC,  UA None Seen /hpf      Epithelial Cells Occasional /hpf      Bacteria, UA Occasional /hpf      MUCUS THREADS Moderate     Amorphous Crystals, UA Occasional    POCT pregnancy, urine [[id number]]  (Normal) Resulted: 01/16/24 0204    Lab Status: Final result Specimen: Urine Updated: 01/16/24 0204     EXT Preg Test, Ur Negative     Control Valid    Urine Macroscopic, POC [[id number]]  (Abnormal) Collected: 01/16/24 0158    Lab Status: Final result Specimen: Urine Updated: 01/16/24 0200     Color, UA Yellow     Clarity, UA Clear     pH, UA 7.0     Leukocytes, UA Negative     Nitrite, UA Negative     Protein, UA Negative mg/dl      Glucose, UA Negative mg/dl      Ketones, UA Negative mg/dl      Urobilinogen, UA 1.0 E.U./dl      Bilirubin, UA Negative     Occult Blood, UA Trace     Specific Gravity, UA 1.025    Narrative:      [facility] RESULT    Comprehensive metabolic panel [[id number]] Collected: 01/16/24 0049    Lab Status: Final result Specimen: Blood from Arm, Right Updated: 01/16/24 0125     Sodium 138 mmol/L      Potassium 3.6 mmol/L      Chloride 106 mmol/L      CO2 24 mmol/L      ANION GAP 8 mmol/L      BUN 16 mg/dL      Creatinine 0.70 mg/dL      Glucose 104 mg/dL      Calcium 9.7 mg/dL      AST 13 U/L      ALT 16 U/L      Alkaline Phosphatase 53 U/L      Total Protein 7.0 g/dL      Albumin 4.5 g/dL      Total Bilirubin 0.28 mg/dL      eGFR 118 ml/min/1.73sq m     Narrative:      [employer] guidelines for Chronic Kidney Disease (CKD):     Stage 1 with normal or high GFR (GFR > 90 mL/min/1.73 square meters)    Stage 2 Mild CKD (GFR = 60-89 mL/min/1.73 square meters)    Stage 3A Moderate CKD (GFR = 45-59 mL/min/1.73 square meters)    Stage 3B Moderate CKD (GFR = 30-44 mL/min/1.73 square meters)    Stage 4 Severe CKD (GFR = 15-29 mL/min/1.73 square meters)    Stage 5 End Stage CKD (GFR <15 mL/min/1.73 square meters)  Note: GFR calculation is accurate only with a steady state creatinine    Lipase  "[[id number]]  (Normal) Collected: 01/16/24 0049    Lab Status: Final result Specimen: Blood from Arm, Right Updated: 01/16/24 0125     Lipase 17 u/L     CBC and differential [[id number]]  (Abnormal) Collected: 01/16/24 0049    Lab Status: Final result Specimen: Blood from Arm, Right Updated: 01/16/24 0104     WBC 17.13 Thousand/uL      RBC 4.42 Million/uL      Hemoglobin 13.0 g/dL      Hematocrit 40.2 %      MCV 91 fL      MCH 29.4 pg      MCHC 32.3 g/dL      RDW 13.1 %      MPV 9.2 fL      Platelets 339 Thousands/uL      nRBC 0 /100 WBCs      Neutrophils Relative 82 %      Immat GRANS % 0 %      Lymphocytes Relative 12 %      Monocytes Relative 5 %      Eosinophils Relative 1 %      Basophils Relative 0 %      Neutrophils Absolute 14.01 Thousands/µL      Immature Grans Absolute 0.05 Thousand/uL      Lymphocytes Absolute 2.11 Thousands/µL      Monocytes Absolute 0.83 Thousand/µL      Eosinophils Absolute 0.09 Thousand/µL      Basophils Absolute 0.04 Thousands/µL                    CT abdomen pelvis with contrast   Final Result by Jonathon Edouard MD (01/16 0240)      No acute inflammatory process identified within the abdomen or pelvis.            Workstation performed: [id number]               Procedures  Procedures      ED Course  ED Course as of 01/16/24 0515   Tue Jan 16, 2024   0115 WBC(!): 17.13   0238 Lipase: 17   0238 MUCUS THREADS(!): Moderate   0238 ALT: 16   0238 AST: 13   0245 CT abdomen pelvis with contrast  \"No acute inflammatory process identified within the abdomen or pelvis.\"                             SBIRT 22yo+      Flowsheet Row Most Recent Value   Initial Alcohol Screen: US AUDIT-C     1. How often do you have a drink containing alcohol? 0 Filed at: 01/16/2024 0035   2. How many drinks containing alcohol do you have on a typical day you are drinking?  0 Filed at: 01/16/2024 0035   3b. FEMALE Any Age, or MALE 65+: How often do you have 4 or more drinks on one occassion? 0 Filed at: 01/16/2024 0035 "   Audit-C Score 0 Filed at: 01/16/2024 0035   MAYUR: How many times in the past year have you...    Used an illegal drug or used a prescription medication for non-medical reasons? Never Filed at: 01/16/2024 0035                  Medical Decision Making  Amount and/or Complexity of Data Reviewed  Labs: ordered. Decision-making details documented in ED Course.  Radiology: ordered. Decision-making details documented in ED Course.    Risk  OTC drugs.  Prescription drug management.        Patient is a 28 y.o. female  who presents to the ED with LLQ pain.    Vital signs stable, afebrile. Exam as listed above    Differential diagnosis includes but is not limited to constipation/gas pain, diverticulitis, gas pain, intermittent torsion, renal stone, UTI    Plan CBC, CMP, lipase, UA, CT AP w contrast, zofran, tylenol, toradol.     View ED course above for further discussion on patient workup.     On review of previous records patient was seen 7/21/23 for complaint  of similar pain.Per chart review patient has not been seen by OBGYN of GI for this.     All labs reviewed and utilized in the medical decision making process  All radiology studies independently viewed by me and interpreted by the radiologist.  I reviewed all testing with the patient.     Upon re-evaluation pain improved with tylenol and toradol. No acute findings on CT. Will discharge patient home with referral to GI.       Disposition  Final diagnoses:   Abdominal pain   Nausea     Time reflects when diagnosis was documented in both MDM as applicable and the Disposition within this note       Time User Action Codes Description Comment    1/16/2024  2:51 AM Leigh Charles Add [R10.9] Abdominal pain     1/16/2024  2:51 AM Leigh Charles Add [R11.0] Nausea           ED Disposition       ED Disposition   Discharge    Condition   Stable    Date/Time   Tue Jan 16, 2024 0251    Comment   Raghav Smith discharge to home/self care.                   Follow-up  Information       Follow up With Specialties Details Why Contact Info    Pam Araujo MD Family Medicine   [address] [place] [address]  [place] [id number]  [phone]              Discharge Medication List as of 1/16/2024  2:52 AM        CONTINUE these medications which have NOT CHANGED    Details   albuterol (PROVENTIL HFA,VENTOLIN HFA) 90 mcg/act inhaler Inhale 2 puffs every 6 (six) hours as needed for wheezing or shortness of breath, Starting Wed 3/15/2023, Normal      buPROPion (Wellbutrin XL) 300 mg 24 hr tablet Take 1 tablet (300 mg total) by mouth daily, Starting Wed 11/15/2023, Until Tue 2/13/2024, Normal      ergocalciferol (VITAMIN D2) 50,000 units TAKE 1 CAPSULE BY MOUTH ONE TIME PER WEEK, Normal      hydrOXYzine HCL (ATARAX) 10 mg tablet Take 1 tablet (10 mg total) by mouth every 6 (six) hours as needed for anxiety, Starting Tue 8/8/2023, Until Mon 11/6/2023 at 2359, Normal      lamoTRIgine (LaMICtal) 200 MG tablet Take 2 tablets (400 mg total) by mouth daily, Starting Mon 11/13/2023, Until Sun 2/11/2024, Normal      Levonorgestrel 13.5 MG IUD 1 each by Intrauterine route, Starting Mon 1/30/2017, Until Thu 4/20/2023 at 2359, Historical Med      ondansetron (ZOFRAN) 4 mg tablet Take 1 tablet (4 mg total) by mouth every 8 (eight) hours as needed for nausea or vomiting, Starting Fri 12/29/2023, Normal      traZODone (DESYREL) 50 mg tablet TAKE 0.5 TABLETS (25 MG TOTAL) BY MOUTH DAILY AT BEDTIME, Starting Sun 7/23/2023, Until Sat 10/21/2023, Normal      valACYclovir (VALTREX) 1,000 mg tablet Take 2 tablets (2,000 mg total) by mouth 2 (two) times a day for 1 day, Starting Thu 4/13/2023, Until Thu 4/20/2023, Normal               PDMP Review       None             ED Provider  Attending physically available and evaluated Raghav Smith. I managed the patient along with the ED Attending.    Electronically Signed by           Leigh Charles MD  01/16/24 0522

## 2024-01-16 NOTE — ED NOTES
Pt returned from CT, sneezing at least 10 times, provider made aware, allergy list updated at this time       Agnes Arceo RN  01/16/24 0222       Agnes Arceo RN  01/16/24 0223

## 2024-01-16 NOTE — ED ATTENDING ATTESTATION
1/16/2024  IJay MD, saw and evaluated the patient. I have discussed the patient with the resident/non-physician practitioner and agree with the resident's/non-physician practitioner's findings, Plan of Care, and MDM as documented in the resident's/non-physician practitioner's note, except where noted. All available labs and Radiology studies were reviewed.  I was present for key portions of any procedure(s) performed by the resident/non-physician practitioner and I was immediately available to provide assistance.       At this point I agree with the current assessment done in the Emergency Department.  I have conducted an independent evaluation of this patient a history and physical is as follows:    ED Course  ED Course as of 01/16/24 0343   Tue Jan 16, 2024   0141 Per resident h&p 27 YO F presents for 6 hours of pain; has had pain like this in the past; prior CT July 2023 NL O: tender abdomen I/P acute abd pain CTAP; CBC, CMP; hCG   Emergency Department Note- Raghav Smith 28 y.o. female MRN: [id number]    Unit/Bed#: [facility] Encounter: [id number]    Raghav Smith is a 28 y.o. female who presents with   Chief Complaint   Patient presents with    Abdominal Pain     Pt c/o L sided abdominal pain since 5 pm with nausea; denies vomiting/diarrhea.          History of Present Illness   HPI:  Raghav Smith is a 28 y.o. female who presents for evaluation of:  Left lower quadrant abdominal discomfort that started at 5 PM and has progressively become worse over the next 6 hours.  Pain is a cramping aching sensation; constant and has progressively become worse; not provoked by movement; not palliated by remaining still.  Patient has no history of abdominal operations.  No LMP recorded. (Menstrual status: Birth Control).      Review of Systems   Constitutional:  Negative for fatigue and fever.   HENT:  Negative for congestion and sore throat.    Respiratory:  Negative for cough and shortness of breath.     Cardiovascular:  Negative for chest pain and palpitations.   Gastrointestinal:  Positive for abdominal pain. Negative for nausea.   Genitourinary:  Negative for flank pain and frequency.   Neurological:  Negative for light-headedness and headaches.   Psychiatric/Behavioral:  Negative for dysphoric mood and hallucinations.    All other systems reviewed and are negative.      Historical Information   Past Medical History:   Diagnosis Date    Anxiety     Bipolar disorder (HCC)     Borderline personality disorder (HCC)     Depression     Self-injurious behavior      Past Surgical History:   Procedure Laterality Date    CERVICAL BIOPSY  W/ LOOP ELECTRODE EXCISION      FL INJECTION LEFT HIP (ARTHROGRAM)  2021    WISDOM TOOTH EXTRACTION       Social History   Social History     Substance and Sexual Activity   Alcohol Use Not Currently    Alcohol/week: 1.0 - 2.0 standard drink of alcohol    Types: 1 - 2 Cans of beer per week    Comment: once per week     Social History     Substance and Sexual Activity   Drug Use Not Currently    Types: Marijuana    Comment: reports medical marijuana use almost every day     Social History     Tobacco Use   Smoking Status Former   Smokeless Tobacco Never   Tobacco Comments    was a social smoker     Family History:   Family History   Problem Relation Age of Onset    Hypertension Mother     Vitamin D deficiency Father     Schizophrenia Paternal Uncle     Diabetes Maternal Grandmother     Suicide Attempts Neg Hx     Completed Suicide  Neg Hx        Meds/Allergies   PTA meds:   Prior to Admission Medications   Prescriptions Last Dose Informant Patient Reported? Taking?   Levonorgestrel 13.5 MG IUD  Self Yes No   Si each by Intrauterine route   albuterol (PROVENTIL HFA,VENTOLIN HFA) 90 mcg/act inhaler   No No   Sig: Inhale 2 puffs every 6 (six) hours as needed for wheezing or shortness of breath   buPROPion (Wellbutrin XL) 300 mg 24 hr tablet   No No   Sig: Take 1 tablet (300 mg  total) by mouth daily   ergocalciferol (VITAMIN D2) 50,000 units   No No   Sig: TAKE 1 CAPSULE BY MOUTH ONE TIME PER WEEK   hydrOXYzine HCL (ATARAX) 10 mg tablet   No No   Sig: Take 1 tablet (10 mg total) by mouth every 6 (six) hours as needed for anxiety   lamoTRIgine (LaMICtal) 200 MG tablet   No No   Sig: Take 2 tablets (400 mg total) by mouth daily   ondansetron (ZOFRAN) 4 mg tablet   No No   Sig: Take 1 tablet (4 mg total) by mouth every 8 (eight) hours as needed for nausea or vomiting   traZODone (DESYREL) 50 mg tablet   No No   Sig: TAKE 0.5 TABLETS (25 MG TOTAL) BY MOUTH DAILY AT BEDTIME   valACYclovir (VALTREX) 1,000 mg tablet   No No   Sig: Take 2 tablets (2,000 mg total) by mouth 2 (two) times a day for 1 day   Patient taking differently: Take 2,000 mg by mouth 2 (two) times a day As needed      Facility-Administered Medications: None     Allergies   Allergen Reactions    Contrast [Iodinated Contrast Media] Sneezing       Objective   First Vitals:   Blood Pressure: 125/58 (01/16/24 0033)  Pulse: (!) 115 (01/16/24 0033)  Temperature: 97.7 °F (36.5 °C) (01/16/24 0033)  Respirations: 20 (01/16/24 0033)  SpO2: 98 % (01/16/24 0033)    Current Vitals:   Blood Pressure: 125/58 (01/16/24 0033)  Pulse: (!) 115 (01/16/24 0033)  Temperature: 97.7 °F (36.5 °C) (01/16/24 0033)  Respirations: 20 (01/16/24 0033)  SpO2: 98 % (01/16/24 0033)    No intake or output data in the 24 hours ending 01/16/24 0343    Invasive Devices       None                   Physical Exam  Vitals and nursing note reviewed.   Constitutional:       General: She is not in acute distress.     Appearance: Normal appearance. She is well-developed.   HENT:      Head: Normocephalic and atraumatic.      Right Ear: External ear normal.      Left Ear: External ear normal.      Nose: Nose normal.      Mouth/Throat:      Pharynx: No oropharyngeal exudate.   Eyes:      Conjunctiva/sclera: Conjunctivae normal.      Pupils: Pupils are equal, round, and  reactive to light.   Cardiovascular:      Rate and Rhythm: Normal rate and regular rhythm.   Pulmonary:      Effort: Pulmonary effort is normal. No respiratory distress.   Abdominal:      General: Abdomen is flat. There is no distension.      Palpations: Abdomen is soft.      Tenderness: There is abdominal tenderness in the left lower quadrant.   Musculoskeletal:         General: No deformity. Normal range of motion.      Cervical back: Normal range of motion and neck supple.   Skin:     General: Skin is warm and dry.      Capillary Refill: Capillary refill takes less than 2 seconds.   Neurological:      General: No focal deficit present.      Mental Status: She is alert and oriented to person, place, and time. Mental status is at baseline.      Coordination: Coordination normal.   Psychiatric:         Mood and Affect: Mood normal.         Behavior: Behavior normal.         Thought Content: Thought content normal.         Judgment: Judgment normal.           Medical Decision Makin.  Acute abdominal pain left lower quadrant: CBC rule out leukocytosis and anemia; complete metabolic profile rule out electrolyte disturbance, uremia, and disorders of glucose homeostasis; urinalysis rule out UTI; urine hCG rule out pregnancy; CT scan abdomen and pelvis rule out diverticulitis.    Recent Results (from the past 36 hour(s))   CBC and differential    Collection Time: 24 12:49 AM   Result Value Ref Range    WBC 17.13 (H) 4.31 - 10.16 Thousand/uL    RBC 4.42 3.81 - 5.12 Million/uL    Hemoglobin 13.0 11.5 - 15.4 g/dL    Hematocrit 40.2 34.8 - 46.1 %    MCV 91 82 - 98 fL    MCH 29.4 26.8 - 34.3 pg    MCHC 32.3 31.4 - 37.4 g/dL    RDW 13.1 11.6 - 15.1 %    MPV 9.2 8.9 - 12.7 fL    Platelets 339 149 - 390 Thousands/uL    nRBC 0 /100 WBCs    Neutrophils Relative 82 (H) 43 - 75 %    Immat GRANS % 0 0 - 2 %    Lymphocytes Relative 12 (L) 14 - 44 %    Monocytes Relative 5 4 - 12 %    Eosinophils Relative 1 0 - 6 %     Basophils Relative 0 0 - 1 %    Neutrophils Absolute 14.01 (H) 1.85 - 7.62 Thousands/µL    Immature Grans Absolute 0.05 0.00 - 0.20 Thousand/uL    Lymphocytes Absolute 2.11 0.60 - 4.47 Thousands/µL    Monocytes Absolute 0.83 0.17 - 1.22 Thousand/µL    Eosinophils Absolute 0.09 0.00 - 0.61 Thousand/µL    Basophils Absolute 0.04 0.00 - 0.10 Thousands/µL   Comprehensive metabolic panel    Collection Time: 01/16/24 12:49 AM   Result Value Ref Range    Sodium 138 135 - 147 mmol/L    Potassium 3.6 3.5 - 5.3 mmol/L    Chloride 106 96 - 108 mmol/L    CO2 24 21 - 32 mmol/L    ANION GAP 8 mmol/L    BUN 16 5 - 25 mg/dL    Creatinine 0.70 0.60 - 1.30 mg/dL    Glucose 104 65 - 140 mg/dL    Calcium 9.7 8.4 - 10.2 mg/dL    AST 13 13 - 39 U/L    ALT 16 7 - 52 U/L    Alkaline Phosphatase 53 34 - 104 U/L    Total Protein 7.0 6.4 - 8.4 g/dL    Albumin 4.5 3.5 - 5.0 g/dL    Total Bilirubin 0.28 0.20 - 1.00 mg/dL    eGFR 118 ml/min/1.73sq m   Lipase    Collection Time: 01/16/24 12:49 AM   Result Value Ref Range    Lipase 17 11 - 82 u/L   Urine Macroscopic, POC    Collection Time: 01/16/24  1:58 AM   Result Value Ref Range    Color, UA Yellow     Clarity, UA Clear     pH, UA 7.0 4.5 - 8.0    Leukocytes, UA Negative Negative    Nitrite, UA Negative Negative    Protein, UA Negative Negative mg/dl    Glucose, UA Negative Negative mg/dl    Ketones, UA Negative Negative mg/dl    Urobilinogen, UA 1.0 0.2, 1.0 E.U./dl E.U./dl    Bilirubin, UA Negative Negative    Occult Blood, UA Trace (A) Negative    Specific Gravity, UA 1.025 1.003 - 1.030   Urine Microscopic    Collection Time: 01/16/24  1:58 AM   Result Value Ref Range    RBC, UA None Seen None Seen, 1-2 /hpf    WBC, UA None Seen None Seen, 1-2 /hpf    Epithelial Cells Occasional None Seen, Occasional /hpf    Bacteria, UA Occasional None Seen, Occasional /hpf    MUCUS THREADS Moderate (A) None Seen    Amorphous Crystals, UA Occasional    POCT pregnancy, urine    Collection Time: 01/16/24   "2:04 AM   Result Value Ref Range    EXT Preg Test, Ur Negative     Control Valid      CT abdomen pelvis with contrast   Final Result      No acute inflammatory process identified within the abdomen or pelvis.            Workstation performed: [id number]               Portions of the record may have been created with voice recognition software. Occasional wrong word or \"sound a like\" substitutions may have occurred due to the inherent limitations of voice recognition software.  Read the chart carefully and recognize, using context, where substitutions have occurred.          Critical Care Time  Procedures      "

## 2024-01-18 ENCOUNTER — TELEPHONE (OUTPATIENT)
Dept: PSYCHIATRY | Facility: CLINIC | Age: 29
End: 2024-01-18

## 2024-01-18 ENCOUNTER — TELEPHONE (OUTPATIENT)
Age: 29
End: 2024-01-18

## 2024-01-18 ENCOUNTER — OFFICE VISIT (OUTPATIENT)
Dept: GASTROENTEROLOGY | Facility: CLINIC | Age: 29
End: 2024-01-18
Payer: COMMERCIAL

## 2024-01-18 VITALS
BODY MASS INDEX: 29.96 KG/M2 | DIASTOLIC BLOOD PRESSURE: 72 MMHG | HEART RATE: 85 BPM | OXYGEN SATURATION: 98 % | TEMPERATURE: 98.1 F | HEIGHT: 60 IN | WEIGHT: 152.6 LBS | SYSTOLIC BLOOD PRESSURE: 109 MMHG

## 2024-01-18 DIAGNOSIS — R19.7 DIARRHEA, UNSPECIFIED TYPE: Primary | ICD-10-CM

## 2024-01-18 DIAGNOSIS — R11.0 NAUSEA: ICD-10-CM

## 2024-01-18 DIAGNOSIS — R10.9 ABDOMINAL PAIN: ICD-10-CM

## 2024-01-18 PROCEDURE — 99244 OFF/OP CNSLTJ NEW/EST MOD 40: CPT | Performed by: PHYSICIAN ASSISTANT

## 2024-01-18 RX ORDER — DICYCLOMINE HYDROCHLORIDE 10 MG/1
10 CAPSULE ORAL
Qty: 30 CAPSULE | Refills: 2 | Status: SHIPPED | OUTPATIENT
Start: 2024-01-18

## 2024-01-18 NOTE — TELEPHONE ENCOUNTER
Patients GI provider:  TOMMY Valdes    Number to return call: [phone]    Reason for call: Pt called in stating she has missed an exit and will be arriving at 9:12am. Patient was informed if patient is more than 15 mins late, patient will need to reschedule. Unable to get a hold of the office.    Scheduled procedure/appointment date if applicable: Apt/procedure 1/18/2024

## 2024-01-18 NOTE — TELEPHONE ENCOUNTER
Spoke to patient to make aware of the Psych Encounter form needing to be signed from recent completed appointment(s).

## 2024-01-18 NOTE — PROGRESS NOTES
[facility] - Outpatient Consultation  Raghav Smith 28 y.o. female MRN: [id number]  Encounter: [id number]    Assessment and Plan    1.  Change in bowel habits  2. Abdominal pain  The patient has had intermittent diarrhea followed by LLQ abdominal pain and then constipation for a number of years.  She is uncertain what triggers these episodes.  She denies any blood in her stool, nighttime symptoms, or unintentional weight loss.  No family history of celiac disease or inflammatory bowel disease. She has been seen in the ER multiple occasions with workup including CBC, CMP, TSH, lipase, urine testing, and CT scan without concern.   -Possibly IBS, would like to rule out underlying etiologies with celiac serologies and inflammatory markers  -Recommend Bentyl for abdominal pain  -Recommend as needed MiraLAX for constipation  -Recommend starting a low FODMAP diet to help determine triggers  -Patient has not been to a gynecologist in many years and has an IUD, given the location of her pain I agree with evaluation from them as well    Follow-up 2 to 3 months    ______________________________________________________________________    History of Present Illness  Raghav Smith is a 28 y.o. female here for consultation of abdominal pain.  The patient states that she has had intermittent left lower quadrant abdominal pain for a few years.  Typically she will get diarrhea followed by the abdominal pain and then constipation.  She denies any blood in her stool or nighttime symptoms.  She has not had any unintentional weight loss.  No family history of celiac disease or inflammatory bowel disease.  She has been seen in the ER multiple occasions with workup including CBC, CMP, lipase, urine testing, and CT scan without concern.  She has been referred to both GI and OB/GYN.  The patient does have an IUD in place and does not get her period.      Review of Systems   Constitutional:  Negative for  activity change, appetite change, chills, fatigue, fever and unexpected weight change.   Gastrointestinal:  Negative for nausea, rectal pain and vomiting.   Musculoskeletal:  Negative for back pain and gait problem.   Psychiatric/Behavioral:  Negative for behavioral problems and confusion.        Past Medical History  Past Medical History:   Diagnosis Date    Anxiety     Bipolar disorder (HCC)     Borderline personality disorder (HCC)     Depression     Self-injurious behavior        Past Social history  Past Surgical History:   Procedure Laterality Date    CERVICAL BIOPSY  W/ LOOP ELECTRODE EXCISION      FL INJECTION LEFT HIP (ARTHROGRAM)  4/7/2021    WISDOM TOOTH EXTRACTION       Social History     Socioeconomic History    Marital status: Single     Spouse name: Not on file    Number of children: 0    Years of education: Not on file    Highest education level: Not on file   Occupational History    Occupation:    Tobacco Use    Smoking status: Former    Smokeless tobacco: Never    Tobacco comments:     was a social smoker   Vaping Use    Vaping status: Never Used   Substance and Sexual Activity    Alcohol use: Not Currently     Alcohol/week: 1.0 - 2.0 standard drink of alcohol     Types: 1 - 2 Cans of beer per week     Comment: once per week    Drug use: Not Currently     Types: Marijuana     Comment: reports medical marijuana use almost every day    Sexual activity: Not Currently   Other Topics Concern    Not on file   Social History Narrative    Not on file     Social Determinants of Health     Financial Resource Strain: Low Risk  (7/28/2020)    Overall Financial Resource Strain (CARDIA)     Difficulty of Paying Living Expenses: Not hard at all   Food Insecurity: No Food Insecurity (7/28/2020)    Hunger Vital Sign     Worried About Running Out of Food in the Last Year: Never true     Ran Out of Food in the Last Year: Never true   Transportation Needs: No Transportation Needs (7/28/2020)    PRAPARE -  Transportation     Lack of Transportation (Medical): No     Lack of Transportation (Non-Medical): No   Physical Activity: Inactive (9/30/2019)    Exercise Vital Sign     Days of Exercise per Week: 0 days     Minutes of Exercise per Session: 0 min   Stress: Stress Concern Present (9/30/2019)    [employer] - Occupational Stress Questionnaire     Feeling of Stress : To some extent   Social Connections: Unknown (9/30/2019)    Social Connection and Isolation Panel [NHANES]     Frequency of Communication with Friends and Family: Patient declined     Frequency of Social Gatherings with Friends and Family: Patient declined     Attends Taoism Services: Patient declined     Active Member of Clubs or Organizations: Patient declined     Attends Club or Organization Meetings: Patient declined     Marital Status: Patient declined   Intimate Partner Violence: Unknown (9/30/2019)    Humiliation, Afraid, Rape, and Kick questionnaire     Fear of Current or Ex-Partner: Patient declined     Emotionally Abused: Patient declined     Physically Abused: Patient declined     Sexually Abused: Patient declined   Housing Stability: Not on file     Social History     Substance and Sexual Activity   Alcohol Use Not Currently    Alcohol/week: 1.0 - 2.0 standard drink of alcohol    Types: 1 - 2 Cans of beer per week    Comment: once per week     Social History     Substance and Sexual Activity   Drug Use Not Currently    Types: Marijuana    Comment: reports medical marijuana use almost every day     Social History     Tobacco Use   Smoking Status Former   Smokeless Tobacco Never   Tobacco Comments    was a social smoker       Past Family History  Family History   Problem Relation Age of Onset    Hypertension Mother     Vitamin D deficiency Father     Schizophrenia Paternal Uncle     Diabetes Maternal Grandmother     Suicide Attempts Neg Hx     Completed Suicide  Neg Hx        Current Medications  Current Outpatient  Medications   Medication Sig Dispense Refill    albuterol (PROVENTIL HFA,VENTOLIN HFA) 90 mcg/act inhaler Inhale 2 puffs every 6 (six) hours as needed for wheezing or shortness of breath 18 g 1    buPROPion (Wellbutrin XL) 300 mg 24 hr tablet Take 1 tablet (300 mg total) by mouth daily 90 tablet 0    ergocalciferol (VITAMIN D2) 50,000 units TAKE 1 CAPSULE BY MOUTH ONE TIME PER WEEK 12 capsule 1    hydrOXYzine HCL (ATARAX) 10 mg tablet Take 1 tablet (10 mg total) by mouth every 6 (six) hours as needed for anxiety 30 tablet 2    lamoTRIgine (LaMICtal) 200 MG tablet Take 2 tablets (400 mg total) by mouth daily 180 tablet 0    Levonorgestrel 13.5 MG IUD 1 each by Intrauterine route      ondansetron (ZOFRAN) 4 mg tablet Take 1 tablet (4 mg total) by mouth every 8 (eight) hours as needed for nausea or vomiting 20 tablet 0    traZODone (DESYREL) 50 mg tablet TAKE 0.5 TABLETS (25 MG TOTAL) BY MOUTH DAILY AT BEDTIME 45 tablet 0    valACYclovir (VALTREX) 1,000 mg tablet Take 2 tablets (2,000 mg total) by mouth 2 (two) times a day for 1 day (Patient taking differently: Take 2,000 mg by mouth 2 (two) times a day As needed) 24 tablet 0     No current facility-administered medications for this visit.       Allergies  Allergies   Allergen Reactions    Contrast [Iodinated Contrast Media] Sneezing         The following portions of the patient's history were reviewed and updated as appropriate: allergies, current medications, past medical history, past social history, past surgical history and problem list.      Vitals  There were no vitals filed for this visit.      Physical Exam  Constitutional   General appearance: Patient is seated and in no acute distress, well appearing and well nourished.   Head and Face   Head and face: Normal.    Eyes   Conjunctiva and lids: No erythema, swelling or discharge.  Anicteric.  Ears, Nose, Mouth, and Throat   Hearing: Normal.    Neck: Supple, trachea midline.  Pulmonary   Respiratory effort: No  increased work of breathing or signs of respiratory distress.    Cardiovascular   Examination of extremities for edema and/or varicosities: Normal.    Abdomen   Abdomen: Soft, tender in the left lower quadrant, no masses, no organomegaly.  Normal bowel sounds.   Musculoskeletal   Gait and station: normal   Skin   Skin and subcutaneous tissue: Warm, dry, and intact. No visible jaundice, lesions or rashes.  Psychiatric   Judgment and insight: Normal  Recent and remote memory:  Normal  Mood and affect: Normal      Results  No visits with results within 1 Day(s) from this visit.   Latest known visit with results is:   Admission on 01/16/2024, Discharged on 01/16/2024   Component Date Value    WBC 01/16/2024 17.13 (H)     RBC 01/16/2024 4.42     Hemoglobin 01/16/2024 13.0     Hematocrit 01/16/2024 40.2     MCV 01/16/2024 91     MCH 01/16/2024 29.4     MCHC 01/16/2024 32.3     RDW 01/16/2024 13.1     MPV 01/16/2024 9.2     Platelets 01/16/2024 339     nRBC 01/16/2024 0     Neutrophils Relative 01/16/2024 82 (H)     Immat GRANS % 01/16/2024 0     Lymphocytes Relative 01/16/2024 12 (L)     Monocytes Relative 01/16/2024 5     Eosinophils Relative 01/16/2024 1     Basophils Relative 01/16/2024 0     Neutrophils Absolute 01/16/2024 14.01 (H)     Immature Grans Absolute 01/16/2024 0.05     Lymphocytes Absolute 01/16/2024 2.11     Monocytes Absolute 01/16/2024 0.83     Eosinophils Absolute 01/16/2024 0.09     Basophils Absolute 01/16/2024 0.04     Sodium 01/16/2024 138     Potassium 01/16/2024 3.6     Chloride 01/16/2024 106     CO2 01/16/2024 24     ANION GAP 01/16/2024 8     BUN 01/16/2024 16     Creatinine 01/16/2024 0.70     Glucose 01/16/2024 104     Calcium 01/16/2024 9.7     AST 01/16/2024 13     ALT 01/16/2024 16     Alkaline Phosphatase 01/16/2024 53     Total Protein 01/16/2024 7.0     Albumin 01/16/2024 4.5     Total Bilirubin 01/16/2024 0.28     eGFR 01/16/2024 118     Lipase 01/16/2024 17     EXT Preg Test, Ur  01/16/2024 Negative     Control 01/16/2024 Valid     Color, UA 01/16/2024 Yellow     Clarity, UA 01/16/2024 Clear     pH, UA 01/16/2024 7.0     Leukocytes, UA 01/16/2024 Negative     Nitrite, UA 01/16/2024 Negative     Protein, UA 01/16/2024 Negative     Glucose, UA 01/16/2024 Negative     Ketones, UA 01/16/2024 Negative     Urobilinogen, UA 01/16/2024 1.0     Bilirubin, UA 01/16/2024 Negative     Occult Blood, UA 01/16/2024 Trace (A)     Specific [place], UA 01/16/2024 1.025     RBC, UA 01/16/2024 None Seen     WBC, UA 01/16/2024 None Seen     Epithelial Cells 01/16/2024 Occasional     Bacteria, UA 01/16/2024 Occasional     MUCUS THREADS 01/16/2024 Moderate (A)     Amorphous Crystals, UA 01/16/2024 Occasional        Radiology Results  CT abdomen pelvis with contrast    Result Date: 1/16/2024  Narrative: CT ABDOMEN AND PELVIS WITH IV CONTRAST INDICATION: LLQ abdominal pain. COMPARISON: CT of the abdomen pelvis on July 21, 2023. TECHNIQUE: CT examination of the abdomen and pelvis was performed. Multiplanar 2D reformatted images were created from the source data. This examination, like all CT scans performed in the [facility], was performed utilizing techniques to minimize radiation dose exposure, including the use of iterative reconstruction and automated exposure control. Radiation dose length product (DLP) for this visit: 324.86 mGy-cm IV Contrast: 100 mL of iohexol (OMNIPAQUE) Enteric Contrast: Enteric contrast was not administered. FINDINGS: ABDOMEN LOWER CHEST: No clinically significant abnormality identified in the visualized lower chest. LIVER/BILIARY TREE: Unremarkable. GALLBLADDER: No calcified gallstones. No pericholecystic inflammatory change. SPLEEN: Unremarkable. PANCREAS: Unremarkable. ADRENAL GLANDS: Unremarkable. KIDNEYS/URETERS: No hydronephrosis or urinary tract calculus. One or more sharply circumscribed subcentimeter renal hypodensities are present, too small to accurately  characterize, and statistically most likely benign findings. According to recent literature (Radiology 2019) no further workup of these findings is recommended. STOMACH AND BOWEL: No bowel obstruction. APPENDIX: Normal appendix. ABDOMINOPELVIC CAVITY: Trace free fluid, likely physiologic. No pneumoperitoneum. No lymphadenopathy. VESSELS: Unremarkable for patient's age. PELVIS REPRODUCTIVE ORGANS: Intrauterine device in appropriate orientation. URINARY BLADDER: Collapsed. ABDOMINAL WALL/INGUINAL REGIONS: Unremarkable. OSSEOUS STRUCTURES: No acute fracture or destructive osseous lesion.     Impression: No acute inflammatory process identified within the abdomen or pelvis. Workstation performed: [id number]       Orders  No orders of the defined types were placed in this encounter.      Answers submitted by the patient for this visit:  Abdominal Pain Questionnaire (Submitted on 1/18/2024)  Chief Complaint: Abdominal pain  Chronicity: recurrent  Onset: more than 1 month ago  Onset quality: undetermined  Frequency: intermittently  Episode duration: 3 Days  Progression since onset: gradually worsening  Pain location: LLQ, left flank  Pain - numeric: 5/10  Pain quality: aching, cramping  Radiates to: LLQ  anorexia: Yes  belching: No  flatus: No  melena: No  weight loss: No  Aggravated by: nothing  Relieved by: nothing  Diagnostic workup: CT scan

## 2024-01-22 ENCOUNTER — SOCIAL WORK (OUTPATIENT)
Dept: BEHAVIORAL/MENTAL HEALTH CLINIC | Facility: CLINIC | Age: 29
End: 2024-01-22
Payer: COMMERCIAL

## 2024-01-22 DIAGNOSIS — F31.81 BIPOLAR 2 DISORDER (HCC): ICD-10-CM

## 2024-01-22 DIAGNOSIS — F43.10 PTSD (POST-TRAUMATIC STRESS DISORDER): Primary | ICD-10-CM

## 2024-01-22 PROCEDURE — 90834 PSYTX W PT 45 MINUTES: CPT | Performed by: COUNSELOR

## 2024-01-22 NOTE — PSYCH
"Behavioral Health Psychotherapy Progress Note    Psychotherapy Provided: Individual Psychotherapy     1. PTSD (post-traumatic stress disorder)        2. Bipolar 2 disorder (HCC)            Goals addressed in session: Goal 1     DATA:  Finding shabana discord. Treating self in third person, \"Celestia\", affirmations for her. Grief for grandmother. CHILDHOOD \"Good enough to go out\" and be with people. Parentification. \"if I try to do the least amount of things, it will be the least hard\" Mother made her feel bad about herself, doing dishes, cleaning. Father made her feel like she didn't exist or was a nuisance. Self-esteem - catholicism, \"Having a body is sinful\", Describes herself as having to \"earn\" happiness, which is what often leads her to not feel like she deserves happiness, or be motivated.She is to bring in DBT workbook. Continue focusing on how she feels obligated to help others, how this stems from childhood experiences. \"How do I get better at everything?\" Desire to learn guitar with her father, taking time from video games.       Discussed importance of treating herself in third persion, \"Celestia\", treating herself well. She says she's feeling higher in mood and has been more active - discussed hypomania. Discussed how wonderful she's feeling treating herself beter.     During this session, this clinician used the following therapeutic modalities: Client-centered Therapy, Cognitive Behavioral Therapy, and Existential Therapy    Substance Abuse was not addressed during this session. If the client is diagnosed with a co-occurring substance use disorder, please indicate any changes in the frequency or amount of use: n/a. Stage of change for addressing substance use diagnoses: No substance use/Not applicable    ASSESSMENT:  Raghav Smith presents with a Euthymic/ normal mood.     her affect is Normal range and intensity, which is congruent, with her mood and the content of the session. The client has made " "progress on their goals.     Raghav Smith presents with a none risk of suicide, none risk of self-harm, and none risk of harm to others.    For any risk assessment that surpasses a \"low\" rating, a safety plan must be developed.    A safety plan was indicated: no  If yes, describe in detail n/a    PLAN: Between sessions, Raghav Smith will continue to engage with positive self-talk. At the next session, the therapist will use Client-centered Therapy, Cognitive Behavioral Therapy, and Existential Therapy to address depression.    Behavioral Health Treatment Plan and Discharge Planning: Raghav Smith is aware of and agrees to continue to work on their treatment plan. They have identified and are working toward their discharge goals. yes    Visit start and stop times:    01/22/24  Start Time: 1516  Stop Time: 1600  Total Visit Time: 44 minutes  "

## 2024-01-29 ENCOUNTER — TELEMEDICINE (OUTPATIENT)
Dept: BEHAVIORAL/MENTAL HEALTH CLINIC | Facility: CLINIC | Age: 29
End: 2024-01-29
Payer: COMMERCIAL

## 2024-01-29 DIAGNOSIS — F43.10 PTSD (POST-TRAUMATIC STRESS DISORDER): Primary | ICD-10-CM

## 2024-01-29 DIAGNOSIS — F31.81 BIPOLAR 2 DISORDER (HCC): ICD-10-CM

## 2024-01-29 PROCEDURE — 90834 PSYTX W PT 45 MINUTES: CPT | Performed by: COUNSELOR

## 2024-01-29 NOTE — PSYCH
Virtual Regular Visit    Verification of patient location:    Patient is located at Home in the following state in which I hold an active license [place]      Assessment/Plan:    Problem List Items Addressed This Visit          Other    Bipolar 2 disorder (HCC)    PTSD (post-traumatic stress disorder) - Primary       Goals addressed in session: Goal 1          Reason for visit is   Chief Complaint   Patient presents with    Virtual Regular Visit          Encounter provider Sunil Ruiz    Provider located at [facility]  [facility] [place]  [address]  [place] [address] [phone]      Recent Visits  No visits were found meeting these conditions.  Showing recent visits within past 7 days and meeting all other requirements  Today's Visits  Date Type Provider Dept   01/29/24 Telemedicine Sunil Ruiz [facility] [place]   Showing today's visits and meeting all other requirements  Future Appointments  No visits were found meeting these conditions.  Showing future appointments within next 150 days and meeting all other requirements       The patient was identified by name and date of birth. Raghav Smith was informed that this is a telemedicine visit and that the visit is being conducted throughthe Epic Embedded platform. She agrees to proceed..  My office door was closed. No one else was in the room.  She acknowledged consent and understanding of privacy and security of the video platform. The patient has agreed to participate and understands they can discontinue the visit at any time.    Patient is aware this is a billable service.     Subjective  Raghav Smith is a 28 y.o. female  .      HPI     Past Medical History:   Diagnosis Date    Anxiety     Bipolar disorder (HCC)     Borderline personality disorder (HCC)     Depression     Self-injurious behavior        Past Surgical History:   Procedure Laterality Date     CERVICAL BIOPSY  W/ LOOP ELECTRODE EXCISION      FL INJECTION LEFT HIP (ARTHROGRAM)  4/7/2021    WISDOM TOOTH EXTRACTION         Current Outpatient Medications   Medication Sig Dispense Refill    albuterol (PROVENTIL HFA,VENTOLIN HFA) 90 mcg/act inhaler Inhale 2 puffs every 6 (six) hours as needed for wheezing or shortness of breath 18 g 1    buPROPion (Wellbutrin XL) 300 mg 24 hr tablet Take 1 tablet (300 mg total) by mouth daily 90 tablet 0    dicyclomine (BENTYL) 10 mg capsule Take 1 capsule (10 mg total) by mouth 4 (four) times a day (before meals and at bedtime) 30 capsule 2    ergocalciferol (VITAMIN D2) 50,000 units TAKE 1 CAPSULE BY MOUTH ONE TIME PER WEEK 12 capsule 1    hydrOXYzine HCL (ATARAX) 10 mg tablet Take 1 tablet (10 mg total) by mouth every 6 (six) hours as needed for anxiety 30 tablet 2    lamoTRIgine (LaMICtal) 200 MG tablet Take 2 tablets (400 mg total) by mouth daily 180 tablet 0    Levonorgestrel 13.5 MG IUD 1 each by Intrauterine route      ondansetron (ZOFRAN) 4 mg tablet Take 1 tablet (4 mg total) by mouth every 8 (eight) hours as needed for nausea or vomiting 20 tablet 0    traZODone (DESYREL) 50 mg tablet TAKE 0.5 TABLETS (25 MG TOTAL) BY MOUTH DAILY AT BEDTIME 45 tablet 0    valACYclovir (VALTREX) 1,000 mg tablet Take 2 tablets (2,000 mg total) by mouth 2 (two) times a day for 1 day (Patient taking differently: Take 2,000 mg by mouth 2 (two) times a day As needed) 24 tablet 0     No current facility-administered medications for this visit.        Allergies   Allergen Reactions    Contrast [Iodinated Contrast Media] Sneezing       Review of Systems    Video Exam    There were no vitals filed for this visit.    Physical Exam       Behavioral Health Psychotherapy Progress Note    Psychotherapy Provided: Individual Psychotherapy     1. PTSD (post-traumatic stress disorder)        2. Bipolar 2 disorder (HCC)            Goals addressed in session: Goal 1     DATA: Patient reports she has  "been experiencing hypomania. Discussed how she has been increasing her awareness in how she masturbates, experiences sexual pleasure, exploring different methods and attempting to self-pleasure without the use of toys. She says she has been working with her friend Ace on this process and keeping a journal. Encouraged her to take her time and figure out the best way to reconnect with her body considering her history of trauma. Explored how she has reached out to her mother for additional support - she was able to get help from her mother and she appreciated this. Discussed hobby of shabana and how this helps her mental health. Reports she is feeling hypomanic and requested another session due to her feeling symptomatic.   During this session, this clinician used the following therapeutic modalities: Client-centered Therapy, Cognitive Behavioral Therapy, and Existential Therapy    Substance Abuse was not addressed during this session. If the client is diagnosed with a co-occurring substance use disorder, please indicate any changes in the frequency or amount of use: n/a. Stage of change for addressing substance use diagnoses: No substance use/Not applicable    ASSESSMENT:  Raghav Smith presents with a Euthymic/ normal mood.     her affect is Normal range and intensity, which is congruent, with her mood and the content of the session. The client has made progress on their goals.     Raghav Smith presents with a none risk of suicide, none risk of self-harm, and none risk of harm to others.    For any risk assessment that surpasses a \"low\" rating, a safety plan must be developed.    A safety plan was indicated: no  If yes, describe in detail n/a    PLAN: Between sessions, Raghav Smith will continue to reach out, explore her body. At the next session, the therapist will use Client-centered Therapy, Cognitive Behavioral Therapy, and Existential Therapy to address depression.    Behavioral Health Treatment " Plan and Discharge Planning: Raghav Smith is aware of and agrees to continue to work on their treatment plan. They have identified and are working toward their discharge goals. yes    Visit start and stop times:    01/29/24  Start Time: 1514  Stop Time: 1555  Total Visit Time: 41 minutes

## 2024-01-30 ENCOUNTER — SOCIAL WORK (OUTPATIENT)
Dept: BEHAVIORAL/MENTAL HEALTH CLINIC | Facility: CLINIC | Age: 29
End: 2024-01-30
Payer: COMMERCIAL

## 2024-01-30 DIAGNOSIS — F31.81 BIPOLAR 2 DISORDER (HCC): ICD-10-CM

## 2024-01-30 DIAGNOSIS — F43.10 PTSD (POST-TRAUMATIC STRESS DISORDER): Primary | ICD-10-CM

## 2024-01-30 PROCEDURE — 90834 PSYTX W PT 45 MINUTES: CPT | Performed by: COUNSELOR

## 2024-01-30 NOTE — PSYCH
"Behavioral Health Psychotherapy Progress Note    Psychotherapy Provided: Individual Psychotherapy     1. PTSD (post-traumatic stress disorder)        2. Bipolar 2 disorder (HCC)            Goals addressed in session: Goal 1     DATA: Finding shabana discord. Treating self in third person, \"Celestia\", affirmations for her. Grief for grandmother. CHILDHOOD \"Good enough to go out\" and be with people. Parentification. \"if I try to do the least amount of things, it will be the least hard\" Mother made her feel bad about herself, doing dishes, cleaning. Father made her feel like she didn't exist or was a nuisance. Self-esteem - catholicism, \"Having a body is sinful\", Describes herself as having to \"earn\" happiness, which is what often leads her to not feel like she deserves happiness, or be motivated.She is to bring in DBT workbook. Continue focusing on how she feels obligated to help others, how this stems from childhood experiences. \"How do I get better at everything?\" Desire to learn guitar with her father, taking time from video games.     Discussed her time with a new video game and encouraged her to engage with discord to find others to socialize with regarding shabana. Discussed importance of her engaging with her creative side, engaging with herself in a meaningful way and allowing herself to express herself. Focused with Sunny on the importance of giving herself the time and lara to do what she wants to do or what she needs to do.     During this session, this clinician used the following therapeutic modalities: Client-centered Therapy, Cognitive Behavioral Therapy, and Existential Therapy    Substance Abuse was not addressed during this session. If the client is diagnosed with a co-occurring substance use disorder, please indicate any changes in the frequency or amount of use: n/a. Stage of change for addressing substance use diagnoses: No substance use/Not applicable    ASSESSMENT:  Raghav Smith presents " "with a Euthymic/ normal mood.     her affect is Normal range and intensity, which is congruent, with her mood and the content of the session. The client has made progress on their goals.     Raghav Smith presents with a none risk of suicide, none risk of self-harm, and none risk of harm to others.    For any risk assessment that surpasses a \"low\" rating, a safety plan must be developed.    A safety plan was indicated: no  If yes, describe in detail n/a    PLAN: Between sessions, Raghav Smith will continue to engage with work on her home, work on her paintings, self-pleasure. At the next session, the therapist will use Client-centered Therapy, Cognitive Behavioral Therapy, and Existential Therapy to address  depression.    Behavioral Health Treatment Plan and Discharge Planning: Raghav Smith is aware of and agrees to continue to work on their treatment plan. They have identified and are working toward their discharge goals. yes    Visit start and stop times:    01/30/24  Start Time: 0906  Stop Time: 0950  Total Visit Time: 44 minutes  "

## 2024-02-01 ENCOUNTER — TELEPHONE (OUTPATIENT)
Dept: PSYCHIATRY | Facility: CLINIC | Age: 29
End: 2024-02-01

## 2024-02-05 ENCOUNTER — SOCIAL WORK (OUTPATIENT)
Dept: BEHAVIORAL/MENTAL HEALTH CLINIC | Facility: CLINIC | Age: 29
End: 2024-02-05
Payer: COMMERCIAL

## 2024-02-05 DIAGNOSIS — F31.81 BIPOLAR 2 DISORDER (HCC): ICD-10-CM

## 2024-02-05 DIAGNOSIS — F43.10 PTSD (POST-TRAUMATIC STRESS DISORDER): Primary | ICD-10-CM

## 2024-02-05 PROCEDURE — 90834 PSYTX W PT 45 MINUTES: CPT | Performed by: COUNSELOR

## 2024-02-05 NOTE — PSYCH
"Behavioral Health Psychotherapy Progress Note    Psychotherapy Provided: Individual Psychotherapy     1. PTSD (post-traumatic stress disorder)        2. Bipolar 2 disorder (HCC)            Goals addressed in session: Goal 1     DATA: Considering Ketamine treatment. Finding shabana discord. Treating self in third person, \"Celestia\", affirmations for her. Grief for grandmother. CHILDHOOD \"Good enough to go out\" and be with people. Parentification. \"if I try to do the least amount of things, it will be the least hard\" Mother made her feel bad about herself, doing dishes, cleaning. Father made her feel like she didn't exist or was a nuisance. Self-esteem - catholicism, \"Having a body is sinful\", Describes herself as having to \"earn\" happiness, which is what often leads her to not feel like she deserves happiness, or be motivated.She is to bring in DBT workbook. Continue focusing on how she feels obligated to help others, how this stems from childhood experiences. \"How do I get better at everything?\" Desire to learn guitar with her father, taking time from video games.      Focused with her today on here and now interventions. Discussed her challenges with identifying her feelings, or even expressing herself at times today. Asked her about her feelings with friends. Discussed feelings in the moment.     During this session, this clinician used the following therapeutic modalities: Client-centered Therapy, Cognitive Behavioral Therapy, and Existential Therapy    Substance Abuse was not addressed during this session. If the client is diagnosed with a co-occurring substance use disorder, please indicate any changes in the frequency or amount of use: n/a. Stage of change for addressing substance use diagnoses: No substance use/Not applicable    ASSESSMENT:  Raghav Smith presents with a Dysthymic mood.     her affect is Blunted, which is congruent, with her mood and the content of the session. The client has made " "progress on their goals.     Raghav Smith presents with a none risk of suicide, none risk of self-harm, and none risk of harm to others.    For any risk assessment that surpasses a \"low\" rating, a safety plan must be developed.    A safety plan was indicated: no  If yes, describe in detail n/a    PLAN: Between sessions, Raghav Smith will continue to engage with her friends and family. At the next session, the therapist will use Client-centered Therapy, Cognitive Behavioral Therapy, and Existential Therapy to address depression.    Behavioral Health Treatment Plan and Discharge Planning: Raghav Smith is aware of and agrees to continue to work on their treatment plan. They have identified and are working toward their discharge goals. yes    Visit start and stop times:    02/05/24  Start Time: 1515  Stop Time: 1600  Total Visit Time: 45 minutes  "

## 2024-02-07 ENCOUNTER — TELEMEDICINE (OUTPATIENT)
Dept: PSYCHIATRY | Facility: CLINIC | Age: 29
End: 2024-02-07
Payer: COMMERCIAL

## 2024-02-07 ENCOUNTER — TELEPHONE (OUTPATIENT)
Dept: PSYCHIATRY | Facility: CLINIC | Age: 29
End: 2024-02-07

## 2024-02-07 DIAGNOSIS — E55.9 VITAMIN D DEFICIENCY: ICD-10-CM

## 2024-02-07 DIAGNOSIS — Z13.0 SCREENING FOR ENDOCRINE, NUTRITIONAL, METABOLIC AND IMMUNITY DISORDER: ICD-10-CM

## 2024-02-07 DIAGNOSIS — Z13.228 SCREENING FOR ENDOCRINE, NUTRITIONAL, METABOLIC AND IMMUNITY DISORDER: ICD-10-CM

## 2024-02-07 DIAGNOSIS — F31.81 BIPOLAR 2 DISORDER (HCC): Primary | ICD-10-CM

## 2024-02-07 DIAGNOSIS — Z13.21 SCREENING FOR ENDOCRINE, NUTRITIONAL, METABOLIC AND IMMUNITY DISORDER: ICD-10-CM

## 2024-02-07 DIAGNOSIS — G47.09 OTHER INSOMNIA: ICD-10-CM

## 2024-02-07 DIAGNOSIS — F60.3 BORDERLINE PERSONALITY DISORDER (HCC): ICD-10-CM

## 2024-02-07 DIAGNOSIS — F43.10 PTSD (POST-TRAUMATIC STRESS DISORDER): ICD-10-CM

## 2024-02-07 DIAGNOSIS — G47.20 DISRUPTIONS OF 24-HOUR SLEEP-WAKE CYCLE: ICD-10-CM

## 2024-02-07 DIAGNOSIS — Z13.29 SCREENING FOR ENDOCRINE, NUTRITIONAL, METABOLIC AND IMMUNITY DISORDER: ICD-10-CM

## 2024-02-07 PROCEDURE — 90833 PSYTX W PT W E/M 30 MIN: CPT | Performed by: STUDENT IN AN ORGANIZED HEALTH CARE EDUCATION/TRAINING PROGRAM

## 2024-02-07 PROCEDURE — 99214 OFFICE O/P EST MOD 30 MIN: CPT | Performed by: STUDENT IN AN ORGANIZED HEALTH CARE EDUCATION/TRAINING PROGRAM

## 2024-02-07 RX ORDER — BUPROPION HYDROCHLORIDE 300 MG/1
300 TABLET ORAL DAILY
Qty: 90 TABLET | Refills: 0 | Status: SHIPPED | OUTPATIENT
Start: 2024-02-07 | End: 2024-05-07

## 2024-02-07 RX ORDER — TRAZODONE HYDROCHLORIDE 50 MG/1
25 TABLET ORAL
Qty: 45 TABLET | Refills: 0 | Status: SHIPPED | OUTPATIENT
Start: 2024-02-07 | End: 2024-05-07

## 2024-02-07 RX ORDER — LANOLIN ALCOHOL/MO/W.PET/CERES
3 CREAM (GRAM) TOPICAL
Qty: 90 TABLET | Refills: 0 | Status: SHIPPED | OUTPATIENT
Start: 2024-02-07 | End: 2024-05-07

## 2024-02-07 RX ORDER — LAMOTRIGINE 200 MG/1
400 TABLET ORAL DAILY
Qty: 180 TABLET | Refills: 0 | Status: SHIPPED | OUTPATIENT
Start: 2024-02-07 | End: 2024-05-07

## 2024-02-07 NOTE — BH CRISIS PLAN
"Client Name: Raghav Smith       Client YOB: 1995    [facility]      Creation Date: 2/7/24 Update Date: 2/7/24   Created By: Suma White MD Last Updated By: Suma White MD      Step 1: Warning Signs:   Warning Signs   \"crying\"; \"outbursts of feelings\"            Step 2: Internal Coping Strategies:   Internal Coping Strategies   \"watching Dr. Who\"   \"playing video games\"   \"hugging my dog\"            Step 3: People and social settings that provide distraction:   Name Contact Information   My  contact saved in cell   My friend contact saved in cell   My family contact saved in cell    Places   My room   My mother's house           Step 4: People whom I can ask for help during a crisis:      Name Contact Information    My  contact saved in cell    My friend and family contact saved in cell      Step 5: Professionals or agencies I can contact during a crisis:      Clinican/Agency Name Phone Emergency Contact    Suma White MD (psychiatrist) [phone]     Sunil Ruiz (therapist) [phone]       [facility] Emergency Department Emergency Department Phone Emergency Department Address    [facility] [phone] [address] [place] [id number]        Crisis Phone Numbers:   Suicide Prevention Lifeline: Call or Text  308 Crisis Text Line: Text HOME to 375-746   Please note: Some [facility] do not have a separate number for Child/Adolescent specific crisis. If your county is not listed under Child/Adolescent, please call the adult number for your county      Adult Crisis Numbers: Child/Adolescent Crisis Numbers   [facility]: [phone] [facility]: [phone]   [facility]: [phone] [facility]: [phone]   [facility]: [phone] [place]: [phone]   [facility]: [phone] [place]: [phone]   [place]: [phone]   [facility]: [phone]   [facility]: " [phone]   [facility]: [phone] (daytime) [phone] (after hours, weekends, holidays)      Step 6: Making the environment safer (plan for lethal means safety):   Patient did not identify any lethal methods: Yes     Optional: What is most important to me and worth living for?      [place] Safety Plan. Lynette Acuna and Ramos Nassar. Used with permission of the authors.
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
## 2024-02-12 ENCOUNTER — TELEPHONE (OUTPATIENT)
Dept: PSYCHIATRY | Facility: CLINIC | Age: 29
End: 2024-02-12

## 2024-02-12 NOTE — TELEPHONE ENCOUNTER
Writer BARTHOLOMEW for patient to return call to office to reschedule cancelled 2/12 apt due to provider being out sick. Transfer call to richier. TY

## 2024-02-12 NOTE — TELEPHONE ENCOUNTER
Writer FLORIDA informing patient today's appointment has been switched to a virtual visit due to provider being out of office. Provided office number if needing to cancel or reschedule.Please assist upon return call.Ty

## 2024-02-21 ENCOUNTER — SOCIAL WORK (OUTPATIENT)
Dept: BEHAVIORAL/MENTAL HEALTH CLINIC | Facility: CLINIC | Age: 29
End: 2024-02-21
Payer: COMMERCIAL

## 2024-02-21 DIAGNOSIS — F43.10 PTSD (POST-TRAUMATIC STRESS DISORDER): Primary | ICD-10-CM

## 2024-02-21 PROCEDURE — 90834 PSYTX W PT 45 MINUTES: CPT | Performed by: COUNSELOR

## 2024-02-21 NOTE — PSYCH
"Behavioral Health Psychotherapy Progress Note    Psychotherapy Provided: Individual Psychotherapy     1. PTSD (post-traumatic stress disorder)            Goals addressed in session: Goal 1     DATA: Considering Ketamine treatment. Finding shabana discord. Treating self in third person, \"Celestia\", affirmations for her. Grief for grandmother. CHILDHOOD \"Good enough to go out\" and be with people. Parentification. \"if I try to do the least amount of things, it will be the least hard\" Mother made her feel bad about herself, doing dishes, cleaning. Father made her feel like she didn't exist or was a nuisance. Self-esteem - catholicism, \"Having a body is sinful\", Describes herself as having to \"earn\" happiness, which is what often leads her to not feel like she deserves happiness, or be motivated.She is to bring in DBT workbook. Continue focusing on how she feels obligated to help others, how this stems from childhood experiences. \"How do I get better at everything?\" Desire to learn guitar with her father, taking time from video games.       Discussed her challenges with sleep recently, her symptoms of depression regarding anhedonia. She says she has been painting, cleaning her house, organizing her rooms. She appears flat today. She reports she did cut herself recently, not to harm herself or hurt herself, but out of boredom/curiosity. Negative for suicidal ideation, intent, plan. Discussed difference between cutting with safety pin and tattooing. She has no plan to cut again. Explored what she could do to cope with any thoughts of cutting through review of safety plan. Reaching out to supports when needed.     During this session, this clinician used the following therapeutic modalities: Client-centered Therapy, Cognitive Behavioral Therapy, and Existential Therapy    Substance Abuse was not addressed during this session. If the client is diagnosed with a co-occurring substance use disorder, please indicate any changes " "in the frequency or amount of use: n/a. Stage of change for addressing substance use diagnoses: No substance use/Not applicable    ASSESSMENT:  Raghav Smith presents with a Euthymic/ normal mood.     her affect is Blunted, which is not congruent, with her mood and the content of the session. The client has made progress on their goals.     Raghav Smith presents with a none risk of suicide, none risk of self-harm, and none risk of harm to others.    For any risk assessment that surpasses a \"low\" rating, a safety plan must be developed.    A safety plan was indicated: no  If yes, describe in detail n/a    PLAN: Between sessions, Raghav Smith will continue to engage with therapy work. She is considering ketamine treatment. Asked her to talk to her doctor and call local treatment centers (gave [facility] and [facility] as potential sources). At the next session, the therapist will use Client-centered Therapy, Cognitive Behavioral Therapy, and Existential Therapy to address depression.    Behavioral Health Treatment Plan and Discharge Planning: Raghav Smith is aware of and agrees to continue to work on their treatment plan. They have identified and are working toward their discharge goals. yes    Visit start and stop times:    02/21/24  Start Time: 1313  Stop Time: 1355  Total Visit Time: 42 minutes  "

## 2024-02-28 ENCOUNTER — SOCIAL WORK (OUTPATIENT)
Dept: BEHAVIORAL/MENTAL HEALTH CLINIC | Facility: CLINIC | Age: 29
End: 2024-02-28

## 2024-02-28 DIAGNOSIS — F43.10 PTSD (POST-TRAUMATIC STRESS DISORDER): Primary | ICD-10-CM

## 2024-02-28 DIAGNOSIS — F31.81 BIPOLAR 2 DISORDER (HCC): ICD-10-CM

## 2024-02-28 NOTE — PSYCH
"Behavioral Health Psychotherapy Progress Note    Psychotherapy Provided: Individual Psychotherapy     1. PTSD (post-traumatic stress disorder)        2. Bipolar 2 disorder (HCC)            Goals addressed in session: Goal 1     DATA: Continue discussion about how her mother was emotionally dysregulated. Considering Ketamine treatment. Finding shabana discord. Treating self in third person, \"Celestia\", affirmations for her. Grief for grandmother. CHILDHOOD \"Good enough to go out\" and be with people. Parentification. \"if I try to do the least amount of things, it will be the least hard\" Mother made her feel bad about herself, doing dishes, cleaning. Father made her feel like she didn't exist or was a nuisance. Self-esteem - catholicism, \"Having a body is sinful\", Describes herself as having to \"earn\" happiness, which is what often leads her to not feel like she deserves happiness, or be motivated.She is to bring in DBT workbook. Continue focusing on how she feels obligated to help others, how this stems from childhood experiences. \"How do I get better at everything?\" Desire to learn guitar with her father, taking time from video games.        Reports she's practiced self-care by getting a haircut that she is happy with. She says she has set up her streaming station and is looking forward to using it. She reports she's been communicating with her sister more often, enjoying this. Focused on her expressions of gratitude toward her sister. Explored her history and her symptoms and how related they are through her issues with her parents.     During this session, this clinician used the following therapeutic modalities: Client-centered Therapy, Cognitive Behavioral Therapy, and Existential Therapy    Substance Abuse was not addressed during this session. If the client is diagnosed with a co-occurring substance use disorder, please indicate any changes in the frequency or amount of use: n/a. Stage of change for addressing " "substance use diagnoses: No substance use/Not applicable    ASSESSMENT:  Raghav Smith presents with a Euthymic/ normal mood.     her affect is Normal range and intensity, which is congruent, with her mood and the content of the session. The client has made progress on their goals.     Raghav Smith presents with a none risk of suicide, none risk of self-harm, and none risk of harm to others.    For any risk assessment that surpasses a \"low\" rating, a safety plan must be developed.    A safety plan was indicated: no  If yes, describe in detail n/a    PLAN: Between sessions, Raghav Smith will continue to engage with her friends, family, streaming venture. At the next session, the therapist will use Client-centered Therapy, Cognitive Behavioral Therapy, and Dialectical Behavior Therapy to address depression, PTSD.    Behavioral Health Treatment Plan and Discharge Planning: Raghav Smith is aware of and agrees to continue to work on their treatment plan. They have identified and are working toward their discharge goals. yes    Visit start and stop times:    02/28/24  Start Time: 1309  Stop Time: 1400  Total Visit Time: 51 minutes  "

## 2024-02-29 ENCOUNTER — SOCIAL WORK (OUTPATIENT)
Dept: BEHAVIORAL/MENTAL HEALTH CLINIC | Facility: CLINIC | Age: 29
End: 2024-02-29

## 2024-02-29 DIAGNOSIS — F31.81 BIPOLAR 2 DISORDER (HCC): ICD-10-CM

## 2024-02-29 DIAGNOSIS — F43.10 PTSD (POST-TRAUMATIC STRESS DISORDER): Primary | ICD-10-CM
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
## 2024-03-06 ENCOUNTER — TELEPHONE (OUTPATIENT)
Dept: PSYCHIATRY | Facility: CLINIC | Age: 29
End: 2024-03-06

## 2024-03-06 ENCOUNTER — SOCIAL WORK (OUTPATIENT)
Dept: BEHAVIORAL/MENTAL HEALTH CLINIC | Facility: CLINIC | Age: 29
End: 2024-03-06

## 2024-03-06 DIAGNOSIS — F43.10 PTSD (POST-TRAUMATIC STRESS DISORDER): Primary | ICD-10-CM

## 2024-03-06 DIAGNOSIS — F31.81 BIPOLAR 2 DISORDER (HCC): ICD-10-CM

## 2024-03-06 NOTE — TELEPHONE ENCOUNTER
I called the patient and spoke with her, and noted she did not take her meds for 5 days. Psycho-education regarding indications, benefits, risks, side effects, and alternative options provided to the patient, and the importance of the compliance with psychiatric treatment reiterated. The patient verbalized understanding and agreed to the proposed regimen and was recommended to restart her meds the same dose as it has not been more than 5 days. Denied SI/HI, intent or plan upon direct inquiry at this time. The patient was recommended to call 911 or go to the ED in case of crisis or safety concerns. She was receptive to the recs. Next appointment on 3/12/24.

## 2024-03-06 NOTE — BH TREATMENT PLAN
"Outpatient Behavioral Health Psychotherapy Treatment Plan     Raghav Smith  1995      Date of Initial Psychotherapy Assessment: 7/15/2021   Date of Current Treatment Plan: 3/6/2024  Treatment Plan Target Date: 9/6/2024  Treatment Plan Expiration Date: 9/6/2024     Diagnosis:   1. History of posttraumatic stress disorder (PTSD)          2. Bipolar 2 disorder (HCC)                Area(s) of Need: Lower self-esteem at times, existential concerns about death and isolation     Long Term Goal 1 (in the client's own words): \"Incorporating my trauma and emotions into relationships and intimacy\" \"Understanding that my trauma is a part of me\" (\"Oh, I got that\" Completed 9/22/2023). PHQ9 will continue to remain \"no or minimal\" depression, PCL-5 will remain under 32 threshold for PTSD. Increased understanding of existential concerns.      Stage of Change: Action     Target Date for completion: 9/6/2024             Anticipated therapeutic modalities: PCT, CBT, ET, CPT             People identified to complete this goal: Sunil Correa                    Objective 1: (identify the means of measuring success in meeting the objective): Mindy will follw through with her medication management, taking medications as prescribed, engaging in various treatment modalities, following through with homework. Raghav will continue to set boundaries in the immediate.                     Objective 2: (identify the means of measuring success in meeting the objective): Raghav will continue to explore her narrative, complete IRT work, engage in self-care at least once per day 7/7 days of the week. This writer will provide Raghav with PCT, CBT, SFBT, DBT through her course of treatment. CBT/DBT for emotional resiliance. Psychoeducation regarding her diagnosis.  PCT and ET work throughout the course of treatment. Increased ET work.       Long Term Goal 2 (in the client's own words):  \"I definitely want to get into the " "reconnection of body thing\" regarding her trauma. \"I will be able to better read what my body feels ... to be able to sit with [a feeling in my body of physical intimacy]\"     Stage of Change: Action     Target Date for completion: 9/6/2024             Anticipated therapeutic modalities: PCT, ET             People identified to complete this goal: Sunil Avilez                    Objective 1: (identify the means of measuring success in meeting the objective): Sunny will engage in mindfulness related to her body, awareness of sensations happening within her body, practicing articulation of feelings within her body, to increase awareness of how her trauma affects her physical being in her day to day, at least once per day.                     Objective 2: (identify the means of measuring success in meeting the objective): Sunny will continue to engage in self-care at home once per day. This writer will provide psychosomatic psychoeducation.      I am currently under the care of a [facility] psychiatric provider: yes     My [facility] psychiatric provider is: Dr. White      I am currently taking psychiatric medications: Yes, as prescribed     I feel that I will be ready for discharge from mental health care when I reach the following (measurable goal/objective): Goal 1 and 2     For children and adults who have a legal guardian:          Has there been any change to custody orders and/or guardianship status? NA. If yes, attach updated documentation.     I have created my Crisis Plan and have been offered a copy of this plan     Behavioral Health Treatment Plan  Luke: Diagnosis and Treatment Plan explained to Raghav Smith acknowledges an understanding of their diagnosis. Raghav Smith agrees to this treatment plan.     I have been offered a copy of this Treatment Plan. yes  "

## 2024-03-06 NOTE — TELEPHONE ENCOUNTER
Raghav called the office to inform provider that she has not taken any of her medications in 5 days and needs to know how to re-start them.    Will refer to Dr White for review.

## 2024-03-06 NOTE — PSYCH
"Behavioral Health Psychotherapy Progress Note    Psychotherapy Provided: Individual Psychotherapy     1. PTSD (post-traumatic stress disorder)        2. Bipolar 2 disorder (HCC)            Goals addressed in session: Goal 1     DATA:  \"People that say they love me or care about me but their actions don't show that, I take that as my fault - that's what I need to work on\". Continue discussion about how her mother was emotionally dysregulated. Considering Ketamine treatment. Finding shabana discord. Treating self in third person, \"Celestia\", affirmations for her. Grief for grandmother. CHILDHOOD \"Good enough to go out\" and be with people. Parentification. \"if I try to do the least amount of things, it will be the least hard\" Mother made her feel bad about herself, doing dishes, cleaning. Father made her feel like she didn't exist or was a nuisance. Self-esteem - catholicism, \"Having a body is sinful\", Describes herself as having to \"earn\" happiness, which is what often leads her to not feel like she deserves happiness, or be motivated.She is to bring in DBT workbook. Continue focusing on how she feels obligated to help others, how this stems from childhood experiences. \"How do I get better at everything?\" Desire to learn guitar with her father, taking time from video games.         Focused on updating her treatment plan today. Made plan for her to reach out to her doctor regarding her medication.     During this session, this clinician used the following therapeutic modalities: Client-centered Therapy, Cognitive Behavioral Therapy, and Existential Therapy    Substance Abuse was not addressed during this session. If the client is diagnosed with a co-occurring substance use disorder, please indicate any changes in the frequency or amount of use: n/a. Stage of change for addressing substance use diagnoses: No substance use/Not applicable    ASSESSMENT:  Raghav Smith presents with a Euthymic/ normal mood.     her " "affect is Normal range and intensity, which is congruent, with her mood and the content of the session. The client has made progress on their goals.     Raghav Smith presents with a none risk of suicide, none risk of self-harm, and none risk of harm to others.    For any risk assessment that surpasses a \"low\" rating, a safety plan must be developed.    A safety plan was indicated: no  If yes, describe in detail n/a    PLAN: Between sessions, Raghav Smith will continue to engage with medication management. At the next session, the therapist will use Client-centered Therapy, Cognitive Behavioral Therapy, and Existential Therapy to address depression.    Behavioral Health Treatment Plan and Discharge Planning: Raghav Smith is aware of and agrees to continue to work on their treatment plan. They have identified and are working toward their discharge goals. yes    Visit start and stop times:    03/06/24  Start Time: 1415  Stop Time: 1500  Total Visit Time: 45 minutes  "

## 2024-03-08 DIAGNOSIS — E55.9 VITAMIN D DEFICIENCY: ICD-10-CM

## 2024-03-08 RX ORDER — ERGOCALCIFEROL 1.25 MG/1
CAPSULE ORAL
Qty: 12 CAPSULE | Refills: 1 | Status: SHIPPED | OUTPATIENT
Start: 2024-03-08

## 2024-03-12 ENCOUNTER — TELEMEDICINE (OUTPATIENT)
Dept: PSYCHIATRY | Facility: CLINIC | Age: 29
End: 2024-03-12
Payer: COMMERCIAL

## 2024-03-12 DIAGNOSIS — F43.10 PTSD (POST-TRAUMATIC STRESS DISORDER): ICD-10-CM

## 2024-03-12 DIAGNOSIS — F12.10 CANNABIS ABUSE: ICD-10-CM

## 2024-03-12 DIAGNOSIS — G47.20 DISRUPTIONS OF 24-HOUR SLEEP-WAKE CYCLE: ICD-10-CM

## 2024-03-12 DIAGNOSIS — F60.3 BORDERLINE PERSONALITY DISORDER (HCC): ICD-10-CM

## 2024-03-12 DIAGNOSIS — F31.81 BIPOLAR 2 DISORDER (HCC): Primary | ICD-10-CM

## 2024-03-12 PROCEDURE — 90833 PSYTX W PT W E/M 30 MIN: CPT | Performed by: STUDENT IN AN ORGANIZED HEALTH CARE EDUCATION/TRAINING PROGRAM

## 2024-03-12 PROCEDURE — 99214 OFFICE O/P EST MOD 30 MIN: CPT | Performed by: STUDENT IN AN ORGANIZED HEALTH CARE EDUCATION/TRAINING PROGRAM

## 2024-03-13 ENCOUNTER — TELEMEDICINE (OUTPATIENT)
Dept: BEHAVIORAL/MENTAL HEALTH CLINIC | Facility: CLINIC | Age: 29
End: 2024-03-13
Payer: COMMERCIAL

## 2024-03-13 ENCOUNTER — TELEPHONE (OUTPATIENT)
Dept: PSYCHIATRY | Facility: CLINIC | Age: 29
End: 2024-03-13

## 2024-03-13 DIAGNOSIS — F31.81 BIPOLAR 2 DISORDER (HCC): ICD-10-CM

## 2024-03-13 DIAGNOSIS — F43.10 PTSD (POST-TRAUMATIC STRESS DISORDER): Primary | ICD-10-CM

## 2024-03-13 PROCEDURE — 90834 PSYTX W PT 45 MINUTES: CPT | Performed by: COUNSELOR

## 2024-03-13 NOTE — PSYCH
Virtual Regular Visit    Verification of patient location:    Patient is located at Home in the following state in which I hold an active license [place]      Assessment/Plan:    Problem List Items Addressed This Visit       Bipolar 2 disorder (HCC)    PTSD (post-traumatic stress disorder) - Primary       Goals addressed in session: Goal 1          Reason for visit is   Chief Complaint   Patient presents with    Virtual Regular Visit          Encounter provider Sunil Ruiz    Provider located at [facility] [place]  [facility] [place]  [address]  [place] [address] [phone]      Recent Visits  No visits were found meeting these conditions.  Showing recent visits within past 7 days and meeting all other requirements  Today's Visits  Date Type Provider Dept   03/13/24 Telephone Sunil Ruiz [facility] [place]   03/13/24 Telemedicine Sunil Ruiz [facility] [place]   Showing today's visits and meeting all other requirements  Future Appointments  No visits were found meeting these conditions.  Showing future appointments within next 150 days and meeting all other requirements       The patient was identified by name and date of birth. Raghav Smith was informed that this is a telemedicine visit and that the visit is being conducted throughthe Epic Embedded platform. She agrees to proceed..  My office door was closed. No one else was in the room.  She acknowledged consent and understanding of privacy and security of the video platform. The patient has agreed to participate and understands they can discontinue the visit at any time.    Patient is aware this is a billable service.     Subjective  Raghav Smith is a 28 y.o. female  .      HPI     Past Medical History:   Diagnosis Date    Anxiety     Bipolar disorder (HCC)     Borderline personality disorder (HCC)     Depression     Self-injurious behavior         Past Surgical History:   Procedure Laterality Date    CERVICAL BIOPSY  W/ LOOP ELECTRODE EXCISION      FL INJECTION LEFT HIP (ARTHROGRAM)  4/7/2021    WISDOM TOOTH EXTRACTION         Current Outpatient Medications   Medication Sig Dispense Refill    albuterol (PROVENTIL HFA,VENTOLIN HFA) 90 mcg/act inhaler Inhale 2 puffs every 6 (six) hours as needed for wheezing or shortness of breath 18 g 1    buPROPion (Wellbutrin XL) 300 mg 24 hr tablet Take 1 tablet (300 mg total) by mouth daily 90 tablet 0    dicyclomine (BENTYL) 10 mg capsule Take 1 capsule (10 mg total) by mouth 4 (four) times a day (before meals and at bedtime) 30 capsule 2    ergocalciferol (VITAMIN D2) 50,000 units TAKE 1 CAPSULE BY MOUTH ONE TIME PER WEEK 12 capsule 1    hydrOXYzine HCL (ATARAX) 10 mg tablet Take 1 tablet (10 mg total) by mouth every 6 (six) hours as needed for anxiety 30 tablet 2    lamoTRIgine (LaMICtal) 200 MG tablet Take 2 tablets (400 mg total) by mouth daily 180 tablet 0    Levonorgestrel 13.5 MG IUD 1 each by Intrauterine route      melatonin 3 mg Take 1 tablet (3 mg total) by mouth daily at bedtime 90 tablet 0    ondansetron (ZOFRAN) 4 mg tablet Take 1 tablet (4 mg total) by mouth every 8 (eight) hours as needed for nausea or vomiting 20 tablet 0    traZODone (DESYREL) 50 mg tablet Take 0.5 tablets (25 mg total) by mouth daily at bedtime 45 tablet 0    valACYclovir (VALTREX) 1,000 mg tablet Take 2 tablets (2,000 mg total) by mouth 2 (two) times a day for 1 day (Patient taking differently: Take 2,000 mg by mouth 2 (two) times a day As needed) 24 tablet 0     No current facility-administered medications for this visit.        Allergies   Allergen Reactions    Contrast [Iodinated Contrast Media] Sneezing       Review of Systems    Video Exam    There were no vitals filed for this visit.    Physical Exam       Behavioral Health Psychotherapy Progress Note    Psychotherapy Provided: Individual Psychotherapy     1. PTSD  "(post-traumatic stress disorder)        2. Bipolar 2 disorder (HCC)            Goals addressed in session: Goal 1     DATA: \"People that say they love me or care about me but their actions don't show that, I take that as my fault - that's what I need to work on\". Continue discussion about how her mother was emotionally dysregulated. Considering Ketamine treatment. Finding shabana discord. Treating self in third person, \"Celestia\", affirmations for her. CHILDHOOD \"Good enough to go out\" and be with people. Parentification. \"if I try to do the least amount of things, it will be the least hard\" Mother made her feel bad about herself, doing dishes, cleaning. Father made her feel like she didn't exist or was a nuisance. Self-esteem - catholicism, \"Having a body is sinful\", Describes herself as having to \"earn\" happiness, which is what often leads her to not feel like she deserves happiness, or be motivated.She is to bring in DBT workbook. Continue focusing on how she feels obligated to help others, how this stems from childhood experiences. \"How do I get better at everything?\" Desire to learn guitar with her father, taking time from video games.         She reports she got to see her gyno \"it was actually jatinder cool\" she says and she enjoyed being able to talk about her issues with someone she trusts and is knowledgeable. She says that she started back on her medication. 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 She enjoyed her time with her friends ACE and their birthday.     During this session, this clinician used the following therapeutic modalities: Client-centered Therapy and Cognitive Behavioral Therapy    Substance Abuse was not addressed during this session. If the client is diagnosed with a co-occurring substance use disorder, please indicate any changes in the frequency or amount of use: n/a. Stage of change for addressing substance use diagnoses: No substance " "use/Not applicable    ASSESSMENT:  Raghav Smith presents with a Euthymic/ normal mood.     her affect is Normal range and intensity, which is congruent, with her mood and the content of the session. The client has made progress on their goals.      Raghav Smith presents with a none risk of suicide, none risk of self-harm, and none risk of harm to others.    For any risk assessment that surpasses a \"low\" rating, a safety plan must be developed.    A safety plan was indicated: no  If yes, describe in detail n/a    PLAN: Between sessions, Raghav Smith will continue to talk to her dad in an authentic manner. At the next session, the therapist will use Client-centered Therapy and Cognitive Behavioral Therapy to address depression.    Behavioral Health Treatment Plan and Discharge Planning: Raghav Smith is aware of and agrees to continue to work on their treatment plan. They have identified and are working toward their discharge goals. yes    Visit start and stop times:    03/13/24  Start Time: 1313  Stop Time: 1355  Total Visit Time: 42 minutes        "

## 2024-03-15 ENCOUNTER — TELEPHONE (OUTPATIENT)
Dept: PSYCHIATRY | Facility: CLINIC | Age: 29
End: 2024-03-15

## 2024-03-15 ENCOUNTER — TELEMEDICINE (OUTPATIENT)
Dept: BEHAVIORAL/MENTAL HEALTH CLINIC | Facility: CLINIC | Age: 29
End: 2024-03-15

## 2024-03-15 DIAGNOSIS — F43.10 PTSD (POST-TRAUMATIC STRESS DISORDER): Primary | ICD-10-CM

## 2024-03-15 DIAGNOSIS — F31.81 BIPOLAR 2 DISORDER (HCC): ICD-10-CM

## 2024-03-15 NOTE — PSYCH
Virtual Regular Visit    Verification of patient location:    Patient is located at Home in the following state in which I hold an active license [place]      Assessment/Plan:    Problem List Items Addressed This Visit       Bipolar 2 disorder (HCC)    PTSD (post-traumatic stress disorder) - Primary       Goals addressed in session: Goal 1          Reason for visit is   Chief Complaint   Patient presents with    Virtual Regular Visit          Encounter provider Sunil Ruiz    [facility]  [facility] [place] [address] [phone]      Recent Visits  Date Type Provider Dept   03/13/24 Telephone Sunil Ruiz [facility] [place]   03/13/24 Telemedicine Sunil Ruiz [facility] [place]   Showing recent visits within past 7 days and meeting all other requirements  Today's Visits  Date Type Provider Dept   03/15/24 Telephone Sunil Ruiz [facility] [place]   03/15/24 Telemedicine Sunil Ruiz [facility] [place]   Showing today's visits and meeting all other requirements  Future Appointments  No visits were found meeting these conditions.  Showing future appointments within next 150 days and meeting all other requirements       The patient was identified by name and date of birth. Raghav Smith was informed that this is a telemedicine visit and that the visit is being conducted throughthe Epic Embedded platform. She agrees to proceed..  My office door was closed. No one else was in the room.  She acknowledged consent and understanding of privacy and security of the video platform. The patient has agreed to participate and understands they can discontinue the visit at any time.    Patient is aware this is a billable service.     Subjective  Raghav Smith is a 28 y.o. female  .      HPI     Past Medical History:   Diagnosis  Date    Anxiety     Bipolar disorder (HCC)     Borderline personality disorder ([facility])     Depression     Self-injurious behavior        Past Surgical History:   Procedure Laterality Date    CERVICAL BIOPSY  W/ LOOP ELECTRODE EXCISION      FL INJECTION LEFT HIP (ARTHROGRAM)  4/7/2021    WISDOM TOOTH EXTRACTION         Current Outpatient Medications   Medication Sig Dispense Refill    albuterol (PROVENTIL HFA,VENTOLIN HFA) 90 mcg/act inhaler Inhale 2 puffs every 6 (six) hours as needed for wheezing or shortness of breath 18 g 1    buPROPion (Wellbutrin XL) 300 mg 24 hr tablet Take 1 tablet (300 mg total) by mouth daily 90 tablet 0    dicyclomine (BENTYL) 10 mg capsule Take 1 capsule (10 mg total) by mouth 4 (four) times a day (before meals and at bedtime) 30 capsule 2    ergocalciferol (VITAMIN D2) 50,000 units TAKE 1 CAPSULE BY MOUTH ONE TIME PER WEEK 12 capsule 1    hydrOXYzine HCL (ATARAX) 10 mg tablet Take 1 tablet (10 mg total) by mouth every 6 (six) hours as needed for anxiety 30 tablet 2    lamoTRIgine (LaMICtal) 200 MG tablet Take 2 tablets (400 mg total) by mouth daily 180 tablet 0    Levonorgestrel 13.5 MG IUD 1 each by Intrauterine route      melatonin 3 mg Take 1 tablet (3 mg total) by mouth daily at bedtime 90 tablet 0    ondansetron (ZOFRAN) 4 mg tablet Take 1 tablet (4 mg total) by mouth every 8 (eight) hours as needed for nausea or vomiting 20 tablet 0    traZODone (DESYREL) 50 mg tablet Take 0.5 tablets (25 mg total) by mouth daily at bedtime 45 tablet 0    valACYclovir (VALTREX) 1,000 mg tablet Take 2 tablets (2,000 mg total) by mouth 2 (two) times a day for 1 day (Patient taking differently: Take 2,000 mg by mouth 2 (two) times a day As needed) 24 tablet 0     No current facility-administered medications for this visit.        Allergies   Allergen Reactions    Contrast [Iodinated Contrast Media] Sneezing       Review of Systems    Video Exam    There were no vitals filed for this visit.    Physical  "Exam     Behavioral Health Psychotherapy Progress Note    Psychotherapy Provided: Individual Psychotherapy     1. PTSD (post-traumatic stress disorder)        2. Bipolar 2 disorder (HCC)            Goals addressed in session: Goal 1     DATA: Discussed trust, trauma, relationship with parents, possible family therapy with parents.\"People that say they love me or care about me but their actions don't show that, I take that as my fault - that's what I need to work on\". Continue discussion about how her mother was emotionally dysregulated. Considering Ketamine treatment. Finding shabana discord. Treating self in third person, \"Celestia\", affirmations for her. CHILDHOOD \"Good enough to go out\" and be with people. Parentification. \"if I try to do the least amount of things, it will be the least hard\" Mother made her feel bad about herself, doing dishes, cleaning. Father made her feel like she didn't exist or was a nuisance. Self-esteem - catholicism, \"Having a body is sinful\", Describes herself as having to \"earn\" happiness, which is what often leads her to not feel like she deserves happiness, or be motivated.She is to bring in DBT workbook. Continue focusing on how she feels obligated to help others, how this stems from childhood experiences. \"How do I get better at everything?\" Desire to learn guitar with her father, taking time from video games.      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 Discussed her housing rehabilitation walkthrough. 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 Discussed her relationship with her father and increasing authentic communication. Discussed trust, trauma, realtionship with parents.    During this session, this clinician used the following therapeutic modalities: Client-centered Therapy, Cognitive Behavioral Therapy, and Existential Therapy    Substance Abuse was not addressed during this session. If the client is diagnosed " "with a co-occurring substance use disorder, please indicate any changes in the frequency or amount of use: n/a. Stage of change for addressing substance use diagnoses: No substance use/Not applicable    ASSESSMENT:  Raghav Smith presents with a Euthymic/ normal mood.     her affect is Normal range and intensity, which is congruent, with her mood and the content of the session. The client has made progress on their goals.     Raghav Smith presents with a none risk of suicide, none risk of self-harm, and none risk of harm to others.    For any risk assessment that surpasses a \"low\" rating, a safety plan must be developed.    A safety plan was indicated: no  If yes, describe in detail n/a    PLAN: Between sessions, Raghav Smith will continue to engage with her self in a self-love centered manner. At the next session, the therapist will use Client-centered Therapy, Cognitive Behavioral Therapy, and Existential Therapy to address depression, PTSD.    Behavioral Health Treatment Plan and Discharge Planning: Raghav Smith is aware of and agrees to continue to work on their treatment plan. They have identified and are working toward their discharge goals. yes    Visit start and stop times:    03/15/24  Start Time: 1102  Stop Time: 1202  Total Visit Time: 60 minutes        "

## 2024-03-15 NOTE — TELEPHONE ENCOUNTER
Patient contacted the office to schedule a follow up visit with provider. Patient is now scheduled for 3/15/2024  at 11am virtually.

## 2024-03-20 ENCOUNTER — SOCIAL WORK (OUTPATIENT)
Dept: BEHAVIORAL/MENTAL HEALTH CLINIC | Facility: CLINIC | Age: 29
End: 2024-03-20
Payer: COMMERCIAL

## 2024-03-20 ENCOUNTER — TELEPHONE (OUTPATIENT)
Dept: PSYCHIATRY | Facility: CLINIC | Age: 29
End: 2024-03-20

## 2024-03-20 DIAGNOSIS — F43.10 PTSD (POST-TRAUMATIC STRESS DISORDER): Primary | ICD-10-CM

## 2024-03-20 DIAGNOSIS — F31.81 BIPOLAR 2 DISORDER (HCC): ICD-10-CM

## 2024-03-20 PROCEDURE — 90834 PSYTX W PT 45 MINUTES: CPT | Performed by: COUNSELOR

## 2024-03-20 NOTE — PSYCH
"Behavioral Health Psychotherapy Progress Note    Psychotherapy Provided: Individual Psychotherapy     1. PTSD (post-traumatic stress disorder)        2. Bipolar 2 disorder (HCC)            Goals addressed in session: Goal 1     DATA: SIGN FOR SESSION BEFORE. Discussed trust, trauma, relationship with parents, possible family therapy with parents.\"People that say they love me or care about me but their actions don't show that, I take that as my fault - that's what I need to work on\". Continue discussion about how her mother was emotionally dysregulated. Considering Ketamine treatment. Finding shabana discord. Treating self in third person, \"Celestia\", affirmations for her. CHILDHOOD \"Good enough to go out\" and be with people. Parentification. \"if I try to do the least amount of things, it will be the least hard\" Mother made her feel bad about herself, doing dishes, cleaning. Father made her feel like she didn't exist or was a nuisance. Self-esteem - catholicism, \"Having a body is sinful\", Describes herself as having to \"earn\" happiness, which is what often leads her to not feel like she deserves happiness, or be motivated.She is to bring in DBT workbook. Continue focusing on how she feels obligated to help others, how this stems from childhood experiences. \"How do I get better at everything?\" Desire to learn guitar with her father, taking time from video games.       Focused with Sunny on making decisions to help with her with her financial stressors. Reports significant distress and depression - offered her an appointment for the following day. Focused with Sunny on taking smaller steps toward her larger goals - attempting to focus only on the smaller steps rather than the more overwhelming larger goals. Empathic listening, validation of feelings, reflection of content and feelings provided.     During this session, this clinician used the following therapeutic modalities: Client-centered Therapy, Cognitive " "Behavioral Therapy, and Existential Therapy    Substance Abuse was not addressed during this session. If the client is diagnosed with a co-occurring substance use disorder, please indicate any changes in the frequency or amount of use: n/a. Stage of change for addressing substance use diagnoses: No substance use/Not applicable    ASSESSMENT:  Raghav Smith presents with a Euthymic/ normal mood.     her affect is Normal range and intensity, which is congruent, with her mood and the content of the session. The client has made progress on their goals.     Raghav Smith presents with a none risk of suicide, none risk of self-harm, and none risk of harm to others.    For any risk assessment that surpasses a \"low\" rating, a safety plan must be developed.    A safety plan was indicated: no  If yes, describe in detail n/a    PLAN: Between sessions, Raghav Smith will continue to engage with her life in smaller steps at a time. At the next session, the therapist will use Client-centered Therapy, Cognitive Behavioral Therapy, and Existential Therapy to address depression.    Behavioral Health Treatment Plan and Discharge Planning: Raghav Smith is aware of and agrees to continue to work on their treatment plan. They have identified and are working toward their discharge goals. yes    Visit start and stop times:    03/20/24  Start Time: 1410  Stop Time: 1500  Total Visit Time: 50 minutes  "

## 2024-03-21 ENCOUNTER — SOCIAL WORK (OUTPATIENT)
Dept: BEHAVIORAL/MENTAL HEALTH CLINIC | Facility: CLINIC | Age: 29
End: 2024-03-21

## 2024-03-21 DIAGNOSIS — F43.10 PTSD (POST-TRAUMATIC STRESS DISORDER): Primary | ICD-10-CM

## 2024-03-21 NOTE — PSYCH
"Behavioral Health Psychotherapy Progress Note    Psychotherapy Provided: Individual Psychotherapy     1. PTSD (post-traumatic stress disorder)            Goals addressed in session: Goal 1     DATA: MUST SIGN PREVIOUS TWO SESSIONS. She is to eat breakfast, brush teeth, take medication, get dressed and care for hair. Discussed trust, trauma, relationship with parents, possible family therapy with parents.\"People that say they love me or care about me but their actions don't show that, I take that as my fault - that's what I need to work on\". Continue discussion about how her mother was emotionally dysregulated. Considering Ketamine treatment. Finding shabana discord. Treating self in third person, \"Celestia\", affirmations for her. CHILDHOOD \"Good enough to go out\" and be with people. Parentification. \"if I try to do the least amount of things, it will be the least hard\" Mother made her feel bad about herself, doing dishes, cleaning. Father made her feel like she didn't exist or was a nuisance. Self-esteem - catholicism, \"Having a body is sinful\", Describes herself as having to \"earn\" happiness, which is what often leads her to not feel like she deserves happiness, or be motivated.She is to bring in DBT workbook. Continue focusing on how she feels obligated to help others, how this stems from childhood experiences. \"How do I get better at everything?\" Desire to learn guitar with her father, taking time from video games.        Focused with her on making adjustments to her behaviors at home. Focused on what is least challenging to start with first. Came up with plan listed above. Discussed communication with her mother.     During this session, this clinician used the following therapeutic modalities: Client-centered Therapy, Cognitive Behavioral Therapy, and Existential Therapy    Substance Abuse was not addressed during this session. If the client is diagnosed with a co-occurring substance use disorder, please " "indicate any changes in the frequency or amount of use: n/a. Stage of change for addressing substance use diagnoses: No substance use/Not applicable    ASSESSMENT:  Raghav Smith presents with a Euthymic/ normal mood.     her affect is Normal range and intensity, which is congruent, with her mood and the content of the session. The client has made progress on their goals.     Raghav Smith presents with a none risk of suicide, none risk of self-harm, and none risk of harm to others.    For any risk assessment that surpasses a \"low\" rating, a safety plan must be developed.    A safety plan was indicated: no  If yes, describe in detail n/a    PLAN: Between sessions, Raghav Smith will continue to engage with life in routine. At the next session, the therapist will use Client-centered Therapy, Cognitive Behavioral Therapy, and Existential Therapy to address depression.    Behavioral Health Treatment Plan and Discharge Planning: Raghav Smith is aware of and agrees to continue to work on their treatment plan. They have identified and are working toward their discharge goals. yes    Visit start and stop times:    03/21/24  Start Time: 1103  Stop Time: 1156  Total Visit Time: 53 minutes  "

## 2024-03-27 ENCOUNTER — SOCIAL WORK (OUTPATIENT)
Dept: BEHAVIORAL/MENTAL HEALTH CLINIC | Facility: CLINIC | Age: 29
End: 2024-03-27

## 2024-03-27 DIAGNOSIS — F43.10 PTSD (POST-TRAUMATIC STRESS DISORDER): Primary | ICD-10-CM

## 2024-03-27 DIAGNOSIS — F31.81 BIPOLAR 2 DISORDER (HCC): ICD-10-CM

## 2024-03-27 NOTE — PSYCH
"Behavioral Health Psychotherapy Progress Note    Psychotherapy Provided: Individual Psychotherapy     1. PTSD (post-traumatic stress disorder)        2. Bipolar 2 disorder (HCC)            Goals addressed in session: Goal 1     DATA: She is to continue found solution of eat breakfast, brush teeth, take medication, get dressed and care for hair (We are calling this cabinet stuff). Discussed trust, trauma, relationship with parents, possible family therapy with parents.\"People that say they love me or care about me but their actions don't show that, I take that as my fault - that's what I need to work on\". Continue discussion about how her mother was emotionally dysregulated. Considering Ketamine treatment. Finding shabana discord. Treating self in third person, \"Celestia\", affirmations for her. CHILDHOOD \"Good enough to go out\" and be with people. Parentification. \"if I try to do the least amount of things, it will be the least hard\" Mother made her feel bad about herself, doing dishes, cleaning. Father made her feel like she didn't exist or was a nuisance. Self-esteem - catholicism, \"Having a body is sinful\", Describes herself as having to \"earn\" happiness, which is what often leads her to not feel like she deserves happiness, or be motivated.She is to bring in DBT workbook. Continue focusing on how she feels obligated to help others, how this stems from childhood experiences. \"How do I get better at everything?\" Desire to learn guitar with her father, taking time from video games.         Says that housing is coming to her house to schedule work to be done on her home. Says she's been able to take medication, brush teeth, getting dressed has been helpful for her to get ready for the day and finds herself more active. \"I don't feel exhausted\" \"I don't feel tired\" Went food shopping. Says she's been cooking more often \"I should eat\", \"I've been keeping up with the dishes\". Focused with her on the importance of taking " "the time to take care for herself and how recovery can be, often is, filled with ups and downs.     During this session, this clinician used the following therapeutic modalities: Client-centered Therapy, Cognitive Behavioral Therapy, and Existential Therapy    Substance Abuse was not addressed during this session. If the client is diagnosed with a co-occurring substance use disorder, please indicate any changes in the frequency or amount of use: n/a. Stage of change for addressing substance use diagnoses: No substance use/Not applicable    ASSESSMENT:  Raghav Smith presents with a Euthymic/ normal mood.     her affect is Normal range and intensity, which is congruent, with her mood and the content of the session. The client has made progress on their goals.     Raghav Smith presents with a none risk of suicide, none risk of self-harm, and none risk of harm to others.    For any risk assessment that surpasses a \"low\" rating, a safety plan must be developed.    A safety plan was indicated: no  If yes, describe in detail n/a    PLAN: Between sessions, Raghav Smith will continue to consider Ketamine treatment, continue to focus on her found solutions. At the next session, the therapist will use Client-centered Therapy, Cognitive Behavioral Therapy, and Existential Therapy to address depression.    Behavioral Health Treatment Plan and Discharge Planning: Raghav Smith is aware of and agrees to continue to work on their treatment plan. They have identified and are working toward their discharge goals. yes    Visit start and stop times:    03/27/24  Start Time: 1310  Stop Time: 1357  Total Visit Time: 47 minutes  "

## 2024-04-02 ENCOUNTER — VBI (OUTPATIENT)
Dept: ADMINISTRATIVE | Facility: OTHER | Age: 29
End: 2024-04-02

## 2024-04-02 NOTE — TELEPHONE ENCOUNTER
04/02/24 7:49 AM     [employer] SmartForm used to document caregap status.    Nissa Wong    No - the patient is unable to be screened due to medical condition

## 2024-04-03 ENCOUNTER — SOCIAL WORK (OUTPATIENT)
Dept: BEHAVIORAL/MENTAL HEALTH CLINIC | Facility: CLINIC | Age: 29
End: 2024-04-03

## 2024-04-03 DIAGNOSIS — F31.81 BIPOLAR 2 DISORDER (HCC): ICD-10-CM

## 2024-04-03 DIAGNOSIS — F43.10 PTSD (POST-TRAUMATIC STRESS DISORDER): Primary | ICD-10-CM
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
## 2024-04-10 ENCOUNTER — SOCIAL WORK (OUTPATIENT)
Dept: BEHAVIORAL/MENTAL HEALTH CLINIC | Facility: CLINIC | Age: 29
End: 2024-04-10

## 2024-04-10 DIAGNOSIS — F31.81 BIPOLAR 2 DISORDER (HCC): ICD-10-CM

## 2024-04-10 DIAGNOSIS — F43.10 PTSD (POST-TRAUMATIC STRESS DISORDER): Primary | ICD-10-CM

## 2024-04-10 NOTE — PSYCH
"Behavioral Health Psychotherapy Progress Note    Psychotherapy Provided: Individual Psychotherapy     1. PTSD (post-traumatic stress disorder)        2. Bipolar 2 disorder (HCC)            Goals addressed in session: Goal 1     DATA: SIGN PREVIOUS WEEK. She is to continue found solution of eat breakfast, brush teeth, take medication, get dressed and care for hair (We are calling this cabinet stuff). Discussed trust, trauma, relationship with parents, possible family therapy with parents.\"People that say they love me or care about me but their actions don't show that, I take that as my fault - that's what I need to work on\". Continue discussion about how her mother was emotionally dysregulated. Considering Ketamine treatment. Finding shabana discord. Treating self in third person, \"Celestia\", affirmations for her. CHILDHOOD \"Good enough to go out\" and be with people. Parentification. \"if I try to do the least amount of things, it will be the least hard\" Mother made her feel bad about herself, doing dishes, cleaning. Father made her feel like she didn't exist or was a nuisance. Self-esteem - catholicism, \"Having a body is sinful\", Describes herself as having to \"earn\" happiness, which is what often leads her to not feel like she deserves happiness, or be motivated.She is to bring in DBT workbook. Continue focusing on how she feels obligated to help others, how this stems from childhood experiences. \"How do I get better at everything?\" Desire to learn guitar with her father, taking time from video games.           Discussed her continuing to engage with her routine and this has been helpful in maintaining increased mood and motivation. She's spent time with her aunt and greatly enjoyed her time with her. She says that she's been fixing her bike at a local non-profit bike repair shop. Explored grief and loss. Checked in with her continued exploration of her sexuality, libido.     During this session, this clinician used " "the following therapeutic modalities: Client-centered Therapy, Cognitive Behavioral Therapy, and Existential Therapy    Substance Abuse was not addressed during this session. If the client is diagnosed with a co-occurring substance use disorder, please indicate any changes in the frequency or amount of use: n/a. Stage of change for addressing substance use diagnoses: No substance use/Not applicable    ASSESSMENT:  Raghav Smith presents with a Euthymic/ normal mood.     her affect is Normal range and intensity, which is congruent, with her mood and the content of the session. The client has made progress on their goals.     Raghav Smith presents with a none risk of suicide, none risk of self-harm, and none risk of harm to others.    For any risk assessment that surpasses a \"low\" rating, a safety plan must be developed.    A safety plan was indicated: no  If yes, describe in detail n/a    PLAN: Between sessions, Raghav Smith will continue to spend time with family, friends, bike shop, dog Snookie. At the next session, the therapist will use Client-centered Therapy, Cognitive Behavioral Therapy, and Existential Therapy to address depression.    Behavioral Health Treatment Plan and Discharge Planning: Raghav Smith is aware of and agrees to continue to work on their treatment plan. They have identified and are working toward their discharge goals. yes    Visit start and stop times:    04/10/24  Start Time: 1312  Stop Time: 1400  Total Visit Time: 48 minutes  "

## 2024-04-17 ENCOUNTER — SOCIAL WORK (OUTPATIENT)
Dept: BEHAVIORAL/MENTAL HEALTH CLINIC | Facility: CLINIC | Age: 29
End: 2024-04-17

## 2024-04-17 DIAGNOSIS — F43.10 PTSD (POST-TRAUMATIC STRESS DISORDER): Primary | ICD-10-CM

## 2024-04-17 DIAGNOSIS — F31.81 BIPOLAR 2 DISORDER (HCC): ICD-10-CM

## 2024-04-17 NOTE — PSYCH
"Behavioral Health Psychotherapy Progress Note    Psychotherapy Provided: Individual Psychotherapy     1. PTSD (post-traumatic stress disorder)        2. Bipolar 2 disorder (HCC)            Goals addressed in session: Goal 1     DATA: She is to continue found solution of eat breakfast, brush teeth, take medication, get dressed and care for hair (We are calling this cabinet stuff). Discussed trust, trauma, relationship with parents, possible family therapy with parents.\"People that say they love me or care about me but their actions don't show that, I take that as my fault - that's what I need to work on\". Continue discussion about how her mother was emotionally dysregulated. Considering Ketamine treatment. Finding shabana discord. Treating self in third person, \"Celestia\", affirmations for her. CHILDHOOD \"Good enough to go out\" and be with people. Parentification. \"if I try to do the least amount of things, it will be the least hard\" Mother made her feel bad about herself, doing dishes, cleaning. Father made her feel like she didn't exist or was a nuisance. Self-esteem - catholicism, \"Having a body is sinful\", Describes herself as having to \"earn\" happiness, which is what often leads her to not feel like she deserves happiness, or be motivated.She is to bring in DBT workbook. Continue focusing on how she feels obligated to help others, how this stems from childhood experiences. \"How do I get better at everything?\" Desire to learn guitar with her father, taking time from video games.            She reports she went for a bike ride with a friend with benefits from [employer]. Focused today on exploring how she trusts or does not trust herself in regard to physical intimacy, knowing herself, her ability to speak up and talk to her partner regarding her wants and needs, exploring her ability to discuss consent. Focused today on her developing a healthy social, sex life that is safe for her, that will allow for growth. " "    During this session, this clinician used the following therapeutic modalities: Client-centered Therapy, Cognitive Behavioral Therapy, and Existential Therapy    Substance Abuse was not addressed during this session. If the client is diagnosed with a co-occurring substance use disorder, please indicate any changes in the frequency or amount of use: n/a. Stage of change for addressing substance use diagnoses: No substance use/Not applicable    ASSESSMENT:  Raghav Smith presents with a Euthymic/ normal mood.     her affect is Normal range and intensity, which is congruent, with her mood and the content of the session. The client has made progress on their goals.     Raghav Smith presents with a none risk of suicide, none risk of self-harm, and none risk of harm to others.    For any risk assessment that surpasses a \"low\" rating, a safety plan must be developed.    A safety plan was indicated: no  If yes, describe in detail n/a    PLAN: Between sessions, Raghav Smith will continue to bike with her friend. At the next session, the therapist will use Client-centered Therapy, Cognitive Behavioral Therapy, and Existential Therapy to address depression.    Behavioral Health Treatment Plan and Discharge Planning: Raghav Smith is aware of and agrees to continue to work on their treatment plan. They have identified and are working toward their discharge goals. yes    Visit start and stop times:    04/17/24  Start Time: 1310  Stop Time: 1400  Total Visit Time: 50 minutes  "

## 2024-04-18 ENCOUNTER — SOCIAL WORK (OUTPATIENT)
Dept: BEHAVIORAL/MENTAL HEALTH CLINIC | Facility: CLINIC | Age: 29
End: 2024-04-18

## 2024-04-18 DIAGNOSIS — F43.10 PTSD (POST-TRAUMATIC STRESS DISORDER): Primary | ICD-10-CM

## 2024-04-18 NOTE — PSYCH
"Behavioral Health Psychotherapy Progress Note    Psychotherapy Provided: Individual Psychotherapy     1. PTSD (post-traumatic stress disorder)            Goals addressed in session: Goal 1     DATA:  MUST SIGN PREVIOUS AND TODAY. She is to continue found solution of eat breakfast, brush teeth, take medication, get dressed and care for hair (We are calling this cabinet stuff). Discussed trust, trauma, relationship with parents, possible family therapy with parents.\"People that say they love me or care about me but their actions don't show that, I take that as my fault - that's what I need to work on\". Continue discussion about how her mother was emotionally dysregulated. Considering Ketamine treatment. Finding shabana discord. Treating self in third person, \"Celestia\", affirmations for her. CHILDHOOD \"Good enough to go out\" and be with people. Parentification. \"if I try to do the least amount of things, it will be the least hard\" Mother made her feel bad about herself, doing dishes, cleaning. Father made her feel like she didn't exist or was a nuisance. Self-esteem - catholicism, \"Having a body is sinful\", Describes herself as having to \"earn\" happiness, which is what often leads her to not feel like she deserves happiness, or be motivated.She is to bring in DBT workbook. Continue focusing on how she feels obligated to help others, how this stems from childhood experiences. \"How do I get better at everything?\" Desire to learn guitar with her father, taking time from video games.             Focused today on penulation and psychosomatic work on her PTSD symptoms. Second session of the week due to increase in PTSD symptoms. She felt relief by the end of the session as a reminder to move between resource and activation within her body to pendulation, release her emotions, process her emotions.     During this session, this clinician used the following therapeutic modalities: Client-centered Therapy, Cognitive Behavioral " "Therapy, and Dialectical Behavior Therapy    Substance Abuse was not addressed during this session. If the client is diagnosed with a co-occurring substance use disorder, please indicate any changes in the frequency or amount of use: n/a. Stage of change for addressing substance use diagnoses: No substance use/Not applicable    ASSESSMENT:  Raghav Smith presents with a Dysthymic mood.     her affect is Normal range and intensity, which is congruent, with her mood and the content of the session. The client has made progress on their goals.     Raghav Smith presents with a none risk of suicide, none risk of self-harm, and none risk of harm to others.    For any risk assessment that surpasses a \"low\" rating, a safety plan must be developed.    A safety plan was indicated: no  If yes, describe in detail n/a    PLAN: Between sessions, Raghav Smith will continue to engage with life fully. At the next session, the therapist will use Client-centered Therapy, Cognitive Behavioral Therapy, and Existential Therapy to address depression.    Behavioral Health Treatment Plan and Discharge Planning: Raghav Smith is aware of and agrees to continue to work on their treatment plan. They have identified and are working toward their discharge goals. yes    Visit start and stop times:    04/18/24  Start Time: 0910  Stop Time: 1000  Total Visit Time: 50 minutes  "

## 2024-04-24 ENCOUNTER — SOCIAL WORK (OUTPATIENT)
Dept: BEHAVIORAL/MENTAL HEALTH CLINIC | Facility: CLINIC | Age: 29
End: 2024-04-24
Payer: COMMERCIAL

## 2024-04-24 DIAGNOSIS — F31.81 BIPOLAR 2 DISORDER (HCC): ICD-10-CM

## 2024-04-24 DIAGNOSIS — F43.10 PTSD (POST-TRAUMATIC STRESS DISORDER): Primary | ICD-10-CM

## 2024-04-24 PROCEDURE — 90834 PSYTX W PT 45 MINUTES: CPT | Performed by: COUNSELOR

## 2024-04-24 NOTE — PSYCH
"Behavioral Health Psychotherapy Progress Note    Psychotherapy Provided: Individual Psychotherapy     1. PTSD (post-traumatic stress disorder)        2. Bipolar 2 disorder (HCC)            Goals addressed in session: Goal 1     DATA: She is to continue found solution of eat breakfast, brush teeth, take medication, get dressed and care for hair (We are calling this cabinet stuff). Discussed trust, trauma, relationship with parents, possible family therapy with parents.\"People that say they love me or care about me but their actions don't show that, I take that as my fault - that's what I need to work on\". Continue discussion about how her mother was emotionally dysregulated. Considering Ketamine treatment. Finding shabana discord. Treating self in third person, \"Celestia\", affirmations for her. CHILDHOOD \"Good enough to go out\" and be with people. Parentification. \"if I try to do the least amount of things, it will be the least hard\" Mother made her feel bad about herself, doing dishes, cleaning. Father made her feel like she didn't exist or was a nuisance. Self-esteem - catholicism, \"Having a body is sinful\", Describes herself as having to \"earn\" happiness, which is what often leads her to not feel like she deserves happiness, or be motivated.She is to bring in DBT workbook. Continue focusing on how she feels obligated to help others, how this stems from childhood experiences. \"How do I get better at everything?\" Desire to learn guitar with her father, taking time from video games.          Focused today on her finding her community at a [facility]. Focused with her on her creativity. Explored her wants, needs for excitement and pleasure. Strong emphasis on her self-reflection that was seemingly automatic, emphasis on her self-growth and becoming her own therapist.     During this session, this clinician used the following therapeutic modalities: Client-centered Therapy, Cognitive " "Behavioral Therapy, and Existential Therapy    Substance Abuse was not addressed during this session. If the client is diagnosed with a co-occurring substance use disorder, please indicate any changes in the frequency or amount of use: n/a. Stage of change for addressing substance use diagnoses: No substance use/Not applicable    ASSESSMENT:  Raghav Smith presents with a Euthymic/ normal mood.     her affect is Normal range and intensity, which is congruent, with her mood and the content of the session. The client has made progress on their goals.      Raghav Smith presents with a none risk of suicide, none risk of self-harm, and none risk of harm to others.    For any risk assessment that surpasses a \"low\" rating, a safety plan must be developed.    A safety plan was indicated: yes  If yes, describe in detail n/a    PLAN: Between sessions, Raghav Smith will continue to expose herself to her community, focusing on her. At the next session, the therapist will use Client-centered Therapy, Cognitive Behavioral Therapy, and Existential Therapy to address depression.    Behavioral Health Treatment Plan and Discharge Planning: Raghav Smith is aware of and agrees to continue to work on their treatment plan. They have identified and are working toward their discharge goals. yes    Visit start and stop times:    04/24/24  Start Time: 1310  Stop Time: 1352  Total Visit Time: 42 minutes  "

## 2024-04-30 DIAGNOSIS — F31.81 BIPOLAR 2 DISORDER (HCC): ICD-10-CM

## 2024-04-30 RX ORDER — TRAZODONE HYDROCHLORIDE 50 MG/1
25 TABLET ORAL
Qty: 45 TABLET | Refills: 0 | Status: SHIPPED | OUTPATIENT
Start: 2024-04-30 | End: 2024-07-29

## 2024-05-01 ENCOUNTER — SOCIAL WORK (OUTPATIENT)
Dept: BEHAVIORAL/MENTAL HEALTH CLINIC | Facility: CLINIC | Age: 29
End: 2024-05-01

## 2024-05-01 DIAGNOSIS — F43.10 PTSD (POST-TRAUMATIC STRESS DISORDER): Primary | ICD-10-CM

## 2024-05-01 DIAGNOSIS — F31.81 BIPOLAR 2 DISORDER (HCC): ICD-10-CM

## 2024-05-01 NOTE — PSYCH
"Behavioral Health Psychotherapy Progress Note    Psychotherapy Provided: Individual Psychotherapy     1. PTSD (post-traumatic stress disorder)        2. Bipolar 2 disorder (HCC)            Goals addressed in session: Goal 1     DATA: She is to continue found solution of eat breakfast, brush teeth, take medication, get dressed and care for hair (We are calling this cabinet stuff). Discussed trust, trauma, relationship with parents, possible family therapy with parents.\"People that say they love me or care about me but their actions don't show that, I take that as my fault - that's what I need to work on\". Continue discussion about how her mother was emotionally dysregulated. Considering Ketamine treatment. Finding shabana discord. Treating self in third person, \"Celestia\", affirmations for her. CHILDHOOD \"Good enough to go out\" and be with people. Parentification. \"if I try to do the least amount of things, it will be the least hard\" Mother made her feel bad about herself, doing dishes, cleaning. Father made her feel like she didn't exist or was a nuisance. Self-esteem - catholicism, \"Having a body is sinful\", Describes herself as having to \"earn\" happiness, which is what often leads her to not feel like she deserves happiness, or be motivated.She is to bring in DBT workbook. Continue focusing on how she feels obligated to help others, how this stems from childhood experiences. \"How do I get better at everything?\" Desire to learn guitar with her father, taking time from video games.           Explored how she feels while she's spending time with her friend Thierno, or her cousins. 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 Focused with Sunny on her work at home and motivating herself using podcasts. Spending time with others and enjoying herself.     During this session, this clinician used the following therapeutic modalities: Client-centered " "Therapy, Cognitive Behavioral Therapy, and Existential Therapy    Substance Abuse was not addressed during this session. If the client is diagnosed with a co-occurring substance use disorder, please indicate any changes in the frequency or amount of use: n/a. Stage of change for addressing substance use diagnoses: No substance use/Not applicable    ASSESSMENT:  Raghav Smith presents with a Euthymic/ normal mood.     her affect is Normal range and intensity, which is congruent, with her mood and the content of the session. The client has made progress on their goals.     Raghav Smith presents with a none risk of suicide, none risk of self-harm, and none risk of harm to others.    For any risk assessment that surpasses a \"low\" rating, a safety plan must be developed.    A safety plan was indicated: no  If yes, describe in detail n/a    PLAN: Between sessions, Raghav Smith will continue to engage with community and work on her house. At the next session, the therapist will use Client-centered Therapy, Cognitive Behavioral Therapy, and Existential Therapy to address depression.    Behavioral Health Treatment Plan and Discharge Planning: Raghav Smith is aware of and agrees to continue to work on their treatment plan. They have identified and are working toward their discharge goals. yes    Visit start and stop times:    05/01/24  Start Time: 1320  Stop Time: 1359  Total Visit Time: 39 minutes  "

## 2024-05-03 ENCOUNTER — TELEMEDICINE (OUTPATIENT)
Dept: BEHAVIORAL/MENTAL HEALTH CLINIC | Facility: CLINIC | Age: 29
End: 2024-05-03

## 2024-05-03 ENCOUNTER — TELEPHONE (OUTPATIENT)
Dept: PSYCHIATRY | Facility: CLINIC | Age: 29
End: 2024-05-03

## 2024-05-03 DIAGNOSIS — F43.10 PTSD (POST-TRAUMATIC STRESS DISORDER): Primary | ICD-10-CM

## 2024-05-03 NOTE — TELEPHONE ENCOUNTER
Writer called and spoke with patient to inform her that the provider was having technical difficulties logging in but was rebooting and attempting logging in again. Patient understood and would wait for provider.

## 2024-05-03 NOTE — PSYCH
Virtual Regular Visit     Verification of patient location:    Patient is located at Other, outside of her house in [facility] in the following state in which I hold an active license [place]      Assessment/Plan:    Problem List Items Addressed This Visit       PTSD (post-traumatic stress disorder) - Primary       Goals addressed in session: Goal 1          Reason for visit is   Chief Complaint   Patient presents with    Virtual Regular Visit          Encounter provider Sunil Ruiz      Recent Visits  No visits were found meeting these conditions.  Showing recent visits within past 7 days and meeting all other requirements  Today's Visits  Date Type Provider Dept   05/03/24 Telephone Sunil Ruiz [facility] [place]   05/03/24 Telemedicine Sunil Ruiz [facility] Therapist [place]   Showing today's visits and meeting all other requirements  Future Appointments  No visits were found meeting these conditions.  Showing future appointments within next 150 days and meeting all other requirements       The patient was identified by name and date of birth. Raghav Smith was informed that this is a telemedicine visit and that the visit is being conducted throughthe Epic Embedded platform. She agrees to proceed..  My office door was closed. No one else was in the room.  She acknowledged consent and understanding of privacy and security of the video platform. The patient has agreed to participate and understands they can discontinue the visit at any time.    Patient is aware this is a billable service.     Subjective  Raghav Smith is a 28 y.o. female  .      HPI     Past Medical History:   Diagnosis Date    Anxiety     Bipolar disorder (HCC)     Borderline personality disorder (HCC)     Depression     Self-injurious behavior        Past Surgical History:   Procedure Laterality Date    CERVICAL BIOPSY  W/ LOOP ELECTRODE EXCISION      FL INJECTION LEFT HIP (ARTHROGRAM)  4/7/2021     WISDOM TOOTH EXTRACTION         Current Outpatient Medications   Medication Sig Dispense Refill    albuterol (PROVENTIL HFA,VENTOLIN HFA) 90 mcg/act inhaler Inhale 2 puffs every 6 (six) hours as needed for wheezing or shortness of breath 18 g 1    buPROPion (Wellbutrin XL) 300 mg 24 hr tablet Take 1 tablet (300 mg total) by mouth daily 90 tablet 0    dicyclomine (BENTYL) 10 mg capsule Take 1 capsule (10 mg total) by mouth 4 (four) times a day (before meals and at bedtime) 30 capsule 2    ergocalciferol (VITAMIN D2) 50,000 units TAKE 1 CAPSULE BY MOUTH ONE TIME PER WEEK 12 capsule 1    hydrOXYzine HCL (ATARAX) 10 mg tablet Take 1 tablet (10 mg total) by mouth every 6 (six) hours as needed for anxiety 30 tablet 2    lamoTRIgine (LaMICtal) 200 MG tablet Take 2 tablets (400 mg total) by mouth daily 180 tablet 0    Levonorgestrel 13.5 MG IUD 1 each by Intrauterine route      melatonin 3 mg Take 1 tablet (3 mg total) by mouth daily at bedtime 90 tablet 0    ondansetron (ZOFRAN) 4 mg tablet Take 1 tablet (4 mg total) by mouth every 8 (eight) hours as needed for nausea or vomiting 20 tablet 0    traZODone (DESYREL) 50 mg tablet TAKE 0.5 TABLETS (25 MG TOTAL) BY MOUTH DAILY AT BEDTIME 45 tablet 0    valACYclovir (VALTREX) 1,000 mg tablet Take 2 tablets (2,000 mg total) by mouth 2 (two) times a day for 1 day (Patient taking differently: Take 2,000 mg by mouth 2 (two) times a day As needed) 24 tablet 0     No current facility-administered medications for this visit.        Allergies   Allergen Reactions    Contrast [Iodinated Contrast Media] Sneezing       Review of Systems    Video Exam    There were no vitals filed for this visit.    Physical Exam     Behavioral Health Psychotherapy Progress Note    Psychotherapy Provided: Individual Psychotherapy     1. PTSD (post-traumatic stress disorder)            Goals addressed in session: Goal 1     DATA: Considering diet as part of having energy and motivation. Had caffeine. More  "music, less horror podcasts. Using solutions focused perspective \"process of elimination\". Listed everything she did in the house and stopped negative thought that she didn't do enough \"I felt like I was trying to make things easier for me, was my goal\". She is to continue found solution of eat breakfast, brush teeth, take medication, get dressed and care for hair (We are calling this cabinet stuff). Discussed trust, trauma, relationship with parents, possible family therapy with parents.\"People that say they love me or care about me but their actions don't show that, I take that as my fault - that's what I need to work on\". Continue discussion about how her mother was emotionally dysregulated. \"Good enough to go out\" and be with people. Parentification. Mother made her feel bad about herself, doing dishes, cleaning. Father made her feel like she didn't exist or was a nuisance.     She says she's had increased symptoms of PTSD, thus second meeting this week to work through symptoms. She reports she's been active in the past day. She says she cleaned her room, cleaned bathroom, washed clothes, tidied up dining and living room, mowed the lawn, cleaned the basement and reorganized it. Took a strong SFBT perspective with Sunny jordan and emphasized her becoming her own therapist by challenging her own thoughts.     During this session, this clinician used the following therapeutic modalities: Solution-Focused Therapy    Substance Abuse was not addressed during this session. If the client is diagnosed with a co-occurring substance use disorder, please indicate any changes in the frequency or amount of use: n/a. Stage of change for addressing substance use diagnoses: No substance use/Not applicable    ASSESSMENT:  Raghav Smith presents with a Euthymic/ normal mood.     her affect is Normal range and intensity, which is congruent, with her mood and the content of the session. The client has made progress on their " "goals.     Raghav Smith presents with a none risk of suicide, none risk of self-harm, and none risk of harm to others.    For any risk assessment that surpasses a \"low\" rating, a safety plan must be developed.    A safety plan was indicated: no  If yes, describe in detail n/a    PLAN: Between sessions, Raghav Smith will continue with her process of elimination. At the next session, the therapist will use Client-centered Therapy, Cognitive Behavioral Therapy, and Existential Therapy to address depression, PTSD.    Behavioral Health Treatment Plan and Discharge Planning: Raghav Smith is aware of and agrees to continue to work on their treatment plan. They have identified and are working toward their discharge goals. yes    Visit start and stop times:    05/03/24  Start Time: 1022  Stop Time: 1100  Total Visit Time: 38 minutes        "

## 2024-05-08 ENCOUNTER — TELEPHONE (OUTPATIENT)
Dept: ADMINISTRATIVE | Facility: OTHER | Age: 29
End: 2024-05-08

## 2024-05-08 ENCOUNTER — TELEPHONE (OUTPATIENT)
Dept: PSYCHIATRY | Facility: CLINIC | Age: 29
End: 2024-05-08

## 2024-05-08 ENCOUNTER — SOCIAL WORK (OUTPATIENT)
Dept: BEHAVIORAL/MENTAL HEALTH CLINIC | Facility: CLINIC | Age: 29
End: 2024-05-08

## 2024-05-08 DIAGNOSIS — F43.10 PTSD (POST-TRAUMATIC STRESS DISORDER): Primary | ICD-10-CM

## 2024-05-08 NOTE — TELEPHONE ENCOUNTER
Left voicemail informing patient and/or parent/guardian of the Psych Encounter form needing to be signed as a requirement from the insurance company for billing purposes. Patient can access form via [employer] and sign electronically.     Please make patient aware this form must be signed for each visit as a requirement to continue future visits with provider.

## 2024-05-08 NOTE — TELEPHONE ENCOUNTER
----- Message from Viki Peña sent at 5/7/2024  6:17 PM EDT -----  Regarding: Care Gap Request  05/07/24 6:17 PM    Hello, our patient Raghav Smith has had Pap Smear (HPV) aka Cervical Cancer Screening completed/performed. Please assist in updating the patient chart by pulling the document from LAB Tab within Chart Review. The date of service is 3/11/24.     Thank you,  Viki Peña  [facility]        86 y/o Female with h/o IBS, COPD/asthma, prior Pna was admitted on 12/23 after she was sent to the ED by MD Griffin for wheezing and SOB worsening over the past 3 days. Pt saw MD Griffin  and had an outpt CT done which showed new lung infiltration concerning for infection and pna.  Pt completed a 10 day course of Prednisone  for asthma with improvement in her respiratory condition.. She became more SOB the day after she finished the Prednisone the week PTA. She reports  that when she coughs, she loses her breath and SOB is exacerbated by ambulating. Pt  did not use her home ProAir inhaler. She is not on home O2.  Pt has  productive yellow cough and decreased PO intake. In ER she received cefepime and vancomycin IV.     1. Pneumonitis with RSV improving. Probable underlying bacterial superimposed pneumonia. COPD.  -dyspnea improving  -cough is dry  -f/u BC x 2, sputum c/s  -on ceftriaxone 2 gm IV qd # 6  -tolerating abx well so far; no side effects noted  -respiratory care  -repeat CXR reviewed  -may d/c droplet isolation  -continue abx coverage for now  -monitor temps  -f/u CBC  -supportive care  2. Other issues:   -care per medicine

## 2024-05-08 NOTE — PSYCH
"Behavioral Health Psychotherapy Progress Note    Psychotherapy Provided: Individual Psychotherapy     1. PTSD (post-traumatic stress disorder)            Goals addressed in session: Goal 1     DATA: Considering diet as part of having energy and motivation. Had caffeine. More music, less horror podcasts. Using solutions focused perspective \"process of elimination\". Listed everything she did in the house and stopped negative thought that she didn't do enough \"I felt like I was trying to make things easier for me, was my goal\". She is to continue found solution of eat breakfast, brush teeth, take medication, get dressed and care for hair (We are calling this cabinet stuff). Discussed trust, trauma, relationship with parents, possible family therapy with parents.\"People that say they love me or care about me but their actions don't show that, I take that as my fault - that's what I need to work on\". Continue discussion about how her mother was emotionally dysregulated. \"Good enough to go out\" and be with people. Parentification. Mother made her feel bad about herself, doing dishes, cleaning. Father made her feel like she didn't exist or was a nuisance.    During this session, this clinician used the following therapeutic modalities: Client-centered Therapy, Cognitive Behavioral Therapy, and Existential Therapy    Focused today on exploring her ideas about emotional, spiritual and physical intimacy. Explored her continuing with her process of elimination strategy, her routine that she has established as helpful and moving her forward toward recovery.     Substance Abuse was not addressed during this session. If the client is diagnosed with a co-occurring substance use disorder, please indicate any changes in the frequency or amount of use: n/a. Stage of change for addressing substance use diagnoses: No substance use/Not applicable    ASSESSMENT:  Raghav Smith presents with a Euthymic/ normal mood.     her affect is " "Normal range and intensity, which is congruent, with her mood and the content of the session. The client has made progress on their goals.     Raghav Smith presents with a none risk of suicide, none risk of self-harm, and none risk of harm to others.    For any risk assessment that surpasses a \"low\" rating, a safety plan must be developed.    A safety plan was indicated: no  If yes, describe in detail n/a    PLAN: Between sessions, Raghav Smith will continue to engage with her routine that she's found helpful. At the next session, the therapist will use Client-centered Therapy, Cognitive Behavioral Therapy, and Existential Therapy to address depression.    Behavioral Health Treatment Plan and Discharge Planning: Raghav Smith is aware of and agrees to continue to work on their treatment plan. They have identified and are working toward their discharge goals. yes    Visit start and stop times:    05/08/24  Start Time: 1303  Stop Time: 1351  Total Visit Time: 48 minutes  "

## 2024-05-08 NOTE — TELEPHONE ENCOUNTER
Upon review of the In Basket request we were able to locate, review, and update the patient chart as requested for Pap Smear (HPV) aka Cervical Cancer Screening.    Any additional questions or concerns should be emailed to the Practice Liaisons via the appropriate education email address, please do not reply via In Basket.    Thank you  Lenny Mitchell

## 2024-05-14 ENCOUNTER — TELEPHONE (OUTPATIENT)
Dept: PSYCHIATRY | Facility: CLINIC | Age: 29
End: 2024-05-14

## 2024-05-14 NOTE — TELEPHONE ENCOUNTER
Left voicemail informing patient and/or parent/guardian of the Psych Encounter form needing to be signed as a requirement from the insurance company for billing purposes. Patient can access form via [employer] and sign electronically.     Please make patient aware this form must be signed for each visit as a requirement to continue future visits with provider.

## 2024-05-15 ENCOUNTER — SOCIAL WORK (OUTPATIENT)
Dept: BEHAVIORAL/MENTAL HEALTH CLINIC | Facility: CLINIC | Age: 29
End: 2024-05-15

## 2024-05-15 DIAGNOSIS — F43.10 PTSD (POST-TRAUMATIC STRESS DISORDER): Primary | ICD-10-CM

## 2024-05-15 NOTE — PSYCH
"Behavioral Health Psychotherapy Progress Note    Psychotherapy Provided: Individual Psychotherapy     1. PTSD (post-traumatic stress disorder)            Goals addressed in session: Goal 1     DATA: Considering diet as part of having energy and motivation. Had caffeine. More music, less horror podcasts. Using solutions focused perspective \"process of elimination\". Listed everything she did in the house and stopped negative thought that she didn't do enough \"I felt like I was trying to make things easier for me, was my goal\". She is to continue found solution of eat breakfast, brush teeth, take medication, get dressed and care for hair (We are calling this cabinet stuff). Discussed trust, trauma, relationship with parents, possible family therapy with parents.\"People that say they love me or care about me but their actions don't show that, I take that as my fault - that's what I need to work on\". Continue discussion about how her mother was emotionally dysregulated. \"Good enough to go out\" and be with people. Parentification. Mother made her feel bad about herself, doing dishes, cleaning. Father made her feel like she didn't exist or was a nuisance.       Discussed her experiencing grief, anticipatory grief, thinking about her parents and missing her parents in the future. Discussed her time with her parents on mothers day. Explored her time with her dogs and enjoying this time. She continues with her routine with getting out more, going for walks, going to biking shop and events, spending time outside, cleaning more often and this is keeping her afloat through this difficult time. Explored regrets. Explored processing her feelings and allowing herself to feel.     During this session, this clinician used the following therapeutic modalities: Client-centered Therapy, Cognitive Behavioral Therapy, and Existential Therapy    Substance Abuse was not addressed during this session. If the client is diagnosed with a " "co-occurring substance use disorder, please indicate any changes in the frequency or amount of use: n/a. Stage of change for addressing substance use diagnoses: No substance use/Not applicable    ASSESSMENT:  Raghav Smith presents with a Euthymic/ normal mood.     her affect is Normal range and intensity, which is congruent, with her mood and the content of the session. The client has made progress on their goals.     Raghav Smith presents with a none risk of suicide, none risk of self-harm, and none risk of harm to others.    For any risk assessment that surpasses a \"low\" rating, a safety plan must be developed.    A safety plan was indicated: no  If yes, describe in detail n/a    PLAN: Between sessions, Raghav Smith will continue to work on herself at home, routine. At the next session, the therapist will use Client-centered Therapy, Cognitive Behavioral Therapy, and Existential Therapy to address depression.    Behavioral Health Treatment Plan and Discharge Planning: Raghav Smith is aware of and agrees to continue to work on their treatment plan. They have identified and are working toward their discharge goals. yes    Visit start and stop times:    05/15/24  Start Time: 1308  Stop Time: 1358  Total Visit Time: 50 minutes  "

## 2024-05-22 ENCOUNTER — SOCIAL WORK (OUTPATIENT)
Dept: BEHAVIORAL/MENTAL HEALTH CLINIC | Facility: CLINIC | Age: 29
End: 2024-05-22

## 2024-05-22 DIAGNOSIS — F43.10 PTSD (POST-TRAUMATIC STRESS DISORDER): Primary | ICD-10-CM

## 2024-05-22 DIAGNOSIS — F31.81 BIPOLAR 2 DISORDER (HCC): ICD-10-CM

## 2024-05-22 NOTE — PSYCH
"Behavioral Health Psychotherapy Progress Note    Psychotherapy Provided: Individual Psychotherapy     1. PTSD (post-traumatic stress disorder)        2. Bipolar 2 disorder (HCC)            Goals addressed in session: Goal 1     DATA:  Considering diet as part of having energy and motivation. Had caffeine. More music, less horror podcasts. Using solutions focused perspective \"process of elimination\". Listed everything she did in the house and stopped negative thought that she didn't do enough \"I felt like I was trying to make things easier for me, was my goal\". She is to continue found solution of eat breakfast, brush teeth, take medication, get dressed and care for hair (We are calling this cabinet stuff). People that say they love me or care about me but their actions don't show that, I take that as my fault - that's what I need to work on\". Continue discussion about how her mother was emotionally dysregulated. Father made her feel like she didn't exist or was a nuisance.        She says she's been cooking for herself more, doing the dishes regularly. She's doing her wash downstairs and taking care of her chores more quickly. She's taking her medication on time. Continues with her morning routine. She's continuing with her diet and considering caffine.     During this session, this clinician used the following therapeutic modalities: Client-centered Therapy, Cognitive Behavioral Therapy, and Existential Therapy    Substance Abuse was not addressed during this session. If the client is diagnosed with a co-occurring substance use disorder, please indicate any changes in the frequency or amount of use: n/a. Stage of change for addressing substance use diagnoses: No substance use/Not applicable    ASSESSMENT:  Raghav Smith presents with a Euthymic/ normal mood.     her affect is Normal range and intensity, which is congruent, with her mood and the content of the session. The client has made progress on their " "goals.     Raghav Smith presents with a none risk of suicide, none risk of self-harm, and none risk of harm to others.    For any risk assessment that surpasses a \"low\" rating, a safety plan must be developed.    A safety plan was indicated: no  If yes, describe in detail n/a    PLAN: Between sessions, Raghav Smith will continue to bike and spend time with others. At the next session, the therapist will use Client-centered Therapy, Cognitive Behavioral Therapy, and Existential Therapy to address depression.    Behavioral Health Treatment Plan and Discharge Planning: Raghav Smith is aware of and agrees to continue to work on their treatment plan. They have identified and are working toward their discharge goals. yes    Visit start and stop times:    05/22/24  Start Time: 1307  Stop Time: 1355  Total Visit Time: 48 minutes  "
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
## 2024-05-24 ENCOUNTER — TELEMEDICINE (OUTPATIENT)
Dept: PSYCHIATRY | Facility: CLINIC | Age: 29
End: 2024-05-24

## 2024-05-24 DIAGNOSIS — F60.3 BORDERLINE PERSONALITY DISORDER (HCC): ICD-10-CM

## 2024-05-24 DIAGNOSIS — F31.81 BIPOLAR 2 DISORDER (HCC): Primary | ICD-10-CM

## 2024-05-24 DIAGNOSIS — G47.20 DISRUPTIONS OF 24-HOUR SLEEP-WAKE CYCLE: ICD-10-CM

## 2024-05-24 DIAGNOSIS — F43.10 PTSD (POST-TRAUMATIC STRESS DISORDER): ICD-10-CM

## 2024-05-24 RX ORDER — BUPROPION HYDROCHLORIDE 300 MG/1
300 TABLET ORAL DAILY
Qty: 90 TABLET | Refills: 0 | Status: SHIPPED | OUTPATIENT
Start: 2024-05-24 | End: 2024-08-22

## 2024-05-24 RX ORDER — CLONIDINE HYDROCHLORIDE 0.1 MG/1
0.1 TABLET ORAL EVERY 12 HOURS SCHEDULED
Qty: 60 TABLET | Refills: 2 | Status: SHIPPED | OUTPATIENT
Start: 2024-05-24 | End: 2024-08-22

## 2024-05-24 RX ORDER — LAMOTRIGINE 200 MG/1
400 TABLET ORAL DAILY
Qty: 180 TABLET | Refills: 0 | Status: SHIPPED | OUTPATIENT
Start: 2024-05-24 | End: 2024-08-22

## 2024-05-29 ENCOUNTER — SOCIAL WORK (OUTPATIENT)
Dept: BEHAVIORAL/MENTAL HEALTH CLINIC | Facility: CLINIC | Age: 29
End: 2024-05-29

## 2024-05-29 DIAGNOSIS — F31.81 BIPOLAR 2 DISORDER (HCC): ICD-10-CM

## 2024-05-29 DIAGNOSIS — F43.10 PTSD (POST-TRAUMATIC STRESS DISORDER): Primary | ICD-10-CM

## 2024-05-29 NOTE — PSYCH
"Behavioral Health Psychotherapy Progress Note    Psychotherapy Provided: Individual Psychotherapy     1. PTSD (post-traumatic stress disorder)        2. Bipolar 2 disorder (HCC)            Goals addressed in session: Goal 1     DATA: Considering diet as part of having energy and motivation. Had caffeine. More music, less horror podcasts. Using solutions focused perspective \"process of elimination\". Listed everything she did in the house and stopped negative thought that she didn't do enough \"I felt like I was trying to make things easier for me, was my goal\". She is to continue found solution of eat breakfast, brush teeth, take medication, get dressed and care for hair (We are calling this cabinet stuff). People that say they love me or care about me but their actions don't show that, I take that as my fault - that's what I need to work on\". Continue discussion about how her mother was emotionally dysregulated. Father made her feel like she didn't exist or was a nuisance.           Discussed how she's been feeling in her mood - reports feeling well. 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 She says she went on a bike ride with Wendi, a young woman from [facility] with [employer]. \"I\"m really excited\". Desires referral for neuropsych eval. Focused with her on how well she has been engaging with her friends, family in an authentic manner, taking personal risks of intimacy by making herself vulnerable, how this has been paying off for her in growing her support network, feeling safer, feeling understood, feeling known.     During this session, this clinician used the following therapeutic modalities: Client-centered Therapy, Cognitive Behavioral Therapy, and Existential Therapy    Substance Abuse was not addressed during this session. If the client is diagnosed with a co-occurring substance use disorder, please indicate any " "changes in the frequency or amount of use: n/a. Stage of change for addressing substance use diagnoses: No substance use/Not applicable    ASSESSMENT:  Raghav Smith presents with a Euthymic/ normal mood.     her affect is Normal range and intensity, which is congruent, with her mood and the content of the session. The client has made progress on their goals.     Raghav Smith presents with a none risk of suicide, none risk of self-harm, and none risk of harm to others.    For any risk assessment that surpasses a \"low\" rating, a safety plan must be developed.    A safety plan was indicated: no  If yes, describe in detail n/a    PLAN: Between sessions, Raghav Smith will continue to engage with her family and friends in an authentic manner. At the next session, the therapist will use Client-centered Therapy, Cognitive Behavioral Therapy, and Existential Therapy to address depression.    Behavioral Health Treatment Plan and Discharge Planning: Raghav Smith is aware of and agrees to continue to work on their treatment plan. They have identified and are working toward their discharge goals. yes    Visit start and stop times:    05/29/24  Start Time: 1313  Stop Time: 1400  Total Visit Time: 47 minutes  "

## 2024-06-05 ENCOUNTER — SOCIAL WORK (OUTPATIENT)
Dept: BEHAVIORAL/MENTAL HEALTH CLINIC | Facility: CLINIC | Age: 29
End: 2024-06-05
Payer: COMMERCIAL

## 2024-06-05 DIAGNOSIS — F31.81 BIPOLAR 2 DISORDER (HCC): ICD-10-CM

## 2024-06-05 DIAGNOSIS — F43.10 PTSD (POST-TRAUMATIC STRESS DISORDER): Primary | ICD-10-CM

## 2024-06-05 PROCEDURE — 90834 PSYTX W PT 45 MINUTES: CPT | Performed by: COUNSELOR

## 2024-06-05 NOTE — PSYCH
"Behavioral Health Psychotherapy Progress Note    Psychotherapy Provided: Individual Psychotherapy     1. PTSD (post-traumatic stress disorder)        2. Bipolar 2 disorder (HCC)            Goals addressed in session: Goal 1     DATA: Considering diet as part of having energy and motivation. Had caffeine. More music, less horror podcasts. Using solutions focused perspective \"process of elimination\". Listed everything she did in the house and stopped negative thought that she didn't do enough \"I felt like I was trying to make things easier for me, was my goal\". She is to continue found solution of eat breakfast, brush teeth, take medication, get dressed and care for hair (We are calling this cabinet stuff). People that say they love me or care about me but their actions don't show that, I take that as my fault - that's what I need to work on\". Continue discussion about how her mother was emotionally dysregulated. Father made her feel like she didn't exist or was a nuisance.         Focused with Antonio today on her feelings about her body and spent time reframing, asking socratic questions to assist antonio with reducing symptoms of depression. Explored her exercise, working on puzzles, increased socialization, MM concert. Emphasized how, within her current power and control, she can \"overwrite\" old trauma memories by creating her own and exposing herself, safely, to similar stimuli.     During this session, this clinician used the following therapeutic modalities: Client-centered Therapy, Cognitive Behavioral Therapy, and Existential Therapy    Substance Abuse was not addressed during this session. If the client is diagnosed with a co-occurring substance use disorder, please indicate any changes in the frequency or amount of use: n/a. Stage of change for addressing substance use diagnoses: No substance use/Not applicable    ASSESSMENT:  Raghav Smith presents with a Euthymic/ normal mood.     her affect is Normal " "range and intensity, which is congruent, with her mood and the content of the session. The client has made progress on their goals.     Raghav Smith presents with a none risk of suicide, none risk of self-harm, and none risk of harm to others.    For any risk assessment that surpasses a \"low\" rating, a safety plan must be developed.    A safety plan was indicated: no  If yes, describe in detail n/a    PLAN: Between sessions, Raghav Smith will continue to engage with her mother on an intimate level. At the next session, the therapist will use Client-centered Therapy, Cognitive Behavioral Therapy, and Existential Therapy to address depression.    Behavioral Health Treatment Plan and Discharge Planning: Raghav Smith is aware of and agrees to continue to work on their treatment plan. They have identified and are working toward their discharge goals. yes    Visit start and stop times:    06/05/24  Start Time: 1311  Stop Time: 1358  Total Visit Time: 47 minutes  "

## 2024-06-06 NOTE — PSYCH
Behavioral Health Psychotherapy Progress Note    Psychotherapy Provided: Individual Psychotherapy     1. PTSD (post-traumatic stress disorder)        2. Bipolar 2 disorder ([address])            Goals addressed in session: Goal 1     DATA: ROLLING PLAN:  She is to talk to EDYTA about her feelings rather than isolating. She is to bring in DBT workbook. Continue focusing on how she feels obligated to help others, how this stems from childhood experiences.  "How do I get better at everything?" Desire to learn guitar with her father, taking time from video games. Discussed her speaking to "parts" of herself from a more curious perspective.  "When I think of doing things I'm not thinking of Ajit, I'm thinking of Ajit and Stefan Heller". Discussed her anticipatory grief for her grandmother. Discussed her family meeting with EDYTA and Ace and how this impacted her. She reports it was successful and she felt relieved afterwards. During this session, this clinician used the following therapeutic modalities: Client-centered Therapy, Cognitive Behavioral Therapy and Existential Therapy    Substance Abuse was not addressed during this session. If the client is diagnosed with a co-occurring substance use disorder, please indicate any changes in the frequency or amount of use: n/a. Stage of change for addressing substance use diagnoses: No substance use/Not applicable    ASSESSMENT:  Kiah Knutson presents with a Euthymic/ normal mood. her affect is Normal range and intensity, which is congruent, with her mood and the content of the session. The client has made progress on their goals. Kiah Knutson presents with a none risk of suicide, none risk of self-harm, and none risk of harm to others. For any risk assessment that surpasses a "low" rating, a safety plan must be developed.     A safety plan was indicated: no  If yes, describe in detail n/a    PLAN: Between sessions, Kiah Knutson will consider attempting talking to J more often, or sitting in her spaces. At the next session, the therapist will use Client-centered Therapy, Cognitive Behavioral Therapy and Existential Therapy to address depression. Behavioral Health Treatment Plan and Discharge Planning: Destin Sampson is aware of and agrees to continue to work on their treatment plan. They have identified and are working toward their discharge goals.  yes    Visit start and stop times:    08/29/23  Start Time: 1500  Stop Time: 1552  Total Visit Time: 52 minutes .

## 2024-06-10 ENCOUNTER — APPOINTMENT (OUTPATIENT)
Dept: LAB | Facility: CLINIC | Age: 29
End: 2024-06-10
Payer: COMMERCIAL

## 2024-06-10 ENCOUNTER — OFFICE VISIT (OUTPATIENT)
Dept: FAMILY MEDICINE CLINIC | Facility: CLINIC | Age: 29
End: 2024-06-10
Payer: COMMERCIAL

## 2024-06-10 VITALS
HEIGHT: 60 IN | SYSTOLIC BLOOD PRESSURE: 102 MMHG | DIASTOLIC BLOOD PRESSURE: 68 MMHG | WEIGHT: 153 LBS | HEART RATE: 98 BPM | TEMPERATURE: 97.9 F | BODY MASS INDEX: 30.04 KG/M2 | RESPIRATION RATE: 18 BRPM | OXYGEN SATURATION: 98 %

## 2024-06-10 DIAGNOSIS — M25.552 LEFT HIP PAIN: ICD-10-CM

## 2024-06-10 DIAGNOSIS — M25.512 CHRONIC LEFT SHOULDER PAIN: ICD-10-CM

## 2024-06-10 DIAGNOSIS — Z11.3 SCREEN FOR STD (SEXUALLY TRANSMITTED DISEASE): ICD-10-CM

## 2024-06-10 DIAGNOSIS — Z13.0 SCREENING FOR ENDOCRINE, NUTRITIONAL, METABOLIC AND IMMUNITY DISORDER: ICD-10-CM

## 2024-06-10 DIAGNOSIS — Z13.21 SCREENING FOR ENDOCRINE, NUTRITIONAL, METABOLIC AND IMMUNITY DISORDER: ICD-10-CM

## 2024-06-10 DIAGNOSIS — F31.81 BIPOLAR 2 DISORDER (HCC): ICD-10-CM

## 2024-06-10 DIAGNOSIS — Z11.59 ENCOUNTER FOR HEPATITIS C SCREENING TEST FOR LOW RISK PATIENT: ICD-10-CM

## 2024-06-10 DIAGNOSIS — Z00.00 ANNUAL PHYSICAL EXAM: Primary | ICD-10-CM

## 2024-06-10 DIAGNOSIS — Z13.228 SCREENING FOR ENDOCRINE, NUTRITIONAL, METABOLIC AND IMMUNITY DISORDER: ICD-10-CM

## 2024-06-10 DIAGNOSIS — F60.3 BORDERLINE PERSONALITY DISORDER (HCC): Chronic | ICD-10-CM

## 2024-06-10 DIAGNOSIS — G89.29 CHRONIC LEFT SHOULDER PAIN: ICD-10-CM

## 2024-06-10 DIAGNOSIS — Z13.1 SCREENING FOR DIABETES MELLITUS (DM): ICD-10-CM

## 2024-06-10 DIAGNOSIS — R19.7 DIARRHEA, UNSPECIFIED TYPE: ICD-10-CM

## 2024-06-10 DIAGNOSIS — R10.9 ABDOMINAL PAIN: ICD-10-CM

## 2024-06-10 DIAGNOSIS — F43.10 PTSD (POST-TRAUMATIC STRESS DISORDER): ICD-10-CM

## 2024-06-10 DIAGNOSIS — F41.9 ANXIETY: ICD-10-CM

## 2024-06-10 DIAGNOSIS — Z11.4 ENCOUNTER FOR SCREENING FOR HUMAN IMMUNODEFICIENCY VIRUS (HIV): ICD-10-CM

## 2024-06-10 DIAGNOSIS — Z13.29 SCREENING FOR ENDOCRINE, NUTRITIONAL, METABOLIC AND IMMUNITY DISORDER: ICD-10-CM

## 2024-06-10 DIAGNOSIS — E55.9 VITAMIN D DEFICIENCY: ICD-10-CM

## 2024-06-10 DIAGNOSIS — Z13.0 SCREENING FOR DEFICIENCY ANEMIA: ICD-10-CM

## 2024-06-10 LAB
25(OH)D3 SERPL-MCNC: 44.4 NG/ML (ref 30–100)
ALBUMIN SERPL BCP-MCNC: 4.4 G/DL (ref 3.5–5)
ALP SERPL-CCNC: 54 U/L (ref 34–104)
ALT SERPL W P-5'-P-CCNC: 11 U/L (ref 7–52)
ANION GAP SERPL CALCULATED.3IONS-SCNC: 9 MMOL/L (ref 4–13)
AST SERPL W P-5'-P-CCNC: 13 U/L (ref 13–39)
BASOPHILS # BLD AUTO: 0.03 THOUSANDS/ÂΜL (ref 0–0.1)
BASOPHILS NFR BLD AUTO: 0 % (ref 0–1)
BILIRUB SERPL-MCNC: 0.46 MG/DL (ref 0.2–1)
BUN SERPL-MCNC: 13 MG/DL (ref 5–25)
CALCIUM SERPL-MCNC: 9 MG/DL (ref 8.4–10.2)
CHLORIDE SERPL-SCNC: 102 MMOL/L (ref 96–108)
CHOLEST SERPL-MCNC: 156 MG/DL
CO2 SERPL-SCNC: 25 MMOL/L (ref 21–32)
CREAT SERPL-MCNC: 0.65 MG/DL (ref 0.6–1.3)
CRP SERPL QL: 2.6 MG/L
EOSINOPHIL # BLD AUTO: 0.03 THOUSAND/ÂΜL (ref 0–0.61)
EOSINOPHIL NFR BLD AUTO: 0 % (ref 0–6)
ERYTHROCYTE [DISTWIDTH] IN BLOOD BY AUTOMATED COUNT: 13.2 % (ref 11.6–15.1)
GFR SERPL CREATININE-BSD FRML MDRD: 121 ML/MIN/1.73SQ M
GLUCOSE P FAST SERPL-MCNC: 89 MG/DL (ref 65–99)
HCT VFR BLD AUTO: 38.4 % (ref 34.8–46.1)
HCV AB SER QL: NORMAL
HDLC SERPL-MCNC: 46 MG/DL
HGB BLD-MCNC: 12.4 G/DL (ref 11.5–15.4)
HIV 1+2 AB+HIV1 P24 AG SERPL QL IA: NORMAL
HIV 2 AB SERPL QL IA: NORMAL
HIV1 AB SERPL QL IA: NORMAL
HIV1 P24 AG SERPL QL IA: NORMAL
IGA SERPL-MCNC: 309 MG/DL (ref 66–433)
IMM GRANULOCYTES # BLD AUTO: 0.01 THOUSAND/UL (ref 0–0.2)
IMM GRANULOCYTES NFR BLD AUTO: 0 % (ref 0–2)
LDLC SERPL CALC-MCNC: 99 MG/DL (ref 0–100)
LYMPHOCYTES # BLD AUTO: 1.55 THOUSANDS/ÂΜL (ref 0.6–4.47)
LYMPHOCYTES NFR BLD AUTO: 22 % (ref 14–44)
MCH RBC QN AUTO: 30 PG (ref 26.8–34.3)
MCHC RBC AUTO-ENTMCNC: 32.3 G/DL (ref 31.4–37.4)
MCV RBC AUTO: 93 FL (ref 82–98)
MONOCYTES # BLD AUTO: 0.43 THOUSAND/ÂΜL (ref 0.17–1.22)
MONOCYTES NFR BLD AUTO: 6 % (ref 4–12)
NEUTROPHILS # BLD AUTO: 5.15 THOUSANDS/ÂΜL (ref 1.85–7.62)
NEUTS SEG NFR BLD AUTO: 72 % (ref 43–75)
NONHDLC SERPL-MCNC: 110 MG/DL
NRBC BLD AUTO-RTO: 0 /100 WBCS
PLATELET # BLD AUTO: 316 THOUSANDS/UL (ref 149–390)
PMV BLD AUTO: 9.5 FL (ref 8.9–12.7)
POTASSIUM SERPL-SCNC: 4.1 MMOL/L (ref 3.5–5.3)
PROT SERPL-MCNC: 6.8 G/DL (ref 6.4–8.4)
RBC # BLD AUTO: 4.14 MILLION/UL (ref 3.81–5.12)
SODIUM SERPL-SCNC: 136 MMOL/L (ref 135–147)
TRIGL SERPL-MCNC: 56 MG/DL
TSH SERPL DL<=0.05 MIU/L-ACNC: 2.31 UIU/ML (ref 0.45–4.5)
WBC # BLD AUTO: 7.2 THOUSAND/UL (ref 4.31–10.16)

## 2024-06-10 PROCEDURE — 99395 PREV VISIT EST AGE 18-39: CPT | Performed by: NURSE PRACTITIONER

## 2024-06-10 PROCEDURE — 82306 VITAMIN D 25 HYDROXY: CPT

## 2024-06-10 PROCEDURE — 87491 CHLMYD TRACH DNA AMP PROBE: CPT

## 2024-06-10 PROCEDURE — 85025 COMPLETE CBC W/AUTO DIFF WBC: CPT

## 2024-06-10 PROCEDURE — 86364 TISS TRNSGLTMNASE EA IG CLAS: CPT

## 2024-06-10 PROCEDURE — 86140 C-REACTIVE PROTEIN: CPT

## 2024-06-10 PROCEDURE — 86803 HEPATITIS C AB TEST: CPT

## 2024-06-10 PROCEDURE — 82784 ASSAY IGA/IGD/IGG/IGM EACH: CPT

## 2024-06-10 PROCEDURE — 87389 HIV-1 AG W/HIV-1&-2 AB AG IA: CPT

## 2024-06-10 PROCEDURE — 83036 HEMOGLOBIN GLYCOSYLATED A1C: CPT

## 2024-06-10 PROCEDURE — 80053 COMPREHEN METABOLIC PANEL: CPT

## 2024-06-10 PROCEDURE — 80061 LIPID PANEL: CPT

## 2024-06-10 PROCEDURE — 84443 ASSAY THYROID STIM HORMONE: CPT

## 2024-06-10 PROCEDURE — 87591 N.GONORRHOEAE DNA AMP PROB: CPT

## 2024-06-10 PROCEDURE — 36415 COLL VENOUS BLD VENIPUNCTURE: CPT

## 2024-06-10 NOTE — PROGRESS NOTES
Adult Annual Physical  Name: Raghav Smith      : 1995      MRN: [id number]  Encounter Provider: BONILLA Calderon  Encounter Date: 6/10/2024   Encounter department: [facility]    Assessment & Plan   1. Annual physical exam  2. Left hip pain  -     Ambulatory Referral to Physical Therapy; Future  3. Chronic left shoulder pain  -     Ambulatory Referral to Physical Therapy; Future  4. Anxiety  5. Bipolar 2 disorder (HCC)  6. Borderline personality disorder (HCC)  7. Encounter for hepatitis C screening test for low risk patient  -     Hepatitis C antibody; Future  8. Encounter for screening for human immunodeficiency virus (HIV)  -     HIV 1/2 AG/AB w Reflex [facility] for 2 yr old and above; Future  9. Screen for STD (sexually transmitted disease)  -     Chlamydia/GC amplified DNA by PCR; Future  10. PTSD (post-traumatic stress disorder)  Immunizations and preventive care screenings were discussed with patient today. Appropriate education was printed on patient's after visit summary.    Counseling:  Alcohol/drug use: discussed moderation in alcohol intake, the recommendations for healthy alcohol use, and avoidance of illicit drug use.  Dental Health: discussed importance of regular tooth brushing, flossing, and dental visits.  Injury prevention: discussed safety/seat belts, safety helmets, smoke detectors, carbon dioxide detectors, and smoking near bedding or upholstery.  Sexual health: discussed sexually transmitted diseases, partner selection, use of condoms, avoidance of unintended pregnancy, and contraceptive alternatives.  Exercise: the importance of regular exercise/physical activity was discussed. Recommend exercise 3-5 times per week for at least 30 minutes.          History of Present Illness     Patient presents today for annual physical exam.  She is concerned of chronic body pain.  She has seen rheumatology in the past.  She does not have a diagnosis although she does state  she was told she had arthritis.  She has some hip pain which worsens when walking.  Patient also with some neck pain.  She did see physical therapy in the past which did help with her neck pain.  Referral to physical therapy.  She continues to follow with psychiatry for multiple psychiatric disorders.  She is compliant with her medications.  No suicidal ideations in the office.      {Disappearing Hyperlinks I Encounters * My Last Note * Since Last Visit * History :40204}  Adult Annual Physical:  Patient presents for annual physical.     Diet and Physical Activity:  - Diet/Nutrition: well balanced diet, consuming 3-5 servings of fruits/vegetables daily and limited junk food.  - Exercise: walking and 30-60 minutes on average.    General Health:  - Sleep: sleeps well and > 8 hours of sleep on average.  - Hearing: normal hearing bilateral ears.  - Vision: goes for regular eye exams and most recent eye exam < 1 year ago.  - Dental: no dental visits for > 1 year and brushes teeth twice daily.    /GYN Health:  - Follows with GYN: yes.   - History of STDs: no  - Contraception:. no contraception, not sexually active      Advanced Care Planning:  - Has an advanced directive?: no    - Has a durable medical POA?: no    - ACP document given to patient?: no      Review of Systems   Constitutional:  Negative for activity change, fatigue and fever.   HENT:  Negative for congestion, hearing loss, rhinorrhea, trouble swallowing and voice change.    Eyes:  Negative for photophobia, pain, discharge and visual disturbance.   Respiratory:  Negative for cough, chest tightness and shortness of breath.    Cardiovascular:  Negative for chest pain, palpitations and leg swelling.   Gastrointestinal:  Negative for abdominal pain, blood in stool, constipation, nausea and vomiting.   Endocrine: Negative for cold intolerance and heat intolerance.   Genitourinary:  Negative for difficulty urinating, frequency, hematuria, urgency, vaginal bleeding  and vaginal discharge.   Musculoskeletal:  Positive for arthralgias and neck pain. Negative for myalgias.   Skin: Negative.    Neurological:  Negative for dizziness, weakness, numbness and headaches.   Psychiatric/Behavioral:  Positive for self-injury and sleep disturbance. Negative for decreased concentration and suicidal ideas. The patient is nervous/anxious.      Current Outpatient Medications on File Prior to Visit   Medication Sig Dispense Refill   • albuterol (PROVENTIL HFA,VENTOLIN HFA) 90 mcg/act inhaler Inhale 2 puffs every 6 (six) hours as needed for wheezing or shortness of breath 18 g 1   • buPROPion (Wellbutrin XL) 300 mg 24 hr tablet Take 1 tablet (300 mg total) by mouth daily 90 tablet 0   • cloNIDine (Catapres) 0.1 mg tablet Take 1 tablet (0.1 mg total) by mouth every 12 (twelve) hours 60 tablet 2   • dicyclomine (BENTYL) 10 mg capsule Take 1 capsule (10 mg total) by mouth 4 (four) times a day (before meals and at bedtime) 30 capsule 2   • ergocalciferol (VITAMIN D2) 50,000 units TAKE 1 CAPSULE BY MOUTH ONE TIME PER WEEK 12 capsule 1   • hydrOXYzine HCL (ATARAX) 10 mg tablet Take 1 tablet (10 mg total) by mouth every 6 (six) hours as needed for anxiety 30 tablet 2   • lamoTRIgine (LaMICtal) 200 MG tablet Take 2 tablets (400 mg total) by mouth daily 180 tablet 0   • ondansetron (ZOFRAN) 4 mg tablet Take 1 tablet (4 mg total) by mouth every 8 (eight) hours as needed for nausea or vomiting 20 tablet 0   • valACYclovir (VALTREX) 1,000 mg tablet Take 2 tablets (2,000 mg total) by mouth 2 (two) times a day for 1 day (Patient taking differently: Take 2,000 mg by mouth 2 (two) times a day As needed) 24 tablet 0     No current facility-administered medications on file prior to visit.      Social History     Tobacco Use   • Smoking status: Never   • Smokeless tobacco: Never   • Tobacco comments:     was a social smoker   Vaping Use   • Vaping status: Never Used   Substance and Sexual Activity   • Alcohol use:  Not Currently     Alcohol/week: 1.0 - 2.0 standard drink of alcohol     Types: 1 - 2 Cans of beer per week     Comment: once per week   • Drug use: Not Currently     Types: Marijuana     Comment: reports medical marijuana use almost every day   • Sexual activity: Not Currently     Partners: Male, Female       Objective   {Disappearing Hyperlinks   Review Vitals * Enter New Vitals * Results Review * Labs * Imaging * Cardiology * Procedures * Lung Cancer Screening :54517}  /68   Pulse 98   Temp 97.9 °F (36.6 °C) (Temporal)   Resp 18   Ht 5' (1.524 m)   Wt 69.4 kg (153 lb)   SpO2 98%   BMI 29.88 kg/m²     Physical Exam  Vitals and nursing note reviewed.   Constitutional:       General: She is not in acute distress.     Appearance: Normal appearance.   HENT:      Head: Normocephalic and atraumatic.      Right Ear: Tympanic membrane, ear canal and external ear normal. There is no impacted cerumen.      Left Ear: Tympanic membrane, ear canal and external ear normal. There is no impacted cerumen.      Nose: Nose normal.      Mouth/Throat:      Mouth: Mucous membranes are moist.      Pharynx: Oropharynx is clear. No posterior oropharyngeal erythema.   Eyes:      General:         Right eye: No discharge.         Left eye: No discharge.      Extraocular Movements: Extraocular movements intact.      Pupils: Pupils are equal, round, and reactive to light.   Cardiovascular:      Rate and Rhythm: Normal rate and regular rhythm.      Pulses: Normal pulses.      Heart sounds: Normal heart sounds. No murmur heard.  Pulmonary:      Effort: Pulmonary effort is normal.      Breath sounds: Normal breath sounds.   Abdominal:      General: Abdomen is flat.   Musculoskeletal:         General: Normal range of motion.      Cervical back: Normal range of motion.   Skin:     General: Skin is warm and dry.      Capillary Refill: Capillary refill takes less than 2 seconds.   Neurological:      General: No focal deficit present.       Mental Status: She is alert and oriented to person, place, and time. Mental status is at baseline.   Psychiatric:         Mood and Affect: Mood normal.         Behavior: Behavior normal.         Thought Content: Thought content normal.         Judgment: Judgment normal.       Administrative Statements {Disappearing Hyperlinks I  [facility]}

## 2024-06-11 LAB
C TRACH DNA SPEC QL NAA+PROBE: NEGATIVE
EST. AVERAGE GLUCOSE BLD GHB EST-MCNC: 97 MG/DL
HBA1C MFR BLD: 5 %
N GONORRHOEA DNA SPEC QL NAA+PROBE: NEGATIVE
TTG IGA SER-ACNC: <2 U/ML (ref 0–3)

## 2024-06-12 ENCOUNTER — SOCIAL WORK (OUTPATIENT)
Dept: BEHAVIORAL/MENTAL HEALTH CLINIC | Facility: CLINIC | Age: 29
End: 2024-06-12
Payer: COMMERCIAL

## 2024-06-12 DIAGNOSIS — F43.10 PTSD (POST-TRAUMATIC STRESS DISORDER): Primary | ICD-10-CM

## 2024-06-12 DIAGNOSIS — F31.81 BIPOLAR 2 DISORDER (HCC): ICD-10-CM

## 2024-06-12 PROCEDURE — 90834 PSYTX W PT 45 MINUTES: CPT | Performed by: COUNSELOR

## 2024-06-12 NOTE — PSYCH
"Behavioral Health Psychotherapy Progress Note    Psychotherapy Provided: Individual Psychotherapy     1. PTSD (post-traumatic stress disorder)        2. Bipolar 2 disorder (HCC)            Goals addressed in session: Goal 1     DATA: Check in with her writing a life schedule like a \"high school schedule\".  Considering diet as part of having energy and motivation. Had caffeine. More music, less horror podcasts. Using solutions focused perspective \"process of elimination\". Listed everything she did in the house and stopped negative thought that she didn't do enough \"I felt like I was trying to make things easier for me, was my goal\". She is to continue found solution of eat breakfast, brush teeth, take medication, get dressed and care for hair (We are calling this cabinet stuff). People that say they love me or care about me but their actions don't show that, I take that as my fault - that's what I need to work on\". Continue discussion about how her   mother was emotionally dysregulated. Father made her feel like she didn't exist or was a nuisance.      She reports she's been feeling exhausted and having trouble motivating herself. Explored what may be causing this. She says her sleep has changed and she stopped taking her medication for about 3 days - has been back on them for about 4 days. Focused on importance of maintain her found routine that has been helpful for her.     During this session, this clinician used the following therapeutic modalities: Client-centered Therapy, Cognitive Behavioral Therapy, and Existential Therapy    Substance Abuse was not addressed during this session. If the client is diagnosed with a co-occurring substance use disorder, please indicate any changes in the frequency or amount of use: n/a. Stage of change for addressing substance use diagnoses: No substance use/Not applicable    ASSESSMENT:  Raghav Smith presents with a Euthymic/ normal mood.     her affect is Normal range and " "intensity, which is congruent, with her mood and the content of the session. The client has made progress on their goals.     Raghav Smith presents with a none risk of suicide, none risk of self-harm, and none risk of harm to others.    For any risk assessment that surpasses a \"low\" rating, a safety plan must be developed.    A safety plan was indicated: no  If yes, describe in detail n/a    PLAN: Between sessions, Raghav Smith will continue to engage with her routine and take medication as prescribed. At the next session, the therapist will use Client-centered Therapy, Cognitive Behavioral Therapy, and Existential Therapy to address depression.    Behavioral Health Treatment Plan and Discharge Planning: Raghav Smith is aware of and agrees to continue to work on their treatment plan. They have identified and are working toward their discharge goals. yes    Visit start and stop times:    06/12/24  Start Time: 1307  Stop Time: 1357  Total Visit Time: 50 minutes  "

## 2024-06-18 ENCOUNTER — EVALUATION (OUTPATIENT)
Dept: PHYSICAL THERAPY | Facility: CLINIC | Age: 29
End: 2024-06-18
Payer: COMMERCIAL

## 2024-06-18 DIAGNOSIS — M25.552 LEFT HIP PAIN: Primary | ICD-10-CM

## 2024-06-18 DIAGNOSIS — M25.512 CHRONIC LEFT SHOULDER PAIN: ICD-10-CM

## 2024-06-18 DIAGNOSIS — G89.29 CHRONIC LEFT SHOULDER PAIN: ICD-10-CM

## 2024-06-18 PROCEDURE — 97161 PT EVAL LOW COMPLEX 20 MIN: CPT

## 2024-06-18 PROCEDURE — 97110 THERAPEUTIC EXERCISES: CPT

## 2024-06-18 NOTE — PROGRESS NOTES
PT Evaluation     Today's date: 2024  Patient name: Raghav Smith  : 1995  MRN: [id number]  Referring provider: Linda Rao CRNP  Dx:   Encounter Diagnosis     ICD-10-CM    1. Left hip pain  M25.552 Ambulatory Referral to Physical Therapy      2. Chronic left shoulder pain  M25.512 Ambulatory Referral to Physical Therapy    G89.29                      Assessment    Assessment details:     Impairment/Problem List:  1. Decreased L shoulder strength  2. Decreased L shoulder stability       Raghav Smith is a 28 y.o. female who presents with L shoulder pain. Will evaluate hip next visit. Their clinical presentation aligns with instability. Raghav Smith is limited with all ADLs and recreational activities secondary to the impairments stated above. No further referral appears necessary at this time based upon examination results. Prognosis is good given POC/HEP compliance.       Understanding of Dx/Px/POC: good     Prognosis: good    Goals  Short Term Goals:  Pt will report decreased levels of pain by at least 2 subjective ratings in 4 weeks  Pt will demonstrate improved ROM by at least 10 degrees in 4 weeks  Pt will demonstrate improved strength by ½ grade in 4 weeks  Pt will be able to paddle board for 1 hour without pain in 4 weeks    Long Term Goals:  Pt will be independent with HEP in 8 weeks  Pt will be functional with all ADL's in 8 weeks  Pt will achieve their predicted FOTO score in 8 weeks  Pt will be able to exercise for 1 hour without pain in 8 weeks      Plan  Patient would benefit from: skilled physical therapy  Referral necessary: No  Planned modality interventions: low level laser therapy    Planned therapy interventions: abdominal trunk stabilization, IASTM, joint mobilization, manual therapy, nerve gliding, neuromuscular re-education, balance, balance/weight bearing training, body mechanics training, breathing training, patient education, postural training, strengthening,  stretching, therapeutic activities, therapeutic exercise, functional ROM exercises, gait training, graded activity, graded exercise and home exercise program    Frequency: 2x week  Duration in weeks: 12  Plan of Care beginning date: 6/18/2024  Plan of Care expiration date: 9/10/2024  Treatment plan discussed with: patient        Subjective Evaluation    History of Present Illness  Mechanism of injury: In regards to L shoulder only...  KIMMIE: Pt reports in 2022 she suffered a jiu jitsu injury to her L shoulder. PT rehab at the time helped. In 2023 she attempted a rope swing and did not have the strength to complete the action, thus re-injuring her shoulder. She has had exacerbated pain since then.  Onset Gradual/Immediate: immediate on chronic  Timeframe: years  Pain Location/Descriptors: localized to shoulder but has related cervical paraspinal pain  Constant/Intermittent: intermittently worsening  Multiple pain locations related Y/N: Y  Symptoms changed since onset Y/N: N  Symptoms unchanged/improved/worsening since onset: unchanged  Aggravating: twisting activity  Easing: time     Denies 5d's, 3n's. Pt denies unrelenting night pain, unexplained weight changes, bowel/bladder symptoms, saddle region numbness, ataxia.    Patient Goals  Patient goals for therapy: decreased pain, increased motion, increased strength, independence with ADLs/IADLs and return to sport/leisure activities          Objective     Postural Observations  Seated posture: good  Standing posture: good  Correction of posture: has no consistent effect      Neurological Testing     Additional Neurological Details  UE dermatomes and myotomes symmetrical throughout    Active Range of Motion   Cervical/Thoracic Spine       Cervical  Subcranial protraction:  WFL   Subcranial retraction:  WFL   Flexion:  WFL  Extension:  WFL  Left lateral flexion:  WFL  Right lateral flexion:  WFL  Left rotation:  WFL  Right rotation:  WFL  Left Shoulder   Flexion:  WFL  Extension: WFL  Abduction: WFL  External rotation 0°: WFL and with pain  External rotation BTH: C7 with pain  Internal rotation BTB: T5 with pain    Right Shoulder   Normal active range of motion  Internal rotation BTB: T12   Mechanical Assessment    Cervical    Seated retraction: repeated movements   Pain location: no change    Thoracic      Lumbar      Tests   Cervical   Negative vertical compression and cervical distraction.     Left   Negative Spurling's Test A and cervical flexion-rotation test.     Right   Negative Spurling's Test A and cervical flexion-rotation test.     Left Shoulder   Positive Hawkin's and Speed's.   Negative full can and painful arc.     Lumbar   Negative vertical compression.              Precautions: None      Manuals 6/18                                                                Neuro Re-Ed             Shoulder taps 3x10            Supine serratus punch             Supine manual perturbations             Serratus wall slides                                                                 Ther Ex             Rows             B/l low trap ER GTB 3x10            TB ER/IR GTB 3x10                                                                             Ther Activity                                       Gait Training                                       Modalities

## 2024-06-19 ENCOUNTER — SOCIAL WORK (OUTPATIENT)
Dept: BEHAVIORAL/MENTAL HEALTH CLINIC | Facility: CLINIC | Age: 29
End: 2024-06-19
Payer: COMMERCIAL

## 2024-06-19 DIAGNOSIS — F31.81 BIPOLAR 2 DISORDER (HCC): ICD-10-CM

## 2024-06-19 DIAGNOSIS — F43.10 PTSD (POST-TRAUMATIC STRESS DISORDER): ICD-10-CM

## 2024-06-19 DIAGNOSIS — F43.10 PTSD (POST-TRAUMATIC STRESS DISORDER): Primary | ICD-10-CM

## 2024-06-19 PROCEDURE — 90834 PSYTX W PT 45 MINUTES: CPT | Performed by: COUNSELOR

## 2024-06-19 RX ORDER — CLONIDINE HYDROCHLORIDE 0.1 MG/1
0.1 TABLET ORAL EVERY 12 HOURS
Qty: 180 TABLET | Refills: 0 | Status: SHIPPED | OUTPATIENT
Start: 2024-06-19

## 2024-06-19 NOTE — PSYCH
"Behavioral Health Psychotherapy Progress Note    Psychotherapy Provided: Individual Psychotherapy     1. PTSD (post-traumatic stress disorder)        2. Bipolar 2 disorder (HCC)            Goals addressed in session: Goal 1     DATA: Gave list. Check in with her writing a life schedule like a \"high school schedule\".  Considering diet as part of having energy and motivation. Had caffeine. More music, less horror podcasts. Using solutions focused perspective \"process of elimination\". Listed everything she did in the house and stopped negative thought that she didn't do enough \"I felt like I was trying to make things easier for me, was my goal\". She is to continue found solution of eat breakfast, brush teeth, take medication, get dressed and care for hair (We are calling this cabinet stuff). People that say they love me or care about me but their actions don't show that, I take that as my fault - that's what I need to work on\". Continue discussion about how her mother was emotionally dysregulated. Father made her feel like she didn't exist or was a nuisance.      Focused on creating lists, using lists as behavioral modification tools. She discussed challenges with getting dishes, laundry and other tasks completed. She reported on enjoying rebekah with her sister and doing nails.     During this session, this clinician used the following therapeutic modalities: Cognitive Behavioral Therapy    Substance Abuse was not addressed during this session. If the client is diagnosed with a co-occurring substance use disorder, please indicate any changes in the frequency or amount of use: n/a. Stage of change for addressing substance use diagnoses: No substance use/Not applicable    ASSESSMENT:  Raghav Smith presents with a Anxious and Dysthymic mood.     her affect is Normal range and intensity, which is congruent, with her mood and the content of the session. The client has made progress on their goals.     Claribelchirag Jacksonosta " "presents with a none risk of suicide, none risk of self-harm, and none risk of harm to others.    For any risk assessment that surpasses a \"low\" rating, a safety plan must be developed.    A safety plan was indicated: no  If yes, describe in detail n/a    PLAN: Between sessions, Raghav Smith will engage with list. At the next session, the therapist will use Client-centered Therapy, Cognitive Behavioral Therapy, and Existential Therapy to address depression.    Behavioral Health Treatment Plan and Discharge Planning: Raghav Smith is aware of and agrees to continue to work on their treatment plan. They have identified and are working toward their discharge goals. yes    Visit start and stop times:    06/19/24  Start Time: 1315  Stop Time: 1400  Total Visit Time: 45 minutes  "

## 2024-06-25 ENCOUNTER — OFFICE VISIT (OUTPATIENT)
Dept: PHYSICAL THERAPY | Facility: CLINIC | Age: 29
End: 2024-06-25
Payer: COMMERCIAL

## 2024-06-25 DIAGNOSIS — M25.552 LEFT HIP PAIN: Primary | ICD-10-CM

## 2024-06-25 DIAGNOSIS — G89.29 CHRONIC LEFT SHOULDER PAIN: ICD-10-CM

## 2024-06-25 DIAGNOSIS — M25.512 CHRONIC LEFT SHOULDER PAIN: ICD-10-CM

## 2024-06-25 PROCEDURE — 97112 NEUROMUSCULAR REEDUCATION: CPT

## 2024-06-25 PROCEDURE — 97110 THERAPEUTIC EXERCISES: CPT

## 2024-06-25 NOTE — PROGRESS NOTES
"Daily Note     Today's date: 2024  Patient name: Raghav Smith  : 1995  MRN: [id number]  Referring provider: Linda Rao CRNP  Dx:   Encounter Diagnosis     ICD-10-CM    1. Left hip pain  M25.552       2. Chronic left shoulder pain  M25.512     G89.29                      Subjective: Pt reports no pain or difficulty with HEP, just very challenging due to weakness.       Objective: See treatment diary below      Assessment: Progressed stability exercises today. Tolerates strengthening and neuromuscular control exercises very well. Continue to progress as able.       Plan: Continue per plan of care.  Progress treatment as tolerated.       Precautions: None      Manuals                                                                Neuro Re-Ed             Shoulder taps 3x10 3x10           Supine serratus punch  5# 3x10           Supine manual perturbations  2x30\"           Serratus wall slides                                                                 Ther Ex             Rows  GTB 3x10           B/l low trap ER GTB 3x10 GTB 3x10           TB ER/IR GTB 3x10 GTB 3x10                                                                            Ther Activity                                       Gait Training                                       Modalities                                            "

## 2024-06-26 ENCOUNTER — SOCIAL WORK (OUTPATIENT)
Dept: BEHAVIORAL/MENTAL HEALTH CLINIC | Facility: CLINIC | Age: 29
End: 2024-06-26
Payer: COMMERCIAL

## 2024-06-26 DIAGNOSIS — F43.10 PTSD (POST-TRAUMATIC STRESS DISORDER): Primary | ICD-10-CM

## 2024-06-26 PROCEDURE — 90834 PSYTX W PT 45 MINUTES: CPT | Performed by: COUNSELOR

## 2024-06-26 NOTE — PSYCH
"Behavioral Health Psychotherapy Progress Note    Psychotherapy Provided: Individual Psychotherapy     1. 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 Check in with her writing a life schedule like a \"high school schedule\".  Considering diet as part of having energy and motivation. Had caffeine. More music, less horror podcasts. Using solutions focused perspective \"process of elimination\". Listed everything she did in the house and stopped negative thought that she didn't do enough \"I felt like I was trying to make things easier for me, was my goal\". She is to continue found solution of eat breakfast, brush teeth, take medication, get dressed and care for hair (We are calling this cabinet stuff). People that say they love me or care about me but their actions don't show that, I take that as my fault - that's what I need to work on\". Continue discussion about how her mother was emotionally dysregulated. Father made her feel like she didn't exist or was a nuisance.       Reports she's working with [employer] to get certified in various areas. 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 Went to 70s dress up party with Ace and greatly enjoyed time with her. She says she's seen her friend Julia and enjoyed her time with her. She says she did the dishes after our last session and has been cooking. Had intense conversation with Thierno and was able to talk through much of it, call him out, ask him (Demand) for more respect.     During this session, this clinician used the following therapeutic modalities: Client-centered Therapy, Cognitive Behavioral Therapy, and Existential Therapy    Substance Abuse was not addressed during this session. If the client is diagnosed with a co-occurring substance use disorder, please indicate any changes in the frequency or amount of use: n/a. Stage of change " "for addressing substance use diagnoses: No substance use/Not applicable    ASSESSMENT:  Raghav Smith presents with a Euthymic/ normal mood.     her affect is Normal range and intensity, which is congruent, with her mood and the content of the session. The client has made progress on their goals.     Raghav Smith presents with a none risk of suicide, none risk of self-harm, and none risk of harm to others.    For any risk assessment that surpasses a \"low\" rating, a safety plan must be developed.    A safety plan was indicated: no  If yes, describe in detail n/a    PLAN: Between sessions, Raghav Smith will continue to engage with friends in meaningful way. At the next session, the therapist will use Client-centered Therapy, Cognitive Behavioral Therapy, and Existential Therapy to address depression.    Behavioral Health Treatment Plan and Discharge Planning: Raghav Smith is aware of and agrees to continue to work on their treatment plan. They have identified and are working toward their discharge goals. yes    Visit start and stop times:    06/26/24  Start Time: 1315  Stop Time: 1400  Total Visit Time: 45 minutes  "

## 2024-06-28 ENCOUNTER — APPOINTMENT (OUTPATIENT)
Dept: PHYSICAL THERAPY | Facility: CLINIC | Age: 29
End: 2024-06-28
Payer: COMMERCIAL

## 2024-07-01 ENCOUNTER — OFFICE VISIT (OUTPATIENT)
Dept: PHYSICAL THERAPY | Facility: CLINIC | Age: 29
End: 2024-07-01
Payer: COMMERCIAL

## 2024-07-01 DIAGNOSIS — M25.552 LEFT HIP PAIN: Primary | ICD-10-CM

## 2024-07-01 DIAGNOSIS — M25.512 CHRONIC LEFT SHOULDER PAIN: ICD-10-CM

## 2024-07-01 DIAGNOSIS — G89.29 CHRONIC LEFT SHOULDER PAIN: ICD-10-CM

## 2024-07-01 PROCEDURE — 97110 THERAPEUTIC EXERCISES: CPT

## 2024-07-01 PROCEDURE — 97112 NEUROMUSCULAR REEDUCATION: CPT

## 2024-07-01 NOTE — PROGRESS NOTES
"Daily Note     Today's date: 2024  Patient name: Raghav Smith  : 1995  MRN: [id number]  Referring provider: Linda Rao CRNP  Dx:   Encounter Diagnosis     ICD-10-CM    1. Left hip pain  M25.552       2. Chronic left shoulder pain  M25.512     G89.29                      Subjective: Pt reports a bit of muscle soreness, and with some increased activity over the weakness that did lead to a bit of true pain. But not too bad overall.       Objective: See treatment diary below      Assessment: Added additional stability and neuromuscular control intervention for improved stability with ADLs. Did not progress other interventions today secondary to DOMS after last visit. Overall tolerated session well. Continue to progress as able.       Plan: Continue per plan of care.  Progress treatment as tolerated.       Precautions: None      Manuals                                                               Neuro Re-Ed             Shoulder taps 3x10 3x10 3x10          Supine serratus punch  5# 3x10 5# 3x10          Supine manual perturbations  2x30\" 2x30\"          Serratus wall slides             Wall alphabet 6\" ball   x1                                                 Ther Ex             Rows  GTB 3x10 GTB 3x10          B/l low trap ER GTB 3x10 GTB 3x10 GTB 3x10          TB ER/IR GTB 3x10 GTB 3x10 GTB 3x10                                                                           Ther Activity                                       Gait Training                                       Modalities                                            "

## 2024-07-02 ENCOUNTER — TELEPHONE (OUTPATIENT)
Dept: PSYCHIATRY | Facility: CLINIC | Age: 29
End: 2024-07-02

## 2024-07-02 NOTE — TELEPHONE ENCOUNTER
Left voicemail informing patient and/or parent/guardian of the Psych Encounter form needing to be signed as a requirement from the insurance company for billing purposes. Patient can access form via [employer] and sign electronically.     Please make patient aware this form must be signed for each visit as a requirement to continue future visits with provider.

## 2024-07-03 ENCOUNTER — SOCIAL WORK (OUTPATIENT)
Dept: BEHAVIORAL/MENTAL HEALTH CLINIC | Facility: CLINIC | Age: 29
End: 2024-07-03
Payer: COMMERCIAL

## 2024-07-03 DIAGNOSIS — F31.81 BIPOLAR 2 DISORDER (HCC): ICD-10-CM

## 2024-07-03 DIAGNOSIS — F43.10 PTSD (POST-TRAUMATIC STRESS DISORDER): Primary | ICD-10-CM

## 2024-07-03 PROCEDURE — 90834 PSYTX W PT 45 MINUTES: CPT | Performed by: COUNSELOR

## 2024-07-03 NOTE — PSYCH
"Behavioral Health Psychotherapy Progress Note    Psychotherapy Provided: Individual Psychotherapy     1. 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 Check in with her writing a life schedule like a \"high school schedule\".  Considering diet as part of having energy and motivation. Had caffeine. More music, less horror podcasts. Using solutions focused perspective \"process of elimination\". Listed everything she did in the house and stopped negative thought that she didn't do enough \"I felt like I was trying to make things easier for me, was my goal\". She is to continue found solution of eat breakfast, brush teeth, take medication, get dressed and care for hair (We are calling this cabinet stuff). People that say they love me or care about me but their actions don't show that, I take that as my fault - that's what I need to work on\". Continue discussion about how her mother was emotionally dysregulated. Father made her feel like she didn't exist or was a nuisance.        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 She says that she enjoyed her time with her friends and family for her birthday. Focused on her continuing with her routine at home.     During this session, this clinician used the following therapeutic modalities: Client-centered Therapy, Cognitive Behavioral Therapy, and Existential Therapy    Substance Abuse was not addressed during this session. If the client is diagnosed with a co-occurring substance use disorder, please indicate any changes in the frequency or amount of use: n/a3. Stage of change for addressing substance use diagnoses: No substance use/Not applicable    ASSESSMENT:  Raghav Smith presents with a Euthymic/ normal mood.     her affect is Normal range and intensity, which is congruent, with her mood " "and the content of the session. The client has made progress on their goals.     Raghav Smith presents with a none risk of suicide, none risk of self-harm, and none risk of harm to others.    For any risk assessment that surpasses a \"low\" rating, a safety plan must be developed.    A safety plan was indicated: no  If yes, describe in detail n/a    PLAN: Between sessions, Raghav Smith will continue to engage with her family and friends. At the next session, the therapist will use Client-centered Therapy, Cognitive Behavioral Therapy, and Existential Therapy to address depression.    Behavioral Health Treatment Plan and Discharge Planning: Raghav Smith is aware of and agrees to continue to work on their treatment plan. They have identified and are working toward their discharge goals. yes    Visit start and stop times:    07/03/24  Start Time: 1310  Stop Time: 1354  Total Visit Time: 44 minutes  "

## 2024-07-05 ENCOUNTER — OFFICE VISIT (OUTPATIENT)
Dept: PHYSICAL THERAPY | Facility: CLINIC | Age: 29
End: 2024-07-05
Payer: COMMERCIAL

## 2024-07-05 DIAGNOSIS — M25.552 LEFT HIP PAIN: Primary | ICD-10-CM

## 2024-07-05 DIAGNOSIS — G89.29 CHRONIC LEFT SHOULDER PAIN: ICD-10-CM

## 2024-07-05 DIAGNOSIS — M25.512 CHRONIC LEFT SHOULDER PAIN: ICD-10-CM

## 2024-07-05 PROCEDURE — 97110 THERAPEUTIC EXERCISES: CPT

## 2024-07-05 PROCEDURE — 97112 NEUROMUSCULAR REEDUCATION: CPT

## 2024-07-05 NOTE — PROGRESS NOTES
"Daily Note     Today's date: 2024  Patient name: Raghav Smith  : 1995  MRN: [id number]  Referring provider: Linda Rao CRNP  Dx:   Encounter Diagnosis     ICD-10-CM    1. Left hip pain  M25.552       2. Chronic left shoulder pain  M25.512     G89.29                      Subjective: Pt reports increased pain today. The pain is in the L side of her neck and her L shoulder. At times it radiated down to L elbow, but not frequently.       Objective: See treatment diary below      Assessment: Performed mechanical assessment based on subjective symptoms. Unable to change symptoms with seated retraction, seated retraction with self OP or with supine retraction. May need further reps or force progression. Continue to reassess mechanical response as indicated. Performed strengthening today with modifications for pain as noted below.       Plan: Continue per plan of care.  Progress treatment as tolerated.       Precautions: None      Manuals          L UT IASTM    6 min                                                Neuro Re-Ed             Shoulder taps 3x10 3x10 3x10 held         Supine serratus punch  5# 3x10 5# 3x10 5# 3x10         Supine manual perturbations  2x30\" 2x30\" 3x30\"         Serratus wall slides             Wall alphabet 6\" ball   x1 nv                                                Ther Ex             Rows  GTB 3x10 GTB 3x10 GTB 3x10         B/l low trap ER GTB 3x10 GTB 3x10 GTB 3x10 GTB 2x10 ea         TB ER/IR GTB 3x10 GTB 3x10 GTB 3x10 GTB 3x10                                                                          Ther Activity                                       Gait Training                                       Modalities                                            "

## 2024-07-09 ENCOUNTER — OFFICE VISIT (OUTPATIENT)
Dept: PHYSICAL THERAPY | Facility: CLINIC | Age: 29
End: 2024-07-09
Payer: COMMERCIAL

## 2024-07-09 DIAGNOSIS — M25.512 CHRONIC LEFT SHOULDER PAIN: ICD-10-CM

## 2024-07-09 DIAGNOSIS — G89.29 CHRONIC LEFT SHOULDER PAIN: ICD-10-CM

## 2024-07-09 DIAGNOSIS — M25.552 LEFT HIP PAIN: Primary | ICD-10-CM

## 2024-07-09 PROCEDURE — 97110 THERAPEUTIC EXERCISES: CPT

## 2024-07-09 PROCEDURE — 97112 NEUROMUSCULAR REEDUCATION: CPT

## 2024-07-09 NOTE — PROGRESS NOTES
"Daily Note     Today's date: 2024  Patient name: Raghav Smith  : 1995  MRN: [id number]  Referring provider: Linda Rao CRNP  Dx:   Encounter Diagnosis     ICD-10-CM    1. Left hip pain  M25.552       2. Chronic left shoulder pain  M25.512     G89.29                      Subjective: Pt reports no change in shoulder pain or stability. She did have some periscapular pain, but less UT pain since last visit. This pain feels different from shoulder.       Objective: See treatment diary below      Assessment: Added supine thoracic extension stretch in attempt to achieve full lower cervical and upper thoracic extension and reduce symtpoms. No significant change. Tolerates shoulder strengthening and stability well.       Plan: Continue per plan of care.  Progress treatment as tolerated.       Precautions: None      Manuals         L UT IASTM    6 min 6 min                                               Neuro Re-Ed             Shoulder taps 3x10 3x10 3x10 held         Supine serratus punch  5# 3x10 5# 3x10 5# 3x10 5# 3x10        Supine manual perturbations  2x30\" 2x30\" 3x30\" 3x30\"        Serratus wall slides             Wall alphabet 6\" ball   x1 nv                                                Ther Ex             Rows  GTB 3x10 GTB 3x10 GTB 3x10 GTB 3x10        B/l low trap ER GTB 3x10 GTB 3x10 GTB 3x10 GTB 2x10 ea GTB 2x10 ea        TB ER/IR GTB 3x10 GTB 3x10 GTB 3x10 GTB 3x10 GTB 3x10        Supine thoracic ext     10x10\"                                                            Ther Activity                                       Gait Training                                       Modalities                                            "

## 2024-07-10 ENCOUNTER — SOCIAL WORK (OUTPATIENT)
Dept: BEHAVIORAL/MENTAL HEALTH CLINIC | Facility: CLINIC | Age: 29
End: 2024-07-10
Payer: COMMERCIAL

## 2024-07-10 ENCOUNTER — TELEPHONE (OUTPATIENT)
Dept: PSYCHIATRY | Facility: CLINIC | Age: 29
End: 2024-07-10

## 2024-07-10 DIAGNOSIS — F43.10 PTSD (POST-TRAUMATIC STRESS DISORDER): Primary | ICD-10-CM

## 2024-07-10 PROCEDURE — 90834 PSYTX W PT 45 MINUTES: CPT | Performed by: COUNSELOR

## 2024-07-10 NOTE — PSYCH
"Behavioral Health Psychotherapy Progress Note    Psychotherapy Provided: Individual Psychotherapy     1. 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 Check in with her writing a life schedule like a \"high school schedule\".  Considering diet as part of having energy and motivation. Had caffeine. More music, less horror podcasts. Using solutions focused perspective \"process of elimination\". Listed everything she did in the house and stopped negative thought that she didn't do enough \"I felt like I was trying to make things easier for me, was my goal\". She is to continue found solution of eat breakfast, brush teeth, take medication, get dressed and care for hair (We are calling this cabinet stuff). People that say they love me or care about me but their actions don't show that, I take that as my fault - that's what I need to work on\". Continue discussion about how her mother was emotionally dysregulated. Father made her feel like she didn't exist or was a nuisance.         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 She reports that she's been engaging with PT. Says she's working. She reports she is longing for an intimare relationship. Explored how she can communicate her needs to others, considering looking out for new relationships.     During this session, this clinician used the following therapeutic modalities: Client-centered Therapy, Cognitive Behavioral Therapy, and Existential Therapy    Substance Abuse was not addressed during this session. If the client is diagnosed with a co-occurring substance use disorder, please indicate any changes in the frequency or amount of use: n/a. Stage of change for addressing substance use diagnoses: No substance use/Not applicable    ASSESSMENT:  Raghav Smith presents with a Euthymic/ normal mood.     her affect is Normal " "range and intensity, which is congruent, with her mood and the content of the session. The client has made progress on their goals.     Raghav Smith presents with a none risk of suicide, none risk of self-harm, and none risk of harm to others.    For any risk assessment that surpasses a \"low\" rating, a safety plan must be developed.    A safety plan was indicated: no  If yes, describe in detail n/a    PLAN: Between sessions, Raghav Smith will continue to set a chart for moving forward with routine and taking more intimate risks. At the next session, the therapist will use Client-centered Therapy, Cognitive Behavioral Therapy, and Existential Therapy to address depression.    Behavioral Health Treatment Plan and Discharge Planning: Raghav Smiht is aware of and agrees to continue to work on their treatment plan. They have identified and are working toward their discharge goals. yes    Visit start and stop times:    07/10/24  Start Time: 1318  Stop Time: 1400  Total Visit Time: 42 minutes  "

## 2024-07-10 NOTE — TELEPHONE ENCOUNTER
Left voicemail informing patient and/or parent/guardian of the Psych Encounter form needing to be signed as a requirement from the insurance company for billing purposes. Patient can access form via [employer] and sign electronically.     Please make patient aware this form must be signed for each visit as a requirement to continue future visits with provider.

## 2024-07-12 ENCOUNTER — OFFICE VISIT (OUTPATIENT)
Dept: PHYSICAL THERAPY | Facility: CLINIC | Age: 29
End: 2024-07-12
Payer: COMMERCIAL

## 2024-07-12 DIAGNOSIS — M25.552 LEFT HIP PAIN: Primary | ICD-10-CM

## 2024-07-12 DIAGNOSIS — M25.512 CHRONIC LEFT SHOULDER PAIN: ICD-10-CM

## 2024-07-12 DIAGNOSIS — G89.29 CHRONIC LEFT SHOULDER PAIN: ICD-10-CM

## 2024-07-12 PROCEDURE — 97110 THERAPEUTIC EXERCISES: CPT

## 2024-07-12 PROCEDURE — 97112 NEUROMUSCULAR REEDUCATION: CPT

## 2024-07-12 NOTE — PROGRESS NOTES
"Daily Note     Today's date: 2024  Patient name: Raghav Smith  : 1995  MRN: [id number]  Referring provider: Linda Rao CRNP  Dx:   Encounter Diagnosis     ICD-10-CM    1. Left hip pain  M25.552       2. Chronic left shoulder pain  M25.512     G89.29                      Subjective: Pt reports less radicular symptoms compared to the last two visits.      Objective: See treatment diary below      Assessment: Thoracic extension stretching continues to help. Tolerates shoulder strengthening and stability training well. Does lack full scaption abduction AROM with hands behind head during thoracic ext strengthening.       Plan: Continue per plan of care.  Progress treatment as tolerated.       Precautions: None      Manuals         L UT IASTM    6 min 6 min                                               Neuro Re-Ed             Shoulder taps 3x10 3x10 3x10 held         Supine serratus punch  5# 3x10 5# 3x10 5# 3x10 5# 3x10        Supine manual perturbations  2x30\" 2x30\" 3x30\" 3x30\"        Serratus table push ups     3x10        Wall alphabet 6\" ball   x1 nv x1                                               Ther Ex             Rows  GTB 3x10 GTB 3x10 GTB 3x10 GTB 3x10        B/l low trap ER GTB 3x10 GTB 3x10 GTB 3x10 GTB 2x10 ea GTB 2x10 ea        TB ER/IR GTB 3x10 GTB 3x10 GTB 3x10 GTB 3x10 GTB 3x10        Supine thoracic ext     10x10\"                                                            Ther Activity                                       Gait Training                                       Modalities                                            "

## 2024-07-16 ENCOUNTER — OFFICE VISIT (OUTPATIENT)
Dept: PHYSICAL THERAPY | Facility: CLINIC | Age: 29
End: 2024-07-16
Payer: COMMERCIAL

## 2024-07-16 DIAGNOSIS — M25.512 CHRONIC LEFT SHOULDER PAIN: ICD-10-CM

## 2024-07-16 DIAGNOSIS — G89.29 CHRONIC LEFT SHOULDER PAIN: ICD-10-CM

## 2024-07-16 DIAGNOSIS — M25.552 LEFT HIP PAIN: Primary | ICD-10-CM

## 2024-07-16 PROCEDURE — 97110 THERAPEUTIC EXERCISES: CPT

## 2024-07-16 PROCEDURE — 97112 NEUROMUSCULAR REEDUCATION: CPT

## 2024-07-16 NOTE — PROGRESS NOTES
"Daily Note     Today's date: 2024  Patient name: Raghav Smith  : 1995  MRN: [id number]  Referring provider: Linda Rao CRNP  Dx:   Encounter Diagnosis     ICD-10-CM    1. Left hip pain  M25.552       2. Chronic left shoulder pain  M25.512     G89.29                      Subjective: Pt reports less radicular symptoms again.       Objective: See treatment diary below      Assessment: Min shoulder blade pain following strengthening. None prior to treatment.       Plan: Continue per plan of care.  Progress treatment as tolerated.       Precautions: None      Manuals        L UT IASTM    6 min 6 min 6 min                                              Neuro Re-Ed             Shoulder taps 3x10 3x10 3x10 held  3x10       Supine serratus punch  5# 3x10 5# 3x10 5# 3x10 5# 3x10        Supine manual perturbations  2x30\" 2x30\" 3x30\" 3x30\"        Serratus table push ups     3x10 3x10       Wall alphabet 6\" ball   x1 nv x1 x1                                              Ther Ex             Rows  GTB 3x10 GTB 3x10 GTB 3x10 GTB 3x10 Blue 3x10       B/l low trap ER GTB 3x10 GTB 3x10 GTB 3x10 GTB 2x10 ea GTB 2x10 ea Blue 3x10       TB ER/IR GTB 3x10 GTB 3x10 GTB 3x10 GTB 3x10 GTB 3x10 Blue 3x10       Supine thoracic ext     10x10\"                                                            Ther Activity                                       Gait Training                                       Modalities                                            "

## 2024-07-17 ENCOUNTER — TELEMEDICINE (OUTPATIENT)
Dept: PSYCHIATRY | Facility: CLINIC | Age: 29
End: 2024-07-17
Payer: COMMERCIAL

## 2024-07-17 ENCOUNTER — SOCIAL WORK (OUTPATIENT)
Dept: BEHAVIORAL/MENTAL HEALTH CLINIC | Facility: CLINIC | Age: 29
End: 2024-07-17
Payer: COMMERCIAL

## 2024-07-17 DIAGNOSIS — F43.10 PTSD (POST-TRAUMATIC STRESS DISORDER): Primary | ICD-10-CM

## 2024-07-17 DIAGNOSIS — G47.20 DISRUPTIONS OF 24-HOUR SLEEP-WAKE CYCLE: ICD-10-CM

## 2024-07-17 DIAGNOSIS — F31.81 BIPOLAR 2 DISORDER (HCC): ICD-10-CM

## 2024-07-17 DIAGNOSIS — F60.3 BORDERLINE PERSONALITY DISORDER (HCC): ICD-10-CM

## 2024-07-17 PROCEDURE — 90834 PSYTX W PT 45 MINUTES: CPT | Performed by: COUNSELOR

## 2024-07-17 PROCEDURE — 90833 PSYTX W PT W E/M 30 MIN: CPT | Performed by: STUDENT IN AN ORGANIZED HEALTH CARE EDUCATION/TRAINING PROGRAM

## 2024-07-17 PROCEDURE — 99214 OFFICE O/P EST MOD 30 MIN: CPT | Performed by: STUDENT IN AN ORGANIZED HEALTH CARE EDUCATION/TRAINING PROGRAM

## 2024-07-17 NOTE — PSYCH
"Behavioral Health Psychotherapy Progress Note    Psychotherapy Provided: Individual Psychotherapy     1. 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 Check in with her writing a life schedule like a \"high school schedule\".  Considering diet as part of having energy and motivation. Had caffeine. More music, less horror podcasts. Using solutions focused perspective \"process of elimination\". Listed everything she did in the house and stopped negative thought that she didn't do enough \"I felt like I was trying to make things easier for me, was my goal\". She is to continue found solution of eat breakfast, brush teeth, take medication, get dressed and care for hair (We are calling this cabinet stuff). People that say they love me or care about me but their actions don't show that, I take that as my fault - that's what I need to work on\". Continue discussion about how her mother was emotionally dysregulated. Father made her feel like she didn't exist or was a nuisance.          Focused on cognitive reframing, behavior modification.     During this session, this clinician used the following therapeutic modalities: Client-centered Therapy, Cognitive Behavioral Therapy, and Existential Therapy    Substance Abuse was not addressed during this session. If the client is diagnosed with a co-occurring substance use disorder, please indicate any changes in the frequency or amount of use: n/a. Stage of change for addressing substance use diagnoses: No substance use/Not applicable    ASSESSMENT:  Raghav Smith presents with a Euthymic/ normal mood.     her affect is Normal range and intensity, which is congruent, with her mood and the content of the session. The client has made progress on their goals.     Raghav Smith presents with a none risk of suicide, none risk of self-harm, and none " "risk of harm to others.    For any risk assessment that surpasses a \"low\" rating, a safety plan must be developed.    A safety plan was indicated: no  If yes, describe in detail n/a    PLAN: Between sessions, Raghav Smith will continue to attempt micro changes. At the next session, the therapist will use Client-centered Therapy, Cognitive Behavioral Therapy, and Existential Therapy to address depression.    Behavioral Health Treatment Plan and Discharge Planning: Raghav Smith is aware of and agrees to continue to work on their treatment plan. They have identified and are working toward their discharge goals. yes    Visit start and stop times:    07/17/24  Start Time: 1312  Stop Time: 1359  Total Visit Time: 47 minutes  "
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
## 2024-07-19 ENCOUNTER — OFFICE VISIT (OUTPATIENT)
Dept: PHYSICAL THERAPY | Facility: CLINIC | Age: 29
End: 2024-07-19
Payer: COMMERCIAL

## 2024-07-19 DIAGNOSIS — M25.512 CHRONIC LEFT SHOULDER PAIN: ICD-10-CM

## 2024-07-19 DIAGNOSIS — G89.29 CHRONIC LEFT SHOULDER PAIN: ICD-10-CM

## 2024-07-19 DIAGNOSIS — M25.552 LEFT HIP PAIN: Primary | ICD-10-CM

## 2024-07-19 PROCEDURE — 97110 THERAPEUTIC EXERCISES: CPT

## 2024-07-19 PROCEDURE — 97112 NEUROMUSCULAR REEDUCATION: CPT

## 2024-07-19 NOTE — PROGRESS NOTES
"Daily Note     Today's date: 2024  Patient name: Raghav Smith  : 1995  MRN: [id number]  Referring provider: Linda Rao CRNP  Dx:   Encounter Diagnosis     ICD-10-CM    1. Left hip pain  M25.552       2. Chronic left shoulder pain  M25.512     G89.29                      Subjective: Pt reports feeling pretty good today.       Objective: See treatment diary below      Assessment: Better tolerance to loaded stabilization training today.       Plan: Continue per plan of care.  Progress treatment as tolerated.       Precautions: None      Manuals       L UT IASTM    6 min 6 min 6 min 6 min                                             Neuro Re-Ed             Shoulder taps 3x10 3x10 3x10 held  3x10 3x10      Supine serratus punch  5# 3x10 5# 3x10 5# 3x10 5# 3x10        Supine manual perturbations  2x30\" 2x30\" 3x30\" 3x30\"        Serratus table push ups     3x10 3x10 3x10      Wall alphabet 6\" ball   x1 nv x1 x1 x1                                             Ther Ex             Rows  GTB 3x10 GTB 3x10 GTB 3x10 GTB 3x10 Blue 3x10 Blue 3x10      B/l low trap ER GTB 3x10 GTB 3x10 GTB 3x10 GTB 2x10 ea GTB 2x10 ea Blue 3x10 Blue 3x10      TB ER/IR GTB 3x10 GTB 3x10 GTB 3x10 GTB 3x10 GTB 3x10 Blue 3x10 Blue 3x10      Supine thoracic ext     10x10\" 10x10\" 10x10\"                                                          Ther Activity                                       Gait Training                                       Modalities                                            "

## 2024-07-22 ENCOUNTER — TELEPHONE (OUTPATIENT)
Dept: PSYCHIATRY | Facility: CLINIC | Age: 29
End: 2024-07-22

## 2024-07-22 NOTE — TELEPHONE ENCOUNTER
Left voicemail informing patient and/or parent/guardian of the Psych Encounter form needing to be signed as a requirement from the insurance company for billing purposes. Patient can access form via [employer] and sign electronically.     Please make patient aware this form must be signed for each visit as a requirement to continue future visits with provider.

## 2024-07-23 ENCOUNTER — TELEPHONE (OUTPATIENT)
Dept: BEHAVIORAL/MENTAL HEALTH CLINIC | Facility: CLINIC | Age: 29
End: 2024-07-23

## 2024-07-23 ENCOUNTER — OFFICE VISIT (OUTPATIENT)
Dept: PHYSICAL THERAPY | Facility: CLINIC | Age: 29
End: 2024-07-23
Payer: COMMERCIAL

## 2024-07-23 DIAGNOSIS — G89.29 CHRONIC LEFT SHOULDER PAIN: ICD-10-CM

## 2024-07-23 DIAGNOSIS — M25.552 LEFT HIP PAIN: Primary | ICD-10-CM

## 2024-07-23 DIAGNOSIS — M25.512 CHRONIC LEFT SHOULDER PAIN: ICD-10-CM

## 2024-07-23 PROCEDURE — 97140 MANUAL THERAPY 1/> REGIONS: CPT

## 2024-07-23 PROCEDURE — 97110 THERAPEUTIC EXERCISES: CPT

## 2024-07-23 NOTE — TELEPHONE ENCOUNTER
Called and spoke with patient to inform 8/7/24 was cancelled due to provider on vacation. Patient acknowledged and set for weekly appointments after that.   Cardiology Consultation   Date: 2/19/2022  Admit Date:  2/12/2022  Reason for Consultation: Right segmental PE with suggestion of RV strain. Consult Requesting Physician: Bertha Alvarado MD     Chief Complaint   Patient presents with    Rectal Bleeding     HPI: [place] is a 76 y.o. with past medical history noted below who is s/p colon adenocarcinoma resection 1/26/2022. Had lower GI bleed with shock s/p 6 units of PRBC transfusion. Cardiology consulted for pulmonary embolus and possible RV strain based on CTA. Past Medical History:   Diagnosis Date    Arthritis     Cancer [facility])     Colon Cancer diagnosed last month    Diabetes mellitus [facility]     Hemodialysis patient ([facility]     Hypertension         Past Surgical History:   Procedure Laterality Date    COLECTOMY N/A 1/26/2022    SIGMOID COLECTOMY, SIGMOIDOSCOPY performed by Maribel Guerrero MD at [address] [facility]  [address]     unsure of date    SIGMOIDOSCOPY N/A 2/17/2022    SIGMOIDOSCOPY CONTROL HEMORRHAGE performed by Marcela Min MD at [address] Reactions    Lisinopril      Allergy listed on paperwork from [facility], no reaction noted       Social History:  Reviewed. reports that he has quit smoking. He has never used smokeless tobacco. He reports previous alcohol use. He reports that he does not use drugs. Family History:  Reviewed. family history is not on file. No premature CAD. Review of System:  Pertinent positive and negatives are in the HPI, the rest are negative.        Physical Examination:  Vitals:    02/19/22 0746   BP:    Pulse:    Resp: 14   Temp:    SpO2: 100%        Intake/Output Summary (Last 24 hours) at 2/19/2022 0854  Last data filed at 2/19/2022 0615  Gross per 24 hour   Intake 3372.51 ml   Output --   Net 3372.51 ml     In: 3372.5 [I.V.:1468.3; Blood:1404.2]  Out: -    Wt Readings from Last 3 Encounters:   02/19/22 211 lb 6.7 oz (95.9 kg)   02/10/22 214 lb 4.6 oz (97.2 kg)   22 208 lb 1.8 oz (94.4 kg)     Temp  Av.4 °F (36.9 °C)  Min: 97.5 °F (36.4 °C)  Max: 98.9 °F (37.2 °C)  Pulse  Av.1  Min: 75  Max: 143  BP  Min: 64/36  Max: 185/159  SpO2  Av.3 %  Min: 93 %  Max: 100 %    · Telemetry: Sinus rhythm   · Constitutional: No distress  · Head: Normocephalic and atraumatic. · Mouth/Throat: Lips appear moist. Oropharynx is clear and moist.  · Eyes: Conjunctivae normal. EOM are normal.   · Neck: Neck supple. No rigidity. · Cardiovascular: Normal rate, regular rhythm. Normal S1&S2. .    · Pulmonary/Chest: Bilateral respiratory sounds present. · Abdominal: Soft. Normal bowel sounds present. No tenderness. · Musculoskeletal: No tenderness. no edema    · Lymphadenopathy: Has no obvious cervical adenopathy. · Skin: Skin is warm and dry. · Psychiatric: appropriate affect. Labs:  Reviewed. Recent Labs     22  0654 22  0138 22  0433   * 140 137   K 4.4 4.7 4.4    102 102   CO2 24 19* 20*   BUN 18 23* 26*   CREATININE 4.3* 5.1* 5.3*     Recent Labs     22  2110 22  0119 22  0433   WBC 3.9* 11.0 10.2   HGB 6.8* 8.3* 10.0*   HCT 20.6* 26.4* 30.5*   MCV 91.4 94.4 91.3    275 213     Lab Results   Component Value Date    TROPONINI 0.06 2022     No results found for: BNP  Lab Results   Component Value Date    PROTIME 12.7 2022    PROTIME 12.9 2022    PROTIME 13.6 2022    INR 1.12 2022    INR 1.14 2022    INR 1.19 2022     Lab Results   Component Value Date    CHOL 60 01/15/2022    HDL 24 01/15/2022    HDL 27 2011    TRIG 54 01/15/2022       Diagnostic and imaging results reviewed. ECG: NSR  Echo: pending  Cath: none. I independently reviewed the ECG, telemetry, serology, echocardiographic results.     Scheduled Meds:   magnesium sulfate  2,000 mg IntraVENous Once    insulin lispro  0-18 Units SubCUTAneous Q4H  calcium gluconate-NaCl  1,000 mg IntraVENous Once    sodium chloride  500 mL IntraVENous Once    LORazepam  0.5 mg Oral BID    epoetin davis-epbx  20,000 Units IntraVENous Once per day on Tue Thu Sat    atorvastatin  10 mg Oral Nightly    gabapentin  100 mg Oral TID    sevelamer  800 mg Oral TID WC    tamsulosin  0.4 mg Oral QAM    sodium chloride flush  5-40 mL IntraVENous 2 times per day    pantoprazole  40 mg IntraVENous Q12H    And    sodium chloride (PF)  10 mL IntraVENous Q12H     Continuous Infusions:   phenylephrine (LAINE-SYNEPHRINE) 50mg/250mL infusion Stopped (02/19/22 0747)    vasopressin (Septic Shock) infusion Stopped (02/19/22 0309)    norepinephrine Stopped (02/19/22 0117)    sodium chloride Stopped (02/18/22 2215)    sodium chloride      dextrose       PRN Meds:.perflutren lipid microspheres, sodium chloride flush, sodium chloride, acetaminophen **OR** acetaminophen, ondansetron, guaiFENesin-dextromethorphan, glucose, dextrose, glucagon (rDNA), dextrose, lidocaine, melatonin     Problem List:   Patient Active Problem List    Diagnosis Date Noted    COVID-19 02/12/2022    Acute blood loss anemia 02/07/2022    Elevated troponin 02/07/2022    [facility] of [facility]     LESLEE (acute kidney injury) ([facility]     Acute CVA (cerebrovascular accident) ([facility] 01/16/2022    GI bleed 01/14/2022    History of COVID-19 01/14/2022    Hyponatremia 01/14/2022    Fatigue 01/14/2022    Acute kidney injury superimposed on CKD ([facility] 01/14/2022    Lethargy 01/14/2022    Suspected UTI 01/14/2022      Active Hospital Problems    Diagnosis Date Noted    COVID-19 [U07.1] 02/12/2022    Acute blood loss anemia [D62] 02/07/2022    GI bleed [K92.2] 01/14/2022         Assessment and Recommendation(s):  1. Pulmonary embolus. RV strain from CTA? 2. Shock - from blood loss. Unlikely from PE (very small) main stem open.    3. Acute blood Loss anemia s/p 6 units PRBC - likely from anastomotic area of recent surgery for adenocarcinoma. 4. Colon adenocarcinoma s/p resection 1/26/2022. · CTA may overestimate RV strain. Will get an echocardiogram. Discussed with Dr. Kait Leal who reviewed CTA to assess if PE required thrombectomy. The thrombus are very small, non obstructive of main bronchus hence thrombectomy not indicated. · Can get  Dedicated CTPE. · When stabilized from bleeding perspective, can start anticoagulation. Thank you for allowing me to participate in the care of Rachel Panchal . If you have any questions/comments, please do not hesitate to contact us.       Aranza Moreno MD  Cardiac Electrophysiology  [address]

## 2024-07-23 NOTE — PROGRESS NOTES
"Daily Note     Today's date: 2024  Patient name: Raghav Smith  : 1995  MRN: [id number]  Referring provider: Linda Rao CRNP  Dx:   Encounter Diagnosis     ICD-10-CM    1. Left hip pain  M25.552       2. Chronic left shoulder pain  M25.512     G89.29                      Subjective: Pt reports feeling pretty good today.       Objective: See treatment diary below      Assessment: Scap elevation noted with flexion and abduction AROM. Used AROM as comparable sign. Elevation with AROM was improved following manuals noted in flow sheet.     Updated HEP: lateral head tilt with scap depression, manubrium inferior lateral glide with shoulder abduction, thoracic ext with scap retraction and LTR      Plan: Continue per plan of care.  Progress treatment as tolerated.       Precautions: None      Manuals      Manubrium inferior lateral glide gr IV, MWM        2x5     SC inferior glide gr IV, MWM        2x5     AC posterior glide gr IV MWM        2x5                  Neuro Re-Ed             Shoulder taps 3x10 3x10 3x10 held  3x10 3x10      Supine serratus punch  5# 3x10 5# 3x10 5# 3x10 5# 3x10        Supine manual perturbations  2x30\" 2x30\" 3x30\" 3x30\"        Serratus table push ups     3x10 3x10 3x10      Wall alphabet 6\" ball   x1 nv x1 x1 x1                                             Ther Ex             Rows  GTB 3x10 GTB 3x10 GTB 3x10 GTB 3x10 Blue 3x10 Blue 3x10 Blue 3x10     B/l low trap ER GTB 3x10 GTB 3x10 GTB 3x10 GTB 2x10 ea GTB 2x10 ea Blue 3x10 Blue 3x10 Blue 3x10     TB ER/IR GTB 3x10 GTB 3x10 GTB 3x10 GTB 3x10 GTB 3x10 Blue 3x10 Blue 3x10 Blue 3x10     Supine thoracic ext     10x10\" 10x10\" 10x10\" 10x10\"                                                         Ther Activity                                       Gait Training                                       Modalities                                            "

## 2024-07-24 ENCOUNTER — SOCIAL WORK (OUTPATIENT)
Dept: BEHAVIORAL/MENTAL HEALTH CLINIC | Facility: CLINIC | Age: 29
End: 2024-07-24
Payer: COMMERCIAL

## 2024-07-24 DIAGNOSIS — F43.10 PTSD (POST-TRAUMATIC STRESS DISORDER): Primary | ICD-10-CM

## 2024-07-24 PROCEDURE — 90834 PSYTX W PT 45 MINUTES: CPT | Performed by: COUNSELOR

## 2024-07-24 NOTE — PSYCH
"Behavioral Health Psychotherapy Progress Note    Psychotherapy Provided: Individual Psychotherapy     1. PTSD (post-traumatic stress disorder)            Goals addressed in session: Goal 1     DATA: Saw Candie. Has excellent physical therapist, reduced pain, cleaned house. 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 \"I felt like I was trying to make things easier for me, was my goal\". She is to continue found solution of eat breakfast, brush teeth, take medication, get dressed and care for hair (We are calling this cabinet stuff). People that say they love me or care about me but their actions don't show that, I take that as my fault - that's what I need to work on\". Continue discussion about how her mother was emotionally dysregulated. Father made her feel like she didn't exist or was a nuisance.         She reports she went to a [employer] and had an incredible time. She says she greatly enjoyed this time with her friends, her  went. She says physical therapy has been \"awesome\" and she's receiving excellent help. Focused on the importance of taking risks in her personal life so she can further enhance meaning in her life.     During this session, this clinician used the following therapeutic modalities: Client-centered Therapy, Cognitive Behavioral Therapy, and Existential Therapy    Substance Abuse was not addressed during this session. If the client is diagnosed with a co-occurring substance use disorder, please indicate any changes in the frequency or amount of use: n/a. Stage of change for addressing substance use diagnoses: No substance use/Not applicable    ASSESSMENT:  Raghav Smith presents with a Euthymic/ normal mood.     her affect is Normal range and intensity, which is congruent, with her mood and the content of the session. The client has made progress on their goals.     Raghav Smith presents with a none risk of suicide, none risk of self-harm, and none risk of harm to others.    For any risk " "assessment that surpasses a \"low\" rating, a safety plan must be developed.    A safety plan was indicated: no  If yes, describe in detail n/a    PLAN: Between sessions, Raghav Smith will continue to engage with her family and friends in a meaningful way. At the next session, the therapist will use Client-centered Therapy, Cognitive Behavioral Therapy, and Existential Therapy to address depression.    Behavioral Health Treatment Plan and Discharge Planning: Raghav Smith is aware of and agrees to continue to work on their treatment plan. They have identified and are working toward their discharge goals. yes    Visit start and stop times:    07/24/24  Start Time: 1307  Stop Time: 1357  Total Visit Time: 50 minutes  "

## 2024-07-26 ENCOUNTER — OFFICE VISIT (OUTPATIENT)
Dept: PHYSICAL THERAPY | Facility: CLINIC | Age: 29
End: 2024-07-26
Payer: COMMERCIAL

## 2024-07-26 DIAGNOSIS — G89.29 CHRONIC LEFT SHOULDER PAIN: ICD-10-CM

## 2024-07-26 DIAGNOSIS — M25.552 LEFT HIP PAIN: Primary | ICD-10-CM

## 2024-07-26 DIAGNOSIS — M25.512 CHRONIC LEFT SHOULDER PAIN: ICD-10-CM

## 2024-07-26 PROCEDURE — 97110 THERAPEUTIC EXERCISES: CPT

## 2024-07-26 PROCEDURE — 97140 MANUAL THERAPY 1/> REGIONS: CPT

## 2024-07-26 NOTE — PROGRESS NOTES
"Daily Note     Today's date: 2024  Patient name: Raghav Smith  : 1995  MRN: [id number]  Referring provider: Linda Rao CRNP  Dx:   Encounter Diagnosis     ICD-10-CM    1. Left hip pain  M25.552       2. Chronic left shoulder pain  M25.512     G89.29                      Subjective: Pt reports did well with 3 new exercises at home and symptom relief had good carryover.       Objective: See treatment diary below      Assessment: Performed same manual interventions as last visit secondary to good response. HEP unchanged. Progress when able. No early elevation of scap noted during AROM.    Updated HEP: lateral head tilt with scap depression, manubrium inferior lateral glide with shoulder abduction, thoracic ext with scap retraction and LTR      Plan: Continue per plan of care.  Progress treatment as tolerated.       Precautions: None      Manuals     Manubrium inferior lateral glide gr IV, MWM        2x5 2x5    SC inferior glide gr IV, MWM        2x5 2x5    AC posterior glide gr IV MWM        2x5 2x5                 Neuro Re-Ed             Shoulder taps 3x10 3x10 3x10 held  3x10 3x10      Supine serratus punch  5# 3x10 5# 3x10 5# 3x10 5# 3x10        Supine manual perturbations  2x30\" 2x30\" 3x30\" 3x30\"        Serratus table push ups     3x10 3x10 3x10      Wall alphabet 6\" ball   x1 nv x1 x1 x1                                             Ther Ex             Rows  GTB 3x10 GTB 3x10 GTB 3x10 GTB 3x10 Blue 3x10 Blue 3x10 Blue 3x10 Blue 3x10    B/l low trap ER GTB 3x10 GTB 3x10 GTB 3x10 GTB 2x10 ea GTB 2x10 ea Blue 3x10 Blue 3x10 Blue 3x10 Blue 3x10    TB ER/IR GTB 3x10 GTB 3x10 GTB 3x10 GTB 3x10 GTB 3x10 Blue 3x10 Blue 3x10 Blue 3x10 Blue 3x10    Supine thoracic ext     10x10\" 10x10\" 10x10\" 10x10\"                                                         Ther Activity                                       Gait Training                                     "   Modalities

## 2024-07-31 ENCOUNTER — SOCIAL WORK (OUTPATIENT)
Dept: BEHAVIORAL/MENTAL HEALTH CLINIC | Facility: CLINIC | Age: 29
End: 2024-07-31
Payer: COMMERCIAL

## 2024-07-31 DIAGNOSIS — F31.81 BIPOLAR 2 DISORDER (HCC): ICD-10-CM

## 2024-07-31 DIAGNOSIS — F43.10 PTSD (POST-TRAUMATIC STRESS DISORDER): Primary | ICD-10-CM

## 2024-07-31 PROCEDURE — 90834 PSYTX W PT 45 MINUTES: CPT | Performed by: COUNSELOR

## 2024-07-31 NOTE — PSYCH
"Behavioral Health Psychotherapy Progress Note    Psychotherapy Provided: Individual Psychotherapy     1. PTSD (post-traumatic stress disorder)        2. Bipolar 2 disorder (HCC)            Goals addressed in session: Goal 1     DATA: Saw [employer]ie. Was called \"fatty\" by parents. Has excellent physical therapist, reduced pain, cleaned house. 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 \"I felt like I was trying to make things easier for me, was my goal\". She is to continue found solution of eat breakfast, brush teeth, take medication, get dressed and care for hair (We are calling this cabinet stuff). People that say they love me or care about me but their actions don't show that, I take that as my fault - that's what I need to work on\". Continue discussion about how her mother was emotionally dysregulated. Father made her feel like she didn't exist or was a nuisance.         Says her conversation with her mother has been challenging due to how disingenuous she is during communication. Her home is being fixed up by a program that helps with making homes more efficient. She says that she continues to get out of the house and engage with her friends.     During this session, this clinician used the following therapeutic modalities: Client-centered Therapy and Cognitive Behavioral Therapy    Substance Abuse was not addressed during this session. If the client is diagnosed with a co-occurring substance use disorder, please indicate any changes in the frequency or amount of use: n/a. Stage of change for addressing substance use diagnoses: No substance use/Not applicable    ASSESSMENT:  Raghav Smith presents with a Euthymic/ normal mood.     her affect is Normal range and intensity, which is congruent, with her mood and the content of the session. The client has made progress on their goals.     Raghav Smith presents with a none risk of suicide, none risk of self-harm, and none risk of harm to others.    For any risk " "assessment that surpasses a \"low\" rating, a safety plan must be developed.    A safety plan was indicated: no  If yes, describe in detail n/a    PLAN: Between sessions, Raghav Smith will continue to engage with her life and family in an authentic manner  . At the next session, the therapist will use Client-centered Therapy, Cognitive Behavioral Therapy, and Existential Therapy to address depression.    Behavioral Health Treatment Plan and Discharge Planning: Raghav Smith is aware of and agrees to continue to work on their treatment plan. They have identified and are working toward their discharge goals. yes    Visit start and stop times:    07/31/24  Start Time: 1313  Stop Time: 1400  Total Visit Time: 47 minutes  "

## 2024-08-02 ENCOUNTER — OFFICE VISIT (OUTPATIENT)
Dept: PHYSICAL THERAPY | Facility: CLINIC | Age: 29
End: 2024-08-02
Payer: COMMERCIAL

## 2024-08-02 DIAGNOSIS — M25.552 LEFT HIP PAIN: Primary | ICD-10-CM

## 2024-08-02 DIAGNOSIS — G89.29 CHRONIC LEFT SHOULDER PAIN: ICD-10-CM

## 2024-08-02 DIAGNOSIS — M25.512 CHRONIC LEFT SHOULDER PAIN: ICD-10-CM

## 2024-08-02 PROCEDURE — 97110 THERAPEUTIC EXERCISES: CPT

## 2024-08-02 PROCEDURE — 97112 NEUROMUSCULAR REEDUCATION: CPT

## 2024-08-02 PROCEDURE — 97140 MANUAL THERAPY 1/> REGIONS: CPT

## 2024-08-02 NOTE — PROGRESS NOTES
"Daily Note     Today's date: 2024  Patient name: Raghav Smith  : 1995  MRN: [id number]  Referring provider: Linda Rao CRNP  Dx:   Encounter Diagnosis     ICD-10-CM    1. Left hip pain  M25.552       2. Chronic left shoulder pain  M25.512     G89.29                      Subjective: Pt reports her shoulder is just feeling tight recently. Tightness in upper trap muscle as well.       Objective: See treatment diary below      Assessment: Added back in [place] for neuromodulation and pain relief. Added shoulder PROM as she does have some restriction with 90/90 ER and hesitancy with overhead flexion.     Updated HEP: lateral head tilt with scap depression, manubrium inferior lateral glide with shoulder abduction, thoracic ext with scap retraction and LTR      Plan: Continue per plan of care.  Progress treatment as tolerated.       Precautions: None      Manuals    Manubrium inferior lateral glide gr IV, MWM 2x5       2x5 2x5 2x5   SC inferior glide gr IV, MWM 2x5       2x5 2x5 2x5   AC posterior glide gr IV MWM 2x5       2x5 2x5 2x5   LUT graston 6'            L shoulder PROM 6'            Neuro Re-Ed             Shoulder taps      3x10 3x10      Supine serratus punch             Supine manual perturbations             Serratus table push ups      3x10 3x10      Wall alphabet 6\" ball      x1 x1      Supine abduction AROM with inf clavicle self mobilization 2x10            Supine scap dression with LUT stretch 2x10                         Ther Ex             Rows Blue 3x10     Blue 3x10 Blue 3x10 Blue 3x10 Blue 3x10 Blue 3x10   B/l low trap ER Blue 3x10     Blue 3x10 Blue 3x10 Blue 3x10 Blue 3x10 Blue 3x10   TB ER/IR Blue 3x10     Blue 3x10 Blue 3x10 Blue 3x10 Blue 3x10 Blue 3x10   Supine thoracic ext 10x10\"     10x10\" 10x10\" 10x10\"                                                         Ther Activity                                       Gait Training                   "                     Modalities

## 2024-08-06 ENCOUNTER — OFFICE VISIT (OUTPATIENT)
Dept: PHYSICAL THERAPY | Facility: CLINIC | Age: 29
End: 2024-08-06
Payer: COMMERCIAL

## 2024-08-06 DIAGNOSIS — G89.29 CHRONIC LEFT SHOULDER PAIN: ICD-10-CM

## 2024-08-06 DIAGNOSIS — M25.512 CHRONIC LEFT SHOULDER PAIN: ICD-10-CM

## 2024-08-06 DIAGNOSIS — M25.552 LEFT HIP PAIN: Primary | ICD-10-CM

## 2024-08-06 PROCEDURE — 97140 MANUAL THERAPY 1/> REGIONS: CPT

## 2024-08-06 PROCEDURE — 97112 NEUROMUSCULAR REEDUCATION: CPT

## 2024-08-06 PROCEDURE — 97110 THERAPEUTIC EXERCISES: CPT

## 2024-08-06 NOTE — PROGRESS NOTES
"Daily Note     Today's date: 2024  Patient name: Raghav Smith  : 1995  MRN: [id number]  Referring provider: Linda Rao CRNP  Dx:   Encounter Diagnosis     ICD-10-CM    1. Left hip pain  M25.552       2. Chronic left shoulder pain  M25.512     G89.29                      Subjective: Pt reports feeling really good today.       Objective: See treatment diary below      Assessment: Good AC joint and sternal mobility noted on assessment. Early scap elevation noted during abduction > flexion. Address with scap depression/retraction following by protrusion with shoulder flexed on wall.     Updated HEP: arm on wall scap retraction/depress, arm on wall scap wall slides      Plan: Continue per plan of care.  Progress treatment as tolerated.       Precautions: None      Manuals    Manubrium inferior lateral glide gr IV, MWM 2x5 assessed      2x5 2x5 2x5   SC inferior glide gr IV, MWM 2x5 assessed      2x5 2x5 2x5   AC posterior glide gr IV MWM 2x5 assessed      2x5 2x5 2x5   LUT graston 6' 6'           L shoulder PROM 6'            Neuro Re-Ed             Shoulder taps      3x10 3x10      Supine serratus punch             Supine manual perturbations             Serratus table push ups      3x10 3x10      Wall alphabet 6\" ball      x1 x1      Shoulder flexed above 90 hand on wall: scap retraction depression, elbow extension with push into wall  2x10           Same as above followed by serratus wall slide  2x10                        Ther Ex             Rows Blue 3x10 Blue 3x10    Blue 3x10 Blue 3x10 Blue 3x10 Blue 3x10 Blue 3x10   B/l low trap ER Blue 3x10 Blue 3x10    Blue 3x10 Blue 3x10 Blue 3x10 Blue 3x10 Blue 3x10   TB ER/IR Blue 3x10 Blue 3x10    Blue 3x10 Blue 3x10 Blue 3x10 Blue 3x10 Blue 3x10   Supine thoracic ext 10x10\" 10x10\"    10x10\" 10x10\" 10x10\"                                                         Ther Activity                                       Gait " Training                                       Modalities

## 2024-08-09 ENCOUNTER — OFFICE VISIT (OUTPATIENT)
Dept: PHYSICAL THERAPY | Facility: CLINIC | Age: 29
End: 2024-08-09
Payer: COMMERCIAL

## 2024-08-09 DIAGNOSIS — B00.1 COLD SORE: ICD-10-CM

## 2024-08-09 DIAGNOSIS — G89.29 CHRONIC LEFT SHOULDER PAIN: ICD-10-CM

## 2024-08-09 DIAGNOSIS — M25.552 LEFT HIP PAIN: Primary | ICD-10-CM

## 2024-08-09 DIAGNOSIS — M25.512 CHRONIC LEFT SHOULDER PAIN: ICD-10-CM

## 2024-08-09 PROCEDURE — 97140 MANUAL THERAPY 1/> REGIONS: CPT

## 2024-08-09 PROCEDURE — 97110 THERAPEUTIC EXERCISES: CPT

## 2024-08-09 PROCEDURE — 97112 NEUROMUSCULAR REEDUCATION: CPT

## 2024-08-09 RX ORDER — VALACYCLOVIR HYDROCHLORIDE 1 G/1
1000 TABLET, FILM COATED ORAL 2 TIMES DAILY
Qty: 14 TABLET | Refills: 0 | Status: SHIPPED | OUTPATIENT
Start: 2024-08-09 | End: 2024-08-16

## 2024-08-09 NOTE — TELEPHONE ENCOUNTER
Reason for call:   [x] Refill   [] Prior Auth  [] Other:     Office:   [x] PCP/Provider - [facility]   [] Specialty/Provider -     Medication:   valACYclovir (VALTREX) 1,000 mg tablet - Take 2 tablets (2,000 mg total) by mouth 2 (two) times a day for 1 day     Pharmacy:   [facility] #[id number] - [place], [place] - [address]     Does the patient have enough for 3 days?   [] Yes   [x] No - Send as HP to POD

## 2024-08-09 NOTE — PROGRESS NOTES
"Daily Note     Today's date: 2024  Patient name: Raghav Smith  : 1995  MRN: [id number]  Referring provider: Linda Rao CRNP  Dx:   Encounter Diagnosis     ICD-10-CM    1. Left hip pain  M25.552       2. Chronic left shoulder pain  M25.512     G89.29                      Subjective: Pt reports no pain again today.      Objective: See treatment diary below      Assessment: Good scap retraction depression with wall exercise. Fatigued appropriately with therapeutic strengthening. Continue to progress as able.     Updated HEP: arm on wall scap retraction/depress, arm on wall scap wall slides      Plan: Continue per plan of care.  Progress treatment as tolerated.       Precautions: None      Manuals    Manubrium inferior lateral glide gr IV, MWM 2x5 assessed       2x5 2x5   SC inferior glide gr IV, MWM 2x5 assessed       2x5 2x5   AC posterior glide gr IV MWM 2x5 assessed       2x5 2x5   LUT graston 6' 6' 8'          L shoulder PROM 6'            Neuro Re-Ed             Shoulder taps             Supine serratus punch             Supine manual perturbations   3x30\"          Serratus table push ups             Wall alphabet 6\" ball             Shoulder flexed above 90 hand on wall: scap retraction depression, elbow extension with push into wall  2x10 2x10          Same as above followed by serratus wall slide  2x10 2x10                       Ther Ex             Rows Blue 3x10 Blue 3x10 Blue 3x10      Blue 3x10 Blue 3x10   B/l low trap ER Blue 3x10 Blue 3x10 Blue 3x10      Blue 3x10 Blue 3x10   TB ER/IR Blue 3x10 Blue 3x10 Blue 3x10      Blue 3x10 Blue 3x10   Supine thoracic ext 10x10\" 10x10\" 10x10\"                                                              Ther Activity                                       Gait Training                                       Modalities                                            "

## 2024-08-13 ENCOUNTER — OFFICE VISIT (OUTPATIENT)
Dept: PHYSICAL THERAPY | Facility: CLINIC | Age: 29
End: 2024-08-13
Payer: COMMERCIAL

## 2024-08-13 DIAGNOSIS — M25.552 LEFT HIP PAIN: Primary | ICD-10-CM

## 2024-08-13 DIAGNOSIS — G89.29 CHRONIC LEFT SHOULDER PAIN: ICD-10-CM

## 2024-08-13 DIAGNOSIS — M25.512 CHRONIC LEFT SHOULDER PAIN: ICD-10-CM

## 2024-08-13 PROCEDURE — 97140 MANUAL THERAPY 1/> REGIONS: CPT

## 2024-08-13 PROCEDURE — 97112 NEUROMUSCULAR REEDUCATION: CPT

## 2024-08-13 PROCEDURE — 97110 THERAPEUTIC EXERCISES: CPT

## 2024-08-13 NOTE — PROGRESS NOTES
"Daily Note     Today's date: 2024  Patient name: Raghav Smith  : 1995  MRN: [id number]  Referring provider: Linda Rao CRNP  Dx:   Encounter Diagnosis     ICD-10-CM    1. Left hip pain  M25.552       2. Chronic left shoulder pain  M25.512     G89.29                      Subjective: Pt reports the only time she had some discomfort was carrying a heavy backpack at work.       Objective: See treatment diary below      Assessment: Continues to be appropriately fatigued with current strengthening regimen. Can progress resistance next visit.     Updated HEP: arm on wall scap retraction/depress, arm on wall scap wall slides      Plan: Continue per plan of care.  Progress treatment as tolerated.       Precautions: None      Manuals    Manubrium inferior lateral glide gr IV, MWM 2x5 assessed       2x5 2x5   SC inferior glide gr IV, MWM 2x5 assessed       2x5 2x5   AC posterior glide gr IV MWM 2x5 assessed       2x5 2x5   LUT graston 6' 6' 8' 8'         L shoulder PROM 6'            Neuro Re-Ed             Shoulder taps             Supine serratus punch             Supine manual perturbations   3x30\" 3x30\"         Serratus table push ups             Wall alphabet 6\" ball             Shoulder flexed above 90 hand on wall: scap retraction depression, elbow extension with push into wall  2x10 2x10 2x10 1#        Same as above followed by serratus wall slide  2x10 2x10 2x10 1#                     Ther Ex             Rows Blue 3x10 Blue 3x10 Blue 3x10 Blue 3x10     Blue 3x10 Blue 3x10   B/l low trap ER Blue 3x10 Blue 3x10 Blue 3x10 Blue 3x10     Blue 3x10 Blue 3x10   TB ER/IR Blue 3x10 Blue 3x10 Blue 3x10 Blue 3x10     Blue 3x10 Blue 3x10   Supine thoracic ext 10x10\" 10x10\" 10x10\" 10x10\"                                                             Ther Activity                                       Gait Training                                       Modalities                        "

## 2024-08-14 ENCOUNTER — SOCIAL WORK (OUTPATIENT)
Dept: BEHAVIORAL/MENTAL HEALTH CLINIC | Facility: CLINIC | Age: 29
End: 2024-08-14
Payer: COMMERCIAL

## 2024-08-14 DIAGNOSIS — F43.10 PTSD (POST-TRAUMATIC STRESS DISORDER): Primary | ICD-10-CM

## 2024-08-14 DIAGNOSIS — F31.81 BIPOLAR 2 DISORDER (HCC): ICD-10-CM

## 2024-08-14 PROCEDURE — 90834 PSYTX W PT 45 MINUTES: CPT | Performed by: COUNSELOR

## 2024-08-14 NOTE — PSYCH
"Behavioral Health Psychotherapy Progress Note    Psychotherapy Provided: Individual Psychotherapy     1. PTSD (post-traumatic stress disorder)        2. Bipolar 2 disorder (HCC)            Goals addressed in session: Goal 1     DATA: UPDATE TREATMENT PLAN. Saw Candie. Was called \"fatty\" by parents. Has excellent physical therapist, reduced pain, cleaned house. 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 \"I felt like I was trying to make things easier for me, was my goal\". She is to continue found solution of eat breakfast, brush teeth, take medication, get dressed and care for hair (We are calling this cabinet stuff). People that say they love me or care about me but their actions don't show that, I take that as my fault - that's what I need to work on\". Continue discussion about how her mother was emotionally dysregulated. Father made her feel like she didn't exist or was a nuisance.         Focused today on importance of positive self-talk, positive self-regard. Explored her time with her friend enjoying the concert at [employer]. Discussed her libido and how strong it has been lately. Explored using dating apps. Discussed barriers to her completing her goal of attempting shabana streaming - discussed using AI to come up with a logo, as the logo has been holding her back.     During this session, this clinician used the following therapeutic modalities: Client-centered Therapy, Cognitive Behavioral Therapy, and Existential Therapy    Substance Abuse was not addressed during this session. If the client is diagnosed with a co-occurring substance use disorder, please indicate any changes in the frequency or amount of use: n/a. Stage of change for addressing substance use diagnoses: No substance use/Not applicable    ASSESSMENT:  Raghav Smith presents with a Euthymic/ normal mood.     her affect is Normal range and intensity, which is congruent, with her mood and the content of the session. The client has made progress on " "their goals.     Raghav Smith presents with a none risk of suicide, none risk of self-harm, and none risk of harm to others.    For any risk assessment that surpasses a \"low\" rating, a safety plan must be developed.    A safety plan was indicated: no  If yes, describe in detail n/a    PLAN: Between sessions, Raghav Smith will continue to . At the next session, the therapist will use Client-centered Therapy, Cognitive Behavioral Therapy, and Existential Therapy to address depression.    Behavioral Health Treatment Plan and Discharge Planning: Raghav Smith is aware of and agrees to continue to work on their treatment plan. They have identified and are working toward their discharge goals. yes    Visit start and stop times:    08/14/24  Start Time: 1306  Stop Time: 1350  Total Visit Time: 44 minutes  "

## 2024-08-16 ENCOUNTER — OFFICE VISIT (OUTPATIENT)
Dept: PHYSICAL THERAPY | Facility: CLINIC | Age: 29
End: 2024-08-16
Payer: COMMERCIAL

## 2024-08-16 DIAGNOSIS — M25.512 CHRONIC LEFT SHOULDER PAIN: ICD-10-CM

## 2024-08-16 DIAGNOSIS — M25.552 LEFT HIP PAIN: Primary | ICD-10-CM

## 2024-08-16 DIAGNOSIS — G89.29 CHRONIC LEFT SHOULDER PAIN: ICD-10-CM

## 2024-08-16 PROCEDURE — 97112 NEUROMUSCULAR REEDUCATION: CPT

## 2024-08-16 PROCEDURE — 97140 MANUAL THERAPY 1/> REGIONS: CPT

## 2024-08-16 PROCEDURE — 97110 THERAPEUTIC EXERCISES: CPT

## 2024-08-16 NOTE — PROGRESS NOTES
"Daily Note     Today's date: 2024  Patient name: Raghav Smith  : 1995  MRN: [id number]  Referring provider: Linda Rao CRNP  Dx:   Encounter Diagnosis     ICD-10-CM    1. Left hip pain  M25.552       2. Chronic left shoulder pain  M25.512     G89.29                      Subjective: Pt reports having slightly increased pain today.       Objective: See treatment diary below  GHJ mod hypomobility  Scapulothoracic mod hypomobility    Assessment: Assessed for further impairments because of increased pain. Addressed impairments as noted in manuals.Pt tolerated well, did have some pain with PA GHJ mobilization. Continue to progress as able.      Updated HEP: arm on wall scap retraction/depress, arm on wall scap wall slides      Plan: Continue per plan of care.  Progress treatment as tolerated.       Precautions: None      Manuals    Manubrium inferior lateral glide gr IV, MWM 2x5 assessed       2x5 2x5   Sidelying scapula mobilizations elevation, depression, protrusion. retraction     2x10 ea        GHJ mobilization PA and inf     Gr II, PA pain, Inf 2x30        LUT graston 6' 6' 8' 8' 8'        L shoulder PROM 6'    4'        Neuro Re-Ed             Shoulder taps             Supine serratus punch             Supine manual perturbations   3x30\" 3x30\"         Serratus table push ups             Wall alphabet 6\" ball             Shoulder flexed above 90 hand on wall: scap retraction depression, elbow extension with push into wall  2x10 2x10 2x10 2x10 1#       Same as above followed by serratus wall slide  2x10 2x10 2x10 2x10 1#                    Ther Ex             Rows Blue 3x10 Blue 3x10 Blue 3x10 Blue 3x10 Blue 3x10    Blue 3x10 Blue 3x10   B/l low trap ER Blue 3x10 Blue 3x10 Blue 3x10 Blue 3x10 Blue 3x10    Blue 3x10 Blue 3x10   TB ER/IR Blue 3x10 Blue 3x10 Blue 3x10 Blue 3x10 Blue 3x10    Blue 3x10 Blue 3x10   Supine thoracic ext 10x10\" 10x10\" 10x10\" 10x10\" 10x10\"    "                                                         Ther Activity                                       Gait Training                                       Modalities

## 2024-08-20 ENCOUNTER — EVALUATION (OUTPATIENT)
Dept: PHYSICAL THERAPY | Facility: CLINIC | Age: 29
End: 2024-08-20
Payer: COMMERCIAL

## 2024-08-20 ENCOUNTER — TELEPHONE (OUTPATIENT)
Dept: BEHAVIORAL/MENTAL HEALTH CLINIC | Facility: CLINIC | Age: 29
End: 2024-08-20

## 2024-08-20 DIAGNOSIS — M25.552 LEFT HIP PAIN: Primary | ICD-10-CM

## 2024-08-20 DIAGNOSIS — G89.29 CHRONIC LEFT SHOULDER PAIN: ICD-10-CM

## 2024-08-20 DIAGNOSIS — M25.512 CHRONIC LEFT SHOULDER PAIN: ICD-10-CM

## 2024-08-20 PROCEDURE — 97110 THERAPEUTIC EXERCISES: CPT

## 2024-08-20 PROCEDURE — 97161 PT EVAL LOW COMPLEX 20 MIN: CPT

## 2024-08-20 NOTE — PROGRESS NOTES
PT Evaluation     Today's date: 2024  Patient name: Raghav Smith  : 1995  MRN: [id number]  Referring provider: Linda Rao CRNP  Dx:   Encounter Diagnosis     ICD-10-CM    1. Left hip pain  M25.552       2. Chronic left shoulder pain  M25.512     G89.29                      Assessment    Assessment details:     Impairment/Problem List:  1. Decreased L hip A/PROM compared to R hip  2. Decreased L hip joint accessory mobility  3. Decreased L hip strength in all planes  4. Limited lumbar spine extension AROM  5. SIJ hypomobility     Raghav Smith is a 29 y.o. female who presents with L hip pain. Their clinical presentation aligns with L hip hypomobility with some minor intra articular irritation. Raghav Smith is limited with all ADLs and recreational activities secondary to the impairments stated above. No further referral appears necessary at this time based upon examination results. Prognosis is good given POC/HEP compliance.     Comparable signs:  1. PROM  2. FADIR  3. Sacral thrust    Understanding of Dx/Px/POC: good     Prognosis: good    Goals  Short Term Goals:  Pt will report decreased levels of pain by at least 2 subjective ratings in 4 weeks  Pt will demonstrate improved ROM by at least 10 degrees in 4 weeks  Pt will demonstrate improved strength by ½ grade in 4 weeks  Pt will be able to work for 2 hours without pain in 4 weeks    Long Term Goals:  Pt will be independent with HEP in 8 weeks  Pt will be functional with all ADL's in 8 weeks  Pt will achieve their predicted FOTO score in 8 weeks  Pt will be able to paddle board on the river for 3 hours without pain in 8 weeks      Plan  Patient would benefit from: skilled physical therapy  Referral necessary: No  Planned modality interventions: low level laser therapy    Planned therapy interventions: abdominal trunk stabilization, IASTM, joint mobilization, manual therapy, nerve gliding, neuromuscular re-education, balance,  balance/weight bearing training, body mechanics training, breathing training, patient education, postural training, strengthening, stretching, therapeutic activities, therapeutic exercise, functional ROM exercises, gait training, graded activity, graded exercise and home exercise program    Frequency: 2x week  Duration in weeks: 12  Plan of Care beginning date: 8/20/2024  Plan of Care expiration date: 11/12/2024  Treatment plan discussed with: patient        Subjective Evaluation    History of Present Illness  Mechanism of injury: Raghav has been being seen in PT for her L shoulder pain. Her original referral included diagnosis of hip pain and L shoulder pain. We have developed a solid POC for her L shoulder pain and are now evaluating her hip to add interventions to address to our POC.     Pt reports long standing history of hip pain without any specific KIMMIE. She does have a history of high activity levels including dance and paddle boarding. She does recall a few traumatic events to the hips including falls, landing in splits and some other traumatic impacts over the years. She thinks at one point she may have injured a ligament in her hip. Overall her L hip feels tight, painful and limited.   Onset Gradual/Immediate: primarily gradual  Timeframe: years  Pain Location/Descriptors: deep, posterolateral tightness and discomfort in hip, occasionally feels related to minor LBP and SIJ pain  Constant/Intermittent: intermittent  Multiple pain locations related Y/N: Y  Symptoms changed since onset Y/N: N  Symptoms unchanged/improved/worsening since onset: unchanged  Aggravating: activity  Easing: making it pop or crack  AM/PM Pattern: none     Denies 5d's, 3n's. Pt denies unrelenting night pain, unexplained weight changes, bowel/bladder symptoms, saddle region numbness, ataxia.    Patient Goals  Patient goals for therapy: decreased pain, increased motion, increased strength, independence with ADLs/IADLs and return to  "sport/leisure activities        Objective     Tenderness     Additional Tenderness Details  No tenderness to palpation    Lumbar Screen  Lumbar range of motion within normal limits with the following exceptions:Limited lumbar spine extension     Active Range of Motion   Left Hip   Flexion: 120 degrees   Extension: 15 degrees   Abduction: 60 degrees   External rotation (90/90): 85 degrees   Internal rotation (90/90): 50 degrees     Right Hip   Normal active range of motion    Additional Active Range of Motion Details  Hypermobile R side    Passive Range of Motion     Additional Passive Range of Motion Details  Comparable to AROM    Strength/Myotome Testing     Left Hip   Planes of Motion   Flexion: 4  Extension: 4  Abduction: 4  Adduction: 4  External rotation: 4  Internal rotation: 4    Right Hip   Planes of Motion   Flexion: 5  Extension: 5  Abduction: 5  Adduction: 5  External rotation: 5  Internal rotation: 5    Tests     Lumbar   Positive sacral thrust.     Left Hip   Positive KIARA, FADIR and scour.     Right Hip   Positive FADIR.   Negative KIARA and scour.           Precautions: None      Manuals 8/2 8/6 8/9 8/13 8/16 8/20 7/26 8/2   Sidelying scapula mobilizations elevation, depression, protrusion. retraction     2x10 ea        GHJ mobilization PA and inf     Gr II, PA pain, Inf 2x30        LUT graston 6' 6' 8' 8' 8'        L shoulder PROM 6'    4'        Supine hip ER/IR MWM lateral glide      3x30       Standing hip flexion MWM lateral glide      3x30                    Neuro Re-Ed             Shoulder taps             Supine manual perturbations   3x30\" 3x30\"         Serratus table push ups             Wall alphabet 6\" ball             Shoulder flexed above 90 hand on wall: scap retraction depression, elbow extension with push into wall  2x10 2x10 2x10 2x10  1#      Same as above followed by serratus wall slide  2x10 2x10 2x10 2x10  1#      Coppenhagen side plank             SL bridge             90/90 " "hip switch             Hip CARs             Ther Ex             Rows Blue 3x10 Blue 3x10 Blue 3x10 Blue 3x10 Blue 3x10    Blue 3x10 Blue 3x10   B/l low trap ER Blue 3x10 Blue 3x10 Blue 3x10 Blue 3x10 Blue 3x10    Blue 3x10 Blue 3x10   TB ER/IR Blue 3x10 Blue 3x10 Blue 3x10 Blue 3x10 Blue 3x10    Blue 3x10 Blue 3x10   Supine thoracic ext 10x10\" 10x10\" 10x10\" 10x10\" 10x10\"        PPU      3x10       Aleisha pose rock back with hips ER      3x10                                 Ther Activity                                       Gait Training                                       Modalities                                               "

## 2024-08-20 NOTE — TELEPHONE ENCOUNTER
Provider reached out to patient and left VM to discuss group beginning September 9th. Provider will schedule patient for group.

## 2024-08-21 ENCOUNTER — SOCIAL WORK (OUTPATIENT)
Dept: BEHAVIORAL/MENTAL HEALTH CLINIC | Facility: CLINIC | Age: 29
End: 2024-08-21
Payer: COMMERCIAL

## 2024-08-21 DIAGNOSIS — F43.10 PTSD (POST-TRAUMATIC STRESS DISORDER): Primary | ICD-10-CM

## 2024-08-21 DIAGNOSIS — F31.81 BIPOLAR 2 DISORDER (HCC): ICD-10-CM

## 2024-08-21 PROCEDURE — 90834 PSYTX W PT 45 MINUTES: CPT | Performed by: COUNSELOR

## 2024-08-21 NOTE — PSYCH
"Behavioral Health Psychotherapy Progress Note    Psychotherapy Provided: Individual Psychotherapy     1. PTSD (post-traumatic stress disorder)        2. Bipolar 2 disorder (HCC)            Goals addressed in session: Goal 1     DATA: Nazario Schreiber. Was called \"fatty\" by parents. Has excellent physical therapist, reduced pain, cleaned house. 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 \"I felt like I was trying to make things easier for me, was my goal\". She is to continue found solution of brush teeth, take medication, eat breakfast,  get dressed and care for hair (We are calling this cabinet stuff). People that say they love me or care about me but their actions don't show that, I take that as my fault - that's what I need to work on\". Continue discussion about how her mother was emotionally dysregulated. Father made her feel like she didn't exist or was a nuisance.         Updated treatment plan for much of the session. Focused on getting back into her routine, her own found solution of getting back onto her routine. She did text Thierno. Got out to pride day.     During this session, this clinician used the following therapeutic modalities: Client-centered Therapy, Cognitive Behavioral Therapy, and Existential Therapy    Substance Abuse was not addressed during this session. If the client is diagnosed with a co-occurring substance use disorder, please indicate any changes in the frequency or amount of use: n/a. Stage of change for addressing substance use diagnoses: No substance use/Not applicable    ASSESSMENT:  Raghav Smith presents with a Euthymic/ normal mood.     her affect is Blunted, which is congruent, with her mood and the content of the session. The client has made progress on their goals.     Raghav Smith presents with a none risk of suicide, none risk of self-harm, and none risk of harm to others.    For any risk assessment that surpasses a \"low\" rating, a safety plan must be developed.    A safety plan was " indicated: no  If yes, describe in detail n/a    PLAN: Between sessions, Raghav Smith will continue to engage with her found solutions. At the next session, the therapist will use Client-centered Therapy, Cognitive Behavioral Therapy, and Existential Therapy to address depression.    Behavioral Health Treatment Plan and Discharge Planning: Raghav Smith is aware of and agrees to continue to work on their treatment plan. They have identified and are working toward their discharge goals. yes    Visit start and stop times:    08/21/24  Start Time: 1313  Stop Time: 1400  Total Visit Time: 47 minutes

## 2024-08-21 NOTE — BH TREATMENT PLAN
"Outpatient Behavioral Health Psychotherapy Treatment Plan     Raghav Jacksonosta  1995      Date of Initial Psychotherapy Assessment: 7/15/2021   Date of Current Treatment Plan: 8/21/2024  Treatment Plan Target Date: 2/21/2025  Treatment Plan Expiration Date: 2/21/2025     Diagnosis:   1. History of posttraumatic stress disorder (PTSD)          2. Bipolar 2 disorder (HCC)                Area(s) of Need: Lower self-esteem at times, existential concerns about death and isolation     Long Term Goal 1 (in the client's own words): \"Incorporating my trauma and emotions into relationships and intimacy\" \"Understanding that my trauma is a part of me\" (\"Oh, I got that\" Completed 9/22/2023). PHQ9 will continue to remain \"no or minimal\" depression, PCL-5 will remain under 32 threshold for PTSD. Increased understanding of existential concerns. Continue to focus more on depression symptoms.      Stage of Change: Action     Target Date for completion: 9/6/2024             Anticipated therapeutic modalities: PCT, CBT, ET, CPT             People identified to complete this goal: Sunil Correa                    Objective 1: (identify the means of measuring success in meeting the objective): Mindy will follw through with her medication management, taking medications as prescribed, engaging in various treatment modalities, following through with homework. Raghav will continue to set boundaries in the immediate.                     Objective 2: (identify the means of measuring success in meeting the objective): Raghav will continue to explore her narrative, complete IRT work, engage in self-care at least once per day 7/7 days of the week. This writer will provide Claribelchirag with PCT, CBT, SFBT, DBT through her course of treatment. CBT/DBT for emotional resiliance. Psychoeducation regarding her diagnosis.  PCT and ET work throughout the course of treatment. Increased ET work.       Long Term Goal 2 (in the client's own " "words):  \"I definitely want to get into the reconnection of body thing\" regarding her trauma. \"I will be able to better read what my body feels ... to be able to sit with [a feeling in my body of physical intimacy]\"     Stage of Change: Action     Target Date for completion: 9/6/2024             Anticipated therapeutic modalities: PCT, ET             People identified to complete this goal: Sunil Avilez                    Objective 1: (identify the means of measuring success in meeting the objective): Sunny will engage in mindfulness related to her body, awareness of sensations happening within her body, practicing articulation of feelings within her body, to increase awareness of how her trauma affects her physical being in her day to day, at least once per day.                     Objective 2: (identify the means of measuring success in meeting the objective): Sunny will continue to engage in self-care at home once per day. This writer will provide psychosomatic psychoeducation.      I am currently under the care of a [facility] psychiatric provider: yes     My [facility] psychiatric provider is: Dr. White      I am currently taking psychiatric medications: Yes, as prescribed     I feel that I will be ready for discharge from mental health care when I reach the following (measurable goal/objective): Goal 1 and 2     For children and adults who have a legal guardian:          Has there been any change to custody orders and/or guardianship status? NA. If yes, attach updated documentation.     I have created my Crisis Plan and have been offered a copy of this plan     Behavioral Health Treatment Plan [facility]: Diagnosis and Treatment Plan explained to Rahgav Smith acknowledges an understanding of their diagnosis. Raghav Smith agrees to this treatment plan.     I have been offered a copy of this Treatment Plan. yes  "

## 2024-08-23 ENCOUNTER — OFFICE VISIT (OUTPATIENT)
Dept: PHYSICAL THERAPY | Facility: CLINIC | Age: 29
End: 2024-08-23
Payer: COMMERCIAL

## 2024-08-23 DIAGNOSIS — M25.552 LEFT HIP PAIN: Primary | ICD-10-CM

## 2024-08-23 DIAGNOSIS — G89.29 CHRONIC LEFT SHOULDER PAIN: ICD-10-CM

## 2024-08-23 DIAGNOSIS — M25.512 CHRONIC LEFT SHOULDER PAIN: ICD-10-CM

## 2024-08-23 PROCEDURE — 97110 THERAPEUTIC EXERCISES: CPT

## 2024-08-23 PROCEDURE — 97140 MANUAL THERAPY 1/> REGIONS: CPT

## 2024-08-23 PROCEDURE — 97112 NEUROMUSCULAR REEDUCATION: CPT

## 2024-08-23 NOTE — PROGRESS NOTES
"Daily Note     Today's date: 2024  Patient name: Raghav Smith  : 1995  MRN: [id number]  Referring provider: Linda Rao CRNP  Dx:   Encounter Diagnosis     ICD-10-CM    1. Left hip pain  M25.552       2. Chronic left shoulder pain  M25.512     G89.29                      Subjective: Pt reports she feels tight and sore in her shoulder and hips.       Objective: See treatment diary below      Assessment: Added hip abduction strengthening and neuromuscular control training throughout the available range. Continue to progress as able.       Plan: Continue per plan of care.  Progress treament per protocol.      Precautions: None    Manuals    Sidelying scapula mobilizations elevation, depression, protrusion. retraction     2x10 ea        GHJ mobilization PA and inf     Gr II, PA pain, Inf 2x30  Gr II, PA pain, Inf 2x30      LUT graston 6' 6' 8' 8' 8'  6'      L shoulder PROM 6'    4'  4'      L hip PROM       4'      Supine hip ER/IR MWM lateral glide      3x30 3x30      Standing hip flexion MWM lateral glide      3x30 3x30                   Neuro Re-Ed             Shoulder taps             Supine manual perturbations   3x30\" 3x30\"         Serratus table push ups             Wall alphabet 6\" ball             Shoulder flexed above 90 hand on wall: scap retraction depression, elbow extension with push into wall  2x10 2x10 2x10 2x10  2x10      Same as above followed by serratus wall slide  2x10 2x10 2x10 2x10  2x10      Clamshell       GTB 3x10      Standing hip abduction       GTB 3x10      Coppenhagen side plank             SL bridge             90/90 hip switch             Hip CARs       1x10      Ther Ex             Rows Blue 3x10 Blue 3x10 Blue 3x10 Blue 3x10 Blue 3x10    Blue 3x10 Blue 3x10   B/l low trap ER Blue 3x10 Blue 3x10 Blue 3x10 Blue 3x10 Blue 3x10    Blue 3x10 Blue 3x10   TB ER/IR Blue 3x10 Blue 3x10 Blue 3x10 Blue 3x10 Blue 3x10    Blue 3x10 Blue " "3x10   Supine thoracic ext 10x10\" 10x10\" 10x10\" 10x10\" 10x10\"        PPU      3x10       Aleisha pose rock back with hips ER      3x10                                 Ther Activity                                       Gait Training                                       Modalities                                                "

## 2024-08-27 ENCOUNTER — OFFICE VISIT (OUTPATIENT)
Dept: PHYSICAL THERAPY | Facility: CLINIC | Age: 29
End: 2024-08-27
Payer: COMMERCIAL

## 2024-08-27 DIAGNOSIS — M25.552 LEFT HIP PAIN: Primary | ICD-10-CM

## 2024-08-27 DIAGNOSIS — M25.512 CHRONIC LEFT SHOULDER PAIN: ICD-10-CM

## 2024-08-27 DIAGNOSIS — G89.29 CHRONIC LEFT SHOULDER PAIN: ICD-10-CM

## 2024-08-27 PROCEDURE — 97112 NEUROMUSCULAR REEDUCATION: CPT

## 2024-08-27 PROCEDURE — 97140 MANUAL THERAPY 1/> REGIONS: CPT

## 2024-08-27 NOTE — PROGRESS NOTES
"Daily Note     Today's date: 2024  Patient name: Raghav Smith  : 1995  MRN: [id number]  Referring provider: Linda Rao CRNP  Dx:   Encounter Diagnosis     ICD-10-CM    1. Left hip pain  M25.552       2. Chronic left shoulder pain  M25.512     G89.29                      Subjective: Pt reports her upper body and neck are sore [place], her hips are not too bad currently.       Objective: See treatment diary below      Assessment: 2/5 positive SIJ cluster tests: sacral thrust, KIARA so cannot rule out SIJ. Slight discrepancy in PSIS upon palpation during forward bend test and marchers test that might suggest anterior rotation of L innominate and posterior rotation of R innominate. Addressed with METs. Pt tolerated well, did have some discomfort and soreness throughout. Followed up with stabilization and NMR training. Continue to progress as able.       Plan: Continue per plan of care.  Progress treament per protocol.      Precautions: None    Manuals      Sidelying scapula mobilizations elevation, depression, protrusion. retraction     2x10 ea        GHJ mobilization PA and inf     Gr II, PA pain, Inf 2x30  Gr II, PA pain, Inf 2x30      LUT graston 6' 6' 8' 8' 8'  6'      L shoulder PROM 6'    4'  4'      L hip PROM       4' 4'     Supine hip ER/IR MWM lateral glide      3x30 3x30      Standing hip flexion MWM lateral glide      3x30 3x30      Sidelying and prone MET R hip flex, L hip ext        10x5\" ea     Neuro Re-Ed             Shoulder taps             Supine manual perturbations   3x30\" 3x30\"         Serratus table push ups             Wall alphabet 6\" ball             Shoulder flexed above 90 hand on wall: scap retraction depression, elbow extension with push into wall  2x10 2x10 2x10 2x10  2x10      Same as above followed by serratus wall slide  2x10 2x10 2x10 2x10  2x10      Clamshell       GTB 3x10      Standing hip " "abduction       GTB 3x10 GTB 3x10     Coppenhagen side plank             SL bridge             90/90 hip switch             Hip CARs       1x10 1x10     Ther Ex             Rows Blue 3x10 Blue 3x10 Blue 3x10 Blue 3x10 Blue 3x10        B/l low trap ER Blue 3x10 Blue 3x10 Blue 3x10 Blue 3x10 Blue 3x10        TB ER/IR Blue 3x10 Blue 3x10 Blue 3x10 Blue 3x10 Blue 3x10        Supine thoracic ext 10x10\" 10x10\" 10x10\" 10x10\" 10x10\"        PPU      3x10       Aleisha pose rock back with hips ER      3x10  HEP                               Ther Activity                                       Gait Training                                       Modalities                                                "

## 2024-08-29 ENCOUNTER — TELEPHONE (OUTPATIENT)
Dept: PSYCHIATRY | Facility: CLINIC | Age: 29
End: 2024-08-29

## 2024-08-29 NOTE — TELEPHONE ENCOUNTER
A medical records request was received 8/29/24 from [place] [facility]. The date range is 1/10/23-present.    Paperwork will be sent to Dr White and circulated to Sunil Ruiz.

## 2024-08-30 ENCOUNTER — OFFICE VISIT (OUTPATIENT)
Dept: PHYSICAL THERAPY | Facility: CLINIC | Age: 29
End: 2024-08-30
Payer: COMMERCIAL

## 2024-08-30 DIAGNOSIS — M25.512 CHRONIC LEFT SHOULDER PAIN: ICD-10-CM

## 2024-08-30 DIAGNOSIS — M25.552 LEFT HIP PAIN: Primary | ICD-10-CM

## 2024-08-30 DIAGNOSIS — G89.29 CHRONIC LEFT SHOULDER PAIN: ICD-10-CM

## 2024-08-30 PROCEDURE — 97110 THERAPEUTIC EXERCISES: CPT

## 2024-08-30 PROCEDURE — 97112 NEUROMUSCULAR REEDUCATION: CPT

## 2024-08-30 PROCEDURE — 97140 MANUAL THERAPY 1/> REGIONS: CPT

## 2024-08-30 NOTE — PROGRESS NOTES
"Daily Note     Today's date: 2024  Patient name: Raghav Smith  : 1995  MRN: [id number]  Referring provider: Linda Rao CRNP  Dx:   Encounter Diagnosis     ICD-10-CM    1. Left hip pain  M25.552       2. Chronic left shoulder pain  M25.512     G89.29                        Subjective: Pt reports soreness in hips and shoulder yesterday.       Objective: See treatment diary below      Assessment: Pt tolerated session well. Hip PROM had little no improvement with PPU and joint mobs. Likely not of lumbar spine origin as there is firm end feel in all planes with L hip PROM. Continue to focus on L hip mobility and neuromuscular control/active range as able.       Plan: Continue per plan of care.  Progress treament per protocol.      Precautions: None    Manuals  830    Sidelying scapula mobilizations elevation, depression, protrusion. retraction     2x10 ea        GHJ mobilization PA and inf     Gr II, PA pain, Inf 2x30  Gr II, PA pain, Inf 2x30      LUT graston 6' 6' 8' 8' 8'  6'  6' DI    L shoulder PROM 6'    4'  4'      L hip PROM       4' 4' 4' KF    Prone PA lumbar spine gr IV mobs L1-S2         2x10 ea KF    Supine hip ER/IR MWM lateral glide      3x30 3x30      Standing hip flexion MWM lateral glide      3x30 3x30      Sidelying and prone MET R hip flex, L hip ext        10x5\" ea     Neuro Re-Ed             Shoulder taps             Supine manual perturbations   3x30\" 3x30\"         Serratus table push ups             Wall alphabet 6\" ball             Shoulder flexed above 90 hand on wall: scap retraction depression, elbow extension with push into wall  2x10 2x10 2x10 2x10  2x10  2x10    Same as above followed by serratus wall slide  2x10 2x10 2x10 2x10  2x10  2x10    Clamshell       GTB 3x10      Standing hip abduction       GTB 3x10 GTB 3x10 GTB 3x10    Coppenhagen side plank             SL bridge             90/90 hip switch             Hip CARs       1x10 " "1x10 1x10    Ther Ex             Rows Blue 3x10 Blue 3x10 Blue 3x10 Blue 3x10 Blue 3x10        B/l low trap ER Blue 3x10 Blue 3x10 Blue 3x10 Blue 3x10 Blue 3x10        TB ER/IR Blue 3x10 Blue 3x10 Blue 3x10 Blue 3x10 Blue 3x10    Blue 3x10    Supine thoracic ext 10x10\" 10x10\" 10x10\" 10x10\" 10x10\"        PPU      3x10   4x10    Aleisha pose rock back with hips ER      3x10  HEP                               Ther Activity                                       Gait Training                                       Modalities                                                "

## 2024-09-03 ENCOUNTER — OFFICE VISIT (OUTPATIENT)
Dept: PHYSICAL THERAPY | Facility: CLINIC | Age: 29
End: 2024-09-03
Payer: COMMERCIAL

## 2024-09-03 DIAGNOSIS — M25.512 CHRONIC LEFT SHOULDER PAIN: ICD-10-CM

## 2024-09-03 DIAGNOSIS — M25.552 LEFT HIP PAIN: Primary | ICD-10-CM

## 2024-09-03 DIAGNOSIS — G89.29 CHRONIC LEFT SHOULDER PAIN: ICD-10-CM

## 2024-09-03 PROCEDURE — 97110 THERAPEUTIC EXERCISES: CPT

## 2024-09-03 PROCEDURE — 97112 NEUROMUSCULAR REEDUCATION: CPT

## 2024-09-03 PROCEDURE — 97140 MANUAL THERAPY 1/> REGIONS: CPT

## 2024-09-03 NOTE — PROGRESS NOTES
"Daily Note     Today's date: 9/3/2024  Patient name: Raghav Smith  : 1995  MRN: [id number]  Referring provider: Linda Rao CRNP  Dx:   Encounter Diagnosis     ICD-10-CM    1. Left hip pain  M25.552       2. Chronic left shoulder pain  M25.512     G89.29                        Subjective: Pt reports she was able to go camping this weekend, although she is sore following.       Objective: See treatment diary below      Assessment: Improved R innominate movement mobility at baseline today. Continue appropriate muscle energy techniques to address. Improved shoulder AROM at baseline today as well. Continues strengthening. Educated to continue hip strengthening at home.       Plan: Continue per plan of care.  Progress treament per protocol.      Precautions: None    Manuals 8/2 8/6 8/9 8/13 8/16 8/20 8/23 8/27 8/30 9/3   Sidelying scapula mobilizations elevation, depression, protrusion. retraction     2x10 ea        GHJ mobilization PA and inf     Gr II, PA pain, Inf 2x30  Gr II, PA pain, Inf 2x30      LUT graston 6' 6' 8' 8' 8'  6'  6' DI 6'   L shoulder PROM 6'    4'  4'      L hip PROM       4' 4' 4' KF    Prone PA lumbar spine gr IV mobs L1-S2         2x10 ea KF    Supine hip ER/IR MWM lateral glide      3x30 3x30      Standing hip flexion MWM lateral glide      3x30 3x30      Sidelying and prone MET R hip flex, L hip ext        10x5\" ea  10x5\" ea   Neuro Re-Ed             Shoulder taps             Supine manual perturbations   3x30\" 3x30\"         Serratus table push ups             Wall alphabet 6\" ball             Shoulder flexed above 90 hand on wall: scap retraction depression, elbow extension with push into wall  2x10 2x10 2x10 2x10  2x10  2x10 2x10   Same as above followed by serratus wall slide  2x10 2x10 2x10 2x10  2x10  2x10 2x10   Clamshell       GTB 3x10      Standing hip abduction       GTB 3x10 GTB 3x10 GTB 3x10    Coppenhagen side plank             SL bridge             90/90 hip switch    " "         Hip CARs       1x10 1x10 1x10    Ther Ex             Rows Blue 3x10 Blue 3x10 Blue 3x10 Blue 3x10 Blue 3x10        B/l low trap ER Blue 3x10 Blue 3x10 Blue 3x10 Blue 3x10 Blue 3x10     Blue 3x10   TB ER/IR Blue 3x10 Blue 3x10 Blue 3x10 Blue 3x10 Blue 3x10    Blue 3x10 Blue 3x10   Supine thoracic ext 10x10\" 10x10\" 10x10\" 10x10\" 10x10\"        PPU      3x10   4x10    Aleisha pose rock back with hips ER      3x10  HEP                               Ther Activity                                       Gait Training                                       Modalities                                                "

## 2024-09-04 ENCOUNTER — TELEPHONE (OUTPATIENT)
Dept: BEHAVIORAL/MENTAL HEALTH CLINIC | Facility: CLINIC | Age: 29
End: 2024-09-04

## 2024-09-04 NOTE — TELEPHONE ENCOUNTER
Called and left message for patient to inform 9/4/2024 was cancelled due to provider sick. Confirmed next appointment on 9/11 with provider. Can reschedule to tomorrow morning if needed.

## 2024-09-05 ENCOUNTER — SOCIAL WORK (OUTPATIENT)
Dept: BEHAVIORAL/MENTAL HEALTH CLINIC | Facility: CLINIC | Age: 29
End: 2024-09-05
Payer: COMMERCIAL

## 2024-09-05 DIAGNOSIS — F43.10 PTSD (POST-TRAUMATIC STRESS DISORDER): Primary | ICD-10-CM

## 2024-09-05 PROCEDURE — 90834 PSYTX W PT 45 MINUTES: CPT | Performed by: COUNSELOR

## 2024-09-05 NOTE — PSYCH
"Behavioral Health Psychotherapy Progress Note    Psychotherapy Provided: Individual Psychotherapy     1. PTSD (post-traumatic stress disorder)            Goals addressed in session: Goal 1     DATA: Asked her to experiment with thought about \"I deserve to be harmed\" and allowing herself to be loved. Saw Candie. Was called \"fatty\" by parents. Has excellent physical therapist, reduced pain, cleaned house. 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 \"I felt like I was trying to make things easier for me, was my goal\". She is to continue found solution of brush teeth, take medication, eat breakfast,  get dressed and care for hair (We are calling this cabinet stuff). People that say they love me or care about me but their actions don't show that, I take that as my fault - that's what I need to work on\". Continue discussion about how her mother was emotionally dysregulated. Father made her feel like she didn't exist or was a nuisance.         She reports she recently cut her arm purposefully - asked her if it was to feel the pain or to kill herself - she denies wanting to die. Explored alternatives to managing emotions she's experiencing before she cuts herself. Alternatives like spending time with others Focused on how wonderful she did feel with her friends, being in nature. Processed her emotions, recognizing why she self-harms is because she feels she deserves it. Asked her to experiment with these thoughts.     During this session, this clinician used the following therapeutic modalities: Client-centered Therapy, Cognitive Behavioral Therapy, Existential Therapy, and Solution-Focused Therapy    Substance Abuse was not addressed during this session. If the client is diagnosed with a co-occurring substance use disorder, please indicate any changes in the frequency or amount of use: n/a. Stage of change for addressing substance use diagnoses: No substance use/Not applicable    ASSESSMENT:  Raghav Smith presents with a " "Euthymic/ normal mood.     her affect is Normal range and intensity, which is congruent, with her mood and the content of the session. The client has made progress on their goals.     Raghav Smith presents with a none risk of suicide, none risk of self-harm, and none risk of harm to others.    For any risk assessment that surpasses a \"low\" rating, a safety plan must be developed.    A safety plan was indicated: no  If yes, describe in detail n/a    PLAN: Between sessions, Raghav Smith will continue to experiment with her thoughts. At the next session, the therapist will use Client-centered Therapy, Cognitive Behavioral Therapy, and Existential Therapy to address depression.    Behavioral Health Treatment Plan and Discharge Planning: Raghav Smith is aware of and agrees to continue to work on their treatment plan. They have identified and are working toward their discharge goals. yes    Visit start and stop times:    09/05/24  Start Time: 1020  Stop Time: 1100  Total Visit Time: 40 minutes  "

## 2024-09-06 ENCOUNTER — OFFICE VISIT (OUTPATIENT)
Dept: PHYSICAL THERAPY | Facility: CLINIC | Age: 29
End: 2024-09-06
Payer: COMMERCIAL

## 2024-09-06 DIAGNOSIS — G89.29 CHRONIC LEFT SHOULDER PAIN: ICD-10-CM

## 2024-09-06 DIAGNOSIS — M25.512 CHRONIC LEFT SHOULDER PAIN: ICD-10-CM

## 2024-09-06 DIAGNOSIS — M25.552 LEFT HIP PAIN: Primary | ICD-10-CM

## 2024-09-06 PROCEDURE — 97110 THERAPEUTIC EXERCISES: CPT

## 2024-09-06 PROCEDURE — 97112 NEUROMUSCULAR REEDUCATION: CPT

## 2024-09-06 NOTE — PROGRESS NOTES
"Daily Note     Today's date: 2024  Patient name: Raghav Smith  : 1995  MRN: [id number]  Referring provider: Linda Rao CRNP  Dx:   Encounter Diagnosis     ICD-10-CM    1. Left hip pain  M25.552       2. Chronic left shoulder pain  M25.512     G89.29                        Subjective: Pt reports no significant change since last visit.       Objective: See treatment diary below      Assessment: Tolerated POC really well. Improvement noted in both L shoulder and L hip PROM however they are still limited compared to non involved side. Able to progress to gr IV mobilization for glenohumeral joint. Improving neuromuscular control of available hip ROM.       Plan: Continue per plan of care.  Progress treament per protocol.      Precautions: None    Manuals  9/3   Sidelying scapula mobilizations elevation, depression, protrusion. retraction             GHJ mobilization PA and inf Gr IV, PA pain, Inf 3x30      Gr II, PA pain, Inf 2x30      LUT graston 8'      6'  6' DI 6'   L shoulder PROM 4'      4'      L hip PROM 4'      4' 4' 4' KF    Prone PA lumbar spine gr IV mobs L1-S2         2x10 ea KF    Supine hip ER/IR MWM lateral glide      3x30 3x30      Standing hip flexion MWM lateral glide      3x30 3x30      Sidelying and prone MET R hip flex, L hip ext 10x5\" ea       10x5\" ea  10x5\" ea   Neuro Re-Ed             Shoulder taps             Supine manual perturbations             Serratus table push ups             Wall alphabet 6\" ball             Shoulder flexed above 90 hand on wall: scap retraction depression, elbow extension with push into wall       2x10  2x10 2x10   Same as above followed by serratus wall slide       2x10  2x10 2x10   Clamshell       GTB 3x10      Standing hip abduction GTB 3x10      GTB 3x10 GTB 3x10 GTB 3x10    Coppenhagen side plank             SL bridge             90/90 hip switch             Hip CARs 1x10      1x10 1x10 1x10    Ther Ex           "   Rows             B/l low trap ER Blue 3x10         Blue 3x10   TB ER/IR Blue 3x10        Blue 3x10 Blue 3x10   Supine thoracic ext             PPU      3x10   4x10    Aleisha pose rock back with hips ER      3x10  HEP                               Ther Activity                                       Gait Training                                       Modalities

## 2024-09-09 ENCOUNTER — OFFICE VISIT (OUTPATIENT)
Dept: BEHAVIORAL/MENTAL HEALTH CLINIC | Facility: CLINIC | Age: 29
End: 2024-09-09
Payer: COMMERCIAL

## 2024-09-09 DIAGNOSIS — F50.9 EATING DISORDER, UNSPECIFIED TYPE: Primary | ICD-10-CM

## 2024-09-09 PROCEDURE — 90853 GROUP PSYCHOTHERAPY: CPT

## 2024-09-10 ENCOUNTER — OFFICE VISIT (OUTPATIENT)
Dept: PHYSICAL THERAPY | Facility: CLINIC | Age: 29
End: 2024-09-10
Payer: COMMERCIAL

## 2024-09-10 DIAGNOSIS — G89.29 CHRONIC LEFT SHOULDER PAIN: ICD-10-CM

## 2024-09-10 DIAGNOSIS — M25.552 LEFT HIP PAIN: Primary | ICD-10-CM

## 2024-09-10 DIAGNOSIS — M25.512 CHRONIC LEFT SHOULDER PAIN: ICD-10-CM

## 2024-09-10 PROCEDURE — 97110 THERAPEUTIC EXERCISES: CPT

## 2024-09-10 PROCEDURE — 97140 MANUAL THERAPY 1/> REGIONS: CPT

## 2024-09-10 PROCEDURE — 97112 NEUROMUSCULAR REEDUCATION: CPT

## 2024-09-10 NOTE — PROGRESS NOTES
"Daily Note     Today's date: 9/10/2024  Patient name: Raghav Smith  : 1995  MRN: [id number]  Referring provider: Linda Rao CRNP  Dx:   Encounter Diagnosis     ICD-10-CM    1. Left hip pain  M25.552       2. Chronic left shoulder pain  M25.512     G89.29                        Subjective: Pt reports she was cleaning her house and irritated her hips and pubic symphysis.       Objective: See treatment diary below      Assessment: Today after MET Raghav demonstrated improve mobility of PSIS with marcher test indicating improvement. Added prone off edge of table MET to HEP. Also added isometric hip add for stability. Shoulder flexion PROM improved with inferior mobilization and scapular mobility.       Plan: Continue per plan of care.  Progress treament per protocol.      Precautions: None    Manuals 9/6 9/10    8/20 8/23 8/27 8/30 9/3   Sidelying scapula mobilizations elevation, depression, protrusion. retraction  3'           GHJ mobilization PA and inf Gr IV, PA pain, Inf 3x30 Gr IV, PA pain, Inf 3x30     Gr II, PA pain, Inf 2x30      LUT graston 8'      6'  6' DI 6'   L shoulder PROM 4' 4'     4'      L hip PROM 4'      4' 4' 4' KF    Prone PA lumbar spine gr IV mobs L1-S2         2x10 ea KF    Supine hip ER/IR MWM lateral glide      3x30 3x30      Standing hip flexion MWM lateral glide      3x30 3x30      Sidelying and prone MET R hip flex, L hip ext 10x5\" ea 10x5\" ea      10x5\" ea  10x5\" ea   Neuro Re-Ed             Shoulder taps             Supine manual perturbations             Serratus table push ups             Wall alphabet 6\" ball             Shoulder flexed above 90 hand on wall: scap retraction depression, elbow extension with push into wall  1x10     2x10  2x10 2x10   Same as above followed by serratus wall slide  1x10     2x10  2x10 2x10   Clamshell       GTB 3x10      Standing hip abduction GTB 3x10 GTB 3x10     GTB 3x10 GTB 3x10 GTB 3x10    Coppenhagen side plank             SL " bridge             90/90 hip switch             Hip CARs 1x10      1x10 1x10 1x10    Ther Ex             Rows             B/l low trap ER Blue 3x10         Blue 3x10   TB ER/IR Blue 3x10 Blue 3x10       Blue 3x10 Blue 3x10   Supine thoracic ext             PPU      3x10   4x10    Aleisha pose rock back with hips ER      3x10  HEP                               Ther Activity                                       Gait Training                                       Modalities

## 2024-09-11 ENCOUNTER — SOCIAL WORK (OUTPATIENT)
Dept: BEHAVIORAL/MENTAL HEALTH CLINIC | Facility: CLINIC | Age: 29
End: 2024-09-11

## 2024-09-11 ENCOUNTER — TELEPHONE (OUTPATIENT)
Dept: PSYCHIATRY | Facility: CLINIC | Age: 29
End: 2024-09-11

## 2024-09-11 DIAGNOSIS — F31.81 BIPOLAR 2 DISORDER (HCC): ICD-10-CM

## 2024-09-11 DIAGNOSIS — F43.10 PTSD (POST-TRAUMATIC STRESS DISORDER): Primary | ICD-10-CM

## 2024-09-11 NOTE — TELEPHONE ENCOUNTER
MELINDA came to FD from Provider's appointment with Patient. It is a request to be excused from Jury Duty at [facility] in [place]. MELINDA was loaded into Media and Provider has been routed.

## 2024-09-11 NOTE — PSYCH
"Behavioral Health Psychotherapy Progress Note    Psychotherapy Provided: Individual Psychotherapy     1. PTSD (post-traumatic stress disorder)        2. Bipolar 2 disorder (HCC)            Goals addressed in session: Goal 1     DATA: Asked her to experiment with thought about \"I deserve to be harmed\" and allowing herself to be loved. Saw Candie. Was called \"fatty\" by parents. Has excellent physical therapist, reduced pain, cleaned house. 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 \"I felt like I was trying to make things easier for me, was my goal\". She is to continue found solution of brush teeth, take medication, eat breakfast,  get dressed and care for hair (We are calling this cabinet stuff). People that say they love me or care about me but their actions don't show that, I take that as my fault - that's what I need to work on\". Continue discussion about how her mother was emotionally dysregulated. Father made her feel like she didn't exist or was a nuisance.         Assisted Sunny with moving forward with her potential for jury duty, spent time writing letter and faxing, getting consent. Focused on her taking sexual risks and exploring what she could do differently moving forward from SFBT perspective.     During this session, this clinician used the following therapeutic modalities: Client-centered Therapy, Cognitive Behavioral Therapy, and Existential Therapy    Substance Abuse was not addressed during this session. If the client is diagnosed with a co-occurring substance use disorder, please indicate any changes in the frequency or amount of use: n/a. Stage of change for addressing substance use diagnoses: No substance use/Not applicable    ASSESSMENT:  Raghav Smith presents with a Euthymic/ normal mood.     her affect is Normal range and intensity, which is congruent, with her mood and the content of the session. The client has made progress on their goals.     Raghav Smith presents with a none risk of " "suicide, none risk of self-harm, and none risk of harm to others.    For any risk assessment that surpasses a \"low\" rating, a safety plan must be developed.    A safety plan was indicated: no  If yes, describe in detail n/a    PLAN: Between sessions, Raghav Smith will continue to engage with her sexuality, experimentation and understanding. At the next session, the therapist will use Client-centered Therapy, Cognitive Behavioral Therapy, and Existential Therapy to address depression.    Behavioral Health Treatment Plan and Discharge Planning: Raghav Smith is aware of and agrees to continue to work on their treatment plan. They have identified and are working toward their discharge goals. yes    Visit start and stop times:    09/11/24  Start Time: 1307  Stop Time: 1357  Total Visit Time: 50 minutes  "

## 2024-09-13 ENCOUNTER — OFFICE VISIT (OUTPATIENT)
Dept: PHYSICAL THERAPY | Facility: CLINIC | Age: 29
End: 2024-09-13
Payer: COMMERCIAL

## 2024-09-13 DIAGNOSIS — G89.29 CHRONIC LEFT SHOULDER PAIN: ICD-10-CM

## 2024-09-13 DIAGNOSIS — M25.512 CHRONIC LEFT SHOULDER PAIN: ICD-10-CM

## 2024-09-13 DIAGNOSIS — M25.552 LEFT HIP PAIN: Primary | ICD-10-CM

## 2024-09-13 PROCEDURE — 97112 NEUROMUSCULAR REEDUCATION: CPT

## 2024-09-13 PROCEDURE — 97140 MANUAL THERAPY 1/> REGIONS: CPT

## 2024-09-13 PROCEDURE — 97110 THERAPEUTIC EXERCISES: CPT

## 2024-09-13 NOTE — PROGRESS NOTES
"Daily Note     Today's date: 2024  Patient name: Raghav Smith  : 1995  MRN: [id number]  Referring provider: Linda Rao CRNP  Dx:   Encounter Diagnosis     ICD-10-CM    1. Left hip pain  M25.552       2. Chronic left shoulder pain  M25.512     G89.29                        Subjective: Pt reports she felt much better after last visit.       Objective: See treatment diary below      Assessment: Improved pelvic alignment at baseline today and improved shoulder AROM at baseline. Performed the exact same plan of care/interventions as last visit since the patient had a good response.       Plan: Continue per plan of care.  Progress treament per protocol.      Precautions: None    Manuals 9/6 9/10 9/13   8/20 8/23 8/27 8/30 9/3   Sidelying scapula mobilizations elevation, depression, protrusion. retraction  3' 3'          GHJ mobilization PA and inf Gr IV, PA pain, Inf 3x30 Gr IV, PA pain, Inf 3x30 Gr IV, PA pain, Inf 3x30    Gr II, PA pain, Inf 2x30      LUT graston 8'  8'    6'  6' DI 6'   L shoulder PROM 4' 4' 4'    4'      L hip PROM 4'      4' 4' 4' KF    Prone PA lumbar spine gr IV mobs L1-S2         2x10 ea KF    Supine hip ER/IR MWM lateral glide      3x30 3x30      Standing hip flexion MWM lateral glide      3x30 3x30      Sidelying and prone MET R hip flex, L hip ext 10x5\" ea 10x5\" ea 10x5\" ea     10x5\" ea  10x5\" ea   Neuro Re-Ed             Shoulder taps             Supine manual perturbations             Serratus table push ups             Wall alphabet 6\" ball             Shoulder flexed above 90 hand on wall: scap retraction depression, elbow extension with push into wall  1x10     2x10  2x10 2x10   Same as above followed by serratus wall slide  1x10     2x10  2x10 2x10   Clamshell       GTB 3x10      Standing hip abduction GTB 3x10 GTB 3x10 GTB 3x10    GTB 3x10 GTB 3x10 GTB 3x10    Coppenhagen side plank             SL bridge             90/90 hip switch             Hip CARs 1x10      1x10 " 1x10 1x10    Ther Ex             Rows             B/l low trap ER Blue 3x10  Blue 3x10       Blue 3x10   TB ER/IR Blue 3x10 Blue 3x10 Blue 3x10      Blue 3x10 Blue 3x10   Supine thoracic ext             PPU      3x10   4x10    Aleisha pose rock back with hips ER      3x10  HEP                               Ther Activity                                       Gait Training                                       Modalities

## 2024-09-13 NOTE — PSYCH
"Behavioral Health Psychotherapy Progress Note    Psychotherapy Provided: Group Therapy    1. Eating disorder, unspecified type          Goals addressed in session: Goal 1     DATA: Raghav arrived for the recovery in disorganized eating group today in person. She shared about some of her own struggles with eating and perceptions about eating in her family. Raghav said she would be interested in discussing the socioeconomic factors that affect how people eat. The focus on the group was to build rapport with other group members, learn expectation, and discuss topics of the group. The group got the chance to discuss certain topics that they'd like to see over the course of group. The group members took the EAT-26 in order to identify where their struggles lie and were able to reflect upon this   During this session, this clinician used the following therapeutic modalities: Client-centered Therapy and Supportive Psychotherapy    Substance Abuse was not addressed during this session. If the client is diagnosed with a co-occurring substance use disorder, please indicate any changes in the frequency or amount of use: n/a. Stage of change for addressing substance use diagnoses: No substance use/Not applicable    ASSESSMENT:  Raghav Smith presents with a Euthymic/ normal mood. her affect is Normal range and intensity, which is congruent, with her mood and the content of the session. The client has not made progress on their goals. She present with an open and supportive affect and helped generate conversation among the group. Raghav Smith presents with a none risk of suicide, none risk of self-harm, and none risk of harm to others.    For any risk assessment that surpasses a \"low\" rating, a safety plan must be developed.    A safety plan was indicated: no  If yes, describe in detail n/a    PLAN: Between sessions, Raghav Smith will complete the journaling worksheet to identify her eating habits. At the " next session, the therapist will use Client-centered Therapy and Supportive Psychotherapy to address her disordered eating.    Behavioral Health Treatment Plan and Discharge Planning: Raghav Smith is aware of and agrees to continue to work on their treatment plan. They have identified and are working toward their discharge goals. yes    Visit start and stop times:    09/09/24  Start Time: 1507  Stop Time: 1600  Total Visit Time: 53 minutes

## 2024-09-16 ENCOUNTER — OFFICE VISIT (OUTPATIENT)
Dept: BEHAVIORAL/MENTAL HEALTH CLINIC | Facility: CLINIC | Age: 29
End: 2024-09-16
Payer: COMMERCIAL

## 2024-09-16 DIAGNOSIS — F50.9 EATING DISORDER, UNSPECIFIED TYPE: Primary | ICD-10-CM

## 2024-09-16 PROCEDURE — 90853 GROUP PSYCHOTHERAPY: CPT

## 2024-09-17 ENCOUNTER — EVALUATION (OUTPATIENT)
Dept: PHYSICAL THERAPY | Facility: CLINIC | Age: 29
End: 2024-09-17
Payer: COMMERCIAL

## 2024-09-17 DIAGNOSIS — M25.552 LEFT HIP PAIN: Primary | ICD-10-CM

## 2024-09-17 DIAGNOSIS — G89.29 CHRONIC LEFT SHOULDER PAIN: ICD-10-CM

## 2024-09-17 DIAGNOSIS — M25.512 CHRONIC LEFT SHOULDER PAIN: ICD-10-CM

## 2024-09-17 DIAGNOSIS — F31.81 BIPOLAR 2 DISORDER (HCC): ICD-10-CM

## 2024-09-17 DIAGNOSIS — F43.10 PTSD (POST-TRAUMATIC STRESS DISORDER): ICD-10-CM

## 2024-09-17 PROCEDURE — 97140 MANUAL THERAPY 1/> REGIONS: CPT

## 2024-09-17 PROCEDURE — 97110 THERAPEUTIC EXERCISES: CPT

## 2024-09-17 RX ORDER — CLONIDINE HYDROCHLORIDE 0.1 MG/1
0.1 TABLET ORAL EVERY 12 HOURS
Qty: 180 TABLET | Refills: 0 | Status: SHIPPED | OUTPATIENT
Start: 2024-09-17

## 2024-09-17 RX ORDER — LAMOTRIGINE 200 MG/1
400 TABLET ORAL DAILY
Qty: 180 TABLET | Refills: 0 | Status: SHIPPED | OUTPATIENT
Start: 2024-09-17

## 2024-09-17 NOTE — PROGRESS NOTES
Daily Note     Today's date: 2024  Patient name: Raghav Smith  : 1995  MRN: [id number]  Referring provider: Linda Rao CRNP  Dx:   Encounter Diagnosis     ICD-10-CM    1. Left hip pain  M25.552       2. Chronic left shoulder pain  M25.512     G89.29                      Subjective: Raghav reports feeling improved overall since starting PT. States does not think about the shoulder often at all anymore. Hip feeling better as well. Raghav states would like to progress again so she can return to activities including rock climbing, paddle boarding and lifting weights.       Objective: See treatment diary below    L shoulder AROM  Flexion: 170 pain free (improved after inferior GHJ gr III mobilizations)  Abduction: slight UT compensation 140 pain free  BTB ER: C5  BTB IR: T12    L hip PROM  Flexion: 120 degrees   Extension: 20 degrees   Abduction: 60 degrees   External rotation (90/90): 85 degrees   Internal rotation (90/90): 60 degrees     L hip MMT  Flexion: 4+/5  Extension: 4+/5  Abduction: 4+/5  External rotation: 4+/5  Internal rotation: 4+/5        Assessment: Bindu has significantly improved periscapular strength and neuromuscular control. There is still very minor early elevation and UT compensation with shoulder abduction AROM. Bindu has also improved strength and stability neuromuscular control of L hip. Mobility of L hip is still limited compared to uninvolved side. Will continue to work in mobility and range of motion at future visits. Can work towards progressing hip strengthening and periscapular strengthening as well. Raghav would benefit from continued skilled physical therapy in order to fully restore functional strength and return to all recreational activities at her prior level of function.       Goals  Short Term Goals:  Pt will report decreased levels of pain by at least 2 subjective ratings in 4 weeks MET  Pt will demonstrate improved ROM by at least 10 degrees  "in 4 weeks MET  Pt will demonstrate improved strength by ½ grade in 4 weeks MET  Pt will be able to work for 2 hours without pain in 4 weeks NOT YET MET     Long Term Goals:  Pt will be independent with HEP in 8 weeks MET  Pt will be functional with all ADL's in 8 weeks NOT YET MET  Pt will achieve their predicted FOTO score in 8 weeks NOT YET MET  Pt will be able to paddle board on the river for 3 hours without pain in 8 weeks NOT YET MET          Plan  Patient would benefit from: skilled physical therapy  Referral necessary: No  Planned modality interventions: low level laser therapy     Planned therapy interventions: abdominal trunk stabilization, IASTM, joint mobilization, manual therapy, nerve gliding, neuromuscular re-education, balance, balance/weight bearing training, body mechanics training, breathing training, patient education, postural training, strengthening, stretching, therapeutic activities, therapeutic exercise, functional ROM exercises, gait training, graded activity, graded exercise and home exercise program     Frequency: 2x week  Duration in weeks: 12  Plan of Care beginning date: 8/20/2024  Plan of Care expiration date: 11/12/2024  Treatment plan discussed with: patient     Precautions: None    Manuals 9/6 9/10 9/13 9/17         Sidelying scapula mobilizations elevation, depression, protrusion. retraction  3' 3' 3'         GHJ mobilization PA and inf Gr IV, PA pain, Inf 3x30 Gr IV, PA pain, Inf 3x30 Gr IV, PA pain, Inf 3x30 Gr IV, PA pain, Inf 3x30         LUT graston 8'  8' 8'         L shoulder PROM 4' 4' 4' 4'         L hip PROM 4'            Prone PA lumbar spine gr IV mobs L1-S2             Supine hip ER/IR MWM lateral glide             Standing hip flexion MWM lateral glide             Sidelying and prone MET R hip flex, L hip ext 10x5\" ea 10x5\" ea 10x5\" ea 10x5\" ea         Neuro Re-Ed             Shoulder taps             Supine manual perturbations             Serratus table push ups    " "         Wall alphabet 6\" ball             Shoulder flexed above 90 hand on wall: scap retraction depression, elbow extension with push into wall  1x10           Same as above followed by serratus wall slide  1x10           Clamshell             Standing hip abduction GTB 3x10 GTB 3x10 GTB 3x10 GTB 3x10         Coppenhagen side plank             SL bridge             90/90 hip switch             Hip CARs 1x10            Ther Ex             Rows    Blue 3x10         B/l low trap ER Blue 3x10  Blue 3x10 Blue 3x10         TB ER/IR Blue 3x10 Blue 3x10 Blue 3x10 Blue 3x10         Supine thoracic ext             PPU             Aleisha pose rock back with hips ER                                       Ther Activity                                       Gait Training                                       Modalities                                                  "

## 2024-09-17 NOTE — PSYCH
"Behavioral Health Psychotherapy Progress Note    Psychotherapy Provided: Group Therapy    1. Eating disorder, unspecified type          Goals addressed in session: Goal 1     DATA: Raghav arrived for the recovery from disorganized eating group. The facilitators discussed an ice breaker in the beginning of the session. Discussed in the group was the homework from last week exploring patterns regarding journaling their moods, foods, sleep, and tasks daily. Raghav noted that grocery shopping is overwhelming for her and that she wants to make a budget to help her grocery shop more effectively/healthily.  The topics discussed were how to improve the patient's relationship with food. Patient was able to identity where they would like to improve their relationship and steps, they will take to address this within the next two weeks.   During this session, this clinician used the following therapeutic modalities: Client-centered Therapy and Supportive Psychotherapy    Substance Abuse was not addressed during this session. If the client is diagnosed with a co-occurring substance use disorder, please indicate any changes in the frequency or amount of use: n/a. Stage of change for addressing substance use diagnoses: No substance use/Not applicable    ASSESSMENT:  Raghav Smith presents with a Depressed mood. her affect is Normal range and intensity, which is congruent, with her mood and the content of the session. The client has not made progress on their goals. Raghav actively participated in the group and was receptive to suggestions from group members about using a smaller plate and making mealtimes more of a ritual with company. Raghav Smith presents with a minimal risk of suicide, minimal risk of self-harm, and none risk of harm to others.    For any risk assessment that surpasses a \"low\" rating, a safety plan must be developed.    A safety plan was indicated: no  If yes, describe in detail n/a    PLAN: " Between sessions, Raghav Smith will complete assigned homework. At the next session, the therapist will use Client-centered Therapy and Supportive Psychotherapy to address her disordered eating.    Behavioral Health Treatment Plan and Discharge Planning: Raghav Smith is aware of and agrees to continue to work on their treatment plan. They have identified and are working toward their discharge goals. yes    Visit start and stop times:    09/16/24  Start Time: 1500  Stop Time: 1600  Total Visit Time: 60 minutes

## 2024-09-18 ENCOUNTER — SOCIAL WORK (OUTPATIENT)
Dept: BEHAVIORAL/MENTAL HEALTH CLINIC | Facility: CLINIC | Age: 29
End: 2024-09-18
Payer: COMMERCIAL

## 2024-09-18 DIAGNOSIS — F43.10 PTSD (POST-TRAUMATIC STRESS DISORDER): Primary | ICD-10-CM

## 2024-09-18 DIAGNOSIS — F31.81 BIPOLAR 2 DISORDER (HCC): ICD-10-CM

## 2024-09-18 PROCEDURE — 90834 PSYTX W PT 45 MINUTES: CPT | Performed by: COUNSELOR

## 2024-09-18 NOTE — PSYCH
"Behavioral Health Psychotherapy Progress Note    Psychotherapy Provided: Individual Psychotherapy     1. PTSD (post-traumatic stress disorder)        2. Bipolar 2 disorder (HCC)            Goals addressed in session: Goal 1     DATA: Consider referral to Dr. West. Asked her to experiment with thought about \"I deserve to be harmed\" and allowing herself to be loved. Saw Candie. Was called \"fatty\" by parents. Has excellent physical therapist, reduced pain, cleaned house. 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 \"I felt like I was trying to make things easier for me, was my goal\". She is to continue found solution of brush teeth, take medication, eat breakfast,  get dressed and care for hair (We are calling this cabinet stuff). People that say they love me or care about me but their actions don't show that, I take that as my fault - that's what I need to work on\". Continue discussion about how her mother was emotionally dysregulated. Father made her feel like she didn't exist or was a nuisance.         Strong focus today on Sunny's symptoms of depression and importance of slowly getting back into her routine that she did find to be helpful. Focused on her thought experiments and adjusting how she talks to herself. Explored changes she can make in her life to improve her mood from a behavioral perspective.     During this session, this clinician used the following therapeutic modalities: Client-centered Therapy, Cognitive Behavioral Therapy, and Existential Therapy    Substance Abuse was not addressed during this session. If the client is diagnosed with a co-occurring substance use disorder, please indicate any changes in the frequency or amount of use: n/a. Stage of change for addressing substance use diagnoses: No substance use/Not applicable    ASSESSMENT:  Raghav Smith presents with a Euthymic/ normal mood.     her affect is Normal range and intensity, which is congruent, with her mood and the content of the " "session. The client has made progress on their goals.     Raghav Smith presents with a none risk of suicide, none risk of self-harm, and none risk of harm to others.    For any risk assessment that surpasses a \"low\" rating, a safety plan must be developed.    A safety plan was indicated: no  If yes, describe in detail n/a    PLAN: Between sessions, Raghav Smith will continue to engage with friends as needed. At the next session, the therapist will use Client-centered Therapy, Cognitive Behavioral Therapy, and Existential Therapy to address depression.    Behavioral Health Treatment Plan and Discharge Planning: Raghav Smith is aware of and agrees to continue to work on their treatment plan. They have identified and are working toward their discharge goals. yes    Visit start and stop times:    09/18/24  Start Time: 1320  Stop Time: 1400  Total Visit Time: 40 minutes  "

## 2024-09-23 NOTE — PROGRESS NOTES
"Daily Note     Today's date: 2024  Patient name: Raghav Smith  : 1995  MRN: [id number]  Referring provider: Linda Rao CRNP  Dx:   Encounter Diagnosis     ICD-10-CM    1. Left hip pain  M25.552       2. Chronic left shoulder pain  M25.512     G89.29             Start Time: 1620  Stop Time: 1658  Total time in clinic (min): 38 minutes    Subjective: Raghav reports feeling improved overall since starting PT. States does not think about the shoulder often at all anymore. Hip feeling better as well. Raghav states would like to progress again so she can return to activities including rock climbing, paddle boarding and lifting weights.       Objective: See treatment diary below    Assessment: Bindu responded to exercise progressions well, noting appropriate levels of fatigue with each. Pt improved breathing/core activation during farmers carries with verbal cues. Pt demonstrates hip mobility R>L leading to a pinching in the front L hip during child's pose. Added deep lunge to address hip flexion mobility in pain free range. Pt requires skilled PT intervention to maximize ADLs/restore PLOF.    Plan: Continue POC as tolerated. Will focus on additional or modified exercises for return to rock climbing over next few visits.      Precautions: None    Manuals 9/6 9/10 9/13 9/17 9/24        Sidelying scapula mobilizations elevation, depression, protrusion. retraction  3' 3' 3'         GHJ mobilization PA and inf Gr IV, PA pain, Inf 3x30 Gr IV, PA pain, Inf 3x30 Gr IV, PA pain, Inf 3x30 Gr IV, PA pain, Inf 3x30         LUT graston 8'  8' 8' 8'        L shoulder PROM 4' 4' 4' 4'         L hip PROM 4'            Prone PA lumbar spine gr IV mobs L1-S2             Supine hip ER/IR MWM lateral glide             Standing hip flexion MWM lateral glide             Sidelying and prone MET R hip flex, L hip ext 10x5\" ea 10x5\" ea 10x5\" ea 10x5\" ea         Neuro Re-Ed             Rivers Carries     20#  DB ea 3x " "2laps         Lat Pull down      3x10 8#        Hip CARS 1x10    1x10        Shoulder taps             Supine manual perturbations             Serratus table push ups             Wall alphabet 6\" ball             Shoulder flexed above 90 hand on wall: scap retraction depression, elbow extension with push into wall  1x10           Same as above followed by serratus wall slide  1x10           Standing hip abduction GTB 3x10 GTB 3x10 GTB 3x10 GTB 3x10         Coppenhagen side plank             SL bridge             90/90 hip switch             Ther Ex             90/90 Switch IR/ER     5x10\"ea        Butterfly     10x10\"        Deep lunge + contralateral reach     10x10\" ea        Rows    Blue 3x10 Tripod KB 15# 3x8        Child's Pose     1x5\" - nv p!        B/l low trap ER Blue 3x10  Blue 3x10 Blue 3x10         TB ER/IR Blue 3x10 Blue 3x10 Blue 3x10 Blue 3x10         Supine thoracic ext             PPU                                       Ther Activity                                       Gait Training                                       Modalities                                                  "

## 2024-09-24 ENCOUNTER — OFFICE VISIT (OUTPATIENT)
Dept: PHYSICAL THERAPY | Facility: CLINIC | Age: 29
End: 2024-09-24
Payer: COMMERCIAL

## 2024-09-24 DIAGNOSIS — G89.29 CHRONIC LEFT SHOULDER PAIN: ICD-10-CM

## 2024-09-24 DIAGNOSIS — M25.512 CHRONIC LEFT SHOULDER PAIN: ICD-10-CM

## 2024-09-24 DIAGNOSIS — M25.552 LEFT HIP PAIN: Primary | ICD-10-CM

## 2024-09-24 PROCEDURE — 97140 MANUAL THERAPY 1/> REGIONS: CPT

## 2024-09-24 PROCEDURE — 97112 NEUROMUSCULAR REEDUCATION: CPT

## 2024-09-24 PROCEDURE — 97110 THERAPEUTIC EXERCISES: CPT

## 2024-09-25 ENCOUNTER — SOCIAL WORK (OUTPATIENT)
Dept: BEHAVIORAL/MENTAL HEALTH CLINIC | Facility: CLINIC | Age: 29
End: 2024-09-25
Payer: COMMERCIAL

## 2024-09-25 DIAGNOSIS — F43.10 PTSD (POST-TRAUMATIC STRESS DISORDER): Primary | ICD-10-CM

## 2024-09-25 PROCEDURE — 90834 PSYTX W PT 45 MINUTES: CPT | Performed by: COUNSELOR

## 2024-09-25 NOTE — PSYCH
"Behavioral Health Psychotherapy Progress Note    Psychotherapy Provided: Individual Psychotherapy     1. PTSD (post-traumatic stress disorder)            Goals addressed in session: Goal 1     DATA: Consider referral to Dr. West. Asked her to experiment with thought about \"I deserve to be harmed\" and allowing herself to be loved. Saw Candie. Was called \"fatty\" by parents. Has excellent physical therapist, reduced pain, cleaned house. 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 \"I felt like I was trying to make things easier for me, was my goal\". She is to continue found solution of brush teeth, take medication, eat breakfast,  get dressed and care for hair (We are calling this cabinet stuff). People that say they love me or care about me but their actions don't show that, I take that as my fault - that's what I need to work on\". Continue discussion about how her mother was emotionally dysregulated. Father made her feel like she didn't exist or was a nuisance.         Focused today on checking in with her regarding her symptoms of depression. Discussed her issues with allowing herself to be loved. Focused on her giving herself time to take care of herself at home, giving herself lara, allowing herself to be loved. Explored her issues with attempting to safely find a sexual partner.     During this session, this clinician used the following therapeutic modalities: Client-centered Therapy, Cognitive Behavioral Therapy, and Existential Therapy    Substance Abuse was not addressed during this session. If the client is diagnosed with a co-occurring substance use disorder, please indicate any changes in the frequency or amount of use: n/a. Stage of change for addressing substance use diagnoses: No substance use/Not applicable    ASSESSMENT:  Raghav Smith presents with a Euthymic/ normal mood.     her affect is Normal range and intensity, which is congruent, with her mood and the content of the session. The client has made " "progress on their goals.     Raghav Smith presents with a none risk of suicide, none risk of self-harm, and none risk of harm to others.    For any risk assessment that surpasses a \"low\" rating, a safety plan must be developed.    A safety plan was indicated: no  If yes, describe in detail n/a    PLAN: Between sessions, Raghav Smith will engage with family fully, give herself lara. At the next session, the therapist will use Client-centered Therapy, Cognitive Behavioral Therapy, and Existential Therapy to address depression.    Behavioral Health Treatment Plan and Discharge Planning: Raghav Smith is aware of and agrees to continue to work on their treatment plan. They have identified and are working toward their discharge goals. yes    Visit start and stop times:    09/25/24  Start Time: 1315  Stop Time: 1400  Total Visit Time: 45 minutes  "

## 2024-09-27 ENCOUNTER — OFFICE VISIT (OUTPATIENT)
Dept: PHYSICAL THERAPY | Facility: CLINIC | Age: 29
End: 2024-09-27
Payer: COMMERCIAL

## 2024-09-27 DIAGNOSIS — M25.512 CHRONIC LEFT SHOULDER PAIN: ICD-10-CM

## 2024-09-27 DIAGNOSIS — G89.29 CHRONIC LEFT SHOULDER PAIN: ICD-10-CM

## 2024-09-27 DIAGNOSIS — M25.552 LEFT HIP PAIN: Primary | ICD-10-CM

## 2024-09-27 PROCEDURE — 97112 NEUROMUSCULAR REEDUCATION: CPT

## 2024-09-27 PROCEDURE — 97110 THERAPEUTIC EXERCISES: CPT

## 2024-09-27 NOTE — PROGRESS NOTES
"Daily Note     Today's date: 2024  Patient name: Raghav Smith  : 1995  MRN: [id number]  Referring provider: Linda Rao CRNP  Dx:   Encounter Diagnosis     ICD-10-CM    1. Left hip pain  M25.552       2. Chronic left shoulder pain  M25.512     G89.29           Start Time: 1450  Stop Time: 1550  Total time in clinic (min): 60 minutes    Subjective: Patient reports 3 occurences of L sided SI pain since last visit. Notes overall having soreness following last visit. Notes L shoulder discomfort with OH movement.     Objective: See treatment diary below    Assessment: Rhomboid/middle trap muscle tone noted during IASTM. Good form with moderate tactile cueing for proper muscular activation. Pt requires skilled PT intervention to maximize ADLs/restore PLOF.    Plan: Continue POC as tolerated. Will focus on additional or modified exercises for return to rock climbing over next few visits.      Precautions: None    Manuals 9/6 9/10 9/13 9/17 9/24 9/27       Sidelying scapula mobilizations elevation, depression, protrusion. retraction  3' 3' 3'         GHJ mobilization PA and inf Gr IV, PA pain, Inf 3x30 Gr IV, PA pain, Inf 3x30 Gr IV, PA pain, Inf 3x30 Gr IV, PA pain, Inf 3x30         LUT graston 8'  8' 8' 8' 8'       L shoulder PROM 4' 4' 4' 4'         L hip PROM 4'            Prone PA lumbar spine gr IV mobs L1-S2             Supine hip ER/IR MWM lateral glide             Standing hip flexion MWM lateral glide             Sidelying and prone MET R hip flex, L hip ext 10x5\" ea 10x5\" ea 10x5\" ea 10x5\" ea         Neuro Re-Ed             Rivers Carries     20#  DB ea 3x 2laps  20#  DB ea 3x   2 laps        Lat Pull down      3x10 8# 3x8  40#       Hip CARS 1x10    1x10        Shoulder taps             Supine manual perturbations             Serratus table push ups             Wall alphabet 6\" ball             Shoulder flexed above 90 hand on wall: scap retraction depression, elbow extension with push into " "wall  1x10           Same as above followed by serratus wall slide  1x10           Standing hip abduction GTB 3x10 GTB 3x10 GTB 3x10 GTB 3x10         Coppenhagen side plank             SL bridge             90/90 hip switch             Ther Ex             90/90 Switch IR/ER     5x10\"ea 5x10\"ea       Butterfly     10x10\" 10x10\" ea       Deep lunge + contralateral reach     10x10\" ea 10x10\" ea       Rows    Blue 3x10 Tripod KB 15# 3x8 Tripod KB 15# 3x8       Child's Pose     1x5\" - nv p!        B/l low trap ER Blue 3x10  Blue 3x10 Blue 3x10         TB ER/IR Blue 3x10 Blue 3x10 Blue 3x10 Blue 3x10         Supine thoracic ext             PPU                                       Ther Activity                                       Gait Training                                       Modalities                                          " Patient/Mother

## 2024-09-30 ENCOUNTER — OFFICE VISIT (OUTPATIENT)
Dept: BEHAVIORAL/MENTAL HEALTH CLINIC | Facility: CLINIC | Age: 29
End: 2024-09-30
Payer: COMMERCIAL

## 2024-09-30 DIAGNOSIS — F50.9 EATING DISORDER, UNSPECIFIED TYPE: Primary | ICD-10-CM

## 2024-09-30 PROCEDURE — 90853 GROUP PSYCHOTHERAPY: CPT

## 2024-10-02 ENCOUNTER — SOCIAL WORK (OUTPATIENT)
Dept: BEHAVIORAL/MENTAL HEALTH CLINIC | Facility: CLINIC | Age: 29
End: 2024-10-02
Payer: COMMERCIAL

## 2024-10-02 ENCOUNTER — OFFICE VISIT (OUTPATIENT)
Dept: PHYSICAL THERAPY | Facility: CLINIC | Age: 29
End: 2024-10-02
Payer: COMMERCIAL

## 2024-10-02 ENCOUNTER — TELEPHONE (OUTPATIENT)
Dept: BEHAVIORAL/MENTAL HEALTH CLINIC | Facility: CLINIC | Age: 29
End: 2024-10-02

## 2024-10-02 DIAGNOSIS — G89.29 CHRONIC LEFT SHOULDER PAIN: ICD-10-CM

## 2024-10-02 DIAGNOSIS — M25.512 CHRONIC LEFT SHOULDER PAIN: ICD-10-CM

## 2024-10-02 DIAGNOSIS — F43.10 PTSD (POST-TRAUMATIC STRESS DISORDER): Primary | ICD-10-CM

## 2024-10-02 DIAGNOSIS — M25.552 LEFT HIP PAIN: Primary | ICD-10-CM

## 2024-10-02 PROCEDURE — 97112 NEUROMUSCULAR REEDUCATION: CPT

## 2024-10-02 PROCEDURE — 90834 PSYTX W PT 45 MINUTES: CPT | Performed by: COUNSELOR

## 2024-10-02 PROCEDURE — 97140 MANUAL THERAPY 1/> REGIONS: CPT

## 2024-10-02 NOTE — TELEPHONE ENCOUNTER
Patient was in office today for an appt and told writer she wanted to be BENY'd from her med management provider from [facility] to Dr Delgadillo in [place] Office.    Please reach and follow up.

## 2024-10-02 NOTE — PSYCH
"Behavioral Health Psychotherapy Progress Note    Psychotherapy Provided: Individual Psychotherapy     1. PTSD (post-traumatic stress disorder)            Goals addressed in session: Goal 1     DATA: Refer to Kathi (if available) and consider ketamine referral. Asked her to experiment with thought about \"I deserve to be harmed\" and allowing herself to be loved. Saw Candie. Was called \"fatty\" by parents. Has excellent physical therapist, reduced pain, cleaned house. 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 \"I felt like I was trying to make things easier for me, was my goal\". She is to continue found solution of brush teeth, take medication, eat breakfast,  get dressed and care for hair (We are calling this cabinet stuff). People that say they love me or care about me but their actions don't show that, I take that as my fault - that's what I need to work on\". Continue discussion about how her mother was emotionally dysregulated. Father made her feel like she didn't exist or was a nuisance.         She reports that she has been doing well with her movement toward recovery from her shoulder and hip pain. Assisted her with completing some paperwork for her disability, as she was challenged in completing it. Discussed this writer leaving [facility] and what her options are moving forward, supporting her during the transition.     During this session, this clinician used the following therapeutic modalities: Client-centered Therapy, Cognitive Behavioral Therapy, and Existential Therapy    Substance Abuse was not addressed during this session. If the client is diagnosed with a co-occurring substance use disorder, please indicate any changes in the frequency or amount of use: n/a. Stage of change for addressing substance use diagnoses: No substance use/Not applicable    ASSESSMENT:  Raghav Smith presents with a Euthymic/ normal mood.     her affect is Normal range and intensity, which is congruent, with her mood and the " "content of the session. The client has made progress on their goals.     Raghav Smith presents with a none risk of suicide, none risk of self-harm, and none risk of harm to others.    For any risk assessment that surpasses a \"low\" rating, a safety plan must be developed.    A safety plan was indicated: no  If yes, describe in detail n/a    PLAN: Between sessions, Raghav Smith will continue work on her depression through making behavioral changes like doing her \"cabinet stuff\". At the next session, the therapist will use Client-centered Therapy, Cognitive Behavioral Therapy, and Existential Therapy to address depression.    Behavioral Health Treatment Plan and Discharge Planning: Raghav Smith is aware of and agrees to continue to work on their treatment plan. They have identified and are working toward their discharge goals. yes    Visit start and stop times:    10/02/24  Start Time: 1310  Stop Time: 1400  Total Visit Time: 50 minutes  "

## 2024-10-02 NOTE — PROGRESS NOTES
"Daily Note     Today's date: 10/2/2024  Patient name: Raghav Smith  : 1995  MRN: [id number]  Referring provider: Linda Rao CRNP  Dx:   Encounter Diagnosis     ICD-10-CM    1. Left hip pain  M25.552       2. Chronic left shoulder pain  M25.512     G89.29                        Subjective: Raghav reports overall she continues to feel a little better. Her hips are bothering her a bit more today. She has a pinching sensation.    Objective: See treatment diary below    Assessment: Limited hip mobility continues to be the most prominent impairment. She did have 5/5 on lateral step down test indicating impaired neuromuscular control. Long axis distraction improved pinching sensation with passive hip flexion ROM. Continue to progress as able.     Plan: Continue POC as tolerated. Will focus on additional or modified exercises for return to rock climbing over next few visits.      Precautions: None    Manuals 9/6 9/10 9/13 9/17 9/24 9/27 10/2      Sidelying scapula mobilizations elevation, depression, protrusion. retraction  3' 3' 3'         GHJ mobilization PA and inf Gr IV, PA pain, Inf 3x30 Gr IV, PA pain, Inf 3x30 Gr IV, PA pain, Inf 3x30 Gr IV, PA pain, Inf 3x30         LUT graston 8'  8' 8' 8' 8'       L shoulder PROM 4' 4' 4' 4'         L hip PROM 4'      4'      L hip long axis distraction       4x30\"      Standing hip flexion MWM lateral glide             Sidelying and prone MET R hip flex, L hip ext 10x5\" ea 10x5\" ea 10x5\" ea 10x5\" ea         Neuro Re-Ed             Rivers Carries     20#  DB ea 3x 2laps  20#  DB ea 3x   2 laps        Lat Pull down      3x10 8# 3x8  40#       Hip CARS 1x10    1x10  1x10      Standing hip abduction GTB 3x10 GTB 3x10 GTB 3x10 GTB 3x10  GTB 3x10 GTB 3x10      Coppenhagen side plank             SL bridge                          Ther Ex             90/90 Switch IR/ER     5x10\"ea 5x10\"ea       Deep lunge + contralateral reach     10x10\" ea 10x10\" ea 10x10\" ea      Rows "    Blue 3x10 Tripod KB 15# 3x8 Tripod KB 15# 3x8       B/l low trap ER Blue 3x10  Blue 3x10 Blue 3x10         TB ER/IR Blue 3x10 Blue 3x10 Blue 3x10 Blue 3x10                                                             Ther Activity                                       Gait Training                                       Modalities

## 2024-10-04 ENCOUNTER — OFFICE VISIT (OUTPATIENT)
Dept: PHYSICAL THERAPY | Facility: CLINIC | Age: 29
End: 2024-10-04
Payer: COMMERCIAL

## 2024-10-04 DIAGNOSIS — M25.552 LEFT HIP PAIN: Primary | ICD-10-CM

## 2024-10-04 DIAGNOSIS — G89.29 CHRONIC LEFT SHOULDER PAIN: ICD-10-CM

## 2024-10-04 DIAGNOSIS — M25.512 CHRONIC LEFT SHOULDER PAIN: ICD-10-CM

## 2024-10-04 PROCEDURE — 97112 NEUROMUSCULAR REEDUCATION: CPT

## 2024-10-04 PROCEDURE — 97140 MANUAL THERAPY 1/> REGIONS: CPT

## 2024-10-04 NOTE — PROGRESS NOTES
"Daily Note     Today's date: 10/4/2024  Patient name: Raghav Smith  : 1995  MRN: [id number]  Referring provider: iLnda Rao CRNP  Dx:   Encounter Diagnosis     ICD-10-CM    1. Left hip pain  M25.552       2. Chronic left shoulder pain  M25.512     G89.29         Start Time: 0845  Stop Time: 0930  Total time in clinic (min): 45 minutes    Subjective: Raghav notes increased pain following last visit. Notes no issue with her L shoulder or low back. Notes L hip pinching in the front.     Objective: See treatment diary below    Assessment: Improved pinching sensation with FF hip AROM following hip extension PROM/stretching. Assess response NV. No irritation with standing hip abduction and slight irritation with clamshells. Continue to progress as able.     Plan: Continue POC as tolerated. Will focus on additional or modified exercises for return to rock climbing over next few visits.      Precautions: None    Manuals 9/6 9/10 9/13 9/17 9/24 9/27 10/2 10/4     Sidelying scapula mobilizations elevation, depression, protrusion. retraction  3' 3' 3'         GHJ mobilization PA and inf Gr IV, PA pain, Inf 3x30 Gr IV, PA pain, Inf 3x30 Gr IV, PA pain, Inf 3x30 Gr IV, PA pain, Inf 3x30         LUT graston 8'  8' 8' 8' 8'       L shoulder PROM 4' 4' 4' 4'         L hip PROM 4'      4' 8'     L hip long axis distraction       4x30\"      Standing hip flexion MWM lateral glide             Sidelying and prone MET R hip flex, L hip ext 10x5\" ea 10x5\" ea 10x5\" ea 10x5\" ea         Neuro Re-Ed             Rivers Carries     20#  DB ea 3x 2laps  20#  DB ea 3x   2 laps        Lat Pull down      3x10 8# 3x8  40#  3x10  30#     Hip CARS 1x10    1x10  1x10      Standing hip abduction GTB 3x10 GTB 3x10 GTB 3x10 GTB 3x10  GTB 3x10 GTB 3x10 GTB  3x10     Coppenhagen side plank             Clamshell        RTB  3x10                  Ther Ex             90/90 Switch IR/ER     5x10\"ea 5x10\"ea       Deep lunge + contralateral " "reach     10x10\" ea 10x10\" ea 10x10\" ea      Rows    Blue 3x10 Tripod KB 15# 3x8 Tripod KB 15# 3x8  Tripod KB 15# 3x10     B/l low trap ER Blue 3x10  Blue 3x10 Blue 3x10         TB ER/IR Blue 3x10 Blue 3x10 Blue 3x10 Blue 3x10                                                             Ther Activity                                       Gait Training                                       Modalities                                          "

## 2024-10-04 NOTE — PSYCH
"Behavioral Health Psychotherapy Progress Note    Psychotherapy Provided: Group Therapy    1. Eating disorder, unspecified type          Goals addressed in session: Goal 1     DATA: Raghav arrived for her group session today. Topics for discussion in the group were the article, It's not about a diet, but building a healthy relationship with food. The participants were able to process and identify what they gained from the article and what resonated with them. We moved into discussion regarding diets and what kind of diets the participants have tried. The group was able to reflect upon their feelings surrounding diet, diet culture, and the media in the way diets are portrayed.   During this session, this clinician used the following therapeutic modalities: Client-centered Therapy and Supportive Psychotherapy    Substance Abuse was not addressed during this session. If the client is diagnosed with a co-occurring substance use disorder, please indicate any changes in the frequency or amount of use: n/a. Stage of change for addressing substance use diagnoses: No substance use/Not applicable    ASSESSMENT:  Raghav Smith presents with a Euthymic/ normal mood. her affect is Normal range and intensity, which is congruent, with her mood and the content of the session. The client has made progress on their goals. Raghav shared her thoughts on diets in group and shared some personal experiences. She and other group members discussed the idea of \"pretty privilege\" in society and how that impacts themselves and diet culture. Raghav Smith presents with a none risk of suicide, none risk of self-harm, and none risk of harm to others.    For any risk assessment that surpasses a \"low\" rating, a safety plan must be developed.    A safety plan was indicated: no  If yes, describe in detail n/a    PLAN: Between sessions, Raghav Smith will explore what a sensible diet is to her. At the next session, the therapist " will use Client-centered Therapy and Supportive Psychotherapy to address her eating disorder.    Behavioral Health Treatment Plan and Discharge Planning: Raghav Smith is aware of and agrees to continue to work on their treatment plan. They have identified and are working toward their discharge goals. yes    Visit start and stop times:    09/30/24  Start Time: 1500  Stop Time: 1600  Total Visit Time: 60 minutes

## 2024-10-08 ENCOUNTER — TELEPHONE (OUTPATIENT)
Dept: PSYCHIATRY | Facility: CLINIC | Age: 29
End: 2024-10-08

## 2024-10-09 ENCOUNTER — OFFICE VISIT (OUTPATIENT)
Dept: PHYSICAL THERAPY | Facility: CLINIC | Age: 29
End: 2024-10-09
Payer: COMMERCIAL

## 2024-10-09 ENCOUNTER — TELEMEDICINE (OUTPATIENT)
Dept: BEHAVIORAL/MENTAL HEALTH CLINIC | Facility: CLINIC | Age: 29
End: 2024-10-09
Payer: COMMERCIAL

## 2024-10-09 DIAGNOSIS — M25.512 CHRONIC LEFT SHOULDER PAIN: ICD-10-CM

## 2024-10-09 DIAGNOSIS — F43.10 PTSD (POST-TRAUMATIC STRESS DISORDER): Primary | ICD-10-CM

## 2024-10-09 DIAGNOSIS — M25.552 LEFT HIP PAIN: Primary | ICD-10-CM

## 2024-10-09 DIAGNOSIS — F50.9 EATING DISORDER, UNSPECIFIED TYPE: ICD-10-CM

## 2024-10-09 DIAGNOSIS — G89.29 CHRONIC LEFT SHOULDER PAIN: ICD-10-CM

## 2024-10-09 PROCEDURE — 97112 NEUROMUSCULAR REEDUCATION: CPT

## 2024-10-09 PROCEDURE — 90834 PSYTX W PT 45 MINUTES: CPT | Performed by: COUNSELOR

## 2024-10-09 PROCEDURE — 97140 MANUAL THERAPY 1/> REGIONS: CPT

## 2024-10-09 NOTE — PSYCH
Virtual Regular Visit    Verification of patient location:    Patient is located at Home in the following state in which I hold an active license [place]      Assessment/Plan:    Problem List Items Addressed This Visit       PTSD (post-traumatic stress disorder) - Primary     Other Visit Diagnoses       Eating disorder, unspecified type                Goals addressed in session: Goal 1          Reason for visit is   Chief Complaint   Patient presents with    Virtual Regular Visit          Encounter provider Sunil Ruiz      Recent Visits  Date Type Provider Dept   10/08/24 Telephone Sunil Ruiz [facility] [place]   10/02/24 Telephone Sunil Ruiz [facility] [place]   Showing recent visits within past 7 days and meeting all other requirements  Today's Visits  Date Type Provider Dept   10/09/24 Telemedicine Sunil Ruiz [facility]rap[facility] [place]   Showing today's visits and meeting all other requirements  Future Appointments  No visits were found meeting these conditions.  Showing future appointments within next 150 days and meeting all other requirements       The patient was identified by name and date of birth. Raghav Smith was informed that this is a telemedicine visit and that the visit is being conducted throughthe Epic Embedded platform. She agrees to proceed..  My office door was closed. No one else was in the room.  She acknowledged consent and understanding of privacy and security of the video platform. The patient has agreed to participate and understands they can discontinue the visit at any time.    Patient is aware this is a billable service.     Subjective  Raghav Smith is a 29 y.o. female  .      HPI     Past Medical History:   Diagnosis Date    Anxiety     Bipolar disorder (HCC)     Borderline personality disorder (HCC)     Depression     Self-injurious behavior        Past Surgical History:   Procedure Laterality Date    CERVICAL  "BIOPSY  W/ LOOP ELECTRODE EXCISION      FL INJECTION LEFT HIP (ARTHROGRAM)  4/7/2021    WISDOM TOOTH EXTRACTION         Current Outpatient Medications   Medication Sig Dispense Refill    albuterol (PROVENTIL HFA,VENTOLIN HFA) 90 mcg/act inhaler Inhale 2 puffs every 6 (six) hours as needed for wheezing or shortness of breath 18 g 1    buPROPion (Wellbutrin XL) 300 mg 24 hr tablet Take 1 tablet (300 mg total) by mouth daily 90 tablet 0    cloNIDine (CATAPRES) 0.1 mg tablet TAKE 1 TABLET BY MOUTH EVERY 12 HOURS 180 tablet 0    dicyclomine (BENTYL) 10 mg capsule Take 1 capsule (10 mg total) by mouth 4 (four) times a day (before meals and at bedtime) 30 capsule 2    ergocalciferol (VITAMIN D2) 50,000 units TAKE 1 CAPSULE BY MOUTH ONE TIME PER WEEK 12 capsule 1    hydrOXYzine HCL (ATARAX) 10 mg tablet Take 1 tablet (10 mg total) by mouth every 6 (six) hours as needed for anxiety 30 tablet 2    lamoTRIgine (LaMICtal) 200 MG tablet TAKE 2 TABLETS BY MOUTH EVERY  tablet 0    ondansetron (ZOFRAN) 4 mg tablet Take 1 tablet (4 mg total) by mouth every 8 (eight) hours as needed for nausea or vomiting 20 tablet 0    valACYclovir (VALTREX) 1,000 mg tablet Take 1 tablet (1,000 mg total) by mouth 2 (two) times a day for 7 days 14 tablet 0     No current facility-administered medications for this visit.        Allergies   Allergen Reactions    Contrast [Iodinated Contrast Media] Sneezing       Review of Systems    Video Exam    There were no vitals filed for this visit.    Physical Exam     Behavioral Health Psychotherapy Progress Note    Psychotherapy Provided: Individual Psychotherapy     1. PTSD (post-traumatic stress disorder)        2. Eating disorder, unspecified type            Goals addressed in session: Goal 1     DATA: Refer to Kathi. \"What would happen if you really loved and nurtured yourself?\" Focus on loving little girl inside herself. Spending time with Julia's friends 7 minutes from her. Asked her to experiment " "with thought about \"I deserve to be harmed\" and allowing herself to be loved. Saw Candie. Was called \"fatty\" by parents. Has excellent physical therapist, reduced pain, cleaned house. Shadowing Aline at salon for nails. \"I felt like I was trying to make things easier for me, was my goal\". She is to continue found solution of brush teeth, take medication, eat breakfast,  get dressed and care for hair (We are calling this cabinet stuff). People that say they love me or care about me but their actions don't show that, I take that as my fault - that's what I need to work on\". Continue discussion about how her mother was emotionally dysregulated. Father made her feel like she didn't exist or was a nuisance.       She reports she's having trouble doing her job due to her pain and lack of motivation. She says she's beating herself up for not using money/gift cards. She does report she has been attempting to have a routine - including waking, eating breakfast, spending time on her porch with her dog and playing her favorite game. Focused on taking these smaller, more manageable steps as being more important than longer leaps forward. Discussed meeting herself where she is at. She says she went to Julia's wedding and she loved it. She says she met new friends at the wedding who live so close to her. She says she is able to feel pleasure while spending time with them. Asked her what others see in her, why so many people love her - \"I'm nice\" \"You dance so good\" \"I listen to them\" \"I'm funny\" \"I'm a very loving person\" \"I'm very caring\" \"Patient\". Asked her to focus on the \"wish\" that she has to go singing/dancing with others, to keep her imagination on a future where she enjoys herself.     During this session, this clinician used the following therapeutic modalities: Client-centered Therapy, Cognitive Behavioral Therapy, and Existential Therapy    Substance Abuse was not addressed during this session. If the client is diagnosed " "with a co-occurring substance use disorder, please indicate any changes in the frequency or amount of use: n/a. Stage of change for addressing substance use diagnoses: No substance use/Not applicable    ASSESSMENT:  Raghav Smith presents with a Euthymic/ normal mood.     her affect is Normal range and intensity, which is congruent, with her mood and the content of the session. The client has made progress on their goals.     Raghav Smith presents with a none risk of suicide, none risk of self-harm, and none risk of harm to others.    For any risk assessment that surpasses a \"low\" rating, a safety plan must be developed.    A safety plan was indicated: no  If yes, describe in detail n/a    PLAN: Between sessions, Raghav Smith will continue to engage with her inner self. At the next session, the therapist will use Client-centered Therapy, Cognitive Behavioral Therapy, and Existential Therapy to address depression.    Behavioral Health Treatment Plan and Discharge Planning: Raghav Smith is aware of and agrees to continue to work on their treatment plan. They have identified and are working toward their discharge goals. yes    Visit start and stop times:    10/09/24  Start Time: 1307  Stop Time: 1355  Total Visit Time: 48 minutes        "

## 2024-10-09 NOTE — PROGRESS NOTES
"Daily Note     Today's date: 10/9/2024  Patient name: Raghav Smith  : 1995  MRN: [id number]  Referring provider: Linda Rao CRNP  Dx:   Encounter Diagnosis     ICD-10-CM    1. Left hip pain  M25.552       2. Chronic left shoulder pain  M25.512     G89.29           Start Time: 1145  Stop Time: 1240  Total time in clinic (min): 55 minutes    Subjective: Raghav reports her L hip is getting better. She notes her neck is bothering her today but thinks it's from how she sleeps.     Objective: See treatment diary below    Assessment: She continues to have improved anterior pinching sensation with FF hip AROM following hip extension PROM/stretching. Great response to current PT POC. Continue to progress as able.     Plan: Continue POC as tolerated. Will focus on additional or modified exercises for return to rock climbing over next few visits.      Precautions: None    Manuals 9/6 9/10 9/13 9/17 9/24 9/27 10/2 10/4 10/9    Sidelying scapula mobilizations elevation, depression, protrusion. retraction  3' 3' 3'         GHJ mobilization PA and inf Gr IV, PA pain, Inf 3x30 Gr IV, PA pain, Inf 3x30 Gr IV, PA pain, Inf 3x30 Gr IV, PA pain, Inf 3x30         LUT graston 8'  8' 8' 8' 8'       L shoulder PROM 4' 4' 4' 4'         L hip PROM 4'      4' 8' 10'    L hip long axis distraction       4x30\"      Standing hip flexion MWM lateral glide             Sidelying and prone MET R hip flex, L hip ext 10x5\" ea 10x5\" ea 10x5\" ea 10x5\" ea         Neuro Re-Ed             Rivers Carries     20#  DB ea 3x 2laps  20#  DB ea 3x   2 laps        Lat Pull down      3x10 8# 3x8  40#  3x10  30# 3x10  30#    Hip CARS 1x10    1x10  1x10      Standing hip abduction GTB 3x10 GTB 3x10 GTB 3x10 GTB 3x10  GTB 3x10 GTB 3x10 GTB  3x10 +ext  GTB  3x10 ea    Coppenhagen side plank             Clamshell        RTB  3x10 RTB  3x10                 Ther Ex             90/90 Switch IR/ER     5x10\"ea 5x10\"ea       Deep lunge + contralateral reach " "    10x10\" ea 10x10\" ea 10x10\" ea      Rows    Blue 3x10 Tripod KB 15# 3x8 Tripod KB 15# 3x8  Tripod KB 15# 3x10 Tripod KB 15# 3x10    B/l low trap ER Blue 3x10  Blue 3x10 Blue 3x10         TB ER/IR Blue 3x10 Blue 3x10 Blue 3x10 Blue 3x10                                                             Ther Activity                                       Gait Training                                       Modalities                                          "

## 2024-10-11 ENCOUNTER — OFFICE VISIT (OUTPATIENT)
Dept: PHYSICAL THERAPY | Facility: CLINIC | Age: 29
End: 2024-10-11
Payer: COMMERCIAL

## 2024-10-11 DIAGNOSIS — G89.29 CHRONIC LEFT SHOULDER PAIN: ICD-10-CM

## 2024-10-11 DIAGNOSIS — M25.552 LEFT HIP PAIN: Primary | ICD-10-CM

## 2024-10-11 DIAGNOSIS — M25.512 CHRONIC LEFT SHOULDER PAIN: ICD-10-CM

## 2024-10-11 PROCEDURE — 97112 NEUROMUSCULAR REEDUCATION: CPT

## 2024-10-11 PROCEDURE — 97140 MANUAL THERAPY 1/> REGIONS: CPT

## 2024-10-11 NOTE — PROGRESS NOTES
"Daily Note     Today's date: 10/11/2024  Patient name: Raghav Smith  : 1995  MRN: [id number]  Referring provider: Linda Rao CRNP  Dx:   Encounter Diagnosis     ICD-10-CM    1. Left hip pain  M25.552       2. Chronic left shoulder pain  M25.512     G89.29                  Subjective: Pt notes that she has been feeling better since last session and that the stretches have been helping.       Objective: See treatment diary below      Assessment: Tolerated treatment well. Pt was able to tolerate deeper passive hip flexion without pain provocation after long axis distraction, PROM/butch stretching. Patient would benefit from continued PT to address current deficits, maximize ADLs/restore PLOF.        Plan: Continue per plan of care.      Precautions: None    Manuals 9/6 9/10 9/13 9/17 9/24 9/27 10/2 10/4 10/9 10/11    Sidelying scapula mobilizations elevation, depression, protrusion. retraction  3' 3' 3'          GHJ mobilization PA and inf Gr IV, PA pain, Inf 3x30 Gr IV, PA pain, Inf 3x30 Gr IV, PA pain, Inf 3x30 Gr IV, PA pain, Inf 3x30          LUT graston 8'  8' 8' 8' 8'        L shoulder PROM 4' 4' 4' 4'          L hip PROM 4'      4' 8' 10' 10'    L hip long axis distraction       4x30\"   5'    Butch S clinician OP          5'    Standing hip flexion MWM lateral glide              Sidelying and prone MET R hip flex, L hip ext 10x5\" ea 10x5\" ea 10x5\" ea 10x5\" ea          Neuro Re-Ed              Rivers Carries     20#  DB ea 3x 2laps  20#  DB ea 3x   2 laps         Lat Pull down      3x10 8# 3x8  40#  3x10  30# 3x10  30# 30# 2x10 40# 1x10    Hip CARS 1x10    1x10  1x10       Coppenhagen side plank              3-way Hip kicks w/ TB          GTB 3x10    Clamshell        RTB  3x10 RTB  3x10 nv                  Ther Ex              90/90 Switch IR/ER     5x10\"ea 5x10\"ea        Deep lunge + contralateral reach     10x10\" ea 10x10\" ea 10x10\" ea       Rows    Blue 3x10 Tripod KB 15# 3x8 Tripod KB 15# 3x8 "  Tripod KB 15# 3x10 Tripod KB 15# 3x10 Tripod KB 15# 3x10    B/l low trap ER Blue 3x10  Blue 3x10 Blue 3x10          TB ER/IR Blue 3x10 Blue 3x10 Blue 3x10 Blue 3x10                                                                  Ther Activity                                          Gait Training                                          Modalities

## 2024-10-14 ENCOUNTER — DOCUMENTATION (OUTPATIENT)
Dept: PSYCHIATRY | Facility: CLINIC | Age: 29
End: 2024-10-14

## 2024-10-14 ENCOUNTER — OFFICE VISIT (OUTPATIENT)
Dept: BEHAVIORAL/MENTAL HEALTH CLINIC | Facility: CLINIC | Age: 29
End: 2024-10-14
Payer: COMMERCIAL

## 2024-10-14 ENCOUNTER — TELEPHONE (OUTPATIENT)
Dept: PSYCHIATRY | Facility: CLINIC | Age: 29
End: 2024-10-14

## 2024-10-14 ENCOUNTER — TELEPHONE (OUTPATIENT)
Dept: BEHAVIORAL/MENTAL HEALTH CLINIC | Facility: CLINIC | Age: 29
End: 2024-10-14

## 2024-10-14 DIAGNOSIS — F43.10 PTSD (POST-TRAUMATIC STRESS DISORDER): ICD-10-CM

## 2024-10-14 DIAGNOSIS — F12.10 CANNABIS ABUSE: ICD-10-CM

## 2024-10-14 DIAGNOSIS — F31.81 BIPOLAR 2 DISORDER (HCC): Primary | ICD-10-CM

## 2024-10-14 DIAGNOSIS — F50.9 EATING DISORDER, UNSPECIFIED TYPE: Primary | ICD-10-CM

## 2024-10-14 DIAGNOSIS — F60.3 BORDERLINE PERSONALITY DISORDER (HCC): ICD-10-CM

## 2024-10-14 PROCEDURE — 90853 GROUP PSYCHOTHERAPY: CPT

## 2024-10-14 NOTE — TELEPHONE ENCOUNTER
LM on Raghav's VM informing her that I was returning her call to discuss her medication concerns.  Requested she call the office when she has a chance for further discussion.
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
## 2024-10-14 NOTE — TELEPHONE ENCOUNTER
Pt was in office today for an appt and asked  if nurses can gave her a callback  since she have a question regarding her medication.      Please follow up.

## 2024-10-16 ENCOUNTER — OFFICE VISIT (OUTPATIENT)
Dept: PHYSICAL THERAPY | Facility: CLINIC | Age: 29
End: 2024-10-16
Payer: COMMERCIAL

## 2024-10-16 ENCOUNTER — SOCIAL WORK (OUTPATIENT)
Dept: BEHAVIORAL/MENTAL HEALTH CLINIC | Facility: CLINIC | Age: 29
End: 2024-10-16
Payer: COMMERCIAL

## 2024-10-16 DIAGNOSIS — F43.10 PTSD (POST-TRAUMATIC STRESS DISORDER): Primary | ICD-10-CM

## 2024-10-16 DIAGNOSIS — G89.29 CHRONIC LEFT SHOULDER PAIN: ICD-10-CM

## 2024-10-16 DIAGNOSIS — M25.512 CHRONIC LEFT SHOULDER PAIN: ICD-10-CM

## 2024-10-16 DIAGNOSIS — M25.552 LEFT HIP PAIN: Primary | ICD-10-CM

## 2024-10-16 DIAGNOSIS — F50.9 EATING DISORDER, UNSPECIFIED TYPE: ICD-10-CM

## 2024-10-16 PROCEDURE — 90834 PSYTX W PT 45 MINUTES: CPT | Performed by: COUNSELOR

## 2024-10-16 PROCEDURE — 97112 NEUROMUSCULAR REEDUCATION: CPT

## 2024-10-16 NOTE — PSYCH
"Behavioral Health Psychotherapy Progress Note    Psychotherapy Provided: Individual Psychotherapy     1. PTSD (post-traumatic stress disorder)        2. Eating disorder, unspecified type            Goals addressed in session: Goal 1     DATA: Refer to Kathi. \"What would happen if you really loved and nurtured yourself?\" Focus on loving little girl inside herself. Spending time with Julia's friends 7 minutes from her. Asked her to experiment with thought about \"I deserve to be harmed\" and allowing herself to be loved. Saw Candie. Was called \"fatty\" by parents. Has excellent physical therapist, reduced pain, cleaned house. 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 \"I felt like I was trying to make things easier for me, was my goal\". She is to continue found solution of brush teeth, take medication, eat breakfast,  get dressed and care for hair (We are calling this cabinet stuff). People that say they love me or care about me but their actions don't show that, I take that as my fault - that's what I need to work on\". Continue discussion about how her mother was emotionally dysregulated. Father made her feel like she didn't exist or was a nuisance.       Focused with Sunny today on her experiences with her body, listening to her body, decision making of head vs gut and psychoeducation.     During this session, this clinician used the following therapeutic modalities: Client-centered Therapy, Cognitive Behavioral Therapy, and Existential Therapy    Substance Abuse was not addressed during this session. If the client is diagnosed with a co-occurring substance use disorder, please indicate any changes in the frequency or amount of use: n/a. Stage of change for addressing substance use diagnoses: No substance use/Not applicable    ASSESSMENT:  Raghav Smith presents with a Euthymic/ normal mood.     her affect is Normal range and intensity, which is congruent, with her mood and the content of the session. The client has made " "progress on their goals.     Raghav Smith presents with a none risk of suicide, none risk of self-harm, and none risk of harm to others.    For any risk assessment that surpasses a \"low\" rating, a safety plan must be developed.    A safety plan was indicated: no  If yes, describe in detail n/a    PLAN: Between sessions, Raghav Smith will continue to engage with her friends. At the next session, the therapist will use Client-centered Therapy, Cognitive Behavioral Therapy, and Existential Therapy to address depression.    Behavioral Health Treatment Plan and Discharge Planning: Raghav Smith is aware of and agrees to continue to work on their treatment plan. They have identified and are working toward their discharge goals. yes    Visit start and stop times:    10/16/24  Start Time: 1302  Stop Time: 1354  Total Visit Time: 52 minutes  "

## 2024-10-16 NOTE — PROGRESS NOTES
"Daily Note     Today's date: 10/16/2024  Patient name: Raghav Smith  : 1995  MRN: [id number]  Referring provider: Linda Rao CRNP  Dx:   Encounter Diagnosis     ICD-10-CM    1. Left hip pain  M25.552       2. Chronic left shoulder pain  M25.512     G89.29           Start Time: 0915  Stop Time: 1000  Total time in clinic (min): 45 minutes    Subjective: Patient states, \"I don't have the pinching sensation today but I've had a constant dull ache in the front of my hip\".     Objective: See treatment diary below    Assessment: Tolerated treatment well. Patient's dull ache abolished following manual stretching. She continues to have limited hip flexion and abd ROM which improves with manual therapy. Good form during assigned TE below with no increased pain or soreness. Patient would benefit from continued PT to address current deficits, maximize ADLs/restore PLOF.    Plan: Continue per plan of care.      Precautions: None    Manuals 10/16     9/27 10/2 10/4 10/9 10/11   Sidelying scapula mobilizations elevation, depression, protrusion. retraction             GHJ mobilization PA and inf             LUT graston      8'       L shoulder PROM             L hip PROM 10'      4' 8' 10' 10'   L hip long axis distraction       4x30\"   5'   Butch S clinician OP 30\"x3         5'   Standing hip flexion MWM lateral glide             Sidelying and prone MET R hip flex, L hip ext             Neuro Re-Ed             Rivers Carries      20#  DB ea 3x   2 laps        Lat Pull down  30# 1x10 40# 2x10     3x8  40#  3x10  30# 3x10  30# 30# 2x10 40# 1x10   Hip CARS       1x10      Coppenhagen side plank             3-way Hip kicks w/ TB GTB  3x10         GTB 3x10   Clamshell RTB  3x10       RTB  3x10 RTB  3x10 nv                Ther Ex             90/90 Switch IR/ER      5x10\"ea       Deep lunge + contralateral reach      10x10\" ea 10x10\" ea      Rows Tripod KB 15# 3x10     Tripod KB 15# 3x8  Tripod KB 15# 3x10 Tripod KB 15# " 3x10 Tripod KB 15# 3x10   B/l low trap ER             TB ER/IR                                                                 Ther Activity                                       Gait Training                                       Modalities

## 2024-10-18 ENCOUNTER — OFFICE VISIT (OUTPATIENT)
Dept: PHYSICAL THERAPY | Facility: CLINIC | Age: 29
End: 2024-10-18
Payer: COMMERCIAL

## 2024-10-18 DIAGNOSIS — M25.552 LEFT HIP PAIN: Primary | ICD-10-CM

## 2024-10-18 DIAGNOSIS — G89.29 CHRONIC LEFT SHOULDER PAIN: ICD-10-CM

## 2024-10-18 DIAGNOSIS — M25.512 CHRONIC LEFT SHOULDER PAIN: ICD-10-CM

## 2024-10-18 PROCEDURE — 97112 NEUROMUSCULAR REEDUCATION: CPT

## 2024-10-18 PROCEDURE — 97140 MANUAL THERAPY 1/> REGIONS: CPT

## 2024-10-18 NOTE — PROGRESS NOTES
"Daily Note     Today's date: 10/18/2024  Patient name: Raghav Smith  : 1995  MRN: [id number]  Referring provider: Linda Rao CRNP  Dx:   Encounter Diagnosis     ICD-10-CM    1. Left hip pain  M25.552       2. Chronic left shoulder pain  M25.512     G89.29             Start Time: 1215  Stop Time: 1300  Total time in clinic (min): 45 minutes    Subjective: Patient reports her L hip felt good for a day and a half following last visit. She notes being able to dance a little bit. Notes tightness and slight pinching today prior to therapy.     Objective: See treatment diary below    Assessment: Tolerated treatment well. She continues to have limited hip flexion which improves with manual therapy. Difficulty during step ups with opposite leg march noting lateral instability and challenged SLS. Patient would benefit from continued PT to address current deficits, maximize ADLs/restore PLOF.    Plan: Continue per plan of care.      Precautions: None    Manuals 10/16 10/18     10/2 10/4 10/9 10/11   Sidelying scapula mobilizations elevation, depression, protrusion. retraction             GHJ mobilization PA and inf             LUT graston             L shoulder PROM             L hip PROM 10' 10'     4' 8' 10' 10'   L hip long axis distraction       4x30\"   5'   Butch S clinician OP 30\"x3         5'   Standing hip flexion MWM lateral glide             Sidelying and prone MET R hip flex, L hip ext             Neuro Re-Ed             Rivers Carries             Lat Pull down  30# 1x10 40# 2x10       3x10  30# 3x10  30# 30# 2x10 40# 1x10   Hip CARS       1x10      Coppenhagen side plank             3-way Hip kicks w/ TB GTB  3x10 GTB  3x10        GTB 3x10   Clamshell RTB  3x10 RTB  3x10      RTB  3x10 RTB  3x10 nv   Step up with opposite march  6\"  1x10           Mini Squat  2x10                        Ther Ex             90/90 Switch IR/ER             Deep lunge + contralateral reach       10x10\" ea      Rows " Tripod KB 15# 3x10       Tripod KB 15# 3x10 Tripod KB 15# 3x10 Tripod KB 15# 3x10   B/l low trap ER             TB ER/IR                                                                 Ther Activity                                       Gait Training                                       Modalities

## 2024-10-18 NOTE — PSYCH
"Behavioral Health Psychotherapy Progress Note    Psychotherapy Provided: Group Therapy    1. Eating disorder, unspecified type          Goals addressed in session: Goal 1     DATA: Raghav arrived for her group session today. The topics of discussion were reviewing the homework of is there such a thing as a sensible diet. The group discussed their relationship with food and how that began to develop from their caregivers in their childhood. Raghav said that her parents would have her and her sister weigh in rather frequently and would display their weights on a chalkboard so that anyone in the house could see them. The group was able to elaborate on how they are managing their relationship with food currently and how that has impacted them from their childhood.    During this session, this clinician used the following therapeutic modalities: Client-centered Therapy and Supportive Psychotherapy    Substance Abuse was not addressed during this session. If the client is diagnosed with a co-occurring substance use disorder, please indicate any changes in the frequency or amount of use: n/a. Stage of change for addressing substance use diagnoses: No substance use/Not applicable    ASSESSMENT:  Raghav Smith presents with a Euthymic/ normal mood. her affect is Normal range and intensity, which is congruent, with her mood and the content of the session. The client has made progress on their goals. Raghav Smith presents with a none risk of suicide, none risk of self-harm, and none risk of harm to others.    For any risk assessment that surpasses a \"low\" rating, a safety plan must be developed.    A safety plan was indicated: no  If yes, describe in detail n/a    PLAN: Between sessions, Raghav Smith will continue exploring her relationship with certain foods. At the next session, the therapist will use Client-centered Therapy and Supportive Psychotherapy to address her disorganized eating " patterns.    Behavioral Health Treatment Plan and Discharge Planning: Raghav Smith is aware of and agrees to continue to work on their treatment plan. They have identified and are working toward their discharge goals. yes    Visit start and stop times:    10/14/24  Start Time: 1500  Stop Time: 1600  Total Visit Time: 60 minutes

## 2024-10-21 ENCOUNTER — OFFICE VISIT (OUTPATIENT)
Dept: BEHAVIORAL/MENTAL HEALTH CLINIC | Facility: CLINIC | Age: 29
End: 2024-10-21
Payer: COMMERCIAL

## 2024-10-21 DIAGNOSIS — F50.9 EATING DISORDER, UNSPECIFIED TYPE: Primary | ICD-10-CM

## 2024-10-21 PROCEDURE — 90853 GROUP PSYCHOTHERAPY: CPT

## 2024-10-21 PROCEDURE — 90837 PSYTX W PT 60 MINUTES: CPT

## 2024-10-22 DIAGNOSIS — B00.1 COLD SORE: ICD-10-CM

## 2024-10-23 ENCOUNTER — OFFICE VISIT (OUTPATIENT)
Dept: PHYSICAL THERAPY | Facility: CLINIC | Age: 29
End: 2024-10-23
Payer: COMMERCIAL

## 2024-10-23 ENCOUNTER — SOCIAL WORK (OUTPATIENT)
Dept: BEHAVIORAL/MENTAL HEALTH CLINIC | Facility: CLINIC | Age: 29
End: 2024-10-23
Payer: COMMERCIAL

## 2024-10-23 DIAGNOSIS — M25.552 LEFT HIP PAIN: Primary | ICD-10-CM

## 2024-10-23 DIAGNOSIS — M25.512 CHRONIC LEFT SHOULDER PAIN: ICD-10-CM

## 2024-10-23 DIAGNOSIS — G89.29 CHRONIC LEFT SHOULDER PAIN: ICD-10-CM

## 2024-10-23 DIAGNOSIS — F43.10 PTSD (POST-TRAUMATIC STRESS DISORDER): Primary | ICD-10-CM

## 2024-10-23 PROCEDURE — 97112 NEUROMUSCULAR REEDUCATION: CPT

## 2024-10-23 PROCEDURE — 90834 PSYTX W PT 45 MINUTES: CPT | Performed by: COUNSELOR

## 2024-10-23 RX ORDER — VALACYCLOVIR HYDROCHLORIDE 1 G/1
1000 TABLET, FILM COATED ORAL 2 TIMES DAILY
Qty: 14 TABLET | Refills: 0 | Status: SHIPPED | OUTPATIENT
Start: 2024-10-23 | End: 2024-10-30

## 2024-10-23 NOTE — PSYCH
"Behavioral Health Psychotherapy Progress Note    Psychotherapy Provided: Group Therapy    No diagnosis found.    Goals addressed in session: Goal 1     DATA: Raghav arrived for her group session today. Topics of discussion in the group were what food is the enemy for each group member, why are we addicted to certain types of food, but not others, emotions/food, and childhood issues. Facilitator led a discussion first on a connection with food and our emotions discussing a specific type of food that is the enemy. The group discussed how easy it is to be addicted to chips or ice cream, but less likely to be addicted to carrots. Facilitator led a discussion surrounding these topics. Briefly the topic of childhood issues were discussed and recognizing the challenges that each member goes through.   During this session, this clinician used the following therapeutic modalities: Client-centered Therapy and Supportive Psychotherapy    Substance Abuse was not addressed during this session. If the client is diagnosed with a co-occurring substance use disorder, please indicate any changes in the frequency or amount of use: n/a. Stage of change for addressing substance use diagnoses: No substance use/Not applicable    ASSESSMENT:  Raghav Smith presents with a Anxious mood. her affect is Normal range and intensity, which is congruent, with her mood and the content of the session. The client has made progress on their goals. Raghav was open during the group, providing feedback to others and sharing her own experiences. Raghav Smith presents with a none risk of suicide, none risk of self-harm, and none risk of harm to others.    For any risk assessment that surpasses a \"low\" rating, a safety plan must be developed.    A safety plan was indicated: no  If yes, describe in detail n/a    PLAN: Between sessions, Raghav Smith will practice eating mindfully. At the next session, the therapist will use " Client-centered Therapy and Supportive Psychotherapy to address her disorganized eating patterns.    Behavioral Health Treatment Plan and Discharge Planning: Raghav Smith is aware of and agrees to continue to work on their treatment plan. They have identified and are working toward their discharge goals. yes    Visit start and stop times:    10/21/24  Start Time: 1505  Stop Time: 1605  Total Visit Time: 60 minutes

## 2024-10-23 NOTE — PROGRESS NOTES
"Daily Note     Today's date: 10/23/2024  Patient name: Raghav Smith  : 1995  MRN: [id number]  Referring provider: Linda Rao CRNP  Dx:   Encounter Diagnosis     ICD-10-CM    1. Left hip pain  M25.552       2. Chronic left shoulder pain  M25.512     G89.29             Start Time: 1145  Stop Time: 1245  Total time in clinic (min): 60 minutes    Subjective: Patient reports, soreness in her L hip over the weekend following a long bike ride (~10 miles). Overall, she notes steady improvement with her hip. She notes her L cervical spine is really bothering her today.     Objective: See treatment diary below    Assessment: Tolerated treatment well. She continues to have limited hip flexion which improves with manual therapy. Able to progress step with opposite leg march which proves improved stability and strength. Progressed TB resistance today. No L hip pain or soreness following therapy. Improved cervical symptoms with slight discomfort lingering. Patient would benefit from continued PT to address current deficits, maximize ADLs/restore PLOF.    Plan: Continue per plan of care.      Precautions: None    Manuals 10/16 10/18 10/23    10/2 10/4 10/9 10/11   Sidelying scapula mobilizations elevation, depression, protrusion. retraction             GHJ mobilization PA and inf             LUT sai   5'          L shoulder PROM             L hip PROM 10' 10' 10'    4' 8' 10' 10'   L hip long axis distraction       4x30\"   5'   Butch S clinician OP 30\"x3         5'   Standing hip flexion MWM lateral glide             Sidelying and prone MET R hip flex, L hip ext             Neuro Re-Ed             Rivers Carries             Lat Pull down  30# 1x10 40# 2x10       3x10  30# 3x10  30# 30# 2x10 40# 1x10   Hip CARS       1x10      Coppenhagen side plank             3-way Hip kicks w/ TB GTB  3x10 GTB  3x10 Blue  3x10       GTB 3x10   Clamshell RTB  3x10 RTB  3x10 GTB  3x10     RTB  3x10 RTB  3x10 nv   Step up with " "opposite march  6\"  1x10 8\"  1x10 ea    Foam  8\"  2x10 ea          Mini Squat  2x10 2x10    10#  2x10                       Ther Ex             90/90 Switch IR/ER             Deep lunge + contralateral reach       10x10\" ea      Rows Tripod KB 15# 3x10       Tripod KB 15# 3x10 Tripod KB 15# 3x10 Tripod KB 15# 3x10   B/l low trap ER             TB ER/IR                                                                 Ther Activity                                       Gait Training                                       Modalities                                          "
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
## 2024-10-25 ENCOUNTER — OFFICE VISIT (OUTPATIENT)
Dept: PHYSICAL THERAPY | Facility: CLINIC | Age: 29
End: 2024-10-25
Payer: COMMERCIAL

## 2024-10-25 DIAGNOSIS — M25.512 CHRONIC LEFT SHOULDER PAIN: ICD-10-CM

## 2024-10-25 DIAGNOSIS — G89.29 CHRONIC LEFT SHOULDER PAIN: ICD-10-CM

## 2024-10-25 DIAGNOSIS — M25.552 LEFT HIP PAIN: Primary | ICD-10-CM

## 2024-10-25 PROCEDURE — 97112 NEUROMUSCULAR REEDUCATION: CPT

## 2024-10-25 NOTE — PROGRESS NOTES
"Daily Note     Today's date: 10/25/2024  Patient name: Raghav Smtih  : 1995  MRN: [id number]  Referring provider: Linda Rao CRNP  Dx:   Encounter Diagnosis     ICD-10-CM    1. Left hip pain  M25.552       2. Chronic left shoulder pain  M25.512     G89.29           Start Time: 1415  Stop Time: 1515  Total time in clinic (min): 60 minutes    Subjective: Patient reports no issues with her L hip since last visit. Notes her L sided neck is better but still uncomfortable. Additionally, she notes her L shoulder movement is limited secondary to tightness.     Objective: See treatment diary below    Assessment: Tolerated treatment well. Diminished L shoulder ROM limitations following scap PROM in all planes. Improved cervical discomfort following manual interventions. No pinching during PROM with good progress in LE strength and stability. Patient would benefit from continued PT to address current deficits, maximize ADLs/restore PLOF.    Plan: Continue per plan of care.      Precautions: None    Manuals 10/16 10/18 10/23 10/25     10/9 10/11   Sidelying scapula mobilizations elevation, depression, protrusion. retraction             GHJ mobilization PA and inf             LUT graston   5' 5'         L shoulder PROM             L hip PROM 10' 10' 10' 10'     10' 10'   L hip long axis distraction          5'   Butch S clinician OP 30\"x3         5'   Standing hip flexion MWM lateral glide             Sidelying and prone MET R hip flex, L hip ext             Neuro Re-Ed             Rivers Carries             Lat Pull down  30# 1x10 40# 2x10   40# 3x10     3x10  30# 30# 2x10 40# 1x10   Hip CARS             Coppenhagen side plank             3-way Hip kicks w/ TB GTB  3x10 GTB  3x10 Blue  3x10 Blue  3x10      GTB 3x10   Clamshell RTB  3x10 RTB  3x10 GTB  3x10 GTB  3x10     RTB  3x10 nv   Step up with opposite \"  1x10 8\"  1x10 ea    Foam  8\"  2x10 ea 8\"  1x10 ea    Foam  8\"  2x10 ea         Mini Squat  2x10 " 2x10    10#  2x10 1x10    10#  1x10    15#  1x10    20#  1x10                      Ther Ex             90/90 Switch IR/ER             Deep lunge + contralateral reach             Rows Tripod KB 15# 3x10        Tripod KB 15# 3x10 Tripod KB 15# 3x10   B/l low trap ER             TB ER/IR                                                                 Ther Activity                                       Gait Training                                       Modalities

## 2024-10-28 ENCOUNTER — OFFICE VISIT (OUTPATIENT)
Dept: BEHAVIORAL/MENTAL HEALTH CLINIC | Facility: CLINIC | Age: 29
End: 2024-10-28
Payer: COMMERCIAL

## 2024-10-28 DIAGNOSIS — F50.9 EATING DISORDER, UNSPECIFIED TYPE: Primary | ICD-10-CM

## 2024-10-28 DIAGNOSIS — F43.10 PTSD (POST-TRAUMATIC STRESS DISORDER): ICD-10-CM

## 2024-10-28 PROCEDURE — 90853 GROUP PSYCHOTHERAPY: CPT

## 2024-10-30 ENCOUNTER — OFFICE VISIT (OUTPATIENT)
Dept: PHYSICAL THERAPY | Facility: CLINIC | Age: 29
End: 2024-10-30
Payer: COMMERCIAL

## 2024-10-30 ENCOUNTER — DOCUMENTATION (OUTPATIENT)
Dept: PSYCHIATRY | Facility: CLINIC | Age: 29
End: 2024-10-30

## 2024-10-30 ENCOUNTER — SOCIAL WORK (OUTPATIENT)
Dept: BEHAVIORAL/MENTAL HEALTH CLINIC | Facility: CLINIC | Age: 29
End: 2024-10-30
Payer: COMMERCIAL

## 2024-10-30 DIAGNOSIS — G89.29 CHRONIC LEFT SHOULDER PAIN: ICD-10-CM

## 2024-10-30 DIAGNOSIS — M25.512 CHRONIC LEFT SHOULDER PAIN: ICD-10-CM

## 2024-10-30 DIAGNOSIS — F43.10 PTSD (POST-TRAUMATIC STRESS DISORDER): Primary | ICD-10-CM

## 2024-10-30 DIAGNOSIS — F33.2 MDD (MAJOR DEPRESSIVE DISORDER), RECURRENT SEVERE, WITHOUT PSYCHOSIS (HCC): ICD-10-CM

## 2024-10-30 DIAGNOSIS — Z86.59 HISTORY OF OCD (OBSESSIVE COMPULSIVE DISORDER): ICD-10-CM

## 2024-10-30 DIAGNOSIS — M25.552 LEFT HIP PAIN: Primary | ICD-10-CM

## 2024-10-30 PROCEDURE — 97140 MANUAL THERAPY 1/> REGIONS: CPT

## 2024-10-30 PROCEDURE — 90834 PSYTX W PT 45 MINUTES: CPT | Performed by: COUNSELOR

## 2024-10-30 PROCEDURE — 97112 NEUROMUSCULAR REEDUCATION: CPT

## 2024-10-30 NOTE — PROGRESS NOTES
"Daily Note     Today's date: 10/30/2024  Patient name: Raghav Smith  : 1995  MRN: [id number]  Referring provider: Linda Rao CRNP  Dx:   Encounter Diagnosis     ICD-10-CM    1. Left hip pain  M25.552       2. Chronic left shoulder pain  M25.512     G89.29           Start Time: 1215  Stop Time: 1255  Total time in clinic (min): 40 minutes    Subjective: Patient reports her L hip has been good. She notes being able to complete a 7 mile bike ride with only having to stop and stretch once. She notes her L neck still bothers her but thinks it's related to stress. She notes typically sleeping in the fetal position.     Objective: See treatment diary below    Assessment: Tolerated treatment well. Patient would benefit from continued PT to address current deficits, maximize ADLs/restore PLOF. Went over modifications for SL sleeping to help decrease L cervical muscle tightness. Additionally, she tends to clench her teeth which could be playing a roll in lateral jaw and neck pain.     Plan: Continue per plan of care.      Precautions: None    Manuals 10/16 10/18 10/23 10/25 10/30        Sidelying scapula mobilizations elevation, depression, protrusion. retraction             GHJ mobilization PA and inf             LUT graston   5' 5' 5'        L shoulder PROM             L hip PROM 10' 10' 10' 10' 5'        L hip long axis distraction             Butch S clinician OP 30\"x3            Standing hip flexion MWM lateral glide             Sidelying and prone MET R hip flex, L hip ext             Neuro Re-Ed             Rivers Carries             Lat Pull down  30# 1x10 40# 2x10   40# 3x10 40#  3x12        Hip CARS             Coppenhagen side plank             3-way Hip kicks w/ TB GTB  3x10 GTB  3x10 Blue  3x10 Blue  3x10 Blue  3x10        Clamshell RTB  3x10 RTB  3x10 GTB  3x10 GTB  3x10 GTB  3x10        Step up with opposite \"  1x10 8\"  1x10 ea    Foam  8\"  2x10 ea 8\"  1x10 ea    Foam  8\"  2x10 ea " "Foam  8\"  3x10 ea        Mini Squat  2x10 2x10    10#  2x10 1x10    10#  1x10    15#  1x10    20#  1x10 1x10    10#  1x10    15#  1x10    20#  1x10                     Ther Ex             90/90 Switch IR/ER             Deep lunge + contralateral reach             Rows Tripod KB 15# 3x10            B/l low trap ER             TB ER/IR                                                                 Ther Activity                                       Gait Training                                       Modalities                                          "

## 2024-10-30 NOTE — PSYCH
"Behavioral Health Psychotherapy Progress Note    Psychotherapy Provided: Group Therapy    1. Eating disorder, unspecified type        2. PTSD (post-traumatic stress disorder)          Goals addressed in session: Goal 1     DATA: Patient arrived for the Recovery from Disordered Eating Group today. Provider led an ice breaker activity for the group members to reflect and identify strengths since beginning group. Provider also encouraged patients to identify strengths within each other. Topics of discussion in group today were mindfulness and attachment styles. Provider led a discussion on mindfulness and asked the group how they have been practicing this on a weekly basis surrounding their food choices. Provider educated the patient on attachment styles and how they are linked to eating disorders.   During this session, this clinician used the following therapeutic modalities: Client-centered Therapy and Supportive Psychotherapy    Substance Abuse was not addressed during this session. If the client is diagnosed with a co-occurring substance use disorder, please indicate any changes in the frequency or amount of use: n/a. Stage of change for addressing substance use diagnoses: No substance use/Not applicable    ASSESSMENT:  Raghav Smith presents with a Anxious and Depressed mood. her affect is Normal range and intensity, which is congruent, with her mood and the content of the session. The client has made progress on their goals. She discussed eating Nutella to fulfill an emotional need for comfort; she continues to work on developing awareness of her emotional eating patterns. Raghav Smith presents with a none risk of suicide, none risk of self-harm, and none risk of harm to others.    For any risk assessment that surpasses a \"low\" rating, a safety plan must be developed.    A safety plan was indicated: no  If yes, describe in detail n/a    PLAN: Between sessions, Raghav Smith will complete the " attachment style quiz. At the next session, the therapist will use Client-centered Therapy and Supportive Psychotherapy to address her disorganized eating habits.    Behavioral Health Treatment Plan and Discharge Planning: Raghav Smith is aware of and agrees to continue to work on their treatment plan. They have identified and are working toward their discharge goals. yes    Visit start and stop times:    10/28/24  Start Time: 1505  Stop Time: 1605  Total Visit Time: 60 minutes

## 2024-10-30 NOTE — PSYCH
"Behavioral Health Psychotherapy Progress Note    Psychotherapy Provided: Individual Psychotherapy     1. PTSD (post-traumatic stress disorder)            Goals addressed in session: Goal 1     DATA: Discussed her transfer to another therapist at [facility]. Discussed her recent challenges with a flashback, with her depression and motivation. Told her to ask her next therapist about somatic work.     During this session, this clinician used the following therapeutic modalities: Client-centered Therapy, Cognitive Behavioral Therapy, and Existential Therapy    Substance Abuse was not addressed during this session. If the client is diagnosed with a co-occurring substance use disorder, please indicate any changes in the frequency or amount of use: n/a. Stage of change for addressing substance use diagnoses: No substance use/Not applicable    ASSESSMENT:  Raghav Smith presents with a Anxious and Dysthymic mood.     her affect is Normal range and intensity, which is congruent, with her mood and the content of the session. The client has made progress on their goals.     Raghav Smith presents with a none risk of suicide, none risk of self-harm, and none risk of harm to others.    For any risk assessment that surpasses a \"low\" rating, a safety plan must be developed.    A safety plan was indicated: no  If yes, describe in detail n/a    PLAN: Between sessions, Raghav Smith will engage with new therapist. Discuss our plan to talk with men, experiment with socialization.     Behavioral Health Treatment Plan and Discharge Planning: Raghav Smith is aware of and agrees to continue to work on their treatment plan. They have identified and are working toward their discharge goals. no    Visit start and stop times:    10/30/24  Start Time: 1310  Stop Time: 1400  Total Visit Time: 50 minutes  "

## 2024-10-30 NOTE — PROGRESS NOTES
TRANSFER SUMMARY    [facility]    Patient Name Raghav Smith     Date of Birth: 29 y.o. 1995      MRN: [id number]    Admission Date:  7/15/2021    Date of Transfer: October 30, 2024    Admission Diagnosis:     Major Depressive Disorder, recurrent, severe without psychotic features  PTSD  OCD    Current Diagnosis:     1. PTSD (post-traumatic stress disorder)        2. History of OCD (obsessive compulsive disorder)        3. MDD (major depressive disorder), recurrent severe, without psychosis (HCC)            Reason for Admission: Raghav presented for treatment due to depression, PTSD symptoms, nightmares, flashbacks, and suicidal ideation. Primary complaints included DEPRESSIVE SYMPTOMS: depressed mood, hopelessness, low energy, low motivation, difficulty with decision making, poor concentration. Avoidance behaviors.     Progress in Treatment: Raghav was seen for Interpersonal Therapy. During the course of treatment she engaged in CPT treatment to eliminate many symptoms of PTSD, successfully reducing PCL-5 score to below threshold. Depressive symptoms remained through lower self-esteem, decreased interest, thoughts of hopelessness and worthlessness.    Episodes of Higher Level of Care: No    Transfer request Initiated by: Psychiatrist: None Therapist: Sunil Ruiz    Reason for Transfer Request: clinician leaving practice    Does this individual need a clinician with specialized training/expertise?: No    Is this client working with any other [facility] Providers/Therapists? Psychiatrist: Dr. Ann Ceballos Therapist: None    Other pertinent issues: None    Are there any specific individuals who would be a “best fit” or who have already agreed to accept this transfer request?      Psychiatrist: None   Therapist:  Kathi Alves  Rationale: Not Applicable    Attempts to maintain the current therapeutic relationship: Not Applicable    Transfer  request routed to  n/a  for input and/or approval.         Sunil Minay10/30/24

## 2024-10-31 ENCOUNTER — TELEPHONE (OUTPATIENT)
Dept: PSYCHIATRY | Facility: CLINIC | Age: 29
End: 2024-10-31

## 2024-11-01 ENCOUNTER — EVALUATION (OUTPATIENT)
Dept: PHYSICAL THERAPY | Facility: CLINIC | Age: 29
End: 2024-11-01
Payer: COMMERCIAL

## 2024-11-01 DIAGNOSIS — M25.552 LEFT HIP PAIN: Primary | ICD-10-CM

## 2024-11-01 DIAGNOSIS — M25.512 CHRONIC LEFT SHOULDER PAIN: ICD-10-CM

## 2024-11-01 DIAGNOSIS — G89.29 CHRONIC LEFT SHOULDER PAIN: ICD-10-CM

## 2024-11-01 PROCEDURE — 97112 NEUROMUSCULAR REEDUCATION: CPT

## 2024-11-01 PROCEDURE — 97535 SELF CARE MNGMENT TRAINING: CPT

## 2024-11-01 PROCEDURE — 97530 THERAPEUTIC ACTIVITIES: CPT

## 2024-11-01 NOTE — PROGRESS NOTES
Daily Note     Today's date: 2024  Patient name: Raghav Smith  : 1995  MRN: [id number]  Referring provider: Linda Rao CRNP  Dx:   Encounter Diagnosis     ICD-10-CM    1. Left hip pain  M25.552       2. Chronic left shoulder pain  M25.512     G89.29         Subjective: Raghav reports that she is feeling improved about 70% since starting PT. She notes that her hip/shoulder feels stronger overall but still feels some instances of shoulder instability when performing overhead activities. She notes that work activities and repeated movements cause some pain after an hour or sometimes less depending on the specific activity. She notes that she would like to continue working on her endurance to be able to tolerate higher levels of activity and repeated movements without significant increases in pain. Pt notes that she also feels pain when she kneels down or is in a deep flexion position for extended periods when a work .      Objective: See treatment diary below  L shoulder AROM  Flexion: 170 pain free  Abduction: slight UT compensation 170   BTB ER: T2  BTB IR: T9    R Shoulder Strength:  Flexion 4/5   ABD: 4/5  IR (0) 4+/5  IR (45) 3+/5 p!  IR (90) 3+/5 p!  ER (0): 4/5  ER (45) 3+/5 p!  ER (90) 3+/5 p!    L Shoulder Strength:  Flexion 5/5   ABD: 5/5  IR (0) 5/5  ER (0): 4/5    L hip PROM  Flexion: 120 degrees   Extension: 20 degrees   Abduction: 65 degrees   External rotation (90/90): 50 degrees   Internal rotation (90/90): 40 degrees     L hip MMT  Flexion: 4/5  Extension: 4/5  Abduction: 5/5  External rotation: 5/5  Internal rotation: 5/5    Assessment: Bindu has improved her shoulder/hip strength over the past few weeks, but is still limited with shoulder strengthening at 45/90 degrees ABD secondary to pain. Pt primary impairments at this stage is shoulder instability and activity intolerance due to lack of endurance. Pt current impairments currently limit ability to perform desired  activities and work tasks pain free. Raghav would benefit from continued skilled physical therapy in order to fully restore functional strength and return to all recreational activities at her prior level of function.     Goals  Short Term Goals:  Pt will report decreased levels of pain by at least 2 subjective ratings in 4 weeks MET  Pt will demonstrate improved ROM by at least 10 degrees in 4 weeks MET  Pt will demonstrate improved strength by ½ grade in 4 weeks MET  Pt will be able to work for 2 hours without pain in 4 weeks NOT YET MET - progressing toward     Long Term Goals:  Pt will be independent with HEP in 8 weeks MET  Pt will be functional with all ADL's in 8 weeks MET  Pt will achieve their predicted FOTO score in 8 weeks NOT YET MET - progressing toward  Pt will be able to paddle board on the river for 3 hours without pain in 8 weeks NOT YET MET          Plan  Patient would benefit from: skilled physical therapy  Referral necessary: No  Planned modality interventions: low level laser therapy     Planned therapy interventions: abdominal trunk stabilization, IASTM, joint mobilization, manual therapy, nerve gliding, neuromuscular re-education, balance, balance/weight bearing training, body mechanics training, breathing training, patient education, postural training, strengthening, stretching, therapeutic activities, therapeutic exercise, functional ROM exercises, gait training, graded activity, graded exercise and home exercise program     Frequency: 2x week  Duration in weeks: 12  Plan of Care beginning date: 11/1/24  Plan of Care expiration date: 1/24/25  Treatment plan discussed with: patient     Precautions: None    Manuals 10/16 10/18 10/23 10/25 10/30  11/1           Pt edu      10'       Sidelying scapula mobilizations elevation, depression, protrusion. retraction                       GHJ mobilization PA and inf                       LUT graston     5' 5' 5'             L shoulder PROM            "            L hip PROM 10' 10' 10' 10' 5'             L hip long axis distraction                       Butch S clinician OP 30\"x3                     Standing hip flexion MWM lateral glide                       Sidelying and prone MET R hip flex, L hip ext                       Neuro Re-Ed                       High plank shoulder taps      2x10       Rhythmic Stabilization 4-way 1sec hold ea      30\"x3 @ 0-45 degrees       Lat Pull down  30# 1x10 40# 2x10     40# 3x10 40#  3x12             Hip CARS                       3-way Hip kicks w/ TB GTB  3x10 GTB  3x10 Blue  3x10 Blue  3x10 Blue  3x10 Blue 3x10           Clamshell RTB  3x10 RTB  3x10 GTB  3x10 GTB  3x10 GTB  3x10             Step up with opposite march 6\"  1x10 8\"  1x10 ea     Foam  8\"  2x10 ea 8\"  1x10 ea     Foam  8\"  2x10 ea Foam  8\"  3x10 ea             Mini Squat   2x10 2x10     10#  2x10 1x10     10#  1x10     15#  1x10     20#  1x10 1x10     10#  1x10     15#  1x10     20#  1x10 20# KB   2x10                                   Ther Ex                       90/90 Switch IR/ER                       Deep lunge + contralateral reach                       Rows Tripod KB 15# 3x10                     B/l low trap ER                       TB ER/IR                                                                                                                       Ther Activity                                                                       Gait Training                                                                       Modalities                                                                                  "

## 2024-11-06 ENCOUNTER — OFFICE VISIT (OUTPATIENT)
Dept: BEHAVIORAL/MENTAL HEALTH CLINIC | Facility: CLINIC | Age: 29
End: 2024-11-06
Payer: COMMERCIAL

## 2024-11-06 DIAGNOSIS — F43.10 PTSD (POST-TRAUMATIC STRESS DISORDER): Primary | ICD-10-CM

## 2024-11-06 PROCEDURE — 90837 PSYTX W PT 60 MINUTES: CPT

## 2024-11-07 ENCOUNTER — TELEPHONE (OUTPATIENT)
Age: 29
End: 2024-11-07

## 2024-11-07 ENCOUNTER — APPOINTMENT (OUTPATIENT)
Dept: PHYSICAL THERAPY | Facility: CLINIC | Age: 29
End: 2024-11-07
Payer: COMMERCIAL

## 2024-11-07 DIAGNOSIS — B00.1 HERPES LABIALIS: Primary | ICD-10-CM

## 2024-11-07 RX ORDER — ACYCLOVIR 50 MG/G
OINTMENT TOPICAL
Qty: 5 G | Refills: 1 | Status: SHIPPED | OUTPATIENT
Start: 2024-11-07

## 2024-11-07 NOTE — TELEPHONE ENCOUNTER
Patient called stating she has a cold sore and would like to know if a cream to put on cold sore can be prescribed.  Patient is unable to afford OTC cream.  Please advise.

## 2024-11-12 ENCOUNTER — OFFICE VISIT (OUTPATIENT)
Dept: PHYSICAL THERAPY | Facility: CLINIC | Age: 29
End: 2024-11-12
Payer: COMMERCIAL

## 2024-11-12 ENCOUNTER — SOCIAL WORK (OUTPATIENT)
Dept: BEHAVIORAL/MENTAL HEALTH CLINIC | Facility: CLINIC | Age: 29
End: 2024-11-12
Payer: COMMERCIAL

## 2024-11-12 DIAGNOSIS — F41.9 ANXIETY: ICD-10-CM

## 2024-11-12 DIAGNOSIS — G89.29 CHRONIC LEFT SHOULDER PAIN: ICD-10-CM

## 2024-11-12 DIAGNOSIS — M25.552 LEFT HIP PAIN: Primary | ICD-10-CM

## 2024-11-12 DIAGNOSIS — F43.10 PTSD (POST-TRAUMATIC STRESS DISORDER): Primary | ICD-10-CM

## 2024-11-12 DIAGNOSIS — M25.512 CHRONIC LEFT SHOULDER PAIN: ICD-10-CM

## 2024-11-12 PROCEDURE — 90837 PSYTX W PT 60 MINUTES: CPT

## 2024-11-12 PROCEDURE — 97140 MANUAL THERAPY 1/> REGIONS: CPT

## 2024-11-12 PROCEDURE — 97112 NEUROMUSCULAR REEDUCATION: CPT

## 2024-11-12 NOTE — PSYCH
"Behavioral Health Psychotherapy Progress Note    Psychotherapy Provided: Individual Psychotherapy     1. PTSD (post-traumatic stress disorder)        2. Anxiety            Goals addressed in session: Goal 1 and Goal 2     DATA: Raghav reports to not having motivation to get her ADL's completed and said \"I'm in a head space where I'm struggling, drowning, trying to stay afloat\". Raghav reports wanting to get to a stable place with her ADL's. Raghav reports dealing with the recent passing of her uncle and said \"My mind is existing in a stage of grief\". Raghav reflected on the relationships she has with men and those around her and how she can let her past go to allow her to be in new space with men. Raghav reports to be having some financial struggles but is trying her best to go to work everyday. Raghav reported to having a breakthrough moment with her parents but identifies her mother as a huge trigger for her anxiety.    During this session, this clinician used the following therapeutic modalities: Client-centered Therapy, Cognitive Behavioral Therapy, Cognitive Processing Therapy, Motivational Interviewing, Solution-Focused Therapy, and Supportive Psychotherapy    Substance Abuse was not addressed during this session. If the client is diagnosed with a co-occurring substance use disorder, please indicate any changes in the frequency or amount of use: N/A. Stage of change for addressing substance use diagnoses: No substance use/Not applicable    ASSESSMENT:  Raghav Smith presents with a Euthymic/ normal and Anxious mood.    her affect is Normal range and intensity, which is congruent, with her mood and the content of the session. The client has made progress on their goals.    Raghav Smith presents with a none risk of suicide, none risk of self-harm, and none risk of harm to others.    For any risk assessment that surpasses a \"low\" rating, a safety plan must be developed.    A safety " plan was indicated: no  If yes, describe in detail N/A    PLAN: Between sessions, Raghav ELZA Smith will practice coping skills. At the next session, the therapist will use Client-centered Therapy, Cognitive Behavioral Therapy, Cognitive Processing Therapy, Motivational Interviewing, Solution-Focused Therapy, and Supportive Psychotherapy to address anxiety.    Behavioral Health Treatment Plan and Discharge Planning: Raghav Smith is aware of and agrees to continue to work on their treatment plan. They have identified and are working toward their discharge goals. yes    Visit start and stop times:    11/12/24  Start Time: 1301  Stop Time: 1400  Total Visit Time: 59 minutes

## 2024-11-12 NOTE — PROGRESS NOTES
"Daily Note     Today's date: 2024  Patient name: Raghav Smith  : 1995  MRN: [id number]  Referring provider: Linda Rao CRNP  Dx:   Encounter Diagnosis     ICD-10-CM    1. Left hip pain  M25.552       2. Chronic left shoulder pain  M25.512     G89.29                      Subjective: Raghav reports overall she is better both in her hip and shoulder. She is still limited with long duration positioning, high level activities (her shoulder feels unstable) and some work specific activities.       Objective: See treatment diary below      Assessment: Hip ROM continues to improve and ERP intensity decreases with distraction. Continue to progress shoulder and hip stability as appropriate.       Plan: Continue per plan of care.  Progress treatment as tolerated.       Precautions: None    Manuals 10/16 10/18 10/23 10/25 10/30  11/1  11/12         LUT graston     5' 5' 5'    5'         L hip PROM 10' 10' 10' 10' 5'    5'         L hip long axis distraction              3x30\"         Sidelying and prone MET R hip flex, L hip ext                       Neuro Re-Ed                       High plank shoulder taps      2x10 2x10      Rhythmic Stabilization 4-way 1sec hold ea      30\"x3 @ 0-45 degrees       Lat Pull down  30# 1x10 40# 2x10     40# 3x10 40#  3x12    40# 3x12         Serratus wall alphabet              1x26         3-way Hip kicks w/ TB GTB  3x10 GTB  3x10 Blue  3x10 Blue  3x10 Blue  3x10 Blue 3x10           Clamshell RTB  3x10 RTB  3x10 GTB  3x10 GTB  3x10 GTB  3x10             Step up with opposite \"  1x10 8\"  1x10 ea     Foam  8\"  2x10 ea 8\"  1x10 ea     Foam  8\"  2x10 ea Foam  8\"  3x10 ea    Foam  8\"  3x10 ea         Mini Squat   2x10 2x10     10#  2x10 1x10     10#  1x10     15#  1x10     20#  1x10 1x10     10#  1x10     15#  1x10     20#  1x10 20# KB   2x10  1x10     10#  1x10     15#  1x10     20#  1x10                                 Ther Ex                                         "                                                                               Ther Activity                                                                       Gait Training                                                                       Modalities

## 2024-11-14 ENCOUNTER — OFFICE VISIT (OUTPATIENT)
Dept: PHYSICAL THERAPY | Facility: CLINIC | Age: 29
End: 2024-11-14
Payer: COMMERCIAL

## 2024-11-14 DIAGNOSIS — M25.552 LEFT HIP PAIN: Primary | ICD-10-CM

## 2024-11-14 DIAGNOSIS — G89.29 CHRONIC LEFT SHOULDER PAIN: ICD-10-CM

## 2024-11-14 DIAGNOSIS — M25.512 CHRONIC LEFT SHOULDER PAIN: ICD-10-CM

## 2024-11-14 PROCEDURE — 97112 NEUROMUSCULAR REEDUCATION: CPT

## 2024-11-14 NOTE — PROGRESS NOTES
"Daily Note     Today's date: 2024  Patient name: Raghav Smith  : 1995  MRN: [id number]  Referring provider: Linda Rao CRNP  Dx:   Encounter Diagnosis     ICD-10-CM    1. Left hip pain  M25.552       2. Chronic left shoulder pain  M25.512     G89.29           Start Time: 1044  Stop Time: 1135  Total time in clinic (min): 51 minutes    Subjective: Patient reports having neck pain with cervical side bending. Reports no cervical pain at rest. Notes her hip feels good with random intermittent pain.     Objective: See treatment diary below    Assessment: Patient should be progressed NV noting squats and lat pull down being easy to complete. Continue to add and increase intensity of TE as able.     Plan: Continue per plan of care.  Progress treatment as tolerated.       Precautions: None    Manuals 10/16 10/18 10/23 10/25 10/30  11/1  11/12  11/14       LUT graston     5' 5' 5'    5'  5'       L hip PROM 10' 10' 10' 10' 5'    5'  5'       L hip long axis distraction              3x30\"  3x30\"       Sidelying and prone MET R hip flex, L hip ext                       Neuro Re-Ed                       High plank shoulder taps      2x10 2x10 2x10     Rhythmic Stabilization 4-way 1sec hold ea      30\"x3 @ 0-45 degrees       Lat Pull down  30# 1x10 40# 2x10     40# 3x10 40#  3x12    40# 3x12  40#   3x12  progress     Serratus wall alphabet              1x26  1x       3-way Hip kicks w/ TB GTB  3x10 GTB  3x10 Blue  3x10 Blue  3x10 Blue  3x10 Blue 3x10           Clamshell RTB  3x10 RTB  3x10 GTB  3x10 GTB  3x10 GTB  3x10             Step up with opposite \"  1x10 8\"  1x10 ea     Foam  8\"  2x10 ea 8\"  1x10 ea     Foam  8\"  2x10 ea Foam  8\"  3x10 ea    Foam  8\"  3x10 ea  Foam  8\"  3x10 ea       Mini Squat   2x10 2x10     10#  2x10 1x10     10#  1x10     15#  1x10     20#  1x10 1x10     10#  1x10     15#  1x10     20#  1x10 20# KB   2x10  1x10     10#  1x10     15#  1x10     20#  1x10   1x10   "   10#  1x10     15#  1x10     20#  1x10  progress                             Ther Ex                                                                                                                       Ther Activity                                                                       Gait Training                                                                       Modalities

## 2024-11-18 ENCOUNTER — OFFICE VISIT (OUTPATIENT)
Dept: BEHAVIORAL/MENTAL HEALTH CLINIC | Facility: CLINIC | Age: 29
End: 2024-11-18
Payer: COMMERCIAL

## 2024-11-18 DIAGNOSIS — F50.9 EATING DISORDER, UNSPECIFIED TYPE: Primary | ICD-10-CM

## 2024-11-18 PROCEDURE — 90853 GROUP PSYCHOTHERAPY: CPT

## 2024-11-19 ENCOUNTER — OFFICE VISIT (OUTPATIENT)
Dept: PHYSICAL THERAPY | Facility: CLINIC | Age: 29
End: 2024-11-19
Payer: COMMERCIAL

## 2024-11-19 ENCOUNTER — SOCIAL WORK (OUTPATIENT)
Dept: BEHAVIORAL/MENTAL HEALTH CLINIC | Facility: CLINIC | Age: 29
End: 2024-11-19
Payer: COMMERCIAL

## 2024-11-19 DIAGNOSIS — M25.512 CHRONIC LEFT SHOULDER PAIN: ICD-10-CM

## 2024-11-19 DIAGNOSIS — F41.9 ANXIETY: ICD-10-CM

## 2024-11-19 DIAGNOSIS — M25.552 LEFT HIP PAIN: Primary | ICD-10-CM

## 2024-11-19 DIAGNOSIS — G89.29 CHRONIC LEFT SHOULDER PAIN: ICD-10-CM

## 2024-11-19 DIAGNOSIS — F43.10 PTSD (POST-TRAUMATIC STRESS DISORDER): Primary | ICD-10-CM

## 2024-11-19 PROCEDURE — 97110 THERAPEUTIC EXERCISES: CPT

## 2024-11-19 PROCEDURE — 97112 NEUROMUSCULAR REEDUCATION: CPT

## 2024-11-19 PROCEDURE — 90837 PSYTX W PT 60 MINUTES: CPT

## 2024-11-19 NOTE — PROGRESS NOTES
"Daily Note     Today's date: 2024  Patient name: Raghav Smith  : 1995  MRN: [id number]  Referring provider: Linda Rao CRNP  Dx:   Encounter Diagnosis     ICD-10-CM    1. Left hip pain  M25.552       2. Chronic left shoulder pain  M25.512     G89.29                      Subjective: Raghav reports feeling improved overall. She is now able to perform ADLs pain free and only has some discomfort with higher level activities that really strain her hip or shoulder.     Objective: See treatment diary below    Assessment: No improvement with hip IR PROM following long axis distraction. Hip IR PROM did improve following glute med activation with clamshells. Then progressed closed chain hip abduction muscle activation    Plan: Continue per plan of care.  Progress treatment as tolerated.       Precautions: None    Manuals 10/16 10/18 10/23 10/25 10/30  11/1  11/12  11/14  11/19     LUT graston     5' 5' 5'    5'  5'  5'     L hip PROM 10' 10' 10' 10' 5'    5'  5'  5'     L hip long axis distraction              3x30\"  3x30\"  3x30\"     Sidelying and prone MET R hip flex, L hip ext                       Neuro Re-Ed                       High plank shoulder taps      2x10 2x10 2x10     Rhythmic stabilization supine shoulder scaption position         2x30\" at 90 degrees    2x30\" at 120 degrees    Lat Pull down  30# 1x10 40# 2x10     40# 3x10 40#  3x12    40# 3x12  40#   3x12  50# 3x10     Serratus wall alphabet              1x26  1x  x1     Clamshell RTB  3x10 RTB  3x10 GTB  3x10 GTB  3x10 GTB  3x10        GTB 3x10 ea     Lateral step ups with foam on 6\" step          GTB 3x10     Squats   2x10 2x10     10#  2x10 1x10     10#  1x10     15#  1x10     20#  1x10 1x10     10#  1x10     15#  1x10     20#  1x10 1x10     10#  1x10     15#  1x10     20#  1x10  1x10     10#  1x10     15#  1x10     20#  1x10   1x10     10#  1x10     15#  1x10     20#  1x10  GTB 3x10 20lb KB                             Ther Ex         "                                                                                                               Ther Activity                                                                       Gait Training                                                                       Modalities

## 2024-11-20 NOTE — PSYCH
"Behavioral Health Psychotherapy Progress Note    Psychotherapy Provided: Individual Psychotherapy     1. PTSD (post-traumatic stress disorder)        2. Anxiety            Goals addressed in session: Goal 1     DATA: Raghav reports to be struggling with work, motivation, no sleep and the endless cycle of no energy. Raghav reports to have had a breakdown when she identified more of her triggers within herself and with men. Raghav reports to be struggling with emotional disgregation and how to express her emotions. Raghav reports struggling with continuous consent with partner. Raghav said \"I'm scared all the time and I always will be\". Raghav reports still not being where she wants to be in processing in her trauma but is trying her best.     During this session, this clinician used the following therapeutic modalities: Client-centered Therapy, Cognitive Processing Therapy, Motivational Interviewing, Solution-Focused Therapy, and Supportive Psychotherapy    Substance Abuse was not addressed during this session. If the client is diagnosed with a co-occurring substance use disorder, please indicate any changes in the frequency or amount of use: N/A. Stage of change for addressing substance use diagnoses: No substance use/Not applicable    ASSESSMENT:  Raghav Smith presents with a Euthymic/ normal mood.    her affect is Normal range and intensity, which is congruent, with her mood and the content of the session. The client has made progress on their goals.    Raghav Smith presents with a minimal risk of suicide, minimal risk of self-harm, and minimal risk of harm to others.    For any risk assessment that surpasses a \"low\" rating, a safety plan must be developed.    A safety plan was indicated: no  If yes, describe in detail N/A    PLAN: Between sessions, Raghav Smith will utilize coping skills . At the next session, the therapist will use Client-centered Therapy, Cognitive Behavioral " Therapy, Motivational Interviewing, Solution-Focused Therapy, and Supportive Psychotherapy to address PTSD and anxiety.    Behavioral Health Treatment Plan and Discharge Planning: Raghav Smith is aware of and agrees to continue to work on their treatment plan. They have identified and are working toward their discharge goals. yes    Visit start and stop times:    11/19/24  Start Time: 1300  Stop Time: 1400  Total Visit Time: 60 minutes

## 2024-11-21 ENCOUNTER — OFFICE VISIT (OUTPATIENT)
Dept: PHYSICAL THERAPY | Facility: CLINIC | Age: 29
End: 2024-11-21
Payer: COMMERCIAL

## 2024-11-21 DIAGNOSIS — M25.512 CHRONIC LEFT SHOULDER PAIN: ICD-10-CM

## 2024-11-21 DIAGNOSIS — G89.29 CHRONIC LEFT SHOULDER PAIN: ICD-10-CM

## 2024-11-21 DIAGNOSIS — M25.552 LEFT HIP PAIN: Primary | ICD-10-CM

## 2024-11-21 PROCEDURE — 97110 THERAPEUTIC EXERCISES: CPT

## 2024-11-21 PROCEDURE — 97112 NEUROMUSCULAR REEDUCATION: CPT

## 2024-11-21 NOTE — PSYCH
"Behavioral Health Psychotherapy Progress Note    Psychotherapy Provided: Group Therapy    1. Eating disorder, unspecified type          Goals addressed in session: Goal 1     DATA: Raghav arrived for her group session today. The focus of the group was on identifying the ED lies that the group members tell themselves. Raghav reflected on how her neurodivergence can create challenges for her when it comes to eating. This was in review of what was given for homework and to process and challenge their eating disorder thoughts. Psychoeducation on these lies and support was given by facilitators. The facilitator handed out information from [employer] in order for the group to focus on learning about nutrition to better support their recovery.   During this session, this clinician used the following therapeutic modalities: Client-centered Therapy, Cognitive Behavioral Therapy, and Supportive Psychotherapy    Substance Abuse was not addressed during this session. If the client is diagnosed with a co-occurring substance use disorder, please indicate any changes in the frequency or amount of use: n/a. Stage of change for addressing substance use diagnoses: No substance use/Not applicable    ASSESSMENT:  Raghav Smith presents with a Anxious mood. her affect is Normal range and intensity and Bright, which is congruent, with her mood and the content of the session. The client has made progress on their goals.  Raghav Smith presents with a none risk of suicide, none risk of self-harm, and none risk of harm to others.    For any risk assessment that surpasses a \"low\" rating, a safety plan must be developed.    A safety plan was indicated: no  If yes, describe in detail n/a    PLAN: Between sessions, Raghav Smith will review the [employer] information . At the next session, the therapist will use Client-centered Therapy, Cognitive Behavioral Therapy, and Supportive Psychotherapy to address her disordered " eating habits.    Behavioral Health Treatment Plan and Discharge Planning: Raghav Smith is aware of and agrees to continue to work on their treatment plan. They have identified and are working toward their discharge goals. yes    Visit start and stop times:    11/18/24  Start Time: 1500  Stop Time: 1600  Total Visit Time: 60 minutes

## 2024-11-21 NOTE — PROGRESS NOTES
"Daily Note     Today's date: 2024  Patient name: Raghav Smith  : 1995  MRN: [id number]  Referring provider: Linda Rao CRNP  Dx:   Encounter Diagnosis     ICD-10-CM    1. Left hip pain  M25.552       2. Chronic left shoulder pain  M25.512     G89.29           Start Time: 1050  Stop Time: 1145  Total time in clinic (min): 55 minutes    Subjective: Raghav notes, feeling sore following last visit with a little bit of pain. The pain only happened twice which made her have to limited LLE weight bearing for 1 minute. Notes L cervical discomfort with L side bending only.     Objective: See treatment diary below    Assessment: Able to abolish L cervical pain post manual therapy. Able to eliminate anterior hip pinching with PROM. Limited ER and IR, however, no pain during stretching today. She did well with assigned strengthening and progressed well with squats. Continue to progress as able.     Plan: Continue per plan of care.  Progress treatment as tolerated.       Precautions: None    Manuals 10/16 10/18 10/23 10/25 10/30  11/1  11/12  11/14  11/19  11/21   LUT graston     5' 5' 5'    5'  5'  5'  5'   L hip PROM 10' 10' 10' 10' 5'    5'  5'  5'  5'   L hip long axis distraction              3x30\"  3x30\"  3x30\"     Sidelying and prone MET R hip flex, L hip ext                       Neuro Re-Ed                       High plank shoulder taps      2x10 2x10 2x10     Rhythmic stabilization supine shoulder scaption position         2x30\" at 90 degrees    2x30\" at 120 degrees 2x30\" at 90 degrees    2x30\" at 120 degrees   Lat Pull down  30# 1x10 40# 2x10     40# 3x10 40#  3x12    40# 3x12  40#   3x12  50#   3x10  50#   3x10   Serratus wall alphabet              1x26  1x  x1  x1   Clamshell RTB  3x10 RTB  3x10 GTB  3x10 GTB  3x10 GTB  3x10        GTB 3x10 ea  GTB     3x10 ea   Lateral step ups with foam on 6\" step          GTB 3x10  GTB    3x10   Squats   2x10 2x10     10#  2x10 1x10     10#  1x10   "   15#  1x10     20#  1x10 1x10     10#  1x10     15#  1x10     20#  1x10 1x10     10#  1x10     15#  1x10     20#  1x10  1x10     10#  1x10     15#  1x10     20#  1x10   1x10     10#  1x10     15#  1x10     20#  1x10  GTB 3x10 20lb KB  25# DB    3x10                           Ther Ex                                                                                                                       Ther Activity                                                                       Gait Training                                                                       Modalities

## 2024-11-25 ENCOUNTER — TELEPHONE (OUTPATIENT)
Dept: PSYCHIATRY | Facility: CLINIC | Age: 29
End: 2024-11-25

## 2024-11-25 ENCOUNTER — OFFICE VISIT (OUTPATIENT)
Dept: BEHAVIORAL/MENTAL HEALTH CLINIC | Facility: CLINIC | Age: 29
End: 2024-11-25
Payer: COMMERCIAL

## 2024-11-25 DIAGNOSIS — F50.9 EATING DISORDER, UNSPECIFIED TYPE: Primary | ICD-10-CM

## 2024-11-25 PROCEDURE — 90853 GROUP PSYCHOTHERAPY: CPT

## 2024-11-25 PROCEDURE — 90834 PSYTX W PT 45 MINUTES: CPT

## 2024-11-25 NOTE — TELEPHONE ENCOUNTER
Left voicemail informing patient and/or parent/guardian of the Psych Encounter form needing to be signed as a requirement from the insurance company for billing purposes. Patient can access form via [employer] and sign electronically.     Please make patient aware this form must be signed for each visit as a requirement to continue future visits with provider.

## 2024-11-26 ENCOUNTER — OFFICE VISIT (OUTPATIENT)
Dept: PHYSICAL THERAPY | Facility: CLINIC | Age: 29
End: 2024-11-26
Payer: COMMERCIAL

## 2024-11-26 DIAGNOSIS — M25.512 CHRONIC LEFT SHOULDER PAIN: ICD-10-CM

## 2024-11-26 DIAGNOSIS — G89.29 CHRONIC LEFT SHOULDER PAIN: ICD-10-CM

## 2024-11-26 DIAGNOSIS — M25.552 LEFT HIP PAIN: Primary | ICD-10-CM

## 2024-11-26 PROCEDURE — 97110 THERAPEUTIC EXERCISES: CPT

## 2024-11-26 PROCEDURE — 97112 NEUROMUSCULAR REEDUCATION: CPT

## 2024-11-26 NOTE — PROGRESS NOTES
"Daily Note     Today's date: 2024  Patient name: Raghav Smith  : 1995  MRN: [id number]  Referring provider: Linda Rao CRNP  Dx:   Encounter Diagnosis     ICD-10-CM    1. Left hip pain  M25.552       2. Chronic left shoulder pain  M25.512     G89.29           Start Time: 1038  Stop Time: 1138  Total time in clinic (min): 60 minutes    Subjective: Raghav notes, \"My neck felt good initially after therapy but did bother me the next day. Especially with rotation\". She notes her hip has been good and her RTC strength needs work.     Objective: See treatment diary below    Assessment: Patient did start with pinching during L hip flexion which decreased post stretching. Patient had resolution of L side bending pain following last visit that maintained to today. However, now she is having pain with L rotation. She did well with assigned strengthening and progressed well with squats with a stronger hip abd band. Continue to progress as able.     Plan: Continue per plan of care.  Progress treatment as tolerated.       Precautions: None    Manuals    LUT graston         5'  5'  5'  5'   L hip PROM 8'        5'  5'  5'  5'   L hip long axis distraction         3x30\"  3x30\"  3x30\"     Sidelying and prone MET R hip flex, L hip ext                  Neuro Re-Ed                  High plank shoulder taps      2x10 2x10 2x10     Rhythmic stabilization supine shoulder scaption position 2x30\" at 120 degrees        2x30\" at 90 degrees    2x30\" at 120 degrees 2x30\" at 90 degrees    2x30\" at 120 degrees   Lat Pull down          40# 3x12  40#   3x12  50#   3x10  50#   3x10   Serratus wall alphabet 2x        1x26  1x  x1  x1   Clamshell Blue  3x10 ea            GTB 3x10 ea  GTB     3x10 ea   Lateral step ups with foam on 6\" step Blue  3x10         GTB 3x10  GTB    3x10   Squats Blue    1x10    10#  1x10     15#  1x10     20#  1x10     1x10     10#  1x10     15#  1x10   " "  20#  1x10  1x10     10#  1x10     15#  1x10     20#  1x10   1x10     10#  1x10     15#  1x10     20#  1x10  GTB 3x10 20lb KB  25# DB    3x10   Body blade elbow at side neutral hand position  30\"x2                               Ther Ex                                                                                              Ther Activity                                                        Gait Training                                                        Modalities                                                           "

## 2024-11-27 ENCOUNTER — SOCIAL WORK (OUTPATIENT)
Dept: BEHAVIORAL/MENTAL HEALTH CLINIC | Facility: CLINIC | Age: 29
End: 2024-11-27
Payer: COMMERCIAL

## 2024-11-27 DIAGNOSIS — F41.9 ANXIETY: ICD-10-CM

## 2024-11-27 DIAGNOSIS — F43.10 PTSD (POST-TRAUMATIC STRESS DISORDER): Primary | ICD-10-CM

## 2024-11-27 PROCEDURE — 90834 PSYTX W PT 45 MINUTES: CPT

## 2024-11-27 NOTE — PSYCH
"Behavioral Health Psychotherapy Progress Note    Psychotherapy Provided: Individual Psychotherapy     1. PTSD (post-traumatic stress disorder)        2. Anxiety            Goals addressed in session: Goal 1 and Goal 2     DATA: Raghav reports that things have been \"Okay I think\" Raghav reports that her finances are a huge stressor for her right now as she is unsure how she will be paying her mortgage. Raghav reports to be trying to find a new job but has not been having much luck but is doing the best she can right now. 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 Raghav said \"I am unorganized and have not been taking my ADHD meds\" Raghav report that her previous trauma still affects the way she thinks and maneuver on a daily basis.    During this session, this clinician used the following therapeutic modalities: Client-centered Therapy, Cognitive Processing Therapy, and Supportive Psychotherapy    Substance Abuse was not addressed during this session. If the client is diagnosed with a co-occurring substance use disorder, please indicate any changes in the frequency or amount of use: . Stage of change for addressing substance use diagnoses: No substance use/Not applicable    ASSESSMENT:  Raghav Smith presents with a Euthymic/ normal mood.     her affect is Normal range and intensity, which is congruent, with her mood and the content of the session. The client has made progress on their goals.    Raghav Smith presents with a none risk of suicide, none risk of self-harm, and none risk of harm to others.    For any risk assessment that surpasses a \"low\" rating, a safety plan must be developed.    A safety plan was indicated: no  If yes, describe in detail N/A    PLAN: Between sessions, Raghav Smith will prioritize goal list. At the next session, the therapist will use Client-centered Therapy, Cognitive Behavioral Therapy, " Cognitive Processing Therapy, Motivational Interviewing, Solution-Focused Therapy, and Supportive Psychotherapy to address anxiety.    Behavioral Health Treatment Plan and Discharge Planning: Raghav Smith is aware of and agrees to continue to work on their treatment plan. They have identified and are working toward their discharge goals. yes    Visit start and stop times:    11/27/24  Start Time: 0810  Stop Time: 0900  Total Visit Time: 50 minutes

## 2024-11-29 ENCOUNTER — OFFICE VISIT (OUTPATIENT)
Dept: PHYSICAL THERAPY | Facility: CLINIC | Age: 29
End: 2024-11-29
Payer: COMMERCIAL

## 2024-11-29 DIAGNOSIS — G89.29 CHRONIC LEFT SHOULDER PAIN: ICD-10-CM

## 2024-11-29 DIAGNOSIS — M25.552 LEFT HIP PAIN: Primary | ICD-10-CM

## 2024-11-29 DIAGNOSIS — M25.512 CHRONIC LEFT SHOULDER PAIN: ICD-10-CM

## 2024-11-29 PROCEDURE — 97110 THERAPEUTIC EXERCISES: CPT

## 2024-11-29 PROCEDURE — 97140 MANUAL THERAPY 1/> REGIONS: CPT

## 2024-11-29 PROCEDURE — 97112 NEUROMUSCULAR REEDUCATION: CPT

## 2024-11-29 NOTE — PROGRESS NOTES
"Daily Note     Today's date: 2024  Patient name: Raghav Smith  : 1995  MRN: [id number]  Referring provider: Linda Rao CRNP  Dx:   Encounter Diagnosis     ICD-10-CM    1. Left hip pain  M25.552       2. Chronic left shoulder pain  M25.512     G89.29           Start Time: 1400  Stop Time: 1500  Total time in clinic (min): 60 minutes    Subjective: Raghav notes, \"I still have some pain when I turn to the L but it's a little better. My hip has been good but I haven't done much with it lately\".     Objective: See treatment diary below    Assessment: Able to abolish neck pain following manual therapy. Assess response and duration of relief NATASHA Avilez has progressed with her shoulder and hip strengthening well thus far. Continue to progress as able.     Plan: Continue per plan of care.  Progress treatment as tolerated.       Precautions: None    Manuals    LUT graston  4'        5'  5'   L hip PROM 8' 4'        5'  5'   L hip long axis distraction          3x30\"     Sidelying and prone MET R hip flex, L hip ext               Neuro Re-Ed               High plank shoulder taps             Rhythmic stabilization supine shoulder scaption position 2x30\" at 120 degrees        2x30\" at 90 degrees    2x30\" at 120 degrees 2x30\" at 90 degrees    2x30\" at 120 degrees   Lat Pull down           50#   3x10  50#   3x10   Serratus wall alphabet 2x 1.5#  2x        x1  x1   Clamshell Blue  3x10 ea Blue  3x10 ea        GTB 3x10 ea  GTB     3x10 ea   Lateral step ups with foam on 6\" step Blue  3x10 Blue  3x10        GTB 3x10  GTB    3x10   Squats Blue    1x10    10#  1x10     15#  1x10     20#  1x10 Blue    1x10    10#  1x10     15#  1x10     20#  1x10        GTB 3x10 20lb KB  25# DB    3x10   Body blade elbow at side neutral hand position  30\"x2 30\"x2                           Ther Ex                                                                               Ther Activity          "                                      Gait Training                                               Modalities

## 2024-12-02 NOTE — PSYCH
"Behavioral Health Psychotherapy Progress Note    Psychotherapy Provided: Group Therapy    1. Eating disorder, unspecified type          Goals addressed in session: Goal 1     DATA: Raghav arrived to group today a little bit late. The topic of discussion in the group was the upcoming Thanksgiving holiday and how this will impact the group participants' recovery from disorganized eating. The group had the chance to discuss their own traditions and how they can impact them negatively. The facilitator led a discussion regarding mindfulness and how to practice mindfulness at the dinner table. We discussed recognizing our hunger cues when also at the dinner table. The facilitator led a discussion on emotional eating and how the group can practice recognizing their thoughts and feelings before, during, and after eating.   During this session, this clinician used the following therapeutic modalities: Client-centered Therapy and Supportive Psychotherapy    Substance Abuse was not addressed during this session. If the client is diagnosed with a co-occurring substance use disorder, please indicate any changes in the frequency or amount of use: n/a. Stage of change for addressing substance use diagnoses: No substance use/Not applicable    ASSESSMENT:  Raghav Smith presents with a Anxious mood. her affect is Normal range and intensity and Bright, which is congruent, with her mood and the content of the session. The client has made progress on their goals. Raghav expressed frustration around the roots of Thanksgiving and its negative impact on indigenous peoples. She shared that she has ancestors from an indigenous people from [place]. Raghav Smith presents with a minimal risk of suicide, minimal risk of self-harm, and minimal risk of harm to others.    For any risk assessment that surpasses a \"low\" rating, a safety plan must be developed.    A safety plan was indicated: no  If yes, describe in detail " n/a    PLAN: Between sessions, Raghav Smith will continue practicing mindfulness around food. At the next session, the therapist will use Client-centered Therapy and Supportive Psychotherapy to address her disordered eating behaviors.    Behavioral Health Treatment Plan and Discharge Planning: Raghav Smith is aware of and agrees to continue to work on their treatment plan. They have identified and are working toward their discharge goals. yes    Visit start and stop times:    11/25/24  Start Time: 1510  Stop Time: 1600  Total Visit Time: 50 minutes

## 2024-12-05 ENCOUNTER — SOCIAL WORK (OUTPATIENT)
Dept: BEHAVIORAL/MENTAL HEALTH CLINIC | Facility: CLINIC | Age: 29
End: 2024-12-05
Payer: COMMERCIAL

## 2024-12-05 ENCOUNTER — OFFICE VISIT (OUTPATIENT)
Dept: PSYCHIATRY | Facility: CLINIC | Age: 29
End: 2024-12-05
Payer: COMMERCIAL

## 2024-12-05 ENCOUNTER — OFFICE VISIT (OUTPATIENT)
Dept: PHYSICAL THERAPY | Facility: CLINIC | Age: 29
End: 2024-12-05
Payer: COMMERCIAL

## 2024-12-05 DIAGNOSIS — F43.10 PTSD (POST-TRAUMATIC STRESS DISORDER): Primary | ICD-10-CM

## 2024-12-05 DIAGNOSIS — Z79.899 HIGH RISK MEDICATION USE: Primary | ICD-10-CM

## 2024-12-05 DIAGNOSIS — M25.512 CHRONIC LEFT SHOULDER PAIN: ICD-10-CM

## 2024-12-05 DIAGNOSIS — F41.9 ANXIETY: ICD-10-CM

## 2024-12-05 DIAGNOSIS — G89.29 CHRONIC LEFT SHOULDER PAIN: ICD-10-CM

## 2024-12-05 DIAGNOSIS — F31.81 BIPOLAR 2 DISORDER (HCC): ICD-10-CM

## 2024-12-05 DIAGNOSIS — M25.552 LEFT HIP PAIN: Primary | ICD-10-CM

## 2024-12-05 PROCEDURE — 97112 NEUROMUSCULAR REEDUCATION: CPT

## 2024-12-05 PROCEDURE — 90837 PSYTX W PT 60 MINUTES: CPT

## 2024-12-05 PROCEDURE — 97110 THERAPEUTIC EXERCISES: CPT

## 2024-12-05 PROCEDURE — 99215 OFFICE O/P EST HI 40 MIN: CPT | Performed by: PSYCHIATRY & NEUROLOGY

## 2024-12-05 RX ORDER — LAMOTRIGINE 25 MG/1
TABLET ORAL
Qty: 42 TABLET | Refills: 0 | Status: SHIPPED | OUTPATIENT
Start: 2024-12-05

## 2024-12-05 RX ORDER — LAMOTRIGINE 100 MG/1
100 TABLET ORAL DAILY
Qty: 30 TABLET | Refills: 0 | Status: SHIPPED | OUTPATIENT
Start: 2024-12-26

## 2024-12-05 RX ORDER — HYDROXYZINE HYDROCHLORIDE 10 MG/1
10 TABLET, FILM COATED ORAL EVERY 6 HOURS PRN
Qty: 30 TABLET | Refills: 2 | Status: SHIPPED | OUTPATIENT
Start: 2024-12-05 | End: 2025-03-05

## 2024-12-05 NOTE — PROGRESS NOTES
"Daily Note     Today's date: 2024  Patient name: Raghav Smith  : 1995  MRN: [id number]  Referring provider: Linda Rao CRNP  Dx:   Encounter Diagnosis     ICD-10-CM    1. Left hip pain  M25.552       2. Chronic left shoulder pain  M25.512     G89.29             Start Time: 1250  Stop Time: 1350  Total time in clinic (min): 60 minutes    Subjective: Raghav notes her hip has been pretty good. Notes having sacral pain today L > R. Notes B/L UT muscle pain prior to therapy.     Objective: See treatment diary below    Assessment: Decreased UT pain following manuals. Abolished hip and sacral pain following manuals. Very fatigued with L shoulder stability exercises. Able to increase squat resistance with no increased pain.     Plan: Continue per plan of care.  Progress treatment as tolerated.       Precautions: None    Manuals    LUT graston  4' 8' B/L       5'  5'   L hip PROM 8' 4' 4'       5'  5'   L hip long axis distraction          3x30\"     Sidelying and prone MET R hip flex, L hip ext               Neuro Re-Ed               TB ER   GTB  3x10          Rhythmic stabilization supine shoulder scaption position 2x30\" at 120 degrees        2x30\" at 90 degrees    2x30\" at 120 degrees 2x30\" at 90 degrees    2x30\" at 120 degrees   Lat Pull down           50#   3x10  50#   3x10   Serratus wall alphabet 2x 1.5#  2x        x1  x1   Clamshell Blue  3x10 ea Blue  3x10 ea Blue  3x10 ea       GTB 3x10 ea  GTB     3x10 ea   Lateral step ups with foam on 6\" step Blue  3x10 Blue  3x10 Blue  3x10       GTB 3x10  GTB    3x10   Squats Blue    1x10    10#  1x10     15#  1x10     20#  1x10 Blue    1x10    10#  1x10     15#  1x10     20#  1x10 Blue  20#   KB    3x10       GTB 3x10 20lb KB  25# DB    3x10   Body blade elbow at side neutral hand position  30\"x2 30\"x2 30\"x2                          Ther Ex                                                                             "   Ther Activity                                               Gait Training                                               Modalities

## 2024-12-05 NOTE — PSYCH
Visit Time    Visit Start Time: 11:30  Visit Stop Time: 12:30  Total Visit Duration:  60 minutes    Subjective: Transfer of care      Patient ID: Raghav Smith is a 29 y.o. female with Bipolar 2, PTSD and Borderline personality do.    HPI ROS Appetite Changes and Sleep: normal appetite, normal energy level, no weight change, and normal number of sleep hours    Transfer from Dr White as requested by patient.  Patient stated that she was born and raised in [facility] [place] [facility] and her parent moved to [place] when she was 5 years old and she traveled back and forth since then. SHe lives in [facility].     She lives independently and she does not have children and she stated she does not plan to get pregnant.. She stated she worries about finding the right partner or having the support to help raise a family. She is legally  but  lives independently.  She stated she is an polyamorous relationship with a non binary partner and her  is aware of it.  born and raised in [facility] and partner from [facility].  lives in [place] [facility] with roommates and her partner lives in [place].  She stated she bought her own home.  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   She stated she applied for SSI but was denied and she is planning to appeal.  She has parents and 2 sisters in [place]  She has aunts and uncles in [place] as well.     Concerns about ADHD, ADLs ability to take care of herself.   Would like neuropsychological evaluation for Autism spectrum vs ADHD.  She stopped all medications and stated she will like to restart the mood stabilizer for now.  Agrees to start Lamictal 25 mg po qam and gradually titrate dose by 25 mg weekly.  She also requested refills for Atarax prn.  Wants to hold off on restarting Clonidine or Wellbutrin.  Denies A/V hallucinations and denies SI or HI.  Mood is labile and anxious.  Agrees to schedule follow up in 4 weeks  Fasting labs  ordered.        Review Of Systems:     Mood Emotional Lability   Behavior Impulsive Behavior and Unusual Behavior   Thought Content Disturbing Thoughts, Feelings   General Emotional Problems and Decreased Functioning   Personality Change in Personality   Other Psych Symptoms Normal   Constitutional Negative   ENT Negative   Cardiovascular Negative   Respiratory Negative   Gastrointestinal Negative   Genitourinary Negative   Musculoskeletal Negative   Integumentary Negative   Neurological Negative   Endocrine Normal    Other Symptoms Normal        Laboratory Results: Recent Labs (last 12 months):   Appointment on 06/10/2024   Component Date Value    Sodium 06/10/2024 136     Potassium 06/10/2024 4.1     Chloride 06/10/2024 102     CO2 06/10/2024 25     ANION GAP 06/10/2024 9     BUN 06/10/2024 13     Creatinine 06/10/2024 0.65     Glucose, Fasting 06/10/2024 89     Calcium 06/10/2024 9.0     AST 06/10/2024 13     ALT 06/10/2024 11     Alkaline Phosphatase 06/10/2024 54     Total Protein 06/10/2024 6.8     Albumin 06/10/2024 4.4     Total Bilirubin 06/10/2024 0.46     eGFR 06/10/2024 121     WBC 06/10/2024 7.20     RBC 06/10/2024 4.14     Hemoglobin 06/10/2024 12.4     Hematocrit 06/10/2024 38.4     MCV 06/10/2024 93     MCH 06/10/2024 30.0     MCHC 06/10/2024 32.3     RDW 06/10/2024 13.2     MPV 06/10/2024 9.5     Platelets 06/10/2024 316     nRBC 06/10/2024 0     Segmented % 06/10/2024 72     Immature Grans % 06/10/2024 0     Lymphocytes % 06/10/2024 22     Monocytes % 06/10/2024 6     Eosinophils Relative 06/10/2024 0     Basophils Relative 06/10/2024 0     Absolute Neutrophils 06/10/2024 5.15     Absolute Immature Grans 06/10/2024 0.01     Absolute Lymphocytes 06/10/2024 1.55     Absolute Monocytes 06/10/2024 0.43     Eosinophils Absolute 06/10/2024 0.03     Basophils Absolute 06/10/2024 0.03     Vit D, 25-Hydroxy 06/10/2024 44.4     IGA 06/10/2024 309     TISSUE TRANSGLUTAMINASE * 06/10/2024 <2     CRP 06/10/2024  2.6     TSH 3RD GENERATON 06/10/2024 2.314     Cholesterol 06/10/2024 156     Triglycerides 06/10/2024 56     HDL, Direct 06/10/2024 46 (L)     LDL Calculated 06/10/2024 99     Non-HDL-Chol (CHOL-HDL) 06/10/2024 110     Hemoglobin A1C 06/10/2024 5.0     EAG 06/10/2024 97     N gonorrhoeae, DNA Probe 06/10/2024 Negative     Chlamydia trachomatis, D* 06/10/2024 Negative     HIV-1 p24 Antigen 06/10/2024 Non-Reactive     HIV-1 Antibody 06/10/2024 Non-Reactive     HIV-2 Antibody 06/10/2024 Non-Reactive     HIV Ag-Ab 5th Gen 06/10/2024 Non-Reactive     Hepatitis C Ab 06/10/2024 Non-reactive    Admission on 01/16/2024, Discharged on 01/16/2024   Component Date Value    WBC 01/16/2024 17.13 (H)     RBC 01/16/2024 4.42     Hemoglobin 01/16/2024 13.0     Hematocrit 01/16/2024 40.2     MCV 01/16/2024 91     MCH 01/16/2024 29.4     MCHC 01/16/2024 32.3     RDW 01/16/2024 13.1     MPV 01/16/2024 9.2     Platelets 01/16/2024 339     nRBC 01/16/2024 0     Segmented % 01/16/2024 82 (H)     Immature Grans % 01/16/2024 0     Lymphocytes % 01/16/2024 12 (L)     Monocytes % 01/16/2024 5     Eosinophils Relative 01/16/2024 1     Basophils Relative 01/16/2024 0     Absolute Neutrophils 01/16/2024 14.01 (H)     Absolute Immature Grans 01/16/2024 0.05     Absolute Lymphocytes 01/16/2024 2.11     Absolute Monocytes 01/16/2024 0.83     Eosinophils Absolute 01/16/2024 0.09     Basophils Absolute 01/16/2024 0.04     Sodium 01/16/2024 138     Potassium 01/16/2024 3.6     Chloride 01/16/2024 106     CO2 01/16/2024 24     ANION GAP 01/16/2024 8     BUN 01/16/2024 16     Creatinine 01/16/2024 0.70     Glucose 01/16/2024 104     Calcium 01/16/2024 9.7     AST 01/16/2024 13     ALT 01/16/2024 16     Alkaline Phosphatase 01/16/2024 53     Total Protein 01/16/2024 7.0     Albumin 01/16/2024 4.5     Total Bilirubin 01/16/2024 0.28     eGFR 01/16/2024 118     Lipase 01/16/2024 17     EXT Preg Test, Ur 01/16/2024 Negative     Control 01/16/2024 Valid      Color, UA 01/16/2024 Yellow     Clarity, UA 01/16/2024 Clear     pH, UA 01/16/2024 7.0     Leukocytes, UA 01/16/2024 Negative     Nitrite, UA 01/16/2024 Negative     Protein, UA 01/16/2024 Negative     Glucose, UA 01/16/2024 Negative     Ketones, UA 01/16/2024 Negative     Urobilinogen, UA 01/16/2024 1.0     Bilirubin, UA 01/16/2024 Negative     Occult Blood, UA 01/16/2024 Trace (A)     Specific [place], UA 01/16/2024 1.025     RBC, UA 01/16/2024 None Seen     WBC, UA 01/16/2024 None Seen     Epithelial Cells 01/16/2024 Occasional     Bacteria, UA 01/16/2024 Occasional     MUCUS THREADS 01/16/2024 Moderate (A)     Amorphous Crystals, UA 01/16/2024 Occasional    Office Visit on 12/29/2023   Component Date Value    LEUKOCYTE ESTERASE,UA 12/29/2023 neg     NITRITE,UA 12/29/2023 neg     SL AMB POCT UROBILINOGEN 12/29/2023 0.2     POCT URINE PROTEIN 12/29/2023 neg      PH,UA 12/29/2023 6.0     BLOOD,UA 12/29/2023 small     SPECIFIC GRAVITY,UA 12/29/2023 1.010     KETONES,UA 12/29/2023 neg     BILIRUBIN,UA 12/29/2023 neg     GLUCOSE, UA 12/29/2023 neg      COLOR,UA 12/29/2023 yellow     CLARITY,UA 12/29/2023 clear     URINE HCG 12/29/2023 neg     Urine Culture 12/29/2023 No Growth <1000 cfu/mL        Substance Abuse History:  Social History     Substance and Sexual Activity   Drug Use Not Currently    Types: Marijuana    Comment: reports medical marijuana use almost every day       Family Psychiatric History:   Family History   Problem Relation Age of Onset    Mental illness Mother     Hypertension Mother     ADD / ADHD Mother     Anxiety disorder Mother     Vitamin D deficiency Father     Prostate cancer Father     OCD Father     Diabetes Maternal Grandmother     Schizophrenia Paternal Uncle     Suicide Attempts Neg Hx     Completed Suicide  Neg Hx        The following portions of the patient's history were reviewed and updated as appropriate: allergies, current medications, past family history, past medical history,  past social history, past surgical history, and problem list.    Social History     Socioeconomic History    Marital status: /Civil Union     Spouse name: Not on file    Number of children: 0    Years of education: Not on file    Highest education level: Not on file   Occupational History    Not on file   Tobacco Use    Smoking status: Never    Smokeless tobacco: Never    Tobacco comments:     was a social smoker   Vaping Use    Vaping status: Never Used   Substance and Sexual Activity    Alcohol use: Not Currently     Alcohol/week: 1.0 - 2.0 standard drink of alcohol     Types: 1 - 2 Cans of beer per week     Comment: once per week    Drug use: Not Currently     Types: Marijuana     Comment: reports medical marijuana use almost every day    Sexual activity: Not Currently     Partners: Male, Female   Other Topics Concern    Not on file   Social History Narrative    Not on file     Social Drivers of Health     Financial Resource Strain: Low Risk  (7/28/2020)    Overall Financial Resource Strain (CARDIA)     Difficulty of Paying Living Expenses: Not hard at all   Food Insecurity: No Food Insecurity (7/28/2020)    Hunger Vital Sign     Worried About Running Out of Food in the Last Year: Never true     Ran Out of Food in the Last Year: Never true   Transportation Needs: No Transportation Needs (7/28/2020)    PRAPARE - Transportation     Lack of Transportation (Medical): No     Lack of Transportation (Non-Medical): No   Physical Activity: Inactive (9/30/2019)    Exercise Vital Sign     Days of Exercise per Week: 0 days     Minutes of Exercise per Session: 0 min   Stress: Stress Concern Present (9/30/2019)    [employer] [place] [employer] - Occupational Stress Questionnaire     Feeling of Stress : To some extent   Social Connections: Unknown (9/30/2019)    Social Connection and Isolation Panel [NHANES]     Frequency of Communication with Friends and Family: Patient declined     Frequency of Social  Gatherings with Friends and Family: Patient declined     Attends Mormonism Services: Patient declined     Active Member of Clubs or Organizations: Patient declined     Attends Club or Organization Meetings: Patient declined     Marital Status: Patient declined   Intimate Partner Violence: Unknown (9/30/2019)    Humiliation, Afraid, Rape, and Kick questionnaire     Fear of Current or Ex-Partner: Patient declined     Emotionally Abused: Patient declined     Physically Abused: Patient declined     Sexually Abused: Patient declined   Housing Stability: Not on file     Social History     Social History Narrative    Not on file       Objective:       Mental status:  Appearance calm and cooperative , adequate hygiene and grooming, and good eye contact    Mood dysphoric and anxious   Affect affect was constricted   Speech a normal rate and fluent   Thought Processes circumstantial and disorganized   Hallucinations no hallucinations present    Thought Content no delusions   Abnormal Thoughts no suicidal thoughts  and no homicidal thoughts    Orientation  oriented to person and place and time   Remote Memory short term memory intact and long term memory intact   Attention Span concentration impaired   Intellect Appears to be of Average Intelligence   Insight Limited insight   Judgement judgment was limited   Muscle Strength Muscle strength and tone were normal and Normal gait    Language no difficulty naming common objects and no difficulty repeating a phrase    Fund of Knowledge displays adequate knowledge of current events, adequate fund of knowledge regarding past history, and adequate fund of knowledge regarding vocabulary                Assessment/Plan:       Diagnoses and all orders for this visit:    High risk medication use  -     Comprehensive metabolic panel; Future  -     CBC and differential; Future  -     TSH, 3rd generation with Free T4 reflex; Future  -     Lipid Panel with Direct LDL reflex; Future  -      Vitamin D 25 hydroxy; Future    Bipolar 2 disorder (HCC)  -     lamoTRIgine (LaMICtal) 25 mg tablet; Take 1 tab po qam for 1 week then take 2 tabs po qan for 1 week then take 3 tabs po qam  -     lamoTRIgine (LaMICtal) 100 mg tablet; Take 1 tablet (100 mg total) by mouth daily Do not start before December 26, 2024.  -     hydrOXYzine HCL (ATARAX) 10 mg tablet; Take 1 tablet (10 mg total) by mouth every 6 (six) hours as needed for anxiety            Treatment Recommendations- Risks Benefits      Immediate Medical/Psychiatric/Psychotherapy Treatments and Any Precautions: as stated on HPI     Risks, Benefits And Possible Side Effects Of Medications:  {PSYCH RISK, BENEFITS AND POSSIBLE SIDE EFFECTS (Optional):28932    Controlled Medication Discussion: n/a    Psychotherapy Provided: No

## 2024-12-06 ENCOUNTER — OFFICE VISIT (OUTPATIENT)
Dept: PHYSICAL THERAPY | Facility: CLINIC | Age: 29
End: 2024-12-06
Payer: COMMERCIAL

## 2024-12-06 DIAGNOSIS — G89.29 CHRONIC LEFT SHOULDER PAIN: ICD-10-CM

## 2024-12-06 DIAGNOSIS — M25.512 CHRONIC LEFT SHOULDER PAIN: ICD-10-CM

## 2024-12-06 DIAGNOSIS — M25.552 LEFT HIP PAIN: Primary | ICD-10-CM

## 2024-12-06 PROCEDURE — 97140 MANUAL THERAPY 1/> REGIONS: CPT

## 2024-12-06 PROCEDURE — 97112 NEUROMUSCULAR REEDUCATION: CPT

## 2024-12-06 NOTE — PROGRESS NOTES
"Daily Note     Today's date: 2024  Patient name: Raghav Smith  : 1995  MRN: [id number]  Referring provider: Linda Rao CRNP  Dx:   Encounter Diagnosis     ICD-10-CM    1. Left hip pain  M25.552       2. Chronic left shoulder pain  M25.512     G89.29                  Subjective: Raghav notes her L shoulder pain after using a vacuum at work where she needs to wear a vest that keeps her in an awkward position for an extended time. Otherwise, patient notes that her hip has been pretty good.     Objective: See treatment diary below    Assessment: Pt responded well to shoulder exercises, exhibiting increased fatigue L>R secondary to fatigue. Progressed squats to box lift to improve functional strength/tolerance of activity. Pt required additional time to perform each exercise in order to maintain quality of form and have adequate rest in between sets.  Pt denied IASTM of UT due to soreness from session yesterday. Will monitor and continue nv.    Plan: Continue per plan of care.  Progress treatment as tolerated.       Precautions: None    Manuals    LUT graston  4' 8' B/L Held      5'  5'   L hip PROM 8' 4' 4' 8'      5'  5'   L hip long axis distraction          3x30\"     Sidelying and prone MET R hip flex, L hip ext               Neuro Re-Ed               TB ER   GTB  3x10 GTB 3x10         Rhythmic stabilization supine shoulder scaption position 2x30\" at 120 degrees        2x30\" at 90 degrees    2x30\" at 120 degrees 2x30\" at 90 degrees    2x30\" at 120 degrees   Lat Pull down           50#   3x10  50#   3x10   Serratus wall alphabet 2x 1.5#  2x        x1  x1   Clamshell Blue  3x10 ea Blue  3x10 ea Blue  3x10 ea Blue 3x10 ea      GTB 3x10 ea  GTB     3x10 ea   Lateral step ups with foam on 6\" step Blue  3x10 Blue  3x10 Blue  3x10 Blue 3x10      GTB 3x10  GTB    3x10   Squats Blue    1x10    10#  1x10     15#  1x10     20#  1x10 Blue    1x10    10#  1x10   " "  15#  1x10     20#  1x10 Blue  20#   KB    3x10 Blue 20# KB      1x10 box lift with 2 5lb ankle weights      GTB 3x10 20lb KB  25# DB    3x10   Body blade elbow at side neutral hand position  30\"x2 30\"x2 30\"x2 30\"x2                         Ther Ex                               Ther Activity                               Gait Training                               Modalities                                  "

## 2024-12-09 ENCOUNTER — OFFICE VISIT (OUTPATIENT)
Dept: BEHAVIORAL/MENTAL HEALTH CLINIC | Facility: CLINIC | Age: 29
End: 2024-12-09
Payer: COMMERCIAL

## 2024-12-09 DIAGNOSIS — F50.9 EATING DISORDER, UNSPECIFIED TYPE: Primary | ICD-10-CM

## 2024-12-09 PROCEDURE — 90853 GROUP PSYCHOTHERAPY: CPT

## 2024-12-09 NOTE — PSYCH
"Behavioral Health Psychotherapy Progress Note    Psychotherapy Provided: Individual Psychotherapy     1. PTSD (post-traumatic stress disorder)        2. Anxiety            Goals addressed in session: Goal 1 and Goal 2     DATA: Raghav reports to be doing okay but pay role issues at her job has her stressed. Raghav reports that she is now established with a new psychiatrist who put her on hew mood stabilizers which seem to be helping a bit. Raghav reports that \"everyday feels like a perry\". Raghav said she is still not making progress with her ADL's and she is trying to figure out how to survive. Raghav reports feeling like a burden to her family because they are helping her with finances. Raghav reports that her lack of motivation and low energy levels are in the way of her trying to do anything. Raghav reports to never feeling a sense of satisfaction or completion and that's why she struggles so much to get things done.    During this session, this clinician used the following therapeutic modalities: Client-centered Therapy, Cognitive Behavioral Therapy, Motivational Interviewing, and Supportive Psychotherapy    Substance Abuse was not addressed during this session. If the client is diagnosed with a co-occurring substance use disorder, please indicate any changes in the frequency or amount of use: N/A. Stage of change for addressing substance use diagnoses: No substance use/Not applicable    ASSESSMENT:  Raghav Smith presents with a Euthymic/ normal mood.    her affect is Normal range and intensity, which is congruent, with her mood and the content of the session. The client has made progress on their goals.    Raghav Smith presents with a none risk of suicide, none risk of self-harm, and none risk of harm to others.    For any risk assessment that surpasses a \"low\" rating, a safety plan must be developed.    A safety plan was indicated: yes  If yes, describe in detail - " N/A    PLAN: Between sessions, Raghav Smith will prioritize to do list. . At the next session, the therapist will use Client-centered Therapy, Cognitive Behavioral Therapy, Cognitive Processing Therapy, Motivational Interviewing, Solution-Focused Therapy, and Supportive Psychotherapy to address anxiety.    Behavioral Health Treatment Plan and Discharge Planning: Raghav Smith is aware of and agrees to continue to work on their treatment plan. They have identified and are working toward their discharge goals. yes    Visit start and stop times:    12/05/24  Start Time: 1500  Stop Time: 1600  Total Visit Time: 60 minutes

## 2024-12-10 ENCOUNTER — APPOINTMENT (OUTPATIENT)
Dept: LAB | Age: 29
End: 2024-12-10
Payer: COMMERCIAL

## 2024-12-10 ENCOUNTER — OFFICE VISIT (OUTPATIENT)
Dept: PHYSICAL THERAPY | Facility: CLINIC | Age: 29
End: 2024-12-10
Payer: COMMERCIAL

## 2024-12-10 ENCOUNTER — SOCIAL WORK (OUTPATIENT)
Dept: BEHAVIORAL/MENTAL HEALTH CLINIC | Facility: CLINIC | Age: 29
End: 2024-12-10
Payer: COMMERCIAL

## 2024-12-10 DIAGNOSIS — G89.29 CHRONIC LEFT SHOULDER PAIN: ICD-10-CM

## 2024-12-10 DIAGNOSIS — Z79.899 HIGH RISK MEDICATION USE: ICD-10-CM

## 2024-12-10 DIAGNOSIS — M25.512 CHRONIC LEFT SHOULDER PAIN: ICD-10-CM

## 2024-12-10 DIAGNOSIS — F43.10 PTSD (POST-TRAUMATIC STRESS DISORDER): Primary | ICD-10-CM

## 2024-12-10 DIAGNOSIS — M25.552 LEFT HIP PAIN: Primary | ICD-10-CM

## 2024-12-10 DIAGNOSIS — F41.9 ANXIETY: ICD-10-CM

## 2024-12-10 LAB
25(OH)D3 SERPL-MCNC: 17 NG/ML (ref 30–100)
BASOPHILS # BLD AUTO: 0.06 THOUSANDS/ÂΜL (ref 0–0.1)
BASOPHILS NFR BLD AUTO: 1 % (ref 0–1)
EOSINOPHIL # BLD AUTO: 0.04 THOUSAND/ÂΜL (ref 0–0.61)
EOSINOPHIL NFR BLD AUTO: 1 % (ref 0–6)
ERYTHROCYTE [DISTWIDTH] IN BLOOD BY AUTOMATED COUNT: 13.2 % (ref 11.6–15.1)
HCT VFR BLD AUTO: 41.5 % (ref 34.8–46.1)
HGB BLD-MCNC: 13.3 G/DL (ref 11.5–15.4)
IMM GRANULOCYTES # BLD AUTO: 0.02 THOUSAND/UL (ref 0–0.2)
IMM GRANULOCYTES NFR BLD AUTO: 0 % (ref 0–2)
LYMPHOCYTES # BLD AUTO: 1.77 THOUSANDS/ÂΜL (ref 0.6–4.47)
LYMPHOCYTES NFR BLD AUTO: 24 % (ref 14–44)
MCH RBC QN AUTO: 29.8 PG (ref 26.8–34.3)
MCHC RBC AUTO-ENTMCNC: 32 G/DL (ref 31.4–37.4)
MCV RBC AUTO: 93 FL (ref 82–98)
MONOCYTES # BLD AUTO: 0.55 THOUSAND/ÂΜL (ref 0.17–1.22)
MONOCYTES NFR BLD AUTO: 7 % (ref 4–12)
NEUTROPHILS # BLD AUTO: 4.98 THOUSANDS/ÂΜL (ref 1.85–7.62)
NEUTS SEG NFR BLD AUTO: 67 % (ref 43–75)
NRBC BLD AUTO-RTO: 0 /100 WBCS
PLATELET # BLD AUTO: 327 THOUSANDS/UL (ref 149–390)
PMV BLD AUTO: 9.7 FL (ref 8.9–12.7)
RBC # BLD AUTO: 4.47 MILLION/UL (ref 3.81–5.12)
TSH SERPL DL<=0.05 MIU/L-ACNC: 1.52 UIU/ML (ref 0.45–4.5)
WBC # BLD AUTO: 7.42 THOUSAND/UL (ref 4.31–10.16)

## 2024-12-10 PROCEDURE — 97140 MANUAL THERAPY 1/> REGIONS: CPT

## 2024-12-10 PROCEDURE — 97112 NEUROMUSCULAR REEDUCATION: CPT

## 2024-12-10 PROCEDURE — 82306 VITAMIN D 25 HYDROXY: CPT

## 2024-12-10 PROCEDURE — 97110 THERAPEUTIC EXERCISES: CPT

## 2024-12-10 PROCEDURE — 80053 COMPREHEN METABOLIC PANEL: CPT

## 2024-12-10 PROCEDURE — 90834 PSYTX W PT 45 MINUTES: CPT

## 2024-12-10 PROCEDURE — 36415 COLL VENOUS BLD VENIPUNCTURE: CPT

## 2024-12-10 PROCEDURE — 80061 LIPID PANEL: CPT

## 2024-12-10 PROCEDURE — 84443 ASSAY THYROID STIM HORMONE: CPT

## 2024-12-10 PROCEDURE — 85025 COMPLETE CBC W/AUTO DIFF WBC: CPT

## 2024-12-10 NOTE — PSYCH
"Behavioral Health Psychotherapy Progress Note    Psychotherapy Provided: Individual Psychotherapy     1. PTSD (post-traumatic stress disorder)        2. Anxiety            Goals addressed in session: Goal 1 and Goal 2     DATA: Raghav was late for session. 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 Raghav reports to feeling very overwhelmed and has been having a hard time asking others for help.  Raghav said \"I need sustainable help and not help that is just going to push me\" Raghav reports to feeling shame and guilt for asking for help and also a burden. Raghav reports feeling tired and feels emotionally responsible for others. Raghav reports to be struggling with remembering to eat as it feels more like a chore for her these days. Raghav said \"the idea of eating is overwhelming\". Raghav said \"I don't ask for help because I do not want my family to suffer because of me\". Raghav reports to always feeling like she is struggling with in invisible illness and masking her feelings.    During this session, this clinician used the following therapeutic modalities: Client-centered Therapy, Cognitive Behavioral Therapy, Motivational Interviewing, Solution-Focused Therapy, and Supportive Psychotherapy    Substance Abuse was not addressed during this session. If the client is diagnosed with a co-occurring substance use disorder, please indicate any changes in the frequency or amount of use: N/A. Stage of change for addressing substance use diagnoses: No substance use/Not applicable    ASSESSMENT:  Raghav Smith presents with a Euthymic/ normal mood.     her affect is Normal range and intensity and Tearful, which is congruent, with her mood and the content of the session. The client has made progress on their goals.    Raghav Smith presents with a minimal risk of suicide, minimal risk of self-harm, and minimal " "risk of harm to others.    For any risk assessment that surpasses a \"low\" rating, a safety plan must be developed.    A safety plan was indicated: no  If yes, describe in detail - N/A    PLAN: Between sessions, Raghav Smith will prioritize to do list. At the next session, the therapist will use Cognitive Behavioral Therapy, Motivational Interviewing, Solution-Focused Therapy, and Supportive Psychotherapy to address anxiety.    Behavioral Health Treatment Plan and Discharge Planning: Raghav Smith is aware of and agrees to continue to work on their treatment plan. They have identified and are working toward their discharge goals. yes    Depression Follow-up Plan Completed: Not applicable    Visit start and stop times:    12/10/24  Start Time: 1117  Stop Time: 1203  Total Visit Time: 46 minutes  "

## 2024-12-10 NOTE — PROGRESS NOTES
"Daily Note     Today's date: 12/10/2024  Patient name: Raghav Smith  : 1995  MRN: [id number]  Referring provider: Linda Rao CRNP  Dx:   Encounter Diagnosis     ICD-10-CM    1. Left hip pain  M25.552       2. Chronic left shoulder pain  M25.512     G89.29                  Subjective: Patient notes no significant change since last visit. She has intermittent hip pain depending on positioning. Shoulder pain is controlled with basic activity. Still limited from sport activity because of shoulder pain.     Objective: See treatment diary below    Assessment: Pt demonstrates normal kinematics with forward flexion test and marchers test. Performed muscle energy techniques for further development of neuromuscular control. Good response with glute med activation. Pt would benefit form continues RTC strengthening to address functional limitations.     Plan: Continue per plan of care.  Progress treatment as tolerated.       Precautions: None    Manuals 11/26 11/29 12/5 12/6 12/10     11/19  11/21   LUT graston  4' 8' B/L Held 4'     5'  5'   L hip PROM 8' 4' 4' 8' 4'     5'  5'   L hip long axis distraction     5x30\"      3x30\"     Sidelying and prone MET R hip flex, L hip ext     10x5\"          Neuro Re-Ed               TB ER   GTB  3x10 GTB 3x10 GTB 3x10        Rhythmic stabilization supine shoulder scaption position 2x30\" at 120 degrees        2x30\" at 90 degrees    2x30\" at 120 degrees 2x30\" at 90 degrees    2x30\" at 120 degrees   Lat Pull down           50#   3x10  50#   3x10   Serratus wall alphabet 2x 1.5#  2x        x1  x1   Clamshell Blue  3x10 ea Blue  3x10 ea Blue  3x10 ea Blue 3x10 ea Blue 3x10 ea     GTB 3x10 ea  GTB     3x10 ea   Lateral step ups with foam on 6\" step Blue  3x10 Blue  3x10 Blue  3x10 Blue 3x10 Blue 3x10     GTB 3x10  GTB    3x10   Squats Blue    1x10    10#  1x10     15#  1x10     20#  1x10 Blue    1x10    10#  1x10     15#  1x10     20#  1x10 Blue  20#   KB    3x10 Blue 20# KB  " "    1x10 box lift with 2 5lb ankle weights [place] KB 3x10     GTB 3x10 20lb KB  25# DB    3x10   Body blade elbow at side neutral hand position  30\"x2 30\"x2 30\"x2 30\"x2 30\"x3                        Ther Ex                               Ther Activity                               Gait Training                               Modalities                                  "

## 2024-12-11 ENCOUNTER — TELEPHONE (OUTPATIENT)
Dept: PSYCHIATRY | Facility: CLINIC | Age: 29
End: 2024-12-11

## 2024-12-11 DIAGNOSIS — E55.9 VITAMIN D DEFICIENCY: Primary | ICD-10-CM

## 2024-12-11 LAB
ALBUMIN SERPL BCG-MCNC: 4.5 G/DL (ref 3.5–5)
ALP SERPL-CCNC: 57 U/L (ref 34–104)
ALT SERPL W P-5'-P-CCNC: 22 U/L (ref 7–52)
ANION GAP SERPL CALCULATED.3IONS-SCNC: 8 MMOL/L (ref 4–13)
AST SERPL W P-5'-P-CCNC: 15 U/L (ref 13–39)
BILIRUB SERPL-MCNC: 0.43 MG/DL (ref 0.2–1)
BUN SERPL-MCNC: 18 MG/DL (ref 5–25)
CALCIUM SERPL-MCNC: 9.4 MG/DL (ref 8.4–10.2)
CHLORIDE SERPL-SCNC: 104 MMOL/L (ref 96–108)
CHOLEST SERPL-MCNC: 190 MG/DL (ref ?–200)
CO2 SERPL-SCNC: 26 MMOL/L (ref 21–32)
CREAT SERPL-MCNC: 0.7 MG/DL (ref 0.6–1.3)
GFR SERPL CREATININE-BSD FRML MDRD: 117 ML/MIN/1.73SQ M
GLUCOSE P FAST SERPL-MCNC: 83 MG/DL (ref 65–99)
HDLC SERPL-MCNC: 50 MG/DL
LDLC SERPL CALC-MCNC: 127 MG/DL (ref 0–100)
POTASSIUM SERPL-SCNC: 4.7 MMOL/L (ref 3.5–5.3)
PROT SERPL-MCNC: 7.1 G/DL (ref 6.4–8.4)
SODIUM SERPL-SCNC: 138 MMOL/L (ref 135–147)
TRIGL SERPL-MCNC: 66 MG/DL (ref ?–150)

## 2024-12-11 RX ORDER — ERGOCALCIFEROL 1.25 MG/1
50000 CAPSULE, LIQUID FILLED ORAL 3 TIMES WEEKLY
Qty: 12 CAPSULE | Refills: 0 | Status: SHIPPED | OUTPATIENT
Start: 2024-12-11

## 2024-12-11 NOTE — TELEPHONE ENCOUNTER
Called and left  requesting a call back to inform her that Dr. Delgadillo sent a script for vitamin D2 as her level came back low.

## 2024-12-12 ENCOUNTER — OFFICE VISIT (OUTPATIENT)
Dept: FAMILY MEDICINE CLINIC | Facility: CLINIC | Age: 29
End: 2024-12-12
Payer: COMMERCIAL

## 2024-12-12 ENCOUNTER — OFFICE VISIT (OUTPATIENT)
Dept: PHYSICAL THERAPY | Facility: CLINIC | Age: 29
End: 2024-12-12
Payer: COMMERCIAL

## 2024-12-12 VITALS
SYSTOLIC BLOOD PRESSURE: 124 MMHG | WEIGHT: 154.2 LBS | BODY MASS INDEX: 30.27 KG/M2 | HEART RATE: 86 BPM | DIASTOLIC BLOOD PRESSURE: 63 MMHG | OXYGEN SATURATION: 98 % | TEMPERATURE: 97.4 F | HEIGHT: 60 IN | RESPIRATION RATE: 16 BRPM

## 2024-12-12 DIAGNOSIS — Z23 ENCOUNTER FOR IMMUNIZATION: ICD-10-CM

## 2024-12-12 DIAGNOSIS — F31.81 BIPOLAR 2 DISORDER (HCC): ICD-10-CM

## 2024-12-12 DIAGNOSIS — Z12.4 CERVICAL CANCER SCREENING: ICD-10-CM

## 2024-12-12 DIAGNOSIS — E55.9 VITAMIN D DEFICIENCY: ICD-10-CM

## 2024-12-12 DIAGNOSIS — M25.512 CHRONIC LEFT SHOULDER PAIN: ICD-10-CM

## 2024-12-12 DIAGNOSIS — F41.9 ANXIETY: ICD-10-CM

## 2024-12-12 DIAGNOSIS — E66.811 CLASS 1 OBESITY WITHOUT SERIOUS COMORBIDITY WITH BODY MASS INDEX (BMI) OF 30.0 TO 30.9 IN ADULT, UNSPECIFIED OBESITY TYPE: ICD-10-CM

## 2024-12-12 DIAGNOSIS — E78.00 PURE HYPERCHOLESTEROLEMIA: Primary | ICD-10-CM

## 2024-12-12 DIAGNOSIS — G89.29 CHRONIC LEFT SHOULDER PAIN: ICD-10-CM

## 2024-12-12 DIAGNOSIS — M25.552 LEFT HIP PAIN: Primary | ICD-10-CM

## 2024-12-12 PROCEDURE — 99214 OFFICE O/P EST MOD 30 MIN: CPT | Performed by: FAMILY MEDICINE

## 2024-12-12 PROCEDURE — 90656 IIV3 VACC NO PRSV 0.5 ML IM: CPT | Performed by: FAMILY MEDICINE

## 2024-12-12 PROCEDURE — 97112 NEUROMUSCULAR REEDUCATION: CPT

## 2024-12-12 PROCEDURE — 90471 IMMUNIZATION ADMIN: CPT | Performed by: FAMILY MEDICINE

## 2024-12-12 NOTE — ASSESSMENT & PLAN NOTE
Newly diagnosed.  Uncontrolled.  LFTs normal.  Total cholesterol went up to 190 from 156 from June of this year, triglycerides went up to 66 from 56, HDL went up to 50 from 46 and her LDL went up to 127 from 99.  Patient asserts that she has been eating outside lately secondary to an ongoing food shopping.  Discussed with patient.  Patient advised appropriately.  Patient advised less fats starches and sweets.  Lose weight with a better diet and exercise.  Recheck labs in 6 months.

## 2024-12-12 NOTE — PROGRESS NOTES
Name: Raghav Smith      : 1995      MRN: [id number]  Encounter Provider: Marissa Barron MD  Encounter Date: 2024   Encounter department: [facility]  :  Assessment & Plan  Pure hypercholesterolemia  Newly diagnosed.  Uncontrolled.  LFTs normal.  Total cholesterol went up to 190 from 156 from Angélica of this year, triglycerides went up to 66 from 56, HDL went up to 50 from 46 and her LDL went up to 127 from 99.  Patient asserts that she has been eating outside lately secondary to an ongoing food shopping.  Discussed with patient.  Patient advised appropriately.  Patient advised less fats starches and sweets.  Lose weight with a better diet and exercise.  Recheck labs in 6 months.       Vitamin D deficiency  Low at 17.0.  Was 44.4 back in .  Patient advised to replenish with vitamin D3 2 to 3000 units daily.  Take multivitamin also.  And recheck labs in 6 months.       Bipolar 2 disorder (HCC)  Sees psychiatry and also sees a therapist.  Patient asserts she is doing well.  Denies SI HI.  Continue current therapy.  Follow-up with psychiatry and therapy as scheduled.       Anxiety  Please see the above under bipolar disorder.       Class 1 obesity without serious comorbidity with body mass index (BMI) of 30.0 to 30.9 in adult, unspecified obesity type    Advised patient to lose weight with a better diet and exercise.       Cervical cancer screening  Patient sent to GYN for her annual/Pap.  Orders:  •  Ambulatory Referral to Obstetrics / Gynecology; Future    Encounter for immunization  Patient received the flu vaccine with us today.  Orders:  •  influenza vaccine preservative-free 0.5 mL IM (Fluzone, Afluria, Fluarix, Flulaval)        RTO 3 months for her annual physical.  Patient can see me prior to that for thing else in between.         History of Present Illness     29-year-old female, new patient to me but not the practice, here to be evaluated for her  hyperlipidemia and vitamin D.  Patient did blood work recently.  Patient is amenable to getting the flu vaccine with us today.  No history of adverse reaction to vaccines in the past.  Patient denies pregnancy.  Patient denies tobacco.  Patient is seen by psychiatry and she also sees a therapist for her bipolar disorder and anxiety.  Patient is stable on her current therapy.  Patient denies SI HI.  Patient also asserts that she is being worked up for ADHD.      Review of Systems   Constitutional:  Negative for chills, fatigue, fever and unexpected weight change.   HENT:  Negative for congestion and sore throat.    Eyes:  Negative for visual disturbance.   Respiratory:  Negative for cough and shortness of breath.    Cardiovascular:  Negative for chest pain and palpitations.   Gastrointestinal:  Negative for abdominal pain.   Genitourinary:  Negative for dysuria.   Neurological:  Negative for dizziness and headaches.   Psychiatric/Behavioral:  Positive for dysphoric mood. Negative for self-injury and suicidal ideas. The patient is nervous/anxious.        Objective   /63 (BP Location: Left arm, Patient Position: Sitting, Cuff Size: Adult)   Pulse 86   Temp (!) 97.4 °F (36.3 °C) (Temporal)   Resp 16   Ht 5' (1.524 m)   Wt 69.9 kg (154 lb 3.2 oz)   SpO2 98%   BMI 30.12 kg/m²      Physical Exam  Vitals reviewed.   Constitutional:       General: She is not in acute distress.     Appearance: Normal appearance. She is obese. She is not ill-appearing.   HENT:      Mouth/Throat:      Mouth: Mucous membranes are moist.   Cardiovascular:      Rate and Rhythm: Normal rate and regular rhythm.   Pulmonary:      Effort: Pulmonary effort is normal.      Breath sounds: Normal breath sounds.   Neurological:      General: No focal deficit present.      Mental Status: She is alert and oriented to person, place, and time.   Psychiatric:         Mood and Affect: Mood normal.         Behavior: Behavior normal.         Thought  Content: Thought content normal.

## 2024-12-12 NOTE — PROGRESS NOTES
"Daily Note     Today's date: 2024  Patient name: Raghav Smith  : 1995  MRN: [id number]  Referring provider: Linda Rao CRNP  Dx:   Encounter Diagnosis     ICD-10-CM    1. Left hip pain  M25.552       2. Chronic left shoulder pain  M25.512     G89.29           Start Time: 1138  Stop Time: 1225  Total time in clinic (min): 47 minutes    Subjective: Patient reports having constant and occasional sharp pains in her L hip yesterday at work. Notes no cervical pain at rest and minimal pain with movement.     Objective: See treatment diary below    Assessment: Patient continues to response well to manual therapy noting decreased pain. Raghav is making steady progress with strength and control. Pt would benefit form continues RTC strengthening to address functional limitations.     Plan: Continue per plan of care.  Progress treatment as tolerated.       Precautions: None    Manuals 11/26 11/29 12/5 12/6 12/10 12/12       LUT graston  4' 8' B/L Held 4' 4'       L hip PROM 8' 4' 4' 8' 4' 4'       L hip long axis distraction     5x30\"         Sidelying and prone MET R hip flex, L hip ext     10x5\" 10x5\"       Neuro Re-Ed             TB ER   GTB  3x10 GTB 3x10 GTB 3x10 GTB  3x10       Rhythmic stabilization supine shoulder scaption position 2x30\" at 120 degrees            Lat Pull down              Serratus wall alphabet 2x 1.5#  2x           Clamshell Blue  3x10 ea Blue  3x10 ea Blue  3x10 ea Blue 3x10 ea Blue 3x10 ea Blue  3x10 ea       Lateral step ups with foam on 6\" step Blue  3x10 Blue  3x10 Blue  3x10 Blue 3x10 Blue 3x10 8\" step  Blue  3x10       Squats Blue    1x10    10#  1x10     15#  1x10     20#  1x10 Blue    1x10    10#  1x10     15#  1x10     20#  1x10 Blue  20#   KB    3x10 Blue 20# KB      1x10 box lift with 2 5lb ankle weights [place] KB 3x10 Orange   30#  KB   3x10       Body blade elbow at side neutral hand position  30\"x2 30\"x2 30\"x2 30\"x2 30\"x3 30\"x3                     Ther Ex         "                   Ther Activity                           Gait Training                           Modalities

## 2024-12-12 NOTE — ASSESSMENT & PLAN NOTE
Low at 17.0.  Was 44.4 back in June.  Patient advised to replenish with vitamin D3 2 to 3000 units daily.  Take multivitamin also.  And recheck labs in 6 months.

## 2024-12-17 ENCOUNTER — OFFICE VISIT (OUTPATIENT)
Dept: PHYSICAL THERAPY | Facility: CLINIC | Age: 29
End: 2024-12-17
Payer: COMMERCIAL

## 2024-12-17 ENCOUNTER — SOCIAL WORK (OUTPATIENT)
Dept: BEHAVIORAL/MENTAL HEALTH CLINIC | Facility: CLINIC | Age: 29
End: 2024-12-17
Payer: COMMERCIAL

## 2024-12-17 DIAGNOSIS — M25.552 LEFT HIP PAIN: Primary | ICD-10-CM

## 2024-12-17 DIAGNOSIS — G89.29 CHRONIC LEFT SHOULDER PAIN: ICD-10-CM

## 2024-12-17 DIAGNOSIS — F43.10 PTSD (POST-TRAUMATIC STRESS DISORDER): Primary | ICD-10-CM

## 2024-12-17 DIAGNOSIS — F41.9 ANXIETY: ICD-10-CM

## 2024-12-17 DIAGNOSIS — M25.512 CHRONIC LEFT SHOULDER PAIN: ICD-10-CM

## 2024-12-17 PROCEDURE — 97110 THERAPEUTIC EXERCISES: CPT

## 2024-12-17 PROCEDURE — 97112 NEUROMUSCULAR REEDUCATION: CPT

## 2024-12-17 PROCEDURE — 90834 PSYTX W PT 45 MINUTES: CPT

## 2024-12-17 NOTE — PROGRESS NOTES
"Daily Note     Today's date: 2024  Patient name: Raghav Smith  : 1995  MRN: [id number]  Referring provider: Linda Rao CRNP  Dx:   Encounter Diagnosis     ICD-10-CM    1. Left hip pain  M25.552       2. Chronic left shoulder pain  M25.512     G89.29                      Subjective: Patient reports having hip pain only with end range sitting positions.     Objective: See treatment diary below    Assessment: Pt tolerates session well. No progressions made to POC. Focused on end range stretching and tolerance. Added butterfly stretch to HEP.     Plan: Continue per plan of care.  Progress treatment as tolerated.       Precautions: None    Manuals 11/26 11/29 12/5 12/6 12/10 12/12 12/17      LUT graston  4' 8' B/L Held 4' 4'       L hip PROM 8' 4' 4' 8' 4' 4' 8'      L hip long axis distraction     5x30\"   10x30\"      Sidelying and prone MET R hip flex, L hip ext     10x5\" 10x5\"       Neuro Re-Ed             TB ER   GTB  3x10 GTB 3x10 GTB 3x10 GTB  3x10 GTB  3x10      Rhythmic stabilization supine shoulder scaption position 2x30\" at 120 degrees            Lat Pull down              Serratus wall alphabet 2x 1.5#  2x           Clamshell Blue  3x10 ea Blue  3x10 ea Blue  3x10 ea Blue 3x10 ea Blue 3x10 ea Blue  3x10 ea Blue  3x10 ea      Lateral step ups with foam on 6\" step Blue  3x10 Blue  3x10 Blue  3x10 Blue 3x10 Blue 3x10 8\" step  Blue  3x10 8\" step  Blue  3x10      Squats Blue    1x10    10#  1x10     15#  1x10     20#  1x10 Blue    1x10    10#  1x10     15#  1x10     20#  1x10 Blue  20#   KB    3x10 Blue 20# KB      1x10 box lift with 2 5lb ankle weights [place] KB 3x10 Orange   30#  KB   3x10 Orange   30#  KB   3x10      Body blade elbow at side neutral hand position  30\"x2 30\"x2 30\"x2 30\"x2 30\"x3 30\"x3 30\"x3                    Ther Ex                           Ther Activity                           Gait Training                           Modalities                              "

## 2024-12-18 NOTE — PSYCH
"Behavioral Health Psychotherapy Progress Note    Psychotherapy Provided: Individual Psychotherapy     1. PTSD (post-traumatic stress disorder)        2. Anxiety            Goals addressed in session: Goal 1 and Goal 2     DATA: Raghav was late for session. Raghav reports not knowing how she feels as she has no sense of satisfaction in her life right now. Raghav said her sister and dad came to help her clean her home as a way to help her get things in order and she still feels like nothing has been done. Raghav reports struggling with depersonalization and feels like her body is a lot right now as she can feel a lot of different sensations going, Raghav reports struggling with negative self talk and feels like she is bothering others by asking for help. Raghav reports being really anxious about [place] and wants to find out things she needs right now to make her feel better over all. Therapist and Raghav engaged in positive self talk techniques.     During this session, this clinician used the following therapeutic modalities: Client-centered Therapy and Supportive Psychotherapy    Substance Abuse was not addressed during this session. If the client is diagnosed with a co-occurring substance use disorder, please indicate any changes in the frequency or amount of use: N/A. Stage of change for addressing substance use diagnoses: No substance use/Not applicable    ASSESSMENT:  Raghav Smith presents with a Euthymic/ normal mood.     her affect is Normal range and intensity, which is congruent, with her mood and the content of the session. The client has made progress on their goals.    Raghav Smith presents with a none risk of suicide, none risk of self-harm, and none risk of harm to others.    For any risk assessment that surpasses a \"low\" rating, a safety plan must be developed.    A safety plan was indicated: no  If yes, describe in detail - N/A    PLAN: Between sessions, Raghav BERTRAND" Luis will practice positive self talk techniques. At the next session, the therapist will use Client-centered Therapy, Motivational Interviewing, Solution-Focused Therapy, and Supportive Psychotherapy to address anxiety.    Behavioral Health Treatment Plan and Discharge Planning: Raghav Smith is aware of and agrees to continue to work on their treatment plan. They have identified and are working toward their discharge goals. yes    Depression Follow-up Plan Completed: Not applicable    Visit start and stop times:    12/17/24  Start Time: 1116  Stop Time: 1204  Total Visit Time: 48 minutes

## 2024-12-18 NOTE — PSYCH
"Behavioral Health Psychotherapy Progress Note    Psychotherapy Provided: Group Therapy    1. Eating disorder, unspecified type          Goals addressed in session: Goal 1     DATA: Raghav arrived for her group session today. The facilitator led an icebreaker activity in order for the group members to introduce themselves because there was a new member joining the group. The group discussed what they have learned thus far in group, identifying different topics and sharing with each member. The group members were given the opportunity to refect upon wins and challenges over the past week and how they will overcome them for next week.   During this session, this clinician used the following therapeutic modalities: Client-centered Therapy and Supportive Psychotherapy    Substance Abuse was not addressed during this session. If the client is diagnosed with a co-occurring substance use disorder, please indicate any changes in the frequency or amount of use: n/a. Stage of change for addressing substance use diagnoses: No substance use/Not applicable    ASSESSMENT:  Raghav Smith presents with a Anxious mood. her affect is Normal range and intensity and Bright, which is congruent, with her mood and the content of the session. The client has made progress on their goals. Raghav was open to sharing her struggles and making a plan for next week in order to succeed. Raghav Smith presents with a none risk of suicide, none risk of self-harm, and none risk of harm to others.    For any risk assessment that surpasses a \"low\" rating, a safety plan must be developed.    A safety plan was indicated: no  If yes, describe in detail n/a    PLAN: Between sessions, Raghav Smith will continue practicing mindfulness when eating. At the next session, the therapist will use Client-centered Therapy and Supportive Psychotherapy to address her disorganized eating behaviors.    Behavioral Health Treatment Plan and Discharge " Planning: Raghav Smith is aware of and agrees to continue to work on their treatment plan. They have identified and are working toward their discharge goals. yes    Depression Follow-up Plan Completed: Not applicable    Visit start and stop times:    12/09/24  Start Time: 1510  Stop Time: 1600  Total Visit Time: 50 minutes

## 2024-12-19 ENCOUNTER — OFFICE VISIT (OUTPATIENT)
Dept: PHYSICAL THERAPY | Facility: CLINIC | Age: 29
End: 2024-12-19
Payer: COMMERCIAL

## 2024-12-19 DIAGNOSIS — G89.29 CHRONIC LEFT SHOULDER PAIN: ICD-10-CM

## 2024-12-19 DIAGNOSIS — M25.552 LEFT HIP PAIN: Primary | ICD-10-CM

## 2024-12-19 DIAGNOSIS — M25.512 CHRONIC LEFT SHOULDER PAIN: ICD-10-CM

## 2024-12-19 PROCEDURE — 97112 NEUROMUSCULAR REEDUCATION: CPT

## 2024-12-19 NOTE — PROGRESS NOTES
"Daily Note     Today's date: 2024  Patient name: Raghav Smith  : 1995  MRN: [id number]  Referring provider: Linda Rao CRNP  Dx:   Encounter Diagnosis     ICD-10-CM    1. Left hip pain  M25.552       2. Chronic left shoulder pain  M25.512     G89.29           Start Time: 1110  Stop Time: 1200  Total time in clinic (min): 50 minutes    Subjective: Patient reports no neck pain prior to therapy. Raghav notes random hip pain when getting out of her car and at end range.     Objective: See treatment diary below    Assessment: Patient demonstrates full L hip PROM with slight ER limitations. Good form demonstrated t/o therapy. Progressing well with strength.     Plan: Continue per plan of care.  Progress treatment as tolerated.       Precautions: None    Manuals 11/26 11/29 12/5 12/6 12/10 12/12 12/17 12/19     LUT graston  4' 8' B/L Held 4' 4'       L hip PROM 8' 4' 4' 8' 4' 4' 8' 8'     L hip long axis distraction     5x30\"   10x30\" 10x30\"     Sidelying and prone MET R hip flex, L hip ext     10x5\" 10x5\"       Neuro Re-Ed             TB ER   GTB  3x10 GTB 3x10 GTB 3x10 GTB  3x10 GTB  3x10 GTB  3x10     Rhythmic stabilization supine shoulder scaption position 2x30\" at 120 degrees            Lat Pull down              Serratus wall alphabet 2x 1.5#  2x           Clamshell Blue  3x10 ea Blue  3x10 ea Blue  3x10 ea Blue 3x10 ea Blue 3x10 ea Blue  3x10 ea Blue  3x10 ea Blue  3x10 ea     Lateral step ups with foam on 6\" step Blue  3x10 Blue  3x10 Blue  3x10 Blue 3x10 Blue 3x10 8\" step  Blue  3x10 8\" step  Blue  3x10 8\" step  Blue  3x10     Squats Blue    1x10    10#  1x10     15#  1x10     20#  1x10 Blue    1x10    10#  1x10     15#  1x10     20#  1x10 Blue  20#   KB    3x10 Blue 20# KB      1x10 box lift with 2 5lb ankle weights [place] KB 3x10 Orange   30#  KB   3x10 Orange   30#  KB   3x10 Orange   30#  KB   3x10     Body blade elbow at side neutral hand position  30\"x2 30\"x2 30\"x2 30\"x2 30\"x3 30\"x3 " "30\"x3 30\"x3                   Ther Ex                           Ther Activity                           Gait Training                           Modalities                              "

## 2024-12-23 ENCOUNTER — TELEPHONE (OUTPATIENT)
Dept: BEHAVIORAL/MENTAL HEALTH CLINIC | Facility: CLINIC | Age: 29
End: 2024-12-23

## 2024-12-23 ENCOUNTER — OFFICE VISIT (OUTPATIENT)
Dept: BEHAVIORAL/MENTAL HEALTH CLINIC | Facility: CLINIC | Age: 29
End: 2024-12-23
Payer: COMMERCIAL

## 2024-12-23 DIAGNOSIS — F41.9 ANXIETY: Primary | ICD-10-CM

## 2024-12-23 PROCEDURE — 90837 PSYTX W PT 60 MINUTES: CPT

## 2024-12-23 NOTE — TELEPHONE ENCOUNTER
Pt called back and [facility]

## 2024-12-23 NOTE — TELEPHONE ENCOUNTER
Provider LVM to ask patient to confirm they will be at group tonight.      The facilitators may cancel group if not enough patients show tonight.

## 2024-12-24 ENCOUNTER — EVALUATION (OUTPATIENT)
Dept: PHYSICAL THERAPY | Facility: CLINIC | Age: 29
End: 2024-12-24
Payer: COMMERCIAL

## 2024-12-24 DIAGNOSIS — M25.512 CHRONIC LEFT SHOULDER PAIN: ICD-10-CM

## 2024-12-24 DIAGNOSIS — G89.29 CHRONIC LEFT SHOULDER PAIN: ICD-10-CM

## 2024-12-24 DIAGNOSIS — M25.552 LEFT HIP PAIN: Primary | ICD-10-CM

## 2024-12-24 DIAGNOSIS — F31.81 BIPOLAR 2 DISORDER (HCC): ICD-10-CM

## 2024-12-24 PROCEDURE — 97112 NEUROMUSCULAR REEDUCATION: CPT

## 2024-12-24 PROCEDURE — 97164 PT RE-EVAL EST PLAN CARE: CPT

## 2024-12-24 PROCEDURE — 97140 MANUAL THERAPY 1/> REGIONS: CPT

## 2024-12-24 RX ORDER — LAMOTRIGINE 100 MG/1
100 TABLET ORAL DAILY
Qty: 30 TABLET | Refills: 0 | Status: SHIPPED | OUTPATIENT
Start: 2024-12-26

## 2024-12-24 NOTE — PROGRESS NOTES
PT Re-Evaluation     Today's date:   Patient name: Raghav Smith  : 1995  MRN: [id number]  Referring provider: Linda Rao CRNP  Dx:   Encounter Diagnosis     ICD-10-CM    1. Left hip pain  M25.552       2. Chronic left shoulder pain  M25.512     G89.29         Subjective: Raghav reports for the past two days she has had more pain. Especially in her neck and shoulder. She has some pain behind or underneath her shoulder blade. She cannot think of aggravating activity. She also reports increased hip pain. Particularly with getting out of the car.       Objective: See treatment diary below    L shoulder AROM  Flexion: 170 pain at end range  Abduction: slight UT compensation 170   BTB ER: T2  BTB IR: T9    L Shoulder Strength:  Flexion 4/5   ABD: 4/5  IR (0) 4+/5  IR (90) 4/5  ER (0): 4/5  ER (90) 4/5    L hip PROM  Flexion: 120 degrees   Extension: 20 degrees   Abduction: 65 degrees   External rotation (90/90): 50 degrees   Internal rotation (90/90): 40 degrees     L hip MMT  Flexion: 4/5  Extension: 4/5  Abduction: 5/5  External rotation: 5/5  Internal rotation: 5/5    LLE lateral step down test  4/5 p!    C/s mechanical assessment:  Standing c/s retraction self OP: D, B    Interventions held today for time, eval and manual skills only performed    Assessment: Raghav continues to demonstrate gradual improvements with current POC. She improves within each session, but then struggles to maintain all improvements and has only minor improvements in between visits. Raghav does demonstrate personal factors that contribute to a slower prognosis overall. She has good improvements in pain free shoulder ROM with only pain at end range, and improved shoulder strength, especially RTC strength. Her hip ROM and strength is unchanged since last re-eval but pain and functional limitations are related to motor control deficits as indicated by lateral step down test score. Bindu would benefit from  "continued skilled physical therapy for manual techniques, guided exercise progression, and frequent encouragement and education in order to mitigate personal factors for overall improvement in condition and return to PLOF.     Goals  Short Term Goals:  Pt will report decreased levels of pain by at least 2 subjective ratings in 4 weeks MET  Pt will demonstrate improved ROM by at least 10 degrees in 4 weeks MET  Pt will demonstrate improved strength by ½ grade in 4 weeks MET  Pt will be able to work for 2 hours without pain in 4 weeks NOT YET MET - progressing toward     Long Term Goals:  Pt will be independent with HEP in 8 weeks MET  Pt will be functional with all ADL's in 8 weeks MET  Pt will achieve their predicted FOTO score in 8 weeks NOT YET MET - progressing toward  Pt will be able to paddle board on the river for 3 hours without pain in 8 weeks NOT YET MET          Plan  Patient would benefit from: skilled physical therapy  Referral necessary: No  Planned modality interventions: low level laser therapy     Planned therapy interventions: abdominal trunk stabilization, IASTM, joint mobilization, manual therapy, nerve gliding, neuromuscular re-education, balance, balance/weight bearing training, body mechanics training, breathing training, patient education, postural training, strengthening, stretching, therapeutic activities, therapeutic exercise, functional ROM exercises, gait training, graded activity, graded exercise and home exercise program     Frequency: 2x week  Duration in weeks: 12  Plan of Care beginning date: 12/24/2024  Plan of Care expiration date: 3/18/2025  Treatment plan discussed with: patient     Precautions: None    Manuals 11/26 11/29 12/5 12/6 12/10 12/12 12/17 12/19 12/24    LUT sai  4' 8' B/L Held 4' 4'       L hip PROM 8' 4' 4' 8' 4' 4' 8' 8' 8'    L hip long axis distraction     5x30\"   10x30\" 10x30\" 5x30\"    Sidelying and prone MET R hip flex, L hip ext     10x5\" 10x5\"     " "  Sidelying L scap mobs         2x10 ea    Neuro Re-Ed             TB ER   GTB  3x10 GTB 3x10 GTB 3x10 GTB  3x10 GTB  3x10 GTB  3x10     Rhythmic stabilization supine shoulder scaption position 2x30\" at 120 degrees            Lat Pull down              Serratus wall alphabet 2x 1.5#  2x           Clamshell Blue  3x10 ea Blue  3x10 ea Blue  3x10 ea Blue 3x10 ea Blue 3x10 ea Blue  3x10 ea Blue  3x10 ea Blue  3x10 ea     Lateral step ups with foam on 6\" step Blue  3x10 Blue  3x10 Blue  3x10 Blue 3x10 Blue 3x10 8\" step  Blue  3x10 8\" step  Blue  3x10 8\" step  Blue  3x10     Squats Blue    1x10    10#  1x10     15#  1x10     20#  1x10 Blue    1x10    10#  1x10     15#  1x10     20#  1x10 Blue  20#   KB    3x10 Blue 20# KB      1x10 box lift with 2 5lb ankle weights [place] KB 3x10 Orange   30#  KB   3x10 Orange   30#  KB   3x10 Orange   30#  KB   3x10     Body blade elbow at side neutral hand position  30\"x2 30\"x2 30\"x2 30\"x2 30\"x3 30\"x3 30\"x3 30\"x3                   Ther Ex                           Ther Activity                           Gait Training                           Modalities              POC, diagnosis, prognosis edu          15 mins               "

## 2024-12-26 ENCOUNTER — OFFICE VISIT (OUTPATIENT)
Dept: PHYSICAL THERAPY | Facility: CLINIC | Age: 29
End: 2024-12-26
Payer: COMMERCIAL

## 2024-12-26 DIAGNOSIS — M25.512 CHRONIC LEFT SHOULDER PAIN: ICD-10-CM

## 2024-12-26 DIAGNOSIS — G89.29 CHRONIC LEFT SHOULDER PAIN: ICD-10-CM

## 2024-12-26 DIAGNOSIS — M25.552 LEFT HIP PAIN: Primary | ICD-10-CM

## 2024-12-26 PROCEDURE — 97112 NEUROMUSCULAR REEDUCATION: CPT

## 2024-12-26 PROCEDURE — 97140 MANUAL THERAPY 1/> REGIONS: CPT

## 2024-12-26 NOTE — PROGRESS NOTES
"Daily Note     Today's date: 2024  Patient name: Raghav Smith  : 1995  MRN: [id number]  Referring provider: Linda Rao CRNP  Dx:   Encounter Diagnosis     ICD-10-CM    1. Left hip pain  M25.552       2. Chronic left shoulder pain  M25.512     G89.29           Start Time: 1115  Stop Time: 1215  Total time in clinic (min): 60 minutes    Subjective: Patient states, \"My L shoulder is better than the other day. I has been 5/10 off and on depending on what I was doing\".    Objective: See treatment diary below    Assessment: Tolerated treatment well. Patient demonstrated fatigue post treatment, exhibited good technique with therapeutic exercises, and would benefit from continued PT. Raghav responded well to cervical retraction noting improved L shoulder abduction AROM with no pain. Making steady progress with strength and stability.     Plan: Continue per plan of care.      Precautions: None    Manuals 11/26 11/29 12/5 12/6 12/10 12/12 12/17 12/19 12/26    LUT graston  4' 8' B/L Held 4' 4'       L hip PROM 8' 4' 4' 8' 4' 4' 8' 8' 10'    L hip long axis distraction     5x30\"   10x30\" 10x30\" 10x30\"    Sidelying and prone MET R hip flex, L hip ext     10x5\" 10x5\"       Neuro Re-Ed             TB ER   GTB  3x10 GTB 3x10 GTB 3x10 GTB  3x10 GTB  3x10 GTB  3x10 GTB  3x10    Rhythmic stabilization supine shoulder scaption position 2x30\" at 120 degrees            Lat Pull down              Serratus wall alphabet 2x 1.5#  2x           Clamshell Blue  3x10 ea Blue  3x10 ea Blue  3x10 ea Blue 3x10 ea Blue 3x10 ea Blue  3x10 ea Blue  3x10 ea Blue  3x10 ea Blue  3x10 ea    Lateral step ups with foam on 6\" step Blue  3x10 Blue  3x10 Blue  3x10 Blue 3x10 Blue 3x10 8\" step  Blue  3x10 8\" step  Blue  3x10 8\" step  Blue  3x10 8\" step  Blue  3x10    Squats Blue    1x10    10#  1x10     15#  1x10     20#  1x10 Blue    1x10    10#  1x10     15#  1x10     20#  1x10 Blue  20#   KB    3x10 Blue 20# KB      1x10 box lift " "with 2 5lb ankle weights [place] KB 3x10 Orange   30#  KB   3x10 Orange   30#  KB   3x10 Orange   30#  KB   3x10 Orange   30#  KB   3x10    Body blade elbow at side neutral hand position  30\"x2 30\"x2 30\"x2 30\"x2 30\"x3 30\"x3 30\"x3 30\"x3 30\"x3    Standing Cervical retraction         2x10                  Ther Ex                           Ther Activity                           Gait Training                           Modalities                              "

## 2024-12-31 ENCOUNTER — OFFICE VISIT (OUTPATIENT)
Dept: PHYSICAL THERAPY | Facility: CLINIC | Age: 29
End: 2024-12-31
Payer: COMMERCIAL

## 2024-12-31 DIAGNOSIS — M25.512 CHRONIC LEFT SHOULDER PAIN: ICD-10-CM

## 2024-12-31 DIAGNOSIS — M25.552 LEFT HIP PAIN: Primary | ICD-10-CM

## 2024-12-31 DIAGNOSIS — G89.29 CHRONIC LEFT SHOULDER PAIN: ICD-10-CM

## 2024-12-31 DIAGNOSIS — F43.10 PTSD (POST-TRAUMATIC STRESS DISORDER): ICD-10-CM

## 2024-12-31 PROCEDURE — 97140 MANUAL THERAPY 1/> REGIONS: CPT

## 2024-12-31 PROCEDURE — 97112 NEUROMUSCULAR REEDUCATION: CPT

## 2024-12-31 RX ORDER — CLONIDINE HYDROCHLORIDE 0.1 MG/1
0.1 TABLET ORAL EVERY 12 HOURS
Qty: 60 TABLET | Refills: 2 | Status: SHIPPED | OUTPATIENT
Start: 2024-12-31

## 2024-12-31 NOTE — PROGRESS NOTES
"Daily Note     Today's date: 2024  Patient name: Raghav Smith  : 1995  MRN: [id number]  Referring provider: Linda Rao CRNP  Dx:   Encounter Diagnosis     ICD-10-CM    1. Left hip pain  M25.552       2. Chronic left shoulder pain  M25.512     G89.29           Start Time: 1109  Stop Time: 1155  Total time in clinic (min): 46 minutes    Subjective: Patient reports, \"I must have slept wrong. My neck is bothering me today. My hips bothered me over the weekend as well\". Notes moderate hip and neck pain prior to therapy.     Objective: See treatment diary below    Assessment: Tolerated treatment well. Patient demonstrated fatigue post treatment, exhibited good technique with therapeutic exercises, and would benefit from continued PT. Decreased hip and neck pain following manual therapy. Good form with strengthening and stability.     Plan: Continue per plan of care.      Precautions: None    Manuals 11/26 11/29 12/5 12/6 12/10 12/12 12/17 12/19 12/26 12/31   LUT graston  4' 8' B/L Held 4' 4'    SOR 3'   L hip PROM 8' 4' 4' 8' 4' 4' 8' 8' 10' 10'   L hip long axis distraction     5x30\"   10x30\" 10x30\" 10x30\" 10x30\"   Sidelying and prone MET R hip flex, L hip ext     10x5\" 10x5\"       Neuro Re-Ed             TB ER   GTB  3x10 GTB 3x10 GTB 3x10 GTB  3x10 GTB  3x10 GTB  3x10 GTB  3x10    Rhythmic stabilization supine shoulder scaption position 2x30\" at 120 degrees            Lat Pull down              Serratus wall alphabet 2x 1.5#  2x           Clamshell Blue  3x10 ea Blue  3x10 ea Blue  3x10 ea Blue 3x10 ea Blue 3x10 ea Blue  3x10 ea Blue  3x10 ea Blue  3x10 ea Blue  3x10 ea Blue  3x10 ea   Lateral step ups with foam on 6\" step Blue  3x10 Blue  3x10 Blue  3x10 Blue 3x10 Blue 3x10 8\" step  Blue  3x10 8\" step  Blue  3x10 8\" step  Blue  3x10 8\" step  Blue  3x10 8\" step  Blue  3x10   Squats Blue    1x10    10#  1x10     15#  1x10     20#  1x10 Blue    1x10    10#  1x10     15#  1x10     20#  1x10 Blue  20# " "  KB    3x10 Blue 20# KB      1x10 box lift with 2 5lb ankle weights [place] KB 3x10 Orange   30#  KB   3x10 Orange   30#  KB   3x10 Orange   30#  KB   3x10 Orange   30#  KB   3x10 Orange   30#  KB   3x10   Body blade elbow at side neutral hand position  30\"x2 30\"x2 30\"x2 30\"x2 30\"x3 30\"x3 30\"x3 30\"x3 30\"x3 30\"x3   Standing Cervical retraction         2x10                  Ther Ex                           Ther Activity                           Gait Training                           Modalities                              "

## 2025-01-06 ENCOUNTER — OFFICE VISIT (OUTPATIENT)
Dept: BEHAVIORAL/MENTAL HEALTH CLINIC | Facility: CLINIC | Age: 30
End: 2025-01-06
Payer: COMMERCIAL

## 2025-01-06 ENCOUNTER — APPOINTMENT (EMERGENCY)
Dept: RADIOLOGY | Facility: HOSPITAL | Age: 30
End: 2025-01-06
Payer: COMMERCIAL

## 2025-01-06 ENCOUNTER — HOSPITAL ENCOUNTER (OUTPATIENT)
Facility: HOSPITAL | Age: 30
Setting detail: OUTPATIENT SURGERY
Discharge: HOME | End: 2025-01-08
Attending: EMERGENCY MEDICINE | Admitting: SURGERY
Payer: COMMERCIAL

## 2025-01-06 DIAGNOSIS — K37 APPENDICITIS: ICD-10-CM

## 2025-01-06 DIAGNOSIS — F50.9 EATING DISORDER, UNSPECIFIED TYPE: Primary | ICD-10-CM

## 2025-01-06 DIAGNOSIS — K35.30 ACUTE APPENDICITIS WITH LOCALIZED PERITONITIS, WITHOUT PERFORATION, ABSCESS, OR GANGRENE: Primary | ICD-10-CM

## 2025-01-06 LAB
AMORPH URATE CRY URNS QL MICRO: ABNORMAL
ANION GAP SERPL CALCULATED.3IONS-SCNC: 7 MMOL/L (ref 4–13)
BACTERIA UR QL AUTO: ABNORMAL /HPF
BASOPHILS # BLD AUTO: 0.05 THOUSANDS/ΜL (ref 0–0.1)
BASOPHILS NFR BLD AUTO: 0 % (ref 0–1)
BILIRUB UR QL STRIP: NEGATIVE
BUN SERPL-MCNC: 12 MG/DL (ref 5–25)
CALCIUM SERPL-MCNC: 9.7 MG/DL (ref 8.4–10.2)
CHLORIDE SERPL-SCNC: 103 MMOL/L (ref 96–108)
CLARITY UR: ABNORMAL
CO2 SERPL-SCNC: 27 MMOL/L (ref 21–32)
COLOR UR: YELLOW
CREAT SERPL-MCNC: 0.7 MG/DL (ref 0.6–1.3)
EOSINOPHIL # BLD AUTO: 0.05 THOUSAND/ΜL (ref 0–0.61)
EOSINOPHIL NFR BLD AUTO: 0 % (ref 0–6)
ERYTHROCYTE [DISTWIDTH] IN BLOOD BY AUTOMATED COUNT: 13.1 % (ref 11.6–15.1)
EXT PREGNANCY TEST URINE: NEGATIVE
EXT. CONTROL: NORMAL
GFR SERPL CREATININE-BSD FRML MDRD: 117 ML/MIN/1.73SQ M
GLUCOSE SERPL-MCNC: 104 MG/DL (ref 65–140)
GLUCOSE UR STRIP-MCNC: NEGATIVE MG/DL
HCT VFR BLD AUTO: 39.2 % (ref 34.8–46.1)
HGB BLD-MCNC: 12.8 G/DL (ref 11.5–15.4)
HGB UR QL STRIP.AUTO: NEGATIVE
IMM GRANULOCYTES # BLD AUTO: 0.06 THOUSAND/UL (ref 0–0.2)
IMM GRANULOCYTES NFR BLD AUTO: 0 % (ref 0–2)
KETONES UR STRIP-MCNC: NEGATIVE MG/DL
LEUKOCYTE ESTERASE UR QL STRIP: NEGATIVE
LYMPHOCYTES # BLD AUTO: 1.87 THOUSANDS/ΜL (ref 0.6–4.47)
LYMPHOCYTES NFR BLD AUTO: 12 % (ref 14–44)
MCH RBC QN AUTO: 29.4 PG (ref 26.8–34.3)
MCHC RBC AUTO-ENTMCNC: 32.7 G/DL (ref 31.4–37.4)
MCV RBC AUTO: 90 FL (ref 82–98)
MONOCYTES # BLD AUTO: 1.06 THOUSAND/ΜL (ref 0.17–1.22)
MONOCYTES NFR BLD AUTO: 7 % (ref 4–12)
MUCOUS THREADS UR QL AUTO: ABNORMAL
NEUTROPHILS # BLD AUTO: 12.02 THOUSANDS/ΜL (ref 1.85–7.62)
NEUTS SEG NFR BLD AUTO: 81 % (ref 43–75)
NITRITE UR QL STRIP: NEGATIVE
NON-SQ EPI CELLS URNS QL MICRO: ABNORMAL /HPF
NRBC BLD AUTO-RTO: 0 /100 WBCS
PH UR STRIP.AUTO: 8.5 [PH]
PLATELET # BLD AUTO: 307 THOUSANDS/UL (ref 149–390)
PMV BLD AUTO: 9.3 FL (ref 8.9–12.7)
POTASSIUM SERPL-SCNC: 4.1 MMOL/L (ref 3.5–5.3)
PROT UR STRIP-MCNC: ABNORMAL MG/DL
RBC # BLD AUTO: 4.35 MILLION/UL (ref 3.81–5.12)
RBC #/AREA URNS AUTO: ABNORMAL /HPF
SODIUM SERPL-SCNC: 137 MMOL/L (ref 135–147)
SP GR UR STRIP.AUTO: 1.02 (ref 1–1.03)
UROBILINOGEN UR STRIP-ACNC: <2 MG/DL
WBC # BLD AUTO: 15.11 THOUSAND/UL (ref 4.31–10.16)
WBC #/AREA URNS AUTO: ABNORMAL /HPF

## 2025-01-06 PROCEDURE — 81025 URINE PREGNANCY TEST: CPT

## 2025-01-06 PROCEDURE — 74177 CT ABD & PELVIS W/CONTRAST: CPT

## 2025-01-06 PROCEDURE — 90853 GROUP PSYCHOTHERAPY: CPT

## 2025-01-06 PROCEDURE — 85025 COMPLETE CBC W/AUTO DIFF WBC: CPT

## 2025-01-06 PROCEDURE — 96375 TX/PRO/DX INJ NEW DRUG ADDON: CPT

## 2025-01-06 PROCEDURE — 36415 COLL VENOUS BLD VENIPUNCTURE: CPT

## 2025-01-06 PROCEDURE — 80048 BASIC METABOLIC PNL TOTAL CA: CPT

## 2025-01-06 PROCEDURE — 81001 URINALYSIS AUTO W/SCOPE: CPT

## 2025-01-06 PROCEDURE — 93005 ELECTROCARDIOGRAM TRACING: CPT

## 2025-01-06 PROCEDURE — 99285 EMERGENCY DEPT VISIT HI MDM: CPT

## 2025-01-06 PROCEDURE — 99285 EMERGENCY DEPT VISIT HI MDM: CPT | Performed by: EMERGENCY MEDICINE

## 2025-01-06 RX ORDER — KETOROLAC TROMETHAMINE 30 MG/ML
15 INJECTION, SOLUTION INTRAMUSCULAR; INTRAVENOUS ONCE
Status: COMPLETED | OUTPATIENT
Start: 2025-01-06 | End: 2025-01-06

## 2025-01-06 RX ORDER — DROPERIDOL 2.5 MG/ML
0.62 INJECTION, SOLUTION INTRAMUSCULAR; INTRAVENOUS ONCE
Status: COMPLETED | OUTPATIENT
Start: 2025-01-06 | End: 2025-01-06

## 2025-01-06 RX ORDER — ACETAMINOPHEN 325 MG/1
975 TABLET ORAL ONCE
Status: COMPLETED | OUTPATIENT
Start: 2025-01-06 | End: 2025-01-06

## 2025-01-06 RX ADMIN — ACETAMINOPHEN 975 MG: 325 TABLET, FILM COATED ORAL at 22:32

## 2025-01-06 RX ADMIN — DROPERIDOL 0.62 MG: 2.5 INJECTION, SOLUTION INTRAMUSCULAR; INTRAVENOUS at 22:36

## 2025-01-06 RX ADMIN — KETOROLAC TROMETHAMINE 15 MG: 30 INJECTION, SOLUTION INTRAMUSCULAR; INTRAVENOUS at 22:37

## 2025-01-06 NOTE — LETTER
[facility]  [address]  [place] [address]  Dept: [phone]    January 8, 2025     Patient: Raghav Smith   YOB: 1995   Date of Visit: 1/6/2025       To Whom it May Concern:    Raghav Smith is under my professional care. She was seen in the hospital from 1/6/2025 to 01/08/25. She is not cleared to return to work until reevaluated in the outpatient surgical office.  Please allow patient to appropriately recover at this time.    If you have any questions or concerns, please don't hesitate to call.         Sincerely,          Eliezer Sabillon PA-C

## 2025-01-07 ENCOUNTER — APPOINTMENT (OUTPATIENT)
Dept: PHYSICAL THERAPY | Facility: CLINIC | Age: 30
End: 2025-01-07
Payer: COMMERCIAL

## 2025-01-07 ENCOUNTER — ANESTHESIA (OUTPATIENT)
Dept: PERIOP | Facility: HOSPITAL | Age: 30
End: 2025-01-07
Payer: COMMERCIAL

## 2025-01-07 ENCOUNTER — ANESTHESIA EVENT (OUTPATIENT)
Dept: PERIOP | Facility: HOSPITAL | Age: 30
End: 2025-01-07
Payer: COMMERCIAL

## 2025-01-07 PROBLEM — K35.80 ACUTE APPENDICITIS: Status: ACTIVE | Noted: 2025-01-07

## 2025-01-07 LAB
ATRIAL RATE: 92 BPM
P AXIS: 29 DEGREES
PLATELET # BLD AUTO: 289 THOUSANDS/UL (ref 149–390)
PMV BLD AUTO: 9.6 FL (ref 8.9–12.7)
PR INTERVAL: 138 MS
QRS AXIS: 52 DEGREES
QRSD INTERVAL: 78 MS
QT INTERVAL: 340 MS
QTC INTERVAL: 421 MS
T WAVE AXIS: 28 DEGREES
VENTRICULAR RATE: 92 BPM

## 2025-01-07 PROCEDURE — 96368 THER/DIAG CONCURRENT INF: CPT

## 2025-01-07 PROCEDURE — 93010 ELECTROCARDIOGRAM REPORT: CPT | Performed by: INTERNAL MEDICINE

## 2025-01-07 PROCEDURE — 96365 THER/PROPH/DIAG IV INF INIT: CPT

## 2025-01-07 PROCEDURE — 99205 OFFICE O/P NEW HI 60 MIN: CPT | Performed by: SURGERY

## 2025-01-07 PROCEDURE — 44970 LAPAROSCOPY APPENDECTOMY: CPT | Performed by: SURGERY

## 2025-01-07 PROCEDURE — 85049 AUTOMATED PLATELET COUNT: CPT

## 2025-01-07 PROCEDURE — 36415 COLL VENOUS BLD VENIPUNCTURE: CPT

## 2025-01-07 PROCEDURE — 96367 TX/PROPH/DG ADDL SEQ IV INF: CPT

## 2025-01-07 PROCEDURE — 96366 THER/PROPH/DIAG IV INF ADDON: CPT

## 2025-01-07 PROCEDURE — 88304 TISSUE EXAM BY PATHOLOGIST: CPT | Performed by: STUDENT IN AN ORGANIZED HEALTH CARE EDUCATION/TRAINING PROGRAM

## 2025-01-07 RX ORDER — FENTANYL CITRATE/PF 50 MCG/ML
50 SYRINGE (ML) INJECTION
Status: DISCONTINUED | OUTPATIENT
Start: 2025-01-07 | End: 2025-01-07 | Stop reason: HOSPADM

## 2025-01-07 RX ORDER — SODIUM CHLORIDE, SODIUM LACTATE, POTASSIUM CHLORIDE, CALCIUM CHLORIDE 600; 310; 30; 20 MG/100ML; MG/100ML; MG/100ML; MG/100ML
INJECTION, SOLUTION INTRAVENOUS CONTINUOUS PRN
Status: DISCONTINUED | OUTPATIENT
Start: 2025-01-07 | End: 2025-01-07

## 2025-01-07 RX ORDER — DEXAMETHASONE SODIUM PHOSPHATE 10 MG/ML
INJECTION, SOLUTION INTRAMUSCULAR; INTRAVENOUS AS NEEDED
Status: DISCONTINUED | OUTPATIENT
Start: 2025-01-07 | End: 2025-01-07

## 2025-01-07 RX ORDER — FENTANYL CITRATE 50 UG/ML
INJECTION, SOLUTION INTRAMUSCULAR; INTRAVENOUS AS NEEDED
Status: DISCONTINUED | OUTPATIENT
Start: 2025-01-07 | End: 2025-01-07

## 2025-01-07 RX ORDER — ONDANSETRON 2 MG/ML
4 INJECTION INTRAMUSCULAR; INTRAVENOUS ONCE AS NEEDED
Status: DISCONTINUED | OUTPATIENT
Start: 2025-01-07 | End: 2025-01-07 | Stop reason: HOSPADM

## 2025-01-07 RX ORDER — BUPIVACAINE HYDROCHLORIDE 5 MG/ML
INJECTION, SOLUTION EPIDURAL; INTRACAUDAL AS NEEDED
Status: DISCONTINUED | OUTPATIENT
Start: 2025-01-07 | End: 2025-01-07 | Stop reason: HOSPADM

## 2025-01-07 RX ORDER — CEFAZOLIN SODIUM 2 G/50ML
2000 SOLUTION INTRAVENOUS
Status: COMPLETED | OUTPATIENT
Start: 2025-01-08 | End: 2025-01-07

## 2025-01-07 RX ORDER — OXYCODONE HYDROCHLORIDE 5 MG/1
5 TABLET ORAL EVERY 6 HOURS PRN
Qty: 12 TABLET | Refills: 0 | Status: SHIPPED | OUTPATIENT
Start: 2025-01-07 | End: 2025-01-19

## 2025-01-07 RX ORDER — METRONIDAZOLE 500 MG/100ML
500 INJECTION, SOLUTION INTRAVENOUS ONCE
Status: COMPLETED | OUTPATIENT
Start: 2025-01-07 | End: 2025-01-07

## 2025-01-07 RX ORDER — METHOCARBAMOL 750 MG/1
750 TABLET, FILM COATED ORAL EVERY 6 HOURS SCHEDULED
Status: DISCONTINUED | OUTPATIENT
Start: 2025-01-07 | End: 2025-01-08 | Stop reason: HOSPADM

## 2025-01-07 RX ORDER — NEOSTIGMINE METHYLSULFATE 1 MG/ML
INJECTION INTRAVENOUS AS NEEDED
Status: DISCONTINUED | OUTPATIENT
Start: 2025-01-07 | End: 2025-01-07

## 2025-01-07 RX ORDER — ALBUTEROL SULFATE 90 UG/1
INHALANT RESPIRATORY (INHALATION) AS NEEDED
Status: DISCONTINUED | OUTPATIENT
Start: 2025-01-07 | End: 2025-01-07

## 2025-01-07 RX ORDER — ACETAMINOPHEN 325 MG/1
975 TABLET ORAL EVERY 8 HOURS SCHEDULED
Status: DISCONTINUED | OUTPATIENT
Start: 2025-01-07 | End: 2025-01-08 | Stop reason: HOSPADM

## 2025-01-07 RX ORDER — MIDAZOLAM HYDROCHLORIDE 2 MG/2ML
INJECTION, SOLUTION INTRAMUSCULAR; INTRAVENOUS AS NEEDED
Status: DISCONTINUED | OUTPATIENT
Start: 2025-01-07 | End: 2025-01-07

## 2025-01-07 RX ORDER — ROCURONIUM BROMIDE 10 MG/ML
INJECTION, SOLUTION INTRAVENOUS AS NEEDED
Status: DISCONTINUED | OUTPATIENT
Start: 2025-01-07 | End: 2025-01-07

## 2025-01-07 RX ORDER — ONDANSETRON 2 MG/ML
INJECTION INTRAMUSCULAR; INTRAVENOUS AS NEEDED
Status: DISCONTINUED | OUTPATIENT
Start: 2025-01-07 | End: 2025-01-07

## 2025-01-07 RX ORDER — ONDANSETRON 2 MG/ML
4 INJECTION INTRAMUSCULAR; INTRAVENOUS EVERY 4 HOURS PRN
Status: DISCONTINUED | OUTPATIENT
Start: 2025-01-07 | End: 2025-01-08 | Stop reason: HOSPADM

## 2025-01-07 RX ORDER — PROPOFOL 10 MG/ML
INJECTION, EMULSION INTRAVENOUS AS NEEDED
Status: DISCONTINUED | OUTPATIENT
Start: 2025-01-07 | End: 2025-01-07

## 2025-01-07 RX ORDER — HYDROMORPHONE HCL/PF 1 MG/ML
0.5 SYRINGE (ML) INJECTION
Status: DISCONTINUED | OUTPATIENT
Start: 2025-01-07 | End: 2025-01-07 | Stop reason: HOSPADM

## 2025-01-07 RX ORDER — OXYCODONE HYDROCHLORIDE 5 MG/1
5 TABLET ORAL EVERY 4 HOURS PRN
Refills: 0 | Status: DISCONTINUED | OUTPATIENT
Start: 2025-01-07 | End: 2025-01-08 | Stop reason: HOSPADM

## 2025-01-07 RX ORDER — HYDROMORPHONE HCL IN WATER/PF 6 MG/30 ML
0.2 PATIENT CONTROLLED ANALGESIA SYRINGE INTRAVENOUS EVERY 2 HOUR PRN
Refills: 0 | Status: DISCONTINUED | OUTPATIENT
Start: 2025-01-07 | End: 2025-01-08 | Stop reason: HOSPADM

## 2025-01-07 RX ORDER — HEPARIN SODIUM 5000 [USP'U]/ML
5000 INJECTION, SOLUTION INTRAVENOUS; SUBCUTANEOUS EVERY 8 HOURS SCHEDULED
Status: DISCONTINUED | OUTPATIENT
Start: 2025-01-07 | End: 2025-01-08 | Stop reason: HOSPADM

## 2025-01-07 RX ORDER — SODIUM CHLORIDE, SODIUM GLUCONATE, SODIUM ACETATE, POTASSIUM CHLORIDE, MAGNESIUM CHLORIDE, SODIUM PHOSPHATE, DIBASIC, AND POTASSIUM PHOSPHATE .53; .5; .37; .037; .03; .012; .00082 G/100ML; G/100ML; G/100ML; G/100ML; G/100ML; G/100ML; G/100ML
1000 INJECTION, SOLUTION INTRAVENOUS ONCE
Status: COMPLETED | OUTPATIENT
Start: 2025-01-07 | End: 2025-01-07

## 2025-01-07 RX ORDER — GLYCOPYRROLATE 0.2 MG/ML
INJECTION INTRAMUSCULAR; INTRAVENOUS AS NEEDED
Status: DISCONTINUED | OUTPATIENT
Start: 2025-01-07 | End: 2025-01-07

## 2025-01-07 RX ORDER — METRONIDAZOLE 500 MG/100ML
500 INJECTION, SOLUTION INTRAVENOUS EVERY 8 HOURS
Status: DISCONTINUED | OUTPATIENT
Start: 2025-01-07 | End: 2025-01-07

## 2025-01-07 RX ORDER — SODIUM CHLORIDE, SODIUM GLUCONATE, SODIUM ACETATE, POTASSIUM CHLORIDE, MAGNESIUM CHLORIDE, SODIUM PHOSPHATE, DIBASIC, AND POTASSIUM PHOSPHATE .53; .5; .37; .037; .03; .012; .00082 G/100ML; G/100ML; G/100ML; G/100ML; G/100ML; G/100ML; G/100ML
100 INJECTION, SOLUTION INTRAVENOUS CONTINUOUS
Status: DISCONTINUED | OUTPATIENT
Start: 2025-01-07 | End: 2025-01-07

## 2025-01-07 RX ORDER — HYDROMORPHONE HCL/PF 1 MG/ML
SYRINGE (ML) INJECTION AS NEEDED
Status: DISCONTINUED | OUTPATIENT
Start: 2025-01-07 | End: 2025-01-07

## 2025-01-07 RX ORDER — KETOROLAC TROMETHAMINE 30 MG/ML
INJECTION, SOLUTION INTRAMUSCULAR; INTRAVENOUS AS NEEDED
Status: DISCONTINUED | OUTPATIENT
Start: 2025-01-07 | End: 2025-01-07

## 2025-01-07 RX ORDER — LIDOCAINE HYDROCHLORIDE 10 MG/ML
INJECTION, SOLUTION EPIDURAL; INFILTRATION; INTRACAUDAL; PERINEURAL AS NEEDED
Status: DISCONTINUED | OUTPATIENT
Start: 2025-01-07 | End: 2025-01-07

## 2025-01-07 RX ADMIN — OXYCODONE HYDROCHLORIDE 5 MG: 5 TABLET ORAL at 08:07

## 2025-01-07 RX ADMIN — PROPOFOL 200 MG: 10 INJECTION, EMULSION INTRAVENOUS at 14:36

## 2025-01-07 RX ADMIN — FENTANYL CITRATE 50 MCG: 50 INJECTION INTRAMUSCULAR; INTRAVENOUS at 16:35

## 2025-01-07 RX ADMIN — HYDROMORPHONE HYDROCHLORIDE 0.5 MG: 1 INJECTION, SOLUTION INTRAMUSCULAR; INTRAVENOUS; SUBCUTANEOUS at 17:00

## 2025-01-07 RX ADMIN — CEFTRIAXONE SODIUM 1000 MG: 10 INJECTION, POWDER, FOR SOLUTION INTRAVENOUS at 01:53

## 2025-01-07 RX ADMIN — ONDANSETRON 4 MG: 2 INJECTION INTRAMUSCULAR; INTRAVENOUS at 15:37

## 2025-01-07 RX ADMIN — SODIUM CHLORIDE, SODIUM LACTATE, POTASSIUM CHLORIDE, AND CALCIUM CHLORIDE: .6; .31; .03; .02 INJECTION, SOLUTION INTRAVENOUS at 14:28

## 2025-01-07 RX ADMIN — HYDROMORPHONE HYDROCHLORIDE 0.5 MG: 1 INJECTION, SOLUTION INTRAMUSCULAR; INTRAVENOUS; SUBCUTANEOUS at 15:50

## 2025-01-07 RX ADMIN — SODIUM CHLORIDE, SODIUM GLUCONATE, SODIUM ACETATE, POTASSIUM CHLORIDE, MAGNESIUM CHLORIDE, SODIUM PHOSPHATE, DIBASIC, AND POTASSIUM PHOSPHATE 100 ML/HR: .53; .5; .37; .037; .03; .012; .00082 INJECTION, SOLUTION INTRAVENOUS at 19:48

## 2025-01-07 RX ADMIN — OXYCODONE HYDROCHLORIDE 5 MG: 5 TABLET ORAL at 20:07

## 2025-01-07 RX ADMIN — FENTANYL CITRATE 50 MCG: 50 INJECTION INTRAMUSCULAR; INTRAVENOUS at 15:35

## 2025-01-07 RX ADMIN — ALBUTEROL SULFATE 4 PUFF: 90 AEROSOL, METERED RESPIRATORY (INHALATION) at 16:21

## 2025-01-07 RX ADMIN — SODIUM CHLORIDE, SODIUM LACTATE, POTASSIUM CHLORIDE, AND CALCIUM CHLORIDE: .6; .31; .03; .02 INJECTION, SOLUTION INTRAVENOUS at 15:45

## 2025-01-07 RX ADMIN — KETOROLAC TROMETHAMINE 15 MG: 30 INJECTION, SOLUTION INTRAMUSCULAR; INTRAVENOUS at 15:56

## 2025-01-07 RX ADMIN — HEPARIN SODIUM 5000 UNITS: 5000 INJECTION INTRAVENOUS; SUBCUTANEOUS at 21:28

## 2025-01-07 RX ADMIN — SODIUM CHLORIDE, SODIUM GLUCONATE, SODIUM ACETATE, POTASSIUM CHLORIDE, MAGNESIUM CHLORIDE, SODIUM PHOSPHATE, DIBASIC, AND POTASSIUM PHOSPHATE 1000 ML: .53; .5; .37; .037; .03; .012; .00082 INJECTION, SOLUTION INTRAVENOUS at 05:24

## 2025-01-07 RX ADMIN — IOHEXOL 100 ML: 350 INJECTION, SOLUTION INTRAVENOUS at 00:12

## 2025-01-07 RX ADMIN — SODIUM CHLORIDE, SODIUM GLUCONATE, SODIUM ACETATE, POTASSIUM CHLORIDE, MAGNESIUM CHLORIDE, SODIUM PHOSPHATE, DIBASIC, AND POTASSIUM PHOSPHATE 100 ML/HR: .53; .5; .37; .037; .03; .012; .00082 INJECTION, SOLUTION INTRAVENOUS at 03:45

## 2025-01-07 RX ADMIN — CEFAZOLIN SODIUM 2000 MG: 2 SOLUTION INTRAVENOUS at 14:28

## 2025-01-07 RX ADMIN — FENTANYL CITRATE 50 MCG: 50 INJECTION INTRAMUSCULAR; INTRAVENOUS at 14:36

## 2025-01-07 RX ADMIN — DEXAMETHASONE SODIUM PHOSPHATE 10 MG: 10 INJECTION, SOLUTION INTRAMUSCULAR; INTRAVENOUS at 14:36

## 2025-01-07 RX ADMIN — HYDROMORPHONE HYDROCHLORIDE 0.2 MG: 0.2 INJECTION, SOLUTION INTRAMUSCULAR; INTRAVENOUS; SUBCUTANEOUS at 21:29

## 2025-01-07 RX ADMIN — METRONIDAZOLE 500 MG: 500 INJECTION, SOLUTION INTRAVENOUS at 10:29

## 2025-01-07 RX ADMIN — LIDOCAINE HYDROCHLORIDE 50 MG: 10 INJECTION, SOLUTION EPIDURAL; INFILTRATION; INTRACAUDAL; PERINEURAL at 14:36

## 2025-01-07 RX ADMIN — ALBUTEROL SULFATE 4 PUFF: 90 AEROSOL, METERED RESPIRATORY (INHALATION) at 16:24

## 2025-01-07 RX ADMIN — MIDAZOLAM 2 MG: 1 INJECTION INTRAMUSCULAR; INTRAVENOUS at 14:26

## 2025-01-07 RX ADMIN — ROCURONIUM BROMIDE 50 MG: 10 INJECTION, SOLUTION INTRAVENOUS at 14:36

## 2025-01-07 RX ADMIN — ACETAMINOPHEN 975 MG: 325 TABLET, FILM COATED ORAL at 08:07

## 2025-01-07 RX ADMIN — HYDROMORPHONE HYDROCHLORIDE 0.5 MG: 1 INJECTION, SOLUTION INTRAMUSCULAR; INTRAVENOUS; SUBCUTANEOUS at 17:20

## 2025-01-07 RX ADMIN — METRONIDAZOLE 500 MG: 500 INJECTION, SOLUTION INTRAVENOUS at 02:54

## 2025-01-07 RX ADMIN — FENTANYL CITRATE 50 MCG: 50 INJECTION INTRAMUSCULAR; INTRAVENOUS at 16:46

## 2025-01-07 RX ADMIN — ACETAMINOPHEN 975 MG: 325 TABLET, FILM COATED ORAL at 21:25

## 2025-01-07 RX ADMIN — METHOCARBAMOL 750 MG: 750 TABLET ORAL at 08:07

## 2025-01-07 RX ADMIN — NEOSTIGMINE METHYLSULFATE 3 MG: 1 INJECTION INTRAVENOUS at 16:09

## 2025-01-07 RX ADMIN — GLYCOPYRROLATE 0.4 MG: 0.2 INJECTION, SOLUTION INTRAMUSCULAR; INTRAVENOUS at 16:09

## 2025-01-07 NOTE — PLAN OF CARE
Problem: PAIN - ADULT  Goal: Verbalizes/displays adequate comfort level or baseline comfort level  Description: Interventions:  - Encourage patient to monitor pain and request assistance  - Assess pain using appropriate pain scale  - Administer analgesics based on type and severity of pain and evaluate response  - Implement non-pharmacological measures as appropriate and evaluate response  - Consider cultural and social influences on pain and pain management  - Notify physician/advanced practitioner if interventions unsuccessful or patient reports new pain  Outcome: Progressing     Problem: INFECTION - ADULT  Goal: Absence or prevention of progression during hospitalization  Description: INTERVENTIONS:  - Assess and monitor for signs and symptoms of infection  - Monitor lab/diagnostic results  - Monitor all insertion sites, i.e. indwelling lines, tubes, and drains  - Monitor endotracheal if appropriate and nasal secretions for changes in amount and color  - Cameron appropriate cooling/warming therapies per order  - Administer medications as ordered  - Instruct and encourage patient and family to use good hand hygiene technique  - Identify and instruct in appropriate isolation precautions for identified infection/condition  Outcome: Progressing  Goal: Absence of fever/infection during neutropenic period  Description: INTERVENTIONS:  - Monitor WBC    Outcome: Progressing     Problem: SAFETY ADULT  Goal: Patient will remain free of falls  Description: INTERVENTIONS:  - Educate patient/family on patient safety including physical limitations  - Instruct patient to call for assistance with activity   - Consult OT/PT to assist with strengthening/mobility   - Keep Call bell within reach  - Keep bed low and locked with side rails adjusted as appropriate  - Keep care items and personal belongings within reach  - Initiate and maintain comfort rounds  - Make Fall Risk Sign visible to staff  - Offer Toileting every  Hours,  in advance of need  - Initiate/Maintain alarm  - Obtain necessary fall risk management equipment:   - Apply yellow socks and bracelet for high fall risk patients  - Consider moving patient to room near nurses station  Outcome: Progressing  Goal: Maintain or return to baseline ADL function  Description: INTERVENTIONS:  -  Assess patient's ability to carry out ADLs; assess patient's baseline for ADL function and identify physical deficits which impact ability to perform ADLs (bathing, care of mouth/teeth, toileting, grooming, dressing, etc.)  - Assess/evaluate cause of self-care deficits   - Assess range of motion  - Assess patient's mobility; develop plan if impaired  - Assess patient's need for assistive devices and provide as appropriate  - Encourage maximum independence but intervene and supervise when necessary  - Involve family in performance of ADLs  - Assess for home care needs following discharge   - Consider OT consult to assist with ADL evaluation and planning for discharge  - Provide patient education as appropriate  Outcome: Progressing

## 2025-01-07 NOTE — ED PROVIDER NOTES
Time reflects when diagnosis was documented in both MDM as applicable and the Disposition within this note       Time User Action Codes Description Comment    1/7/2025  1:18 AM Nissa Tse Add [K37] Appendicitis     1/7/2025  3:59 PM Tory Rodriguez Modify [K37] Appendicitis     1/7/2025  4:01 PM Herminio Broussard Modify [K37] Appendicitis     1/7/2025  4:01 PM Herminio Broussard Add [K35.30] Acute appendicitis with localized peritonitis, without perforation, abscess, or gangrene           ED Disposition       ED Disposition   Admit    Condition   Stable    Date/Time   Tue Jan 7, 2025  1:18 AM    Comment                  Assessment & Plan       Medical Decision Making  Patient presents for evaluation of left lower quadrant abdominal pain.  Differential includes diverticulitis, ureterolithiasis, pyelonephritis, pancreatitis, ovarian cyst, appendicitis.  Will order abdominal workup and CT abdomen symptomatically with droperidol, Tylenol, and Toradol.    WBC is elevated.  Other labs unremarkable.  CT is consistent with Appendicitis.  Flagyl and Rocephin ordered. Discussed the case with surgery who will admit to their service.     Amount and/or Complexity of Data Reviewed  Labs: ordered. Decision-making details documented in ED Course.  Radiology: ordered.    Risk  OTC drugs.  Prescription drug management.  Decision regarding hospitalization.        ED Course as of 01/09/25 1731   Mon Jan 06, 2025 2236 PREGNANCY TEST URINE: Negative   2306 Creatinine: 0.70   2306 WBC(!): 15.11   2306 RBC Urine: None Seen   2307 WBC, UA: None Seen   2307 Epithelial Cells: Occasional   2307 Bacteria, UA: Occasional   Tue Jan 07, 2025   0108 Acute uncomplicated appendicitis.        Medications   acetaminophen (TYLENOL) tablet 975 mg (975 mg Oral Given 1/6/25 2232)   ketorolac (TORADOL) injection 15 mg (15 mg Intravenous Given 1/6/25 2237)   droperidol (INAPSINE) injection 0.625 mg (0.625 mg Intravenous Given 1/6/25 2236)   iohexol  (OMNIPAQUE) 350 MG/ML injection (SINGLE-DOSE) 100 mL (100 mL Intravenous Given 1/7/25 0012)   ceftriaxone (ROCEPHIN) 1 g/50 mL in dextrose IVPB (0 mg Intravenous Stopped 1/7/25 0252)   metroNIDAZOLE (FLAGYL) IVPB (premix) 500 mg 100 mL (0 mg Intravenous Stopped 1/7/25 0345)   ceFAZolin (ANCEF) IVPB (premix in dextrose) 2,000 mg 50 mL (has no administration in time range)   multi-electrolyte (ISOLYTE-S PH 7.4) bolus 1,000 mL (0 mL Intravenous Stopped 1/7/25 4326)       ED Risk Strat Scores                                              History of Present Illness       Chief Complaint   Patient presents with    Abdominal Pain     Patient having abd pain for a few hours and its getting worse. +nausea +cramping -diarrhea. No urinary issues        Past Medical History:   Diagnosis Date    Anxiety     Bipolar disorder (HCC)     Borderline personality disorder (HCC)     Depression     Self-injurious behavior       Past Surgical History:   Procedure Laterality Date    APPENDECTOMY LAPAROSCOPIC N/A 1/7/2025    Procedure: APPENDECTOMY LAPAROSCOPIC;  Surgeon: Herminio Broussard DO;  Location: [facility];  Service: General    CERVICAL BIOPSY  W/ LOOP ELECTRODE EXCISION      FL INJECTION LEFT HIP (ARTHROGRAM)  4/7/2021    WISDOM TOOTH EXTRACTION        Family History   Problem Relation Age of Onset    Mental illness Mother     Hypertension Mother     ADD / ADHD Mother     Anxiety disorder Mother     Vitamin D deficiency Father     Prostate cancer Father     OCD Father     Diabetes Maternal Grandmother     Schizophrenia Paternal Uncle     Suicide Attempts Neg Hx     Completed Suicide  Neg Hx       Social History     Tobacco Use    Smoking status: Never    Smokeless tobacco: Never    Tobacco comments:     was a social smoker   Vaping Use    Vaping status: Never Used   Substance Use Topics    Alcohol use: Not Currently     Alcohol/week: 1.0 - 2.0 standard drink of alcohol     Types: 1 - 2 Cans of beer per week     Comment: once  per week    Drug use: Not Currently     Types: Marijuana     Comment: reports medical marijuana use almost every day      E-Cigarette/Vaping    E-Cigarette Use Never User       E-Cigarette/Vaping Substances    Nicotine No     THC Yes     CBD No     Flavoring No     Other No     Unknown No       I have reviewed and agree with the history as documented.     HPI    Patient is a 29-year-old female who presents with left lower quadrant pain.  Patient states pain began about 3 hours ago and has progressively worsened.  She is nauseous but has not vomited.  She tried Bentyl and Motrin at home without improvement of pain.  She has never experienced this pain before.  No history of abdominal surgeries.  LMP was 2 weeks ago.  No dysuria or hematuria.  No back pain.    Review of Systems   Constitutional:  Negative for chills and fever.   HENT:  Negative for congestion and sore throat.    Respiratory:  Negative for cough and shortness of breath.    Cardiovascular:  Negative for chest pain and palpitations.   Gastrointestinal:  Positive for abdominal pain. Negative for vomiting.   Genitourinary:  Negative for dysuria and hematuria.   Musculoskeletal:  Negative for back pain and neck pain.   Neurological:  Negative for syncope and headaches.   All other systems reviewed and are negative.          Objective       ED Triage Vitals [01/06/25 2028]   Temperature Pulse Blood Pressure Respirations SpO2 Patient Position - Orthostatic VS   97.6 °F (36.4 °C) 100 137/72 18 96 % Sitting      Temp Source Heart Rate Source BP Location FiO2 (%) Pain Score    Temporal Monitor Left arm -- 8      Vitals      Date and Time Temp Pulse SpO2 Resp BP Pain Score FACES Pain Rating User   01/08/25 0750 -- -- -- -- -- No Pain -- VK   01/08/25 0750 98.5 °F (36.9 °C) 90 96 % -- 110/77 -- -- DII   01/08/25 0749 98.5 °F (36.9 °C) 96 97 % 18 110/77 -- -- DII   01/08/25 0517 -- -- -- -- -- 8 -- EB   01/08/25 0102 -- -- -- -- -- 8 -- EB   01/07/25 2232 97.7 °F  (36.5 °C) 90 91 % -- 102/61 -- -- DII   01/07/25 2148 -- -- -- -- -- 5 -- SO   01/07/25 2129 -- -- -- -- -- 8 -- SO   01/07/25 2125 -- -- -- -- -- 8 -- SO   01/07/25 2007 -- -- -- -- -- 8 -- SO   01/07/25 1847 97.6 °F (36.4 °C) 87 94 % -- 101/59 -- -- DII   01/07/25 1800 -- 81 93 % 22 95/50 -- -- GD   01/07/25 1730 -- 94 95 % 20 99/54 4 -- GD   01/07/25 1720 -- 93 98 % 22 101/51 7 -- GD   01/07/25 1715 -- 81 93 % 23 101/51 -- -- GD   01/07/25 1700 -- 97 98 % 23 103/56 8 -- GD   01/07/25 1646 -- 96 94 % 20 101/58 8 -- GD   01/07/25 1645 -- 96 94 % 20 101/58 8 -- GD   01/07/25 1635 -- 101 96 % 19 113/58 8 -- GD   01/07/25 1631 97.4 °F (36.3 °C) 105 92 % 24 113/58 8 -- GD   01/07/25 1415 -- -- -- -- -- No Pain -- JM   01/07/25 1300 -- -- -- -- -- No Pain -- AM   01/07/25 1256 -- -- -- -- -- No Pain -- JSW   01/07/25 1250 -- -- -- 18 -- -- -- AM   01/07/25 1250 98.2 °F (36.8 °C) 88 97 % -- 106/58 -- -- DII   01/07/25 0810 -- 82 99 % 18 104/60 -- -- MV   01/07/25 0807 -- -- -- -- -- 6 -- MV   01/07/25 0440 -- 91 97 % 18 -- -- -- JF   01/07/25 0440 -- -- -- -- 86/52 provider made aware -- -- CM   01/07/25 0049 -- 60 100 % 16 104/58 -- --    01/06/25 2305 -- -- -- -- -- 3 --    01/06/25 2232 -- -- -- -- -- 10 - Worst Possible Pain --    01/06/25 2028 97.6 °F (36.4 °C) 100 96 % 18 137/72 8 -- DK            Physical Exam  Vitals and nursing note reviewed.   HENT:      Head: Normocephalic and atraumatic.      Nose: No congestion or rhinorrhea.      Mouth/Throat:      Mouth: Mucous membranes are moist.   Eyes:      General:         Right eye: No discharge.         Left eye: No discharge.      Extraocular Movements: Extraocular movements intact.   Cardiovascular:      Rate and Rhythm: Normal rate and regular rhythm.   Pulmonary:      Effort: Pulmonary effort is normal. No respiratory distress.      Breath sounds: Normal breath sounds. No wheezing or rhonchi.   Abdominal:      Palpations: Abdomen is soft.      Tenderness:  There is abdominal tenderness. There is no right CVA tenderness, left CVA tenderness, guarding or rebound.      Comments: TTP LLQ   Musculoskeletal:      Cervical back: Normal range of motion.      Right lower leg: No edema.      Left lower leg: No edema.   Skin:     General: Skin is warm and dry.      Capillary Refill: Capillary refill takes less than 2 seconds.   Neurological:      Mental Status: She is alert.   Psychiatric:         Mood and Affect: Mood normal.         Results Reviewed       Procedure Component Value Units Date/Time    Platelet count [[id number]]  (Normal) Collected: 01/07/25 0416    Lab Status: Final result Specimen: Blood from Arm, Left Updated: 01/07/25 0426     Platelets 289 Thousands/uL      MPV 9.6 fL     Basic metabolic panel [[id number]] Collected: 01/06/25 2237    Lab Status: Final result Specimen: Blood from Arm, Right Updated: 01/06/25 2304     Sodium 137 mmol/L      Potassium 4.1 mmol/L      Chloride 103 mmol/L      CO2 27 mmol/L      ANION GAP 7 mmol/L      BUN 12 mg/dL      Creatinine 0.70 mg/dL      Glucose 104 mg/dL      Calcium 9.7 mg/dL      eGFR 117 ml/min/1.73sq m     Narrative:      [employer] guidelines for Chronic Kidney Disease (CKD):     Stage 1 with normal or high GFR (GFR > 90 mL/min/1.73 square meters)    Stage 2 Mild CKD (GFR = 60-89 mL/min/1.73 square meters)    Stage 3A Moderate CKD (GFR = 45-59 mL/min/1.73 square meters)    Stage 3B Moderate CKD (GFR = 30-44 mL/min/1.73 square meters)    Stage 4 Severe CKD (GFR = 15-29 mL/min/1.73 square meters)    Stage 5 End Stage CKD (GFR <15 mL/min/1.73 square meters)  Note: GFR calculation is accurate only with a steady state creatinine    Urine Microscopic [[id number]]  (Abnormal) Collected: 01/06/25 2228    Lab Status: Final result Specimen: Urine, Clean Catch Updated: 01/06/25 2248     RBC, UA None Seen /hpf      WBC, UA None Seen /hpf      Epithelial Cells Occasional /hpf      Bacteria, UA Occasional  /hpf      MUCUS THREADS Occasional     Amorphous Crystals, UA Moderate    UA w Reflex to Microscopic w Reflex to Culture [[id number]]  (Abnormal) Collected: 01/06/25 2228    Lab Status: Final result Specimen: Urine, Clean Catch Updated: 01/06/25 2245     Color, UA Yellow     Clarity, UA Extra Turbid     Specific Gravity, UA 1.016     pH, UA 8.5     Leukocytes, UA Negative     Nitrite, UA Negative     Protein, UA Trace mg/dl      Glucose, UA Negative mg/dl      Ketones, UA Negative mg/dl      Urobilinogen, UA <2.0 mg/dl      Bilirubin, UA Negative     Occult Blood, UA Negative    CBC and differential [[id number]]  (Abnormal) Collected: 01/06/25 2237    Lab Status: Final result Specimen: Blood from Arm, Right Updated: 01/06/25 2244     WBC 15.11 Thousand/uL      RBC 4.35 Million/uL      Hemoglobin 12.8 g/dL      Hematocrit 39.2 %      MCV 90 fL      MCH 29.4 pg      MCHC 32.7 g/dL      RDW 13.1 %      MPV 9.3 fL      Platelets 307 Thousands/uL      nRBC 0 /100 WBCs      Segmented % 81 %      Immature Grans % 0 %      Lymphocytes % 12 %      Monocytes % 7 %      Eosinophils Relative 0 %      Basophils Relative 0 %      Absolute Neutrophils 12.02 Thousands/µL      Absolute Immature Grans 0.06 Thousand/uL      Absolute Lymphocytes 1.87 Thousands/µL      Absolute Monocytes 1.06 Thousand/µL      Eosinophils Absolute 0.05 Thousand/µL      Basophils Absolute 0.05 Thousands/µL     POCT pregnancy, urine [[id number]]  (Normal) Collected: 01/06/25 2233    Lab Status: Final result Updated: 01/06/25 2233     EXT Preg Test, Ur Negative     Control Valid            CT abdomen pelvis with contrast   Final Interpretation by Earnestine Mayberry MD (01/07 0112)      Findings suggestive of uncomplicated tip appendicitis      Findings discussed with Dr. Tse at 1:10 a.m., 1/7/2025         Workstation performed: [id number]             Procedures    ED Medication and Procedure Management   Prior to Admission Medications   Prescriptions Last Dose  Informant Patient Reported? Taking?   acyclovir (ZOVIRAX) 5 % ointment Not Taking Self No No   Sig: Apply topically every 3 (three) hours   Patient not taking: Reported on 1/7/2025   albuterol (PROVENTIL HFA,VENTOLIN HFA) 90 mcg/act inhaler Not Taking Self No No   Sig: Inhale 2 puffs every 6 (six) hours as needed for wheezing or shortness of breath   Patient not taking: Reported on 1/7/2025   buPROPion (Wellbutrin XL) 300 mg 24 hr tablet   No No   Sig: Take 1 tablet (300 mg total) by mouth daily   cloNIDine (CATAPRES) 0.1 mg tablet Not Taking  No No   Sig: TAKE 1 TABLET BY MOUTH EVERY 12 HOURS   Patient not taking: Reported on 1/7/2025   dicyclomine (BENTYL) 10 mg capsule Not Taking Self No No   Sig: Take 1 capsule (10 mg total) by mouth 4 (four) times a day (before meals and at bedtime)   Patient not taking: Reported on 1/7/2025   ergocalciferol (VITAMIN D2) 50,000 units Not Taking Self No No   Sig: Take 1 capsule (50,000 Units total) by mouth 3 (three) times a week   Patient not taking: Reported on 12/12/2024   hydrOXYzine HCL (ATARAX) 10 mg tablet Not Taking Self No No   Sig: Take 1 tablet (10 mg total) by mouth every 6 (six) hours as needed for anxiety   Patient not taking: Reported on 1/7/2025   lamoTRIgine (LaMICtal) 100 mg tablet   No No   Sig: Take 1 tablet (100 mg total) by mouth daily Do not start before December 26, 2024.   lamoTRIgine (LaMICtal) 25 mg tablet Not Taking Self No No   Sig: Take 1 tab po qam for 1 week then take 2 tabs po qan for 1 week then take 3 tabs po qam   Patient not taking: Reported on 1/7/2025   ondansetron (ZOFRAN) 4 mg tablet Not Taking Self No No   Sig: Take 1 tablet (4 mg total) by mouth every 8 (eight) hours as needed for nausea or vomiting   Patient not taking: Reported on 12/12/2024   valACYclovir (VALTREX) 1,000 mg tablet   No No   Sig: Take 1 tablet (1,000 mg total) by mouth 2 (two) times a day for 7 days      Facility-Administered Medications: None     Discharge Medication  List as of 1/8/2025  9:19 AM        START taking these medications    Details   oxyCODONE (Roxicodone) 5 immediate release tablet Take 1 tablet (5 mg total) by mouth every 6 (six) hours as needed for severe pain for up to 12 days Max Daily Amount: 20 mg, Starting Tue 1/7/2025, Until Sun 1/19/2025 at 2359, Normal           CONTINUE these medications which have NOT CHANGED    Details   acyclovir (ZOVIRAX) 5 % ointment Apply topically every 3 (three) hours, Starting Thu 11/7/2024, Normal      albuterol (PROVENTIL HFA,VENTOLIN HFA) 90 mcg/act inhaler Inhale 2 puffs every 6 (six) hours as needed for wheezing or shortness of breath, Starting Wed 3/15/2023, Normal      buPROPion (Wellbutrin XL) 300 mg 24 hr tablet Take 1 tablet (300 mg total) by mouth daily, Starting Fri 5/24/2024, Until Thu 8/22/2024, Normal      cloNIDine (CATAPRES) 0.1 mg tablet TAKE 1 TABLET BY MOUTH EVERY 12 HOURS, Starting Tue 12/31/2024, Normal      dicyclomine (BENTYL) 10 mg capsule Take 1 capsule (10 mg total) by mouth 4 (four) times a day (before meals and at bedtime), Starting Thu 1/18/2024, Normal      ergocalciferol (VITAMIN D2) 50,000 units Take 1 capsule (50,000 Units total) by mouth 3 (three) times a week, Starting Wed 12/11/2024, Normal      hydrOXYzine HCL (ATARAX) 10 mg tablet Take 1 tablet (10 mg total) by mouth every 6 (six) hours as needed for anxiety, Starting Thu 12/5/2024, Until Wed 3/5/2025 at 2359, Normal      !! lamoTRIgine (LaMICtal) 100 mg tablet Take 1 tablet (100 mg total) by mouth daily Do not start before December 26, 2024., Starting Thu 12/26/2024, Normal      !! lamoTRIgine (LaMICtal) 25 mg tablet Take 1 tab po qam for 1 week then take 2 tabs po qan for 1 week then take 3 tabs po qam, Normal      ondansetron (ZOFRAN) 4 mg tablet Take 1 tablet (4 mg total) by mouth every 8 (eight) hours as needed for nausea or vomiting, Starting Fri 12/29/2023, Normal      valACYclovir (VALTREX) 1,000 mg tablet Take 1 tablet (1,000 mg  total) by mouth 2 (two) times a day for 7 days, Starting Wed 10/23/2024, Until Wed 10/30/2024, Normal       !! - Potential duplicate medications found. Please discuss with provider.        Outpatient Discharge Orders   Discharge Diet     Activity as tolerated     Lifting restrictions     No strenuous exercise     Call provider for:  extreme fatigue     Call provider for:  persistent dizziness or light-headedness     Call provider for:  difficulty breathing, headache or visual disturbances     Call provider for: active or persistent bleeding     Call provider for:  redness, tenderness, or signs of infection (pain, swelling, redness, odor or green/yellow discharge around incision site)     Call provider for:  severe uncontrolled pain     Call provider for:  persistent nausea or vomiting     No dressing needed     ED SEPSIS DOCUMENTATION   Time reflects when diagnosis was documented in both MDM as applicable and the Disposition within this note       Time User Action Codes Description Comment    1/7/2025  1:18 AM Nissa Tse Add [K37] Appendicitis     1/7/2025  3:59 PM Tory Rodriguez Modify [K37] Appendicitis     1/7/2025  4:01 PM Herminio Broussard Modify [K37] Appendicitis     1/7/2025  4:01 PM Herminio Broussard Add [K35.30] Acute appendicitis with localized peritonitis, without perforation, abscess, or gangrene                  Nissa Tse MD  01/09/25 1221

## 2025-01-07 NOTE — QUICK NOTE
Patient's mother was called and notified of the surgical procedure and the patient's status.  Patient's mother expressed her understanding and questions were answered.

## 2025-01-07 NOTE — ED NOTES
Surgical provider at bedside.     Tracy Heredia RN  01/07/25 0158     Minoxidil Pregnancy And Lactation Text: This medication has not been assigned a Pregnancy Risk Category but animal studies failed to show danger with the topical medication. It is unknown if the medication is excreted in breast milk.

## 2025-01-07 NOTE — OP NOTE
OPERATIVE REPORT  PATIENT NAME: Raghav Smith    :  1995  MRN: [id number]  Pt Location: [facility] RO[facility] 06    SURGERY DATE: 2025    Surgeons and Role:     * Herminio Broussard DO - Primary     * Manohar Stallings MD - Assisting     * César Arcos MD - Assisting    Preop Diagnosis:  Appendicitis [K37]    Post-Op Diagnosis Codes:     * Appendicitis [K37]    Procedure(s):  APPENDECTOMY LAPAROSCOPIC    Specimen(s):  ID Type Source Tests Collected by Time Destination   1 :  Tissue Appendix TISSUE EXAM Herminio Broussard DO 2025 1559        Estimated Blood Loss:   Minimal    Drains:  Urethral Catheter Latex 16 Fr. (Active)   Number of days: 0       [REMOVED] Urethral Catheter Latex 16 Fr. (Removed)   Number of days: 0       Anesthesia Type:   General    Operative Indications:  Appendicitis [K37]    Operative Findings:  Appendicitis, non-perforated      Complications:   None    Procedure and Technique:  The patient was taken to the operating room where general anesthesia was carried out and a Olmedo catheter was inserted. The left arm was tucked and the abdomen was prepped and draped in the usual sterile fashion. A 12-mm port was inserted infra-umbilically via Wilkes. CO2 was instilled to attain adequate pneumoperitoneum. A suprapubic 5-mm port and LLQ 5-mm port were placed under direct vision. The scope was introduced at the umbilical port and the appendix was easily visualized. The appendix was inflamed but not perforated. Blunt dissection was used to make a window in the mesoappendix adjacent to the appendix base. A stapler was introduced through the window making sure to not have any other tissue in the jaws of the stapler other than the appendix. The stapler was fired across the appendix. Ultrasonic cautery was used to dissect the mesoappendix along the appendix to its stapled edge. Appendix was placed in an Endobag and brought out through the umbilical port. Trace fluid in the abdomen/pelvis  was suctioned. The appendix transection site appeared clean and dry. All ports were removed under direct vision, with no bleeding seen from the trocar sites. Pneumoperitoneum was deflated. The subumbilical fascial defect was closed with 0-Vicryl suture. The skin incisions were injected with local anesthetic and closed with 4-0 Monocryl suture and glue. Patient was brought to the recovery room in stable condition.      Patient Disposition:  PACU     This procedure was not performed to treat colon cancer through resection           SIGNATURE: Manohar Stallings MD  DATE: January 7, 2025  TIME: 4:30 PM

## 2025-01-07 NOTE — ASSESSMENT & PLAN NOTE
29-year-old female with acute appendicitis, pain began at 6 PM 1/6 1/6 CT A/P: Acute appendicitis    Afebrile, vital signs stable within normal limits on room air    WBC 15.1    Plan  -N.p.o.  -Admit to general surgery  -Continue IV antibiotics  -IV fluids  -OR for laparoscopic appendectomy today  -Multimodal pain regimen  -Encourage ambulation/out of bed, 3 times daily  -Encourage incentive spirometer use, denies per hour

## 2025-01-07 NOTE — H&P
H&P - Surgery-General   Name: Raghav Smith 29 y.o. female I MRN: [id number]  Unit/Bed#: Z6HB I Date of Admission: 1/6/2025   Date of Service: 1/7/2025 I Hospital Day: 0     Assessment & Plan  Acute appendicitis  29-year-old female with acute appendicitis, pain began at 6 PM 1/6 1/6 CT A/P: Acute appendicitis    Afebrile, vital signs stable within normal limits on room air    WBC 15.1    Plan  -N.p.o.  -Admit to general surgery  -Continue IV antibiotics  -IV fluids  -OR for laparoscopic appendectomy today  -Multimodal pain regimen  -Encourage ambulation/out of bed, 3 times daily  -Encourage incentive spirometer use, denies per hour    History of Present Illness   Raghav Smith is a 29 y.o. female who presents left lower quadrant abdominal pain that began at approximately 6 PM yesterday evening.  Patient reports similar gastrointestinal pain in the past.  Patient reports some nausea associated with her pain which improved with Zofran.  Patient reports she was able to sleep a little but the pain persisted today prompting her to come in for evaluation.  Patient denies fevers chills shortness of breath.  Patient was previously feeling well, tolerating diet having bowel function and urinating.    Past medical history is significant for depression, anxiety, bipolar on Lamictal.  Patient denies anticoagulation and antiplatelet use.  No prior abdominal surgical history.    Review of Systems  I have reviewed the patient's PMH, PSH, Social History, Family History, Meds, and Allergies  Historical Information   Past Medical History:   Diagnosis Date    Anxiety     Bipolar disorder (HCC)     Borderline personality disorder (HCC)     Depression     Self-injurious behavior      Past Surgical History:   Procedure Laterality Date    CERVICAL BIOPSY  W/ LOOP ELECTRODE EXCISION      FL INJECTION LEFT HIP (ARTHROGRAM)  4/7/2021    WISDOM TOOTH EXTRACTION       Social History     Tobacco Use    Smoking status: Never     Smokeless tobacco: Never    Tobacco comments:     was a social smoker   Vaping Use    Vaping status: Never Used   Substance and Sexual Activity    Alcohol use: Not Currently     Alcohol/week: 1.0 - 2.0 standard drink of alcohol     Types: 1 - 2 Cans of beer per week     Comment: once per week    Drug use: Not Currently     Types: Marijuana     Comment: reports medical marijuana use almost every day    Sexual activity: Not Currently     Partners: Male, Female     E-Cigarette/Vaping    E-Cigarette Use Never User      E-Cigarette/Vaping Substances    Nicotine No     THC Yes     CBD No     Flavoring No     Other No     Unknown No      Family History   Problem Relation Age of Onset    Mental illness Mother     Hypertension Mother     ADD / ADHD Mother     Anxiety disorder Mother     Vitamin D deficiency Father     Prostate cancer Father     OCD Father     Diabetes Maternal Grandmother     Schizophrenia Paternal Uncle     Suicide Attempts Neg Hx     Completed Suicide  Neg Hx      Social History     Tobacco Use    Smoking status: Never    Smokeless tobacco: Never    Tobacco comments:     was a social smoker   Vaping Use    Vaping status: Never Used   Substance and Sexual Activity    Alcohol use: Not Currently     Alcohol/week: 1.0 - 2.0 standard drink of alcohol     Types: 1 - 2 Cans of beer per week     Comment: once per week    Drug use: Not Currently     Types: Marijuana     Comment: reports medical marijuana use almost every day    Sexual activity: Not Currently     Partners: Male, Female       Current Facility-Administered Medications:     acetaminophen (TYLENOL) tablet 975 mg, Q8H RAFA    [START ON 1/8/2025] ceFAZolin (ANCEF) IVPB (premix in dextrose) 2,000 mg 50 mL, On Call To OR    [START ON 1/8/2025] ceftriaxone (ROCEPHIN) 1 g/50 mL in dextrose IVPB, Q24H    heparin (porcine) subcutaneous injection 5,000 Units, Q8H RAFA **AND** Platelet count, Once    HYDROmorphone HCl (DILAUDID) injection 0.2 mg, Q2H PRN     methocarbamol (ROBAXIN) tablet 750 mg, Q6H RAFA    metroNIDAZOLE (FLAGYL) IVPB (premix) 500 mg 100 mL, Once, Last Rate: 500 mg (01/07/25 0254)    metroNIDAZOLE (FLAGYL) IVPB (premix) 500 mg 100 mL, Q8H    multi-electrolyte (PLASMALYTE-A/ISOLYTE-S PH 7.4) IV solution, Continuous    ondansetron (ZOFRAN) injection 4 mg, Q4H PRN    oxyCODONE (ROXICODONE) split tablet 2.5 mg, Q4H PRN **OR** oxyCODONE (ROXICODONE) IR tablet 5 mg, Q4H PRN  Prior to Admission Medications   Prescriptions Last Dose Informant Patient Reported? Taking?   acyclovir (ZOVIRAX) 5 % ointment  Self No No   Sig: Apply topically every 3 (three) hours   albuterol (PROVENTIL HFA,VENTOLIN HFA) 90 mcg/act inhaler  Self No No   Sig: Inhale 2 puffs every 6 (six) hours as needed for wheezing or shortness of breath   buPROPion (Wellbutrin XL) 300 mg 24 hr tablet   No No   Sig: Take 1 tablet (300 mg total) by mouth daily   cloNIDine (CATAPRES) 0.1 mg tablet   No No   Sig: TAKE 1 TABLET BY MOUTH EVERY 12 HOURS   dicyclomine (BENTYL) 10 mg capsule  Self No No   Sig: Take 1 capsule (10 mg total) by mouth 4 (four) times a day (before meals and at bedtime)   ergocalciferol (VITAMIN D2) 50,000 units  Self No No   Sig: Take 1 capsule (50,000 Units total) by mouth 3 (three) times a week   Patient not taking: Reported on 12/12/2024   hydrOXYzine HCL (ATARAX) 10 mg tablet  Self No No   Sig: Take 1 tablet (10 mg total) by mouth every 6 (six) hours as needed for anxiety   lamoTRIgine (LaMICtal) 100 mg tablet   No No   Sig: Take 1 tablet (100 mg total) by mouth daily Do not start before December 26, 2024.   lamoTRIgine (LaMICtal) 25 mg tablet  Self No No   Sig: Take 1 tab po qam for 1 week then take 2 tabs po qan for 1 week then take 3 tabs po qam   ondansetron (ZOFRAN) 4 mg tablet  Self No No   Sig: Take 1 tablet (4 mg total) by mouth every 8 (eight) hours as needed for nausea or vomiting   Patient not taking: Reported on 12/12/2024   valACYclovir (VALTREX) 1,000 mg tablet    No No   Sig: Take 1 tablet (1,000 mg total) by mouth 2 (two) times a day for 7 days      Facility-Administered Medications: None       Objective :  Temp:  [97.6 °F (36.4 °C)] 97.6 °F (36.4 °C)  HR:  [] 60  BP: (104-137)/(58-72) 104/58  Resp:  [16-18] 16  SpO2:  [96 %-100 %] 100 %  O2 Device: None (Room air)      Physical Exam  Vitals and nursing note reviewed.   Constitutional:       General: She is not in acute distress.     Appearance: Normal appearance. She is normal weight. She is not ill-appearing or toxic-appearing.   HENT:      Head: Normocephalic and atraumatic.      Nose: Nose normal.   Eyes:      General: No scleral icterus.     Conjunctiva/sclera: Conjunctivae normal.   Cardiovascular:      Rate and Rhythm: Normal rate.   Pulmonary:      Effort: Pulmonary effort is normal.      Comments: On room air  Abdominal:      General: Abdomen is flat. There is no distension.      Palpations: Abdomen is soft.      Tenderness: There is abdominal tenderness (Minimally tender in the left lower quadrant). There is no guarding.   Skin:     General: Skin is warm and dry.   Neurological:      Mental Status: She is alert and oriented to person, place, and time.   Psychiatric:         Mood and Affect: Mood normal.         Lab Results: I have reviewed the following results:  Recent Labs     01/06/25  2237   WBC 15.11*   HGB 12.8   HCT 39.2      SODIUM 137   K 4.1      CO2 27   BUN 12   CREATININE 0.70   GLUC 104       Imaging Results Review: I reviewed radiology reports from this admission including: CT abdomen/pelvis.  Other Study Results Review: No additional pertinent studies reviewed.    VTE Pharmacologic Prophylaxis: VTE covered by:  heparin (porcine), Subcutaneous     VTE Mechanical Prophylaxis: sequential compression device

## 2025-01-07 NOTE — DISCHARGE INSTR - AVS FIRST PAGE
General Surgery Discharge Instructions    Please follow-up as instructed. If you do not already have a follow-up appointment, please call the office when you leave to schedule an appointment to be seen in 2-3 weeks for post-operative re-evaluation.    Activity:  - No lifting greater than 20 pounds or strenuous physical activity or exercise for 2 weeks.  - Walking and normal light activities are encouraged.  - Normal daily activities including climbing steps are okay.  - No driving until no longer using pain medications.    Return to work:    - You may return to work in 2 weeks or sooner if you are feeling well enough.    Diet:    - You may resume your normal diet.    Wound Care:  - May shower daily. No tub baths or swimming until cleared by your surgeon.  - Wash incision gently with soap and water and pat dry.  - Do not apply any creams or ointments unless instructed to do so by your surgeon.  - You may apply ice as needed (no longer than 20 minutes at a time) for the first 48 hours.  - Bruising is not unusual and will go away with a little time. You may apply a warm, moist compress that may help the bruising resolve quicker.  - You may remove the dressings the day after surgery (unless otherwise instructed). Leave any skin tapes (steri-strips) on the incision(s) in place until they fall off on their own. Any new dressings are optional.    Medications:    - You may resume all of your regular medications after discharge unless otherwise instructed. Please refer to your discharge medication list for further details.  - Please take the pain medications as directed.  - You are encouraged to use non-narcotic pain medications first and whenever possible. Reserve the use of narcotic pain medication for moderate to severe pain not controlled by non-narcotic medications.  - No driving while taking narcotic pain medications.  - You may become constipated, especially if taking pain medications. You may take any over the  counter stool softeners or laxatives as needed. Examples: Milk of Magnesia, Colace, Senna.    Additional Instructions:  - If you have any questions or concerns after discharge please call the office.  - Call office or return to ER if fever greater than 101, chills, persistent nausea/vomiting, worsening/uncontrollable pain, and/or increasing redness or purulent/foul smelling drainage from incision(s).

## 2025-01-07 NOTE — ED ATTENDING ATTESTATION
Final Diagnoses:     1. Acute appendicitis with localized peritonitis, without perforation, abscess, or gangrene    2. Appendicitis           I, Axel Duke MD, saw and evaluated the patient. All available labs and X-rays were ordered by me or the resident / non-physician and have been reviewed by myself. I discussed the patient with the resident / non-physician and agree with the resident's / non-physician practitioner's findings and plan as documented in the resident's / non-physician practicitioner's note, except where noted.   At this point, I agree with the current assessment done in the ED.   I was present during key portions of all procedures performed unless otherwise stated.     HPI:  NURSING TRIAGE:    This is a 29 y.o. female presenting for evaluation of belly pain since 5:30PM. +nausea -vomiting -diarrhea  Denies any urinary tract infection symptoms (burning, itching, pain, blood, frequency).  No f/ch  No belly surgeries  Bentyl didn't help.  Zofran helped nausea.  Last menses 2 weeks ago. Chief Complaint   Patient presents with    Abdominal Pain     Patient having abd pain for a few hours and its getting worse. +nausea +cramping -diarrhea. No urinary issues       PHYSICAL: ASSESSMENT + PLAN:   Pertinent: looks uncomfortable.   TTP LLQ / suprapubpic  focal  No CVAT  Rest of belly non-tender  No CVAT  Heel strike sign absent.     General: VS reviewed  Appears in NAD  awake, alert.   Well-nourished, well-developed. Appears stated age.   Speaking normally in full sentences.   Head: Normocephalic, atraumatic  Eyes: EOM-I. No diplopia.   No hyphema.   No subconjunctival hemorrhages.  Symmetrical lids.   ENT: Atraumatic external nose and ears.    MMM  No malocclusion. No stridor. Normal phonation. No drooling. Normal swallowing.   Neck: No JVD.  CV: No pallor noted  Lungs:   No tachypnea  No respiratory distress  Abd: soft as abovend no rebound/guarding  MSK:   FROM spontaneously  Skin: Dry, intact.    Neuro: Awake, alert, GCS15, CN II-XII grossly intact.   Motor grossly intact.  Psychiatric/Behavioral: very anxiious appearing.    Exam: deferred    Vitals:    01/07/25 1730 01/07/25 1800 01/07/25 1847 01/07/25 2232   BP: 99/54 95/50 101/59 102/61   BP Location: Left arm Left arm     Pulse: 94 81 87 90   Resp: 20 22     Temp:   97.6 °F (36.4 °C) 97.7 °F (36.5 °C)   TempSrc:       SpO2: 95% 93% 94% 91%   Weight:       Height:        - given the presentation, will check CBC for marked leukocytosis  - CMP for liver enzyme elevation that could signal cholecystitis, biliary obstructive disease. Check RFTs for ENMA / markers of dehydration.  - Urine: will check for UTI or signs of pyelonephritis.   - Pregnancy test: given she is female, could if positive, could be ectopic and would change workup to ultrasound instead of CT scan.   - Lastly, will consider abdominal imaging.  - CT AP w Contrast: r/o appendicitis, cholecystitis, bowel obstruction or other acute abdominal pathology. Would also demonstrate signs of pyelonephritis, cystitis.   - Disposition per workup.      There are no obvious limitations to social determinants of care.   Nursing note reviewed.   Vitals reviewed.   Orders placed by myself and/or advanced practitioner / resident.    Previous chart was reviewed  History obtained from: Patient  Language barrier: None  Limitations to the history obtained: None    Past Medical: Past Surgical:    has a past medical history of Anxiety, Bipolar disorder (HCC), Borderline personality disorder (HCC), Depression, and Self-injurious behavior.  has a past surgical history that includes [place] tooth extraction; Cervical biopsy w/ loop electrode excision; and FL injection left hip (arthrogram) (4/7/2021).   Social: Cardiac (Echo/Cath)   Social History     Substance and Sexual Activity   Alcohol Use Not Currently    Alcohol/week: 1.0 - 2.0 standard drink of alcohol    Types: 1 - 2 Cans of beer per week    Comment: once per  week     Social History     Tobacco Use   Smoking Status Never   Smokeless Tobacco Never   Tobacco Comments    was a social smoker     Social History     Substance and Sexual Activity   Drug Use Not Currently    Types: Marijuana    Comment: reports medical marijuana use almost every day    Results for orders placed during the hospital encounter of 17    Echo complete with contrast if indicated    Narrative  [address] [id number] [phone]    Transthoracic Echocardiogram  2D, M-mode, Doppler, and Color Doppler    Study date:  24-Aug-2017    Patient: MARGA MINA  MR number: [id number]  Account number: [id number]  : 1995  Age: 22 years  Gender: Female  Status: Outpatient  Location: [address] [place] [address] [facility] [place]  Height: 61 in  Weight: 140 lb  BP: 100/ 70 mmHg    Indications: Irregular Heartbeat    Diagnoses: I49.9 - Cardiac arrhythmia, unspecified    Sonographer:  MADINA Cheatham  Primary Physician:  BONILLA Lujan  Referring Physician:  BONILLA Lujan  Group:  [facility]  Interpreting Physician:  Lul Pompa MD PhD    SUMMARY    LEFT VENTRICLE:  Systolic function was normal. Ejection fraction was estimated in the range of 65 %.  There were no regional wall motion abnormalities.    HISTORY: PRIOR HISTORY: Chest Pressure, Palpitations    PROCEDURE: The study was performed in the [address] [place] [address]. This was a routine study. The transthoracic approach was used. The study included complete 2D imaging, M-mode, complete spectral Doppler, and color Doppler. The  heart rate was 77 bpm, at the start of the study. Images were obtained from the parasternal, apical, subcostal, and suprasternal notch acoustic windows. Echocardiographic views were limited due to lung interference. Image quality was  adequate.    LEFT VENTRICLE: Size was normal. Systolic function was normal. Ejection fraction  was estimated in the range of 65 %. There were no regional wall motion abnormalities. Wall thickness was normal. DOPPLER: Left ventricular diastolic function  parameters were normal.    RIGHT VENTRICLE: The size was normal. Systolic function was normal.    LEFT ATRIUM: Size was normal.    RIGHT ATRIUM: Size was normal.    MITRAL VALVE: Valve structure was normal. There was normal leaflet separation. DOPPLER: The transmitral velocity was within the normal range. There was no evidence for stenosis. There was no regurgitation.    AORTIC VALVE: The valve was trileaflet. Leaflets exhibited normal thickness and normal cuspal separation. DOPPLER: Transaortic velocity was within the normal range. There was no evidence for stenosis. There was no regurgitation.    TRICUSPID VALVE: The valve structure was normal. There was normal leaflet separation. DOPPLER: The transtricuspid velocity was within the normal range. There was no evidence for stenosis. There was no significant regurgitation.    PULMONIC VALVE: There was no echocardiographic evidence of vegetation. Not well visualized. DOPPLER: There was no regurgitation.    PERICARDIUM: There was no pericardial effusion.    AORTA: The root exhibited normal size.    SYSTEM MEASUREMENT TABLES    2D  %FS: 25.9 %  Ao Diam: 2.45 cm  EDV(Teich): 65.96 ml  EF(Cube): 59.32 %  EF(Teich): 51.55 %  ESV(Cube): 24.15 ml  ESV(Teich): 31.96 ml  IVSd: 0.89 cm  LA Area: 12.92 cm2  LA Diam: 2.87 cm  LVEDV MOD A4C: 60.7 ml  LVEF MOD A4C: 73.33 %  LVESV MOD A4C: 16.19 ml  LVIDd: 3.9 cm  LVIDs: 2.89 cm  LVLd A4C: 7.91 cm  LVLs A4C: 6.08 cm  LVPWd: 0.74 cm  RA Area: 10.75 cm2  RV Diam.: 3.07 cm  SV MOD A4C: 44.51 ml  SV(Cube): 35.22 ml  SV(Teich): 34 ml    CW  TR Vmax: 2.25 m/s  TR maxP.31 mmHg    MM  TAPSE: 2.02 cm    PW  E': 0.11 m/s  E/E': 9.14  MV A Jesus: 0.33 m/s  MV Dec Mower: 3.84 m/s2  MV DecT: 267.99 ms  MV E Jesus: 1.03 m/s  MV E/A Ratio: 3.16    Intersocietal Commission Accredited  Echocardiography Laboratory    Prepared and electronically signed by    Lul Pompa MD PhD  Signed 24-Aug-2017 12:02:04    No results found for this or any previous visit.    No results found for this or any previous visit.     Labs: Imaging:   Labs Reviewed   CBC AND DIFFERENTIAL - Abnormal       Result Value Ref Range Status    WBC 15.11 (*) 4.31 - 10.16 Thousand/uL Final    RBC 4.35  3.81 - 5.12 Million/uL Final    Hemoglobin 12.8  11.5 - 15.4 g/dL Final    Hematocrit 39.2  34.8 - 46.1 % Final    MCV 90  82 - 98 fL Final    MCH 29.4  26.8 - 34.3 pg Final    MCHC 32.7  31.4 - 37.4 g/dL Final    RDW 13.1  11.6 - 15.1 % Final    MPV 9.3  8.9 - 12.7 fL Final    Platelets 307  149 - 390 Thousands/uL Final    nRBC 0  /100 WBCs Final    Segmented % 81 (*) 43 - 75 % Final    Immature Grans % 0  0 - 2 % Final    Lymphocytes % 12 (*) 14 - 44 % Final    Monocytes % 7  4 - 12 % Final    Eosinophils Relative 0  0 - 6 % Final    Basophils Relative 0  0 - 1 % Final    Absolute Neutrophils 12.02 (*) 1.85 - 7.62 Thousands/µL Final    Absolute Immature Grans 0.06  0.00 - 0.20 Thousand/uL Final    Absolute Lymphocytes 1.87  0.60 - 4.47 Thousands/µL Final    Absolute Monocytes 1.06  0.17 - 1.22 Thousand/µL Final    Eosinophils Absolute 0.05  0.00 - 0.61 Thousand/µL Final    Basophils Absolute 0.05  0.00 - 0.10 Thousands/µL Final   UA W REFLEX TO MICROSCOPIC WITH REFLEX TO CULTURE - Abnormal    Color, UA Yellow   Final    Clarity, UA Extra Turbid   Final    Specific Gravity, UA 1.016  1.003 - 1.030 Final    pH, UA 8.5 (*) 4.5, 5.0, 5.5, 6.0, 6.5, 7.0, 7.5, 8.0 Final    Leukocytes, UA Negative  Negative Final    Nitrite, UA Negative  Negative Final    Protein, UA Trace (*) Negative mg/dl Final    Glucose, UA Negative  Negative mg/dl Final    Ketones, UA Negative  Negative mg/dl Final    Urobilinogen, UA <2.0  <2.0 mg/dl mg/dl Final    Bilirubin, UA Negative  Negative Final    Occult Blood, UA Negative  Negative Final   URINE  MICROSCOPIC - Abnormal    RBC, UA None Seen  None Seen, 1-2 /hpf Final    WBC, UA None Seen  None Seen, 1-2 /hpf Final    Epithelial Cells Occasional  None Seen, Occasional /hpf Final    Bacteria, UA Occasional  None Seen, Occasional /hpf Final    MUCUS THREADS Occasional (*) None Seen Final    Amorphous Crystals, UA Moderate   Final   PLATELET COUNT - Normal    Platelets 289  149 - 390 Thousands/uL Final    MPV 9.6  8.9 - 12.7 fL Final   POCT PREGNANCY, URINE - Normal    EXT Preg Test, Ur Negative   Final    Control Valid   Final   BASIC METABOLIC PANEL    Sodium 137  135 - 147 mmol/L Final    Potassium 4.1  3.5 - 5.3 mmol/L Final    Chloride 103  96 - 108 mmol/L Final    CO2 27  21 - 32 mmol/L Final    ANION GAP 7  4 - 13 mmol/L Final    BUN 12  5 - 25 mg/dL Final    Creatinine 0.70  0.60 - 1.30 mg/dL Final    Comment: Standardized to IDMS reference method    Glucose 104  65 - 140 mg/dL Final    Comment: If the patient is fasting, the ADA then defines impaired fasting glucose as > 100 mg/dL and diabetes as > or equal to 123 mg/dL.    Calcium 9.7  8.4 - 10.2 mg/dL Final    eGFR 117  ml/min/1.73sq m Final    Narrative:     National Kidney Disease Foundation guidelines for Chronic Kidney Disease (CKD):     Stage 1 with normal or high GFR (GFR > 90 mL/min/1.73 square meters)    Stage 2 Mild CKD (GFR = 60-89 mL/min/1.73 square meters)    Stage 3A Moderate CKD (GFR = 45-59 mL/min/1.73 square meters)    Stage 3B Moderate CKD (GFR = 30-44 mL/min/1.73 square meters)    Stage 4 Severe CKD (GFR = 15-29 mL/min/1.73 square meters)    Stage 5 End Stage CKD (GFR <15 mL/min/1.73 square meters)  Note: GFR calculation is accurate only with a steady state creatinine    CT abdomen pelvis with contrast   Final Result      Findings suggestive of uncomplicated tip appendicitis      Findings discussed with Dr. Tse at 1:10 a.m., 1/7/2025         Workstation performed: [id number]            Medications: Code Status:   Medications    acetaminophen (TYLENOL) tablet 975 mg (975 mg Oral Given 1/7/25 2125)   oxyCODONE (ROXICODONE) split tablet 2.5 mg ( Oral See Alternative 1/7/25 2007)     Or   oxyCODONE (ROXICODONE) IR tablet 5 mg (5 mg Oral Given 1/7/25 2007)   HYDROmorphone HCl (DILAUDID) injection 0.2 mg (0.2 mg Intravenous Given 1/7/25 2129)   ondansetron (ZOFRAN) injection 4 mg ( Intravenous MAR Unhold 1/7/25 1609)   methocarbamol (ROBAXIN) tablet 750 mg (750 mg Oral Not Given 1/7/25 1820)   heparin (porcine) subcutaneous injection 5,000 Units (5,000 Units Subcutaneous Given 1/7/25 2128)   acetaminophen (TYLENOL) tablet 975 mg (975 mg Oral Given 1/6/25 2232)   ketorolac (TORADOL) injection 15 mg (15 mg Intravenous Given 1/6/25 2237)   droperidol (INAPSINE) injection 0.625 mg (0.625 mg Intravenous Given 1/6/25 2236)   iohexol (OMNIPAQUE) 350 MG/ML injection (SINGLE-DOSE) 100 mL (100 mL Intravenous Given 1/7/25 0012)   ceftriaxone (ROCEPHIN) 1 g/50 mL in dextrose IVPB (0 mg Intravenous Stopped 1/7/25 0252)   metroNIDAZOLE (FLAGYL) IVPB (premix) 500 mg 100 mL (0 mg Intravenous Stopped 1/7/25 0345)   ceFAZolin (ANCEF) IVPB (premix in dextrose) 2,000 mg 50 mL (2,000 mg Intravenous Given 1/7/25 1428)   multi-electrolyte (ISOLYTE-S PH 7.4) bolus 1,000 mL (0 mL Intravenous Stopped 1/7/25 0726)    Code Status: Level 1 - Full Code  Advance Directive and Living Will:      Power of :    POLST:       Orders Placed This Encounter   Procedures    CT abdomen pelvis with contrast    CBC and differential    Basic metabolic panel    UA w Reflex to Microscopic w Reflex to Culture    Urine Microscopic    Platelet count    Diet Regular; Regular House    Discharge Diet    Vital signs per unit routine    Notify physician    Insert peripheral IV    Maintain IV access    Incentive spirometry    Apply SCD only    Void on call to OR    Apply chlorhexidine 4% cloth topical    Insert urinary catheter    Activity as tolerated    Lifting restrictions    No  strenuous exercise    Call provider for:  extreme fatigue    Call provider for:  persistent dizziness or light-headedness    Call provider for:  difficulty breathing, headache or visual disturbances    Call provider for: active or persistent bleeding    Call provider for:  redness, tenderness, or signs of infection (pain, swelling, redness, odor or green/yellow discharge around incision site)    Call provider for:  severe uncontrolled pain    Call provider for:  persistent nausea or vomiting    No dressing needed    Level 1-Full Code: all life saving measures are indicated    POCT pregnancy, urine    ECG 12 lead    Discontinue IV    Discharge patient    Outpatient No Charge Bed     Time reflects when diagnosis was documented in both MDM as applicable and the Disposition within this note       Time User Action Codes Description Comment    1/7/2025  1:18 AM Nissa Tse Add [K37] Appendicitis     1/7/2025  3:59 PM Tory Rodriguez Modify [K37] Appendicitis     1/7/2025  4:01 PM Herminio Broussard Modify [K37] Appendicitis     1/7/2025  4:01 PM Herminio Broussard Add [K35.30] Acute appendicitis with localized peritonitis, without perforation, abscess, or gangrene           ED Disposition       ED Disposition   Admit    Condition   Stable    Date/Time   Tue Jan 7, 2025  1:18 AM    Comment                  Follow-up Information       Follow up With Specialties Details Why Contact Info Additional Information    [facility] Schedule an appointment as soon as possible for a visit in 2 week(s)  [address] [id number] [phone] [facility], [address] [place] [address], [id number] [phone]          Current Discharge Medication List        START taking these medications    Details   oxyCODONE (Roxicodone) 5 immediate release tablet Take 1 tablet (5 mg total) by mouth every 6 (six) hours as needed for  severe pain for up to 12 days Max Daily Amount: 20 mg  Qty: 12 tablet, Refills: 0    Associated Diagnoses: Acute appendicitis with localized peritonitis, without perforation, abscess, or gangrene           CONTINUE these medications which have NOT CHANGED    Details   acyclovir (ZOVIRAX) 5 % ointment Apply topically every 3 (three) hours  Qty: 5 g, Refills: 1    Associated Diagnoses: Herpes labialis      albuterol (PROVENTIL HFA,VENTOLIN HFA) 90 mcg/act inhaler Inhale 2 puffs every 6 (six) hours as needed for wheezing or shortness of breath  Qty: 18 g, Refills: 1    Comments: Substitution to a formulary equivalent within the same pharmaceutical class is authorized.  Associated Diagnoses: Mild intermittent reactive airway disease without complication      buPROPion (Wellbutrin XL) 300 mg 24 hr tablet Take 1 tablet (300 mg total) by mouth daily  Qty: 90 tablet, Refills: 0    Associated Diagnoses: Bipolar 2 disorder (HCC)      cloNIDine (CATAPRES) 0.1 mg tablet TAKE 1 TABLET BY MOUTH EVERY 12 HOURS  Qty: 60 tablet, Refills: 2    Associated Diagnoses: PTSD (post-traumatic stress disorder)      dicyclomine (BENTYL) 10 mg capsule Take 1 capsule (10 mg total) by mouth 4 (four) times a day (before meals and at bedtime)  Qty: 30 capsule, Refills: 2    Associated Diagnoses: Abdominal pain      ergocalciferol (VITAMIN D2) 50,000 units Take 1 capsule (50,000 Units total) by mouth 3 (three) times a week  Qty: 12 capsule, Refills: 0    Associated Diagnoses: Vitamin D deficiency      hydrOXYzine HCL (ATARAX) 10 mg tablet Take 1 tablet (10 mg total) by mouth every 6 (six) hours as needed for anxiety  Qty: 30 tablet, Refills: 2    Associated Diagnoses: Bipolar 2 disorder (HCC)      !! lamoTRIgine (LaMICtal) 100 mg tablet Take 1 tablet (100 mg total) by mouth daily Do not start before December 26, 2024.  Qty: 30 tablet, Refills: 0    Associated Diagnoses: Bipolar 2 disorder (HCC)      !! lamoTRIgine (LaMICtal) 25 mg tablet Take 1 tab  po qam for 1 week then take 2 tabs po qan for 1 week then take 3 tabs po qam  Qty: 42 tablet, Refills: 0    Comments: Re initiation of therapy  Associated Diagnoses: Bipolar 2 disorder (HCC)      ondansetron (ZOFRAN) 4 mg tablet Take 1 tablet (4 mg total) by mouth every 8 (eight) hours as needed for nausea or vomiting  Qty: 20 tablet, Refills: 0    Associated Diagnoses: Lower abdominal pain      valACYclovir (VALTREX) 1,000 mg tablet Take 1 tablet (1,000 mg total) by mouth 2 (two) times a day for 7 days  Qty: 14 tablet, Refills: 0    Associated Diagnoses: Cold sore       !! - Potential duplicate medications found. Please discuss with provider.        Outpatient Discharge Orders   Discharge Diet     Activity as tolerated     Lifting restrictions     No strenuous exercise     Call provider for:  extreme fatigue     Call provider for:  persistent dizziness or light-headedness     Call provider for:  difficulty breathing, headache or visual disturbances     Call provider for: active or persistent bleeding     Call provider for:  redness, tenderness, or signs of infection (pain, swelling, redness, odor or green/yellow discharge around incision site)     Call provider for:  severe uncontrolled pain     Call provider for:  persistent nausea or vomiting     No dressing needed     Prior to Admission Medications   Prescriptions Last Dose Informant Patient Reported? Taking?   acyclovir (ZOVIRAX) 5 % ointment Not Taking Self No No   Sig: Apply topically every 3 (three) hours   Patient not taking: Reported on 1/7/2025   albuterol (PROVENTIL HFA,VENTOLIN HFA) 90 mcg/act inhaler Not Taking Self No No   Sig: Inhale 2 puffs every 6 (six) hours as needed for wheezing or shortness of breath   Patient not taking: Reported on 1/7/2025   buPROPion (Wellbutrin XL) 300 mg 24 hr tablet   No No   Sig: Take 1 tablet (300 mg total) by mouth daily   cloNIDine (CATAPRES) 0.1 mg tablet Not Taking  No No   Sig: TAKE 1 TABLET BY MOUTH EVERY 12  "HOURS   Patient not taking: Reported on 1/7/2025   dicyclomine (BENTYL) 10 mg capsule Not Taking Self No No   Sig: Take 1 capsule (10 mg total) by mouth 4 (four) times a day (before meals and at bedtime)   Patient not taking: Reported on 1/7/2025   ergocalciferol (VITAMIN D2) 50,000 units Not Taking Self No No   Sig: Take 1 capsule (50,000 Units total) by mouth 3 (three) times a week   Patient not taking: Reported on 12/12/2024   hydrOXYzine HCL (ATARAX) 10 mg tablet Not Taking Self No No   Sig: Take 1 tablet (10 mg total) by mouth every 6 (six) hours as needed for anxiety   Patient not taking: Reported on 1/7/2025   lamoTRIgine (LaMICtal) 100 mg tablet   No No   Sig: Take 1 tablet (100 mg total) by mouth daily Do not start before December 26, 2024.   lamoTRIgine (LaMICtal) 25 mg tablet Not Taking Self No No   Sig: Take 1 tab po qam for 1 week then take 2 tabs po qan for 1 week then take 3 tabs po qam   Patient not taking: Reported on 1/7/2025   ondansetron (ZOFRAN) 4 mg tablet Not Taking Self No No   Sig: Take 1 tablet (4 mg total) by mouth every 8 (eight) hours as needed for nausea or vomiting   Patient not taking: Reported on 12/12/2024   valACYclovir (VALTREX) 1,000 mg tablet   No No   Sig: Take 1 tablet (1,000 mg total) by mouth 2 (two) times a day for 7 days      Facility-Administered Medications: None                        Portions of the record may have been created with voice recognition software. Occasional wrong word or \"sound a like\" substitutions may have occurred due to the inherent limitations of voice recognition software. Read the chart carefully and recognize, using context, where substitutions have occurred.    Electronically signed by:  Axel Duke  "

## 2025-01-07 NOTE — ANESTHESIA POSTPROCEDURE EVALUATION
Post-Op Assessment Note    CV Status:  Stable  Pain Score: 8    Pain management: adequate       Mental Status:  Alert and awake   Hydration Status:  Euvolemic   PONV Controlled:  Controlled   Airway Patency:  Patent     Post Op Vitals Reviewed: Yes    No anethesia notable event occurred.    Staff: CRNA, Anesthesiologist           Last Filed PACU Vitals:  Vitals Value Taken Time   Temp 98.4    Pulse 105 01/07/25 1630   /58 01/07/25 1631   Resp 24 01/07/25 1630   SpO2 92 % 01/07/25 1630   Vitals shown include unfiled device data.

## 2025-01-07 NOTE — PLAN OF CARE
Problem: PAIN - ADULT  Goal: Verbalizes/displays adequate comfort level or baseline comfort level  Description: Interventions:  - Encourage patient to monitor pain and request assistance  - Assess pain using appropriate pain scale  - Administer analgesics based on type and severity of pain and evaluate response  - Implement non-pharmacological measures as appropriate and evaluate response  - Consider cultural and social influences on pain and pain management  - Notify physician/advanced practitioner if interventions unsuccessful or patient reports new pain  Outcome: Progressing     Problem: INFECTION - ADULT  Goal: Absence or prevention of progression during hospitalization  Description: INTERVENTIONS:  - Assess and monitor for signs and symptoms of infection  - Monitor lab/diagnostic results  - Monitor all insertion sites, i.e. indwelling lines, tubes, and drains  - Monitor endotracheal if appropriate and nasal secretions for changes in amount and color  - [place] appropriate cooling/warming therapies per order  - Administer medications as ordered  - Instruct and encourage patient and family to use good hand hygiene technique  - Identify and instruct in appropriate isolation precautions for identified infection/condition  Outcome: Progressing  Goal: Absence of fever/infection during neutropenic period  Description: INTERVENTIONS:  - Monitor WBC    Outcome: Progressing     Problem: SAFETY ADULT  Goal: Patient will remain free of falls  Description: INTERVENTIONS:  - Educate patient/family on patient safety including physical limitations  - Instruct patient to call for assistance with activity   - Consult OT/PT to assist with strengthening/mobility   - Keep Call bell within reach  - Keep bed low and locked with side rails adjusted as appropriate  - Keep care items and personal belongings within reach  - Initiate and maintain comfort rounds  - Make Fall Risk Sign visible to staff  - Offer Toileting every  Hours,  in advance of need  - Initiate/Maintain alarm  - Obtain necessary fall risk management equipment:   - Apply yellow socks and bracelet for high fall risk patients  - Consider moving patient to room near nurses station  Outcome: Progressing  Goal: Maintain or return to baseline ADL function  Description: INTERVENTIONS:  -  Assess patient's ability to carry out ADLs; assess patient's baseline for ADL function and identify physical deficits which impact ability to perform ADLs (bathing, care of mouth/teeth, toileting, grooming, dressing, etc.)  - Assess/evaluate cause of self-care deficits   - Assess range of motion  - Assess patient's mobility; develop plan if impaired  - Assess patient's need for assistive devices and provide as appropriate  - Encourage maximum independence but intervene and supervise when necessary  - Involve family in performance of ADLs  - Assess for home care needs following discharge   - Consider OT consult to assist with ADL evaluation and planning for discharge  - Provide patient education as appropriate  Outcome: Progressing

## 2025-01-07 NOTE — PLAN OF CARE
Problem: PAIN - ADULT  Goal: Verbalizes/displays adequate comfort level or baseline comfort level  Description: Interventions:  - Encourage patient to monitor pain and request assistance  - Assess pain using appropriate pain scale  - Administer analgesics based on type and severity of pain and evaluate response  - Implement non-pharmacological measures as appropriate and evaluate response  - Consider cultural and social influences on pain and pain management  - Notify physician/advanced practitioner if interventions unsuccessful or patient reports new pain  Outcome: Progressing     Problem: INFECTION - ADULT  Goal: Absence or prevention of progression during hospitalization  Description: INTERVENTIONS:  - Assess and monitor for signs and symptoms of infection  - Monitor lab/diagnostic results  - Monitor all insertion sites, i.e. indwelling lines, tubes, and drains  - Monitor endotracheal if appropriate and nasal secretions for changes in amount and color  - [place] appropriate cooling/warming therapies per order  - Administer medications as ordered  - Instruct and encourage patient and family to use good hand hygiene technique  - Identify and instruct in appropriate isolation precautions for identified infection/condition  Outcome: Progressing  Goal: Absence of fever/infection during neutropenic period  Description: INTERVENTIONS:  - Monitor WBC    Outcome: Progressing     Problem: SAFETY ADULT  Goal: Patient will remain free of falls  Description: INTERVENTIONS:  - Educate patient/family on patient safety including physical limitations  - Instruct patient to call for assistance with activity   - Consult OT/PT to assist with strengthening/mobility   - Keep Call bell within reach  - Keep bed low and locked with side rails adjusted as appropriate  - Keep care items and personal belongings within reach  - Initiate and maintain comfort rounds  - Make Fall Risk Sign visible to staff  - Offer Toileting every  Hours,  in advance of need  - Initiate/Maintain alarm  - Obtain necessary fall risk management equipment:   - Apply yellow socks and bracelet for high fall risk patients  - Consider moving patient to room near nurses station  Outcome: Progressing  Goal: Maintain or return to baseline ADL function  Description: INTERVENTIONS:  -  Assess patient's ability to carry out ADLs; assess patient's baseline for ADL function and identify physical deficits which impact ability to perform ADLs (bathing, care of mouth/teeth, toileting, grooming, dressing, etc.)  - Assess/evaluate cause of self-care deficits   - Assess range of motion  - Assess patient's mobility; develop plan if impaired  - Assess patient's need for assistive devices and provide as appropriate  - Encourage maximum independence but intervene and supervise when necessary  - Involve family in performance of ADLs  - Assess for home care needs following discharge   - Consider OT consult to assist with ADL evaluation and planning for discharge  - Provide patient education as appropriate  Outcome: Progressing

## 2025-01-07 NOTE — ANESTHESIA PREPROCEDURE EVALUATION
"Procedure:  APPENDECTOMY LAPAROSCOPIC (Abdomen)    Relevant Problems   CARDIO   (+) Pure hypercholesterolemia      NEURO/PSYCH   (+) Anxiety   (+) PTSD (post-traumatic stress disorder)      Behavioral Health   (+) Bipolar 2 disorder (HCC)      Respiratory/Allergy   (+) Reactive airway disease      FEN/Gastrointestinal   (+) Acute appendicitis      Recent labs personally reviewed:  Lab Results   Component Value Date    WBC 15.11 (H) 01/06/2025    HGB 12.8 01/06/2025     01/07/2025     Lab Results   Component Value Date     10/15/2015    K 4.1 01/06/2025    BUN 12 01/06/2025    CREATININE 0.70 01/06/2025    GLUCOSE 84 10/15/2015     No results found for: \"PTT\"   No results found for: \"INR\"    Lab Results   Component Value Date    HGBA1C 5.0 06/10/2024             Physical Exam    Airway    Mallampati score: II  TM Distance: >3 FB       Dental   No notable dental hx     Cardiovascular      Pulmonary      Other Findings  post-pubertal.      Anesthesia Plan  ASA Score- 2     Anesthesia Type- general with ASA Monitors.         Additional Monitors:     Airway Plan: ETT.           Plan Factors-    Chart reviewed.   Existing labs reviewed. Patient summary reviewed.                  Induction- intravenous.    Postoperative Plan-     Perioperative Resuscitation Plan - Level 1 - Full Code.       Informed Consent- Anesthetic plan and risks discussed with patient.  I personally reviewed this patient with the CRNA. Discussed and agreed on the Anesthesia Plan with the CRNA..        "

## 2025-01-08 VITALS
BODY MASS INDEX: 31.41 KG/M2 | OXYGEN SATURATION: 96 % | TEMPERATURE: 98.5 F | DIASTOLIC BLOOD PRESSURE: 77 MMHG | WEIGHT: 160 LBS | SYSTOLIC BLOOD PRESSURE: 110 MMHG | HEIGHT: 60 IN | HEART RATE: 90 BPM | RESPIRATION RATE: 18 BRPM

## 2025-01-08 PROCEDURE — 96366 THER/PROPH/DIAG IV INF ADDON: CPT

## 2025-01-08 PROCEDURE — 99024 POSTOP FOLLOW-UP VISIT: CPT | Performed by: SURGERY

## 2025-01-08 RX ADMIN — METHOCARBAMOL 750 MG: 750 TABLET ORAL at 05:17

## 2025-01-08 RX ADMIN — METHOCARBAMOL 750 MG: 750 TABLET ORAL at 00:02

## 2025-01-08 RX ADMIN — OXYCODONE HYDROCHLORIDE 5 MG: 5 TABLET ORAL at 01:02

## 2025-01-08 RX ADMIN — HEPARIN SODIUM 5000 UNITS: 5000 INJECTION INTRAVENOUS; SUBCUTANEOUS at 05:16

## 2025-01-08 RX ADMIN — ACETAMINOPHEN 975 MG: 325 TABLET, FILM COATED ORAL at 05:16

## 2025-01-08 RX ADMIN — OXYCODONE HYDROCHLORIDE 5 MG: 5 TABLET ORAL at 05:17

## 2025-01-08 NOTE — PROGRESS NOTES
Progress Note - Surgery-General   Name: Raghav Smith 29 y.o. female I MRN: [id number]  Unit/Bed#: CW2 216-01 I Date of Admission: 1/6/2025   Date of Service: 1/8/2025 I Hospital Day: 0     Assessment & Plan  Acute appendicitis  S/p 1/7 lap appy.    Plan  -regular diet  -dc home  -DVT ppx, IS/OOB    Subjective/Objective   NAOE. Pain controlled. Tolerating diet, no n/v. No BM, no flatus. Voiding. Walking.    Physical Exam:  General: NAD  CV: nl rate  Lungs: nl wob. No resp distress.  ABD: Soft, ND, NT, CDI  Extrem: No CCE  UOP 1300cc    Patient Vitals for the past 24 hrs:   BP Temp Temp src Pulse Resp SpO2 Height Weight   01/07/25 2232 102/61 97.7 °F (36.5 °C) -- 90 -- 91 % -- --   01/07/25 1847 101/59 97.6 °F (36.4 °C) -- 87 -- 94 % -- --   01/07/25 1800 95/50 -- -- 81 22 93 % -- --   01/07/25 1730 99/54 -- -- 94 20 95 % -- --   01/07/25 1720 101/51 -- -- 93 22 98 % -- --   01/07/25 1715 101/51 -- -- 81 (!) 23 93 % -- --   01/07/25 1700 103/56 -- -- 97 (!) 23 98 % -- --   01/07/25 1646 101/58 -- -- 96 20 94 % -- --   01/07/25 1645 101/58 -- -- 96 20 94 % -- --   01/07/25 1635 113/58 -- -- 101 19 96 % -- --   01/07/25 1631 113/58 (!) 97.4 °F (36.3 °C) Temporal 105 (!) 24 92 % -- --   01/07/25 1300 -- -- -- -- -- -- 5' (1.524 m) 72.6 kg (160 lb)   01/07/25 1250 106/58 98.2 °F (36.8 °C) Oral 88 18 97 % -- --   01/07/25 0810 104/60 -- -- 82 18 99 % -- --       I/O         01/06 0701 01/07 0700 01/07 0701 01/08 0700    I.V. (mL/kg)  1300 (17.9)    IV Piggyback 50 100    Total Intake(mL/kg) 50 1400 (19.3)    Urine (mL/kg/hr)  500 (0.4)    Total Output  500    Net +50 +900                  Recent Labs     01/06/25 2237 01/07/25  0416   WBC 15.11*  --    HGB 12.8  --     289   SODIUM 137  --    K 4.1  --      --    CO2 27  --    BUN 12  --    CREATININE 0.70  --    GLUC 104  --    CALCIUM 9.7  --    EGFR 117  --      Lab Results   Component Value Date    URINECX No Growth <1000 cfu/mL 12/29/2023     LEUKOCYTESUR Negative 01/06/2025

## 2025-01-08 NOTE — QUICK NOTE
Post-Op Check    Assessment:  S/p 1/7 lap appy.  Has been cleared for discharge, pt says she has no ride til tmw morning.    Subjective:  Pt's pain is controlled. Ate dinner. No nausea, chest pain, SOB. Voiding.    Objective:  General: NAD  CV: nl rate  Lungs: nl wob. No resp distress.  ABD: Soft, ND, minimal RLQ tenderness, CDI  Extrem: No CCE    Patient Vitals for the past 24 hrs:   BP Temp Temp src Pulse Resp SpO2 Height Weight   01/07/25 2232 102/61 97.7 °F (36.5 °C) -- 90 -- 91 % -- --   01/07/25 1847 101/59 97.6 °F (36.4 °C) -- 87 -- 94 % -- --   01/07/25 1800 95/50 -- -- 81 22 93 % -- --   01/07/25 1730 99/54 -- -- 94 20 95 % -- --   01/07/25 1720 101/51 -- -- 93 22 98 % -- --   01/07/25 1715 101/51 -- -- 81 (!) 23 93 % -- --   01/07/25 1700 103/56 -- -- 97 (!) 23 98 % -- --   01/07/25 1646 101/58 -- -- 96 20 94 % -- --   01/07/25 1645 101/58 -- -- 96 20 94 % -- --   01/07/25 1635 113/58 -- -- 101 19 96 % -- --   01/07/25 1631 113/58 (!) 97.4 °F (36.3 °C) Temporal 105 (!) 24 92 % -- --   01/07/25 1300 -- -- -- -- -- -- 5' (1.524 m) 72.6 kg (160 lb)   01/07/25 1250 106/58 98.2 °F (36.8 °C) Oral 88 18 97 % -- --   01/07/25 0810 104/60 -- -- 82 18 99 % -- --   01/07/25 0440 (!) 86/52 -- -- 91 18 97 % -- --   01/07/25 0049 104/58 -- -- 60 16 100 % -- --

## 2025-01-08 NOTE — PLAN OF CARE
Problem: PAIN - ADULT  Goal: Verbalizes/displays adequate comfort level or baseline comfort level  Description: Interventions:  - Encourage patient to monitor pain and request assistance  - Assess pain using appropriate pain scale  - Administer analgesics based on type and severity of pain and evaluate response  - Implement non-pharmacological measures as appropriate and evaluate response  - Consider cultural and social influences on pain and pain management  - Notify physician/advanced practitioner if interventions unsuccessful or patient reports new pain  Outcome: Progressing     Problem: INFECTION - ADULT  Goal: Absence or prevention of progression during hospitalization  Description: INTERVENTIONS:  - Assess and monitor for signs and symptoms of infection  - Monitor lab/diagnostic results  - Monitor all insertion sites, i.e. indwelling lines, tubes, and drains  - Monitor endotracheal if appropriate and nasal secretions for changes in amount and color  - [place] appropriate cooling/warming therapies per order  - Administer medications as ordered  - Instruct and encourage patient and family to use good hand hygiene technique  - Identify and instruct in appropriate isolation precautions for identified infection/condition  Outcome: Progressing  Goal: Absence of fever/infection during neutropenic period  Description: INTERVENTIONS:  - Monitor WBC    Outcome: Progressing     Problem: SAFETY ADULT  Goal: Patient will remain free of falls  Description: INTERVENTIONS:  - Educate patient/family on patient safety including physical limitations  - Instruct patient to call for assistance with activity   - Consult OT/PT to assist with strengthening/mobility   - Keep Call bell within reach  - Keep bed low and locked with side rails adjusted as appropriate  - Keep care items and personal belongings within reach  - Initiate and maintain comfort rounds  - Make Fall Risk Sign visible to staff  - Offer Toileting every  Hours,  in advance of need  - Initiate/Maintain alarm  - Obtain necessary fall risk management equipment:   - Apply yellow socks and bracelet for high fall risk patients  - Consider moving patient to room near nurses station  Outcome: Progressing  Goal: Maintain or return to baseline ADL function  Description: INTERVENTIONS:  -  Assess patient's ability to carry out ADLs; assess patient's baseline for ADL function and identify physical deficits which impact ability to perform ADLs (bathing, care of mouth/teeth, toileting, grooming, dressing, etc.)  - Assess/evaluate cause of self-care deficits   - Assess range of motion  - Assess patient's mobility; develop plan if impaired  - Assess patient's need for assistive devices and provide as appropriate  - Encourage maximum independence but intervene and supervise when necessary  - Involve family in performance of ADLs  - Assess for home care needs following discharge   - Consider OT consult to assist with ADL evaluation and planning for discharge  - Provide patient education as appropriate  Outcome: Progressing  Goal: Maintains/Returns to pre admission functional level  Description: INTERVENTIONS:  - Perform AM-PAC 6 Click Basic Mobility/ Daily Activity assessment daily.  - Set and communicate daily mobility goal to care team and patient/family/caregiver.   - Collaborate with rehabilitation services on mobility goals if consulted  - Perform Range of Motion  times a day.  - Reposition patient every  hours.  - Dangle patient  times a day  - Stand patient  times a day  - Ambulate patient  times a day  - Out of bed to chair  times a day   - Out of bed for meals  times a day  - Out of bed for toileting  - Record patient progress and toleration of activity level   Outcome: Progressing     Problem: DISCHARGE PLANNING  Goal: Discharge to home or other facility with appropriate resources  Description: INTERVENTIONS:  - Identify barriers to discharge w/patient and caregiver  - Arrange for  needed discharge resources and transportation as appropriate  - Identify discharge learning needs (meds, wound care, etc.)  - Arrange for interpretive services to assist at discharge as needed  - Refer to Case Management Department for coordinating discharge planning if the patient needs post-hospital services based on physician/advanced practitioner order or complex needs related to functional status, cognitive ability, or social support system  Outcome: Progressing     Problem: Knowledge Deficit  Goal: Patient/family/caregiver demonstrates understanding of disease process, treatment plan, medications, and discharge instructions  Description: Complete learning assessment and assess knowledge base.  Interventions:  - Provide teaching at level of understanding  - Provide teaching via preferred learning methods  Outcome: Progressing

## 2025-01-08 NOTE — INCIDENTAL FINDINGS
The following findings require follow up:  Radiographic finding   Finding:   KIDNEYS/URETERS: No hydronephrosis or urinary tract calculi. Subcentimeter hypoattenuating renal lesion(s), too small to characterize but statistically likely benign, which do not warrant follow-up (Radiology June 2019).      Follow up required: Yes   Follow up should be done within 2-4 week(s)    Please notify the following clinician to assist with the follow up:   Primary Care Provider.     Incidental finding results were discussed with the Patient by Eliezer Sabillon PA-C on 01/08/25.   They expressed understanding and all questions answered.

## 2025-01-08 NOTE — PLAN OF CARE
Problem: PAIN - ADULT  Goal: Verbalizes/displays adequate comfort level or baseline comfort level  Description: Interventions:  - Encourage patient to monitor pain and request assistance  - Assess pain using appropriate pain scale  - Administer analgesics based on type and severity of pain and evaluate response  - Implement non-pharmacological measures as appropriate and evaluate response  - Consider cultural and social influences on pain and pain management  - Notify physician/advanced practitioner if interventions unsuccessful or patient reports new pain  Outcome: Progressing     Problem: INFECTION - ADULT  Goal: Absence or prevention of progression during hospitalization  Description: INTERVENTIONS:  - Assess and monitor for signs and symptoms of infection  - Monitor lab/diagnostic results  - Monitor all insertion sites, i.e. indwelling lines, tubes, and drains  - Monitor endotracheal if appropriate and nasal secretions for changes in amount and color  - [place] appropriate cooling/warming therapies per order  - Administer medications as ordered  - Instruct and encourage patient and family to use good hand hygiene technique  - Identify and instruct in appropriate isolation precautions for identified infection/condition  Outcome: Progressing  Goal: Absence of fever/infection during neutropenic period  Description: INTERVENTIONS:  - Monitor WBC    Outcome: Progressing     Problem: SAFETY ADULT  Goal: Patient will remain free of falls  Description: INTERVENTIONS:  - Educate patient/family on patient safety including physical limitations  - Instruct patient to call for assistance with activity   - Consult OT/PT to assist with strengthening/mobility   - Keep Call bell within reach  - Keep bed low and locked with side rails adjusted as appropriate  - Keep care items and personal belongings within reach  - Initiate and maintain comfort rounds  - Make Fall Risk Sign visible to staff  - Offer Toileting every  Hours,  in advance of need  - Initiate/Maintain alarm  - Obtain necessary fall risk management equipment:   - Apply yellow socks and bracelet for high fall risk patients  - Consider moving patient to room near nurses station  Outcome: Progressing  Goal: Maintain or return to baseline ADL function  Description: INTERVENTIONS:  -  Assess patient's ability to carry out ADLs; assess patient's baseline for ADL function and identify physical deficits which impact ability to perform ADLs (bathing, care of mouth/teeth, toileting, grooming, dressing, etc.)  - Assess/evaluate cause of self-care deficits   - Assess range of motion  - Assess patient's mobility; develop plan if impaired  - Assess patient's need for assistive devices and provide as appropriate  - Encourage maximum independence but intervene and supervise when necessary  - Involve family in performance of ADLs  - Assess for home care needs following discharge   - Consider OT consult to assist with ADL evaluation and planning for discharge  - Provide patient education as appropriate  Outcome: Progressing  Goal: Maintains/Returns to pre admission functional level  Description: INTERVENTIONS:  - Perform AM-PAC 6 Click Basic Mobility/ Daily Activity assessment daily.  - Set and communicate daily mobility goal to care team and patient/family/caregiver.   - Collaborate with rehabilitation services on mobility goals if consulted  - Perform Range of Motion  times a day.  - Reposition patient every  hours.  - Dangle patient  times a day  - Stand patient  times a day  - Ambulate patient  times a day  - Out of bed to chair  times a day   - Out of bed for meals  times a day  - Out of bed for toileting  - Record patient progress and toleration of activity level   Outcome: Progressing     Problem: DISCHARGE PLANNING  Goal: Discharge to home or other facility with appropriate resources  Description: INTERVENTIONS:  - Identify barriers to discharge w/patient and caregiver  - Arrange for  needed discharge resources and transportation as appropriate  - Identify discharge learning needs (meds, wound care, etc.)  - Arrange for interpretive services to assist at discharge as needed  - Refer to Case Management Department for coordinating discharge planning if the patient needs post-hospital services based on physician/advanced practitioner order or complex needs related to functional status, cognitive ability, or social support system  Outcome: Progressing     Problem: Knowledge Deficit  Goal: Patient/family/caregiver demonstrates understanding of disease process, treatment plan, medications, and discharge instructions  Description: Complete learning assessment and assess knowledge base.  Interventions:  - Provide teaching at level of understanding  - Provide teaching via preferred learning methods  Outcome: Progressing

## 2025-01-09 ENCOUNTER — APPOINTMENT (OUTPATIENT)
Dept: PHYSICAL THERAPY | Facility: CLINIC | Age: 30
End: 2025-01-09
Payer: COMMERCIAL

## 2025-01-09 NOTE — PSYCH
"Behavioral Health Psychotherapy Progress Note    Psychotherapy Provided: Individual Psychotherapy     1. Anxiety          Goals addressed in session: Goal 1     DATA: Raghav said she is a little stressed about the upcoming holiday because she feels guilty receiving gifts. She said the financial stress of the holidays has also been weighing on her. She feels badly that she will get gifts from her friends and family, but doesn't currently have the means to get them all gifts in return. Raghav said she was touched because her father bought her a couple of ADHD fidget toys. He even messaged the company who makes them to add a modification for Raghav, and they did. Raghav said she finds help from some of her family members easier to accept and use than others. She identified that this help mostly comes from her aunt and that her relationship with her mother remains strained at this time. She implied that her mom makes her feel guilty for asking for help with doing her ADLs.     Pt was seen for an individual session today because no other group members came for scheduled group session.    During this session, this clinician used the following therapeutic modalities: Client-centered Therapy and Supportive Psychotherapy    Substance Abuse was not addressed during this session. If the client is diagnosed with a co-occurring substance use disorder, please indicate any changes in the frequency or amount of use: n/a. Stage of change for addressing substance use diagnoses: No substance use/Not applicable    ASSESSMENT:  Raghav Smith presents with a Anxious mood. her affect is Bright, which is congruent, with her mood and the content of the session. The client has made progress on their goals. Raghav Smith presents with a none risk of suicide, none risk of self-harm, and none risk of harm to others.    For any risk assessment that surpasses a \"low\" rating, a safety plan must be developed.    A safety plan " was indicated: no  If yes, describe in detail n/a    PLAN: Between sessions, Raghav Smith will use coping skills as needed and practice self care. At the next session, the therapist will use Client-centered Therapy and Supportive Psychotherapy to address [place]'s treatment goals.    Behavioral Health Treatment Plan and Discharge Planning: Raghav Smith is aware of and agrees to continue to work on their treatment plan. They have identified and are working toward their discharge goals. yes    Depression Follow-up Plan Completed: Not applicable    Visit start and stop times:    12/23/24  Start Time: 1508  Stop Time: 1606  Total Visit Time: 58 minutes

## 2025-01-09 NOTE — ANESTHESIA POSTPROCEDURE EVALUATION
Post-Op Assessment Note    CV Status:  Stable    Pain management: adequate       Mental Status:  Alert and awake   Hydration Status:  Euvolemic   PONV Controlled:  Controlled   Airway Patency:  Patent     Post Op Vitals Reviewed: Yes    No anethesia notable event occurred.    Staff: Anesthesiologist           Last Filed PACU Vitals:  Vitals Value Taken Time   Temp 97.4 °F (36.3 °C) 01/07/25 1631   Pulse 98 01/07/25 1818   BP 95/51 01/07/25 1815   Resp 17 01/07/25 1818   SpO2 96 % 01/07/25 1818   Vitals shown include unfiled device data.    Modified Arvind:     Vitals Value Taken Time   Activity 2 01/07/25 1800   Respiration 2 01/07/25 1800   Circulation 2 01/07/25 1800   Consciousness 1 01/07/25 1800   Oxygen Saturation 1 01/07/25 1800     Modified Arvind Score: 8

## 2025-01-10 PROCEDURE — 88304 TISSUE EXAM BY PATHOLOGIST: CPT | Performed by: STUDENT IN AN ORGANIZED HEALTH CARE EDUCATION/TRAINING PROGRAM

## 2025-01-13 ENCOUNTER — TELEPHONE (OUTPATIENT)
Age: 30
End: 2025-01-13

## 2025-01-13 NOTE — TELEPHONE ENCOUNTER
Patient is calling regarding cancelling an appointment.    Date/Time: 1/13/2025 3pm    Reason: patient had appendix surgery and does not feel well    Patient was rescheduled: YES [] NO [x]  If yes, when was Patient reschedule for:     Patient requesting call back to reschedule: YES [] NO [x]

## 2025-01-14 ENCOUNTER — SOCIAL WORK (OUTPATIENT)
Dept: BEHAVIORAL/MENTAL HEALTH CLINIC | Facility: CLINIC | Age: 30
End: 2025-01-14
Payer: COMMERCIAL

## 2025-01-14 ENCOUNTER — APPOINTMENT (OUTPATIENT)
Dept: PHYSICAL THERAPY | Facility: CLINIC | Age: 30
End: 2025-01-14
Payer: COMMERCIAL

## 2025-01-14 DIAGNOSIS — F41.9 ANXIETY: ICD-10-CM

## 2025-01-14 DIAGNOSIS — F43.10 PTSD (POST-TRAUMATIC STRESS DISORDER): Primary | ICD-10-CM

## 2025-01-14 DIAGNOSIS — F31.81 BIPOLAR 2 DISORDER (HCC): ICD-10-CM

## 2025-01-14 DIAGNOSIS — F60.3 BORDERLINE PERSONALITY DISORDER (HCC): Chronic | ICD-10-CM

## 2025-01-14 PROCEDURE — 90837 PSYTX W PT 60 MINUTES: CPT

## 2025-01-14 NOTE — PSYCH
"Behavioral Health Psychotherapy Progress Note    Psychotherapy Provided: Individual Psychotherapy     1. PTSD (post-traumatic stress disorder)        2. Bipolar 2 disorder (HCC)        3. Borderline personality disorder (HCC)        4. Anxiety            Goals addressed in session: Goal 1 and Goal 2     DATA: Raghav reports to be doing okay and apologized for missing last session as she had to get emergency surgery and she is now recovering. Raghav reports enjoying the medications the doctors gave her as she said they gave her a quite mind and she hasn't felt that in a while. Raghav reports having a good holiday as she spent it with her family. Raghav reports still not getting things done and needs someone there to hold her accountable. Raghav reports wanting to do a lot but long term goal freak her out. Raghav said \"I need to figure out what I need to do\".    During this session, this clinician used the following therapeutic modalities: Client-centered Therapy and Supportive Psychotherapy    Substance Abuse was not addressed during this session. If the client is diagnosed with a co-occurring substance use disorder, please indicate any changes in the frequency or amount of use: N/A. Stage of change for addressing substance use diagnoses: No substance use/Not applicable    ASSESSMENT:  Raghav Smith presents with a Euthymic/ normal mood.    her affect is Normal range and intensity, which is congruent, with her mood and the content of the session. The client has made progress on their goals.    Raghav Smith presents with a none risk of suicide, none risk of self-harm, and none risk of harm to others.    For any risk assessment that surpasses a \"low\" rating, a safety plan must be developed.    A safety plan was indicated: no  If yes, describe in detail - N/A    PLAN: Between sessions, Raghav Smith will practice coping skills. At the next session, the therapist will use Client-centered " Therapy, Cognitive Behavioral Therapy, Motivational Interviewing, Solution-Focused Therapy, and Supportive Psychotherapy to address PTSD and anxiety.    Behavioral Health Treatment Plan and Discharge Planning: Raghav Smith is aware of and agrees to continue to work on their treatment plan. They have identified and are working toward their discharge goals. yes    Depression Follow-up Plan Completed: Not applicable    Visit start and stop times:    01/14/25  Start Time: 1206  Stop Time: 1302  Total Visit Time: 56 minutes

## 2025-01-15 NOTE — PSYCH
"Behavioral Health Psychotherapy Progress Note    Psychotherapy Provided: Group Therapy    1. Eating disorder, unspecified type          Goals addressed in session: Goal 1     DATA: Raghav arrived for her group session. We reflected upon the holidays and the group members were able to share their successes and challenges being around family and food this holiday season. Raghav said she went grocery shopping with her partner and meal prepped a bit following their shopping trip. The facilitator led a discussion regarding urges and symptoms reflecting in the past group what was discussed. The group members were able to identify their coping statements to support their recovery. The participants shared their coping strategies and identified a plan for utilizing them over the next week.   During this session, this clinician used the following therapeutic modalities: Client-centered Therapy and Supportive Psychotherapy    Substance Abuse was not addressed during this session. If the client is diagnosed with a co-occurring substance use disorder, please indicate any changes in the frequency or amount of use: n/a. Stage of change for addressing substance use diagnoses: No substance use/Not applicable    ASSESSMENT:  Raghav Smith presents with a Euthymic/ normal mood. her affect is Normal range and intensity, which is congruent, with her mood and the content of the session. The client has made progress on their goals. Raghav was open to sharing some of her challenges and made a plan to work through them.  Raghav Smith presents with a none risk of suicide, none risk of self-harm, and none risk of harm to others.    For any risk assessment that surpasses a \"low\" rating, a safety plan must be developed.    A safety plan was indicated: no  If yes, describe in detail n/a    PLAN: Between sessions, Raghav Smith will utilize her coping statements . At the next session, the therapist will use " Client-centered Therapy and Supportive Psychotherapy to address her disorganized eating behaviors.    Behavioral Health Treatment Plan and Discharge Planning: Raghav Smith is aware of and agrees to continue to work on their treatment plan. They have identified and are working toward their discharge goals. yes    Depression Follow-up Plan Completed: Not applicable    Visit start and stop times:    01/06/25  Start Time: 1505  Stop Time: 1600  Total Visit Time: 55 minutes

## 2025-01-16 ENCOUNTER — APPOINTMENT (OUTPATIENT)
Dept: PHYSICAL THERAPY | Facility: CLINIC | Age: 30
End: 2025-01-16
Payer: COMMERCIAL

## 2025-01-20 ENCOUNTER — TELEMEDICINE (OUTPATIENT)
Dept: BEHAVIORAL/MENTAL HEALTH CLINIC | Facility: CLINIC | Age: 30
End: 2025-01-20
Payer: COMMERCIAL

## 2025-01-20 ENCOUNTER — TELEPHONE (OUTPATIENT)
Dept: BEHAVIORAL/MENTAL HEALTH CLINIC | Facility: CLINIC | Age: 30
End: 2025-01-20

## 2025-01-20 DIAGNOSIS — F50.9 EATING DISORDER, UNSPECIFIED TYPE: Primary | ICD-10-CM

## 2025-01-20 PROCEDURE — 90853 GROUP PSYCHOTHERAPY: CPT

## 2025-01-20 NOTE — TELEPHONE ENCOUNTER
Provider left VM for patient to discuss group being virtual today. Asked patient to confirm their attendance.    If they confirm, Provider will send link to email on file.

## 2025-01-21 ENCOUNTER — APPOINTMENT (OUTPATIENT)
Dept: PHYSICAL THERAPY | Facility: CLINIC | Age: 30
End: 2025-01-21
Payer: COMMERCIAL

## 2025-01-23 ENCOUNTER — OFFICE VISIT (OUTPATIENT)
Dept: FAMILY MEDICINE CLINIC | Facility: CLINIC | Age: 30
End: 2025-01-23
Payer: COMMERCIAL

## 2025-01-23 ENCOUNTER — OFFICE VISIT (OUTPATIENT)
Dept: PSYCHIATRY | Facility: CLINIC | Age: 30
End: 2025-01-23
Payer: COMMERCIAL

## 2025-01-23 ENCOUNTER — TELEPHONE (OUTPATIENT)
Age: 30
End: 2025-01-23

## 2025-01-23 ENCOUNTER — OFFICE VISIT (OUTPATIENT)
Dept: SURGERY | Facility: CLINIC | Age: 30
End: 2025-01-23

## 2025-01-23 ENCOUNTER — APPOINTMENT (OUTPATIENT)
Dept: PHYSICAL THERAPY | Facility: CLINIC | Age: 30
End: 2025-01-23
Payer: COMMERCIAL

## 2025-01-23 VITALS
RESPIRATION RATE: 16 BRPM | DIASTOLIC BLOOD PRESSURE: 58 MMHG | WEIGHT: 156.8 LBS | OXYGEN SATURATION: 96 % | SYSTOLIC BLOOD PRESSURE: 99 MMHG | TEMPERATURE: 98.6 F | HEART RATE: 90 BPM | BODY MASS INDEX: 30.78 KG/M2 | HEIGHT: 60 IN

## 2025-01-23 DIAGNOSIS — F31.81 BIPOLAR 2 DISORDER (HCC): ICD-10-CM

## 2025-01-23 DIAGNOSIS — F43.10 PTSD (POST-TRAUMATIC STRESS DISORDER): ICD-10-CM

## 2025-01-23 DIAGNOSIS — Z09 STATUS POST APPENDECTOMY, FOLLOW-UP EXAM: Primary | ICD-10-CM

## 2025-01-23 DIAGNOSIS — F60.3 BORDERLINE PERSONALITY DISORDER (HCC): Primary | Chronic | ICD-10-CM

## 2025-01-23 PROCEDURE — 99495 TRANSJ CARE MGMT MOD F2F 14D: CPT | Performed by: FAMILY MEDICINE

## 2025-01-23 PROCEDURE — 90833 PSYTX W PT W E/M 30 MIN: CPT | Performed by: PSYCHIATRY & NEUROLOGY

## 2025-01-23 PROCEDURE — 99024 POSTOP FOLLOW-UP VISIT: CPT | Performed by: SURGERY

## 2025-01-23 PROCEDURE — 99214 OFFICE O/P EST MOD 30 MIN: CPT | Performed by: PSYCHIATRY & NEUROLOGY

## 2025-01-23 RX ORDER — LAMOTRIGINE 100 MG/1
100 TABLET ORAL DAILY
Qty: 30 TABLET | Refills: 2 | Status: SHIPPED | OUTPATIENT
Start: 2025-01-23

## 2025-01-23 NOTE — TELEPHONE ENCOUNTER
Patient calling after post op visit today for appendectomy.  She was cleared to return to work light duty for 2 weeks, then full duty.    She is questioning if she can return to physical therapy at this time. She is in PT for her hip and shoulder.  Advised she should let her physical therapist know her restrictions and accommodate accordingly. If unable she should just postpone until she is cleared full duty after 2 weeks.  Verbalized understanding.

## 2025-01-23 NOTE — PROGRESS NOTES
Transition of Care Visit  Name: Raghav Smith      : 1995      MRN: [id number]  Encounter Provider: Marissa Barron MD  Encounter Date: 2025   Encounter department: [facility]    Assessment & Plan  Status post appendectomy, follow-up exam       No acute distress.  Vital signs stable.  Patient is doing well.  Patient is advised to try to take it easy.  Advised well hydration and fiber so she does not get constipated.  Also no heavy lifting pushing or pulling etc.  Diet also discussed with patient.  Patient is tolerating p.o. well.  Evacuating without any issues also.  And no breathing issues or now.             RTO for her neck scheduled visit.        History of Present Illness     Transitional Care Management Review:   Raghav Smith is a 29 y.o. female here for TCM follow up.     During the TCM phone call patient stated:  TCM Call     None      TCM Call     None        29-year-old female here for post hospital visit.  Patient was admitted on 2025 and then discharged on 2025.  Patient needed an appendectomy that she had performed laparoscopically on 2025.  Patient is doing well.  In fact she saw her surgeon about 2 hours before my appointment with her today.  And they gave her a letter to return to work, light duty, this coming Monday.  Patient cleans offices for work.  Patient denies pregnancy.  Patient denies tobacco.      Review of Systems   Constitutional:  Negative for chills, diaphoresis, fatigue, fever and unexpected weight change.   HENT:  Negative for congestion and sore throat.    Eyes:  Negative for visual disturbance.   Respiratory:  Negative for cough and shortness of breath.    Cardiovascular:  Negative for chest pain, palpitations and leg swelling.   Gastrointestinal:  Negative for abdominal pain, blood in stool, constipation, diarrhea, nausea and vomiting.        Denies pneumaturia also.   Genitourinary:  Negative for  dysuria, hematuria and vaginal bleeding.   Musculoskeletal:  Negative for back pain.   Skin:  Negative for rash.   Neurological:  Negative for dizziness and headaches.   Psychiatric/Behavioral:  Negative for dysphoric mood. The patient is not nervous/anxious.      Objective   BP 99/58 (BP Location: Left arm, Patient Position: Sitting, Cuff Size: Adult)   Pulse 90   Temp 98.6 °F (37 °C) (Temporal)   Resp 16   Ht 5' (1.524 m)   Wt 71.1 kg (156 lb 12.8 oz)   SpO2 96%   BMI 30.62 kg/m²     Physical Exam  Vitals reviewed.   Constitutional:       General: She is not in acute distress.     Appearance: Normal appearance. She is obese. She is not ill-appearing.   HENT:      Mouth/Throat:      Mouth: Mucous membranes are moist.   Eyes:      Extraocular Movements: Extraocular movements intact.      Conjunctiva/sclera: Conjunctivae normal.   Cardiovascular:      Rate and Rhythm: Normal rate and regular rhythm.   Pulmonary:      Effort: Pulmonary effort is normal.      Breath sounds: Normal breath sounds.   Abdominal:      General: Bowel sounds are normal. There is no distension.      Palpations: Abdomen is soft.      Tenderness: There is no abdominal tenderness. There is no right CVA tenderness, left CVA tenderness or guarding.      Comments: 3 points of entry from her laparoscopic surgery look clean.   Musculoskeletal:      Right lower leg: No edema.      Left lower leg: No edema.      Comments: No calf tenderness bilateral.   Skin:     General: Skin is warm.   Neurological:      General: No focal deficit present.      Mental Status: She is alert and oriented to person, place, and time.   Psychiatric:         Mood and Affect: Mood normal.         Behavior: Behavior normal.         Thought Content: Thought content normal.       Medications have been reviewed by provider in current encounter

## 2025-01-23 NOTE — LETTER
January 23, 2025     Patient: Raghav Smith  YOB: 1995  Date of Visit: 1/23/2025      To Whom it May Concern:    Raghav Smith is under my professional care. Raghav was seen in my office on 1/23/2025. Raghav may return to work with limitations on Monday 1/27/2025 . She will be cleared from any restrictions on Monday 2/10/2025.     If you have any questions or concerns, please don't hesitate to call.         Sincerely,          Jenny Lugo MD        CC: No Recipients

## 2025-01-23 NOTE — ASSESSMENT & PLAN NOTE
She is doing well.  Diet as tolerated.  Slowly return to normal activity as tolerated.  Okay to return to work with light duty restrictions for the next 2 weeks and then full duty afterwards work note provided.
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
## 2025-01-23 NOTE — ASSESSMENT & PLAN NOTE
No acute distress.  Vital signs stable.  Patient is doing well.  Patient is advised to try to take it easy.  Advised well hydration and fiber so she does not get constipated.  Also no heavy lifting pushing or pulling etc.  Diet also discussed with patient.  Patient is tolerating p.o. well.  Evacuating without any issues also.  And no breathing issues or now.

## 2025-01-23 NOTE — PROGRESS NOTES
Name: Raghav Smith      : 1995      MRN: [id number]  Encounter Provider: Jenny Lugo MD  Encounter Date: 2025   Encounter department: [facility] [place]  :  Assessment & Plan  Status post appendectomy, follow-up exam  She is doing well.  Diet as tolerated.  Slowly return to normal activity as tolerated.  Okay to return to work with light duty restrictions for the next 2 weeks and then full duty afterwards work note provided.           History of Present Illness   HPI  Raghav Smith is a 29 y.o. female who presents for evaluation status post laparoscopic appendectomy on  by Dr. Broussard.  Pathology report consistent with early acute appendicitis.      Review of Systems as per HPI  Medical History Reviewed by provider this encounter:  Tobacco  Allergies  Meds  Problems  Med Hx  Surg Hx  Fam Hx     .     Objective   There were no vitals taken for this visit.     Physical Exam  NAD, AOx4  Abdomen is soft nondistended nontender.  Incisions x 3 with Exofin glue in place, clean/dry/intact    Pathology report as stated above with appendicitis.

## 2025-01-28 ENCOUNTER — OFFICE VISIT (OUTPATIENT)
Dept: PHYSICAL THERAPY | Facility: CLINIC | Age: 30
End: 2025-01-28
Payer: COMMERCIAL

## 2025-01-28 DIAGNOSIS — M25.512 CHRONIC LEFT SHOULDER PAIN: ICD-10-CM

## 2025-01-28 DIAGNOSIS — G89.29 CHRONIC LEFT SHOULDER PAIN: ICD-10-CM

## 2025-01-28 DIAGNOSIS — M25.552 LEFT HIP PAIN: Primary | ICD-10-CM

## 2025-01-28 PROCEDURE — 97140 MANUAL THERAPY 1/> REGIONS: CPT

## 2025-01-28 PROCEDURE — 97164 PT RE-EVAL EST PLAN CARE: CPT

## 2025-01-28 PROCEDURE — 97112 NEUROMUSCULAR REEDUCATION: CPT

## 2025-01-28 NOTE — PROGRESS NOTES
PT Re-Evaluation     Today's date: 2025  Patient name: Raghav Smith  : 1995  MRN: [id number]  Referring provider: Linda Rao CRNP  Dx:   Encounter Diagnosis     ICD-10-CM    1. Left hip pain  M25.552       2. Chronic left shoulder pain  M25.512     G89.29                      Assessment    Assessment details:     Impairment/Problem List:  1. Decreased abdominal musculature length  2. Decreased abdominal musculature neuromuscular control following abdominal surgery  3. Decreased hip A/PROM from recent immobility  4. Decreased hip musculature neuromuscular control following recent immobility  5. Decreased c/s A/PROM with radiating muscular tightness into L shoulder  6. History of L shoulder weakness and A/PROM impairments secondary to history of trauma managed conservatively     Raghav Smith is a 29 y.o. female who returns to PT following a 1 month break secondary to emergency appendectomy. Their clinical presentation aligns with deconditioning, decreased neuromuscular control globally of trunk and hips/pelvis. Raghav would also benefit from postural strengthening, and L shoulder strengthening as needed to address impairments. Raghav Smith is limited with all ADLs, work related and recreational activities secondary to the impairments stated above. She is not yet cleared for full activity until at least 6 weeks post operatively. No further referral appears necessary at this time based upon examination results. Prognosis is good given POC/HEP compliance.      Understanding of Dx/Px/POC: good     Prognosis: good    Goals  Short Term Goals:  Pt will report decreased levels of pain by at least 2 subjective ratings in 4 weeks MET  Pt will demonstrate improved ROM by at least 10 degrees in 4 weeks MET --> regressed following 2025 appendectomy  Pt will demonstrate improved strength by ½ grade in 4 weeks MET --> regressed following 2025 appendectomy  Pt will be able to work for 2  hours without pain in 4 weeks NOT YET MET - progressing toward --> regressed following 1/7/2025 appendectomy     Long Term Goals:  Pt will be independent with HEP in 8 weeks MET  Pt will be functional with all ADL's in 8 weeks MET --> regressed following 1/7/2025 appendectomy  Pt will achieve their predicted FOTO score in 8 weeks NOT YET MET   Pt will be able to paddle board on the river for 3 hours without pain in 8 weeks NOT YET MET        Plan  Patient would benefit from: skilled physical therapy  Referral necessary: No  Planned modality interventions: low level laser therapy    Planned therapy interventions: abdominal trunk stabilization, IASTM, joint mobilization, manual therapy, nerve gliding, neuromuscular re-education, balance, balance/weight bearing training, body mechanics training, breathing training, patient education, postural training, strengthening, stretching, therapeutic activities, therapeutic exercise, functional ROM exercises, gait training, graded activity, graded exercise and home exercise program    Frequency: 2x week  Duration in weeks: 12  Plan of Care beginning date: 1/28/2025  Plan of Care expiration date: 4/22/2025  Treatment plan discussed with: patient        Subjective Evaluation    History of Present Illness  Date of surgery: 1/7/2025  Mechanism of injury: surgery  Mechanism of injury: Pt returns to PT after a 3-4 week break after undergoing emergency appendectomy. Overall, vipul is recovering well. No overt issue, but can feel tightness and weakness in her core, can feel uncomfortable pulling stretch in her abdomen. Has some tightness in her neck but shoulder is feeling not too bad with less activity level. Hip/pelvic pain is a bit aggravated from being stuck in bed for extended period of time.     Just prior to there surgery she did try rock climbing for the first time and surprised herself with how much she was able to do rock climbing. This was a huge success. A little bit of  "a set back in strength and function since surgery, but optimistic that she will be able to get back to where she was.         Objective     Postural Observations  Seated posture: fair  Standing posture: fair      Neurological Testing     Additional Neurological Details  UE dermatomes and myotomes WNL  LE dermatomes and myotomes WNL    Active Range of Motion   Cervical/Thoracic Spine       Cervical  Subcranial protraction:  WFL   Subcranial retraction:   Restriction level: moderate  Flexion:  WFL  Extension:  Restriction level: moderate  Left lateral flexion:  Restriction level: minimal  Right lateral flexion:  Restriction level minimal  Left rotation:  Restriction level: minimal  Right rotation:  Restriction level: minimal  Mechanical Assessment    Cervical    Seated retraction: repeated movements   Pain intensity: better    Thoracic      Lumbar      Muscle Activation   Patient able to activate left transverse abdominals and right transverse abdominals.     General Comments:      Lumbar Comments  Lumbar ROM no assessed secondary to healing time frame for abdominal surgery             Precautions: Appendectomy 1/7/2025, not cleared for full activity until 6 week johnathan      Manuals 1/28            Sidelying L scap mobs gr II             Seated L cervical paraspinal LUT graston 5' 5'           L hip PROM  GENTLE 4'           LLE LAD  3x30\"           Neuro Re-Ed             Seated c/s retraction 3x10 3x10           Seated scap retraction             Supine TrA activation 20x5\" 20x5\"           Supine TrA hold march 2x20 2x20           Supine TrA bridge --> SL bridge when able 1x10 3x10           Sidelying TrA hold clamshell  RTB 3x10?                        Ther Ex             Rows             TB ER/IR                                                                                           Ther Activity                                       Gait Training                                       Modalities                    "

## 2025-01-28 NOTE — PSYCH
"Behavioral Health Psychotherapy Progress Note    Psychotherapy Provided: Group Therapy    1. Eating disorder, unspecified type          Goals addressed in session: Goal 1     DATA: Raghav arrived for her group session today virtually. The facilitator has introduced a new group participant and did a few ice breakers for the group. Raghav had an opportunity to reflect on her wins and challenges for the week when it comes to her disorganized eating. The group members were able to share and give feedback to each in order to make a plan for the upcoming week.   During this session, this clinician used the following therapeutic modalities: Client-centered Therapy and Supportive Psychotherapy    Substance Abuse was not addressed during this session. If the client is diagnosed with a co-occurring substance use disorder, please indicate any changes in the frequency or amount of use: n/a. Stage of change for addressing substance use diagnoses: No substance use/Not applicable    ASSESSMENT:  Raghav Smith presents with a Euthymic/ normal mood. her affect is Normal range and intensity, which is congruent, with her mood and the content of the session. The client has made progress on their goals. Raghav shared openly and appropriately with group today. Raghav Smith presents with a none risk of suicide, none risk of self-harm, and none risk of harm to others.    For any risk assessment that surpasses a \"low\" rating, a safety plan must be developed.    A safety plan was indicated: no  If yes, describe in detail n/a    PLAN: Between sessions, Raghav Smith will continue following the plan she made in group and continue practicing self compassion. At the next session, the therapist will use Client-centered Therapy and Supportive Psychotherapy to address her disorganized eating.    Behavioral Health Treatment Plan and Discharge Planning: Raghav Smith is aware of and agrees to continue to work on their " treatment plan. They have identified and are working toward their discharge goals. yes    Depression Follow-up Plan Completed: Not applicable    Visit start and stop times:    01/20/25  Start Time: 1500  Stop Time: 1556  Total Visit Time: 56 minutes    Virtual Regular Visit    Verification of patient location:    Patient is located at Home in the following state in which I hold an active license [place]     Encounter provider HODA Carney     The patient was identified by name and date of birth. Raghav Smith was informed that this is a telemedicine visit and that the visit is being conducted through the [employer] Teams platform. She agrees to proceed. My office door was closed. No one else was in the room.  She acknowledged consent and understanding of privacy and security of the video platform. The patient has agreed to participate and understands they can discontinue the visit at any time.    Patient is aware this is a billable service.

## 2025-01-29 ENCOUNTER — SOCIAL WORK (OUTPATIENT)
Dept: BEHAVIORAL/MENTAL HEALTH CLINIC | Facility: CLINIC | Age: 30
End: 2025-01-29
Payer: COMMERCIAL

## 2025-01-29 DIAGNOSIS — F60.3 BORDERLINE PERSONALITY DISORDER (HCC): Chronic | ICD-10-CM

## 2025-01-29 DIAGNOSIS — F31.81 BIPOLAR 2 DISORDER (HCC): Primary | ICD-10-CM

## 2025-01-29 DIAGNOSIS — F43.10 PTSD (POST-TRAUMATIC STRESS DISORDER): ICD-10-CM

## 2025-01-29 PROCEDURE — 90837 PSYTX W PT 60 MINUTES: CPT

## 2025-01-30 ENCOUNTER — OFFICE VISIT (OUTPATIENT)
Dept: PHYSICAL THERAPY | Facility: CLINIC | Age: 30
End: 2025-01-30
Payer: COMMERCIAL

## 2025-01-30 DIAGNOSIS — M25.512 CHRONIC LEFT SHOULDER PAIN: ICD-10-CM

## 2025-01-30 DIAGNOSIS — M25.552 LEFT HIP PAIN: Primary | ICD-10-CM

## 2025-01-30 DIAGNOSIS — G89.29 CHRONIC LEFT SHOULDER PAIN: ICD-10-CM

## 2025-01-30 PROCEDURE — 97140 MANUAL THERAPY 1/> REGIONS: CPT

## 2025-01-30 PROCEDURE — 97112 NEUROMUSCULAR REEDUCATION: CPT

## 2025-01-30 NOTE — PSYCH
"Behavioral Health Psychotherapy Progress Note    Psychotherapy Provided: Individual Psychotherapy     1. Bipolar 2 disorder (HCC)        2. Borderline personality disorder (HCC)        3. PTSD (post-traumatic stress disorder)            Goals addressed in session: Goal 1 and Goal 2     DATA: Raghav reports to not being in a good space right now. Raghav apologized for no showing last appointment as she said \"I forget that I exist outside of my home and I have things to do\" Raghav reports being really overwhelmed as she is still trying to recover from having her appendix removed and she is not working at the moment. Raghav reports that her dog is dying from heart failure and she is taking that hard because her dog has been her support for many years. Raghav reports her dad just had surgery for her pancreatic cancer and that has been taking a toll on her too. Raghav said \"I dont want to deal with all of these emotions on top of my mental health\" \"everything feels like too much\" Raghav reports feeling hopeless and has no motivation for anything as she has a hard time seeing a future for herself. Raghav reports having many intrusive thoughts but no thoughts of suicide or any plan to harm herself.    During this session, this clinician used the following therapeutic modalities: Client-centered Therapy and Supportive Psychotherapy    Substance Abuse was not addressed during this session. If the client is diagnosed with a co-occurring substance use disorder, please indicate any changes in the frequency or amount of use: N/A. Stage of change for addressing substance use diagnoses: No substance use/Not applicable    ASSESSMENT:  Raghav Smith presents with a Euthymic/ normal, Dysthymic, and Depressed mood.     her affect is Tearful, which is congruent, with her mood and the content of the session. The client has made progress on their goals.    Raghav Smith presents with a none risk of " "suicide, none risk of self-harm, and none risk of harm to others.    For any risk assessment that surpasses a \"low\" rating, a safety plan must be developed.    A safety plan was indicated: no  If yes, describe in detail - N/A    PLAN: Between sessions, Raghav Smith will prioritize responsibilities. At the next session, the therapist will use Client-centered Therapy, Cognitive Behavioral Therapy, Motivational Interviewing, Solution-Focused Therapy, and Supportive Psychotherapy to address lack of motivation.    Behavioral Health Treatment Plan and Discharge Planning: Raghav Smith is aware of and agrees to continue to work on their treatment plan. They have identified and are working toward their discharge goals. yes    Depression Follow-up Plan Completed: Not applicable    Visit start and stop times:    01/29/25  Start Time: 1318  Stop Time: 1420  Total Visit Time: 62 minutes  "

## 2025-02-03 ENCOUNTER — OFFICE VISIT (OUTPATIENT)
Dept: BEHAVIORAL/MENTAL HEALTH CLINIC | Facility: CLINIC | Age: 30
End: 2025-02-03
Payer: COMMERCIAL

## 2025-02-03 DIAGNOSIS — F50.9 EATING DISORDER, UNSPECIFIED TYPE: Primary | ICD-10-CM

## 2025-02-03 PROCEDURE — 90853 GROUP PSYCHOTHERAPY: CPT

## 2025-02-04 ENCOUNTER — TELEPHONE (OUTPATIENT)
Age: 30
End: 2025-02-04

## 2025-02-04 ENCOUNTER — OFFICE VISIT (OUTPATIENT)
Dept: PHYSICAL THERAPY | Facility: CLINIC | Age: 30
End: 2025-02-04
Payer: COMMERCIAL

## 2025-02-04 ENCOUNTER — TELEMEDICINE (OUTPATIENT)
Dept: BEHAVIORAL/MENTAL HEALTH CLINIC | Facility: CLINIC | Age: 30
End: 2025-02-04
Payer: COMMERCIAL

## 2025-02-04 DIAGNOSIS — F31.81 BIPOLAR 2 DISORDER (HCC): Primary | ICD-10-CM

## 2025-02-04 DIAGNOSIS — M25.552 LEFT HIP PAIN: Primary | ICD-10-CM

## 2025-02-04 DIAGNOSIS — M25.512 CHRONIC LEFT SHOULDER PAIN: ICD-10-CM

## 2025-02-04 DIAGNOSIS — F60.3 BORDERLINE PERSONALITY DISORDER (HCC): Chronic | ICD-10-CM

## 2025-02-04 DIAGNOSIS — F43.10 PTSD (POST-TRAUMATIC STRESS DISORDER): ICD-10-CM

## 2025-02-04 DIAGNOSIS — G89.29 CHRONIC LEFT SHOULDER PAIN: ICD-10-CM

## 2025-02-04 PROCEDURE — 97112 NEUROMUSCULAR REEDUCATION: CPT

## 2025-02-04 PROCEDURE — 90837 PSYTX W PT 60 MINUTES: CPT

## 2025-02-04 PROCEDURE — 97140 MANUAL THERAPY 1/> REGIONS: CPT

## 2025-02-04 NOTE — BH CRISIS PLAN
"Client Name: Raghav Smith       Client YOB: 1995    ArmandCesario Safety Plan      Creation Date: 2/7/24 Update Date: 2/4/25   Created By: Suma White MD Last Updated By: SAL Patterson      Step 1: Warning Signs:   Warning Signs   \"crying\"; \"outbursts of feelings\"   self isolation            Step 2: Internal Coping Strategies:   Internal Coping Strategies   \"watching Dr. Who\"   \"playing video games\"   \"hugging my dog\"            Step 3: People and social settings that provide distraction:   Name Contact Information   My  contact saved in cell   My friend contact saved in cell   My family contact saved in cell   My partner in patient phone    Places   My room   My mother's house   Porch           Step 4: People whom I can ask for help during a crisis:      Name Contact Information    Partner in patient phone      Step 5: Professionals or agencies I can contact during a crisis:      Clinican/Agency Name Phone Emergency Contact    Ann Armenta MD  (psychiatrist) [phone]     Therapist [phone]       [facility] Emergency Department Emergency Department Phone Emergency Department Address    [facility] [phone] [address] [place] [id number]        Crisis Phone Numbers:   Suicide Prevention Lifeline: Call or Text  368 Crisis Text Line: Text HOME to 582-885   Please note: Some [facility] do not have a separate number for Child/Adolescent specific crisis. If your county is not listed under Child/Adolescent, please call the adult number for your county      Adult Crisis Numbers: Child/Adolescent Crisis Numbers   [facility]: [phone] [facility]: [phone]   [facility]: [phone] [facility]: [phone]   [facility]: [phone] [place]: [phone]   [facility]: [phone] [place]: [phone]   [place]: [phone]   [facility]: [phone]   [facility]: " "[phone]   [facility]: [phone] (daytime) [phone] (after hours, weekends, holidays)      Step 6: Making the environment safer (plan for lethal means safety):   Patient did not identify any lethal methods: Yes     Optional: What is most important to me and worth living for?   \"My family\"     [place]ida Safety Plan. Lynette Acuna and Ramos Nassar. Used with permission of the authors.           "

## 2025-02-04 NOTE — BH TREATMENT PLAN
"Outpatient Behavioral Health Psychotherapy Treatment Plan    Raghav Smith  1995     Date of Initial Psychotherapy Assessment: 07/15/2021   Date of Current Treatment Plan: 02/04/25  Treatment Plan Target Date: 8/3/2025  Treatment Plan Expiration Date: 8/3/2025    Diagnosis:   1. Bipolar 2 disorder (HCC)        2. Borderline personality disorder (HCC)        3. PTSD (post-traumatic stress disorder)            Area(s) of Need: Consistency and routine     Long Term Goal 1 (in the client's own words): \"I want to be able to get a consistent routine and manage daily activities of living\"    Stage of Change: Contemplation    Target Date for completion: TBD     Anticipated therapeutic modalities: CBT, Motivational Interviewing, Client Centered Therapy, Solution Focused, Mindfulness Techniques      People identified to complete this goal: Raghav and Kathi [facility]      Objective 1: (identify the means of measuring success in meeting the objective): Raghav will identify realistic, attainable and relevant goals that align with her values and needs on a daily basis to complete daily tasks      I am currently under the care of a [facility] psychiatric provider: yes    My [facility] psychiatric provider is: Dr. Ann Armenta MD     I am currently taking psychiatric medications: Yes, as prescribed    I feel that I will be ready for discharge from mental health care when I reach the following (measurable goal/objective): \"Im not sure, because I don't see myself not needing therapy\"    For children and adults who have a legal guardian:   Has there been any change to custody orders and/or guardianship status? NA. If yes, attach updated documentation.    I have updated my Crisis Plan and have been offered a copy of this plan    Behavioral Health Treatment Plan [facility]: Diagnosis and Treatment Plan explained to Raghav Smith acknowledges an understanding of their diagnosis. Raghav BERTRAND" Luis agrees to this treatment plan.    I have been offered a copy of this Treatment Plan. yes

## 2025-02-04 NOTE — TELEPHONE ENCOUNTER
Patient calling to switch her in office appt today at 12 to virtual due to schedule conflict and will not make it in time for in ov

## 2025-02-04 NOTE — PSYCH
Virtual Regular Visit    Verification of patient location:    Patient is located at Home in the following state in which I am a therapist in [place]      Assessment/Plan:    Problem List Items Addressed This Visit          Behavioral Health    Borderline personality disorder (HCC) (Chronic)    Bipolar 2 disorder (HCC) - Primary    PTSD (post-traumatic stress disorder)       Goals addressed in session: Goal 1     Depression Follow-up Plan Completed: Not applicable    Reason for visit is   Chief Complaint   Patient presents with    Virtual Regular Visit    Bipolar 2 disorder     Borderline personality disorder    PTSD          Encounter provider SAL Patterson      Recent Visits  No visits were found meeting these conditions.  Showing recent visits within past 7 days and meeting all other requirements  Today's Visits  Date Type Provider Dept   02/04/25 Telemedicine SAL Patterson [facility] Therapist [place]   Showing today's visits and meeting all other requirements  Future Appointments  No visits were found meeting these conditions.  Showing future appointments within next 150 days and meeting all other requirements       The patient was identified by name and date of birth. Raghav Smith was informed that this is a telemedicine visit and that the visit is being conducted throughthe Epic Embedded platform. She agrees to proceed..  My office door was closed. No one else was in the room.  She acknowledged consent and understanding of privacy and security of the video platform. The patient has agreed to participate and understands they can discontinue the visit at any time.    Patient is aware this is a billable service.     Behavioral Health Psychotherapy Progress Note    Psychotherapy Provided: Individual Psychotherapy     1. Bipolar 2 disorder (HCC)        2. Borderline personality disorder (HCC)        3. PTSD (post-traumatic stress disorder)            Goals addressed in session: Goal 1  "    DATA: Raghav reports to be in a funk and is working in getting out of this cycle. Raghav said \"I feel like I have not changed much because I am still doing the same old things\" Raghav states that she has been dealing with her dog's health declining and it has been taking a toll on her. Raghav still struggling with seeing a future for herself and just has no motivation or no routine. Raghav reports that she does not see a future for herself without suffering.  Treatment plan and safety plan updated.     During this session, this clinician used the following therapeutic modalities: Client-centered Therapy and Supportive Psychotherapy    Substance Abuse was not addressed during this session. If the client is diagnosed with a co-occurring substance use disorder, please indicate any changes in the frequency or amount of use: N/A. Stage of change for addressing substance use diagnoses: No substance use/Not applicable    ASSESSMENT:  Raghav Smith presents with a Dysthymic mood.     her affect is Tearful, which is congruent, with her mood and the content of the session. The client has not made progress on their goals.    Raghav Smith presents with a minimal risk of suicide, minimal risk of self-harm, and minimal risk of harm to others.    For any risk assessment that surpasses a \"low\" rating, a safety plan must be developed.    A safety plan was indicated: yes  If yes, describe in detail - Safety plan was updated    PLAN: Between sessions, Raghav Smith will prioritize daily tasks. At the next session, the therapist will use Client-centered Therapy, Solution-Focused Therapy, and Supportive Psychotherapy to address routine.    Behavioral Health Treatment Plan and Discharge Planning: Raghav Smith is aware of and agrees to continue to work on their treatment plan. They have identified and are working toward their discharge goals. yes    Depression Follow-up Plan Completed: Not " applicable    Visit start and stop times:    02/04/25  Start Time: 1202  Stop Time: 1259  Total Visit Time: 57 minutes

## 2025-02-04 NOTE — PROGRESS NOTES
"Daily Note     Today's date: 2025  Patient name: Raghav Smith  : 1995  MRN: [id number]  Referring provider: Linda Rao CRNP  Dx:   Encounter Diagnosis     ICD-10-CM    1. Left hip pain  M25.552       2. Chronic left shoulder pain  M25.512     G89.29           Start Time: 1040  Stop Time: 1125  Total time in clinic (min): 45 minutes    Subjective: Patient reports low back discomfort that feels like it needs to crack. Notes the L hip is still tight and pinching in the front. Notes abdominal tightness.     Objective: See treatment diary below    Assessment: No problems or complaints with oRnnie neck. Improved L hip PROM following therapy. Able to complete TE with no increased pain.     Plan: Continue per plan of care.  Progress treatment as tolerated.       Precautions: Appendectomy 2025, not cleared for full activity until 6 week johnathan    Manuals  2/4          Sidelying L scap mobs gr II  3x10 ea           Seated L cervical paraspinal LUT graston 5' 5' 5'          L hip PROM  GENTLE 4' GENTLE 4'          LLE LAD  3x30\" 3x30\"          Neuro Re-Ed             Seated c/s retraction 3x10 3x10 3x10          Seated scap retraction             Supine TrA activation 20x5\" 20x5\" 20x5\"          Supine TrA hold march 2x20 2x20 2x20          Supine TrA bridge --> SL bridge when able 1x10 3x10 3x10          Sidelying TrA hold clamshell   RTB 3x10                       Ther Ex             Rows             TB ER/IR             Small range PPU from elbows 2x10 2x10 2x10                                                                           Ther Activity                                       Gait Training                                       Modalities                                          "

## 2025-02-06 ENCOUNTER — APPOINTMENT (OUTPATIENT)
Dept: PHYSICAL THERAPY | Facility: CLINIC | Age: 30
End: 2025-02-06
Payer: COMMERCIAL

## 2025-02-07 ENCOUNTER — TELEPHONE (OUTPATIENT)
Dept: PSYCHIATRY | Facility: CLINIC | Age: 30
End: 2025-02-07

## 2025-02-07 NOTE — TELEPHONE ENCOUNTER
Left voicemail informing patient and/or parent/guardian of the Psych Encounter form needing to be signed as a requirement from the insurance company for billing purposes. Patient can access form via [employer] and sign electronically.     Please make patient aware this form must be signed for each visit as a requirement to continue future visits with provider.    Virtual Encounter form 2/4/25 call #1

## 2025-02-11 ENCOUNTER — TELEPHONE (OUTPATIENT)
Dept: BEHAVIORAL/MENTAL HEALTH CLINIC | Facility: CLINIC | Age: 30
End: 2025-02-11

## 2025-02-11 ENCOUNTER — OFFICE VISIT (OUTPATIENT)
Dept: PHYSICAL THERAPY | Facility: CLINIC | Age: 30
End: 2025-02-11
Payer: COMMERCIAL

## 2025-02-11 DIAGNOSIS — M25.512 CHRONIC LEFT SHOULDER PAIN: ICD-10-CM

## 2025-02-11 DIAGNOSIS — G89.29 CHRONIC LEFT SHOULDER PAIN: ICD-10-CM

## 2025-02-11 DIAGNOSIS — M25.552 LEFT HIP PAIN: Primary | ICD-10-CM

## 2025-02-11 PROCEDURE — 97112 NEUROMUSCULAR REEDUCATION: CPT

## 2025-02-11 PROCEDURE — 97110 THERAPEUTIC EXERCISES: CPT

## 2025-02-11 PROCEDURE — 97140 MANUAL THERAPY 1/> REGIONS: CPT

## 2025-02-11 NOTE — TELEPHONE ENCOUNTER
Spoke with patient letting her know that her apt for 2/12/25 at 1pm will be switched to virtual due to inclement weather.    Patient was agreeable.

## 2025-02-12 ENCOUNTER — TELEMEDICINE (OUTPATIENT)
Dept: BEHAVIORAL/MENTAL HEALTH CLINIC | Facility: CLINIC | Age: 30
End: 2025-02-12
Payer: COMMERCIAL

## 2025-02-12 DIAGNOSIS — F43.10 PTSD (POST-TRAUMATIC STRESS DISORDER): ICD-10-CM

## 2025-02-12 DIAGNOSIS — F31.81 BIPOLAR 2 DISORDER (HCC): Primary | ICD-10-CM

## 2025-02-12 DIAGNOSIS — F60.3 BORDERLINE PERSONALITY DISORDER (HCC): Chronic | ICD-10-CM

## 2025-02-12 PROCEDURE — 90837 PSYTX W PT 60 MINUTES: CPT

## 2025-02-12 NOTE — PSYCH
SESSION WAS DONE VIRTUALLY DUE TO INCLEMENT WEATHER    Virtual Regular Visit    Verification of patient location:    Patient is located at Home in the following state in which I am a therapist in [place]      Assessment/Plan:    Problem List Items Addressed This Visit          Behavioral Health    Borderline personality disorder (HCC) (Chronic)    Bipolar 2 disorder (HCC) - Primary    PTSD (post-traumatic stress disorder)       Goals addressed in session: Goal 1     Depression Follow-up Plan Completed: Not applicable    Reason for visit is   Chief Complaint   Patient presents with    Virtual Regular Visit    Bipolar 2 disorder    Borderline personality disorder    PTSD          Encounter provider SAL Patterson      Recent Visits  Date Type Provider Dept   02/11/25 Telephone SAL Patterson [facility] Therapist [place]   Showing recent visits within past 7 days and meeting all other requirements  Today's Visits  Date Type Provider Dept   02/12/25 Telemedicine SAL Patterson [facility]rapist [place]   Showing today's visits and meeting all other requirements  Future Appointments  No visits were found meeting these conditions.  Showing future appointments within next 150 days and meeting all other requirements       The patient was identified by name and date of birth. Raghav Smith was informed that this is a telemedicine visit and that the visit is being conducted throughthe Epic Embedded platform. She agrees to proceed..  My office door was closed. No one else was in the room.  She acknowledged consent and understanding of privacy and security of the video platform. The patient has agreed to participate and understands they can discontinue the visit at any time.    Patient is aware this is a billable service.     Behavioral Health Psychotherapy Progress Note    Psychotherapy Provided: Individual Psychotherapy     1. Bipolar 2 disorder (HCC)        2. Borderline  "personality disorder (HCC)        3. PTSD (post-traumatic stress disorder)            Goals addressed in session: Goal 1     DATA: Raghav reports to be in a mood as she had to put her dog down and her grandmother left. Raghav reports feeling really lonely as the things and people around her she uses for support are gone. Raghav reports being back to work but she has been disappointment in her self because she is back to the same old habits at work. Raghav reports to be feeling dissociated with everything around her and is struggling with a specific version of herself. Raghav reports to be struggling with validation from others and the \"why\" in a lot of things.    During this session, this clinician used the following therapeutic modalities: Cognitive Processing Therapy, Motivational Interviewing, and Supportive Psychotherapy    Substance Abuse was not addressed during this session. If the client is diagnosed with a co-occurring substance use disorder, please indicate any changes in the frequency or amount of use: N/A. Stage of change for addressing substance use diagnoses: No substance use/Not applicable    ASSESSMENT:  Raghav Smith presents with a Dysthymic mood.     her affect is Normal range and intensity, which is congruent, with her mood and the content of the session. The client has made progress on their goals.     Raghav Smith presents with a none risk of suicide, none risk of self-harm, and none risk of harm to others.    For any risk assessment that surpasses a \"low\" rating, a safety plan must be developed.    A safety plan was indicated: no  If yes, describe in detail - N/A    PLAN: Between sessions, Raghav Smith will practice self affirmations. At the next session, the therapist will use Client-centered Therapy, Motivational Interviewing, and Supportive Psychotherapy to address self validation.     Behavioral Health Treatment Plan and Discharge Planning: Raghav BERTRAND" Smith is aware of and agrees to continue to work on their treatment plan. They have identified and are working toward their discharge goals. yes    Depression Follow-up Plan Completed: Not applicable    Visit start and stop times:    02/12/25  Start Time: 1300  Stop Time: 1402  Total Visit Time: 62 minutes

## 2025-02-13 ENCOUNTER — OFFICE VISIT (OUTPATIENT)
Dept: PHYSICAL THERAPY | Facility: CLINIC | Age: 30
End: 2025-02-13
Payer: COMMERCIAL

## 2025-02-13 DIAGNOSIS — M25.512 CHRONIC LEFT SHOULDER PAIN: ICD-10-CM

## 2025-02-13 DIAGNOSIS — M25.552 LEFT HIP PAIN: Primary | ICD-10-CM

## 2025-02-13 DIAGNOSIS — G89.29 CHRONIC LEFT SHOULDER PAIN: ICD-10-CM

## 2025-02-13 PROCEDURE — 97140 MANUAL THERAPY 1/> REGIONS: CPT

## 2025-02-13 PROCEDURE — 97110 THERAPEUTIC EXERCISES: CPT

## 2025-02-13 PROCEDURE — 97112 NEUROMUSCULAR REEDUCATION: CPT

## 2025-02-13 NOTE — PROGRESS NOTES
"Daily Note     Today's date: 2025  Patient name: Raghav Smith  : 1995  MRN: [id number]  Referring provider: Linda Rao CRNP  Dx:   Encounter Diagnosis     ICD-10-CM    1. Left hip pain  M25.552       2. Chronic left shoulder pain  M25.512     G89.29           Start Time: 1235  Stop Time: 1315  Total time in clinic (min): 40 minutes    Subjective: Patient reports feeling okay today. Notes her L hip is tight and bothers her in the front occasionally. Notes her neck is ok now but bothers her on and off.     Objective: See treatment diary below    Assessment: Patient able to complete all TE with no increased discomfort. Improved L hp PROM following manual therapy.     Plan: Continue per plan of care.  Progress treatment as tolerated.       Precautions: Appendectomy 2025, not cleared for full activity until 6 week johnathan    Manuals         Sidelying L scap mobs gr II  3x10 ea  3x10 ea 3x10 ea  KF        Seated L cervical paraspinal LUT graston 5' 5' 5' 5'         L hip PROM  GENTLE 4' GENTLE 4' GENTLE 4' Gentle  8'        LLE LAD  3x30\" 3x30\" 3x30\" 3x30\"        Neuro Re-Ed             Seated c/s retraction 3x10 3x10 3x10 3x10 3x10        Seated scap retraction             Supine TrA activation 20x5\" 20x5\" 20x5\" 20x5\" 20x5\"        Supine TrA hold march 2x20 2x20 2x20 2x20 2x20        Supine TrA bridge --> SL bridge when able 1x10 3x10 3x10 3x10 3x10        Sidelying TrA hold clamshell   RTB 3x10 RTB 3x10 RTB  3x10        Supine hip add isometric    20x5\" 20x5\"        Ther Ex             Rows             TB ER/IR             Small range PPU from elbows 2x10 2x10 2x10 2x10 2x10                                                                         Ther Activity                                       Gait Training                                       Modalities                                          "

## 2025-02-18 ENCOUNTER — APPOINTMENT (OUTPATIENT)
Dept: PHYSICAL THERAPY | Facility: CLINIC | Age: 30
End: 2025-02-18
Payer: COMMERCIAL

## 2025-02-18 ENCOUNTER — TELEMEDICINE (OUTPATIENT)
Dept: BEHAVIORAL/MENTAL HEALTH CLINIC | Facility: CLINIC | Age: 30
End: 2025-02-18
Payer: COMMERCIAL

## 2025-02-18 ENCOUNTER — TELEPHONE (OUTPATIENT)
Dept: BEHAVIORAL/MENTAL HEALTH CLINIC | Facility: CLINIC | Age: 30
End: 2025-02-18

## 2025-02-18 DIAGNOSIS — F31.81 BIPOLAR 2 DISORDER (HCC): Primary | ICD-10-CM

## 2025-02-18 DIAGNOSIS — F43.10 PTSD (POST-TRAUMATIC STRESS DISORDER): ICD-10-CM

## 2025-02-18 DIAGNOSIS — F60.3 BORDERLINE PERSONALITY DISORDER (HCC): Chronic | ICD-10-CM

## 2025-02-18 PROCEDURE — 90834 PSYTX W PT 45 MINUTES: CPT

## 2025-02-18 NOTE — TELEPHONE ENCOUNTER
Patient sent provider email to have 12pm apt switched to virtual due to over sleeping.    Apt was switched.

## 2025-02-18 NOTE — PSYCH
Virtual Regular VisitName: Raghav Smith      : 1995      MRN: [id number]  Encounter Provider: SAL Patterson  Encounter Date: 2025   Encounter department: [facility] THERAPIST [place]  :  Assessment & Plan  Bipolar 2 disorder (HCC)         Borderline personality disorder (HCC)         PTSD (post-traumatic stress disorder)             Goals addressed in session: Goal 1     Depression Follow-up Plan Completed: Not applicable    Reason for visit is   Chief Complaint   Patient presents with    Virtual Regular Visit      Recent Visits  Date Type Provider Dept   25 Telemedicine SAL Patterson [facility]rapist [place]   25 Telephone SAL Patterson [facility] Therapist [place]   Showing recent visits within past 7 days and meeting all other requirements  Today's Visits  Date Type Provider Dept   25 Telephone SAL Patterson [facility] Therapist [place]   25 Telemedicine SAL Patterson [facility] Therapist [place]   Showing today's visits and meeting all other requirements  Future Appointments  No visits were found meeting these conditions.  Showing future appointments within next 150 days and meeting all other requirements     History of Present Illness     Raghav Smith is a 29 y.o. female.  HPI    Past Medical History:   Diagnosis Date    Anxiety     Bipolar disorder (HCC)     Borderline personality disorder (HCC)     Depression     Self-injurious behavior      Past Surgical History:   Procedure Laterality Date    APPENDECTOMY LAPAROSCOPIC N/A 2025    Procedure: APPENDECTOMY LAPAROSCOPIC;  Surgeon: Herminio Broussrad DO;  Location: [facility];  Service: General    CERVICAL BIOPSY  W/ LOOP ELECTRODE EXCISION      FL INJECTION LEFT HIP (ARTHROGRAM)  2021    WISDOM TOOTH EXTRACTION       Current Outpatient Medications   Medication Instructions     acyclovir (ZOVIRAX) 5 % ointment Topical, Every 3 hours scheduled    albuterol (PROVENTIL HFA,VENTOLIN HFA) 90 mcg/act inhaler 2 puffs, Inhalation, Every 6 hours PRN    dicyclomine (BENTYL) 10 mg, Oral, 4 times daily (before meals and at bedtime)    ergocalciferol (VITAMIN D2) 50,000 Units, Oral, 3 times weekly    hydrOXYzine HCL (ATARAX) 10 mg, Oral, Every 6 hours PRN    lamoTRIgine (LAMICTAL) 100 mg, Oral, Daily    ondansetron (ZOFRAN) 4 mg, Oral, Every 8 hours PRN    valACYclovir (VALTREX) 1,000 mg, Oral, 2 times daily     Allergies   Allergen Reactions    Contrast [Iodinated Contrast Media] Sneezing       Review of Systems    Objective   There were no vitals taken for this visit.    Video Exam  Physical Exam     Administrative Statements   Encounter provider SAL Patterson    The Patient is located at Home and in the [facility] in which I am a therapist in [place].    The patient was identified by name and date of birth. Raghav Smith was informed that this is a telemedicine visit and that the visit is being conducted through the Epic Embedded platform. She agrees to proceed..  My office door was closed. No one else was in the room.  She acknowledged consent and understanding of privacy and security of the video platform. The patient has agreed to participate and understands they can discontinue the visit at any time.    I have spent a total time of 39 minutes in caring for this patient on the day of the visit/encounter including Counseling / Coordination of care.      Behavioral Health Psychotherapy Progress Note    Psychotherapy Provided: Individual Psychotherapy     1. Bipolar 2 disorder (HCC)        2. Borderline personality disorder (HCC)        3. PTSD (post-traumatic stress disorder)            Goals addressed in session: Goal 1     DATA: Raghav was late to session due to over sleeping and missing alarm. Session was switched to virtual. Raghav reports to be still struggling with lack of  "motivation and maintaining a routine. Raghav and Therapist engaged in lack of motivation techniques. Raghav reports to be working on using communication skills to communicate with her partner and that has been helping. Raghav was able to identify where she is going wrong and taking accountability.    During this session, this clinician used the following therapeutic modalities: Engagement Strategies, Cognitive Behavioral Therapy, and Supportive Psychotherapy    Substance Abuse was not addressed during this session. If the client is diagnosed with a co-occurring substance use disorder, please indicate any changes in the frequency or amount of use: N/A. Stage of change for addressing substance use diagnoses: No substance use/Not applicable    ASSESSMENT:  Raghav Smith presents with a Euthymic/ normal mood.     her affect is Normal range and intensity, which is congruent, with her mood and the content of the session. The client has made progress on their goals.    Raghav Smith presents with a none risk of suicide, none risk of self-harm, and none risk of harm to others.    For any risk assessment that surpasses a \"low\" rating, a safety plan must be developed.    A safety plan was indicated: no  If yes, describe in detail - N/A    PLAN: Between sessions, Raghav Smith will practice lack of motivation techniques. At the next session, the therapist will use Client-centered Therapy, Cognitive Behavioral Therapy, Motivational Interviewing, and Supportive Psychotherapy to address lack of motivation.    Behavioral Health Treatment Plan and Discharge Planning: Raghav Smith is aware of and agrees to continue to work on their treatment plan. They have identified and are working toward their discharge goals. yes    Depression Follow-up Plan Completed: Not applicable    Visit start and stop times:    02/18/25  Start Time: 1221  Stop Time: 1300  Total Visit Time: 39 minutes  "

## 2025-02-19 NOTE — PSYCH
"Behavioral Health Psychotherapy Progress Note    Psychotherapy Provided: Group Therapy    1. Eating disorder, unspecified type          Goals addressed in session: Goal 1     DATA: Patients attended group to discuss their wins and challenges for the week. The topic of discussion was grocery shopping and how to reduce risky situations with disorganized eating behaviors. Raghav attended today's group. She identified her challenges over the past week and provided feedback to other participants.   During this session, this clinician used the following therapeutic modalities: Client-centered Therapy and Supportive Psychotherapy    Substance Abuse was not addressed during this session. If the client is diagnosed with a co-occurring substance use disorder, please indicate any changes in the frequency or amount of use: n/a. Stage of change for addressing substance use diagnoses: No substance use/Not applicable    ASSESSMENT:  Raghav Smith presents with a Anxious mood. her affect is Normal range and intensity, which is congruent, with her mood and the content of the session. The client has made progress on their goals. Raghav Smith presents with a none risk of suicide, none risk of self-harm, and none risk of harm to others.    For any risk assessment that surpasses a \"low\" rating, a safety plan must be developed.    A safety plan was indicated: no  If yes, describe in detail n/a    PLAN: Between sessions, Raghav Smith will complete risky eating situations worksheet. At the next session, the therapist will use Client-centered Therapy and Supportive Psychotherapy to address review of risky situation planning worksheet and breaking the habit. .    Behavioral Health Treatment Plan and Discharge Planning: Raghav Smith is aware of and agrees to continue to work on their treatment plan. They have identified and are working toward their discharge goals. yes    Depression Follow-up Plan Completed: " No    Visit start and stop times:    02/03/25  Start Time: 1500  Stop Time: 1600  Total Visit Time: 60 minutes

## 2025-02-19 NOTE — GROUP NOTE
Patients attended group to discuss their wins and challenges for the week. The topic of discussion was grocery shopping and how to reduce risky situations for disorganized eating.

## 2025-02-20 ENCOUNTER — OFFICE VISIT (OUTPATIENT)
Dept: PHYSICAL THERAPY | Facility: CLINIC | Age: 30
End: 2025-02-20
Payer: COMMERCIAL

## 2025-02-20 DIAGNOSIS — M25.512 CHRONIC LEFT SHOULDER PAIN: ICD-10-CM

## 2025-02-20 DIAGNOSIS — M25.552 LEFT HIP PAIN: Primary | ICD-10-CM

## 2025-02-20 DIAGNOSIS — G89.29 CHRONIC LEFT SHOULDER PAIN: ICD-10-CM

## 2025-02-20 PROCEDURE — 97112 NEUROMUSCULAR REEDUCATION: CPT

## 2025-02-20 PROCEDURE — 97110 THERAPEUTIC EXERCISES: CPT

## 2025-02-20 NOTE — PROGRESS NOTES
"Daily Note     Today's date: 2025  Patient name: Raghav Smith  : 1995  MRN: [id number]  Referring provider: Linda Rao CRNP  Dx:   Encounter Diagnosis     ICD-10-CM    1. Left hip pain  M25.552       2. Chronic left shoulder pain  M25.512     G89.29           Start Time: 1220  Stop Time: 1300  Total time in clinic (min): 40 minutes    Subjective: Patient states, \"I've been doing a puzzle and shoveled snow which irritated my neck and scapular\". My hip still has good and bad days.     Objective: See treatment diary below    Assessment: Patient's hip PROM has maintained but was limited with pain into FF, abd and IR. Improved cervical and L scap pain following manual intervention and cervical retractions. Assess response NV.     Plan: Continue per plan of care.  Progress treatment as tolerated.       Precautions: Appendectomy 2025, not cleared for full activity until 6 week johnathan    Manuals        Sidelying L scap mobs gr II  3x10 ea  3x10 ea 3x10 ea  KF        Seated L cervical paraspinal LUT graston 5' 5' 5' 5'  5'       L hip PROM  GENTLE 4' GENTLE 4' GENTLE 4' Gentle  8' Gentle  5'       LLE LAD  3x30\" 3x30\" 3x30\" 3x30\" 3x30\"       Neuro Re-Ed             Seated c/s retraction 3x10 3x10 3x10 3x10 3x10 3x10       Seated scap retraction             Supine TrA activation 20x5\" 20x5\" 20x5\" 20x5\" 20x5\" 20x5\"       Supine TrA hold march 2x20 2x20 2x20 2x20 2x20 2x20       Supine TrA bridge --> SL bridge when able 1x10 3x10 3x10 3x10 3x10 3x10       Sidelying TrA hold clamshell   RTB 3x10 RTB 3x10 RTB  3x10 RTB  3x10       Supine hip add isometric    20x5\" 20x5\" 20x5\"       Ther Ex             Rows             TB ER/IR             Small range PPU from elbows 2x10 2x10 2x10 2x10 2x10 2x10                                                                        Ther Activity                                       Gait Training                                       Modalities    "

## 2025-02-21 DIAGNOSIS — F31.81 BIPOLAR 2 DISORDER (HCC): ICD-10-CM

## 2025-02-21 NOTE — TELEPHONE ENCOUNTER
Left voicemail informing patient and/or parent/guardian of the Psych Encounter form needing to be signed as a requirement from the insurance company for billing purposes. Patient can access form via [employer] and sign electronically.     Please make patient aware this form must be signed for each visit as a requirement to continue future visits with provider.    Virtual Encounter form 2/18/25

## 2025-02-22 RX ORDER — LAMOTRIGINE 100 MG/1
100 TABLET ORAL DAILY
Qty: 90 TABLET | Refills: 1 | Status: SHIPPED | OUTPATIENT
Start: 2025-02-22

## 2025-02-24 ENCOUNTER — OFFICE VISIT (OUTPATIENT)
Dept: BEHAVIORAL/MENTAL HEALTH CLINIC | Facility: CLINIC | Age: 30
End: 2025-02-24
Payer: COMMERCIAL

## 2025-02-24 DIAGNOSIS — F50.9 EATING DISORDER, UNSPECIFIED TYPE: Primary | ICD-10-CM

## 2025-02-24 PROCEDURE — 90853 GROUP PSYCHOTHERAPY: CPT

## 2025-02-26 ENCOUNTER — SOCIAL WORK (OUTPATIENT)
Dept: BEHAVIORAL/MENTAL HEALTH CLINIC | Facility: CLINIC | Age: 30
End: 2025-02-26
Payer: COMMERCIAL

## 2025-02-26 DIAGNOSIS — F60.3 BORDERLINE PERSONALITY DISORDER (HCC): Chronic | ICD-10-CM

## 2025-02-26 DIAGNOSIS — F43.10 PTSD (POST-TRAUMATIC STRESS DISORDER): ICD-10-CM

## 2025-02-26 DIAGNOSIS — F31.81 BIPOLAR 2 DISORDER (HCC): Primary | ICD-10-CM

## 2025-02-26 PROCEDURE — 90837 PSYTX W PT 60 MINUTES: CPT

## 2025-02-27 ENCOUNTER — OFFICE VISIT (OUTPATIENT)
Dept: PHYSICAL THERAPY | Facility: CLINIC | Age: 30
End: 2025-02-27
Payer: COMMERCIAL

## 2025-02-27 DIAGNOSIS — M25.552 LEFT HIP PAIN: Primary | ICD-10-CM

## 2025-02-27 DIAGNOSIS — M25.512 CHRONIC LEFT SHOULDER PAIN: ICD-10-CM

## 2025-02-27 DIAGNOSIS — G89.29 CHRONIC LEFT SHOULDER PAIN: ICD-10-CM

## 2025-02-27 PROCEDURE — 97112 NEUROMUSCULAR REEDUCATION: CPT

## 2025-02-27 PROCEDURE — 97140 MANUAL THERAPY 1/> REGIONS: CPT

## 2025-02-27 NOTE — PSYCH
"Behavioral Health Psychotherapy Progress Note    Psychotherapy Provided: Individual Psychotherapy     1. Bipolar 2 disorder (HCC)        2. Borderline personality disorder (HCC)        3. PTSD (post-traumatic stress disorder)            Goals addressed in session: Goal 1     DATA: Raghav said \"I am not progressing\". Raghav reports to be not taking her medication and is unsure when she has stopped taking them.  Reports to be struggling with overall routine she spends most of the day sleeping and gets nothing done.  Raghav Reports sleeping issues and lack of motivation as she is constantly deflecting and disassociating from her everyday life. Raghav Reports to be struggling in social settings and reports high levels of anxiety. Raghav and therapist engage in sleep hygiene techniques and coping skills for anxiety.    During this session, this clinician used the following therapeutic modalities: Engagement Strategies, Motivational Interviewing, and Supportive Psychotherapy    Substance Abuse was not addressed during this session. If the client is diagnosed with a co-occurring substance use disorder, please indicate any changes in the frequency or amount of use: N/A. Stage of change for addressing substance use diagnoses: No substance use/Not applicable    ASSESSMENT:  Raghav Smith presents with a Anxious mood.     her affect is Normal range and intensity, which is congruent, with her mood and the content of the session. The client has not made progress on their goals.    Raghav Smith presents with a none risk of suicide, none risk of self-harm, and none risk of harm to others.    For any risk assessment that surpasses a \"low\" rating, a safety plan must be developed.    A safety plan was indicated: no  If yes, describe in detail - N/A    PLAN: Between sessions, Raghav Smith will practice sleep hygiene. At the next session, the therapist will use Client-centered Therapy, Cognitive " Behavioral Therapy, Motivational Interviewing, and Supportive Psychotherapy to address lack of motivation and anxiety.    Behavioral Health Treatment Plan and Discharge Planning: Raghav Smith is aware of and agrees to continue to work on their treatment plan. They have identified and are working toward their discharge goals. yes    Depression Follow-up Plan Completed: Not applicable    Visit start and stop times:    02/26/25  Start Time: 1300  Stop Time: 1400  Total Visit Time: 60 minutes

## 2025-02-27 NOTE — PROGRESS NOTES
"Daily Note     Today's date: 2025  Patient name: Raghav Smith  : 1995  MRN: [id number]  Referring provider: Linda Rao CRNP  Dx:   Encounter Diagnosis     ICD-10-CM    1. Left hip pain  M25.552       2. Chronic left shoulder pain  M25.512     G89.29             Start Time: 1210  Stop Time: 1300  Total time in clinic (min): 50 minutes    Subjective: Patient states, \"I still have off and on L hip and back pain. My neck pain is intermittent as well and tends to travel to the outside of my L scapula and under the L scapula\". No hip or neck pain currently.     Objective: See treatment diary below    Assessment: Patient's hip PROM improves with manual stretching. She attempted a SL bridge but had slight L anterior hip discomfort and found the exercise a bit too challenging.     Plan: Continue per plan of care.  Progress treatment as tolerated.       Precautions: Appendectomy 2025, not cleared for full activity until 6 week johnathan    Manuals       Sidelying L scap mobs gr II  3x10 ea  3x10 ea 3x10 ea  KF        Seated L cervical paraspinal LUT graston 5' 5' 5' 5'  5'       L hip PROM  GENTLE 4' GENTLE 4' GENTLE 4' Gentle  8' Gentle  5' Gentle  8'      LLE LAD  3x30\" 3x30\" 3x30\" 3x30\" 3x30\"       Neuro Re-Ed             Seated c/s retraction 3x10 3x10 3x10 3x10 3x10 3x10 3x10      Seated scap retraction             Supine TrA activation 20x5\" 20x5\" 20x5\" 20x5\" 20x5\" 20x5\" 20x5\"      Supine TrA hold march 2x20 2x20 2x20 2x20 2x20 2x20 2x20      Supine TrA bridge --> SL bridge when able 1x10 3x10 3x10 3x10 3x10 3x10 3x10      Sidelying TrA hold clamshell   RTB 3x10 RTB 3x10 RTB  3x10 RTB  3x10 RTB  3x10      Supine hip add isometric    20x5\" 20x5\" 20x5\" 20x5\"      Ther Ex             Rows             TB ER/IR             Small range PPU from elbows 2x10 2x10 2x10 2x10 2x10 2x10 2x10                                                                       Ther Activity        "                                Gait Training                                       Modalities

## 2025-03-03 ENCOUNTER — TELEPHONE (OUTPATIENT)
Dept: FAMILY MEDICINE CLINIC | Facility: CLINIC | Age: 30
End: 2025-03-03

## 2025-03-03 DIAGNOSIS — F31.81 BIPOLAR 2 DISORDER (HCC): ICD-10-CM

## 2025-03-03 RX ORDER — LAMOTRIGINE 25 MG/1
25 TABLET ORAL DAILY
Qty: 30 TABLET | Refills: 0 | Status: SHIPPED | OUTPATIENT
Start: 2025-03-03

## 2025-03-04 ENCOUNTER — OFFICE VISIT (OUTPATIENT)
Dept: PSYCHIATRY | Facility: CLINIC | Age: 30
End: 2025-03-04
Payer: COMMERCIAL

## 2025-03-04 ENCOUNTER — OFFICE VISIT (OUTPATIENT)
Dept: PHYSICAL THERAPY | Facility: CLINIC | Age: 30
End: 2025-03-04
Payer: COMMERCIAL

## 2025-03-04 DIAGNOSIS — M25.512 CHRONIC LEFT SHOULDER PAIN: ICD-10-CM

## 2025-03-04 DIAGNOSIS — G89.29 CHRONIC LEFT SHOULDER PAIN: ICD-10-CM

## 2025-03-04 DIAGNOSIS — M25.552 LEFT HIP PAIN: Primary | ICD-10-CM

## 2025-03-04 DIAGNOSIS — F31.81 BIPOLAR 2 DISORDER (HCC): Primary | ICD-10-CM

## 2025-03-04 DIAGNOSIS — F43.10 PTSD (POST-TRAUMATIC STRESS DISORDER): ICD-10-CM

## 2025-03-04 DIAGNOSIS — F60.3 BORDERLINE PERSONALITY DISORDER (HCC): Chronic | ICD-10-CM

## 2025-03-04 PROCEDURE — 90833 PSYTX W PT W E/M 30 MIN: CPT | Performed by: PSYCHIATRY & NEUROLOGY

## 2025-03-04 PROCEDURE — 97140 MANUAL THERAPY 1/> REGIONS: CPT

## 2025-03-04 PROCEDURE — 99214 OFFICE O/P EST MOD 30 MIN: CPT | Performed by: PSYCHIATRY & NEUROLOGY

## 2025-03-04 PROCEDURE — 97110 THERAPEUTIC EXERCISES: CPT

## 2025-03-04 PROCEDURE — 97530 THERAPEUTIC ACTIVITIES: CPT
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
## 2025-03-04 NOTE — PROGRESS NOTES
"Daily Note     Today's date: 3/4/2025  Patient name: Raghav Smith  : 1995  MRN: [id number]  Referring provider: Linda Rao CRNP  Dx:   Encounter Diagnosis     ICD-10-CM    1. Left hip pain  M25.552       2. Chronic left shoulder pain  M25.512     G89.29                        Subjective: Patient reports shoulder and scapula pain has not been that bad recently. She has been more limited by her hip pain. Pain limits her from work and recreational activities including dance.     Objective: See treatment diary below    Assessment: Resumed muscle energy techniques and stability training to provide control movement symptoms relief to pelvic joints. Assess response nv.     Plan: Continue per plan of care.  Progress treatment as tolerated.       Precautions: Appendectomy 2025, not cleared for full activity until 6 week johnathan    Manuals 1/28 1/30 2/4 2/11 2/13 2/20 2/27 3/4     Sidelying L scap mobs gr II  3x10 ea  3x10 ea 3x10 ea  KF        Seated L cervical paraspinal LUT graston 5' 5' 5' 5'  5'       L hip PROM  GENTLE 4' GENTLE 4' GENTLE 4' Gentle  8' Gentle  5' Gentle  8'      LLE LAD  3x30\" 3x30\" 3x30\" 3x30\" 3x30\"  Gr V x1     MET's L hip ext, R hip flexion        10x5\" x3                  Neuro Re-Ed             Seated c/s retraction 3x10 3x10 3x10 3x10 3x10 3x10 3x10      Seated scap retraction             Supine TrA activation 20x5\" 20x5\" 20x5\" 20x5\" 20x5\" 20x5\" 20x5\"      Supine TrA hold march 2x20 2x20 2x20 2x20 2x20 2x20 2x20      Supine TrA bridge --> SL bridge when able 1x10 3x10 3x10 3x10 3x10 3x10 3x10 3x10     Sidelying TrA hold clamshell   RTB 3x10 RTB 3x10 RTB  3x10 RTB  3x10 RTB  3x10 RTB 3x10     Supine hip add isometric    20x5\" 20x5\" 20x5\" 20x5\" 20x5\"     TrA SLR        3x10     Ther Ex             Rows             TB ER/IR             Small range PPU from elbows 2x10 2x10 2x10 2x10 2x10 2x10 2x10                                                                       Ther Activity      " "       Lateral step ups        6\" 2x10 ea                  Gait Training                                       Modalities                                          "

## 2025-03-05 ENCOUNTER — SOCIAL WORK (OUTPATIENT)
Dept: BEHAVIORAL/MENTAL HEALTH CLINIC | Facility: CLINIC | Age: 30
End: 2025-03-05
Payer: COMMERCIAL

## 2025-03-05 DIAGNOSIS — F31.81 BIPOLAR 2 DISORDER (HCC): Primary | ICD-10-CM

## 2025-03-05 DIAGNOSIS — F43.10 PTSD (POST-TRAUMATIC STRESS DISORDER): ICD-10-CM

## 2025-03-05 DIAGNOSIS — F60.3 BORDERLINE PERSONALITY DISORDER (HCC): Chronic | ICD-10-CM

## 2025-03-05 PROCEDURE — 90837 PSYTX W PT 60 MINUTES: CPT

## 2025-03-05 NOTE — GROUP NOTE
"Behavioral Health Psychotherapy Group Progress Note    Psychotherapy Provided: Group Therapy    1. Eating disorder, unspecified type          Goals addressed in session: Goal 1     Group Name: Recovery from Disorganized Eating    Topic(s) covered: risky eating situations    Skill(s) covered: coping with and preventing risky eating situations    Group summary:  Patients attended a group on eating disorders. They shared their own struggles and provided feedback for fellow group members about risky eating situations.      Data: Raghav attended today's group. She participated in sharing habits that she is attempting to break surrounding food and the steps in order to break them. Raghav shared her plan to begin engaging in some simple meal prep practices and starting to keep nutritious meals and snacks on hand to help her feel less overwhelmed by process of cooking/preparing her meals and snacks.     Substance Abuse was not addressed during this session. If the client is diagnosed with a co-occurring substance use disorder, please indicate any changes in the frequency or amount of use: n/a. Stage of change for addressing substance use diagnoses: No substance use/Not applicable    ASSESSMENT:  Raghav appeared  with a Anxious mood. Her affect is Normal range and intensity, which is congruent, with his mood and the content of the session. She appeared to actively participated in the group and was supportive of and dominated the conversation with the other group members. Raghav continues to work on sharing for an appropriate amount of time in group. Raghav Smith presents with a nonerisk of suicide,nonerisk of self-harm, and none risk of harm to others.    For any risk assessment that surpasses a \"low\" rating, a safety plan must be developed.    A safety plan was indicated: no  If yes, describe in detail n/a    PLAN: Raghav will begin simple meal and snack prepping. The next group is scheduled for " 3/10/2025 and will cover the topic of mindful eating.    Behavioral Health Treatment Plan and Discharge Planning: Raghav Smith is aware of and agrees to continue to work on their treatment plan. They have identified and are working toward their discharge goals. yes    Visit start and stop times:    02/24/25  Start Time: 1505  Stop Time: 1602  Total Visit Time: 57 minutes

## 2025-03-05 NOTE — PSYCH
"Behavioral Health Psychotherapy Progress Note    Psychotherapy Provided: Individual Psychotherapy     1. Bipolar 2 disorder (HCC)        2. Borderline personality disorder (HCC)        3. PTSD (post-traumatic stress disorder)            Goals addressed in session: Goal 1     DATA: Raghav reports to have had a meltdown as she is still struggling with a routine and getting things done. Raghav reports being more irritable than usual and feeling more disassociated that usual. Raghav reports to be just be existing.  Raghav said \"I need a routine\", What's the point of being bipolar if I'm not going to be manic\", I want to experience a manic episode so I can get things done\". Raghav reports to be over consumed with wanting to be perfect.  Raghav and therapist engaged in motivation.    During this session, this clinician used the following therapeutic modalities: Cognitive Behavioral Therapy and Supportive Psychotherapy    Substance Abuse was not addressed during this session. If the client is diagnosed with a co-occurring substance use disorder, please indicate any changes in the frequency or amount of use: N/A. Stage of change for addressing substance use diagnoses: No substance use/Not applicable    ASSESSMENT:  Raghav Smith presents with a Euthymic/ normal mood.     her affect is Normal range and intensity, which is congruent, with her mood and the content of the session. The client has not made progress on their goals.    Raghav Smith presents with a none risk of suicide, none risk of self-harm, and none risk of harm to others.    For any risk assessment that surpasses a \"low\" rating, a safety plan must be developed.    A safety plan was indicated: no  If yes, describe in detail - N/A    PLAN: Between sessions, Raghav Smith will continue to work on building a routine. At the next session, the therapist will use Client-centered Therapy, Cognitive Behavioral Therapy, Motivational " Interviewing, and Supportive Psychotherapy to address lack of routine.    Behavioral Health Treatment Plan and Discharge Planning: Raghav Smith is aware of and agrees to continue to work on their treatment plan. They have identified and are working toward their discharge goals. yes    Depression Follow-up Plan Completed: Not applicable    Visit start and stop times:    03/05/25  Start Time: 1300  Stop Time: 1400  Total Visit Time: 60 minutes

## 2025-03-10 ENCOUNTER — TELEPHONE (OUTPATIENT)
Dept: BEHAVIORAL/MENTAL HEALTH CLINIC | Facility: CLINIC | Age: 30
End: 2025-03-10

## 2025-03-10 NOTE — TELEPHONE ENCOUNTER
LSW called pt to let her know that Recovery from Disorganized Eating Group every other Monday at 3 is being put on hold temporarily. Pt expressed understanding.    LSW to remain available as needed.

## 2025-03-11 ENCOUNTER — OFFICE VISIT (OUTPATIENT)
Dept: PHYSICAL THERAPY | Facility: CLINIC | Age: 30
End: 2025-03-11
Payer: COMMERCIAL

## 2025-03-11 DIAGNOSIS — G89.29 CHRONIC LEFT SHOULDER PAIN: ICD-10-CM

## 2025-03-11 DIAGNOSIS — M25.512 CHRONIC LEFT SHOULDER PAIN: ICD-10-CM

## 2025-03-11 DIAGNOSIS — M25.552 LEFT HIP PAIN: Primary | ICD-10-CM

## 2025-03-11 PROCEDURE — 97140 MANUAL THERAPY 1/> REGIONS: CPT

## 2025-03-11 PROCEDURE — 97110 THERAPEUTIC EXERCISES: CPT

## 2025-03-11 PROCEDURE — 97530 THERAPEUTIC ACTIVITIES: CPT

## 2025-03-11 NOTE — PROGRESS NOTES
"Daily Note     Today's date: 3/11/2025  Patient name: Raghav Smith  : 1995  MRN: [id number]  Referring provider: Linda Rao CRNP  Dx:   Encounter Diagnosis     ICD-10-CM    1. Left hip pain  M25.552       2. Chronic left shoulder pain  M25.512     G89.29           Start Time: 1200  Stop Time: 1245  Total time in clinic (min): 45 minutes    Subjective: Patient reports, overall her shoulder and scap has been good but still has random pain at times no greater than 4-5/10\". Notes her L hip still feels off and lacks fluidity. Patient states, \"I haven't slept since yesterday at 11 am\". I highly advised her to call her therapist/psychiatrist if she's unable to sleep tonight.     Objective: See treatment diary below    Assessment: Good response to manual therapy today noting no pain following therapy. Still challenged with current PT POC.     Plan: Continue per plan of care.  Progress treatment as tolerated.       Precautions: Appendectomy 2025, not cleared for full activity until 6 week johnathan    Manuals 1/28 1/30 2/4 2/11 2/13 2/20 2/27 3/4 3/11    Sidelying L scap mobs gr II  3x10 ea  3x10 ea 3x10 ea  KF        Seated L cervical paraspinal LUT graston 5' 5' 5' 5'  5'       L hip PROM  GENTLE 4' GENTLE 4' GENTLE 4' Gentle  8' Gentle  5' Gentle  8'  5'    LLE LAD  3x30\" 3x30\" 3x30\" 3x30\" 3x30\"  Gr V x1     MET's L hip ext, R hip flexion        10x5\" x3 10x5\" x3                 Neuro Re-Ed             Seated c/s retraction 3x10 3x10 3x10 3x10 3x10 3x10 3x10      Seated scap retraction             Supine TrA activation 20x5\" 20x5\" 20x5\" 20x5\" 20x5\" 20x5\" 20x5\"      Supine TrA hold march 2x20 2x20 2x20 2x20 2x20 2x20 2x20      Supine TrA bridge --> SL bridge when able 1x10 3x10 3x10 3x10 3x10 3x10 3x10 3x10 3x10    Sidelying TrA hold clamshell   RTB 3x10 RTB 3x10 RTB  3x10 RTB  3x10 RTB  3x10 RTB 3x10 RTB  3x10    Supine hip add isometric    20x5\" 20x5\" 20x5\" 20x5\" 20x5\" 20x5\"    TrA SLR        3x10 4x5 ea  " "               Ther Ex             Rows             TB ER/IR             Small range PPU from elbows 2x10 2x10 2x10 2x10 2x10 2x10 2x10                                                                       Ther Activity             Lateral step ups        6\" 2x10 ea 6\"  2x10 ea                 Gait Training                                       Modalities                                          "

## 2025-03-12 ENCOUNTER — SOCIAL WORK (OUTPATIENT)
Dept: BEHAVIORAL/MENTAL HEALTH CLINIC | Facility: CLINIC | Age: 30
End: 2025-03-12
Payer: COMMERCIAL

## 2025-03-12 DIAGNOSIS — F31.81 BIPOLAR 2 DISORDER (HCC): Primary | ICD-10-CM

## 2025-03-12 DIAGNOSIS — F43.10 PTSD (POST-TRAUMATIC STRESS DISORDER): ICD-10-CM

## 2025-03-12 DIAGNOSIS — F41.9 ANXIETY: ICD-10-CM

## 2025-03-12 DIAGNOSIS — F60.3 BORDERLINE PERSONALITY DISORDER (HCC): Chronic | ICD-10-CM

## 2025-03-12 PROCEDURE — 90837 PSYTX W PT 60 MINUTES: CPT

## 2025-03-12 NOTE — PSYCH
"Behavioral Health Psychotherapy Progress Note    Psychotherapy Provided: Individual Psychotherapy     1. Bipolar 2 disorder (HCC)        2. Borderline personality disorder (HCC)        3. PTSD (post-traumatic stress disorder)        4. Anxiety            Goals addressed in session: Goal 1     DATA: Raghav reports to be all over the place with her emotions and is unsure where to go from here. Raghav reports to have lost her job, is having car issues and has been dealing with a lot of financial difficulty. Raghav reports not having slept in the past 30 hours and needs to come up with a plan that will be helpful for her. Raghav stated \"routine is good but I get bored, so I need to find something that works. Clinician reccommended PHP for Raghav but she declined as she reports \" I don't have time for that\"    During this session, this clinician used the following therapeutic modalities: Client-centered Therapy and Supportive Psychotherapy    Substance Abuse was not addressed during this session. If the client is diagnosed with a co-occurring substance use disorder, please indicate any changes in the frequency or amount of use: N/A. Stage of change for addressing substance use diagnoses: No substance use/Not applicable    ASSESSMENT:  Raghav Smith presents with a Euthymic/ normal and Anxious mood.     her affect is Normal range and intensity, which is congruent, with her mood and the content of the session. The client has not made progress on their goals.    Raghav  presents with dilated yes, rapid speech and flight of ideas.    Raghav Smith presents with a minimal risk of suicide, none risk of self-harm, and none risk of harm to others.    For any risk assessment that surpasses a \"low\" rating, a safety plan must be developed.    A safety plan was indicated: no  If yes, describe in detail - N/A    PLAN: Between sessions, Raghav Smith will plan out priorities . At the next session, the " therapist will use Client-centered Therapy, Cognitive Behavioral Therapy, Motivational Interviewing, and Supportive Psychotherapy to address anxiety.    Behavioral Health Treatment Plan and Discharge Planning: Raghav Smith is aware of and agrees to continue to work on their treatment plan. They have identified and are working toward their discharge goals. yes    Depression Follow-up Plan Completed: Not applicable    Visit start and stop times:    03/12/25  Start Time: 1303  Stop Time: 1400  Total Visit Time: 57 minutes

## 2025-03-13 ENCOUNTER — OFFICE VISIT (OUTPATIENT)
Dept: PHYSICAL THERAPY | Facility: CLINIC | Age: 30
End: 2025-03-13
Payer: COMMERCIAL

## 2025-03-13 DIAGNOSIS — G89.29 CHRONIC LEFT SHOULDER PAIN: ICD-10-CM

## 2025-03-13 DIAGNOSIS — M25.512 CHRONIC LEFT SHOULDER PAIN: ICD-10-CM

## 2025-03-13 DIAGNOSIS — M25.552 LEFT HIP PAIN: Primary | ICD-10-CM

## 2025-03-13 PROCEDURE — 97140 MANUAL THERAPY 1/> REGIONS: CPT

## 2025-03-13 PROCEDURE — 97110 THERAPEUTIC EXERCISES: CPT

## 2025-03-13 PROCEDURE — 97530 THERAPEUTIC ACTIVITIES: CPT

## 2025-03-13 NOTE — PROGRESS NOTES
"Daily Note     Today's date: 3/13/2025  Patient name: Raghav Smith  : 1995  MRN: [id number]  Referring provider: Linda Rao CRNP  Dx:   Encounter Diagnosis     ICD-10-CM    1. Left hip pain  M25.552       2. Chronic left shoulder pain  M25.512     G89.29           Start Time: 1230  Stop Time: 1315  Total time in clinic (min): 45 minutes    Subjective: Patient reports just waking up a bit ago. Notes her anterior hip feels about the same. Still has occasional anterior hip pinching and low back pain. Notes her scap pain is intermittent and produced with certain movements.     Objective: See treatment diary below    Assessment: Cervical retractions continue to centralize her symptoms. Good response to manual therapy today noting no pain following therapy. Still challenged with current PT POC.     Plan: Continue per plan of care.  Progress treatment as tolerated.       Precautions: Appendectomy 2025, not cleared for full activity until 6 week johnathan    Manuals 1/28 1/30 2/4 2/11 2/13 2/20 2/27 3/4 3/11 3/13   Sidelying L scap mobs gr II  3x10 ea  3x10 ea 3x10 ea  KF        Seated L cervical paraspinal LUT graston 5' 5' 5' 5'  5'       L hip PROM  GENTLE 4' GENTLE 4' GENTLE 4' Gentle  8' Gentle  5' Gentle  8'  5' 5'   LLE LAD  3x30\" 3x30\" 3x30\" 3x30\" 3x30\"  Gr V x1     MET's L hip ext, R hip flexion        10x5\" x3 10x5\" x3 10\"x5                Neuro Re-Ed             Seated c/s retraction 3x10 3x10 3x10 3x10 3x10 3x10 3x10      Seated scap retraction             Supine TrA activation 20x5\" 20x5\" 20x5\" 20x5\" 20x5\" 20x5\" 20x5\"      Supine TrA hold march 2x20 2x20 2x20 2x20 2x20 2x20 2x20      Supine TrA bridge --> SL bridge when able 1x10 3x10 3x10 3x10 3x10 3x10 3x10 3x10 3x10 3x12   Sidelying TrA hold clamshell   RTB 3x10 RTB 3x10 RTB  3x10 RTB  3x10 RTB  3x10 RTB 3x10 RTB  3x10 RTB  3x12   Supine hip add isometric    20x5\" 20x5\" 20x5\" 20x5\" 20x5\" 20x5\" 20x5'   TrA SLR        3x10 4x5 ea 3x8 ea           " "     Ther Ex             Rows             TB ER/IR             Small range PPU from elbows 2x10 2x10 2x10 2x10 2x10 2x10 2x10                                                                       Ther Activity             Lateral step ups        6\" 2x10 ea 6\"  2x10 ea 6\"  2x12 ea                Gait Training                                       Modalities                                          "

## 2025-03-18 ENCOUNTER — SOCIAL WORK (OUTPATIENT)
Dept: BEHAVIORAL/MENTAL HEALTH CLINIC | Facility: CLINIC | Age: 30
End: 2025-03-18
Payer: COMMERCIAL

## 2025-03-18 ENCOUNTER — OFFICE VISIT (OUTPATIENT)
Dept: PHYSICAL THERAPY | Facility: CLINIC | Age: 30
End: 2025-03-18
Payer: COMMERCIAL

## 2025-03-18 DIAGNOSIS — F41.9 ANXIETY: ICD-10-CM

## 2025-03-18 DIAGNOSIS — F60.3 BORDERLINE PERSONALITY DISORDER (HCC): Chronic | ICD-10-CM

## 2025-03-18 DIAGNOSIS — G89.29 CHRONIC LEFT SHOULDER PAIN: ICD-10-CM

## 2025-03-18 DIAGNOSIS — F31.81 BIPOLAR 2 DISORDER (HCC): Primary | ICD-10-CM

## 2025-03-18 DIAGNOSIS — F43.10 PTSD (POST-TRAUMATIC STRESS DISORDER): ICD-10-CM

## 2025-03-18 DIAGNOSIS — M25.552 LEFT HIP PAIN: Primary | ICD-10-CM

## 2025-03-18 DIAGNOSIS — M25.512 CHRONIC LEFT SHOULDER PAIN: ICD-10-CM

## 2025-03-18 PROCEDURE — 90837 PSYTX W PT 60 MINUTES: CPT

## 2025-03-18 PROCEDURE — 97112 NEUROMUSCULAR REEDUCATION: CPT

## 2025-03-18 PROCEDURE — 97140 MANUAL THERAPY 1/> REGIONS: CPT

## 2025-03-18 PROCEDURE — 97530 THERAPEUTIC ACTIVITIES: CPT

## 2025-03-18 NOTE — PSYCH
"Behavioral Health Psychotherapy Progress Note    Psychotherapy Provided: Individual Psychotherapy     1. Bipolar 2 disorder (HCC)        2. Borderline personality disorder (HCC)        3. PTSD (post-traumatic stress disorder)        4. Anxiety            Goals addressed in session: Goal 1     DATA: Raghav reports feeling high levels of depression but feels that things are manageable for her. Raghav reports wanting to figure out what it takes for her to get herself together. Raghav  reports not fearing any consequences and she just wants to live. Raghav expressed frustration with the world and how things are being operated. Raghav stated \"I do not fear death as I see death as a relief from everything\". Raghav stated \"I am not suicidal and I do not want to hurt myself or others but death would be a relief\". Raghav reports wanting to process her feeling and wants to live a life worth looking forward to. Raghav reports no SI/HI and no self harm.    During this session, this clinician used the following therapeutic modalities: Client-centered Therapy and Supportive Psychotherapy    Substance Abuse was not addressed during this session. If the client is diagnosed with a co-occurring substance use disorder, please indicate any changes in the frequency or amount of use: N/A. Stage of change for addressing substance use diagnoses: No substance use/Not applicable    ASSESSMENT:  Raghav Smith presents with a Euthymic/ normal mood.     her affect is Normal range and intensity, which is congruent, with her mood and the content of the session. The client has not made progress on their goals.    Raghav Smith presents with a none risk of suicide, none risk of self-harm, and none risk of harm to others.    For any risk assessment that surpasses a \"low\" rating, a safety plan must be developed.    A safety plan was indicated: no  If yes, describe in detail - N/A    PLAN: Between sessions, Raghav " ELZA Smith will continue working prioritizing daily tasks . At the next session, the therapist will use Client-centered Therapy, Cognitive Behavioral Therapy, Motivational Interviewing, and Supportive Psychotherapy to address anxiety and depression.    Behavioral Health Treatment Plan and Discharge Planning: Raghav Smith is aware of and agrees to continue to work on their treatment plan. They have identified and are working toward their discharge goals. yes    Depression Follow-up Plan Completed: Not applicable    Visit start and stop times:    03/18/25  Start Time: 1200  Stop Time: 1300  Total Visit Time: 60 minutes

## 2025-03-18 NOTE — PROGRESS NOTES
"Daily Note     Today's date: 3/18/2025  Patient name: Raghav Smith  : 1995  MRN: [id number]  Referring provider: Linda Rao CRNP  Dx:   Encounter Diagnosis     ICD-10-CM    1. Left hip pain  M25.552       2. Chronic left shoulder pain  M25.512     G89.29                      Subjective: Patient reports just waking up a bit ago. Notes her anterior hip feels about the same. Still has occasional anterior hip pinching and low back pain. Notes her scap pain is intermittent and produced with certain movements.     Objective: See treatment diary below    Assessment: Cervical retractions continue to centralize her symptoms. Good response to manual therapy today noting no pain following therapy. Still challenged with current PT POC.     Plan: Continue per plan of care.  Progress treatment as tolerated.       Precautions: Appendectomy 2025, not cleared for full activity until 6 week johnathan    Manuals 3/18     2/20 2/27 3/4 3/11 3/13   Sidelying L scap mobs gr II             Seated L cervical paraspinal LUT graston      5'       L hip PROM 5'     Gentle  5' Gentle  8'  5' 5'   LLE LAD 3x30\"     3x30\"  Gr V x1     MET's L hip ext, R hip flexion 10\"x5 ea       10x5\" x3 10x5\" x3 10\"x5                Neuro Re-Ed             Seated c/s retraction      3x10 3x10      Seated scap retraction             Supine TrA activation      20x5\" 20x5\"      Supine TrA hold march      2x20 2x20      Supine TrA bridge --> SL bridge when able 3x12     3x10 3x10 3x10 3x10 3x12   Sidelying TrA hold clamshell RTB 3x12     RTB  3x10 RTB  3x10 RTB 3x10 RTB  3x10 RTB  3x12   Supine hip add isometric 20x5\"     20x5\" 20x5\" 20x5\" 20x5\" 20x5'   TrA SLR 3x8 ea       3x10 4x5 ea 3x8 ea                Ther Ex             Rows             TB ER/IR             Small range PPU from elbows      2x10 2x10                                                                       Ther Activity             Lateral step ups 6\" 2x12 ea       6\" 2x10 ea " "6\"  2x10 ea 6\"  2x12 ea                Gait Training                                       Modalities                                          "

## 2025-03-20 ENCOUNTER — OFFICE VISIT (OUTPATIENT)
Dept: PHYSICAL THERAPY | Facility: CLINIC | Age: 30
End: 2025-03-20
Payer: COMMERCIAL

## 2025-03-20 DIAGNOSIS — M25.512 CHRONIC LEFT SHOULDER PAIN: ICD-10-CM

## 2025-03-20 DIAGNOSIS — M25.552 LEFT HIP PAIN: Primary | ICD-10-CM

## 2025-03-20 DIAGNOSIS — G89.29 CHRONIC LEFT SHOULDER PAIN: ICD-10-CM

## 2025-03-20 PROCEDURE — 97110 THERAPEUTIC EXERCISES: CPT

## 2025-03-20 PROCEDURE — 97530 THERAPEUTIC ACTIVITIES: CPT

## 2025-03-20 NOTE — PROGRESS NOTES
"Daily Note     Today's date: 3/20/2025  Patient name: Raghav Smith  : 1995  MRN: [id number]  Referring provider: Linda Rao CRNP  Dx:   Encounter Diagnosis     ICD-10-CM    1. Left hip pain  M25.552       2. Chronic left shoulder pain  M25.512     G89.29           Start Time: 1205  Stop Time: 1255  Total time in clinic (min): 50 minutes    Subjective: Patient reports taking a 3.5 mile bike ride on Tuesday and a 7 mile bike ride Wednesday. She notes sacral pressure following and overall muscle soreness.     Objective: See treatment diary below    Assessment: Patient is making steady progress with her pain and function with the current PT POC.     Plan: Continue per plan of care.  Progress treatment as tolerated.       Precautions: Appendectomy 2025, not cleared for full activity until 6 week johnathan    Manuals 3/18 3/20      3/4 3/11 3/13   Sidelying L scap mobs gr II             Seated L cervical paraspinal LUT graston             L hip PROM 5' 8'       5' 5'   LLE LAD 3x30\" nv      Gr V x1     MET's L hip ext, R hip flexion 10\"x5 ea nv      10x5\" x3 10x5\" x3 10\"x5                Neuro Re-Ed             Seated c/s retraction             Seated scap retraction             Supine TrA activation             Supine TrA hold march             Supine TrA bridge --> SL bridge when able 3x12 3x12      3x10 3x10 3x12   Sidelying TrA hold clamshell RTB 3x12 GTB  3x10      RTB 3x10 RTB  3x10 RTB  3x12   Supine hip add isometric 20x5\" 20x5\"      20x5\" 20x5\" 20x5'   TrA SLR 3x8 ea 3x10   ea      3x10 4x5 ea 3x8 ea   Mini Squats  2x10                        Ther Ex             Rows             TB ER/IR             Small range PPU from elbows                                                                              Ther Activity             Lateral step ups 6\" 2x12 ea 6\"  3x10 ea      6\" 2x10 ea 6\"  2x10 ea 6\"  2x12 ea                             Gait Training                                       Modalities     "

## 2025-03-24 ENCOUNTER — OFFICE VISIT (OUTPATIENT)
Dept: PHYSICAL THERAPY | Facility: CLINIC | Age: 30
End: 2025-03-24
Payer: COMMERCIAL

## 2025-03-24 DIAGNOSIS — M25.512 CHRONIC LEFT SHOULDER PAIN: ICD-10-CM

## 2025-03-24 DIAGNOSIS — M25.552 LEFT HIP PAIN: Primary | ICD-10-CM

## 2025-03-24 DIAGNOSIS — G89.29 CHRONIC LEFT SHOULDER PAIN: ICD-10-CM

## 2025-03-24 PROCEDURE — 97112 NEUROMUSCULAR REEDUCATION: CPT

## 2025-03-24 PROCEDURE — 97140 MANUAL THERAPY 1/> REGIONS: CPT

## 2025-03-24 PROCEDURE — 97530 THERAPEUTIC ACTIVITIES: CPT

## 2025-03-24 NOTE — PROGRESS NOTES
"Daily Note     Today's date: 3/24/2025  Patient name: Raghav Smith  : 1995  MRN: [id number]  Referring provider: Linda Rao CRNP  Dx:   Encounter Diagnosis     ICD-10-CM    1. Left hip pain  M25.552       2. Chronic left shoulder pain  M25.512     G89.29                      Subjective: Patient reports increased pain in her back, pelvis and SIJ today, thinks it may be from increased cleaning/work around the house.     Objective: See treatment diary below    Assessment: Performed grade V mobilization of thoracic spine for neuromodulatory effect on pain, and increased extension ROM. Good response to mobilization and manual therapy. Continue to progress stabilization exercises following manuals.     Plan: Continue per plan of care.  Progress treatment as tolerated.       Precautions: Appendectomy 2025, not cleared for full activity until 6 week johnathan    Manuals 3/18 3/20 3/24     3/4 3/11 3/13                Prone PA thoracic mobilization gr V   x2          L hip PROM 5' 8' 8'      5' 5'   LLE LAD 3x30\" nv 10x5\"     Gr V x1     MET's L hip ext, R hip flexion 10\"x5 ea nv 10x5\" ea     10x5\" x3 10x5\" x3 10\"x5                Neuro Re-Ed             Seated c/s retraction             Seated scap retraction             Supine TrA activation             Supine TrA hold march             Supine TrA bridge --> SL bridge when able 3x12 3x12 3x12     3x10 3x10 3x12   Sidelying TrA hold clamshell RTB 3x12 GTB  3x10 GTB  3x10     RTB 3x10 RTB  3x10 RTB  3x12   Supine hip add isometric 20x5\" 20x5\" 20x5\"     20x5\" 20x5\" 20x5'   TrA SLR 3x8 ea 3x10   ea 3x10   ea     3x10 4x5 ea 3x8 ea   Mini Squats  2x10 2x10                       Ther Ex             Rows             TB ER/IR             Small range PPU from elbows                                                                              Ther Activity             Lateral step ups 6\" 2x12 ea 6\"  3x10 ea 6\"  3x10 ea     6\" 2x10 ea 6\"  2x10 ea 6\"  2x12 ea             "                 Gait Training                                       Modalities

## 2025-03-25 ENCOUNTER — OFFICE VISIT (OUTPATIENT)
Dept: PHYSICAL THERAPY | Facility: CLINIC | Age: 30
End: 2025-03-25
Payer: COMMERCIAL

## 2025-03-25 DIAGNOSIS — M25.552 LEFT HIP PAIN: Primary | ICD-10-CM

## 2025-03-25 DIAGNOSIS — M25.512 CHRONIC LEFT SHOULDER PAIN: ICD-10-CM

## 2025-03-25 DIAGNOSIS — G89.29 CHRONIC LEFT SHOULDER PAIN: ICD-10-CM

## 2025-03-25 PROCEDURE — 97112 NEUROMUSCULAR REEDUCATION: CPT

## 2025-03-25 PROCEDURE — 97530 THERAPEUTIC ACTIVITIES: CPT

## 2025-03-25 PROCEDURE — 97140 MANUAL THERAPY 1/> REGIONS: CPT

## 2025-03-25 NOTE — PROGRESS NOTES
"Daily Note     Today's date: 3/25/2025  Patient name: Raghav Smith  : 1995  MRN: [id number]  Referring provider: Linda Rao CRNP  Dx:   Encounter Diagnosis     ICD-10-CM    1. Left hip pain  M25.552       2. Chronic left shoulder pain  M25.512     G89.29           Start Time: 1700  Stop Time: 1745  Total time in clinic (min): 45 minutes    Subjective: Patient reports, \"I have very little neck pain. I have 5/10\" low back and anterior L hip pain\".     Objective: See treatment diary below    Assessment: Patient had less pain following therapy. Raghav demonstrates good form during therapy with moderate fatigue. Improved depth during squats today with no pain. Continue to progress as able.     Plan: Continue per plan of care.  Progress treatment as tolerated.       Precautions: Appendectomy 2025, not cleared for full activity until 6 week johnathan    Manuals 3/18 3/20 3/24 3/25                      Prone PA thoracic mobilization gr V   x2          L hip PROM 5' 8' 8' 8'         LLE LAD 3x30\" nv 10x5\" 10x5\"         MET's L hip ext, R hip flexion 10\"x5 ea nv 10x5\" ea 10x5\" ea                      Neuro Re-Ed             Seated c/s retraction             Seated scap retraction             Supine TrA activation             Supine TrA hold march             Supine TrA bridge --> SL bridge when able 3x12 3x12 3x12 3x12         Sidelying TrA hold clamshell RTB 3x12 GTB  3x10 GTB  3x10 GTB  3x10         Supine hip add isometric 20x5\" 20x5\" 20x5\" 20x5\"         TrA SLR 3x8 ea 3x10   ea 3x10   ea 3x10   ea         Mini Squats  2x10 2x10 2x10                      Ther Ex             Rows             TB ER/IR             Small range PPU from elbows                                                                              Ther Activity             Lateral step ups 6\" 2x12 ea 6\"  3x10 ea 6\"  3x10 ea 6\"  3x10 ea                                   Gait Training                                       Modalities          "

## 2025-03-26 ENCOUNTER — SOCIAL WORK (OUTPATIENT)
Dept: BEHAVIORAL/MENTAL HEALTH CLINIC | Facility: CLINIC | Age: 30
End: 2025-03-26
Payer: COMMERCIAL

## 2025-03-26 DIAGNOSIS — F43.10 PTSD (POST-TRAUMATIC STRESS DISORDER): ICD-10-CM

## 2025-03-26 DIAGNOSIS — F60.3 BORDERLINE PERSONALITY DISORDER (HCC): Chronic | ICD-10-CM

## 2025-03-26 DIAGNOSIS — F31.81 BIPOLAR 2 DISORDER (HCC): Primary | ICD-10-CM

## 2025-03-26 PROCEDURE — 90837 PSYTX W PT 60 MINUTES: CPT

## 2025-03-26 NOTE — PSYCH
"Behavioral Health Psychotherapy Progress Note    Psychotherapy Provided: Individual Psychotherapy     1. Bipolar 2 disorder (HCC)        2. Borderline personality disorder (HCC)        3. PTSD (post-traumatic stress disorder)            Goals addressed in session: Goal 1     DATA: Raghav arrived on time for session. Raghav reports to be feeling tired as she started a new job and is trying to get adjusted. Raghav reports that with the start of her new job it has been helping with routine in her life and it has been a way to hold herself accountable.     During this session, this clinician used the following therapeutic modalities: Cognitive Behavioral Therapy and Supportive Psychotherapy    Substance Abuse was not addressed during this session. If the client is diagnosed with a co-occurring substance use disorder, please indicate any changes in the frequency or amount of use: N/A. Stage of change for addressing substance use diagnoses: No substance use/Not applicable    ASSESSMENT:  Raghav Smith presents with a Euthymic/ normal mood.     her affect is Normal range and intensity, which is congruent, with her mood and the content of the session. The client has made progress on their goals.    Raghav Smith presents with a none risk of suicide, none risk of self-harm, and none risk of harm to others.    For any risk assessment that surpasses a \"low\" rating, a safety plan must be developed.    A safety plan was indicated: no  If yes, describe in detail - N/A    PLAN: Between sessions, Raghav Smith will continue working on routine. At the next session, the therapist will use Client-centered Therapy, Cognitive Behavioral Therapy, Motivational Interviewing, Solution-Focused Therapy, and Supportive Psychotherapy to address everyday functioning.    Behavioral Health Treatment Plan and Discharge Planning: Raghav Smith is aware of and agrees to continue to work on their treatment plan. They have " identified and are working toward their discharge goals. yes    Depression Follow-up Plan Completed: Not applicable    Visit start and stop times:    03/26/25  Start Time: 1300  Stop Time: 1400  Total Visit Time: 60 minutes

## 2025-03-27 ENCOUNTER — APPOINTMENT (OUTPATIENT)
Dept: PHYSICAL THERAPY | Facility: CLINIC | Age: 30
End: 2025-03-27
Payer: COMMERCIAL

## 2025-03-29 DIAGNOSIS — F31.81 BIPOLAR 2 DISORDER (HCC): ICD-10-CM

## 2025-03-29 RX ORDER — LAMOTRIGINE 25 MG/1
25 TABLET ORAL DAILY
Qty: 30 TABLET | Refills: 0 | Status: SHIPPED | OUTPATIENT
Start: 2025-03-29

## 2025-03-31 ENCOUNTER — OFFICE VISIT (OUTPATIENT)
Dept: PHYSICAL THERAPY | Facility: CLINIC | Age: 30
End: 2025-03-31
Payer: COMMERCIAL

## 2025-03-31 DIAGNOSIS — M25.512 CHRONIC LEFT SHOULDER PAIN: ICD-10-CM

## 2025-03-31 DIAGNOSIS — M25.552 LEFT HIP PAIN: Primary | ICD-10-CM

## 2025-03-31 DIAGNOSIS — G89.29 CHRONIC LEFT SHOULDER PAIN: ICD-10-CM

## 2025-03-31 PROCEDURE — 97112 NEUROMUSCULAR REEDUCATION: CPT

## 2025-03-31 PROCEDURE — 97530 THERAPEUTIC ACTIVITIES: CPT

## 2025-03-31 NOTE — PROGRESS NOTES
"Daily Note     Today's date: 3/31/2025  Patient name: Raghav Smith  : 1995  MRN: [id number]  Referring provider: Linda Rao CRNP  Dx:   Encounter Diagnosis     ICD-10-CM    1. Left hip pain  M25.552       2. Chronic left shoulder pain  M25.512     G89.29           Start Time: 1700  Stop Time: 1750  Total time in clinic (min): 50 minutes    Subjective: Patient notes her neck feels good. Notes her mid back pain continues. Notes minimal low back pain prior to therapy.     Objective: See treatment diary below    Assessment: Patient is showing steady progress thus far. No increased pain today. Continue to progress.     Plan: Continue per plan of care.  Progress treatment as tolerated.       Precautions: Appendectomy 2025, not cleared for full activity until 6 week johnathan    Manuals 3/18 3/20 3/24 3/25 3/31                     Prone PA thoracic mobilization gr V   x2          L hip PROM 5' 8' 8' 8' 8'        LLE LAD 3x30\" nv 10x5\" 10x5\" 10x5\"        MET's L hip ext, R hip flexion 10\"x5 ea nv 10x5\" ea 10x5\" ea 10x5\" ea                     Neuro Re-Ed             Seated c/s retraction             Seated scap retraction             Supine TrA activation             Supine TrA hold march             Supine TrA bridge --> SL bridge when able 3x12 3x12 3x12 3x12 3x12        Sidelying TrA hold clamshell RTB 3x12 GTB  3x10 GTB  3x10 GTB  3x10 GTB  3x10        Supine hip add isometric 20x5\" 20x5\" 20x5\" 20x5\" 20x5\"        TrA SLR 3x8 ea 3x10   ea 3x10   ea 3x10   ea 3x10   ea        Mini Squats  2x10 2x10 2x10 3x10                     Ther Ex             Rows             TB ER/IR             Small range PPU from elbows                                                                              Ther Activity             Lateral step ups 6\" 2x12 ea 6\"  3x10 ea 6\"  3x10 ea 6\"  3x10 ea 8\"  3x10 ea                                  Gait Training                                       Modalities                            "

## 2025-04-01 ENCOUNTER — SOCIAL WORK (OUTPATIENT)
Dept: BEHAVIORAL/MENTAL HEALTH CLINIC | Facility: CLINIC | Age: 30
End: 2025-04-01
Payer: COMMERCIAL

## 2025-04-01 ENCOUNTER — OFFICE VISIT (OUTPATIENT)
Dept: PHYSICAL THERAPY | Facility: CLINIC | Age: 30
End: 2025-04-01
Payer: COMMERCIAL

## 2025-04-01 DIAGNOSIS — M25.552 LEFT HIP PAIN: Primary | ICD-10-CM

## 2025-04-01 DIAGNOSIS — F31.81 BIPOLAR 2 DISORDER (HCC): Primary | ICD-10-CM

## 2025-04-01 DIAGNOSIS — F43.10 PTSD (POST-TRAUMATIC STRESS DISORDER): ICD-10-CM

## 2025-04-01 DIAGNOSIS — G89.29 CHRONIC LEFT SHOULDER PAIN: ICD-10-CM

## 2025-04-01 DIAGNOSIS — F60.3 BORDERLINE PERSONALITY DISORDER (HCC): Chronic | ICD-10-CM

## 2025-04-01 DIAGNOSIS — M25.512 CHRONIC LEFT SHOULDER PAIN: ICD-10-CM

## 2025-04-01 PROCEDURE — 97112 NEUROMUSCULAR REEDUCATION: CPT

## 2025-04-01 PROCEDURE — 97530 THERAPEUTIC ACTIVITIES: CPT

## 2025-04-01 PROCEDURE — 90837 PSYTX W PT 60 MINUTES: CPT

## 2025-04-01 PROCEDURE — 97140 MANUAL THERAPY 1/> REGIONS: CPT

## 2025-04-01 NOTE — PROGRESS NOTES
"Daily Note     Today's date: 2025  Patient name: Raghav Smith  : 1995  MRN: [id number]  Referring provider: Linda Rao CRNP  Dx:   Encounter Diagnosis     ICD-10-CM    1. Left hip pain  M25.552       2. Chronic left shoulder pain  M25.512     G89.29           Start Time: 1700  Stop Time: 1750  Total time in clinic (min): 50 minutes    Subjective: Patient reports her sacrum and hips feel unstable sometimes. Notes minimal pain prior to therapy.     Objective: See treatment diary below    Assessment: Patient had no adverse effect from yesterdays progressions. Raghav is making steady progress with TE tolerance. However, was unable to progress again today secondary to fatigue with current resistance and reps.     Plan: Continue per plan of care.  Progress treatment as tolerated.       Precautions: Appendectomy 2025, not cleared for full activity until 6 week johnathan    Manuals 3/18 3/20 3/24 3/25 3/31 4/1                    Prone PA thoracic mobilization gr V   x2          L hip PROM 5' 8' 8' 8' 8' 8'       LLE LAD 3x30\" nv 10x5\" 10x5\" 10x5\"        MET's L hip ext, R hip flexion 10\"x5 ea nv 10x5\" ea 10x5\" ea 10x5\" ea 10x5\"  ea                    Neuro Re-Ed             Seated c/s retraction             Seated scap retraction             Supine TrA activation             Supine TrA hold  TrA bridge --> SL bridge when able 3x12 3x12 3x12 3x12 3x12 3x12       Sidelying TrA hold clamshell RTB 3x12 GTB  3x10 GTB  3x10 GTB  3x10 GTB  3x10 GTB  3x10       Supine hip add isometric 20x5\" 20x5\" 20x5\" 20x5\" 20x5\" 20x5\"       TrA SLR 3x8 ea 3x10   ea 3x10   ea 3x10   ea 3x10   ea 3x10 ea       Mini Squats  2x10 2x10 2x10 3x10 3x10                    Ther Ex             Rows             TB ER/IR             Small range PPU from elbows                                                                              Ther Activity             Lateral step ups 6\" 2x12 ea 6\"  3x10 ea 6\"  3x10 ea " "6\"  3x10 ea 8\"  3x10 ea 8\"  3x10 ea                                 Gait Training                                       Modalities                                          "

## 2025-04-01 NOTE — PSYCH
"Behavioral Health Psychotherapy Progress Note    Psychotherapy Provided: Individual Psychotherapy     1. Bipolar 2 disorder (HCC)        2. Borderline personality disorder (HCC)        3. PTSD (post-traumatic stress disorder)            Goals addressed in session: Goal 1     DATA: Raghav reports to be feeling exhausted about having to take care of herself. Raghav reports feels like chore to exist anymore these days. Raghav reports to be feeling depressed and has no escape. Raghav reports having constant racing thoughts and always feels a sense of anger and sadness. Raghav reports being upset and disappointed in herself as she did not to set herself up for success this week. Raghav reports everyday is a fight that she does not want to do because she does not want to feel her emotions. Raghav reports no SI and no plan.    During this session, this clinician used the following therapeutic modalities: Client-centered Therapy and Supportive Psychotherapy    Substance Abuse was not addressed during this session. If the client is diagnosed with a co-occurring substance use disorder, please indicate any changes in the frequency or amount of use: N/A. Stage of change for addressing substance use diagnoses: No substance use/Not applicable    ASSESSMENT:  Raghav Smith presents with a Euthymic/ normal mood.     her affect is Normal range and intensity and Tearful, which is congruent, with her mood and the content of the session. The client has made progress on their goals.    Raghav Smith presents with a none risk of suicide, none risk of self-harm, and none risk of harm to others.    For any risk assessment that surpasses a \"low\" rating, a safety plan must be developed.    A safety plan was indicated: no  If yes, describe in detail - N/A    PLAN: Between sessions, Raghav Smith will continue working on time management and structure. At the next session, the therapist will use " Client-centered Therapy, Cognitive Behavioral Therapy, Motivational Interviewing, and Supportive Psychotherapy to address anxiety.    Behavioral Health Treatment Plan and Discharge Planning: Raghav Smith is aware of and agrees to continue to work on their treatment plan. They have identified and are working toward their discharge goals. yes    Depression Follow-up Plan Completed: Not applicable    Visit start and stop times:    04/01/25  Start Time: 1200  Stop Time: 1300  Total Visit Time: 60 minutes

## 2025-04-03 ENCOUNTER — OFFICE VISIT (OUTPATIENT)
Dept: PSYCHIATRY | Facility: CLINIC | Age: 30
End: 2025-04-03
Payer: COMMERCIAL

## 2025-04-03 DIAGNOSIS — F60.3 BORDERLINE PERSONALITY DISORDER (HCC): Primary | Chronic | ICD-10-CM

## 2025-04-03 DIAGNOSIS — F43.10 PTSD (POST-TRAUMATIC STRESS DISORDER): ICD-10-CM

## 2025-04-03 DIAGNOSIS — F31.81 BIPOLAR 2 DISORDER (HCC): ICD-10-CM

## 2025-04-03 PROCEDURE — 99214 OFFICE O/P EST MOD 30 MIN: CPT | Performed by: PSYCHIATRY & NEUROLOGY

## 2025-04-03 PROCEDURE — 90833 PSYTX W PT W E/M 30 MIN: CPT | Performed by: PSYCHIATRY & NEUROLOGY

## 2025-04-03 RX ORDER — LAMOTRIGINE 100 MG/1
100 TABLET ORAL DAILY
Start: 2025-04-03

## 2025-04-03 NOTE — PSYCH
Visit Time    Visit Start Time: 2:00  Visit Stop Time: 2:30 PM  Total Visit Duration:  30 minutes    Subjective: Medication Management      Patient ID: Raghav Smith is a 29 y.o. female with Bipolar do and CPTSD.    HPI ROS Appetite Changes and Sleep: normal appetite, normal energy level, no weight change, and normal number of sleep hours        Waiting neuropsych testing for dx clarification. Differential include bipolar do vs ADHD, DIANA r/o OCD.    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   She has concerns about her supervisor due to certain comments that seem to be discriminatory.  She is now working full time and she remains on weekly therapy and twice week PT and one client for cleaning.    She is afraid she might lose her home due to not being able to pay her mortgage.   She stated her internet was cut off.   She remains on Lamictal and has managed to build her dose to 100 mg po qam   Her sleep is still difficult as it is hard to sleep without waking up in the middle of the night.  She stated she has anxiety and raising thoughts.   Plans to resume Trazodone 50 mg po qhs (own supplies)  Schedule follow up in 4-6 weeks.    Review Of Systems:     Mood Emotional Lability   Behavior Impulsive Behavior   Thought Content Disturbing Thoughts, Feelings   General Emotional Problems and Decreased Functioning   Personality Change in Personality   Other Psych Symptoms Normal   Constitutional Negative   ENT Negative   Cardiovascular Negative   Respiratory Negative   Gastrointestinal As Noted in HPI   Genitourinary Negative   Musculoskeletal Negative   Integumentary Negative   Neurological Negative   Endocrine Normal    Other Symptoms Normal        Laboratory Results: Recent Labs (last 6 months):   Admission on 01/06/2025, Discharged on 01/08/2025   Component Date Value    Ventricular Rate 01/06/2025 92     Atrial Rate 01/06/2025 92     SC Interval  01/06/2025 138     QRSD Interval 01/06/2025 78     QT Interval 01/06/2025 340     QTC Interval 01/06/2025 421     P Axis 01/06/2025 29     QRS Axis 01/06/2025 52     T Wave Douglas 01/06/2025 28     WBC 01/06/2025 15.11 (H)     RBC 01/06/2025 4.35     Hemoglobin 01/06/2025 12.8     Hematocrit 01/06/2025 39.2     MCV 01/06/2025 90     MCH 01/06/2025 29.4     MCHC 01/06/2025 32.7     RDW 01/06/2025 13.1     MPV 01/06/2025 9.3     Platelets 01/06/2025 307     nRBC 01/06/2025 0     Segmented % 01/06/2025 81 (H)     Immature Grans % 01/06/2025 0     Lymphocytes % 01/06/2025 12 (L)     Monocytes % 01/06/2025 7     Eosinophils Relative 01/06/2025 0     Basophils Relative 01/06/2025 0     Absolute Neutrophils 01/06/2025 12.02 (H)     Absolute Immature Grans 01/06/2025 0.06     Absolute Lymphocytes 01/06/2025 1.87     Absolute Monocytes 01/06/2025 1.06     Eosinophils Absolute 01/06/2025 0.05     Basophils Absolute 01/06/2025 0.05     Sodium 01/06/2025 137     Potassium 01/06/2025 4.1     Chloride 01/06/2025 103     CO2 01/06/2025 27     ANION GAP 01/06/2025 7     BUN 01/06/2025 12     Creatinine 01/06/2025 0.70     Glucose 01/06/2025 104     Calcium 01/06/2025 9.7     eGFR 01/06/2025 117     Color, UA 01/06/2025 Yellow     Clarity, UA 01/06/2025 Extra Turbid     Specific Gravity, UA 01/06/2025 1.016     pH, UA 01/06/2025 8.5 (A)     Leukocytes, UA 01/06/2025 Negative     Nitrite, UA 01/06/2025 Negative     Protein, UA 01/06/2025 Trace (A)     Glucose, UA 01/06/2025 Negative     Ketones, UA 01/06/2025 Negative     Urobilinogen, UA 01/06/2025 <2.0     Bilirubin, UA 01/06/2025 Negative     Occult Blood, UA 01/06/2025 Negative     EXT Preg Test, Ur 01/06/2025 Negative     Control 01/06/2025 Valid     RBC, UA 01/06/2025 None Seen     WBC, UA 01/06/2025 None Seen     Epithelial Cells 01/06/2025 Occasional     Bacteria, UA 01/06/2025 Occasional     MUCUS THREADS 01/06/2025 Occasional (A)     Amorphous Crystals, UA 01/06/2025 Moderate      Platelets 01/07/2025 289     MPV 01/07/2025 9.6     Case Report 01/07/2025                      Value:Surgical Pathology Report                         Case: [id number]                                  Authorizing Provider:  Herminio Broussard,   Collected:           01/07/2025 1559                                     DO                                                                           Ordering Location:     [facility]      Received:            01/07/2025 1826                                     Hospital Operating Room                                                      Pathologist:           Keny Phoenix MD                                                          Specimen:    Appendix                                                                                   Final Diagnosis 01/07/2025                      Value:A.  Appendix, appendectomy:    -   Appendix with changes suggestive of early/evolving acute appendicitis.        Additional Information 01/07/2025                      Value:All reported additional testing was performed with appropriately reactive controls.  These tests were developed and their performance characteristics determined by [facility] or appropriate performing facility, though some tests may be performed on tissues which have not been validated for performance characteristics (such as staining performed on alcohol exposed cell blocks and decalcified tissues).  Results should be interpreted with caution and in the context of the patients’ clinical condition. These tests may not be cleared or approved by the U.S. Food and Drug Administration, though the FDA has determined that such clearance or approval is not necessary. These tests are used for clinical purposes and they should not be regarded as investigational or for research. This laboratory has been approved by CLIA 88, designated as a high-complexity laboratory and is qualified to  "perform these tests.  .Interpretation performed at [facility]  [address] [place] [address]        Gross Description 01/07/2025                      Value:A. The specimen is received in formalin, labeled with the patient's name and hospital number, and is designated \" appendix\".    The specimen consists of a vermiform appendix measuring 7.4 cm in length  0.7 cm diameter within attached mesoappendix measuring the length of the appendix and extending 2.2 cm.  The serosa is tan-purple, smooth and does not exhibit any transmural defects.  The distal tip is longitudinally sectioned revealing possible diverticulum outpouching.  The margin of resection is inked blue.  The proximal portion of the specimen is serially section revealing a a pinpoint lumen dilated up to 0.4 cm containing fecalith and hemorrhagic like material.  The appendiceal wall measures up to 0.2 cm in thickness.  There are no additional findings.  Representative sections. Two cassettes.    Note: The estimated total formalin fixation time based upon information provided by the submitting clinician and the standard processing schedule is under 72 hours.      [place]     Appointment on 12/10/2024   Component Date Value    Sodium 12/10/2024 138     Potassium 12/10/2024 4.7     Chloride 12/10/2024 104     CO2 12/10/2024 26     ANION GAP 12/10/2024 8     BUN 12/10/2024 18     Creatinine 12/10/2024 0.70     Glucose, Fasting 12/10/2024 83     Calcium 12/10/2024 9.4     AST 12/10/2024 15     ALT 12/10/2024 22     Alkaline Phosphatase 12/10/2024 57     Total Protein 12/10/2024 7.1     Albumin 12/10/2024 4.5     Total Bilirubin 12/10/2024 0.43     eGFR 12/10/2024 117     WBC 12/10/2024 7.42     RBC 12/10/2024 4.47     Hemoglobin 12/10/2024 13.3     Hematocrit 12/10/2024 41.5     MCV 12/10/2024 93     MCH 12/10/2024 29.8     MCHC 12/10/2024 32.0     RDW 12/10/2024 13.2     MPV 12/10/2024 9.7     Platelets 12/10/2024 327     " nRBC 12/10/2024 0     Segmented % 12/10/2024 67     Immature Grans % 12/10/2024 0     Lymphocytes % 12/10/2024 24     Monocytes % 12/10/2024 7     Eosinophils Relative 12/10/2024 1     Basophils Relative 12/10/2024 1     Absolute Neutrophils 12/10/2024 4.98     Absolute Immature Grans 12/10/2024 0.02     Absolute Lymphocytes 12/10/2024 1.77     Absolute Monocytes 12/10/2024 0.55     Eosinophils Absolute 12/10/2024 0.04     Basophils Absolute 12/10/2024 0.06     TSH 3RD GENERATON 12/10/2024 1.520     Cholesterol 12/10/2024 190     Triglycerides 12/10/2024 66     HDL, Direct 12/10/2024 50     LDL Calculated 12/10/2024 127 (H)     Vit D, 25-Hydroxy 12/10/2024 17.0 (L)          Substance Abuse History:  Social History     Substance and Sexual Activity   Drug Use Not Currently    Types: Marijuana    Comment: reports medical marijuana use almost every day       Family Psychiatric History:   Family History   Problem Relation Age of Onset    Mental illness Mother     Hypertension Mother     ADD / ADHD Mother     Anxiety disorder Mother     Vitamin D deficiency Father     Prostate cancer Father     OCD Father     Diabetes Maternal Grandmother     Schizophrenia Paternal Uncle     Suicide Attempts Neg Hx     Completed Suicide  Neg Hx        The following portions of the patient's history were reviewed and updated as appropriate: allergies, current medications, past family history, past medical history, past social history, past surgical history, and problem list.    Social History     Socioeconomic History    Marital status: /Civil Union     Spouse name: Not on file    Number of children: 0    Years of education: Not on file    Highest education level: Not on file   Occupational History    Not on file   Tobacco Use    Smoking status: Never    Smokeless tobacco: Never    Tobacco comments:     was a social smoker   Vaping Use    Vaping status: Never Used   Substance and Sexual Activity    Alcohol use: Not Currently      Alcohol/week: 1.0 - 2.0 standard drink of alcohol     Types: 1 - 2 Cans of beer per week     Comment: once per week    Drug use: Not Currently     Types: Marijuana     Comment: reports medical marijuana use almost every day    Sexual activity: Not Currently     Partners: Male, Female   Other Topics Concern    Not on file   Social History Narrative    Not on file     Social Drivers of Health     Financial Resource Strain: Low Risk  (7/28/2020)    Overall Financial Resource Strain (CARDIA)     Difficulty of Paying Living Expenses: Not hard at all   Food Insecurity: No Food Insecurity (1/7/2025)    Nursing - Inadequate Food Risk Classification     Worried About Running Out of Food in the Last Year: Not on file     Ran Out of Food in the Last Year: Not on file     Ran Out of Food in the Last Year: Never true   Transportation Needs: No Transportation Needs (1/7/2025)    Nursing - Transportation Risk Classification     Lack of Transportation: Not on file     Lack of Transportation: No   Physical Activity: Inactive (9/30/2019)    Exercise Vital Sign     Days of Exercise per Week: 0 days     Minutes of Exercise per Session: 0 min   Stress: Stress Concern Present (9/30/2019)    [employer] [place] [place] [employer] - Occupational Stress Questionnaire     Feeling of Stress : To some extent   Social Connections: Unknown (9/30/2019)    Social Connection and Isolation Panel [NHANES]     Frequency of Communication with Friends and Family: Patient declined     Frequency of Social Gatherings with Friends and Family: Patient declined     Attends Zoroastrian Services: Patient declined     Active Member of Clubs or Organizations: Patient declined     Attends Club or Organization Meetings: Patient declined     Marital Status: Patient declined   Intimate Partner Violence: Unknown (1/7/2025)    Nursing IPS     Feels Physically and Emotionally Safe: Not on file     Physically Hurt by Someone: Not on file     Humiliated or Emotionally  Abused by Someone: Not on file     Physically Hurt by Someone: No     Hurt or Threatened by Someone: No   Housing Stability: Unknown (2025)    Nursing: Inadequate Housing Risk Classification     Has Housing: Not on file     Worried About Losing Housing: Not on file     Unable to Get Utilities: Not on file     Unable to Pay for Housing in the Last Year: No     Has Housin     Social History     Social History Narrative    Not on file       Objective:     Mental status:  Appearance calm and cooperative , adequate hygiene and grooming, and good eye contact    Mood dysphoric   Affect affect was constricted   Speech a normal rate and fluent   Thought Processes coherent/organized and normal thought processes   Hallucinations no hallucinations present    Thought Content no delusions   Abnormal Thoughts no suicidal thoughts  and no homicidal thoughts    Orientation  oriented to person and place and time   Remote Memory short term memory intact and long term memory intact   Attention Span concentration impaired   Intellect Appears to be of Average Intelligence   Insight Limited insight   Judgement judgment was limited   Muscle Strength Muscle strength and tone were normal and Normal gait    Language no difficulty naming common objects and no difficulty repeating a phrase    Fund of Knowledge displays adequate knowledge of current events, adequate fund of knowledge regarding past history, and adequate fund of knowledge regarding vocabulary                Assessment/Plan:       Diagnoses and all orders for this visit:    Borderline personality disorder (HCC)    Bipolar 2 disorder (HCC)  -     lamoTRIgine (LaMICtal) 100 mg tablet; Take 1 tablet (100 mg total) by mouth daily    PTSD (post-traumatic stress disorder)            Assessment & Plan  Bipolar 2 disorder (HCC)    Orders:    lamoTRIgine (LaMICtal) 100 mg tablet; Take 1 tablet (100 mg total) by mouth daily    Borderline personality disorder (HCC)         PTSD  (post-traumatic stress disorder)            Treatment Recommendations- Risks Benefits      Immediate Medical/Psychiatric/Psychotherapy Treatments and Any Precautions: as sated on HPI    Risks, Benefits And Possible Side Effects Of Medications:  {PSYCH RISK, BENEFITS AND POSSIBLE SIDE EFFECTS (Optional):18267    Controlled Medication Discussion: n/a    Psychotherapy Provided: Individual psychotherapy provided.       Psychotherapy Provided:     Individual psychotherapy provided: Yes  Counseling was provided during the session today for 16 minutes.  Medications, treatment progress and treatment plan reviewed with Raghav.  Medication education provided to Raghav.  Goals discussed during in session: continue improvement in mood stability.   Coping strategies including compliance with medications and contacting a therapist reviewed with Raghav.

## 2025-04-07 ENCOUNTER — OFFICE VISIT (OUTPATIENT)
Dept: PHYSICAL THERAPY | Facility: CLINIC | Age: 30
End: 2025-04-07
Payer: COMMERCIAL

## 2025-04-07 DIAGNOSIS — M25.552 LEFT HIP PAIN: Primary | ICD-10-CM

## 2025-04-07 DIAGNOSIS — G89.29 CHRONIC LEFT SHOULDER PAIN: ICD-10-CM

## 2025-04-07 DIAGNOSIS — M25.512 CHRONIC LEFT SHOULDER PAIN: ICD-10-CM

## 2025-04-07 PROCEDURE — 97140 MANUAL THERAPY 1/> REGIONS: CPT

## 2025-04-07 PROCEDURE — 97530 THERAPEUTIC ACTIVITIES: CPT

## 2025-04-07 PROCEDURE — 97112 NEUROMUSCULAR REEDUCATION: CPT

## 2025-04-07 NOTE — PROGRESS NOTES
"Daily Note     Today's date: 2025  Patient name: Raghav Smith  : 1995  MRN: [id number]  Referring provider: Linda Rao CRNP  Dx:   Encounter Diagnosis     ICD-10-CM    1. Left hip pain  M25.552       2. Chronic left shoulder pain  M25.512     G89.29           Start Time: 1700  Stop Time: 1750  Total time in clinic (min): 50 minutes    Subjective: Patient notes having steady improvement with therapy.     Objective: See treatment diary below    Assessment: Raghav did really well today and was able to add resistance to squats with no pain. Patient had minor soreness following exercises. Progressing well with therapy.     Plan: Continue per plan of care.  Progress treatment as tolerated.       Precautions: Appendectomy 2025, not cleared for full activity until 6 week johnathan    Manuals 3/18 3/20 3/24 3/25 3/31 4/1 4/7                   Prone PA thoracic mobilization gr V   x2          L hip PROM 5' 8' 8' 8' 8' 8' 8'      LLE LAD 3x30\" nv 10x5\" 10x5\" 10x5\"        MET's L hip ext, R hip flexion 10\"x5 ea nv 10x5\" ea 10x5\" ea 10x5\" ea 10x5\"  ea 10x5\"  ea                   Neuro Re-Ed             Seated c/s retraction             Seated scap retraction             Supine TrA activation             Supine TrA hold march             Supine TrA bridge --> SL bridge when able 3x12 3x12 3x12 3x12 3x12 3x12 3x15      Sidelying TrA hold clamshell RTB 3x12 GTB  3x10 GTB  3x10 GTB  3x10 GTB  3x10 GTB  3x10 GTB  3x12      Supine hip add isometric 20x5\" 20x5\" 20x5\" 20x5\" 20x5\" 20x5\" 30x5\"      TrA SLR 3x8 ea 3x10   ea 3x10   ea 3x10   ea 3x10   ea 3x10 ea 3x12 ea      Mini Squats  2x10 2x10 2x10 3x10 3x10 1x10    5# KB  1x10    7.5# KB  1x10                     Ther Ex             Rows             TB ER/IR             Small range PPU from elbows                                                                              Ther Activity             Lateral step ups 6\" 2x12 ea 6\"  3x10 ea 6\"  3x10 ea 6\"  3x10 ea " "8\"  3x10 ea 8\"  3x10 ea 8\"  3x10 ea                                Gait Training                                       Modalities                                          "

## 2025-04-08 ENCOUNTER — OFFICE VISIT (OUTPATIENT)
Dept: PHYSICAL THERAPY | Facility: CLINIC | Age: 30
End: 2025-04-08
Payer: COMMERCIAL

## 2025-04-08 DIAGNOSIS — M25.512 CHRONIC LEFT SHOULDER PAIN: ICD-10-CM

## 2025-04-08 DIAGNOSIS — G89.29 CHRONIC LEFT SHOULDER PAIN: ICD-10-CM

## 2025-04-08 DIAGNOSIS — M25.552 LEFT HIP PAIN: Primary | ICD-10-CM

## 2025-04-08 PROCEDURE — 97140 MANUAL THERAPY 1/> REGIONS: CPT

## 2025-04-08 PROCEDURE — 97112 NEUROMUSCULAR REEDUCATION: CPT

## 2025-04-08 NOTE — PROGRESS NOTES
"Daily Note     Today's date: 2025  Patient name: Raghav Smith  : 1995  MRN: [id number]  Referring provider: Linda Rao CRNP  Dx:   Encounter Diagnosis     ICD-10-CM    1. Left hip pain  M25.552       2. Chronic left shoulder pain  M25.512     G89.29           Start Time: 1700  Stop Time: 1750  Total time in clinic (min): 50 minutes    Subjective: Patient reports soreness following last visit but minimal pain.    Objective: See treatment diary below    Assessment: Raghav is making good progress with lumbar stability and LE strength. Will continue to progress each visit as able to return to prior level of function.     Plan: Continue per plan of care.  Progress treatment as tolerated.       Precautions: Appendectomy 2025, not cleared for full activity until 6 week johnathan    Manuals 3/18 3/20 3/24 3/25 3/31 4/1 4/7 4/8                  Prone PA thoracic mobilization gr V   x2          L hip PROM 5' 8' 8' 8' 8' 8' 8' 8'     LLE LAD 3x30\" nv 10x5\" 10x5\" 10x5\"        MET's L hip ext, R hip flexion 10\"x5 ea nv 10x5\" ea 10x5\" ea 10x5\" ea 10x5\"  ea 10x5\"  ea PRN                  Neuro Re-Ed             Seated c/s retraction             Seated scap retraction             Supine TrA activation             Supine TrA hold march             Supine TrA bridge --> SL bridge when able 3x12 3x12 3x12 3x12 3x12 3x12 3x15 3x15     Sidelying TrA hold clamshell RTB 3x12 GTB  3x10 GTB  3x10 GTB  3x10 GTB  3x10 GTB  3x10 GTB  3x12 GTB  3x12     Supine hip add isometric 20x5\" 20x5\" 20x5\" 20x5\" 20x5\" 20x5\" 30x5\" 30x5\"     TrA SLR 3x8 ea 3x10   ea 3x10   ea 3x10   ea 3x10   ea 3x10 ea 3x12 ea 3x12 ea     Mini Squats  2x10 2x10 2x10 3x10 3x10 1x10    5# KB  1x10    7.5# KB  1x10   1x10    5# KB  1x10    7.5# KB  1x10                  Ther Ex             Rows             TB ER/IR             Small range PPU from elbows                                                                              Ther Activity           " "  Lateral step ups 6\" 2x12 ea 6\"  3x10 ea 6\"  3x10 ea 6\"  3x10 ea 8\"  3x10 ea 8\"  3x10 ea 8\"  3x10 ea 8\"  3x10 ea                               Gait Training                                       Modalities                                          "

## 2025-04-09 ENCOUNTER — SOCIAL WORK (OUTPATIENT)
Dept: BEHAVIORAL/MENTAL HEALTH CLINIC | Facility: CLINIC | Age: 30
End: 2025-04-09
Payer: COMMERCIAL

## 2025-04-09 DIAGNOSIS — F43.10 PTSD (POST-TRAUMATIC STRESS DISORDER): ICD-10-CM

## 2025-04-09 DIAGNOSIS — F31.81 BIPOLAR 2 DISORDER (HCC): Primary | ICD-10-CM

## 2025-04-09 DIAGNOSIS — F60.3 BORDERLINE PERSONALITY DISORDER (HCC): Chronic | ICD-10-CM

## 2025-04-09 PROCEDURE — 90837 PSYTX W PT 60 MINUTES: CPT

## 2025-04-09 NOTE — PSYCH
"Behavioral Health Psychotherapy Progress Note    Psychotherapy Provided: Individual Psychotherapy     1. Bipolar 2 disorder (HCC)        2. Borderline personality disorder (HCC)        3. PTSD (post-traumatic stress disorder)            Goals addressed in session: Goal 1     DATA: Reports to have been having weird dreams over the past couple of nights.  Reports that she has been increasing her intake of melatonin but is still taking her psychiatric medications as prescribed.  Reports to still not have made any progress on her house chores and tasks and states that asking for help feels like she is being a burden to those around her.  Reports always looking for an escape from the things that she needs to do but realizes that she still needs to work on herself to get a lot done.  Reports wanting to be in a submissive state to someone else but struggles with what perfectionism means to her as that stems from her mother.    During this session, this clinician used the following therapeutic modalities: Client-centered Therapy and Supportive Psychotherapy    Substance Abuse was not addressed during this session. If the client is diagnosed with a co-occurring substance use disorder, please indicate any changes in the frequency or amount of use: N/A. Stage of change for addressing substance use diagnoses: No substance use/Not applicable    ASSESSMENT:  Raghav Smith presents with a Euthymic/ normal mood.     her affect is Normal range and intensity, which is congruent, with her mood and the content of the session. The client has not made progress on their goals.    Raghav Smith presents with a none risk of suicide, none risk of self-harm, and none risk of harm to others.    For any risk assessment that surpasses a \"low\" rating, a safety plan must be developed.    A safety plan was indicated: no  If yes, describe in detail - N/A    PLAN: Between sessions, Raghav Smith will define what " perfection/perfectionism means to her. At the next session, the therapist will use Client-centered Therapy, Cognitive Behavioral Therapy, Mindfulness-based Strategies, Motivational Interviewing, and Supportive Psychotherapy to address anxiety and over thinking.    Behavioral Health Treatment Plan and Discharge Planning: Raghav Smith is aware of and agrees to continue to work on their treatment plan. They have identified and are working toward their discharge goals. yes    Depression Follow-up Plan Completed: Not applicable    Visit start and stop times:    04/09/25  Start Time: 1300  Stop Time: 1400  Total Visit Time: 60 minutes

## 2025-04-14 ENCOUNTER — OFFICE VISIT (OUTPATIENT)
Dept: PHYSICAL THERAPY | Facility: CLINIC | Age: 30
End: 2025-04-14
Payer: COMMERCIAL

## 2025-04-14 DIAGNOSIS — M25.552 LEFT HIP PAIN: Primary | ICD-10-CM

## 2025-04-14 DIAGNOSIS — G89.29 CHRONIC LEFT SHOULDER PAIN: ICD-10-CM

## 2025-04-14 DIAGNOSIS — M25.512 CHRONIC LEFT SHOULDER PAIN: ICD-10-CM

## 2025-04-14 PROCEDURE — 97530 THERAPEUTIC ACTIVITIES: CPT

## 2025-04-14 PROCEDURE — 97110 THERAPEUTIC EXERCISES: CPT

## 2025-04-14 PROCEDURE — 97140 MANUAL THERAPY 1/> REGIONS: CPT

## 2025-04-14 NOTE — PROGRESS NOTES
"Daily Note     Today's date: 2025  Patient name: Vipul Smith  : 1995  MRN: [id number]  Referring provider: Linda Rao CRNP  Dx:   Encounter Diagnosis     ICD-10-CM    1. Left hip pain  M25.552       2. Chronic left shoulder pain  M25.512     G89.29                        Subjective: Patient reports feeling like she is almost back to the level she was at prior to her appendix surgery.     Objective: See treatment diary below    Assessment: Pain with hip adduction initially, then improved follow other stabilization exercises, and vipul was able to finish all reps with adduction. Good stability noted with functional mobility.     Plan: Continue per plan of care.  Progress treatment as tolerated.       Precautions: Appendectomy 2025, not cleared for full activity until 6 week johnathan    Manuals 3/18 3/20 3/24 3/25 3/31 4/1 4/7 4/8 4/14                 Prone PA thoracic mobilization gr V   x2          L hip PROM 5' 8' 8' 8' 8' 8' 8' 8' 8'    LLE LAD 3x30\" nv 10x5\" 10x5\" 10x5\"        MET's L hip ext, R hip flexion 10\"x5 ea nv 10x5\" ea 10x5\" ea 10x5\" ea 10x5\"  ea 10x5\"  ea PRN 10x5\"  ea                 Neuro Re-Ed             Seated c/s retraction             Seated scap retraction             Supine TrA activation             Supine TrA hold march             Supine TrA bridge --> SL bridge when able 3x12 3x12 3x12 3x12 3x12 3x12 3x15 3x15 3x15    Sidelying TrA hold clamshell RTB 3x12 GTB  3x10 GTB  3x10 GTB  3x10 GTB  3x10 GTB  3x10 GTB  3x12 GTB  3x12 GTB  3x12    Supine hip add isometric 20x5\" 20x5\" 20x5\" 20x5\" 20x5\" 20x5\" 30x5\" 30x5\" 30x5\"    TrA SLR 3x8 ea 3x10   ea 3x10   ea 3x10   ea 3x10   ea 3x10 ea 3x12 ea 3x12 ea 3x12 ea    Mini Squats  2x10 2x10 2x10 3x10 3x10 1x10    5# KB  1x10    7.5# KB  1x10   1x10    5# KB  1x10    7.5# KB  1x10 1x10    5# KB  1x10    7.5# KB  1x10                 Ther Ex             Rows             TB ER/IR             Small range PPU from elbows               " "                                                               Ther Activity             Lateral step ups 6\" 2x12 ea 6\"  3x10 ea 6\"  3x10 ea 6\"  3x10 ea 8\"  3x10 ea 8\"  3x10 ea 8\"  3x10 ea 8\"  3x10 ea 8\"  3x10 ea                              Gait Training                                       Modalities                                          "

## 2025-04-14 NOTE — PROGRESS NOTES
"Daily Note     Today's date: 2025  Patient name: Raghav Smith  : 1995  MRN: [id number]  Referring provider: Linda Rao CRNP  Dx:   Encounter Diagnosis     ICD-10-CM    1. Left hip pain  M25.552       2. Chronic left shoulder pain  M25.512     G89.29                      Subjective: Pt reports some soreness in her core from last visit. Different from usual DOMS. Like she can tell it's still recovering from surgery.       Objective: See treatment diary below      Assessment: Did not progressed based on response to last visit but was able to add additional manuals for pain modulation.       Plan: Continue per plan of care.  Progress treatment as tolerated.       Precautions: Appendectomy 2025, not cleared for full activity until 6 week johnathan      Manuals            Sidelying L scap mobs gr II  3x10 ea           Seated L cervical paraspinal LUT graston 5' 5'           L hip PROM  GENTLE 4'           LLE LAD  3x30\"           Neuro Re-Ed             Seated c/s retraction 3x10 3x10           Seated scap retraction             Supine TrA activation 20x5\" 20x5\"           Supine TrA hold march 2x20 2x20           Supine TrA bridge --> SL bridge when able 1x10 3x10           Sidelying TrA hold clamshell   RTB 3x10?                       Ther Ex             Rows             TB ER/IR             Small range PPU from elbows 2x10 2x10                                                                            Ther Activity                                       Gait Training                                       Modalities                                            " Droplet+Contact precautions

## 2025-04-15 ENCOUNTER — SOCIAL WORK (OUTPATIENT)
Dept: BEHAVIORAL/MENTAL HEALTH CLINIC | Facility: CLINIC | Age: 30
End: 2025-04-15
Payer: COMMERCIAL

## 2025-04-15 ENCOUNTER — OFFICE VISIT (OUTPATIENT)
Dept: PHYSICAL THERAPY | Facility: CLINIC | Age: 30
End: 2025-04-15
Payer: COMMERCIAL

## 2025-04-15 DIAGNOSIS — G89.29 CHRONIC LEFT SHOULDER PAIN: ICD-10-CM

## 2025-04-15 DIAGNOSIS — F60.3 BORDERLINE PERSONALITY DISORDER (HCC): Chronic | ICD-10-CM

## 2025-04-15 DIAGNOSIS — F31.81 BIPOLAR 2 DISORDER (HCC): Primary | ICD-10-CM

## 2025-04-15 DIAGNOSIS — M25.552 LEFT HIP PAIN: Primary | ICD-10-CM

## 2025-04-15 DIAGNOSIS — F43.10 PTSD (POST-TRAUMATIC STRESS DISORDER): ICD-10-CM

## 2025-04-15 DIAGNOSIS — M25.512 CHRONIC LEFT SHOULDER PAIN: ICD-10-CM

## 2025-04-15 PROCEDURE — 97140 MANUAL THERAPY 1/> REGIONS: CPT

## 2025-04-15 PROCEDURE — 90837 PSYTX W PT 60 MINUTES: CPT

## 2025-04-15 PROCEDURE — 97112 NEUROMUSCULAR REEDUCATION: CPT

## 2025-04-15 NOTE — PSYCH
"Behavioral Health Psychotherapy Progress Note    Psychotherapy Provided: Individual Psychotherapy     1. Bipolar 2 disorder (HCC)        2. Borderline personality disorder (HCC)        3. PTSD (post-traumatic stress disorder)            Goals addressed in session: Goal 1     DATA: Raghav arrived on time for session. Raghav completed assigned homework and was able to define what perfection means to her. Raghav reports to be trying to come up with a plan on how to get her daily tasks done that way she can live a normal life. Raghav and Clinician explored why she deflects from her priorities and that affects her on a daily.     During this session, this clinician used the following therapeutic modalities: Client-centered Therapy and Supportive Psychotherapy    Substance Abuse was not addressed during this session. If the client is diagnosed with a co-occurring substance use disorder, please indicate any changes in the frequency or amount of use: N/A. Stage of change for addressing substance use diagnoses: No substance use/Not applicable    ASSESSMENT:  Raghav Smith presents with a Euthymic/ normal mood.     her affect is Normal range and intensity, which is congruent, with her mood and the content of the session. The client has not made progress on their goals.    Raghav Smith presents with a none risk of suicide, none risk of self-harm, and none risk of harm to others.    For any risk assessment that surpasses a \"low\" rating, a safety plan must be developed.    A safety plan was indicated: no  If yes, describe in detail - N/A    PLAN: Between sessions, Raghav Smith will prioritize responsibilities. At the next session, the therapist will use Client-centered Therapy, Cognitive Processing Therapy, Motivational Interviewing, Solution-Focused Therapy, and Supportive Psychotherapy to address routine making.    Behavioral Health Treatment Plan and Discharge Planning: Raghav Smith is aware " of and agrees to continue to work on their treatment plan. They have identified and are working toward their discharge goals. yes    Depression Follow-up Plan Completed: Not applicable    Visit start and stop times:    04/15/25  Start Time: 1200  Stop Time: 1300  Total Visit Time: 60 minutes

## 2025-04-15 NOTE — PROGRESS NOTES
"Daily Note     Today's date: 4/15/2025  Patient name: Raghav Smith  : 1995  MRN: [id number]  Referring provider: Linda Rao CRNP  Dx:   Encounter Diagnosis     ICD-10-CM    1. Left hip pain  M25.552       2. Chronic left shoulder pain  M25.512     G89.29             Start Time: 1700  Stop Time: 1750  Total time in clinic (min): 50 minutes    Subjective: Patient reports minimal pain prior to therapy.     Objective: See treatment diary below    Assessment: Patient demonstrates improved hip stability and PROM. She is responding well to the current PT POC. Progress as able.     Plan: Continue per plan of care.  Progress treatment as tolerated.       Precautions: Appendectomy 2025, not cleared for full activity until 6 week johnathan    Manuals       4/7 4/8 4/14 4/15                Prone PA thoracic mobilization gr V             L hip PROM       8' 8' 8' 8'   LLE LAD             MET's L hip ext, R hip flexion       10x5\"  ea PRN 10x5\"  ea 10x5\" ea                Neuro Re-Ed             Seated c/s retraction             Seated scap retraction             Supine TrA activation             Supine TrA hold march             Supine TrA bridge --> SL bridge when able       3x15 3x15 3x15 3x15   Sidelying TrA hold clamshell       GTB  3x12 GTB  3x12 GTB  3x12 GTB     Supine hip add isometric       30x5\" 30x5\" 30x5\" 30x5\"   TrA SLR       3x12 ea 3x12 ea 3x12 ea 3x15 ea   Mini Squats       1x10    5# KB  1x10    7.5# KB  1x10   1x10    5# KB  1x10    7.5# KB  1x10 1x10    5# KB  1x10    7.5# KB  1x10 1x10    5# KB  1x10    7.5# KB  1x10    10# KB  1x10                Ther Ex             Rows             TB ER/IR             Small range PPU from elbows                                                                              Ther Activity             Lateral step ups Foam      8\"  3x10 ea 8\"  3x10 ea 8\"  3x10 ea 8\"  3x10 ea                             Gait Training                                       Modalities "

## 2025-04-18 ENCOUNTER — TELEPHONE (OUTPATIENT)
Age: 30
End: 2025-04-18

## 2025-04-18 NOTE — TELEPHONE ENCOUNTER
Writer spoke to patient in regards to scheduling an appointment off the wait list. Patient was scheduled for 5/14/2025 @ 2:00 pm for a virtual visit for the intake.     Writer will get authorization prior to appointment. Writer will update this encounter when authorization information is received.     Writer then removed patient from the wait list due to being scheduled.     Testing for clarification of DX and ASD

## 2025-04-21 ENCOUNTER — OFFICE VISIT (OUTPATIENT)
Dept: PHYSICAL THERAPY | Facility: CLINIC | Age: 30
End: 2025-04-21
Payer: COMMERCIAL

## 2025-04-21 DIAGNOSIS — M25.552 LEFT HIP PAIN: Primary | ICD-10-CM

## 2025-04-21 DIAGNOSIS — M25.512 CHRONIC LEFT SHOULDER PAIN: ICD-10-CM

## 2025-04-21 DIAGNOSIS — G89.29 CHRONIC LEFT SHOULDER PAIN: ICD-10-CM

## 2025-04-21 PROCEDURE — 97112 NEUROMUSCULAR REEDUCATION: CPT

## 2025-04-21 PROCEDURE — 97110 THERAPEUTIC EXERCISES: CPT

## 2025-04-21 NOTE — PROGRESS NOTES
"Daily Note     Today's date: 2025  Patient name: Raghav Smith  : 1995  MRN: [id number]  Referring provider: Linda Rao CRNP  Dx:   Encounter Diagnosis     ICD-10-CM    1. Left hip pain  M25.552       2. Chronic left shoulder pain  M25.512     G89.29                        Subjective: Patient reports she was able to go for a hike over th weekend and the biggest problem she had was shoulder pain. Thinks it could have been from carrying the backpack.     Objective: See treatment diary below    Assessment: Resumed some shoulder exercises. Good tolerance. Continue to progress as able.     Plan: Continue per plan of care.  Progress treatment as tolerated.       Precautions: Appendectomy 2025, not cleared for full activity until 6 week johnathan    Manuals 4/21      4/7 4/8 4/14 4/15                Prone PA thoracic mobilization gr V             L hip PROM 8'      8' 8' 8' 8'   LLE LAD             MET's L hip ext, R hip flexion 10x5\" ea      10x5\"  ea PRN 10x5\"  ea 10x5\" ea                Neuro Re-Ed             Seated c/s retraction HEP edu 1x10 prn            Supine TrA bridge --> SL bridge when able 3x15      3x15 3x15 3x15 3x15   Sidelying TrA hold clamshell Blue 3x12      GTB  3x12 GTB  3x12 GTB  3x12 GTB     Supine hip add isometric 30x5\"      30x5\" 30x5\" 30x5\" 30x5\"   TrA SLR 3x15 ea      3x12 ea 3x12 ea 3x12 ea 3x15 ea   Mini Squats 1x10    5# KB  1x10    7.5# KB  1x10    10# KB  1x10      1x10    5# KB  1x10    7.5# KB  1x10   1x10    5# KB  1x10    7.5# KB  1x10 1x10    5# KB  1x10    7.5# KB  1x10 1x10    5# KB  1x10    7.5# KB  1x10    10# KB  1x10                Ther Ex             Rows 10# CC 3x10            Lat pull down             TB ER Blue 3x10            TB IR             Small range PPU from elbows                                                                              Ther Activity             Lateral step ups Foam 8\" 3x10      8\"  3x10 ea 8\"  3x10 ea 8\"  3x10 ea 8\"  3x10 ea    "                          Gait Training                                       Modalities

## 2025-04-22 ENCOUNTER — OFFICE VISIT (OUTPATIENT)
Dept: PHYSICAL THERAPY | Facility: CLINIC | Age: 30
End: 2025-04-22
Payer: COMMERCIAL

## 2025-04-22 DIAGNOSIS — M25.512 CHRONIC LEFT SHOULDER PAIN: ICD-10-CM

## 2025-04-22 DIAGNOSIS — M25.552 LEFT HIP PAIN: Primary | ICD-10-CM

## 2025-04-22 DIAGNOSIS — G89.29 CHRONIC LEFT SHOULDER PAIN: ICD-10-CM

## 2025-04-22 PROCEDURE — 97112 NEUROMUSCULAR REEDUCATION: CPT

## 2025-04-22 PROCEDURE — 97140 MANUAL THERAPY 1/> REGIONS: CPT

## 2025-04-22 NOTE — PROGRESS NOTES
"Daily Note     Today's date: 2025  Patient name: Raghav Smith  : 1995  MRN: [id number]  Referring provider: Linda Rao CRNP  Dx:   Encounter Diagnosis     ICD-10-CM    1. Left hip pain  M25.552       2. Chronic left shoulder pain  M25.512     G89.29                          Subjective: Patient reports shoulder blade is a little aggravated still from the hike, shoulder exercises and lifting things at work.     Objective: See treatment diary below    Assessment: some periscapular soreness today maybe from working shoulder more. Improved with prone PA mobs. Good LE stabilziation control.     Plan: Continue per plan of care.  Progress treatment as tolerated.       Precautions: Appendectomy 2025, not cleared for full activity until 6 week johnathan    Manuals 4/21 4/22     4/7 4/8 4/14 4/15                Prone PA thoracic mobilization gr V  3x10 ea           L hip PROM 8' 8'     8' 8' 8' 8'   LLE LAD             MET's L hip ext, R hip flexion 10x5\" ea 10x5\" ea     10x5\"  ea PRN 10x5\"  ea 10x5\" ea                Neuro Re-Ed             Seated c/s retraction HEP edu 1x10 prn            Supine TrA bridge --> SL bridge when able 3x15 3x15     3x15 3x15 3x15 3x15   Sidelying TrA hold clamshell Blue 3x12 Blue 3x12     GTB  3x12 GTB  3x12 GTB  3x12 GTB     Supine hip add isometric 30x5\" 30x5\"     30x5\" 30x5\" 30x5\" 30x5\"   TrA SLR 3x15 ea 3x15 ea     3x12 ea 3x12 ea 3x12 ea 3x15 ea   Mini Squats 1x10    5# KB  1x10    7.5# KB  1x10    10# KB  1x10 1x10    5# KB  1x10    7.5# KB  1x10    10# KB  1x10     1x10    5# KB  1x10    7.5# KB  1x10   1x10    5# KB  1x10    7.5# KB  1x10 1x10    5# KB  1x10    7.5# KB  1x10 1x10    5# KB  1x10    7.5# KB  1x10    10# KB  1x10                Ther Ex             Rows 10# CC 3x10 10# CC 3x10           Lat pull down             TB ER Blue 3x10 Blue 3x10           TB IR             Serratus push ups  2x10                                                                       " "     Ther Activity             Lateral step ups Foam 8\" 3x10 Foam 8\" 3x10     8\"  3x10 ea 8\"  3x10 ea 8\"  3x10 ea 8\"  3x10 ea                             Gait Training                                       Modalities                                          "

## 2025-04-23 ENCOUNTER — SOCIAL WORK (OUTPATIENT)
Dept: BEHAVIORAL/MENTAL HEALTH CLINIC | Facility: CLINIC | Age: 30
End: 2025-04-23
Payer: COMMERCIAL

## 2025-04-23 DIAGNOSIS — F60.3 BORDERLINE PERSONALITY DISORDER (HCC): ICD-10-CM

## 2025-04-23 DIAGNOSIS — F43.10 PTSD (POST-TRAUMATIC STRESS DISORDER): ICD-10-CM

## 2025-04-23 DIAGNOSIS — F31.81 BIPOLAR 2 DISORDER (HCC): Primary | ICD-10-CM

## 2025-04-23 PROCEDURE — 90837 PSYTX W PT 60 MINUTES: CPT

## 2025-04-23 NOTE — PSYCH
"Behavioral Health Psychotherapy Progress Note    Psychotherapy Provided: Individual Psychotherapy     1. Bipolar 2 disorder (HCC)        2. Borderline personality disorder (HCC)        3. PTSD (post-traumatic stress disorder)            Goals addressed in session: Goal 1     DATA: Raghav reports to be doing much better this week as she has been able to make minimal progress with her goals. Raghav reports not sleeping much and has been very tried. Raghav reports to be working on doing things one day at a time as this has been the best for her to maintain a routine. Raghav reports that things feel impossible for her but they are doable and she is able to maintain and manage.  Raghav reports not knowing what she needs right now but is just doing the best that she can. Raghav and clinician engaged in coping skills that can help with decision making more effective.    During this session, this clinician used the following therapeutic modalities: Client-centered Therapy and Supportive Psychotherapy    Substance Abuse was not addressed during this session. If the client is diagnosed with a co-occurring substance use disorder, please indicate any changes in the frequency or amount of use: N/A. Stage of change for addressing substance use diagnoses: No substance use/Not applicable    ASSESSMENT:  Raghav Smith presents with a Euthymic/ normal mood.     her affect is Normal range and intensity, which is congruent, with her mood and the content of the session. The client has made progress on their goals.    Raghav Smith presents with a none risk of suicide, none risk of self-harm, and none risk of harm to others.    For any risk assessment that surpasses a \"low\" rating, a safety plan must be developed.    A safety plan was indicated: no  If yes, describe in detail - N/A    PLAN: Between sessions, Raghav Smith will utilize coping skills. At the next session, the therapist will use " Client-centered Therapy, Cognitive Behavioral Therapy, Motivational Interviewing, Solution-Focused Therapy, and Supportive Psychotherapy to address anxiety.    Behavioral Health Treatment Plan and Discharge Planning: Raghav Smith is aware of and agrees to continue to work on their treatment plan. They have identified and are working toward their discharge goals. yes    Depression Follow-up Plan Completed: Not applicable    Visit start and stop times:    04/23/25  Start Time: 1300  Stop Time: 1400  Total Visit Time: 60 minutes

## 2025-04-28 ENCOUNTER — OFFICE VISIT (OUTPATIENT)
Dept: PHYSICAL THERAPY | Facility: CLINIC | Age: 30
End: 2025-04-28
Payer: COMMERCIAL

## 2025-04-28 DIAGNOSIS — M25.552 LEFT HIP PAIN: Primary | ICD-10-CM

## 2025-04-28 DIAGNOSIS — M25.512 CHRONIC LEFT SHOULDER PAIN: ICD-10-CM

## 2025-04-28 DIAGNOSIS — G89.29 CHRONIC LEFT SHOULDER PAIN: ICD-10-CM

## 2025-04-28 PROCEDURE — 97112 NEUROMUSCULAR REEDUCATION: CPT

## 2025-04-28 PROCEDURE — 97110 THERAPEUTIC EXERCISES: CPT

## 2025-04-28 NOTE — PROGRESS NOTES
"Daily Note     Today's date: 2025  Patient name: Raghav Smith  : 1995  MRN: [id number]  Referring provider: Linda Rao CRNP  Dx:   Encounter Diagnosis     ICD-10-CM    1. Left hip pain  M25.552       2. Chronic left shoulder pain  M25.512     G89.29           Start Time: 1700  Stop Time: 1800  Total time in clinic (min): 60 minutes    Subjective: Patient reports, \"My L low back feels really tight and uncomfortable. My L scap and shoulder felt really weird today. I had this cold sensation inside my joint. It's gone now\".     Objective: See treatment diary below    Assessment: Slight UT compensating during rows. Decreased L low back discomfort after exercises. Easily fatigued and sore following RTC strengthening.     Plan: Continue per plan of care.  Progress treatment as tolerated.       Precautions: Appendectomy 2025, not cleared for full activity until 6 week johnathan    Manuals                        Prone PA thoracic mobilization gr V  3x10 ea           L hip PROM 8' 8'           LLE LAD             MET's L hip ext, R hip flexion 10x5\" ea 10x5\" ea                        Neuro Re-Ed             Seated c/s retraction HEP edu 1x10 prn            Supine TrA bridge --> SL bridge when able 3x15 3x15 3x15          Sidelying TrA hold clamshell Blue 3x12 Blue 3x12 Blue  3x12          Supine hip add isometric 30x5\" 30x5\" 30x5\"          TrA SLR 3x15 ea 3x15 ea 1#  3x10 ea          Mini Squats 1x10    5# KB  1x10    7.5# KB  1x10    10# KB  1x10 1x10    5# KB  1x10    7.5# KB  1x10    10# KB  1x10 1x10    5# KB  1x10    7.5# KB  1x10    10# KB  1x10                       Ther Ex             Rows 10# CC 3x10 10# CC 3x10 10# CC 3x10          Lat pull down             TB ER Blue 3x10 Blue 3x10 Bue  3x10          TB IR             Serratus push ups  2x10 2x10                                                                           Ther Activity             Lateral step ups Foam 8\" 3x10 Foam 8\" " "3x10 Foam  8\"  3x10                                    Gait Training                                       Modalities                                          "

## 2025-04-29 ENCOUNTER — OFFICE VISIT (OUTPATIENT)
Dept: PHYSICAL THERAPY | Facility: CLINIC | Age: 30
End: 2025-04-29
Payer: COMMERCIAL

## 2025-04-29 ENCOUNTER — SOCIAL WORK (OUTPATIENT)
Dept: BEHAVIORAL/MENTAL HEALTH CLINIC | Facility: CLINIC | Age: 30
End: 2025-04-29
Payer: COMMERCIAL

## 2025-04-29 DIAGNOSIS — F43.10 PTSD (POST-TRAUMATIC STRESS DISORDER): ICD-10-CM

## 2025-04-29 DIAGNOSIS — M25.512 CHRONIC LEFT SHOULDER PAIN: ICD-10-CM

## 2025-04-29 DIAGNOSIS — M25.552 LEFT HIP PAIN: Primary | ICD-10-CM

## 2025-04-29 DIAGNOSIS — G89.29 CHRONIC LEFT SHOULDER PAIN: ICD-10-CM

## 2025-04-29 DIAGNOSIS — F31.81 BIPOLAR 2 DISORDER (HCC): Primary | ICD-10-CM

## 2025-04-29 DIAGNOSIS — F60.3 BORDERLINE PERSONALITY DISORDER (HCC): ICD-10-CM

## 2025-04-29 PROCEDURE — 97112 NEUROMUSCULAR REEDUCATION: CPT

## 2025-04-29 PROCEDURE — 90837 PSYTX W PT 60 MINUTES: CPT

## 2025-04-29 PROCEDURE — 97110 THERAPEUTIC EXERCISES: CPT

## 2025-04-29 NOTE — PROGRESS NOTES
"Daily Note     Today's date: 2025  Patient name: Raghav Smith  : 1995  MRN: [id number]  Referring provider: Linda Rao CRNP  Dx:   Encounter Diagnosis     ICD-10-CM    1. Left hip pain  M25.552       2. Chronic left shoulder pain  M25.512     G89.29           Start Time: 1710  Stop Time: 1810  Total time in clinic (min): 60 minutes    Subjective: Patient reports, \"I've been completing more tasks at work so my back is a 5/10 pain and so is my L UT today\". 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     Objective: See treatment diary below    Assessment: Patient demonstrates good form during therapy with mod cueing for proper form. Raghav continues to show progress with her strength and function secondary to therapy.     Plan: Continue per plan of care.  Progress treatment as tolerated.       Precautions: Appendectomy 2025, not cleared for full activity until 6 week johnathan    Manuals                       Prone PA thoracic mobilization gr V  3x10 ea           L hip PROM 8' 8'           LLE LAD             MET's L hip ext, R hip flexion 10x5\" ea 10x5\" ea                        Neuro Re-Ed             Seated c/s retraction HEP edu 1x10 prn            Supine TrA bridge --> SL bridge when able 3x15 3x15 3x15 3x15         Sidelying TrA hold clamshell Blue 3x12 Blue 3x12 Blue  3x12 Blue  3x12         Supine hip add isometric 30x5\" 30x5\" 30x5\"          TrA SLR 3x15 ea 3x15 ea 1#  3x10 ea 1#  3x10 ea         Mini Squats 1x10    5# KB  1x10    7.5# KB  1x10    10# KB  1x10 1x10    5# KB  1x10    7.5# KB  1x10    10# KB  1x10 1x10    5# KB  1x10    7.5# KB  1x10    10# KB  1x10 1x10    10#  3x10  KB                      Ther Ex             Rows 10# CC 3x10 10# CC 3x10 10# CC 3x10 10# CC 3x10         Lat pull down             TB ER Blue 3x10 Blue 3x10 Blue  3x10 Blue  3x10         TB IR             Serratus push ups  2x10 2x10 2x10                                                      " "                    Ther Activity             Lateral step ups Foam 8\" 3x10 Foam 8\" 3x10 Foam  8\"  3x10 Foam  8\"  3x10                                   Gait Training                                       Modalities                                          "

## 2025-04-29 NOTE — PSYCH
"Behavioral Health Psychotherapy Progress Note    Psychotherapy Provided: Individual Psychotherapy     1. Bipolar 2 disorder (HCC)        2. Borderline personality disorder (HCC)        3. PTSD (post-traumatic stress disorder)            Goals addressed in session: Goal 1     DATA: Raghav reports to be having a better week this week as she has been doing little things to get back on track. Raghav reports being in the space of not wanting to feel her feelings as she believes that feeling them is extremely exhausting for her. Raghav reports wanting to find things that gave her satisfaction that is not instant but in a way that she is able to accept things for what they are and feel her emotions overall. Raghav and clinician worked on emotional regulation techniques.    During this session, this clinician used the following therapeutic modalities: Cognitive Behavioral Therapy, Motivational Interviewing, and Supportive Psychotherapy    Substance Abuse was not addressed during this session. If the client is diagnosed with a co-occurring substance use disorder, please indicate any changes in the frequency or amount of use: N/A. Stage of change for addressing substance use diagnoses: No substance use/Not applicable    ASSESSMENT:  Raghav Smith presents with a Euthymic/ normal mood.     her affect is Normal range and intensity and Tearful, which is congruent, with her mood and the content of the session. The client has made progress on their goals.    Raghav Smith presents with a none risk of suicide, none risk of self-harm, and none risk of harm to others.    For any risk assessment that surpasses a \"low\" rating, a safety plan must be developed.    A safety plan was indicated: no  If yes, describe in detail - N/A    PLAN: Between sessions, Raghav Smith will practice emotional regulation techniques. At the next session, the therapist will use Client-centered Therapy, Cognitive Behavioral " Therapy, Motivational Interviewing, Solution-Focused Therapy, and Supportive Psychotherapy to address feeling het emotions.    Behavioral Health Treatment Plan and Discharge Planning: Raghav Smith is aware of and agrees to continue to work on their treatment plan. They have identified and are working toward their discharge goals. yes    Depression Follow-up Plan Completed: Not applicable    Visit start and stop times:    04/29/25  Start Time: 1200  Stop Time: 1300  Total Visit Time: 60 minutes

## 2025-04-30 NOTE — PROGRESS NOTES
PT Re-Evaluation     Today's date: 2025  Patient name: Raghav Smith  : 1995  MRN: [id number]  Referring provider: Linda Rao CRNP  Dx:   Encounter Diagnosis     ICD-10-CM    1. Left hip pain  M25.552       2. Chronic left shoulder pain  M25.512     G89.29           Start Time: 1710  Stop Time: 1810  Total time in clinic (min): 60 minutes    Assessment  Impairments: abnormal or restricted ROM, activity intolerance, impaired physical strength, lacks appropriate home exercise program, pain with function, poor posture , poor body mechanics, participation limitations and activity limitations    Assessment details: Pt demonstrated improved ROM, pain, strength and function since IE.  Pt will benefit from continued PT to further improve functional lifting for work and pain.    Goals  ST-4 weeks.  1.  Pt will increase frequent lifting tolerance at work to 20# without pain.  2.  Pt will centralize scap pain.    LT-6 weeks.  1.  Pt will increase frequent lifting tolerance > 30# at work without pain.  2.  Pt will bike for 30 min without pain.    Plan    Planned therapy interventions: joint mobilization, abdominal trunk stabilization, neuromuscular re-education, patient/caregiver education, postural training, self care, strengthening, stretching, therapeutic activities, therapeutic exercise, therapeutic training, flexibility, functional ROM exercises, gait training, graded activity, graded exercise, body mechanics training, activity modification, graded motor and home exercise program    Frequency: 2x week  Duration in weeks: 6        Subjective Evaluation    History of Present Illness  Mechanism of injury: Pt reports a 50% improvement in L shoulder pain and a 60% improvement in L hip pain since IE.  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  Patient Goals  Patient goals for therapy: decreased pain, increased motion, increased strength, independence with  ADLs/IADLs, return to sport/leisure activities and return to work  Patient goal: return to paddle boarding, biking  Pain  Current pain ratin  At best pain ratin  At worst pain ratin  Quality: radiating, dull ache and tight  Alleviating factors: Lsh: HEP: TB ER, c/s ret, L hip: EIS, EIL.  Exacerbated by: L sh: SLsleeping, lifting at work, L hip: driving, sitting.  Progression: improved          Objective     Active Range of Motion   Left Shoulder   Flexion: 170 degrees   Abduction: 170 degrees   External rotation 0°: 45 degrees   Internal rotation BTB: L2     Right Shoulder   Flexion: 170 degrees   Abduction: 165 degrees   External rotation 0°: 80 degrees   Internal rotation BTB: T8     Passive Range of Motion   Left Hip   Flexion: 110 degrees with pain  Extension: 20 degrees   External rotation (90/90): 45 degrees   Internal rotation (90/90): 35 degrees   Mechanical Assessment    Cervical    Seated retraction: repeated movements   D, B.    Thoracic      Lumbar    Lying extension: repeated movements  D,B.    Strength/Myotome Testing     Left Shoulder     Planes of Motion   Flexion: 4   Abduction: 4   External rotation at 0°: 4-   External rotation at 90°: 3+     Left Hip   Planes of Motion   Flexion: 4-  Extension: 4-  Abduction: 3+  External rotation: 4-  Internal rotation: 4-

## 2025-05-02 ENCOUNTER — TELEPHONE (OUTPATIENT)
Age: 30
End: 2025-05-02

## 2025-05-02 NOTE — TELEPHONE ENCOUNTER
Writer spoke to patient in regards to upcoming appointment for psychological testing. Writer let her now her insurance wasn't coming up active. We had agreed that I will cancel the appointment for May and that I will call her back in June to see if there is active insurance or patient will call me if the insurance becomes active prior to June.

## 2025-05-05 ENCOUNTER — APPOINTMENT (OUTPATIENT)
Dept: PHYSICAL THERAPY | Facility: CLINIC | Age: 30
End: 2025-05-05
Attending: NURSE PRACTITIONER
Payer: COMMERCIAL

## 2025-05-07 ENCOUNTER — SOCIAL WORK (OUTPATIENT)
Dept: BEHAVIORAL/MENTAL HEALTH CLINIC | Facility: CLINIC | Age: 30
End: 2025-05-07
Payer: COMMERCIAL

## 2025-05-07 DIAGNOSIS — F60.3 BORDERLINE PERSONALITY DISORDER (HCC): ICD-10-CM

## 2025-05-07 DIAGNOSIS — F31.81 BIPOLAR 2 DISORDER (HCC): Primary | ICD-10-CM

## 2025-05-07 DIAGNOSIS — F43.10 PTSD (POST-TRAUMATIC STRESS DISORDER): ICD-10-CM

## 2025-05-07 PROCEDURE — 90837 PSYTX W PT 60 MINUTES: CPT

## 2025-05-07 NOTE — PSYCH
"Behavioral Health Psychotherapy Progress Note    Psychotherapy Provided: Individual Psychotherapy     1. Bipolar 2 disorder (HCC)        2. Borderline personality disorder (HCC)        3. PTSD (post-traumatic stress disorder)            Goals addressed in session: Goal 1     DATA: Raghav arrived on time for session. Raghav reports to have made progress over the past week as she is doing what she needs to do to get herself into a steady and proper routine. Raghav spent session reflecting on the type of relationships that she wants to have with people and why she fears feeling certain feelings. Raghav reports that she has a disorganized anxious attachment style that she wants to get a better understanding of.    During this session, this clinician used the following therapeutic modalities: Client-centered Therapy and Supportive Psychotherapy    Substance Abuse was not addressed during this session. If the client is diagnosed with a co-occurring substance use disorder, please indicate any changes in the frequency or amount of use: N/. Stage of change for addressing substance use diagnoses: No substance use/Not applicable    ASSESSMENT:  Raghav Smith presents with a Euthymic/ normal mood.     her affect is Normal range and intensity, which is congruent, with her mood and the content of the session. The client has made progress on their goals.    Raghav Smith presents with a none risk of suicide, none risk of self-harm, and none risk of harm to others.    For any risk assessment that surpasses a \"low\" rating, a safety plan must be developed.    A safety plan was indicated: no  If yes, describe in detail N/A    PLAN: Between sessions, Raghav Smith will practice coping skills. At the next session, the therapist will use Client-centered Therapy, Cognitive Behavioral Therapy, Motivational Interviewing, Solution-Focused Therapy, and Supportive Psychotherapy to address motivation and " overthinking.    Behavioral Health Treatment Plan and Discharge Planning: Raghav Smith is aware of and agrees to continue to work on their treatment plan. They have identified and are working toward their discharge goals. yes    Depression Follow-up Plan Completed: Not applicable    Visit start and stop times:    05/07/25  Start Time: 1300  Stop Time: 1400  Total Visit Time: 60 minutes

## 2025-05-12 ENCOUNTER — OFFICE VISIT (OUTPATIENT)
Dept: PHYSICAL THERAPY | Facility: CLINIC | Age: 30
End: 2025-05-12
Attending: NURSE PRACTITIONER
Payer: COMMERCIAL

## 2025-05-12 DIAGNOSIS — M25.552 LEFT HIP PAIN: Primary | ICD-10-CM

## 2025-05-12 DIAGNOSIS — G89.29 CHRONIC LEFT SHOULDER PAIN: ICD-10-CM

## 2025-05-12 DIAGNOSIS — M25.512 CHRONIC LEFT SHOULDER PAIN: ICD-10-CM

## 2025-05-12 PROCEDURE — 97110 THERAPEUTIC EXERCISES: CPT

## 2025-05-12 PROCEDURE — 97112 NEUROMUSCULAR REEDUCATION: CPT

## 2025-05-12 PROCEDURE — 97140 MANUAL THERAPY 1/> REGIONS: CPT

## 2025-05-12 PROCEDURE — 97530 THERAPEUTIC ACTIVITIES: CPT

## 2025-05-12 NOTE — PROGRESS NOTES
"Daily Note     Today's date: 2025  Patient name: Raghav Smith  : 1995  MRN: [id number]  Referring provider: Linda Rao CRNP  Dx:   Encounter Diagnosis     ICD-10-CM    1. Left hip pain  M25.552       2. Chronic left shoulder pain  M25.512     G89.29                      Subjective: Pt reports she was learning a new task at work that she had to support her body with her L arm and she fatigued pretty quickly and had pain in RTC, bicep, AC joint and periscapular area.       Objective: See treatment diary below      Assessment: Focused treatment today on L shoulder. Good tolerance to POC but did fatigue quickly. Weakness continues to limited function at work.       Plan: Continue per plan of care.  Progress treatment as tolerated.       Precautions: Appendectomy 2025, not cleared for full activity until 6 week johnathan    Manuals                      Prone PA thoracic mobilization gr V  3x10 ea           L hip PROM 8' 8'           LLE LAD             MET's L hip ext, R hip flexion 10x5\" ea 10x5\" ea   10x5\" ea        L scap mobs all planes     1x10 ea        GHJ PA mobs gr IV     3x20        Neuro Re-Ed             Seated c/s retraction HEP edu 1x10 prn            Supine TrA bridge --> SL bridge when able 3x15 3x15 3x15 3x15 3x15        Sidelying TrA hold clamshell Blue 3x12 Blue 3x12 Blue  3x12 Blue  3x12 Blue  3x12        Supine hip add isometric 30x5\" 30x5\" 30x5\"          TrA SLR 3x15 ea 3x15 ea 1#  3x10 ea 1#  3x10 ea 1#  3x10 ea        Mini Squats 1x10    5# KB  1x10    7.5# KB  1x10    10# KB  1x10 1x10    5# KB  1x10    7.5# KB  1x10    10# KB  1x10 1x10    5# KB  1x10    7.5# KB  1x10    10# KB  1x10 1x10    10#  3x10  KB 10#  3x10  KB                     Ther Ex             Rows 10# CC 3x10 10# CC 3x10 10# CC 3x10 10# CC 3x10 10# CC 3x10        Lat pull down     50# 3x8        TB ER Blue 3x10 Blue 3x10 Blue  3x10 Blue  3x10 Blue  3x10        Sidelying ER AROM     3# " "2x8        Serratus push ups  2x10 2x10 2x10 2x10                                                                          Ther Activity             Lateral step ups Foam 8\" 3x10 Foam 8\" 3x10 Foam  8\"  3x10 Foam  8\"  3x10 Foam  8\"  3x10                                  Gait Training                                       Modalities                                            "

## 2025-05-13 ENCOUNTER — OFFICE VISIT (OUTPATIENT)
Dept: PHYSICAL THERAPY | Facility: CLINIC | Age: 30
End: 2025-05-13
Attending: NURSE PRACTITIONER
Payer: COMMERCIAL

## 2025-05-13 DIAGNOSIS — M25.512 CHRONIC LEFT SHOULDER PAIN: ICD-10-CM

## 2025-05-13 DIAGNOSIS — M25.552 LEFT HIP PAIN: Primary | ICD-10-CM

## 2025-05-13 DIAGNOSIS — G89.29 CHRONIC LEFT SHOULDER PAIN: ICD-10-CM

## 2025-05-13 PROCEDURE — 97110 THERAPEUTIC EXERCISES: CPT

## 2025-05-13 PROCEDURE — 97530 THERAPEUTIC ACTIVITIES: CPT

## 2025-05-13 PROCEDURE — 97112 NEUROMUSCULAR REEDUCATION: CPT

## 2025-05-13 NOTE — PROGRESS NOTES
"Daily Note     Today's date: 2025  Patient name: Raghav Smith  : 1995  MRN: [id number]  Referring provider: Linda Rao CRNP  Dx:   Encounter Diagnosis     ICD-10-CM    1. Left hip pain  M25.552       2. Chronic left shoulder pain  M25.512     G89.29           Start Time: 1700  Stop Time: 1755  Total time in clinic (min): 55 minutes    Subjective: Patient reports soreness and slight discomfort following last visit.     Objective: See treatment diary below    Assessment: Able to progress squats this visit. Good form demonstrated with some tactile and verbal cueing required.  Weakness continues to limited function at work.     Plan: Continue per plan of care.  Progress treatment as tolerated.       Precautions: Appendectomy 2025, not cleared for full activity until 6 week johnathan    Manuals                     Prone PA thoracic mobilization gr V  3x10 ea           L hip PROM 8' 8'           LLE LAD             MET's L hip ext, R hip flexion 10x5\" ea 10x5\" ea   10x5\" ea        L scap mobs all planes     1x10 ea        GHJ PA mobs gr IV     3x20        Neuro Re-Ed             Seated c/s retraction HEP edu 1x10 prn            Supine TrA bridge --> SL bridge when able 3x15 3x15 3x15 3x15 3x15 3x15       Sidelying TrA hold clamshell Blue 3x12 Blue 3x12 Blue  3x12 Blue  3x12 Blue  3x12 Blue  3x12                     Supine hip add isometric 30x5\" 30x5\" 30x5\"          TrA SLR 3x15 ea 3x15 ea 1#  3x10 ea 1#  3x10 ea 1#  3x10 ea 1#  3x10 ea       Mini Squats 1x10    5# KB  1x10    7.5# KB  1x10    10# KB  1x10 1x10    5# KB  1x10    7.5# KB  1x10    10# KB  1x10 1x10    5# KB  1x10    7.5# KB  1x10    10# KB  1x10 1x10    10#  3x10  KB 10#  3x10  KB 10# KB  1x10    15# KB  2x10                    Ther Ex             Rows 10# CC 3x10 10# CC 3x10 10# CC 3x10 10# CC 3x10 10# CC 3x10 10# CC 3x10       Lat pull down     50# 3x8 50#  3x8       TB ER Blue 3x10 Blue 3x10 Blue  3x10 " "Blue  3x10 Blue  3x10 Blue  3x10       Sidelying ER AROM     3# 2x8 3#  3x8       Serratus push ups  2x10 2x10 2x10 2x10  2x10                                                                        Ther Activity             Lateral step ups Foam 8\" 3x10 Foam 8\" 3x10 Foam  8\"  3x10 Foam  8\"  3x10 Foam  8\"  3x10 Foam  8\"  3x10                                 Gait Training                                       Modalities                                          "

## 2025-05-14 ENCOUNTER — TELEPHONE (OUTPATIENT)
Dept: BEHAVIORAL/MENTAL HEALTH CLINIC | Facility: CLINIC | Age: 30
End: 2025-05-14

## 2025-05-14 NOTE — LETTER
25     Raghav Smith   : 1995   [address] [facility] [address]       It is the policy of [facility] to monitor and manage appointments that have been no-showed or cancelled with less than 48-hour notice. This is necessary to ensure that we are able to provide timely access for all patients to our providers. Undue numbers of unutilized appointments delays necessary medical care for all patients.      Dear Raghav Smith       We are sorry that you missed your appointment with SAL Patterson on 2025. Your health and follow-up care are important to us. We want to make you aware of your next appointment with SAL Patterson that is scheduled for Tuesday, 2025 at 12:00pm.    Please be aware that our office policy states that if you 'no show' two or more Therapy appointments without prior notice of cancellation within in a calendar year, you may be discharged from Therapy treatment.  We want to bring this to your attention now to prevent an interruption of your care.  If you have any questions about this policy, please call us at the number above.     Thank you in advance for your cooperation and assistance.       Sincerely,      [facility] Support Staff.

## 2025-05-14 NOTE — TELEPHONE ENCOUNTER
NO-SHOW LETTER MAILED TO Raghav Smith.  ADDRESS: [address] [place] [phone] for appt 5/13/25 with JANNA Alves

## 2025-05-19 ENCOUNTER — OFFICE VISIT (OUTPATIENT)
Dept: PHYSICAL THERAPY | Facility: CLINIC | Age: 30
End: 2025-05-19
Attending: NURSE PRACTITIONER
Payer: COMMERCIAL

## 2025-05-19 DIAGNOSIS — M25.552 LEFT HIP PAIN: Primary | ICD-10-CM

## 2025-05-19 DIAGNOSIS — M25.512 CHRONIC LEFT SHOULDER PAIN: ICD-10-CM

## 2025-05-19 DIAGNOSIS — G89.29 CHRONIC LEFT SHOULDER PAIN: ICD-10-CM

## 2025-05-19 PROCEDURE — 97110 THERAPEUTIC EXERCISES: CPT

## 2025-05-19 PROCEDURE — 97112 NEUROMUSCULAR REEDUCATION: CPT

## 2025-05-19 NOTE — PROGRESS NOTES
"Daily Note     Today's date: 2025  Patient name: Raghav Smith  : 1995  MRN: [id number]  Referring provider: Linda Rao CRNP  Dx:   Encounter Diagnosis     ICD-10-CM    1. Left hip pain  M25.552       2. Chronic left shoulder pain  M25.512     G89.29           Start Time: 1700  Stop Time: 1800  Total time in clinic (min): 60 minutes    Subjective: Patient reports, I feel pretty good. My L shoulder continues to bother me with certain movements and my L hip still pinches in the front\".     Objective: See treatment diary below    Assessment: Cueing required to better isolate lats during lat pulls downs. Able to progress resistance during squats and TB ER today with mod fatigue. Continue to progress as able.     Plan: Continue per plan of care.  Progress treatment as tolerated.       Precautions: Appendectomy 2025, not cleared for full activity until 6 week johnathan    Manuals                    Prone PA thoracic mobilization gr V  3x10 ea           L hip PROM 8' 8'     5'      LLE LAD             MET's L hip ext, R hip flexion 10x5\" ea 10x5\" ea   10x5\" ea        L scap mobs all planes     1x10 ea        GHJ PA mobs gr IV     3x20        Neuro Re-Ed             Seated c/s retraction HEP edu 1x10 prn            Supine TrA bridge --> SL bridge when able 3x15 3x15 3x15 3x15 3x15 3x15 3x15      Sidelying TrA hold clamshell Blue 3x12 Blue 3x12 Blue  3x12 Blue  3x12 Blue  3x12 Blue  3x12               Blue  3x15      Supine hip add isometric 30x5\" 30x5\" 30x5\"          TrA SLR 3x15 ea 3x15 ea 1#  3x10 ea 1#  3x10 ea 1#  3x10 ea 1#  3x10 ea 1.5#  3x10 ea      Mini Squats 1x10    5# KB  1x10    7.5# KB  1x10    10# KB  1x10 1x10    5# KB  1x10    7.5# KB  1x10    10# KB  1x10 1x10    5# KB  1x10    7.5# KB  1x10    10# KB  1x10 1x10    10#  3x10  KB 10#  3x10  KB 10# KB  1x10    15# KB  2x10 10# KB  1x10    15# KB  1x10    20# KB  1x10                   Ther Ex           " "  Rows 10# CC 3x10 10# CC 3x10 10# CC 3x10 10# CC 3x10 10# CC 3x10 10# CC 3x10 15#  CC  3x10      Lat pull down     50# 3x8 50#  3x8 50#  3x10      TB ER Blue 3x10 Blue 3x10 Blue  3x10 Blue  3x10 Blue  3x10 Blue  3x10 Black  3x10      Sidelying ER AROM     3# 2x8 3#  3x8 3#  3x10      Serratus push ups  2x10 2x10 2x10 2x10  2x10 2x10                                                                       Ther Activity             Lateral step ups Foam 8\" 3x10 Foam 8\" 3x10 Foam  8\"  3x10 Foam  8\"  3x10 Foam  8\"  3x10 Foam  8\"  3x10 Foam  8\"  3x10                                Gait Training                                       Modalities                                          "

## 2025-05-20 ENCOUNTER — OFFICE VISIT (OUTPATIENT)
Dept: PHYSICAL THERAPY | Facility: CLINIC | Age: 30
End: 2025-05-20
Attending: NURSE PRACTITIONER
Payer: COMMERCIAL

## 2025-05-20 DIAGNOSIS — M25.512 CHRONIC LEFT SHOULDER PAIN: ICD-10-CM

## 2025-05-20 DIAGNOSIS — G89.29 CHRONIC LEFT SHOULDER PAIN: ICD-10-CM

## 2025-05-20 DIAGNOSIS — M25.552 LEFT HIP PAIN: Primary | ICD-10-CM

## 2025-05-20 PROCEDURE — 97112 NEUROMUSCULAR REEDUCATION: CPT

## 2025-05-20 PROCEDURE — 97110 THERAPEUTIC EXERCISES: CPT

## 2025-05-20 NOTE — PROGRESS NOTES
"Daily Note     Today's date: 2025  Patient name: Raghav Smith  : 1995  MRN: [id number]  Referring provider: Linda Rao CRNP  Dx:   Encounter Diagnosis     ICD-10-CM    1. Left hip pain  M25.552       2. Chronic left shoulder pain  M25.512     G89.29           Start Time: 1700  Stop Time: 1800  Total time in clinic (min): 60 minutes    Subjective: Patient reports moderate soreness following last visit that lasted most of the day. Notes feeling pretty good today.      Objective: See treatment diary below    Assessment: Patient demonstrates good form during therapy and required minimal cueing. Continue to progress as able.     Plan: Continue per plan of care.  Progress treatment as tolerated.       Precautions: Appendectomy 2025, not cleared for full activity until 6 week johnathan    Manuals                   Prone PA thoracic mobilization gr V  3x10 ea           L hip PROM 8' 8'     5'      LLE LAD             MET's L hip ext, R hip flexion 10x5\" ea 10x5\" ea   10x5\" ea        L scap mobs all planes     1x10 ea        GHJ PA mobs gr IV     3x20        Neuro Re-Ed             Seated c/s retraction HEP edu 1x10 prn            Supine TrA bridge --> SL bridge when able 3x15 3x15 3x15 3x15 3x15 3x15 3x15 3x15     Sidelying TrA hold clamshell Blue 3x12 Blue 3x12 Blue  3x12 Blue  3x12 Blue  3x12 Blue  3x12               Blue  3x15 Blue  3x15     Supine hip add isometric 30x5\" 30x5\" 30x5\"          TrA SLR 3x15 ea 3x15 ea 1#  3x10 ea 1#  3x10 ea 1#  3x10 ea 1#  3x10 ea 1.5#  3x10 ea 1.5#  3x10 ea     Mini Squats 1x10    5# KB  1x10    7.5# KB  1x10    10# KB  1x10 1x10    5# KB  1x10    7.5# KB  1x10    10# KB  1x10 1x10    5# KB  1x10    7.5# KB  1x10    10# KB  1x10 1x10    10#  3x10  KB 10#  3x10  KB 10# KB  1x10    15# KB  2x10 10# KB  1x10    15# KB  1x10    20# KB  1x10 10# KB  1x10    15# KB  1x10    20# KB  1x10                  Ther Ex             Rows 10# CC " "3x10 10# CC 3x10 10# CC 3x10 10# CC 3x10 10# CC 3x10 10# CC 3x10 15#  CC  3x10 15#  CC  3x10     Lat pull down     50# 3x8 50#  3x8 50#  3x10 50#  3x10     TB ER Blue 3x10 Blue 3x10 Blue  3x10 Blue  3x10 Blue  3x10 Blue  3x10 Black  3x10 Black  3x10     Sidelying ER AROM     3# 2x8 3#  3x8 3#  3x10 3#  3x10     Serratus push ups  2x10 2x10 2x10 2x10  2x10 2x10 2x10                                                                      Ther Activity             Lateral step ups Foam 8\" 3x10 Foam 8\" 3x10 Foam  8\"  3x10 Foam  8\"  3x10 Foam  8\"  3x10 Foam  8\"  3x10 Foam  8\"  3x10 Foam  8\"  3x10                               Gait Training                                       Modalities                                          "

## 2025-05-27 ENCOUNTER — OFFICE VISIT (OUTPATIENT)
Dept: PSYCHIATRY | Facility: CLINIC | Age: 30
End: 2025-05-27
Payer: COMMERCIAL

## 2025-05-27 ENCOUNTER — SOCIAL WORK (OUTPATIENT)
Dept: BEHAVIORAL/MENTAL HEALTH CLINIC | Facility: CLINIC | Age: 30
End: 2025-05-27
Payer: COMMERCIAL

## 2025-05-27 ENCOUNTER — TELEPHONE (OUTPATIENT)
Dept: PSYCHIATRY | Facility: CLINIC | Age: 30
End: 2025-05-27

## 2025-05-27 ENCOUNTER — OFFICE VISIT (OUTPATIENT)
Dept: PHYSICAL THERAPY | Facility: CLINIC | Age: 30
End: 2025-05-27
Attending: NURSE PRACTITIONER
Payer: COMMERCIAL

## 2025-05-27 DIAGNOSIS — M25.512 CHRONIC LEFT SHOULDER PAIN: ICD-10-CM

## 2025-05-27 DIAGNOSIS — F31.81 BIPOLAR 2 DISORDER (HCC): ICD-10-CM

## 2025-05-27 DIAGNOSIS — F31.81 BIPOLAR 2 DISORDER (HCC): Primary | ICD-10-CM

## 2025-05-27 DIAGNOSIS — F60.3 BORDERLINE PERSONALITY DISORDER (HCC): ICD-10-CM

## 2025-05-27 DIAGNOSIS — F43.10 PTSD (POST-TRAUMATIC STRESS DISORDER): ICD-10-CM

## 2025-05-27 DIAGNOSIS — G89.29 CHRONIC LEFT SHOULDER PAIN: ICD-10-CM

## 2025-05-27 DIAGNOSIS — M25.552 LEFT HIP PAIN: Primary | ICD-10-CM

## 2025-05-27 PROCEDURE — 90837 PSYTX W PT 60 MINUTES: CPT

## 2025-05-27 PROCEDURE — 90833 PSYTX W PT W E/M 30 MIN: CPT | Performed by: PSYCHIATRY & NEUROLOGY

## 2025-05-27 PROCEDURE — 97110 THERAPEUTIC EXERCISES: CPT

## 2025-05-27 PROCEDURE — 99214 OFFICE O/P EST MOD 30 MIN: CPT | Performed by: PSYCHIATRY & NEUROLOGY

## 2025-05-27 PROCEDURE — 97112 NEUROMUSCULAR REEDUCATION: CPT

## 2025-05-27 RX ORDER — LAMOTRIGINE 100 MG/1
100 TABLET ORAL DAILY
Qty: 30 TABLET | Refills: 2 | Status: SHIPPED | OUTPATIENT
Start: 2025-05-27

## 2025-05-27 NOTE — PSYCH
"Behavioral Health Psychotherapy Progress Note    Psychotherapy Provided: Individual Psychotherapy     1. Bipolar 2 disorder (HCC)        2. Borderline personality disorder (HCC)        3. PTSD (post-traumatic stress disorder)            Goals addressed in session: Goal 1     DATA: Raghav arrived on time for session. Raghav reports to be doing alright this week except that she has been experiencing some sadness over the weekend and was not sure what was contributing to that factor.Raghav and clinician engaged in exploring her feelings to see what was triggering her sadness. Raghav reports that her grandmother is now back in town and this is a great time for her to start getting herself in order as her grandma will be a great help for her.    During this session, this clinician used the following therapeutic modalities: Client-centered Therapy and Supportive Psychotherapy    Substance Abuse was not addressed during this session. If the client is diagnosed with a co-occurring substance use disorder, please indicate any changes in the frequency or amount of use: N/A. Stage of change for addressing substance use diagnoses: No substance use/Not applicable    ASSESSMENT:  Raghav Smith presents with a Euthymic/ normal mood.     her affect is Normal range and intensity, which is congruent, with her mood and the content of the session. The client has made progress on their goals.    Raghav Smith presents with a none risk of suicide, none risk of self-harm, and none risk of harm to others.    For any risk assessment that surpasses a \"low\" rating, a safety plan must be developed.    A safety plan was indicated: no  If yes, describe in detail - N/A    PLAN: Between sessions, Raghav Smith will practice identifying feelings. At the next session, the therapist will use Client-centered Therapy, Cognitive Behavioral Therapy, Motivational Interviewing, Solution-Focused Therapy, and Supportive " Psychotherapy to address anxiety.    Behavioral Health Treatment Plan and Discharge Planning: Raghav Smith is aware of and agrees to continue to work on their treatment plan. They have identified and are working toward their discharge goals. yes    Depression Follow-up Plan Completed: Not applicable    Visit start and stop times:    05/27/25  Start Time: 1200  Stop Time: 1300  Total Visit Time: 60 minutes

## 2025-05-27 NOTE — PROGRESS NOTES
"Daily Note     Today's date: 2025  Patient name: Raghav Smith  : 1995  MRN: [id number]  Referring provider: Linda Rao CRNP  Dx:   Encounter Diagnosis     ICD-10-CM    1. Left hip pain  M25.552       2. Chronic left shoulder pain  M25.512     G89.29                      Subjective: Patient reports less pain overall, to the point that her pain scale has shifted downward for the better. She feels more stable as well.     Objective: See treatment diary below    Assessment: Really good response to progressions made over recent visits. Continue to progress as able.     Plan: Continue per plan of care.  Progress treatment as tolerated.       Precautions: Appendectomy 2025, not cleared for full activity until 6 week johnathan    Manuals                  Prone PA thoracic mobilization gr V  3x10 ea           L hip PROM 8' 8'     5'      LLE LAD             MET's L hip ext, R hip flexion 10x5\" ea 10x5\" ea   10x5\" ea        L scap mobs all planes     1x10 ea        GHJ PA mobs gr IV     3x20        Neuro Re-Ed             Seated c/s retraction HEP edu 1x10 prn            Supine TrA bridge --> SL bridge when able 3x15 3x15 3x15 3x15 3x15 3x15 3x15 3x15 3x15    Sidelying TrA hold clamshell Blue 3x12 Blue 3x12 Blue  3x12 Blue  3x12 Blue  3x12 Blue  3x12               Blue  3x15 Blue  3x15 Blue  3x15    Supine hip add isometric 30x5\" 30x5\" 30x5\"          TrA SLR 3x15 ea 3x15 ea 1#  3x10 ea 1#  3x10 ea 1#  3x10 ea 1#  3x10 ea 1.5#  3x10 ea 1.5#  3x10 ea 1.5#  3x10 ea    Mini Squats 1x10    5# KB  1x10    7.5# KB  1x10    10# KB  1x10 1x10    5# KB  1x10    7.5# KB  1x10    10# KB  1x10 1x10    5# KB  1x10    7.5# KB  1x10    10# KB  1x10 1x10    10#  3x10  KB 10#  3x10  KB 10# KB  1x10    15# KB  2x10 10# KB  1x10    15# KB  1x10    20# KB  1x10 10# KB  1x10    15# KB  1x10    20# KB  1x10 10# KB  1x10    15# KB  1x10    20# KB  1x10                 Ther Ex           " "  Rows 10# CC 3x10 10# CC 3x10 10# CC 3x10 10# CC 3x10 10# CC 3x10 10# CC 3x10 15#  CC  3x10 15#  CC  3x10 15#  CC  3x10    Lat pull down     50# 3x8 50#  3x8 50#  3x10 50#  3x10 50#  3x10    TB ER Blue 3x10 Blue 3x10 Blue  3x10 Blue  3x10 Blue  3x10 Blue  3x10 Black  3x10 Black  3x10 Black  3x10    Sidelying ER AROM     3# 2x8 3#  3x8 3#  3x10 3#  3x10 3#  3x10    Serratus push ups  2x10 2x10 2x10 2x10  2x10 2x10 2x10 2x10                                                                     Ther Activity             Lateral step ups Foam 8\" 3x10 Foam 8\" 3x10 Foam  8\"  3x10 Foam  8\"  3x10 Foam  8\"  3x10 Foam  8\"  3x10 Foam  8\"  3x10 Foam  8\"  3x10 Foam  8\"  3x10                              Gait Training                                       Modalities                                          "

## 2025-05-27 NOTE — PSYCH
Visit Time    Visit Start Time: 3:00  Visit Stop Time: 3:30 PM  Total Visit Duration: 30 minutes    Subjective: Medication Management      Patient ID: Raghav Smith is a 29 y.o. female with Bipolar do and CPTSD.    HPI ROS Appetite Changes and Sleep: normal appetite, normal energy level, no weight change, and normal number of sleep hours        Waiting neuropsych testing for dx clarification. Differential include bipolar do vs ADHD, DIANA r/o OCD.    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   She has concerns about her supervisor due to certain comments that seem to be discriminatory.  She is now working full time and she remains on weekly therapy and twice week PT and one client for cleaning.    She is afraid she might lose her home due to not being able to pay her mortgage.   She stated her internet was cut off.   She remains on Lamictal and has managed to build her dose to 100 mg po qam   Her sleep is still difficult as it is hard to sleep without waking up in the middle of the night.  She stated she has anxiety and raising thoughts.   Since last seen she stated she feels her mood has been fairly stable and she feels her reactions are appropriate with the situation.  She stated her job is going well 7-4:30.  She also continues her therapy sessions and her PT.  She stated she often feels tired due to being constantly busy.  She stated PT may be long term treatment for her chronic pain from old injury.  Sleep has been better with new working schedule.  She still wakes up tired.  She decided to not restart Trazodone.  Feels stable on current regimen.  June 12 she has intake for neuropsychological testing for diagnosis clarification.   She has MA but stated she needs to provide medical records for the past year.  Continue Lamictal and hydroxyzine.       Schedule follow up in 8 weeks.    Review Of Systems:     Mood Emotional Lability   Behavior Impulsive Behavior    Thought Content Disturbing Thoughts, Feelings   General Emotional Problems and Decreased Functioning   Personality Change in Personality   Other Psych Symptoms Normal   Constitutional Negative   ENT Negative   Cardiovascular Negative   Respiratory Negative   Gastrointestinal As Noted in HPI   Genitourinary Negative   Musculoskeletal Negative   Integumentary Negative   Neurological Negative   Endocrine Normal    Other Symptoms Normal        Laboratory Results: Recent Labs (last 6 months):   Admission on 01/06/2025, Discharged on 01/08/2025   Component Date Value    Ventricular Rate 01/06/2025 92     Atrial Rate 01/06/2025 92     [place] Interval 01/06/2025 138     QRSD Interval 01/06/2025 78     QT Interval 01/06/2025 340     QTC Interval 01/06/2025 421     P Axis 01/06/2025 29     QRS Axis 01/06/2025 52     T Wave [place] 01/06/2025 28     WBC 01/06/2025 15.11 (H)     RBC 01/06/2025 4.35     Hemoglobin 01/06/2025 12.8     Hematocrit 01/06/2025 39.2     MCV 01/06/2025 90     MCH 01/06/2025 29.4     MCHC 01/06/2025 32.7     RDW 01/06/2025 13.1     MPV 01/06/2025 9.3     Platelets 01/06/2025 307     nRBC 01/06/2025 0     Segmented % 01/06/2025 81 (H)     Immature Grans % 01/06/2025 0     Lymphocytes % 01/06/2025 12 (L)     Monocytes % 01/06/2025 7     Eosinophils Relative 01/06/2025 0     Basophils Relative 01/06/2025 0     Absolute Neutrophils 01/06/2025 12.02 (H)     Absolute Immature Grans 01/06/2025 0.06     Absolute Lymphocytes 01/06/2025 1.87     Absolute Monocytes 01/06/2025 1.06     Eosinophils Absolute 01/06/2025 0.05     Basophils Absolute 01/06/2025 0.05     Sodium 01/06/2025 137     Potassium 01/06/2025 4.1     Chloride 01/06/2025 103     CO2 01/06/2025 27     ANION GAP 01/06/2025 7     BUN 01/06/2025 12     Creatinine 01/06/2025 0.70     Glucose 01/06/2025 104     Calcium 01/06/2025 9.7     eGFR 01/06/2025 117     Color, UA 01/06/2025 Yellow     Clarity, UA 01/06/2025 Extra Turbid     Specific Gravity, UA  01/06/2025 1.016     pH, UA 01/06/2025 8.5 (A)     Leukocytes, UA 01/06/2025 Negative     Nitrite, UA 01/06/2025 Negative     Protein, UA 01/06/2025 Trace (A)     Glucose, UA 01/06/2025 Negative     Ketones, UA 01/06/2025 Negative     Urobilinogen, UA 01/06/2025 <2.0     Bilirubin, UA 01/06/2025 Negative     Occult Blood, UA 01/06/2025 Negative     EXT Preg Test, Ur 01/06/2025 Negative     Control 01/06/2025 Valid     RBC, UA 01/06/2025 None Seen     WBC, UA 01/06/2025 None Seen     Epithelial Cells 01/06/2025 Occasional     Bacteria, UA 01/06/2025 Occasional     MUCUS THREADS 01/06/2025 Occasional (A)     Amorphous Crystals, UA 01/06/2025 Moderate     Platelets 01/07/2025 289     MPV 01/07/2025 9.6     Case Report 01/07/2025                      Value:Surgical Pathology Report                         Case: [id number]                                  Authorizing Provider:  Herminio Broussard,   Collected:           01/07/2025 1559                                     DO                                                                           Ordering Location:     [facility]      Received:            01/07/2025 1826                                     Hospital Operating Room                                                      Pathologist:           Keny Phoenix MD                                                          Specimen:    Appendix                                                                                   Final Diagnosis 01/07/2025                      Value:A.  Appendix, appendectomy:    -   Appendix with changes suggestive of early/evolving acute appendicitis.        Additional Information 01/07/2025                      Value:All reported additional testing was performed with appropriately reactive controls.  These tests were developed and their performance characteristics determined by [facility] or appropriate performing facility, though some tests may  "be performed on tissues which have not been validated for performance characteristics (such as staining performed on alcohol exposed cell blocks and decalcified tissues).  Results should be interpreted with caution and in the context of the patients’ clinical condition. These tests may not be cleared or approved by the U.S. Food and Drug Administration, though the FDA has determined that such clearance or approval is not necessary. These tests are used for clinical purposes and they should not be regarded as investigational or for research. This laboratory has been approved by CLIA 88, designated as a high-complexity laboratory and is qualified to perform these tests.  .Interpretation performed at [facility] [address] [id number]        Gross Description 01/07/2025                      Value:A. The specimen is received in formalin, labeled with the patient's name and hospital number, and is designated \" appendix\".    The specimen consists of a vermiform appendix measuring 7.4 cm in length  0.7 cm diameter within attached mesoappendix measuring the length of the appendix and extending 2.2 cm.  The serosa is tan-purple, smooth and does not exhibit any transmural defects.  The distal tip is longitudinally sectioned revealing possible diverticulum outpouching.  The margin of resection is inked blue.  The proximal portion of the specimen is serially section revealing a a pinpoint lumen dilated up to 0.4 cm containing fecalith and hemorrhagic like material.  The appendiceal wall measures up to 0.2 cm in thickness.  There are no additional findings.  Representative sections. Two cassettes.    Note: The estimated total formalin fixation time based upon information provided by the submitting clinician and the standard processing schedule is under 72 hours.      [place]     Appointment on 12/10/2024   Component Date Value    Sodium 12/10/2024 138     Potassium 12/10/2024 4.7 "     Chloride 12/10/2024 104     CO2 12/10/2024 26     ANION GAP 12/10/2024 8     BUN 12/10/2024 18     Creatinine 12/10/2024 0.70     Glucose, Fasting 12/10/2024 83     Calcium 12/10/2024 9.4     AST 12/10/2024 15     ALT 12/10/2024 22     Alkaline Phosphatase 12/10/2024 57     Total Protein 12/10/2024 7.1     Albumin 12/10/2024 4.5     Total Bilirubin 12/10/2024 0.43     eGFR 12/10/2024 117     WBC 12/10/2024 7.42     RBC 12/10/2024 4.47     Hemoglobin 12/10/2024 13.3     Hematocrit 12/10/2024 41.5     MCV 12/10/2024 93     MCH 12/10/2024 29.8     MCHC 12/10/2024 32.0     RDW 12/10/2024 13.2     MPV 12/10/2024 9.7     Platelets 12/10/2024 327     nRBC 12/10/2024 0     Segmented % 12/10/2024 67     Immature Grans % 12/10/2024 0     Lymphocytes % 12/10/2024 24     Monocytes % 12/10/2024 7     Eosinophils Relative 12/10/2024 1     Basophils Relative 12/10/2024 1     Absolute Neutrophils 12/10/2024 4.98     Absolute Immature Grans 12/10/2024 0.02     Absolute Lymphocytes 12/10/2024 1.77     Absolute Monocytes 12/10/2024 0.55     Eosinophils Absolute 12/10/2024 0.04     Basophils Absolute 12/10/2024 0.06     TSH 3RD GENERATON 12/10/2024 1.520     Cholesterol 12/10/2024 190     Triglycerides 12/10/2024 66     HDL, Direct 12/10/2024 50     LDL Calculated 12/10/2024 127 (H)     Vit D, 25-Hydroxy 12/10/2024 17.0 (L)          Substance Abuse History:  Social History     Substance and Sexual Activity   Drug Use Not Currently    Types: Marijuana    Comment: reports medical marijuana use almost every day       Family Psychiatric History:   Family History   Problem Relation Name Age of Onset    Mental illness Mother      Hypertension Mother      ADD / ADHD Mother      Anxiety disorder Mother      Vitamin D deficiency Father      Prostate cancer Father      OCD Father      Diabetes Maternal Grandmother      Schizophrenia Paternal Uncle      Suicide Attempts Neg Hx      Completed Suicide  Neg Hx         The following portions of  the patient's history were reviewed and updated as appropriate: allergies, current medications, past family history, past medical history, past social history, past surgical history, and problem list.    Social History     Socioeconomic History    Marital status: /Civil Union     Spouse name: Not on file    Number of children: 0    Years of education: Not on file    Highest education level: Not on file   Occupational History    Not on file   Tobacco Use    Smoking status: Never    Smokeless tobacco: Never    Tobacco comments:     was a social smoker   Vaping Use    Vaping status: Never Used   Substance and Sexual Activity    Alcohol use: Not Currently     Alcohol/week: 1.0 - 2.0 standard drink of alcohol     Types: 1 - 2 Cans of beer per week     Comment: once per week    Drug use: Not Currently     Types: Marijuana     Comment: reports medical marijuana use almost every day    Sexual activity: Not Currently     Partners: Male, Female   Other Topics Concern    Not on file   Social History Narrative    Not on file     Social Drivers of Health     Financial Resource Strain: Low Risk  (7/28/2020)    Overall Financial Resource Strain (CARDIA)     Difficulty of Paying Living Expenses: Not hard at all   Food Insecurity: No Food Insecurity (1/7/2025)    Nursing - Inadequate Food Risk Classification     Worried About Running Out of Food in the Last Year: Not on file     Ran Out of Food in the Last Year: Not on file     Ran Out of Food in the Last Year: Never true   Transportation Needs: No Transportation Needs (1/7/2025)    Nursing - Transportation Risk Classification     Lack of Transportation: Not on file     Lack of Transportation: No   Physical Activity: Inactive (9/30/2019)    Exercise Vital Sign     Days of Exercise per Week: 0 days     Minutes of Exercise per Session: 0 min   Stress: Stress Concern Present (9/30/2019)    [employer] [place] [employer] - Occupational Stress Questionnaire     Feeling  of Stress : To some extent   Social Connections: Unknown (2019)    Social Connection and Isolation Panel     Frequency of Communication with Friends and Family: Patient declined     Frequency of Social Gatherings with Friends and Family: Patient declined     Attends Quaker Services: Patient declined     Active Member of Clubs or Organizations: Patient declined     Attends Club or Organization Meetings: Patient declined     Marital Status: Patient declined   Intimate Partner Violence: Unknown (2025)    Nursing IPS     Feels Physically and Emotionally Safe: Not on file     Physically Hurt by Someone: Not on file     Humiliated or Emotionally Abused by Someone: Not on file     Physically Hurt by Someone: No     Hurt or Threatened by Someone: No   Housing Stability: Unknown (2025)    Nursing: Inadequate Housing Risk Classification     Has Housing: Not on file     Worried About Losing Housing: Not on file     Unable to Get Utilities: Not on file     Unable to Pay for Housing in the Last Year: No     Has Housin     Social History     Social History Narrative    Not on file       Objective:     Mental status:  Appearance calm and cooperative , adequate hygiene and grooming, and good eye contact    Mood dysphoric   Affect affect was constricted   Speech a normal rate and fluent   Thought Processes coherent/organized and normal thought processes   Hallucinations no hallucinations present    Thought Content no delusions   Abnormal Thoughts no suicidal thoughts  and no homicidal thoughts    Orientation  oriented to person and place and time   Remote Memory short term memory intact and long term memory intact   Attention Span concentration impaired   Intellect Appears to be of Average Intelligence   Insight Limited insight   Judgement judgment was limited   Muscle Strength Muscle strength and tone were normal and Normal gait    Language no difficulty naming common objects and no difficulty repeating a phrase     Fund of Knowledge displays adequate knowledge of current events, adequate fund of knowledge regarding past history, and adequate fund of knowledge regarding vocabulary                Assessment/Plan:       Diagnoses and all orders for this visit:    Bipolar 2 disorder (HCC)  -     lamoTRIgine (LaMICtal) 100 mg tablet; Take 1 tablet (100 mg total) by mouth daily              Assessment & Plan  Bipolar 2 disorder (HCC)    Orders:    lamoTRIgine (LaMICtal) 100 mg tablet; Take 1 tablet (100 mg total) by mouth daily       Treatment Recommendations- Risks Benefits      Immediate Medical/Psychiatric/Psychotherapy Treatments and Any Precautions: as sated on HPI    Risks, Benefits And Possible Side Effects Of Medications:  {PSYCH RISK, BENEFITS AND POSSIBLE SIDE EFFECTS (Optional):33658    Controlled Medication Discussion: n/a    Psychotherapy Provided: Individual psychotherapy provided.       Psychotherapy Provided:     Individual psychotherapy provided: Yes  Counseling was provided during the session today for 16 minutes.  Medications, treatment progress and treatment plan reviewed with Raghav.  Medication education provided to Raghav.  Goals discussed during in session: continue improvement in mood stability.   Coping strategies including compliance with medications and contacting a therapist reviewed with Raghav.

## 2025-05-27 NOTE — TELEPHONE ENCOUNTER
PT came to [facility] after MM appt. as she received a notice of medical records needed. Writer attached letter received as it does not list a lot of information. Placed in Records mailbox for review

## 2025-05-29 ENCOUNTER — TELEPHONE (OUTPATIENT)
Dept: PSYCHIATRY | Facility: CLINIC | Age: 30
End: 2025-05-29

## 2025-05-29 NOTE — TELEPHONE ENCOUNTER
A medical records request (MELINDA) was received 5/29/25 from [facility] of Human Services[facility]. The date range is 5/1/24-Present.    Paperwork was placed in the mailboxes of Dr Armenta, Adalgisa BRONSON (for Antonio GALLAGHER and Kathi PARIKH for their review.. A copy was also faxed to [facility] office for Dr White's review.

## 2025-06-03 ENCOUNTER — OFFICE VISIT (OUTPATIENT)
Dept: PHYSICAL THERAPY | Facility: CLINIC | Age: 30
End: 2025-06-03
Attending: NURSE PRACTITIONER
Payer: COMMERCIAL

## 2025-06-03 ENCOUNTER — SOCIAL WORK (OUTPATIENT)
Dept: BEHAVIORAL/MENTAL HEALTH CLINIC | Facility: CLINIC | Age: 30
End: 2025-06-03
Payer: COMMERCIAL

## 2025-06-03 DIAGNOSIS — M25.512 CHRONIC LEFT SHOULDER PAIN: ICD-10-CM

## 2025-06-03 DIAGNOSIS — F43.10 PTSD (POST-TRAUMATIC STRESS DISORDER): ICD-10-CM

## 2025-06-03 DIAGNOSIS — F31.81 BIPOLAR 2 DISORDER (HCC): Primary | ICD-10-CM

## 2025-06-03 DIAGNOSIS — F60.3 BORDERLINE PERSONALITY DISORDER (HCC): ICD-10-CM

## 2025-06-03 DIAGNOSIS — G89.29 CHRONIC LEFT SHOULDER PAIN: ICD-10-CM

## 2025-06-03 DIAGNOSIS — M25.552 LEFT HIP PAIN: Primary | ICD-10-CM

## 2025-06-03 PROCEDURE — 97140 MANUAL THERAPY 1/> REGIONS: CPT

## 2025-06-03 PROCEDURE — 97110 THERAPEUTIC EXERCISES: CPT

## 2025-06-03 PROCEDURE — 90837 PSYTX W PT 60 MINUTES: CPT

## 2025-06-03 PROCEDURE — 97112 NEUROMUSCULAR REEDUCATION: CPT

## 2025-06-03 NOTE — PSYCH
"Behavioral Health Psychotherapy Progress Note    Psychotherapy Provided: Individual Psychotherapy     1. Bipolar 2 disorder (HCC)        2. Borderline personality disorder (HCC)        3. PTSD (post-traumatic stress disorder)            Goals addressed in session: Goal 1     DATA: Raghav arrived on time for session. Raghav reports to be doing okay this week as she is doing her best to continue working on maintaining her routine on a daily basis. Raghav reports that things at work are good but she is currently working on maintaining a routine at work as well as a way to manage and maintain her anxiety.    During this session, this clinician used the following therapeutic modalities: Supportive Psychotherapy    Substance Abuse was not addressed during this session. If the client is diagnosed with a co-occurring substance use disorder, please indicate any changes in the frequency or amount of use: N/A. Stage of change for addressing substance use diagnoses: No substance use/Not applicable    ASSESSMENT:  Raghav Smith presents with a Euthymic/ normal mood.     her affect is Normal range and intensity, which is congruent, with her mood and the content of the session. The client has made progress on their goals.    Raghav Smith presents with a none risk of suicide, none risk of self-harm, and none risk of harm to others.    For any risk assessment that surpasses a \"low\" rating, a safety plan must be developed.    A safety plan was indicated: no  If yes, describe in detail - N/A    PLAN: Between sessions, Raghav Smith will continue working on maintaining routine. At the next session, the therapist will use Client-centered Therapy, Cognitive Behavioral Therapy, Motivational Interviewing, and Supportive Psychotherapy to address anxiety and routine.    Behavioral Health Treatment Plan and Discharge Planning: Raghav Smith is aware of and agrees to continue to work on their treatment plan. They " have identified and are working toward their discharge goals. yes    Depression Follow-up Plan Completed: Not applicable    Visit start and stop times:    06/03/25  Start Time: 1600  Stop Time: 1700  Total Visit Time: 60 minutes

## 2025-06-03 NOTE — PROGRESS NOTES
"Daily Note     Today's date: 6/3/2025  Patient name: Raghav Smith  : 1995  MRN: [id number]  Referring provider: Linda Rao CRNP  Dx:   Encounter Diagnosis     ICD-10-CM    1. Left hip pain  M25.552       2. Chronic left shoulder pain  M25.512     G89.29                      Subjective:Pt reports slightly irritated symptoms from standing up from a chair wrong.     Objective: See treatment diary below    Assessment: Performed distraction and PROM in order to address symptoms related to hip impingement. Did help relieve, then followed with stabilization training. Educated on self STM with ball on wall.     Plan: Continue per plan of care.  Progress treatment as tolerated.       Precautions: Appendectomy 2025, not cleared for full activity until 6 week johnathan    Manuals 4/21 4/22 4/28 4/29 5/12 5/13 5/19 5/20 5/27 6/3                Prone PA thoracic mobilization gr V  3x10 ea           L hip PROM 8' 8'     5'   8'   LLE LAD             MET's L hip ext, R hip flexion 10x5\" ea 10x5\" ea   10x5\" ea        L scap mobs all planes     1x10 ea        GHJ PA mobs gr IV     3x20        Neuro Re-Ed             Seated c/s retraction HEP edu 1x10 prn            Supine TrA bridge --> SL bridge when able 3x15 3x15 3x15 3x15 3x15 3x15 3x15 3x15 3x15 3x15   Sidelying TrA hold clamshell Blue 3x12 Blue 3x12 Blue  3x12 Blue  3x12 Blue  3x12 Blue  3x12               Blue  3x15 Blue  3x15 Blue  3x15 Blue  3x15   Supine hip add isometric 30x5\" 30x5\" 30x5\"          TrA SLR 3x15 ea 3x15 ea 1#  3x10 ea 1#  3x10 ea 1#  3x10 ea 1#  3x10 ea 1.5#  3x10 ea 1.5#  3x10 ea 1.5#  3x10 ea 1.5#  3x10 ea   Mini Squats 1x10    5# KB  1x10    7.5# KB  1x10    10# KB  1x10 1x10    5# KB  1x10    7.5# KB  1x10    10# KB  1x10 1x10    5# KB  1x10    7.5# KB  1x10    10# KB  1x10 1x10    10#  3x10  KB 10#  3x10  KB 10# KB  1x10    15# KB  2x10 10# KB  1x10    15# KB  1x10    20# KB  1x10 10# KB  1x10    15# KB  1x10    20# KB  1x10 10# " "KB  1x10    15# KB  1x10    20# KB  1x10 10# KB  1x10    15# KB  1x10    20# KB  1x10                Ther Ex             Rows 10# CC 3x10 10# CC 3x10 10# CC 3x10 10# CC 3x10 10# CC 3x10 10# CC 3x10 15#  CC  3x10 15#  CC  3x10 15#  CC  3x10 15#  CC  3x10   Lat pull down     50# 3x8 50#  3x8 50#  3x10 50#  3x10 50#  3x10 50#  3x10   TB ER Blue 3x10 Blue 3x10 Blue  3x10 Blue  3x10 Blue  3x10 Blue  3x10 Black  3x10 Black  3x10 Black  3x10 Black  3x10   Sidelying ER AROM     3# 2x8 3#  3x8 3#  3x10 3#  3x10 3#  3x10 3#  3x10   Serratus push ups  2x10 2x10 2x10 2x10  2x10 2x10 2x10 2x10 2x10                                                                    Ther Activity             Lateral step ups Foam 8\" 3x10 Foam 8\" 3x10 Foam  8\"  3x10 Foam  8\"  3x10 Foam  8\"  3x10 Foam  8\"  3x10 Foam  8\"  3x10 Foam  8\"  3x10 Foam  8\"  3x10 Foam  8\"  3x10                             Gait Training                                       Modalities                                          "

## 2025-06-06 DIAGNOSIS — B00.1 COLD SORE: ICD-10-CM

## 2025-06-06 RX ORDER — VALACYCLOVIR HYDROCHLORIDE 1 G/1
1000 TABLET, FILM COATED ORAL 2 TIMES DAILY
Qty: 14 TABLET | Refills: 0 | Status: SHIPPED | OUTPATIENT
Start: 2025-06-06 | End: 2025-06-13

## 2025-06-09 ENCOUNTER — APPOINTMENT (OUTPATIENT)
Dept: PHYSICAL THERAPY | Facility: CLINIC | Age: 30
End: 2025-06-09
Attending: NURSE PRACTITIONER
Payer: COMMERCIAL

## 2025-06-10 ENCOUNTER — SOCIAL WORK (OUTPATIENT)
Dept: BEHAVIORAL/MENTAL HEALTH CLINIC | Facility: CLINIC | Age: 30
End: 2025-06-10
Payer: COMMERCIAL

## 2025-06-10 ENCOUNTER — OFFICE VISIT (OUTPATIENT)
Dept: PHYSICAL THERAPY | Facility: CLINIC | Age: 30
End: 2025-06-10
Attending: NURSE PRACTITIONER
Payer: COMMERCIAL

## 2025-06-10 DIAGNOSIS — F60.3 BORDERLINE PERSONALITY DISORDER (HCC): ICD-10-CM

## 2025-06-10 DIAGNOSIS — M25.552 LEFT HIP PAIN: Primary | ICD-10-CM

## 2025-06-10 DIAGNOSIS — F43.10 PTSD (POST-TRAUMATIC STRESS DISORDER): ICD-10-CM

## 2025-06-10 DIAGNOSIS — G89.29 CHRONIC LEFT SHOULDER PAIN: ICD-10-CM

## 2025-06-10 DIAGNOSIS — M25.512 CHRONIC LEFT SHOULDER PAIN: ICD-10-CM

## 2025-06-10 DIAGNOSIS — F31.81 BIPOLAR 2 DISORDER (HCC): Primary | ICD-10-CM

## 2025-06-10 PROCEDURE — 97110 THERAPEUTIC EXERCISES: CPT

## 2025-06-10 PROCEDURE — 90837 PSYTX W PT 60 MINUTES: CPT

## 2025-06-10 PROCEDURE — 97112 NEUROMUSCULAR REEDUCATION: CPT

## 2025-06-10 NOTE — PSYCH
"Behavioral Health Psychotherapy Progress Note    Psychotherapy Provided: Individual Psychotherapy     1. Bipolar 2 disorder (HCC)        2. Borderline personality disorder (HCC)        3. PTSD (post-traumatic stress disorder)            Goals addressed in session: Goal 1     DATA: Raghav arrived on time for session. Raghav reports trying to figure out what the question \"how are you doing\" means for her as she reports not often taking time to actually think about how she is feeling. Raghav reports that she is upset with herself as it takes her too long to do the things that she needs to on a daily basis because she constantly over thinks things. Raghav and clinician explored different forms of what anxiety can look like for her and how that is affecting her on a daily basis. Raghav and clinician engage in anxiety management tools and techniques.    During this session, this clinician used the following therapeutic modalities: Client-centered Therapy, Solution-Focused Therapy, and Supportive Psychotherapy    Substance Abuse was not addressed during this session. If the client is diagnosed with a co-occurring substance use disorder, please indicate any changes in the frequency or amount of use: N/A. Stage of change for addressing substance use diagnoses: No substance use/Not applicable    ASSESSMENT:  Raghav Smith presents with a Euthymic/ normal mood.     her affect is Normal range and intensity, which is congruent, with her mood and the content of the session. The client has made progress on their goals.    Raghav Smith presents with a none risk of suicide, none risk of self-harm, and none risk of harm to others.    For any risk assessment that surpasses a \"low\" rating, a safety plan must be developed.    A safety plan was indicated: no  If yes, describe in detail - N/A    PLAN: Between sessions, Raghav Smith will practice anxiety coping techniques. At the next session, the therapist " will use Client-centered Therapy, Cognitive Behavioral Therapy, Motivational Interviewing, Solution-Focused Therapy, and Supportive Psychotherapy to address anxiety and PTSD.    Behavioral Health Treatment Plan and Discharge Planning: Raghav Smith is aware of and agrees to continue to work on their treatment plan. They have identified and are working toward their discharge goals. yes    Depression Follow-up Plan Completed: Not applicable    Visit start and stop times:    06/10/25  Start Time: 1200  Stop Time: 1300  Total Visit Time: 60 minutes

## 2025-06-10 NOTE — PROGRESS NOTES
Daily Note     Today's date: 6/10/2025  Patient name: Raghav Smith  : 1995  MRN: [id number]  Referring provider: Linda Rao CRNP  Dx:   Encounter Diagnosis     ICD-10-CM    1. Left hip pain  M25.552       2. Chronic left shoulder pain  M25.512     G89.29           Start Time: 1730  Stop Time: 1830  Total time in clinic (min): 60 minutes    Subjective: Patient reports occasional L hip pinching with flexion. Mild pain prior to therapy.     Objective: See treatment diary below    Assessment: Patient continues to benefit from hip strengthening to further improve her functional ability. Moderate fatigue noted with progressions made today. Continue to progress.     Plan: Continue per plan of care.  Progress treatment as tolerated.       Precautions: Appendectomy 2025, not cleared for full activity until 6 week johnathan    Manuals 6/10      5/19 5/20 5/27 6/3                Prone PA thoracic mobilization gr V             L hip PROM 8'      5'   8'   LLE LAD             MET's L hip ext, R hip flexion             L scap mobs all planes             GHJ PA mobs gr IV             Neuro Re-Ed             Seated c/s retraction             Supine TrA bridge --> SL bridge when able 3x15      3x15 3x15 3x15 3x15   Sidelying TrA hold clamshell Blue  3x15      Blue  3x15 Blue  3x15 Blue  3x15 Blue  3x15   Supine hip add isometric             TrA SLR 2#  3x10 ea      1.5#  3x10 ea 1.5#  3x10 ea 1.5#  3x10 ea 1.5#  3x10 ea   Mini Squats 10# KB  1x10    15# KB  1x10    20# KB  1x10    25#  1x10      10# KB  1x10    15# KB  1x10    20# KB  1x10 10# KB  1x10    15# KB  1x10    20# KB  1x10 10# KB  1x10    15# KB  1x10    20# KB  1x10 10# KB  1x10    15# KB  1x10    20# KB  1x10                Ther Ex             Rows 15#  CC  3x10      15#  CC  3x10 15#  CC  3x10 15#  CC  3x10 15#  CC  3x10   Lat pull down 60#  3x10      50#  3x10 50#  3x10 50#  3x10 50#  3x10   TB ER Black  3x10      Black  3x10 Black  3x10 Black  3x10  "Black  3x10   Sidelying ER AROM 3#  3x10      3#  3x10 3#  3x10 3#  3x10 3#  3x10   Serratus push ups 2x10      2x10 2x10 2x10 2x10                                                                    Ther Activity             Lateral step ups NV      Foam  8\"  3x10 Foam  8\"  3x10 Foam  8\"  3x10 Foam  8\"  3x10                             Gait Training                                       Modalities                                          "

## 2025-06-12 ENCOUNTER — OFFICE VISIT (OUTPATIENT)
Age: 30
End: 2025-06-12
Payer: COMMERCIAL

## 2025-06-12 DIAGNOSIS — F31.81 BIPOLAR 2 DISORDER (HCC): ICD-10-CM

## 2025-06-12 DIAGNOSIS — F60.3 BORDERLINE PERSONALITY DISORDER (HCC): Chronic | ICD-10-CM

## 2025-06-12 DIAGNOSIS — F41.9 ANXIETY: Primary | ICD-10-CM

## 2025-06-12 PROCEDURE — 90791 PSYCH DIAGNOSTIC EVALUATION: CPT | Performed by: COUNSELOR

## 2025-06-16 ENCOUNTER — OFFICE VISIT (OUTPATIENT)
Dept: PHYSICAL THERAPY | Facility: CLINIC | Age: 30
End: 2025-06-16
Attending: NURSE PRACTITIONER
Payer: COMMERCIAL

## 2025-06-16 DIAGNOSIS — M25.512 CHRONIC LEFT SHOULDER PAIN: ICD-10-CM

## 2025-06-16 DIAGNOSIS — G89.29 CHRONIC LEFT SHOULDER PAIN: ICD-10-CM

## 2025-06-16 DIAGNOSIS — M25.552 LEFT HIP PAIN: Primary | ICD-10-CM

## 2025-06-16 PROCEDURE — 97140 MANUAL THERAPY 1/> REGIONS: CPT

## 2025-06-16 PROCEDURE — 97112 NEUROMUSCULAR REEDUCATION: CPT

## 2025-06-16 PROCEDURE — 97110 THERAPEUTIC EXERCISES: CPT

## 2025-06-16 NOTE — PROGRESS NOTES
Daily Note     Today's date: 2025  Patient name: Raghav Smith  : 1995  MRN: [id number]  Referring provider: Linda Rao CRNP  Dx:   Encounter Diagnosis     ICD-10-CM    1. Left hip pain  M25.552       2. Chronic left shoulder pain  M25.512     G89.29           Start Time: 1700  Stop Time: 1800  Total time in clinic (min): 60 minutes    Subjective: Patient reports increased L scapular pain recently. Raghav notes some improvement in her shoulder blade with retractions but it did not abolish. Continues to have L hip pinching with AROM.    Objective: See treatment diary below    Assessment: Patient continues to benefit from hip strengthening to further improve her functional ability. Completed repeated t spine extension which helped her scap symptoms but did not abolish. L hip ER OP PROM improved L hip flexion PROM and abolished pinch. Assess response NV.    Plan: Continue per plan of care.  Progress treatment as tolerated.       Precautions: Appendectomy 2025, not cleared for full activity until 6 week johnathan    Manuals 6/10 6/16     5/19 5/20 5/27 6/3                Prone PA thoracic mobilization gr V             L hip PROM 8' 8'     5'   8'   LLE LAD             MET's L hip ext, R hip flexion             L scap mobs all planes             GHJ PA mobs gr IV             Neuro Re-Ed             Seated c/s retraction             Supine TrA bridge --> SL bridge when able 3x15 3x18     3x15 3x15 3x15 3x15   Sidelying TrA hold clamshell Blue  3x15 Blue  3x18     Blue  3x15 Blue  3x15 Blue  3x15 Blue  3x15   Supine hip add isometric             TrA SLR 2#  3x10 ea 2#  3x10   ea     1.5#  3x10 ea 1.5#  3x10 ea 1.5#  3x10 ea 1.5#  3x10 ea   Mini Squats 10# KB  1x10    15# KB  1x10    20# KB  1x10    25#  1x10 10# KB  1x10    15# KB  1x10    20# KB  1x10    25#  1x10     10# KB  1x10    15# KB  1x10    20# KB  1x10 10# KB  1x10    15# KB  1x10    20# KB  1x10 10# KB  1x10    15# KB  1x10    20# KB  1x10  "10# KB  1x10    15# KB  1x10    20# KB  1x10                Ther Ex             Rows 15#  CC  3x10 15#  CC  3x10     15#  CC  3x10 15#  CC  3x10 15#  CC  3x10 15#  CC  3x10   Lat pull down 60#  3x10 60#  3x10     50#  3x10 50#  3x10 50#  3x10 50#  3x10   TB ER Black  3x10 Black  3x12     Black  3x10 Black  3x10 Black  3x10 Black  3x10   Sidelying ER AROM 3#  3x10 3#  3x10     3#  3x10 3#  3x10 3#  3x10 3#  3x10   Serratus push ups 2x10 2x10     2x10 2x10 2x10 2x10   Seated t-spine ext over half foam  2x10                                                               Ther Activity             Lateral step ups NV Foam  8\"  3x10     Foam  8\"  3x10 Foam  8\"  3x10 Foam  8\"  3x10 Foam  8\"  3x10                             Gait Training                                       Modalities                                          "

## 2025-06-17 ENCOUNTER — OFFICE VISIT (OUTPATIENT)
Dept: PHYSICAL THERAPY | Facility: CLINIC | Age: 30
End: 2025-06-17
Attending: NURSE PRACTITIONER
Payer: COMMERCIAL

## 2025-06-17 DIAGNOSIS — M25.552 LEFT HIP PAIN: Primary | ICD-10-CM

## 2025-06-17 DIAGNOSIS — G89.29 CHRONIC LEFT SHOULDER PAIN: ICD-10-CM

## 2025-06-17 DIAGNOSIS — M25.512 CHRONIC LEFT SHOULDER PAIN: ICD-10-CM

## 2025-06-17 PROCEDURE — 97110 THERAPEUTIC EXERCISES: CPT

## 2025-06-17 PROCEDURE — 97112 NEUROMUSCULAR REEDUCATION: CPT

## 2025-06-17 PROCEDURE — 97140 MANUAL THERAPY 1/> REGIONS: CPT

## 2025-06-17 NOTE — PROGRESS NOTES
"Daily Note     Today's date: 2025  Patient name: Raghav Smith  : 1995  MRN: [id number]  Referring provider: Linda Rao CRNP  Dx:   Encounter Diagnosis     ICD-10-CM    1. Left hip pain  M25.552       2. Chronic left shoulder pain  M25.512     G89.29         One on One time from 5:00pm - 5:30pm and 6:00pm to 6:45pm               Subjective: Patient reports continued pinching in the hip with end range AROM and medial inferior scap border pain.     Objective: See treatment diary below    Assessment: Attempted retraction repeated movements progression to address scap pain with min difference. Also continued thoracic mobility which did reduce intensity slightly. Manual techniques improve the degree of flexion at which pinching occurs.  Added independent 90/90 shoulder stretching as pt report difficulty with shoulder mobility to wash her hair.     Plan: Continue per plan of care.  Progress treatment as tolerated.  One visit on mobility and one visit on neuromuscular control/strength.      Precautions: Appendectomy 2025, not cleared for full activity until 6 week johnathan    Manuals 6/10 6/16 6/17 Mobility Strength        Prone PA thoracic mobilization gr IV   T14-12   2x10 ea x         L shoulder PROM especially 90/90 ER    x         L hip PROM 8' 8' 8' x         LLE LAD   5x10\" x         Inferior L hip glide gr IV    2x10 x                                   Neuro Re-Ed             Supine c/s retraction off EOB with towel   2x10 x         Supine thoracic ext over foam roll   2x10 x         90/90 doorway stretch for pec and ER   2x10\" ea          Supine TrA bridge --> SL bridge when able 3x15 3x18 3x18  x        Sidelying TrA hold clamshell Blue  3x15 Blue  3x18 Blue 3x18  x        TrA SLR 2#  3x10 ea 2#  3x10   ea   optional        Mini Squats 10# KB  1x10    15# KB  1x10    20# KB  1x10    25#  1x10 10# KB  1x10    15# KB  1x10    20# KB  1x10    25#  1x10 10# KB  1x10    15# KB  1x10    20# " "KB  1x10    25#  1x10  x                     Ther Ex             Rows 15#  CC  3x10 15#  CC  3x10   x        Lat pull down 60#  3x10 60#  3x10 60# 3x10  x        TB ER Black  3x10 Black  3x12   x        Sidelying ER AROM 3#  3x10 3#  3x10   x        Serratus push ups 2x10 2x10                                                                            Ther Activity             Lateral step ups NV Foam  8\"  3x10   x                                  Gait Training                                       Modalities                                          "
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
## 2025-06-18 ENCOUNTER — SOCIAL WORK (OUTPATIENT)
Dept: BEHAVIORAL/MENTAL HEALTH CLINIC | Facility: CLINIC | Age: 30
End: 2025-06-18
Payer: COMMERCIAL

## 2025-06-18 DIAGNOSIS — F31.81 BIPOLAR 2 DISORDER (HCC): Primary | ICD-10-CM

## 2025-06-18 DIAGNOSIS — F60.3 BORDERLINE PERSONALITY DISORDER (HCC): ICD-10-CM

## 2025-06-18 DIAGNOSIS — F43.10 PTSD (POST-TRAUMATIC STRESS DISORDER): ICD-10-CM

## 2025-06-18 PROCEDURE — 90837 PSYTX W PT 60 MINUTES: CPT

## 2025-06-18 NOTE — PSYCH
"Behavioral Health Psychotherapy Progress Note    Psychotherapy Provided: Individual Psychotherapy     1. Bipolar 2 disorder (HCC)        2. Borderline personality disorder (HCC)        3. PTSD (post-traumatic stress disorder)            Goals addressed in session: Goal 1     DATA: Raghav arrived on time for session. Raghav reports that lately she feels she has been craving intimacy on a more deeper level. Raghav stated \" I want to connect on a deeper level of intimacy with other people without being anxious in order to build more deeper bonds that then eventually lead to a sexual connection\". Raghav reports struggling to make certain connections as she feels like the way that she loves in and interacts with others is a bit more over the top compared to other people.Raghav states wanting to work on more self-exploration as she feels that there are things deeper inside her that need to be explored in order for her to have the intimacy and connection that she is craving.Raghav reports trying to work more on her self discipline as a way to make connections based off of her wants versus her needs.    During this session, this clinician used the following therapeutic modalities: Client-centered Therapy and Supportive Psychotherapy    Substance Abuse was not addressed during this session. If the client is diagnosed with a co-occurring substance use disorder, please indicate any changes in the frequency or amount of use: N/A. Stage of change for addressing substance use diagnoses: No substance use/Not applicable    ASSESSMENT:  Raghav Smith presents with a Euthymic/ normal mood.     her affect is Normal range and intensity, which is congruent, with her mood and the content of the session. The client has made progress on their goals.    Raghav Smith presents with a none risk of suicide, none risk of self-harm, and none risk of harm to others.    For any risk assessment that surpasses a \"low\" " rating, a safety plan must be developed.    A safety plan was indicated: no  If yes, describe in detail - N/A    PLAN: Between sessions, Raghav Smith will Raghav will continue to work on self exploration. At the next session, the therapist will use Client-centered Therapy, Cognitive Behavioral Therapy, Motivational Interviewing, Solution-Focused Therapy, and Supportive Psychotherapy to address PTSD and anxiety.    Behavioral Health Treatment Plan and Discharge Planning: Raghav Smith is aware of and agrees to continue to work on their treatment plan. They have identified and are working toward their discharge goals. yes    Depression Follow-up Plan Completed: Not applicable    Visit start and stop times:    06/18/25  Start Time: 1300  Stop Time: 1400  Total Visit Time: 60 minutes

## 2025-06-23 ENCOUNTER — OFFICE VISIT (OUTPATIENT)
Dept: PHYSICAL THERAPY | Facility: CLINIC | Age: 30
End: 2025-06-23
Attending: NURSE PRACTITIONER
Payer: COMMERCIAL

## 2025-06-23 DIAGNOSIS — M25.512 CHRONIC LEFT SHOULDER PAIN: ICD-10-CM

## 2025-06-23 DIAGNOSIS — G89.29 CHRONIC LEFT SHOULDER PAIN: ICD-10-CM

## 2025-06-23 DIAGNOSIS — M25.552 LEFT HIP PAIN: Primary | ICD-10-CM

## 2025-06-23 PROCEDURE — 97110 THERAPEUTIC EXERCISES: CPT

## 2025-06-23 PROCEDURE — 97140 MANUAL THERAPY 1/> REGIONS: CPT

## 2025-06-23 PROCEDURE — 97112 NEUROMUSCULAR REEDUCATION: CPT

## 2025-06-23 NOTE — PROGRESS NOTES
"Daily Note     Today's date: 2025  Patient name: Vipul Smith  : 1995  MRN: [id number]  Referring provider: Linda Rao CRNP  Dx:   Encounter Diagnosis     ICD-10-CM    1. Left hip pain  M25.552       2. Chronic left shoulder pain  M25.512     G89.29                      Subjective: Patient reports feeling okay today, tired overall. She had trouble with mobility when paddle boarding.     Objective: See treatment diary below    Assessment: Focused on mobility today with good response. Did perform strengthening that vipul is unable to perform independently at home. No pinching in hip following manual therapy. Continue to progress as able.     Plan: Continue per plan of care.  Progress treatment as tolerated.  One visit on mobility and one visit on neuromuscular control/strength.      Precautions: Appendectomy 2025, not cleared for full activity until 6 week johnathan    Manuals 6/10 6/16 6/17 Mobility Strength        Prone PA thoracic mobilization gr IV   T14-12   2x10 ea x  2x10 ea       L shoulder PROM especially 90/90 ER    x  4'       L hip PROM 8' 8' 8' x  4'       LLE LAD   5x10\" x  5x10\"       Inferior L hip glide gr IV    2x10 x  2x10                                 Neuro Re-Ed             Supine c/s retraction off EOB with towel   2x10 x         Supine thoracic ext over foam roll   2x10 x         90/90 doorway stretch for pec and ER   2x10\" ea          Supine TrA bridge --> SL bridge when able 3x15 3x18 3x18  x        Sidelying TrA hold clamshell Blue  3x15 Blue  3x18 Blue 3x18  x        TrA SLR 2#  3x10 ea 2#  3x10   ea   optional        Mini Squats 10# KB  1x10    15# KB  1x10    20# KB  1x10    25#  1x10 10# KB  1x10    15# KB  1x10    20# KB  1x10    25#  1x10 10# KB  1x10    15# KB  1x10    20# KB  1x10    25#  1x10  x 10# KB  1x10    15# KB  1x10    20# KB  1x10    25#  1x10                    Ther Ex             Rows 15#  CC  3x10 15#  CC  3x10   x 15# 3x10       Lat pull down " "60#  3x10 60#  3x10 60# 3x10  x 60# 3x10       TB ER Black  3x10 Black  3x12   x Black 3x12       Sidelying ER AROM 3#  3x10 3#  3x10   x        Serratus push ups 2x10 2x10                                                                            Ther Activity             Lateral step ups NV Foam  8\"  3x10   x                                  Gait Training                                       Modalities                                          "

## 2025-06-24 ENCOUNTER — SOCIAL WORK (OUTPATIENT)
Dept: BEHAVIORAL/MENTAL HEALTH CLINIC | Facility: CLINIC | Age: 30
End: 2025-06-24
Payer: COMMERCIAL

## 2025-06-24 ENCOUNTER — APPOINTMENT (OUTPATIENT)
Dept: PHYSICAL THERAPY | Facility: CLINIC | Age: 30
End: 2025-06-24
Attending: NURSE PRACTITIONER
Payer: COMMERCIAL

## 2025-06-24 DIAGNOSIS — F31.81 BIPOLAR 2 DISORDER (HCC): Primary | ICD-10-CM

## 2025-06-24 DIAGNOSIS — F60.3 BORDERLINE PERSONALITY DISORDER (HCC): ICD-10-CM

## 2025-06-24 DIAGNOSIS — F43.10 PTSD (POST-TRAUMATIC STRESS DISORDER): ICD-10-CM

## 2025-06-24 PROCEDURE — 90837 PSYTX W PT 60 MINUTES: CPT

## 2025-06-24 NOTE — PSYCH
"Behavioral Health Psychotherapy Progress Note    Psychotherapy Provided: Individual Psychotherapy     1. Bipolar 2 disorder (HCC)        2. Borderline personality disorder (HCC)        3. PTSD (post-traumatic stress disorder)            Goals addressed in session: Goal 1     DATA: Raghav arrived on time for session. Raghav reports to be feeling all over the place today and is unsure what feelings she is feeling. Raghav and clinician utilized the feelings wheel to identify feelings in which she was feeling as a way to work on emotional regulation.     During this session, this clinician used the following therapeutic modalities: Supportive Psychotherapy    Substance Abuse was not addressed during this session. If the client is diagnosed with a co-occurring substance use disorder, please indicate any changes in the frequency or amount of use: N/A. Stage of change for addressing substance use diagnoses: No substance use/Not applicable    ASSESSMENT:  Raghav Smith presents with a Euthymic/ normal mood.     her affect is Normal range and intensity, which is congruent, with her mood and the content of the session. The client has made progress on their goals.    Raghav Smith presents with a none risk of suicide, none risk of self-harm, and none risk of harm to others.    For any risk assessment that surpasses a \"low\" rating, a safety plan must be developed.    A safety plan was indicated: no  If yes, describe in detail - N/A    PLAN: Between sessions, Raghav Smith will identify more of her feelings using the feelings wheel. At the next session, the therapist will use Client-centered Therapy, Motivational Interviewing, Solution-Focused Therapy, and Supportive Psychotherapy to address regulating emotions.    Behavioral Health Treatment Plan and Discharge Planning: Raghav Smith is aware of and agrees to continue to work on their treatment plan. They have identified and are working toward their " discharge goals. no    Depression Follow-up Plan Completed: Not applicable    Visit start and stop times:    06/24/25  Start Time: 1200  Stop Time: 1300  Total Visit Time: 60 minutes

## 2025-06-30 ENCOUNTER — OFFICE VISIT (OUTPATIENT)
Age: 30
End: 2025-06-30
Payer: COMMERCIAL

## 2025-06-30 ENCOUNTER — EVALUATION (OUTPATIENT)
Dept: PHYSICAL THERAPY | Facility: CLINIC | Age: 30
End: 2025-06-30
Attending: NURSE PRACTITIONER
Payer: COMMERCIAL

## 2025-06-30 DIAGNOSIS — F31.81 BIPOLAR 2 DISORDER (HCC): ICD-10-CM

## 2025-06-30 DIAGNOSIS — G89.29 CHRONIC LEFT SHOULDER PAIN: ICD-10-CM

## 2025-06-30 DIAGNOSIS — M25.512 CHRONIC LEFT SHOULDER PAIN: ICD-10-CM

## 2025-06-30 DIAGNOSIS — F41.9 ANXIETY: Primary | ICD-10-CM

## 2025-06-30 DIAGNOSIS — F43.10 PTSD (POST-TRAUMATIC STRESS DISORDER): ICD-10-CM

## 2025-06-30 DIAGNOSIS — F60.3 BORDERLINE PERSONALITY DISORDER (HCC): Chronic | ICD-10-CM

## 2025-06-30 DIAGNOSIS — M25.552 LEFT HIP PAIN: Primary | ICD-10-CM

## 2025-06-30 PROCEDURE — 96136 PSYCL/NRPSYC TST PHY/QHP 1ST: CPT | Performed by: COUNSELOR

## 2025-06-30 PROCEDURE — 97110 THERAPEUTIC EXERCISES: CPT

## 2025-06-30 PROCEDURE — 97112 NEUROMUSCULAR REEDUCATION: CPT

## 2025-06-30 PROCEDURE — 97140 MANUAL THERAPY 1/> REGIONS: CPT

## 2025-06-30 PROCEDURE — 96137 PSYCL/NRPSYC TST PHY/QHP EA: CPT | Performed by: COUNSELOR

## 2025-06-30 NOTE — PROGRESS NOTES
PT Re-Evaluation     Today's date: 2025  Patient name: Raghav Smith  : 1995  MRN: [id number]  Referring provider: Linda Rao CRNP  Dx:   Encounter Diagnosis     ICD-10-CM    1. Left hip pain  M25.552       2. Chronic left shoulder pain  M25.512     G89.29                      Assessment    Assessment details:     Impairment/Problem List:  1. Decreased mobility of L hip compared to R hip which is hypermobile at baseline  2. Intraarticular hip pain related to previous trauma  3. Decreased neuromuscular control leading to impaired joint mechanics and possible impingement  4. Decreased neuromuscular control of L shoulder leading to impaired GHJ mechanics and instability without subluxation/dislocation  5. Decreased periscapular strength leading to impaired glenohumeral mechanics  6. Periscapular pain possible referral from cervical spine     Raghav Smith is a 29 y.o. female who presents with L periscapular pain, L shoulder pain and L hip pain. Bindu has made improvements thus far and is tolerating strengthening well. But is still symptomatic with long duration positioning at end range and with high level functional activities including paddle boarding, rope climbing/swings or rock climbing, or lifting/moving things at work. Their clinical presentation aligns with L hip impingement secondary to poor neuromuscular control and L shoulder hypermobility. Raghav Smith is limited with all ADLs and recreational activities secondary to the impairments stated above, and would benefit from continued skilled physical therapy in order to progress neuromuscular control training and graded activity to reach functional goals. Raghav also benefits from manual therapy to reduce symptoms related to impingement. No further referral appears necessary at this time based upon examination results. Prognosis is good given POC/HEP compliance.         Understanding of Dx/Px/POC: good     Prognosis:  good    Goals  Goals  ST-4 weeks.  1.  Pt will increase frequent lifting tolerance at work to 20# without pain.      met  2.  Pt will centralize scap pain.                                                                       not yet met    LT-6 weeks.  1.  Pt will increase frequent lifting tolerance > 30# at work without pain.       not yet met  2.  Pt will bike for 30 min without pain.                                                           not yet met    Plan  Patient would benefit from: skilled physical therapy  Referral necessary: No  Planned modality interventions: low level laser therapy    Planned therapy interventions: abdominal trunk stabilization, IASTM, joint mobilization, manual therapy, nerve gliding, neuromuscular re-education, balance, balance/weight bearing training, body mechanics training, breathing training, patient education, postural training, strengthening, stretching, therapeutic activities, therapeutic exercise, functional ROM exercises, gait training, graded activity, graded exercise and home exercise program    Frequency: 2x week  Duration in weeks: 12  Plan of Care beginning date: 2025  Plan of Care expiration date: 2025  Treatment plan discussed with: patient        Subjective Evaluation    History of Present Illness  Mechanism of injury: Pt reports 70% improvement in shoulder and 75% improvement in hip. At times feels like mobility is the biggest issue, but then still has problems completely functional activities including paddle boarding and rock climbing.   Patient Goals  Patient goals for therapy: decreased pain, improved balance, increased motion, increased strength, independence with ADLs/IADLs and return to sport/leisure activities    Pain  Current pain ratin  At best pain ratin  At worst pain ratin    Treatments  Previous treatment: physical therapy        Objective    L shoulder AROM  Flexion: 175 pain at end range  Abduction: slight UT  "compensation 172   BTB ER: T2  BTB IR: T8     L Shoulder Strength:  Flexion 4+/5   ABD: 4+/5  IR (0) 4+/5  IR (90) 4+/5  ER (0): 4+/5  ER (90) 4/5     L hip PROM  Flexion: 120 degrees   Extension: 20 degrees   Abduction: 65 degrees   External rotation (90/90): 65 degrees   Internal rotation (90/90): 45 degrees      L hip MMT  Flexion: 4+/5  Extension: 4+/5  Abduction: 4-/5  External rotation: 4-/5  Internal rotation: 5/5     LLE lateral step down test  4/5 p!     C/s mechanical assessment:  Standing c/s retraction self OP: D, B       Precautions: Appendectomy 1/7/2025, not cleared for full activity until 6 week johnathan   Manuals 6/10 6/16 6/17 Mobility Strength 6/23 6/30      Prone PA thoracic mobilization gr IV   T14-12   2x10 ea x  2x10 ea 2x10 ea      L shoulder PROM especially 90/90 ER    x  4' 4'      L hip PROM 8' 8' 8' x  4'       LLE LAD   5x10\" x  5x10\" 5x10\"      Inferior L hip glide gr IV    2x10 x  2x10                                 Neuro Re-Ed             Supine c/s retraction off EOB with towel   2x10 x         Supine thoracic ext over foam roll   2x10 x         90/90 doorway stretch for pec and ER   2x10\" ea          Supine TrA bridge --> SL bridge when able 3x15 3x18 3x18  x        Sidelying TrA hold clamshell Blue  3x15 Blue  3x18 Blue 3x18  x        TrA SLR 2#  3x10 ea 2#  3x10   ea   optional        Mini Squats 10# KB  1x10    15# KB  1x10    20# KB  1x10    25#  1x10 10# KB  1x10    15# KB  1x10    20# KB  1x10    25#  1x10 10# KB  1x10    15# KB  1x10    20# KB  1x10    25#  1x10  x 10# KB  1x10    15# KB  1x10    20# KB  1x10    25#  1x10 10# KB  1x10    15# KB  1x10    20# KB  1x10    25#  1x10                   Ther Ex             Rows 15#  CC  3x10 15#  CC  3x10   x 15# 3x10 15# 3x10      Lat pull down 60#  3x10 60#  3x10 60# 3x10  x 60# 3x10 60# 3x10      TB ER Black  3x10 Black  3x12   x Black 3x12 Black 3x12      Sidelying ER AROM 3#  3x10 3#  3x10   x  3# 3x10      Serratus push ups 2x10 " "2x10                                                                            Ther Activity             Lateral step ups NV Foam  8\"  3x10   x                                  Gait Training                                       Modalities                                                "

## 2025-06-30 NOTE — PSYCH
Psychological testing completed by psychologistValarie Avilez completed the ADOS-2 and MDQ     Administration  Start Time: 1216  End time: 1249     Scoring   Start time: 1251  End time: 1310        On the ADOS-2, the following results were obtained:   Communication Total- 2  Reciprocal Social interaction- 1  Communication and social interaction Total- 3  Imagination/ creativity-1  Stereotyped Behaviors and Restricted Interest- 2        MDQ: 8/13, yes, moderate

## 2025-07-01 ENCOUNTER — OFFICE VISIT (OUTPATIENT)
Dept: PHYSICAL THERAPY | Facility: CLINIC | Age: 30
End: 2025-07-01
Attending: NURSE PRACTITIONER
Payer: COMMERCIAL

## 2025-07-01 DIAGNOSIS — M25.512 CHRONIC LEFT SHOULDER PAIN: ICD-10-CM

## 2025-07-01 DIAGNOSIS — M25.552 LEFT HIP PAIN: Primary | ICD-10-CM

## 2025-07-01 DIAGNOSIS — G89.29 CHRONIC LEFT SHOULDER PAIN: ICD-10-CM

## 2025-07-01 PROCEDURE — 97110 THERAPEUTIC EXERCISES: CPT

## 2025-07-01 PROCEDURE — 97140 MANUAL THERAPY 1/> REGIONS: CPT

## 2025-07-01 NOTE — PROGRESS NOTES
"Daily Note     Today's date: 2025  Patient name: Raghav Smith  : 1995  MRN: [id number]  Referring provider: Linda Rao CRNP  Dx:   Encounter Diagnosis     ICD-10-CM    1. Left hip pain  M25.552       2. Chronic left shoulder pain  M25.512     G89.29           Start Time: 1700  Stop Time: 1745  Total time in clinic (min): 45 minutes    Subjective: Patient reports, \"I did a lot of lifting at work today and felt pretty good. I made sure to complete some warm up squats before I started\".    Objective: See treatment diary below    Assessment: Patient demonstrates slightly limited L hip flexion secondary to pain. Able to complete strengthening well with no pain and great form.     Plan: Continue per plan of care.      Precautions: Appendectomy 2025, not cleared for full activity until 6 week johnathan   Manuals 6/10 6/16 6/17 Mobility Strength      Prone PA thoracic mobilization gr IV   T14-12   2x10 ea x  2x10 ea 2x10 ea      L shoulder PROM especially 90/90 ER    x  4' 4' 8'     L hip PROM 8' 8' 8' x  4'  8'     LLE LAD   5x10\" x  5x10\" 5x10\"      Inferior L hip glide gr IV    2x10 x  2x10                                 Neuro Re-Ed             Supine c/s retraction off EOB with towel   2x10 x         Supine thoracic ext over foam roll   2x10 x         90/90 doorway stretch for pec and ER   2x10\" ea          Supine TrA bridge --> SL bridge when able 3x15 3x18 3x18  x   3x18     Sidelying TrA hold clamshell Blue  3x15 Blue  3x18 Blue 3x18  x   Blue  3x18     TrA SLR 2#  3x10 ea 2#  3x10   ea   optional        Mini Squats 10# KB  1x10    15# KB  1x10    20# KB  1x10    25#  1x10 10# KB  1x10    15# KB  1x10    20# KB  1x10    25#  1x10 10# KB  1x10    15# KB  1x10    20# KB  1x10    25#  1x10  x 10# KB  1x10    15# KB  1x10    20# KB  1x10    25#  1x10 10# KB  1x10    15# KB  1x10    20# KB  1x10    25#  1x10 10# KB  1x10    15# KB  1x10    20# KB  1x10    30# KB  1x10                  Ther " "Ex             Rows 15#  CC  3x10 15#  CC  3x10   x 15# 3x10 15# 3x10      Lat pull down 60#  3x10 60#  3x10 60# 3x10  x 60# 3x10 60# 3x10      TB ER Black  3x10 Black  3x12   x Black 3x12 Black 3x12      Sidelying ER AROM 3#  3x10 3#  3x10   x  3# 3x10      Serratus push ups 2x10 2x10                                                                            Ther Activity             Lateral step ups NV Foam  8\"  3x10   x                                  Gait Training                                       Modalities                                          "

## 2025-07-02 ENCOUNTER — SOCIAL WORK (OUTPATIENT)
Dept: BEHAVIORAL/MENTAL HEALTH CLINIC | Facility: CLINIC | Age: 30
End: 2025-07-02
Payer: COMMERCIAL

## 2025-07-02 DIAGNOSIS — F43.10 PTSD (POST-TRAUMATIC STRESS DISORDER): ICD-10-CM

## 2025-07-02 DIAGNOSIS — F31.81 BIPOLAR 2 DISORDER (HCC): Primary | ICD-10-CM

## 2025-07-02 DIAGNOSIS — F60.3 BORDERLINE PERSONALITY DISORDER (HCC): ICD-10-CM

## 2025-07-02 PROCEDURE — 90837 PSYTX W PT 60 MINUTES: CPT

## 2025-07-02 NOTE — PSYCH
"Behavioral Health Psychotherapy Progress Note    Psychotherapy Provided: Individual Psychotherapy     1. Bipolar 2 disorder (HCC)        2. Borderline personality disorder (HCC)        3. PTSD (post-traumatic stress disorder)            Goals addressed in session: Goal 1     DATA: Raghav reports to have had a good week this week and she is celebrating her birthday. Raghav reports to be getting more involved in more social events and have been meeting more people as a way for social support. Raghav reports to be feeling a bit lonely as she misses her family in [place] but had been using her new events as sports.     During this session, this clinician used the following therapeutic modalities: Supportive Psychotherapy    Substance Abuse was not addressed during this session. If the client is diagnosed with a co-occurring substance use disorder, please indicate any changes in the frequency or amount of use: N/A. Stage of change for addressing substance use diagnoses: No substance use/Not applicable    ASSESSMENT:  Raghav Smith presents with a Euthymic/ normal mood.     her affect is Normal range and intensity and Tearful, which is congruent, with her mood and the content of the session. The client has made progress on their goals.    Raghav Smith presents with a none risk of suicide, none risk of self-harm, and none risk of harm to others.    For any risk assessment that surpasses a \"low\" rating, a safety plan must be developed.    A safety plan was indicated: no  If yes, describe in detail - N/A    PLAN: Between sessions, Raghav Smith will continue going to social events. At the next session, the therapist will use Client-centered Therapy, Cognitive Behavioral Therapy, Mindfulness-based Strategies, Motivational Interviewing, Solution-Focused Therapy, and Supportive Psychotherapy to address loneliness.    Behavioral Health Treatment Plan and Discharge Planning: Raghav Smith is aware of and " agrees to continue to work on their treatment plan. They have identified and are working toward their discharge goals. yes    Depression Follow-up Plan Completed: Not applicable    Visit start and stop times:    07/02/25  Start Time: 1300  Stop Time: 1400  Total Visit Time: 60 minutes

## 2025-07-07 ENCOUNTER — TELEPHONE (OUTPATIENT)
Age: 30
End: 2025-07-07

## 2025-07-07 ENCOUNTER — CLINICAL SUPPORT (OUTPATIENT)
Age: 30
End: 2025-07-07
Payer: COMMERCIAL

## 2025-07-07 ENCOUNTER — DOCUMENTATION (OUTPATIENT)
Dept: PSYCHIATRY | Facility: CLINIC | Age: 30
End: 2025-07-07

## 2025-07-07 DIAGNOSIS — F60.3 BORDERLINE PERSONALITY DISORDER (HCC): ICD-10-CM

## 2025-07-07 DIAGNOSIS — F41.9 ANXIETY: Primary | ICD-10-CM

## 2025-07-07 DIAGNOSIS — F31.81 BIPOLAR 2 DISORDER (HCC): ICD-10-CM

## 2025-07-07 PROCEDURE — 96139 PSYCL/NRPSYC TST TECH EA: CPT | Performed by: COUNSELOR

## 2025-07-07 PROCEDURE — 96138 PSYCL/NRPSYC TECH 1ST: CPT | Performed by: COUNSELOR

## 2025-07-07 NOTE — PSYCH
Raghav Smith completed psychological assessment by brice Nguyen psychometrist, on 2025 .      Start: 10:04 am   Break: 12:30 pm - 12:42 pm ( 12 min )   End: 1:55 pm  Scorin minutes   Total: 204 minutes ( 3 hrs 24 min )     The following assessments were administered:   Wechsler Adult Intelligence Scale (WAIS-IV)        Kev Continuous Performance Test  Variable Type  Measure T-Score   Detectability  d' 50   Error Type Omissions 48    Commissions 47    Perseverations 48   Reaction Time Statistic  HRT 64    HRT SD 54    Variability  47    HRT Block Change  54    HRT MELQUIADES Change  57       Kev Continuous Auditory Test of Attention  Variable Type  Measure T-Score   Detectability  d' 56   Error Type Omissions 46    Commissions 53    Perseverative Commissions 51   Reaction Time Statistic  HRT 49    HRT SD 58    HRT Block Change  59     Mini-Mental Status Examination (MMSE)  Total: 29     Autism Quotient (AQ)  Total: 36    RAADS  Total: 184    Social Responsiveness Scale - 2 (SRS-2)  Total Raw Score: 106  T Score: 73    DSM 5 Compatible Subscales  Social Communication and Interaction: raw score 76, t score 68  Restricted Interests and Repetitive Behavior: raw score 30, t score 90     Behavior Rating Inventory of Executive Functioning (BRIEF)-Adult Version (BRIEF-A)  Scale/Index Raw Score T Score    Inhibit 19 75   Shift 15 78   Emotional Control 22 67   Self-Monitor 13 68   Behavioral Regulation Index 69 76   Initiate 22 84   Working Memory 21 85   Plan/Organize 25 80   Task Monitor 15 79   Organization of Materials 23 80   Midway cognition Index  106 87   Global Executive Composite  175 85     Validity Scale  Raw Score    Negativity 3   Infrequency  0   Inconsistency  4       BAARS-IV: Self Report   Current:   Sum of Sections Raw Scores 1-3 Total ADHD Score: 44  Section 1 Symptom Count: 14  Sum of Sections 2 and 3 Symptom Counts: 8  Total ADHD Symptom Count: 22     Childhood:   Sum of Sections  1-2 for Total Scores: 51  Sum of Sections 1-2 for Symptom Counts: 36     Personality Assessment Inventory (DYLLAN)      CPT Codes:   30066: 1 unit  80486: 5 units

## 2025-07-07 NOTE — PROGRESS NOTES
"During psychological testing a safety assessment was completed. She denied current SI/HI/SIB. She was able to contract for safety and noted that \"I got a support system.\"   "

## 2025-07-08 ENCOUNTER — OFFICE VISIT (OUTPATIENT)
Dept: PHYSICAL THERAPY | Facility: CLINIC | Age: 30
End: 2025-07-08
Attending: NURSE PRACTITIONER
Payer: COMMERCIAL

## 2025-07-08 ENCOUNTER — SOCIAL WORK (OUTPATIENT)
Dept: BEHAVIORAL/MENTAL HEALTH CLINIC | Facility: CLINIC | Age: 30
End: 2025-07-08
Payer: COMMERCIAL

## 2025-07-08 ENCOUNTER — TELEPHONE (OUTPATIENT)
Age: 30
End: 2025-07-08

## 2025-07-08 DIAGNOSIS — M25.552 LEFT HIP PAIN: Primary | ICD-10-CM

## 2025-07-08 DIAGNOSIS — F60.3 BORDERLINE PERSONALITY DISORDER (HCC): ICD-10-CM

## 2025-07-08 DIAGNOSIS — F43.10 PTSD (POST-TRAUMATIC STRESS DISORDER): ICD-10-CM

## 2025-07-08 DIAGNOSIS — F31.81 BIPOLAR 2 DISORDER (HCC): Primary | ICD-10-CM

## 2025-07-08 DIAGNOSIS — G89.29 CHRONIC LEFT SHOULDER PAIN: ICD-10-CM

## 2025-07-08 DIAGNOSIS — M25.512 CHRONIC LEFT SHOULDER PAIN: ICD-10-CM

## 2025-07-08 PROCEDURE — 97112 NEUROMUSCULAR REEDUCATION: CPT

## 2025-07-08 PROCEDURE — 90837 PSYTX W PT 60 MINUTES: CPT

## 2025-07-08 PROCEDURE — 97110 THERAPEUTIC EXERCISES: CPT

## 2025-07-08 NOTE — PROGRESS NOTES
"Daily Note     Today's date: 2025  Patient name: Raghav Smith  : 1995  MRN: [id number]  Referring provider: Linda Rao CRNP  Dx:   Encounter Diagnosis     ICD-10-CM    1. Left hip pain  M25.552       2. Chronic left shoulder pain  M25.512     G89.29             Start Time: 1705  Stop Time: 1755  Total time in clinic (min): 50 minutes    Subjective: Patient states, \"My hip has been good. I was even able to dance which is a great improvement. I had soreness a few days after though. I moved my furniture around and irritated my L shoulder\".     Objective: See treatment diary below    Assessment: Patient is responding well to therapy for her L hip noting improved functional ability at work and home. Decreased pain with cervical retractions with OP. Assess response NV.     Plan: Continue per plan of care.      Precautions: Appendectomy 2025, not cleared for full activity until 6 week johnathan   Manuals 6/10 6/16 6/17 Mobility Strength     Prone PA thoracic mobilization gr IV   T14-12   2x10 ea x  2x10 ea 2x10 ea      L shoulder PROM especially 90/90 ER    x  4' 4' 8' 4'    L hip PROM 8' 8' 8' x  4'  8' 4'    LLE LAD   5x10\" x  5x10\" 5x10\"      Inferior L hip glide gr IV    2x10 x  2x10                                 Neuro Re-Ed             Supine c/s retraction off EOB with towel   2x10 x         Supine thoracic ext over foam roll   2x10 x         90/90 doorway stretch for pec and ER   2x10\" ea          Supine TrA bridge --> SL bridge when able 3x15 3x18 3x18  x   3x18 3x18    Sidelying TrA hold clamshell Blue  3x15 Blue  3x18 Blue 3x18  x   Blue  3x18 Blue  3x18    TrA SLR 2#  3x10 ea 2#  3x10   ea   optional        Mini Squats 10# KB  1x10    15# KB  1x10    20# KB  1x10    25#  1x10 10# KB  1x10    15# KB  1x10    20# KB  1x10    25#  1x10 10# KB  1x10    15# KB  1x10    20# KB  1x10    25#  1x10  x 10# KB  1x10    15# KB  1x10    20# KB  1x10    25#  1x10 10# KB  1x10    15# " "KB  1x10    20# KB  1x10    25#  1x10 10# KB  1x10    15# KB  1x10    20# KB  1x10    30# KB  1x10 10# KB  1x10    15# KB  1x10    20# KB  1x10    30# KB  1x10                 Ther Ex             Rows 15#  CC  3x10 15#  CC  3x10   x 15# 3x10 15# 3x10  15#  3x10    Lat pull down 60#  3x10 60#  3x10 60# 3x10  x 60# 3x10 60# 3x10  60#  3x10    TB ER Black  3x10 Black  3x12   x Black 3x12 Black 3x12  Black  3x12    Sidelying ER AROM 3#  3x10 3#  3x10   x  3# 3x10  3#  3x10    Serratus push ups 2x10 2x10                                                                            Ther Activity             Lateral step ups NV Foam  8\"  3x10   x                                  Gait Training                                       Modalities                                          "

## 2025-07-09 NOTE — PSYCH
"Behavioral Health Psychotherapy Progress Note    Psychotherapy Provided: Individual Psychotherapy     1. Bipolar 2 disorder (HCC)        2. Borderline personality disorder (HCC)        3. PTSD (post-traumatic stress disorder)            Goals addressed in session: Goal 1     DATA: Raghav arrived on time for session. Raghav reports that she is doing well. Raghav said she is doing the best that she can to keep herself afloat. Raghav reports that she is doing the best that she can to maintain routine in her everyday life. Raghav reports that she struggles right now with missing her family and wanting to have better relationships with them. Raghav reports that she has been contemplating on working on bettering their relationship as it is a way that can help with her overall mental health.    During this session, this clinician used the following therapeutic modalities: Client-centered Therapy and Supportive Psychotherapy    Substance Abuse was not addressed during this session. If the client is diagnosed with a co-occurring substance use disorder, please indicate any changes in the frequency or amount of use: N/A. Stage of change for addressing substance use diagnoses: No substance use/Not applicable    ASSESSMENT:  Raghav Smith presents with a Euthymic/ normal mood.     her affect is Normal range and intensity and Tearful, which is congruent, with her mood and the content of the session. The client has made progress on their goals.    Raghav Smith presents with a none risk of suicide, none risk of self-harm, and none risk of harm to others.    For any risk assessment that surpasses a \"low\" rating, a safety plan must be developed.    A safety plan was indicated: no  If yes, describe in detail - N/A    PLAN: Between sessions, Raghav Smith will continue working on routine. At the next session, the therapist will use Client-centered Therapy, Cognitive Behavioral Therapy, " Mindfulness-based Strategies, Motivational Interviewing, Solution-Focused Therapy, and Supportive Psychotherapy to address anxiety and PTSD.    Behavioral Health Treatment Plan and Discharge Planning: Raghav Smith is aware of and agrees to continue to work on their treatment plan. They have identified and are working toward their discharge goals. yes    Depression Follow-up Plan Completed: Not applicable    Visit start and stop times:    07/08/25  Start Time: 1200  Stop Time: 1300  Total Visit Time: 60 minutes

## 2025-07-14 ENCOUNTER — OFFICE VISIT (OUTPATIENT)
Dept: PHYSICAL THERAPY | Facility: CLINIC | Age: 30
End: 2025-07-14
Attending: NURSE PRACTITIONER
Payer: COMMERCIAL

## 2025-07-14 DIAGNOSIS — M25.552 LEFT HIP PAIN: Primary | ICD-10-CM

## 2025-07-14 DIAGNOSIS — G89.29 CHRONIC LEFT SHOULDER PAIN: ICD-10-CM

## 2025-07-14 DIAGNOSIS — M25.512 CHRONIC LEFT SHOULDER PAIN: ICD-10-CM

## 2025-07-14 PROCEDURE — 97110 THERAPEUTIC EXERCISES: CPT

## 2025-07-14 PROCEDURE — 97140 MANUAL THERAPY 1/> REGIONS: CPT

## 2025-07-14 NOTE — PROGRESS NOTES
"Daily Note     Today's date: 2025  Patient name: Raghav Smith  : 1995  MRN: [id number]  Referring provider: Linda Rao CRNP  Dx:   Encounter Diagnosis     ICD-10-CM    1. Left hip pain  M25.552       2. Chronic left shoulder pain  M25.512     G89.29                        Subjective: Patient reports she was moving furniture herself and really aggravated thoracic spine and shoulder.     Objective: See treatment diary below    Assessment: held hip interventions today, will focus on hip tomorrow. Noted hypomobility of GHJ, thoracic spine and scapulothoracic junction at baseline. Improved slight with manuals. Followed up with additional periscapular strengthening. Assess response tomorrow    Plan: Continue per plan of care.      Precautions: Appendectomy 2025, not cleared for full activity until 6 week johnathan   Manuals 6/10 6/16 6/17 Mobility Strength    Prone PA thoracic mobilization gr IV   T14-12   2x10 ea x  2x10 ea 2x10 ea   2x10 ea   L shoulder PROM especially 90/90 ER    x  4' 4' 8' 4' 4'   L scap mobs gr II          1x10 ea   L periscap trigger point release          4'   L hip PROM 8' 8' 8' x  4'  8' 4'    LLE LAD   5x10\" x  5x10\" 5x10\"      Inferior L hip glide gr IV    2x10 x  2x10                                 Neuro Re-Ed             Supine c/s retraction off EOB with towel   2x10 x         Supine thoracic ext over foam roll   2x10 x         90/90 doorway stretch for pec and ER   2x10\" ea          Supine TrA bridge --> SL bridge when able 3x15 3x18 3x18  x   3x18 3x18    Sidelying TrA hold clamshell Blue  3x15 Blue  3x18 Blue 3x18  x   Blue  3x18 Blue  3x18    TrA SLR 2#  3x10 ea 2#  3x10   ea   optional        Mini Squats 10# KB  1x10    15# KB  1x10    20# KB  1x10    25#  1x10 10# KB  1x10    15# KB  1x10    20# KB  1x10    25#  1x10 10# KB  1x10    15# KB  1x10    20# KB  1x10    25#  1x10  x 10# KB  1x10    15# KB  1x10    20# KB  1x10    25#  1x10 10# " "KB  1x10    15# KB  1x10    20# KB  1x10    25#  1x10 10# KB  1x10    15# KB  1x10    20# KB  1x10    30# KB  1x10 10# KB  1x10    15# KB  1x10    20# KB  1x10    30# KB  1x10                 Ther Ex             Rows 15#  CC  3x10 15#  CC  3x10   x 15# 3x10 15# 3x10  15#  3x10 15#  3x10   Lat pull down 60#  3x10 60#  3x10 60# 3x10  x 60# 3x10 60# 3x10  60#  3x10 60#  3x10   TB ER Black  3x10 Black  3x12   x Black 3x12 Black 3x12  Black  3x12    Sidelying ER AROM 3#  3x10 3#  3x10   x  3# 3x10  3#  3x10    Serratus push ups 2x10 2x10        3x10   Prone T's          2x10   Prone Y's          2x10                                          Ther Activity             Lateral step ups NV Foam  8\"  3x10   x                                  Gait Training                                       Modalities                                          "

## 2025-07-15 ENCOUNTER — OFFICE VISIT (OUTPATIENT)
Dept: PHYSICAL THERAPY | Facility: CLINIC | Age: 30
End: 2025-07-15
Attending: NURSE PRACTITIONER
Payer: COMMERCIAL

## 2025-07-15 DIAGNOSIS — M25.512 CHRONIC LEFT SHOULDER PAIN: ICD-10-CM

## 2025-07-15 DIAGNOSIS — M25.552 LEFT HIP PAIN: Primary | ICD-10-CM

## 2025-07-15 DIAGNOSIS — G89.29 CHRONIC LEFT SHOULDER PAIN: ICD-10-CM

## 2025-07-15 PROCEDURE — 97110 THERAPEUTIC EXERCISES: CPT

## 2025-07-15 PROCEDURE — 97140 MANUAL THERAPY 1/> REGIONS: CPT

## 2025-07-16 ENCOUNTER — SOCIAL WORK (OUTPATIENT)
Dept: BEHAVIORAL/MENTAL HEALTH CLINIC | Facility: CLINIC | Age: 30
End: 2025-07-16
Payer: COMMERCIAL

## 2025-07-16 DIAGNOSIS — F60.3 BORDERLINE PERSONALITY DISORDER (HCC): ICD-10-CM

## 2025-07-16 DIAGNOSIS — F43.10 PTSD (POST-TRAUMATIC STRESS DISORDER): ICD-10-CM

## 2025-07-16 DIAGNOSIS — F31.81 BIPOLAR 2 DISORDER (HCC): Primary | ICD-10-CM

## 2025-07-16 PROCEDURE — 90837 PSYTX W PT 60 MINUTES: CPT

## 2025-07-16 NOTE — PSYCH
Behavioral Health Psychotherapy Progress Note    Psychotherapy Provided: Individual Psychotherapy     1. Bipolar 2 disorder (HCC)        2. Borderline personality disorder (HCC)        3. PTSD (post-traumatic stress disorder)            Goals addressed in session: Goal 1     DATA: Raghav arrived on time for session. Raghav reports that she wants to get back to reconnecting with herself as she feels unsure of who she is anymore as a person. Raghav reports that she wants to work on more needs assessments and shadow work.Raghav reports that she wants to practice Telintra more and move her body as she feels like she could be doing a lot more with herself in her life moving forward. Raghav reports that she is still stuck in her old mindset when she was going through and processing her trauma that she has not allowed herself to look at the positives as she is in a different phase in her life and needs to start working on doing more to accept that. Raghav reports that she is struggles with feeling detached from reality and struggles with adjustment and really wants to andrew and focus more on that. Clinician advised Raghav to do her homework of making a list of what her wants and needs are that way she can work on planning for her future and herself moving forward.    During this session, this clinician used the following therapeutic modalities: Client-centered Therapy and Motivational Interviewing    Substance Abuse was not addressed during this session. If the client is diagnosed with a co-occurring substance use disorder, please indicate any changes in the frequency or amount of use: N/A. Stage of change for addressing substance use diagnoses: No substance use/Not applicable    ASSESSMENT:  Raghav Smith presents with a Euthymic/ normal mood.     her affect is Normal range and intensity, which is congruent, with her mood and the content of the session. The client has made progress on their  "goals.    Raghav Smith presents with a none risk of suicide, none risk of self-harm, and none risk of harm to others.    For any risk assessment that surpasses a \"low\" rating, a safety plan must be developed.    A safety plan was indicated: no  If yes, describe in detail - N/A    PLAN: Between sessions, Raghav Smith will make needs and wants list. At the next session, the therapist will use Client-centered Therapy, Cognitive Behavioral Therapy, Mindfulness-based Strategies, Motivational Interviewing, Solution-Focused Therapy, and Supportive Psychotherapy to address motivation and goal planning.    Behavioral Health Treatment Plan and Discharge Planning: Raghav Smith is aware of and agrees to continue to work on their treatment plan. They have identified and are working toward their discharge goals. yes    Depression Follow-up Plan Completed: Not applicable    Visit start and stop times:    07/16/25  Start Time: 1300  Stop Time: 1400  Total Visit Time: 60 minutes  "

## 2025-07-17 ENCOUNTER — TELEMEDICINE (OUTPATIENT)
Age: 30
End: 2025-07-17
Payer: COMMERCIAL

## 2025-07-17 DIAGNOSIS — F60.3 BORDERLINE PERSONALITY DISORDER (HCC): Chronic | ICD-10-CM

## 2025-07-17 DIAGNOSIS — F41.9 ANXIETY: Primary | ICD-10-CM

## 2025-07-17 DIAGNOSIS — F31.81 BIPOLAR 2 DISORDER (HCC): ICD-10-CM

## 2025-07-17 DIAGNOSIS — F43.10 PTSD (POST-TRAUMATIC STRESS DISORDER): ICD-10-CM

## 2025-07-17 PROCEDURE — 96130 PSYCL TST EVAL PHYS/QHP 1ST: CPT | Performed by: COUNSELOR

## 2025-07-17 PROCEDURE — 96131 PSYCL TST EVAL PHYS/QHP EA: CPT | Performed by: COUNSELOR

## 2025-07-21 ENCOUNTER — OFFICE VISIT (OUTPATIENT)
Dept: PHYSICAL THERAPY | Facility: CLINIC | Age: 30
End: 2025-07-21
Attending: NURSE PRACTITIONER
Payer: COMMERCIAL

## 2025-07-21 DIAGNOSIS — M25.552 LEFT HIP PAIN: Primary | ICD-10-CM

## 2025-07-21 DIAGNOSIS — G89.29 CHRONIC LEFT SHOULDER PAIN: ICD-10-CM

## 2025-07-21 DIAGNOSIS — M25.512 CHRONIC LEFT SHOULDER PAIN: ICD-10-CM

## 2025-07-21 PROCEDURE — 97110 THERAPEUTIC EXERCISES: CPT

## 2025-07-21 PROCEDURE — 97140 MANUAL THERAPY 1/> REGIONS: CPT

## 2025-07-21 NOTE — PROGRESS NOTES
"Daily Note     Today's date: 2025  Patient name: Raghav Smith  : 1995  MRN: [id number]  Referring provider: Linda Rao CRNP  Dx:   Encounter Diagnosis     ICD-10-CM    1. Left hip pain  M25.552       2. Chronic left shoulder pain  M25.512     G89.29           Start Time: 1700  Stop Time: 1755  Total time in clinic (min): 55 minutes    Subjective: Patient reports that her L hip is stiff from being in a tube for a float trip in the river. She notes her L cervical spine is really bothering her but isn't sure why. Notes occasional symptoms into her scapula and superior shoulder.     Objective: See treatment diary below    Assessment: D, B cervical AROM following cervical DP. Slight improvement with radicular symptoms. Stiffness improved with L hip PROM. Continued with strengthening.      Plan: Continue per plan of care.      Precautions: Appendectomy 2025, not cleared for full activity until 6 week johnathan   Manuals 7/15 7/21           Prone PA thoracic mobilization gr IV   T14-12 2x10 ea            L shoulder PROM especially 90/90 ER 4' 5'           L scap mobs gr II             L periscap trigger point release 4'            L hip PROM 8' 8'           LLE LAD 5x10\"            Inferior L hip glide gr IV  2x10                                      Neuro Re-Ed             Supine c/s retraction off EOB with towel             Supine thoracic ext over foam roll             90/90 doorway stretch for pec and ER             Supine TrA bridge --> SL bridge when able             Sidelying TrA hold clamshell Blue  3x15 Blue  3x15           TrA SLR             Mini Squats 10# KB  1x10    15# KB  1x10    20# KB  1x10    30#  1x10 15# KB  1x10    20# KB  1x10    30#  1x10           Cervical retraction with self OP  1x10           Cervical retraction with ext  3x10           Cervical retraction with ext and OP  3x10                        Ther Ex             Rows 30#  CC  3x10 30#  CC  3x10           Lat pull down " "60#  3x10 60#  3x10           TB ER             Sidelying ER AROM             Serratus push ups 3x10 3x10           Prone T's 2x10 3x10           Prone Y's 2x10 3x10                                                  Ther Activity             Lateral step ups NV 8\"  3x10 ea                                     Gait Training                                       Modalities                                          "

## 2025-07-22 ENCOUNTER — OFFICE VISIT (OUTPATIENT)
Dept: PHYSICAL THERAPY | Facility: CLINIC | Age: 30
End: 2025-07-22
Attending: NURSE PRACTITIONER
Payer: COMMERCIAL

## 2025-07-22 DIAGNOSIS — M25.552 LEFT HIP PAIN: Primary | ICD-10-CM

## 2025-07-22 DIAGNOSIS — G89.29 CHRONIC LEFT SHOULDER PAIN: ICD-10-CM

## 2025-07-22 DIAGNOSIS — M25.512 CHRONIC LEFT SHOULDER PAIN: ICD-10-CM

## 2025-07-22 PROCEDURE — 97140 MANUAL THERAPY 1/> REGIONS: CPT

## 2025-07-22 PROCEDURE — 97110 THERAPEUTIC EXERCISES: CPT

## 2025-07-22 NOTE — PROGRESS NOTES
"Daily Note    Today's date: 2025  Patient name: Raghav Smith  : 1995  MRN: [id number]  Referring provider: Linda Rao CRNP  Dx:   Encounter Diagnosis     ICD-10-CM    1. Left hip pain  M25.552       2. Chronic left shoulder pain  M25.512     G89.29           Start Time: 1708  Stop Time: 1810  Total time in clinic (min): 62 minutes    Subjective: Patient reports improvement in neck symptoms since yesterday. She notes her hip feels good today with no stiffness.     Objective: See treatment diary below    Assessment: Cervical retraction with ext and self OP P, B symptoms. Cervical radicular symptoms are centralized with this DP. Improved scapular and LE strength demonstrated and progressing. Consider progressing LE exercises NV to be more dynamic.    Plan: Continue per plan of care.      Precautions: Appendectomy 2025, not cleared for full activity until 6 week johnathan   Manuals 7/15 7/21 7/22          Prone PA thoracic mobilization gr IV   T14-12 2x10 ea            L shoulder PROM especially 90/90 ER 4' 5' 5'          L scap mobs gr II             L periscap trigger point release 4'  4'          L hip PROM 8' 8' 8'          LLE LAD 5x10\"            Inferior L hip glide gr IV  2x10                                      Neuro Re-Ed             Supine c/s retraction off EOB with towel             Supine thoracic ext over foam roll             90/90 doorway stretch for pec and ER             Supine TrA bridge --> SL bridge when able             Sidelying TrA hold clamshell Blue  3x15 Blue  3x15 Blue  3x15          TrA SLR             Mini Squats 10# KB  1x10    15# KB  1x10    20# KB  1x10    30#  1x10 15# KB  1x10    20# KB  1x10    30#  1x10 15# KB  1x10    20# KB  1x10    30#  1x10          Cervical retraction with self OP  1x10 1x10          Cervical retraction with ext  3x10 1x10          Cervical retraction with ext and OP  3x10 3x10                       Ther Ex             Rows 30#  CC  3x10 " "30#  CC  3x10 30#  CC  3x10          Lat pull down 60#  3x10 60#  3x10 60#  3x10          TB ER             Sidelying ER AROM             Serratus push ups 3x10 3x10 3x10          Prone T's 2x10 3x10 3x10          Prone Y's 2x10 3x10 3x10                                                 Ther Activity             Lateral step ups NV 8\"  3x10 ea 8\"  3x10 ea                                    Gait Training                                       Modalities                                          "

## 2025-07-23 ENCOUNTER — SOCIAL WORK (OUTPATIENT)
Dept: BEHAVIORAL/MENTAL HEALTH CLINIC | Facility: CLINIC | Age: 30
End: 2025-07-23
Payer: COMMERCIAL

## 2025-07-23 DIAGNOSIS — F43.10 PTSD (POST-TRAUMATIC STRESS DISORDER): ICD-10-CM

## 2025-07-23 DIAGNOSIS — F31.81 BIPOLAR 2 DISORDER (HCC): Primary | ICD-10-CM

## 2025-07-23 DIAGNOSIS — F60.3 BORDERLINE PERSONALITY DISORDER (HCC): ICD-10-CM

## 2025-07-23 PROCEDURE — 90837 PSYTX W PT 60 MINUTES: CPT

## 2025-07-23 NOTE — PSYCH
"Behavioral Health Psychotherapy Progress Note    Psychotherapy Provided: Individual Psychotherapy     1. Bipolar 2 disorder (HCC)        2. Borderline personality disorder (HCC)        3. PTSD (post-traumatic stress disorder)            Goals addressed in session: Goal 1     DATA: Raghav arrived on time for session. Raghav reports to be feeling a lot of emotions due to having a friend who is making difficult decision to make about moving out the country. Rgahav feels that her support system around her is falling apart and those she supports is not returning the same support. Raghav reports wanting to find more support that is consistent.    During this session, this clinician used the following therapeutic modalities: Supportive Psychotherapy    Substance Abuse was not addressed during this session. If the client is diagnosed with a co-occurring substance use disorder, please indicate any changes in the frequency or amount of use: N/A. Stage of change for addressing substance use diagnoses: No substance use/Not applicable    ASSESSMENT:  Raghav Smith presents with a Dysthymic mood.     her affect is Tearful, which is congruent, with her mood and the content of the session. The client has made progress on their goals.    Raghav Smith presents with a none risk of suicide, none risk of self-harm, and none risk of harm to others.    For any risk assessment that surpasses a \"low\" rating, a safety plan must be developed.    A safety plan was indicated: no  If yes, describe in detail - N/A    PLAN: Between sessions, Raghav Smith will work on making new supports. At the next session, the therapist will use Client-centered Therapy, Cognitive Behavioral Therapy, Mindfulness-based Strategies, Motivational Interviewing, Solution-Focused Therapy, and Supportive Psychotherapy to address Bipolar 2 disorder   Borderline personality disorder.    Behavioral Health Treatment Plan and Discharge Planning: " Raghav Smith is aware of and agrees to continue to work on their treatment plan. They have identified and are working toward their discharge goals. yes    Depression Follow-up Plan Completed: Not applicable    Visit start and stop times:    07/23/25  Start Time: 1300  Stop Time: 1400  Total Visit Time: 60 minutes

## 2025-07-24 ENCOUNTER — DOCUMENTATION (OUTPATIENT)
Age: 30
End: 2025-07-24

## 2025-07-24 NOTE — PSYCH
Virtual AWV Consent    Reason for visit is feedback session.    Provider located at [facility] [place]  [address]  [address]  [facility] [address] [phone]    Recent Visits  No visits were found meeting these conditions.  Showing recent visits within past 7 days and meeting all other requirements  Future Appointments  No visits were found meeting these conditions.  Showing future appointments within next 150 days and meeting all other requirements       Administrative Statements   Encounter provider Soumya Plunkett PsyD    The Patient is located at Home and in the following state in which I hold an active license [place].    The patient was identified by name and date of birth. Raghav Smith was informed that this is a telemedicine visit and that the visit is being conducted through the Epic Embedded platform. She agrees to proceed..  My office door was closed. No one else was in the room.  She acknowledged consent and understanding of privacy and security of the video platform. The patient has agreed to participate and understands they can discontinue the visit at any time.    Psychological Evaluation  [facility]  [address] [place] [id number]  P: [phone] ? F: [phone]      Feedback from Psychological Evaluation    Ms. Smith returned for a feedback appointment to review the findings and recommendations from a psychological evaluation.  Findings from the evaluation, indicated anxiety, ptsd, mood and personality features.. Recommendations were reviewed (see evaluation report from 6/30/2025 for full details). The patient was given the opportunity to ask questions and expressed satisfaction with answers provided. Contact information for this provider was given to the patient and she was encouraged to contact the office with any further questions or concerns.     Soumya  JOSHUA Plunkett PsyD  Clinical Psychologist  [place] Licensed Psychologist  [facility].#[id number]     Tasks Conducted Total Time Spent Associated CPT Codes   Report Writing 140 min.    96131 x 2 units       Chart Review 30 min. 96130 x 1 unit  39988 0 units   Interpretation of Test Data 30 min.    Feedback to Patient/Family Members 15 min.

## 2025-07-24 NOTE — PROGRESS NOTES
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 monitoring one's own action. The Metacognition Index reflects the individual's ability to initiate activity and generate problem-solving ideas to sustain working memory, to plan and organize problem-solving approaches to monitor success and failures and problem solving, and to organize once materials and environment.    Based on Raghav's responses, concerns are noted with Charcatieann's ability to inhibit impulsive responses, adjust to changes in routine, modulate emotions, monitor social behavior, initiate problem-solving or activity, sustain working memory, plan, and organize problem-solving approaches, attend to task-oriented output, and organize environment and materials.    Based on Kari's responses of Charlyann's behavior, concerns are noted with Charlyann's ability to inhibit impulsive responses, adjust to changes in routine, monitor social behavior, initiate problem-solving or activity, sustain working memory, plan, and organize problem-solving approaches, attend to task-oriented output, and organize environment and materials.    Scale / Index - Description  At Risk = T 60-64  Clinical  Significance = T > 65 Raghav Pierce   Inhibit - the ability to resist impulses and the ability to stop one's own behavior at the appropriate time. 75 68   Shift - the ability to move freely from one situation, activity, or aspect of a problem to another as the circumstances demand. 78 77   Emotional Control - ability to modulate or regulate his or her emotional responses. 67 62   Self-Monitor - the degree to which one is aware of the effect that his or her behavior has on others and how it compares with standards or expectations for behavior. 68 66   Behavioral Regulation Index (MCKAYLA)  76 69   Initiate - ability to begin a task or activity and to independently generate ideas, responses, or problem-solving strategies. 84 77   Working Memory - the capacity to hold information in mind for the purpose of completing a task; encoding information; or generating goals, plans, and sequential steps to achieve goals. 85 75   Plan/Organize - ability to manage current and future-oriented task demands 80 72   Task-Monitor - task-oriented monitoring or work-checking habits. 79 75   Organization of Materials - orderliness of work, play, and storage spaces (e.g., desks, lockers, backpacks, and bedrooms). 80 77   Metacognition Index 87 78   Global Executive Composite (GEC) 85 76     [place] Current and Childhood Symptom Scales  The [place]yn's Symptom Scales are designed to assess for the presence of the diagnostic criteria of ADHD in the domains of inattention and hyperactivity/impulsivity. Raghav completed the Ashtyn's ADHD rating scales. Regarding current symptoms, Raghav reported experiencing 4 out of 9 inattentive symptoms and 2 out of 9 hyperactive/ impulsive symptoms. On the childhood form, Raghav reported having experienced 1 out of 9 on the inattentive and 8 out of 9 on the hyperactive-impulsive symptoms.     Personality Assessment Inventory (DYLLAN)  The DYLLAN assesses psychopathological syndromes and provides  information relevant for clinical diagnosis. Results indicate to consider: Major depressive disorder, single episode, unspecified. Also, the following are considered Rule Out: Social anxiety disorder (social phobia), Obsessive-compulsive disorder, Bipolar II disorder, Somatic symptom disorder, and Borderline personality disorder.    Summary   As previously reported, Raghav is a 29-year-old female who presents for psychological evaluation upon referral from Kathi Alves, for assessment of Attention Deficit Hyperactivity Disorder, Autism Spectrum Disorder, and to assist with differential diagnosis. The history, as reported below, incorporates information obtained through medical record review, patient report, and clinical observation as applicable. Raghav has a history of Bipolar Disorder, type II, Generalized Anxiety Disorder, PTSD, and Borderline Personality Disorder. Primary complaints include having a difficult time focusing and paying attention, acting without thinking, interrupting others, struggling with time management, frequently losses things and being forgetful. She also reports rubbing her legs together and is not the best at following routines. She attends community events and is very interested in the topic of intersectionality.    When considering diagnoses, Raghav appears to be experiencing symptoms related to anxiety, mood, trauma, and personality features. This is based on responses during the clinical interview as well as results from the DYLLAN. Raghav does not meet diagnostic criteria of ADHD based on results from the WAIS-IV, CPT-3, and LORE. Also, with regard to Autism Spectrum disorder, Raghav appears to exhibit characteristics of Autism but does not meet diagnostic criteria.     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 Raghav would benefit from cognitive behavioral therapies to help identify thoughts and behavioral patterns that may be contributing to and or maintaining current clinical symptomology. It would be beneficial to identify the cognitive distortions displayed and start challenging them using cognitive restructuring techniques.  Raghav would benefit from relaxation strategies such as mindfulness skills, progressive muscle relaxation as well as grounding techniques.   Raghav is encouraged to continue consulting with prescribing provider about appropriate medication management of symptoms.

## 2025-07-28 ENCOUNTER — OFFICE VISIT (OUTPATIENT)
Dept: PHYSICAL THERAPY | Facility: CLINIC | Age: 30
End: 2025-07-28
Attending: NURSE PRACTITIONER
Payer: COMMERCIAL

## 2025-07-28 DIAGNOSIS — G89.29 CHRONIC LEFT SHOULDER PAIN: ICD-10-CM

## 2025-07-28 DIAGNOSIS — M25.552 LEFT HIP PAIN: Primary | ICD-10-CM

## 2025-07-28 DIAGNOSIS — M25.512 CHRONIC LEFT SHOULDER PAIN: ICD-10-CM

## 2025-07-28 PROCEDURE — 97110 THERAPEUTIC EXERCISES: CPT

## 2025-07-28 PROCEDURE — 97140 MANUAL THERAPY 1/> REGIONS: CPT

## 2025-07-29 ENCOUNTER — OFFICE VISIT (OUTPATIENT)
Dept: PHYSICAL THERAPY | Facility: CLINIC | Age: 30
End: 2025-07-29
Attending: NURSE PRACTITIONER
Payer: COMMERCIAL

## 2025-07-29 DIAGNOSIS — G89.29 CHRONIC LEFT SHOULDER PAIN: ICD-10-CM

## 2025-07-29 DIAGNOSIS — M25.512 CHRONIC LEFT SHOULDER PAIN: ICD-10-CM

## 2025-07-29 DIAGNOSIS — M25.552 LEFT HIP PAIN: Primary | ICD-10-CM

## 2025-07-29 PROCEDURE — 97140 MANUAL THERAPY 1/> REGIONS: CPT

## 2025-07-29 PROCEDURE — 97112 NEUROMUSCULAR REEDUCATION: CPT

## 2025-07-29 PROCEDURE — 97110 THERAPEUTIC EXERCISES: CPT

## 2025-07-30 ENCOUNTER — SOCIAL WORK (OUTPATIENT)
Dept: BEHAVIORAL/MENTAL HEALTH CLINIC | Facility: CLINIC | Age: 30
End: 2025-07-30
Payer: COMMERCIAL

## 2025-07-30 DIAGNOSIS — F60.3 BORDERLINE PERSONALITY DISORDER (HCC): ICD-10-CM

## 2025-07-30 DIAGNOSIS — F43.10 PTSD (POST-TRAUMATIC STRESS DISORDER): ICD-10-CM

## 2025-07-30 DIAGNOSIS — F31.81 BIPOLAR 2 DISORDER (HCC): Primary | ICD-10-CM

## 2025-07-30 PROCEDURE — 90837 PSYTX W PT 60 MINUTES: CPT

## 2025-08-04 ENCOUNTER — OFFICE VISIT (OUTPATIENT)
Dept: PHYSICAL THERAPY | Facility: CLINIC | Age: 30
End: 2025-08-04
Attending: NURSE PRACTITIONER
Payer: COMMERCIAL

## 2025-08-04 DIAGNOSIS — G89.29 CHRONIC LEFT SHOULDER PAIN: ICD-10-CM

## 2025-08-04 DIAGNOSIS — M25.512 CHRONIC LEFT SHOULDER PAIN: ICD-10-CM

## 2025-08-04 DIAGNOSIS — M25.552 LEFT HIP PAIN: Primary | ICD-10-CM

## 2025-08-04 PROCEDURE — 97110 THERAPEUTIC EXERCISES: CPT

## 2025-08-04 PROCEDURE — 97112 NEUROMUSCULAR REEDUCATION: CPT

## 2025-08-05 ENCOUNTER — OFFICE VISIT (OUTPATIENT)
Dept: PHYSICAL THERAPY | Facility: CLINIC | Age: 30
End: 2025-08-05
Attending: NURSE PRACTITIONER
Payer: COMMERCIAL

## 2025-08-05 DIAGNOSIS — M25.552 LEFT HIP PAIN: Primary | ICD-10-CM

## 2025-08-05 DIAGNOSIS — G89.29 CHRONIC LEFT SHOULDER PAIN: ICD-10-CM

## 2025-08-05 DIAGNOSIS — M25.512 CHRONIC LEFT SHOULDER PAIN: ICD-10-CM

## 2025-08-05 PROCEDURE — 97110 THERAPEUTIC EXERCISES: CPT

## 2025-08-05 PROCEDURE — 97112 NEUROMUSCULAR REEDUCATION: CPT

## 2025-08-06 ENCOUNTER — SOCIAL WORK (OUTPATIENT)
Dept: BEHAVIORAL/MENTAL HEALTH CLINIC | Facility: CLINIC | Age: 30
End: 2025-08-06
Payer: COMMERCIAL

## 2025-08-11 ENCOUNTER — NURSE TRIAGE (OUTPATIENT)
Age: 30
End: 2025-08-11

## 2025-08-12 ENCOUNTER — OFFICE VISIT (OUTPATIENT)
Dept: FAMILY MEDICINE CLINIC | Facility: CLINIC | Age: 30
End: 2025-08-12
Payer: COMMERCIAL

## 2025-08-12 VITALS
RESPIRATION RATE: 18 BRPM | TEMPERATURE: 98.6 F | OXYGEN SATURATION: 98 % | WEIGHT: 158 LBS | BODY MASS INDEX: 30.86 KG/M2

## 2025-08-12 DIAGNOSIS — J02.9 SORE THROAT: ICD-10-CM

## 2025-08-12 DIAGNOSIS — J45.20 MILD INTERMITTENT REACTIVE AIRWAY DISEASE WITHOUT COMPLICATION: ICD-10-CM

## 2025-08-12 DIAGNOSIS — U07.1 COVID: Primary | ICD-10-CM

## 2025-08-12 PROBLEM — Z00.00 ANNUAL PHYSICAL EXAM: Status: RESOLVED | Noted: 2022-04-04 | Resolved: 2025-08-12

## 2025-08-12 LAB
S PYO AG THROAT QL: NEGATIVE
SARS-COV-2 AG UPPER RESP QL IA: POSITIVE
VALID CONTROL: ABNORMAL

## 2025-08-12 PROCEDURE — 87880 STREP A ASSAY W/OPTIC: CPT | Performed by: FAMILY MEDICINE

## 2025-08-12 PROCEDURE — 99214 OFFICE O/P EST MOD 30 MIN: CPT | Performed by: FAMILY MEDICINE

## 2025-08-12 PROCEDURE — 87811 SARS-COV-2 COVID19 W/OPTIC: CPT | Performed by: FAMILY MEDICINE

## 2025-08-12 RX ORDER — NIRMATRELVIR AND RITONAVIR 300-100 MG
3 KIT ORAL 2 TIMES DAILY
Qty: 30 TABLET | Refills: 0 | Status: SHIPPED | OUTPATIENT
Start: 2025-08-12 | End: 2025-08-17

## 2025-08-12 RX ORDER — ALBUTEROL SULFATE 90 UG/1
2 INHALANT RESPIRATORY (INHALATION) EVERY 6 HOURS PRN
Qty: 18 G | Refills: 1 | Status: SHIPPED | OUTPATIENT
Start: 2025-08-12

## 2025-08-12 RX ORDER — FLUTICASONE PROPIONATE 50 MCG
1 SPRAY, SUSPENSION (ML) NASAL DAILY
Qty: 15.8 ML | Refills: 0 | Status: SHIPPED | OUTPATIENT
Start: 2025-08-12

## 2025-08-12 RX ORDER — GUAIFENESIN/DEXTROMETHORPHAN 100-10MG/5
5 SYRUP ORAL 3 TIMES DAILY PRN
Qty: 118 ML | Refills: 0 | Status: SHIPPED | OUTPATIENT
Start: 2025-08-12

## 2025-08-12 RX ORDER — PREDNISONE 20 MG/1
40 TABLET ORAL DAILY
Qty: 8 TABLET | Refills: 0 | Status: SHIPPED | OUTPATIENT
Start: 2025-08-12 | End: 2025-08-16

## 2025-08-18 ENCOUNTER — OFFICE VISIT (OUTPATIENT)
Dept: PHYSICAL THERAPY | Facility: CLINIC | Age: 30
End: 2025-08-18
Attending: NURSE PRACTITIONER
Payer: COMMERCIAL

## 2025-08-18 DIAGNOSIS — G89.29 CHRONIC LEFT SHOULDER PAIN: ICD-10-CM

## 2025-08-18 DIAGNOSIS — M25.552 LEFT HIP PAIN: Primary | ICD-10-CM

## 2025-08-18 DIAGNOSIS — M25.512 CHRONIC LEFT SHOULDER PAIN: ICD-10-CM

## 2025-08-18 PROCEDURE — 97112 NEUROMUSCULAR REEDUCATION: CPT

## 2025-08-18 PROCEDURE — 97110 THERAPEUTIC EXERCISES: CPT

## 2025-08-18 PROCEDURE — 97140 MANUAL THERAPY 1/> REGIONS: CPT

## 2025-08-19 ENCOUNTER — OFFICE VISIT (OUTPATIENT)
Dept: PHYSICAL THERAPY | Facility: CLINIC | Age: 30
End: 2025-08-19
Attending: NURSE PRACTITIONER
Payer: COMMERCIAL

## 2025-08-19 DIAGNOSIS — M25.512 CHRONIC LEFT SHOULDER PAIN: ICD-10-CM

## 2025-08-19 DIAGNOSIS — M25.552 LEFT HIP PAIN: Primary | ICD-10-CM

## 2025-08-19 DIAGNOSIS — G89.29 CHRONIC LEFT SHOULDER PAIN: ICD-10-CM

## 2025-08-19 PROCEDURE — 97112 NEUROMUSCULAR REEDUCATION: CPT

## 2025-08-19 PROCEDURE — 97110 THERAPEUTIC EXERCISES: CPT

## 2025-08-20 ENCOUNTER — SOCIAL WORK (OUTPATIENT)
Dept: BEHAVIORAL/MENTAL HEALTH CLINIC | Facility: CLINIC | Age: 30
End: 2025-08-20
Payer: COMMERCIAL

## 2025-08-20 DIAGNOSIS — F41.9 ANXIETY: ICD-10-CM

## 2025-08-20 DIAGNOSIS — F43.10 PTSD (POST-TRAUMATIC STRESS DISORDER): ICD-10-CM

## 2025-08-20 DIAGNOSIS — F60.3 BORDERLINE PERSONALITY DISORDER (HCC): ICD-10-CM

## 2025-08-20 DIAGNOSIS — F31.81 BIPOLAR 2 DISORDER (HCC): Primary | ICD-10-CM

## 2025-08-20 PROCEDURE — 90837 PSYTX W PT 60 MINUTES: CPT

## (undated) DEVICE — TROCAR: Brand: KII FIOS FIRST ENTRY

## (undated) DEVICE — TROCAR: Brand: KII® SLEEVE

## (undated) DEVICE — PACK PBDS LAP CHOLE RF

## (undated) DEVICE — EXOFIN PRECISION PEN HIGH VISCOSITY TOPICAL SKIN ADHESIVE: Brand: EXOFIN PRECISION PEN, 1G

## (undated) DEVICE — TISSUE RETRIEVAL SYSTEM: Brand: INZII RETRIEVAL SYSTEM

## (undated) DEVICE — CHLORAPREP HI-LITE 26ML ORANGE

## (undated) DEVICE — SUT VICRYL PLUS 0 UR-6 27IN VCP603H

## (undated) DEVICE — UNIVERSAL STAPLER: Brand: ENDO GIA ULTRA

## (undated) DEVICE — GLOVE SRG BIOGEL 7

## (undated) DEVICE — ADHESIVE SKIN CLOSURE SYS EXOFIN FUSION 22CM

## (undated) DEVICE — TROCAR SITE CLOSURE DEVICE: Brand: ENDO CLOSE

## (undated) DEVICE — INTENDED FOR TISSUE SEPARATION, AND OTHER PROCEDURES THAT REQUIRE A SHARP SURGICAL BLADE TO PUNCTURE OR CUT.: Brand: BARD-PARKER SAFETY BLADES SIZE 15, STERILE

## (undated) DEVICE — Device

## (undated) DEVICE — GLOVE INDICATOR PI UNDERGLOVE SZ 7.5 BLUE

## (undated) DEVICE — PENCILETTE PUSH BUTTON COATED

## (undated) DEVICE — Device: Brand: OMNICLOSE TROCAR SITE CLOSURE DEVICE

## (undated) DEVICE — ADHESIVE SKIN HIGH VISCOSITY EXOFIN 1ML

## (undated) DEVICE — ARTICULATION RELOAD WITH TRI-STAPLE TECHNOLOGY: Brand: ENDO GIA

## (undated) DEVICE — SUT MONOCRYL 4-0 PS-2 18 IN Y496G

## (undated) DEVICE — REM POLYHESIVE ADULT PATIENT RETURN ELECTRODE: Brand: VALLEYLAB

## (undated) DEVICE — INSUFFLATION NEEDLE TO ESTABLISH PNEUMOPERITONEUM.: Brand: INSUFFLATION NEEDLE